# Patient Record
Sex: MALE | Race: BLACK OR AFRICAN AMERICAN | NOT HISPANIC OR LATINO
[De-identification: names, ages, dates, MRNs, and addresses within clinical notes are randomized per-mention and may not be internally consistent; named-entity substitution may affect disease eponyms.]

---

## 2017-01-02 ENCOUNTER — RESULT REVIEW (OUTPATIENT)
Age: 72
End: 2017-01-02

## 2017-01-03 ENCOUNTER — APPOINTMENT (OUTPATIENT)
Dept: INFUSION THERAPY | Facility: HOSPITAL | Age: 72
End: 2017-01-03

## 2017-01-04 ENCOUNTER — APPOINTMENT (OUTPATIENT)
Dept: NEPHROLOGY | Facility: CLINIC | Age: 72
End: 2017-01-04

## 2017-01-06 ENCOUNTER — LABORATORY RESULT (OUTPATIENT)
Age: 72
End: 2017-01-06

## 2017-01-06 ENCOUNTER — OUTPATIENT (OUTPATIENT)
Dept: OUTPATIENT SERVICES | Facility: HOSPITAL | Age: 72
LOS: 1 days | Discharge: HOME | End: 2017-01-06

## 2017-01-06 ENCOUNTER — APPOINTMENT (OUTPATIENT)
Dept: SPEECH THERAPY | Facility: CLINIC | Age: 72
End: 2017-01-06

## 2017-01-06 ENCOUNTER — APPOINTMENT (OUTPATIENT)
Dept: INFUSION THERAPY | Facility: HOSPITAL | Age: 72
End: 2017-01-06

## 2017-01-06 DIAGNOSIS — Z98.89 OTHER SPECIFIED POSTPROCEDURAL STATES: Chronic | ICD-10-CM

## 2017-01-06 DIAGNOSIS — Z90.49 ACQUIRED ABSENCE OF OTHER SPECIFIED PARTS OF DIGESTIVE TRACT: Chronic | ICD-10-CM

## 2017-01-09 ENCOUNTER — RESULT REVIEW (OUTPATIENT)
Age: 72
End: 2017-01-09

## 2017-01-10 ENCOUNTER — APPOINTMENT (OUTPATIENT)
Dept: INFUSION THERAPY | Facility: HOSPITAL | Age: 72
End: 2017-01-10

## 2017-01-10 ENCOUNTER — APPOINTMENT (OUTPATIENT)
Dept: HEMATOLOGY ONCOLOGY | Facility: CLINIC | Age: 72
End: 2017-01-10

## 2017-01-10 VITALS
OXYGEN SATURATION: 100 % | HEART RATE: 59 BPM | SYSTOLIC BLOOD PRESSURE: 115 MMHG | TEMPERATURE: 97.4 F | BODY MASS INDEX: 19.68 KG/M2 | RESPIRATION RATE: 16 BRPM | WEIGHT: 137.13 LBS | DIASTOLIC BLOOD PRESSURE: 76 MMHG

## 2017-01-13 ENCOUNTER — LABORATORY RESULT (OUTPATIENT)
Age: 72
End: 2017-01-13

## 2017-01-13 ENCOUNTER — APPOINTMENT (OUTPATIENT)
Dept: INFUSION THERAPY | Facility: HOSPITAL | Age: 72
End: 2017-01-13

## 2017-01-13 ENCOUNTER — APPOINTMENT (OUTPATIENT)
Dept: SPEECH THERAPY | Facility: CLINIC | Age: 72
End: 2017-01-13

## 2017-01-16 ENCOUNTER — RESULT REVIEW (OUTPATIENT)
Age: 72
End: 2017-01-16

## 2017-01-17 ENCOUNTER — APPOINTMENT (OUTPATIENT)
Dept: INFUSION THERAPY | Facility: HOSPITAL | Age: 72
End: 2017-01-17

## 2017-01-18 ENCOUNTER — APPOINTMENT (OUTPATIENT)
Dept: RADIATION ONCOLOGY | Facility: CLINIC | Age: 72
End: 2017-01-18

## 2017-01-18 ENCOUNTER — APPOINTMENT (OUTPATIENT)
Dept: SPEECH THERAPY | Facility: CLINIC | Age: 72
End: 2017-01-18

## 2017-01-18 VITALS
WEIGHT: 139 LBS | HEIGHT: 70 IN | OXYGEN SATURATION: 95 % | BODY MASS INDEX: 19.9 KG/M2 | DIASTOLIC BLOOD PRESSURE: 71 MMHG | SYSTOLIC BLOOD PRESSURE: 147 MMHG | HEART RATE: 61 BPM

## 2017-01-18 DIAGNOSIS — R13.12 DYSPHAGIA, OROPHARYNGEAL PHASE: ICD-10-CM

## 2017-01-20 ENCOUNTER — LABORATORY RESULT (OUTPATIENT)
Age: 72
End: 2017-01-20

## 2017-01-20 ENCOUNTER — APPOINTMENT (OUTPATIENT)
Dept: INFUSION THERAPY | Facility: HOSPITAL | Age: 72
End: 2017-01-20

## 2017-01-23 ENCOUNTER — RESULT REVIEW (OUTPATIENT)
Age: 72
End: 2017-01-23

## 2017-01-24 ENCOUNTER — APPOINTMENT (OUTPATIENT)
Dept: INFUSION THERAPY | Facility: HOSPITAL | Age: 72
End: 2017-01-24

## 2017-01-25 ENCOUNTER — RESULT REVIEW (OUTPATIENT)
Age: 72
End: 2017-01-25

## 2017-01-26 ENCOUNTER — LABORATORY RESULT (OUTPATIENT)
Age: 72
End: 2017-01-26

## 2017-01-26 ENCOUNTER — APPOINTMENT (OUTPATIENT)
Dept: INFUSION THERAPY | Facility: HOSPITAL | Age: 72
End: 2017-01-26

## 2017-01-30 ENCOUNTER — RESULT REVIEW (OUTPATIENT)
Age: 72
End: 2017-01-30

## 2017-01-30 ENCOUNTER — OUTPATIENT (OUTPATIENT)
Dept: OUTPATIENT SERVICES | Facility: HOSPITAL | Age: 72
LOS: 1 days | Discharge: HOME | End: 2017-01-30
Payer: MEDICARE

## 2017-01-30 DIAGNOSIS — Z98.89 OTHER SPECIFIED POSTPROCEDURAL STATES: Chronic | ICD-10-CM

## 2017-01-30 DIAGNOSIS — Z90.49 ACQUIRED ABSENCE OF OTHER SPECIFIED PARTS OF DIGESTIVE TRACT: Chronic | ICD-10-CM

## 2017-01-30 DIAGNOSIS — C11.9 MALIGNANT NEOPLASM OF NASOPHARYNX, UNSPECIFIED: ICD-10-CM

## 2017-01-31 ENCOUNTER — LABORATORY RESULT (OUTPATIENT)
Age: 72
End: 2017-01-31

## 2017-01-31 ENCOUNTER — APPOINTMENT (OUTPATIENT)
Dept: INFUSION THERAPY | Facility: HOSPITAL | Age: 72
End: 2017-01-31

## 2017-01-31 LAB
BASOPHILS # BLD AUTO: 0 K/UL — SIGNIFICANT CHANGE UP (ref 0–0.2)
BASOPHILS NFR BLD AUTO: 0 % — SIGNIFICANT CHANGE UP (ref 0–2)
EOSINOPHIL # BLD AUTO: 0.1 K/UL — SIGNIFICANT CHANGE UP (ref 0–0.5)
EOSINOPHIL NFR BLD AUTO: 1.1 % — SIGNIFICANT CHANGE UP (ref 0–6)
HCT VFR BLD CALC: 33.5 % — LOW (ref 39–50)
HGB BLD-MCNC: 11.5 G/DL — LOW (ref 13–17)
LYMPHOCYTES # BLD AUTO: 1.3 K/UL — SIGNIFICANT CHANGE UP (ref 1–3.3)
LYMPHOCYTES # BLD AUTO: 18.9 % — SIGNIFICANT CHANGE UP (ref 13–44)
MCHC RBC-ENTMCNC: 34.2 GM/DL — SIGNIFICANT CHANGE UP (ref 32–36)
MCHC RBC-ENTMCNC: 35.4 PG — HIGH (ref 27–34)
MCV RBC AUTO: 103 FL — HIGH (ref 80–100)
MONOCYTES # BLD AUTO: 0.4 K/UL — SIGNIFICANT CHANGE UP (ref 0–0.9)
MONOCYTES NFR BLD AUTO: 5.6 % — SIGNIFICANT CHANGE UP (ref 2–14)
NEUTROPHILS # BLD AUTO: 5.1 K/UL — SIGNIFICANT CHANGE UP (ref 1.8–7.4)
NEUTROPHILS NFR BLD AUTO: 74.4 % — SIGNIFICANT CHANGE UP (ref 43–77)
PLATELET # BLD AUTO: 262 K/UL — SIGNIFICANT CHANGE UP (ref 150–400)
RBC # BLD: 3.25 M/UL — LOW (ref 4.2–5.8)
RBC # FLD: 17 % — HIGH (ref 10.3–14.5)
WBC # BLD: 6.8 K/UL — SIGNIFICANT CHANGE UP (ref 3.8–10.5)
WBC # FLD AUTO: 6.8 K/UL — SIGNIFICANT CHANGE UP (ref 3.8–10.5)

## 2017-02-01 DIAGNOSIS — E86.0 DEHYDRATION: ICD-10-CM

## 2017-02-03 ENCOUNTER — APPOINTMENT (OUTPATIENT)
Dept: INFUSION THERAPY | Facility: HOSPITAL | Age: 72
End: 2017-02-03

## 2017-02-06 ENCOUNTER — RESULT REVIEW (OUTPATIENT)
Age: 72
End: 2017-02-06

## 2017-02-07 ENCOUNTER — APPOINTMENT (OUTPATIENT)
Dept: INFUSION THERAPY | Facility: HOSPITAL | Age: 72
End: 2017-02-07

## 2017-02-07 ENCOUNTER — LABORATORY RESULT (OUTPATIENT)
Age: 72
End: 2017-02-07

## 2017-02-07 ENCOUNTER — APPOINTMENT (OUTPATIENT)
Dept: HEMATOLOGY ONCOLOGY | Facility: CLINIC | Age: 72
End: 2017-02-07

## 2017-02-07 VITALS
TEMPERATURE: 98.1 F | HEART RATE: 68 BPM | DIASTOLIC BLOOD PRESSURE: 66 MMHG | WEIGHT: 134.48 LBS | RESPIRATION RATE: 16 BRPM | SYSTOLIC BLOOD PRESSURE: 102 MMHG | OXYGEN SATURATION: 98 % | BODY MASS INDEX: 19.3 KG/M2

## 2017-02-07 LAB
BASOPHILS # BLD AUTO: 0 K/UL — SIGNIFICANT CHANGE UP (ref 0–0.2)
BASOPHILS NFR BLD AUTO: 2 % — SIGNIFICANT CHANGE UP (ref 0–2)
EOSINOPHIL # BLD AUTO: 0.1 K/UL — SIGNIFICANT CHANGE UP (ref 0–0.5)
EOSINOPHIL NFR BLD AUTO: 2 % — SIGNIFICANT CHANGE UP (ref 0–6)
HCT VFR BLD CALC: 32.6 % — LOW (ref 39–50)
HGB BLD-MCNC: 10.8 G/DL — LOW (ref 13–17)
LYMPHOCYTES # BLD AUTO: 1.2 K/UL — SIGNIFICANT CHANGE UP (ref 1–3.3)
LYMPHOCYTES # BLD AUTO: 40 % — SIGNIFICANT CHANGE UP (ref 13–44)
MCHC RBC-ENTMCNC: 33.2 GM/DL — SIGNIFICANT CHANGE UP (ref 32–36)
MCHC RBC-ENTMCNC: 35.1 PG — HIGH (ref 27–34)
MCV RBC AUTO: 106 FL — HIGH (ref 80–100)
METAMYELOCYTES # FLD: 2 % — HIGH (ref 0–0)
MONOCYTES # BLD AUTO: 0.6 K/UL — SIGNIFICANT CHANGE UP (ref 0–0.9)
MONOCYTES NFR BLD AUTO: 17 % — HIGH (ref 2–14)
NEUTROPHILS # BLD AUTO: 1.6 K/UL — LOW (ref 1.8–7.4)
NEUTROPHILS NFR BLD AUTO: 37 % — LOW (ref 43–77)
PLAT MORPH BLD: NORMAL — SIGNIFICANT CHANGE UP
PLATELET # BLD AUTO: 285 K/UL — SIGNIFICANT CHANGE UP (ref 150–400)
RBC # BLD: 3.08 M/UL — LOW (ref 4.2–5.8)
RBC # FLD: 16.6 % — HIGH (ref 10.3–14.5)
RBC BLD AUTO: SIGNIFICANT CHANGE UP
WBC # BLD: 3.5 K/UL — LOW (ref 3.8–10.5)
WBC # FLD AUTO: 3.5 K/UL — LOW (ref 3.8–10.5)

## 2017-02-10 ENCOUNTER — APPOINTMENT (OUTPATIENT)
Dept: INFUSION THERAPY | Facility: HOSPITAL | Age: 72
End: 2017-02-10

## 2017-02-13 ENCOUNTER — RESULT REVIEW (OUTPATIENT)
Age: 72
End: 2017-02-13

## 2017-02-14 ENCOUNTER — APPOINTMENT (OUTPATIENT)
Dept: INFUSION THERAPY | Facility: HOSPITAL | Age: 72
End: 2017-02-14

## 2017-02-14 LAB
BASOPHILS # BLD AUTO: 0 K/UL — SIGNIFICANT CHANGE UP (ref 0–0.2)
BASOPHILS NFR BLD AUTO: 0.5 % — SIGNIFICANT CHANGE UP (ref 0–2)
EOSINOPHIL # BLD AUTO: 0 K/UL — SIGNIFICANT CHANGE UP (ref 0–0.5)
EOSINOPHIL NFR BLD AUTO: 0 % — SIGNIFICANT CHANGE UP (ref 0–6)
HCT VFR BLD CALC: 37.2 % — LOW (ref 39–50)
HGB BLD-MCNC: 12.3 G/DL — LOW (ref 13–17)
LYMPHOCYTES # BLD AUTO: 1.5 K/UL — SIGNIFICANT CHANGE UP (ref 1–3.3)
LYMPHOCYTES # BLD AUTO: 31.8 % — SIGNIFICANT CHANGE UP (ref 13–44)
MCHC RBC-ENTMCNC: 33 G/DL — SIGNIFICANT CHANGE UP (ref 32–36)
MCHC RBC-ENTMCNC: 34.3 PG — HIGH (ref 27–34)
MCV RBC AUTO: 104 FL — HIGH (ref 80–100)
MONOCYTES # BLD AUTO: 0.6 K/UL — SIGNIFICANT CHANGE UP (ref 0–0.9)
MONOCYTES NFR BLD AUTO: 12.4 % — SIGNIFICANT CHANGE UP (ref 2–14)
NEUTROPHILS # BLD AUTO: 2.7 K/UL — SIGNIFICANT CHANGE UP (ref 1.8–7.4)
NEUTROPHILS NFR BLD AUTO: 55.2 % — SIGNIFICANT CHANGE UP (ref 43–77)
PLATELET # BLD AUTO: 308 K/UL — SIGNIFICANT CHANGE UP (ref 150–400)
RBC # BLD: 3.58 M/UL — LOW (ref 4.2–5.8)
RBC # FLD: 16.1 % — HIGH (ref 10.3–14.5)
WBC # BLD: 4.8 K/UL — SIGNIFICANT CHANGE UP (ref 3.8–10.5)
WBC # FLD AUTO: 4.8 K/UL — SIGNIFICANT CHANGE UP (ref 3.8–10.5)

## 2017-02-17 ENCOUNTER — APPOINTMENT (OUTPATIENT)
Dept: INFUSION THERAPY | Facility: HOSPITAL | Age: 72
End: 2017-02-17

## 2017-02-17 PROCEDURE — 93010 ELECTROCARDIOGRAM REPORT: CPT

## 2017-02-20 ENCOUNTER — RESULT REVIEW (OUTPATIENT)
Age: 72
End: 2017-02-20

## 2017-02-21 ENCOUNTER — APPOINTMENT (OUTPATIENT)
Dept: INFUSION THERAPY | Facility: HOSPITAL | Age: 72
End: 2017-02-21

## 2017-02-21 ENCOUNTER — LABORATORY RESULT (OUTPATIENT)
Age: 72
End: 2017-02-21

## 2017-02-21 LAB
BASOPHILS # BLD AUTO: 0 K/UL — SIGNIFICANT CHANGE UP (ref 0–0.2)
BASOPHILS NFR BLD AUTO: 0.3 % — SIGNIFICANT CHANGE UP (ref 0–2)
EOSINOPHIL # BLD AUTO: 0.2 K/UL — SIGNIFICANT CHANGE UP (ref 0–0.5)
EOSINOPHIL NFR BLD AUTO: 4.2 % — SIGNIFICANT CHANGE UP (ref 0–6)
HCT VFR BLD CALC: 33.4 % — LOW (ref 39–50)
HGB BLD-MCNC: 11.4 G/DL — LOW (ref 13–17)
LYMPHOCYTES # BLD AUTO: 1.3 K/UL — SIGNIFICANT CHANGE UP (ref 1–3.3)
LYMPHOCYTES # BLD AUTO: 28.9 % — SIGNIFICANT CHANGE UP (ref 13–44)
MCHC RBC-ENTMCNC: 34.2 G/DL — SIGNIFICANT CHANGE UP (ref 32–36)
MCHC RBC-ENTMCNC: 35.6 PG — HIGH (ref 27–34)
MCV RBC AUTO: 104 FL — HIGH (ref 80–100)
MONOCYTES # BLD AUTO: 0.7 K/UL — SIGNIFICANT CHANGE UP (ref 0–0.9)
MONOCYTES NFR BLD AUTO: 14.7 % — HIGH (ref 2–14)
NEUTROPHILS # BLD AUTO: 2.4 K/UL — SIGNIFICANT CHANGE UP (ref 1.8–7.4)
NEUTROPHILS NFR BLD AUTO: 51.9 % — SIGNIFICANT CHANGE UP (ref 43–77)
PLATELET # BLD AUTO: 254 K/UL — SIGNIFICANT CHANGE UP (ref 150–400)
RBC # BLD: 3.2 M/UL — LOW (ref 4.2–5.8)
RBC # FLD: 15.9 % — HIGH (ref 10.3–14.5)
WBC # BLD: 4.6 K/UL — SIGNIFICANT CHANGE UP (ref 3.8–10.5)
WBC # FLD AUTO: 4.6 K/UL — SIGNIFICANT CHANGE UP (ref 3.8–10.5)

## 2017-02-22 ENCOUNTER — APPOINTMENT (OUTPATIENT)
Dept: NEPHROLOGY | Facility: CLINIC | Age: 72
End: 2017-02-22

## 2017-02-22 VITALS
DIASTOLIC BLOOD PRESSURE: 85 MMHG | BODY MASS INDEX: 19.61 KG/M2 | SYSTOLIC BLOOD PRESSURE: 151 MMHG | HEIGHT: 70 IN | OXYGEN SATURATION: 98 % | HEART RATE: 66 BPM | WEIGHT: 137 LBS

## 2017-02-22 VITALS — SYSTOLIC BLOOD PRESSURE: 130 MMHG | DIASTOLIC BLOOD PRESSURE: 80 MMHG

## 2017-02-22 RX ORDER — FLUCONAZOLE 40 MG/ML
40 POWDER, FOR SUSPENSION ORAL
Qty: 1 | Refills: 0 | Status: DISCONTINUED | COMMUNITY
Start: 2017-01-18 | End: 2017-02-22

## 2017-02-23 DIAGNOSIS — Z51.11 ENCOUNTER FOR ANTINEOPLASTIC CHEMOTHERAPY: ICD-10-CM

## 2017-02-23 DIAGNOSIS — R11.2 NAUSEA WITH VOMITING, UNSPECIFIED: ICD-10-CM

## 2017-02-24 ENCOUNTER — APPOINTMENT (OUTPATIENT)
Dept: INFUSION THERAPY | Facility: HOSPITAL | Age: 72
End: 2017-02-24

## 2017-02-27 ENCOUNTER — RESULT REVIEW (OUTPATIENT)
Age: 72
End: 2017-02-27

## 2017-02-27 ENCOUNTER — OUTPATIENT (OUTPATIENT)
Dept: OUTPATIENT SERVICES | Facility: HOSPITAL | Age: 72
LOS: 1 days | Discharge: HOME | End: 2017-02-27

## 2017-02-27 DIAGNOSIS — C11.9 MALIGNANT NEOPLASM OF NASOPHARYNX, UNSPECIFIED: ICD-10-CM

## 2017-02-27 DIAGNOSIS — Z98.89 OTHER SPECIFIED POSTPROCEDURAL STATES: Chronic | ICD-10-CM

## 2017-02-27 DIAGNOSIS — Z90.49 ACQUIRED ABSENCE OF OTHER SPECIFIED PARTS OF DIGESTIVE TRACT: Chronic | ICD-10-CM

## 2017-02-28 ENCOUNTER — LABORATORY RESULT (OUTPATIENT)
Age: 72
End: 2017-02-28

## 2017-02-28 ENCOUNTER — APPOINTMENT (OUTPATIENT)
Dept: INFUSION THERAPY | Facility: HOSPITAL | Age: 72
End: 2017-02-28

## 2017-02-28 LAB
BASO STIPL BLD QL SMEAR: PRESENT — SIGNIFICANT CHANGE UP
BASOPHILS # BLD AUTO: 0 K/UL — SIGNIFICANT CHANGE UP (ref 0–0.2)
BASOPHILS NFR BLD AUTO: 1 % — SIGNIFICANT CHANGE UP (ref 0–2)
EOSINOPHIL # BLD AUTO: 0 K/UL — SIGNIFICANT CHANGE UP (ref 0–0.5)
EOSINOPHIL NFR BLD AUTO: 1 % — SIGNIFICANT CHANGE UP (ref 0–6)
HCT VFR BLD CALC: 34.7 % — LOW (ref 39–50)
HGB BLD-MCNC: 12 G/DL — LOW (ref 13–17)
LYMPHOCYTES # BLD AUTO: 1.5 K/UL — SIGNIFICANT CHANGE UP (ref 1–3.3)
LYMPHOCYTES # BLD AUTO: 18 % — SIGNIFICANT CHANGE UP (ref 13–44)
MCHC RBC-ENTMCNC: 34.6 G/DL — SIGNIFICANT CHANGE UP (ref 32–36)
MCHC RBC-ENTMCNC: 35.5 PG — HIGH (ref 27–34)
MCV RBC AUTO: 103 FL — HIGH (ref 80–100)
MONOCYTES # BLD AUTO: 0.5 K/UL — SIGNIFICANT CHANGE UP (ref 0–0.9)
MONOCYTES NFR BLD AUTO: 15 % — HIGH (ref 2–14)
NEUTROPHILS # BLD AUTO: 3.4 K/UL — SIGNIFICANT CHANGE UP (ref 1.8–7.4)
NEUTROPHILS NFR BLD AUTO: 65 % — SIGNIFICANT CHANGE UP (ref 43–77)
PLAT MORPH BLD: NORMAL — SIGNIFICANT CHANGE UP
PLATELET # BLD AUTO: 214 K/UL — SIGNIFICANT CHANGE UP (ref 150–400)
RBC # BLD: 3.39 M/UL — LOW (ref 4.2–5.8)
RBC # FLD: 15.8 % — HIGH (ref 10.3–14.5)
RBC BLD AUTO: SIGNIFICANT CHANGE UP
WBC # BLD: 5.4 K/UL — SIGNIFICANT CHANGE UP (ref 3.8–10.5)
WBC # FLD AUTO: 5.4 K/UL — SIGNIFICANT CHANGE UP (ref 3.8–10.5)

## 2017-03-01 DIAGNOSIS — R11.2 NAUSEA WITH VOMITING, UNSPECIFIED: ICD-10-CM

## 2017-03-01 DIAGNOSIS — Z51.11 ENCOUNTER FOR ANTINEOPLASTIC CHEMOTHERAPY: ICD-10-CM

## 2017-03-01 DIAGNOSIS — E86.0 DEHYDRATION: ICD-10-CM

## 2017-03-04 ENCOUNTER — APPOINTMENT (OUTPATIENT)
Dept: INFUSION THERAPY | Facility: HOSPITAL | Age: 72
End: 2017-03-04

## 2017-03-05 ENCOUNTER — RESULT REVIEW (OUTPATIENT)
Age: 72
End: 2017-03-05

## 2017-03-06 ENCOUNTER — APPOINTMENT (OUTPATIENT)
Dept: INFUSION THERAPY | Facility: HOSPITAL | Age: 72
End: 2017-03-06

## 2017-03-06 LAB
EOSINOPHIL # BLD AUTO: 0 K/UL — SIGNIFICANT CHANGE UP (ref 0–0.5)
EOSINOPHIL NFR BLD AUTO: 4 % — SIGNIFICANT CHANGE UP (ref 0–6)
HCT VFR BLD CALC: 35.2 % — LOW (ref 39–50)
HGB BLD-MCNC: 11.9 G/DL — LOW (ref 13–17)
LYMPHOCYTES # BLD AUTO: 1 K/UL — SIGNIFICANT CHANGE UP (ref 1–3.3)
LYMPHOCYTES # BLD AUTO: 27 % — SIGNIFICANT CHANGE UP (ref 13–44)
MCHC RBC-ENTMCNC: 33.9 G/DL — SIGNIFICANT CHANGE UP (ref 32–36)
MCHC RBC-ENTMCNC: 35.2 PG — HIGH (ref 27–34)
MCV RBC AUTO: 104 FL — HIGH (ref 80–100)
MONOCYTES # BLD AUTO: 0.3 K/UL — SIGNIFICANT CHANGE UP (ref 0–0.9)
MONOCYTES NFR BLD AUTO: 10 % — SIGNIFICANT CHANGE UP (ref 2–14)
NEUTROPHILS # BLD AUTO: 2.1 K/UL — SIGNIFICANT CHANGE UP (ref 1.8–7.4)
NEUTROPHILS NFR BLD AUTO: 59 % — SIGNIFICANT CHANGE UP (ref 43–77)
NRBC # BLD: 1 /100 — HIGH (ref 0–0)
PLAT MORPH BLD: NORMAL — SIGNIFICANT CHANGE UP
PLATELET # BLD AUTO: 245 K/UL — SIGNIFICANT CHANGE UP (ref 150–400)
RBC # BLD: 3.39 M/UL — LOW (ref 4.2–5.8)
RBC # FLD: 16 % — HIGH (ref 10.3–14.5)
RBC BLD AUTO: SIGNIFICANT CHANGE UP
WBC # BLD: 3.5 K/UL — LOW (ref 3.8–10.5)
WBC # FLD AUTO: 3.5 K/UL — LOW (ref 3.8–10.5)

## 2017-03-07 ENCOUNTER — OTHER (OUTPATIENT)
Age: 72
End: 2017-03-07

## 2017-03-10 ENCOUNTER — APPOINTMENT (OUTPATIENT)
Dept: INFUSION THERAPY | Facility: HOSPITAL | Age: 72
End: 2017-03-10

## 2017-03-12 ENCOUNTER — RESULT REVIEW (OUTPATIENT)
Age: 72
End: 2017-03-12

## 2017-03-13 ENCOUNTER — LABORATORY RESULT (OUTPATIENT)
Age: 72
End: 2017-03-13

## 2017-03-13 ENCOUNTER — APPOINTMENT (OUTPATIENT)
Dept: INFUSION THERAPY | Facility: HOSPITAL | Age: 72
End: 2017-03-13

## 2017-03-13 LAB
BASOPHILS # BLD AUTO: 0 K/UL — SIGNIFICANT CHANGE UP (ref 0–0.2)
BASOPHILS NFR BLD AUTO: 0.7 % — SIGNIFICANT CHANGE UP (ref 0–2)
EOSINOPHIL # BLD AUTO: 0.2 K/UL — SIGNIFICANT CHANGE UP (ref 0–0.5)
EOSINOPHIL NFR BLD AUTO: 4.3 % — SIGNIFICANT CHANGE UP (ref 0–6)
HCT VFR BLD CALC: 33.8 % — LOW (ref 39–50)
HGB BLD-MCNC: 11.8 G/DL — LOW (ref 13–17)
LYMPHOCYTES # BLD AUTO: 1.4 K/UL — SIGNIFICANT CHANGE UP (ref 1–3.3)
LYMPHOCYTES # BLD AUTO: 28.1 % — SIGNIFICANT CHANGE UP (ref 13–44)
MCHC RBC-ENTMCNC: 34.9 G/DL — SIGNIFICANT CHANGE UP (ref 32–36)
MCHC RBC-ENTMCNC: 35.6 PG — HIGH (ref 27–34)
MCV RBC AUTO: 102 FL — HIGH (ref 80–100)
MONOCYTES # BLD AUTO: 0.4 K/UL — SIGNIFICANT CHANGE UP (ref 0–0.9)
MONOCYTES NFR BLD AUTO: 7.8 % — SIGNIFICANT CHANGE UP (ref 2–14)
NEUTROPHILS # BLD AUTO: 2.8 K/UL — SIGNIFICANT CHANGE UP (ref 1.8–7.4)
NEUTROPHILS NFR BLD AUTO: 59.1 % — SIGNIFICANT CHANGE UP (ref 43–77)
PLATELET # BLD AUTO: 210 K/UL — SIGNIFICANT CHANGE UP (ref 150–400)
RBC # BLD: 3.31 M/UL — LOW (ref 4.2–5.8)
RBC # FLD: 15.9 % — HIGH (ref 10.3–14.5)
WBC # BLD: 4.8 K/UL — SIGNIFICANT CHANGE UP (ref 3.8–10.5)
WBC # FLD AUTO: 4.8 K/UL — SIGNIFICANT CHANGE UP (ref 3.8–10.5)

## 2017-03-14 ENCOUNTER — APPOINTMENT (OUTPATIENT)
Dept: INFUSION THERAPY | Facility: HOSPITAL | Age: 72
End: 2017-03-14

## 2017-03-17 ENCOUNTER — APPOINTMENT (OUTPATIENT)
Dept: INFUSION THERAPY | Facility: HOSPITAL | Age: 72
End: 2017-03-17

## 2017-03-20 ENCOUNTER — RESULT REVIEW (OUTPATIENT)
Age: 72
End: 2017-03-20

## 2017-03-21 ENCOUNTER — LABORATORY RESULT (OUTPATIENT)
Age: 72
End: 2017-03-21

## 2017-03-21 ENCOUNTER — APPOINTMENT (OUTPATIENT)
Dept: INFUSION THERAPY | Facility: HOSPITAL | Age: 72
End: 2017-03-21

## 2017-03-21 LAB
BASOPHILS # BLD AUTO: 0 K/UL — SIGNIFICANT CHANGE UP (ref 0–0.2)
BASOPHILS NFR BLD AUTO: 0.2 % — SIGNIFICANT CHANGE UP (ref 0–2)
EOSINOPHIL # BLD AUTO: 0.2 K/UL — SIGNIFICANT CHANGE UP (ref 0–0.5)
EOSINOPHIL NFR BLD AUTO: 4.2 % — SIGNIFICANT CHANGE UP (ref 0–6)
HCT VFR BLD CALC: 34.8 % — LOW (ref 39–50)
HGB BLD-MCNC: 12.2 G/DL — LOW (ref 13–17)
LYMPHOCYTES # BLD AUTO: 1.2 K/UL — SIGNIFICANT CHANGE UP (ref 1–3.3)
LYMPHOCYTES # BLD AUTO: 28.2 % — SIGNIFICANT CHANGE UP (ref 13–44)
MCHC RBC-ENTMCNC: 35 G/DL — SIGNIFICANT CHANGE UP (ref 32–36)
MCHC RBC-ENTMCNC: 35.8 PG — HIGH (ref 27–34)
MCV RBC AUTO: 102 FL — HIGH (ref 80–100)
MONOCYTES # BLD AUTO: 0.2 K/UL — SIGNIFICANT CHANGE UP (ref 0–0.9)
MONOCYTES NFR BLD AUTO: 5.5 % — SIGNIFICANT CHANGE UP (ref 2–14)
NEUTROPHILS # BLD AUTO: 2.8 K/UL — SIGNIFICANT CHANGE UP (ref 1.8–7.4)
NEUTROPHILS NFR BLD AUTO: 62 % — SIGNIFICANT CHANGE UP (ref 43–77)
PLATELET # BLD AUTO: 215 K/UL — SIGNIFICANT CHANGE UP (ref 150–400)
RBC # BLD: 3.4 M/UL — LOW (ref 4.2–5.8)
RBC # FLD: 16 % — HIGH (ref 10.3–14.5)
WBC # BLD: 4.4 K/UL — SIGNIFICANT CHANGE UP (ref 3.8–10.5)
WBC # FLD AUTO: 4.4 K/UL — SIGNIFICANT CHANGE UP (ref 3.8–10.5)

## 2017-03-23 ENCOUNTER — RESULT REVIEW (OUTPATIENT)
Age: 72
End: 2017-03-23

## 2017-03-24 ENCOUNTER — LABORATORY RESULT (OUTPATIENT)
Age: 72
End: 2017-03-24

## 2017-03-24 ENCOUNTER — APPOINTMENT (OUTPATIENT)
Dept: INFUSION THERAPY | Facility: HOSPITAL | Age: 72
End: 2017-03-24

## 2017-03-24 LAB
BASOPHILS # BLD AUTO: 0 K/UL — SIGNIFICANT CHANGE UP (ref 0–0.2)
EOSINOPHIL # BLD AUTO: 0 K/UL — SIGNIFICANT CHANGE UP (ref 0–0.5)
EOSINOPHIL NFR BLD AUTO: 2 % — SIGNIFICANT CHANGE UP (ref 0–6)
HCT VFR BLD CALC: 33.1 % — LOW (ref 39–50)
HGB BLD-MCNC: 12 G/DL — LOW (ref 13–17)
LYMPHOCYTES # BLD AUTO: 1 K/UL — SIGNIFICANT CHANGE UP (ref 1–3.3)
LYMPHOCYTES # BLD AUTO: 35 % — SIGNIFICANT CHANGE UP (ref 13–44)
MCHC RBC-ENTMCNC: 36.3 G/DL — HIGH (ref 32–36)
MCHC RBC-ENTMCNC: 37 PG — HIGH (ref 27–34)
MCV RBC AUTO: 102 FL — HIGH (ref 80–100)
MONOCYTES # BLD AUTO: 0.6 K/UL — SIGNIFICANT CHANGE UP (ref 0–0.9)
MONOCYTES NFR BLD AUTO: 17 % — HIGH (ref 2–14)
NEUTROPHILS # BLD AUTO: 2.3 K/UL — SIGNIFICANT CHANGE UP (ref 1.8–7.4)
NEUTROPHILS NFR BLD AUTO: 46 % — SIGNIFICANT CHANGE UP (ref 43–77)
PLAT MORPH BLD: NORMAL — SIGNIFICANT CHANGE UP
PLATELET # BLD AUTO: 244 K/UL — SIGNIFICANT CHANGE UP (ref 150–400)
RBC # BLD: 3.25 M/UL — LOW (ref 4.2–5.8)
RBC # FLD: 16.1 % — HIGH (ref 10.3–14.5)
RBC BLD AUTO: SIGNIFICANT CHANGE UP
WBC # BLD: 4 K/UL — SIGNIFICANT CHANGE UP (ref 3.8–10.5)
WBC # FLD AUTO: 4 K/UL — SIGNIFICANT CHANGE UP (ref 3.8–10.5)

## 2017-03-27 ENCOUNTER — RESULT REVIEW (OUTPATIENT)
Age: 72
End: 2017-03-27

## 2017-03-27 ENCOUNTER — OUTPATIENT (OUTPATIENT)
Dept: OUTPATIENT SERVICES | Facility: HOSPITAL | Age: 72
LOS: 1 days | Discharge: HOME | End: 2017-03-27

## 2017-03-27 DIAGNOSIS — Z98.89 OTHER SPECIFIED POSTPROCEDURAL STATES: Chronic | ICD-10-CM

## 2017-03-27 DIAGNOSIS — C11.9 MALIGNANT NEOPLASM OF NASOPHARYNX, UNSPECIFIED: ICD-10-CM

## 2017-03-27 DIAGNOSIS — Z90.49 ACQUIRED ABSENCE OF OTHER SPECIFIED PARTS OF DIGESTIVE TRACT: Chronic | ICD-10-CM

## 2017-03-28 ENCOUNTER — LABORATORY RESULT (OUTPATIENT)
Age: 72
End: 2017-03-28

## 2017-03-28 ENCOUNTER — APPOINTMENT (OUTPATIENT)
Dept: HEMATOLOGY ONCOLOGY | Facility: CLINIC | Age: 72
End: 2017-03-28

## 2017-03-28 ENCOUNTER — APPOINTMENT (OUTPATIENT)
Dept: INFUSION THERAPY | Facility: HOSPITAL | Age: 72
End: 2017-03-28

## 2017-03-28 LAB
BASOPHILS # BLD AUTO: 0 K/UL — SIGNIFICANT CHANGE UP (ref 0–0.2)
BASOPHILS NFR BLD AUTO: 2 % — SIGNIFICANT CHANGE UP (ref 0–2)
EOSINOPHIL # BLD AUTO: 0.2 K/UL — SIGNIFICANT CHANGE UP (ref 0–0.5)
HCT VFR BLD CALC: 32.2 % — LOW (ref 39–50)
HGB BLD-MCNC: 11.3 G/DL — LOW (ref 13–17)
LYMPHOCYTES # BLD AUTO: 1.3 K/UL — SIGNIFICANT CHANGE UP (ref 1–3.3)
LYMPHOCYTES # BLD AUTO: 50 % — HIGH (ref 13–44)
MCHC RBC-ENTMCNC: 35.1 G/DL — SIGNIFICANT CHANGE UP (ref 32–36)
MCHC RBC-ENTMCNC: 35.8 PG — HIGH (ref 27–34)
MCV RBC AUTO: 102 FL — HIGH (ref 80–100)
MONOCYTES # BLD AUTO: 0.6 K/UL — SIGNIFICANT CHANGE UP (ref 0–0.9)
MONOCYTES NFR BLD AUTO: 15 % — HIGH (ref 2–14)
NEUTROPHILS # BLD AUTO: 1.1 K/UL — LOW (ref 1.8–7.4)
NEUTROPHILS NFR BLD AUTO: 33 % — LOW (ref 43–77)
PLAT MORPH BLD: NORMAL — SIGNIFICANT CHANGE UP
PLATELET # BLD AUTO: 268 K/UL — SIGNIFICANT CHANGE UP (ref 150–400)
RBC # BLD: 3.15 M/UL — LOW (ref 4.2–5.8)
RBC # FLD: 15.6 % — HIGH (ref 10.3–14.5)
RBC BLD AUTO: SIGNIFICANT CHANGE UP
WBC # BLD: 3.1 K/UL — LOW (ref 3.8–10.5)
WBC # FLD AUTO: 3.1 K/UL — LOW (ref 3.8–10.5)

## 2017-03-29 DIAGNOSIS — R11.2 NAUSEA WITH VOMITING, UNSPECIFIED: ICD-10-CM

## 2017-03-29 DIAGNOSIS — Z51.11 ENCOUNTER FOR ANTINEOPLASTIC CHEMOTHERAPY: ICD-10-CM

## 2017-03-31 ENCOUNTER — RESULT REVIEW (OUTPATIENT)
Age: 72
End: 2017-03-31

## 2017-04-01 ENCOUNTER — APPOINTMENT (OUTPATIENT)
Dept: INFUSION THERAPY | Facility: HOSPITAL | Age: 72
End: 2017-04-01

## 2017-04-01 LAB
BASOPHILS # BLD AUTO: 0 K/UL — SIGNIFICANT CHANGE UP (ref 0–0.2)
BASOPHILS NFR BLD AUTO: 0.5 % — SIGNIFICANT CHANGE UP (ref 0–2)
EOSINOPHIL # BLD AUTO: 0.1 K/UL — SIGNIFICANT CHANGE UP (ref 0–0.5)
EOSINOPHIL NFR BLD AUTO: 2 % — SIGNIFICANT CHANGE UP (ref 0–6)
HCT VFR BLD CALC: 36.7 % — LOW (ref 39–50)
HGB BLD-MCNC: 12.3 G/DL — LOW (ref 13–17)
LYMPHOCYTES # BLD AUTO: 1.3 K/UL — SIGNIFICANT CHANGE UP (ref 1–3.3)
LYMPHOCYTES # BLD AUTO: 39.1 % — SIGNIFICANT CHANGE UP (ref 13–44)
MCHC RBC-ENTMCNC: 33.5 G/DL — SIGNIFICANT CHANGE UP (ref 32–36)
MCHC RBC-ENTMCNC: 35.4 PG — HIGH (ref 27–34)
MCV RBC AUTO: 106 FL — HIGH (ref 80–100)
MONOCYTES # BLD AUTO: 0.5 K/UL — SIGNIFICANT CHANGE UP (ref 0–0.9)
MONOCYTES NFR BLD AUTO: 14 % — SIGNIFICANT CHANGE UP (ref 2–14)
NEUTROPHILS # BLD AUTO: 1.5 K/UL — LOW (ref 1.8–7.4)
NEUTROPHILS NFR BLD AUTO: 44.4 % — SIGNIFICANT CHANGE UP (ref 43–77)
PLATELET # BLD AUTO: 276 K/UL — SIGNIFICANT CHANGE UP (ref 150–400)
RBC # BLD: 3.47 M/UL — LOW (ref 4.2–5.8)
RBC # FLD: 15.7 % — HIGH (ref 10.3–14.5)
WBC # BLD: 3.3 K/UL — LOW (ref 3.8–10.5)
WBC # FLD AUTO: 3.3 K/UL — LOW (ref 3.8–10.5)

## 2017-04-04 DIAGNOSIS — E86.0 DEHYDRATION: ICD-10-CM

## 2017-04-05 ENCOUNTER — FORM ENCOUNTER (OUTPATIENT)
Age: 72
End: 2017-04-05

## 2017-04-06 ENCOUNTER — APPOINTMENT (OUTPATIENT)
Dept: NUCLEAR MEDICINE | Facility: IMAGING CENTER | Age: 72
End: 2017-04-06

## 2017-04-06 ENCOUNTER — OUTPATIENT (OUTPATIENT)
Dept: OUTPATIENT SERVICES | Facility: HOSPITAL | Age: 72
LOS: 1 days | End: 2017-04-06
Payer: MEDICARE

## 2017-04-06 ENCOUNTER — APPOINTMENT (OUTPATIENT)
Dept: MRI IMAGING | Facility: IMAGING CENTER | Age: 72
End: 2017-04-06

## 2017-04-06 DIAGNOSIS — C11.9 MALIGNANT NEOPLASM OF NASOPHARYNX, UNSPECIFIED: ICD-10-CM

## 2017-04-06 DIAGNOSIS — Z90.49 ACQUIRED ABSENCE OF OTHER SPECIFIED PARTS OF DIGESTIVE TRACT: Chronic | ICD-10-CM

## 2017-04-06 DIAGNOSIS — Z98.89 OTHER SPECIFIED POSTPROCEDURAL STATES: Chronic | ICD-10-CM

## 2017-04-06 PROCEDURE — A9552: CPT

## 2017-04-06 PROCEDURE — 70551 MRI BRAIN STEM W/O DYE: CPT

## 2017-04-06 PROCEDURE — 78815 PET IMAGE W/CT SKULL-THIGH: CPT

## 2017-04-11 ENCOUNTER — APPOINTMENT (OUTPATIENT)
Dept: HEMATOLOGY ONCOLOGY | Facility: CLINIC | Age: 72
End: 2017-04-11

## 2017-04-11 ENCOUNTER — APPOINTMENT (OUTPATIENT)
Dept: RADIATION ONCOLOGY | Facility: CLINIC | Age: 72
End: 2017-04-11

## 2017-04-11 ENCOUNTER — OTHER (OUTPATIENT)
Age: 72
End: 2017-04-11

## 2017-04-11 VITALS
HEART RATE: 90 BPM | RESPIRATION RATE: 17 BRPM | BODY MASS INDEX: 19.3 KG/M2 | SYSTOLIC BLOOD PRESSURE: 100 MMHG | DIASTOLIC BLOOD PRESSURE: 60 MMHG | WEIGHT: 134.48 LBS | TEMPERATURE: 98.8 F | OXYGEN SATURATION: 97 %

## 2017-04-11 VITALS
HEIGHT: 70 IN | RESPIRATION RATE: 16 BRPM | HEART RATE: 83 BPM | WEIGHT: 133.81 LBS | TEMPERATURE: 96.9 F | SYSTOLIC BLOOD PRESSURE: 125 MMHG | BODY MASS INDEX: 19.16 KG/M2 | DIASTOLIC BLOOD PRESSURE: 82 MMHG | OXYGEN SATURATION: 98 %

## 2017-05-01 ENCOUNTER — APPOINTMENT (OUTPATIENT)
Dept: OTOLARYNGOLOGY | Facility: CLINIC | Age: 72
End: 2017-05-01

## 2017-05-01 VITALS
SYSTOLIC BLOOD PRESSURE: 119 MMHG | BODY MASS INDEX: 19.08 KG/M2 | HEART RATE: 62 BPM | WEIGHT: 133 LBS | DIASTOLIC BLOOD PRESSURE: 76 MMHG

## 2017-05-10 ENCOUNTER — APPOINTMENT (OUTPATIENT)
Dept: PHYSICAL MEDICINE AND REHAB | Facility: CLINIC | Age: 72
End: 2017-05-10

## 2017-08-10 ENCOUNTER — APPOINTMENT (OUTPATIENT)
Dept: NEPHROLOGY | Facility: CLINIC | Age: 72
End: 2017-08-10
Payer: MEDICARE

## 2017-08-10 VITALS — DIASTOLIC BLOOD PRESSURE: 75 MMHG | SYSTOLIC BLOOD PRESSURE: 105 MMHG

## 2017-08-10 VITALS
OXYGEN SATURATION: 97 % | HEIGHT: 70 IN | SYSTOLIC BLOOD PRESSURE: 108 MMHG | WEIGHT: 131 LBS | BODY MASS INDEX: 18.75 KG/M2 | DIASTOLIC BLOOD PRESSURE: 79 MMHG | HEART RATE: 73 BPM

## 2017-08-10 DIAGNOSIS — J44.9 CHRONIC OBSTRUCTIVE PULMONARY DISEASE, UNSPECIFIED: ICD-10-CM

## 2017-08-10 DIAGNOSIS — Z83.3 FAMILY HISTORY OF DIABETES MELLITUS: ICD-10-CM

## 2017-08-10 DIAGNOSIS — Z87.898 PERSONAL HISTORY OF OTHER SPECIFIED CONDITIONS: ICD-10-CM

## 2017-08-10 DIAGNOSIS — Z80.9 FAMILY HISTORY OF MALIGNANT NEOPLASM, UNSPECIFIED: ICD-10-CM

## 2017-08-10 DIAGNOSIS — D3A.00 BENIGN CARCINOID TUMOR OF UNSPECIFIED SITE: ICD-10-CM

## 2017-08-10 PROCEDURE — 99215 OFFICE O/P EST HI 40 MIN: CPT | Mod: GC

## 2017-08-14 LAB
ALBUMIN SERPL ELPH-MCNC: 4.3 G/DL
ANION GAP SERPL CALC-SCNC: 16 MMOL/L
BASOPHILS # BLD AUTO: 0.02 K/UL
BASOPHILS NFR BLD AUTO: 0.5 %
BUN SERPL-MCNC: 15 MG/DL
CALCIUM SERPL-MCNC: 9.9 MG/DL
CHLORIDE SERPL-SCNC: 104 MMOL/L
CO2 SERPL-SCNC: 21 MMOL/L
CREAT SERPL-MCNC: 1.26 MG/DL
EOSINOPHIL # BLD AUTO: 0.05 K/UL
EOSINOPHIL NFR BLD AUTO: 1.3 %
FERRITIN SERPL-MCNC: 156 NG/ML
FOLATE SERPL-MCNC: >20 NG/ML
GLUCOSE SERPL-MCNC: 82 MG/DL
HCT VFR BLD CALC: 40.1 %
HGB BLD-MCNC: 13.1 G/DL
IMM GRANULOCYTES NFR BLD AUTO: 0.3 %
IRON SATN MFR SERPL: 27 %
IRON SERPL-MCNC: 61 UG/DL
LYMPHOCYTES # BLD AUTO: 1.32 K/UL
LYMPHOCYTES NFR BLD AUTO: 35.6 %
MAN DIFF?: NORMAL
MCHC RBC-ENTMCNC: 31 PG
MCHC RBC-ENTMCNC: 32.7 GM/DL
MCV RBC AUTO: 95 FL
MONOCYTES # BLD AUTO: 0.41 K/UL
MONOCYTES NFR BLD AUTO: 11.1 %
NEUTROPHILS # BLD AUTO: 1.9 K/UL
NEUTROPHILS NFR BLD AUTO: 51.2 %
PHOSPHATE SERPL-MCNC: 3.4 MG/DL
PLATELET # BLD AUTO: 281 K/UL
POTASSIUM SERPL-SCNC: 4.5 MMOL/L
RBC # BLD: 4.22 M/UL
RBC # FLD: 14 %
SODIUM SERPL-SCNC: 141 MMOL/L
TIBC SERPL-MCNC: 229 UG/DL
UIBC SERPL-MCNC: 168 UG/DL
VIT B12 SERPL-MCNC: 1032 PG/ML
WBC # FLD AUTO: 3.71 K/UL

## 2017-08-23 ENCOUNTER — APPOINTMENT (OUTPATIENT)
Dept: PHYSICAL MEDICINE AND REHAB | Facility: CLINIC | Age: 72
End: 2017-08-23

## 2017-08-25 ENCOUNTER — FORM ENCOUNTER (OUTPATIENT)
Age: 72
End: 2017-08-25

## 2017-08-25 ENCOUNTER — OUTPATIENT (OUTPATIENT)
Dept: OUTPATIENT SERVICES | Facility: HOSPITAL | Age: 72
LOS: 1 days | Discharge: HOME | End: 2017-08-25

## 2017-08-25 DIAGNOSIS — Z98.89 OTHER SPECIFIED POSTPROCEDURAL STATES: Chronic | ICD-10-CM

## 2017-08-25 DIAGNOSIS — Z90.49 ACQUIRED ABSENCE OF OTHER SPECIFIED PARTS OF DIGESTIVE TRACT: Chronic | ICD-10-CM

## 2017-08-25 DIAGNOSIS — C11.9 MALIGNANT NEOPLASM OF NASOPHARYNX, UNSPECIFIED: ICD-10-CM

## 2017-08-26 ENCOUNTER — APPOINTMENT (OUTPATIENT)
Dept: MRI IMAGING | Facility: IMAGING CENTER | Age: 72
End: 2017-08-26
Payer: MEDICARE

## 2017-08-26 ENCOUNTER — OUTPATIENT (OUTPATIENT)
Dept: OUTPATIENT SERVICES | Facility: HOSPITAL | Age: 72
LOS: 1 days | End: 2017-08-26
Payer: MEDICARE

## 2017-08-26 ENCOUNTER — APPOINTMENT (OUTPATIENT)
Dept: CT IMAGING | Facility: IMAGING CENTER | Age: 72
End: 2017-08-26
Payer: MEDICARE

## 2017-08-26 DIAGNOSIS — C11.9 MALIGNANT NEOPLASM OF NASOPHARYNX, UNSPECIFIED: ICD-10-CM

## 2017-08-26 DIAGNOSIS — Z90.49 ACQUIRED ABSENCE OF OTHER SPECIFIED PARTS OF DIGESTIVE TRACT: Chronic | ICD-10-CM

## 2017-08-26 DIAGNOSIS — Z98.89 OTHER SPECIFIED POSTPROCEDURAL STATES: Chronic | ICD-10-CM

## 2017-08-26 PROCEDURE — A9585: CPT

## 2017-08-26 PROCEDURE — 71250 CT THORAX DX C-: CPT | Mod: 26

## 2017-08-26 PROCEDURE — 70543 MRI ORBT/FAC/NCK W/O &W/DYE: CPT | Mod: 26

## 2017-08-26 PROCEDURE — 71250 CT THORAX DX C-: CPT

## 2017-08-26 PROCEDURE — 70543 MRI ORBT/FAC/NCK W/O &W/DYE: CPT

## 2017-08-30 ENCOUNTER — RESULT REVIEW (OUTPATIENT)
Age: 72
End: 2017-08-30

## 2017-08-30 ENCOUNTER — APPOINTMENT (OUTPATIENT)
Dept: HEMATOLOGY ONCOLOGY | Facility: CLINIC | Age: 72
End: 2017-08-30
Payer: MEDICARE

## 2017-08-30 VITALS
RESPIRATION RATE: 16 BRPM | OXYGEN SATURATION: 96 % | DIASTOLIC BLOOD PRESSURE: 91 MMHG | TEMPERATURE: 97.6 F | BODY MASS INDEX: 19.3 KG/M2 | WEIGHT: 134.48 LBS | HEART RATE: 80 BPM | SYSTOLIC BLOOD PRESSURE: 146 MMHG

## 2017-08-30 LAB
BASOPHILS # BLD AUTO: 0 K/UL — SIGNIFICANT CHANGE UP (ref 0–0.2)
BASOPHILS NFR BLD AUTO: 0.2 % — SIGNIFICANT CHANGE UP (ref 0–2)
EOSINOPHIL # BLD AUTO: 0.1 K/UL — SIGNIFICANT CHANGE UP (ref 0–0.5)
EOSINOPHIL NFR BLD AUTO: 2.1 % — SIGNIFICANT CHANGE UP (ref 0–6)
HCT VFR BLD CALC: 40.9 % — SIGNIFICANT CHANGE UP (ref 39–50)
HGB BLD-MCNC: 13.6 G/DL — SIGNIFICANT CHANGE UP (ref 13–17)
LYMPHOCYTES # BLD AUTO: 1.4 K/UL — SIGNIFICANT CHANGE UP (ref 1–3.3)
LYMPHOCYTES # BLD AUTO: 30.9 % — SIGNIFICANT CHANGE UP (ref 13–44)
MCHC RBC-ENTMCNC: 31.3 PG — SIGNIFICANT CHANGE UP (ref 27–34)
MCHC RBC-ENTMCNC: 33.4 G/DL — SIGNIFICANT CHANGE UP (ref 32–36)
MCV RBC AUTO: 93.9 FL — SIGNIFICANT CHANGE UP (ref 80–100)
MONOCYTES # BLD AUTO: 0.5 K/UL — SIGNIFICANT CHANGE UP (ref 0–0.9)
MONOCYTES NFR BLD AUTO: 11.3 % — SIGNIFICANT CHANGE UP (ref 2–14)
NEUTROPHILS # BLD AUTO: 2.5 K/UL — SIGNIFICANT CHANGE UP (ref 1.8–7.4)
NEUTROPHILS NFR BLD AUTO: 55.5 % — SIGNIFICANT CHANGE UP (ref 43–77)
PLATELET # BLD AUTO: 299 K/UL — SIGNIFICANT CHANGE UP (ref 150–400)
RBC # BLD: 4.35 M/UL — SIGNIFICANT CHANGE UP (ref 4.2–5.8)
RBC # FLD: 12.5 % — SIGNIFICANT CHANGE UP (ref 10.3–14.5)
WBC # BLD: 4.5 K/UL — SIGNIFICANT CHANGE UP (ref 3.8–10.5)
WBC # FLD AUTO: 4.5 K/UL — SIGNIFICANT CHANGE UP (ref 3.8–10.5)

## 2017-08-30 PROCEDURE — 99213 OFFICE O/P EST LOW 20 MIN: CPT

## 2017-09-25 ENCOUNTER — APPOINTMENT (OUTPATIENT)
Dept: OTOLARYNGOLOGY | Facility: CLINIC | Age: 72
End: 2017-09-25
Payer: MEDICARE

## 2017-09-25 VITALS
RESPIRATION RATE: 18 BRPM | DIASTOLIC BLOOD PRESSURE: 74 MMHG | HEIGHT: 70 IN | HEART RATE: 68 BPM | WEIGHT: 133 LBS | SYSTOLIC BLOOD PRESSURE: 113 MMHG | BODY MASS INDEX: 19.04 KG/M2

## 2017-09-25 DIAGNOSIS — Z09 ENCOUNTER FOR FOLLOW-UP EXAMINATION AFTER COMPLETED TREATMENT FOR CONDITIONS OTHER THAN MALIGNANT NEOPLASM: ICD-10-CM

## 2017-09-25 PROCEDURE — 31231 NASAL ENDOSCOPY DX: CPT

## 2017-09-25 PROCEDURE — 92567 TYMPANOMETRY: CPT

## 2017-09-25 PROCEDURE — 92557 COMPREHENSIVE HEARING TEST: CPT

## 2017-09-25 PROCEDURE — 99215 OFFICE O/P EST HI 40 MIN: CPT | Mod: 25

## 2017-10-16 LAB
ALBUMIN SERPL ELPH-MCNC: 4.5 G/DL
ALP BLD-CCNC: 49 U/L
ALT SERPL-CCNC: 13 U/L
AST SERPL-CCNC: 18 U/L
BILIRUB DIRECT SERPL-MCNC: 0.1 MG/DL
BILIRUB INDIRECT SERPL-MCNC: 0.2 MG/DL
BILIRUB SERPL-MCNC: 0.3 MG/DL
PROT SERPL-MCNC: 7.4 G/DL
TSH SERPL-ACNC: 1.15 UIU/ML

## 2017-10-24 ENCOUNTER — OTHER (OUTPATIENT)
Age: 72
End: 2017-10-24

## 2018-01-02 ENCOUNTER — OUTPATIENT (OUTPATIENT)
Dept: OUTPATIENT SERVICES | Facility: HOSPITAL | Age: 73
LOS: 1 days | Discharge: HOME | End: 2018-01-02

## 2018-01-02 DIAGNOSIS — C11.9 MALIGNANT NEOPLASM OF NASOPHARYNX, UNSPECIFIED: ICD-10-CM

## 2018-01-02 DIAGNOSIS — Z90.49 ACQUIRED ABSENCE OF OTHER SPECIFIED PARTS OF DIGESTIVE TRACT: Chronic | ICD-10-CM

## 2018-01-02 DIAGNOSIS — Z98.89 OTHER SPECIFIED POSTPROCEDURAL STATES: Chronic | ICD-10-CM

## 2018-01-15 ENCOUNTER — FORM ENCOUNTER (OUTPATIENT)
Age: 73
End: 2018-01-15

## 2018-01-16 ENCOUNTER — APPOINTMENT (OUTPATIENT)
Dept: CT IMAGING | Facility: IMAGING CENTER | Age: 73
End: 2018-01-16
Payer: MEDICARE

## 2018-01-16 ENCOUNTER — APPOINTMENT (OUTPATIENT)
Dept: MRI IMAGING | Facility: IMAGING CENTER | Age: 73
End: 2018-01-16
Payer: MEDICARE

## 2018-01-16 ENCOUNTER — OUTPATIENT (OUTPATIENT)
Dept: OUTPATIENT SERVICES | Facility: HOSPITAL | Age: 73
LOS: 1 days | End: 2018-01-16
Payer: MEDICARE

## 2018-01-16 DIAGNOSIS — Z90.49 ACQUIRED ABSENCE OF OTHER SPECIFIED PARTS OF DIGESTIVE TRACT: Chronic | ICD-10-CM

## 2018-01-16 DIAGNOSIS — Z98.89 OTHER SPECIFIED POSTPROCEDURAL STATES: Chronic | ICD-10-CM

## 2018-01-16 DIAGNOSIS — C11.9 MALIGNANT NEOPLASM OF NASOPHARYNX, UNSPECIFIED: ICD-10-CM

## 2018-01-16 PROCEDURE — 71250 CT THORAX DX C-: CPT | Mod: 26

## 2018-01-16 PROCEDURE — 82565 ASSAY OF CREATININE: CPT

## 2018-01-16 PROCEDURE — A9585: CPT

## 2018-01-16 PROCEDURE — 70543 MRI ORBT/FAC/NCK W/O &W/DYE: CPT | Mod: 26

## 2018-01-16 PROCEDURE — 71250 CT THORAX DX C-: CPT

## 2018-01-16 PROCEDURE — 70543 MRI ORBT/FAC/NCK W/O &W/DYE: CPT

## 2018-01-19 ENCOUNTER — RESULT REVIEW (OUTPATIENT)
Age: 73
End: 2018-01-19

## 2018-01-19 ENCOUNTER — APPOINTMENT (OUTPATIENT)
Dept: HEMATOLOGY ONCOLOGY | Facility: CLINIC | Age: 73
End: 2018-01-19
Payer: MEDICARE

## 2018-01-19 VITALS
BODY MASS INDEX: 19.9 KG/M2 | WEIGHT: 138.67 LBS | DIASTOLIC BLOOD PRESSURE: 72 MMHG | TEMPERATURE: 97.6 F | HEART RATE: 67 BPM | OXYGEN SATURATION: 94 % | SYSTOLIC BLOOD PRESSURE: 113 MMHG | RESPIRATION RATE: 16 BRPM

## 2018-01-19 LAB
BASOPHILS # BLD AUTO: 0.1 K/UL — SIGNIFICANT CHANGE UP (ref 0–0.2)
BASOPHILS NFR BLD AUTO: 1.4 % — SIGNIFICANT CHANGE UP (ref 0–2)
EOSINOPHIL # BLD AUTO: 0.1 K/UL — SIGNIFICANT CHANGE UP (ref 0–0.5)
EOSINOPHIL NFR BLD AUTO: 2.1 % — SIGNIFICANT CHANGE UP (ref 0–6)
HCT VFR BLD CALC: 41.3 % — SIGNIFICANT CHANGE UP (ref 39–50)
HGB BLD-MCNC: 14 G/DL — SIGNIFICANT CHANGE UP (ref 13–17)
LYMPHOCYTES # BLD AUTO: 1.4 K/UL — SIGNIFICANT CHANGE UP (ref 1–3.3)
LYMPHOCYTES # BLD AUTO: 30 % — SIGNIFICANT CHANGE UP (ref 13–44)
MCHC RBC-ENTMCNC: 31.8 PG — SIGNIFICANT CHANGE UP (ref 27–34)
MCHC RBC-ENTMCNC: 34 G/DL — SIGNIFICANT CHANGE UP (ref 32–36)
MCV RBC AUTO: 93.6 FL — SIGNIFICANT CHANGE UP (ref 80–100)
MONOCYTES # BLD AUTO: 0.5 K/UL — SIGNIFICANT CHANGE UP (ref 0–0.9)
MONOCYTES NFR BLD AUTO: 10.6 % — SIGNIFICANT CHANGE UP (ref 2–14)
NEUTROPHILS # BLD AUTO: 2.6 K/UL — SIGNIFICANT CHANGE UP (ref 1.8–7.4)
NEUTROPHILS NFR BLD AUTO: 55.9 % — SIGNIFICANT CHANGE UP (ref 43–77)
PLATELET # BLD AUTO: 273 K/UL — SIGNIFICANT CHANGE UP (ref 150–400)
RBC # BLD: 4.41 M/UL — SIGNIFICANT CHANGE UP (ref 4.2–5.8)
RBC # FLD: 13.3 % — SIGNIFICANT CHANGE UP (ref 10.3–14.5)
WBC # BLD: 4.6 K/UL — SIGNIFICANT CHANGE UP (ref 3.8–10.5)
WBC # FLD AUTO: 4.6 K/UL — SIGNIFICANT CHANGE UP (ref 3.8–10.5)

## 2018-01-19 PROCEDURE — 99214 OFFICE O/P EST MOD 30 MIN: CPT

## 2018-01-25 ENCOUNTER — APPOINTMENT (OUTPATIENT)
Dept: RADIATION ONCOLOGY | Facility: CLINIC | Age: 73
End: 2018-01-25
Payer: MEDICARE

## 2018-01-25 VITALS
HEIGHT: 70 IN | RESPIRATION RATE: 16 BRPM | OXYGEN SATURATION: 92 % | WEIGHT: 144.31 LBS | HEART RATE: 73 BPM | BODY MASS INDEX: 20.66 KG/M2 | DIASTOLIC BLOOD PRESSURE: 75 MMHG | TEMPERATURE: 98.1 F | SYSTOLIC BLOOD PRESSURE: 134 MMHG

## 2018-01-25 PROCEDURE — 99213 OFFICE O/P EST LOW 20 MIN: CPT | Mod: GC

## 2018-01-30 ENCOUNTER — APPOINTMENT (OUTPATIENT)
Dept: OTOLARYNGOLOGY | Facility: CLINIC | Age: 73
End: 2018-01-30
Payer: MEDICARE

## 2018-01-30 VITALS
DIASTOLIC BLOOD PRESSURE: 79 MMHG | HEART RATE: 62 BPM | BODY MASS INDEX: 20.62 KG/M2 | HEIGHT: 70 IN | SYSTOLIC BLOOD PRESSURE: 162 MMHG | WEIGHT: 144 LBS

## 2018-01-30 PROCEDURE — 92557 COMPREHENSIVE HEARING TEST: CPT

## 2018-01-30 PROCEDURE — 31231 NASAL ENDOSCOPY DX: CPT

## 2018-01-30 PROCEDURE — 99214 OFFICE O/P EST MOD 30 MIN: CPT | Mod: 25

## 2018-01-30 PROCEDURE — 92567 TYMPANOMETRY: CPT

## 2018-02-01 ENCOUNTER — APPOINTMENT (OUTPATIENT)
Dept: NEPHROLOGY | Facility: CLINIC | Age: 73
End: 2018-02-01
Payer: MEDICARE

## 2018-02-01 VITALS
OXYGEN SATURATION: 95 % | BODY MASS INDEX: 20.14 KG/M2 | DIASTOLIC BLOOD PRESSURE: 80 MMHG | SYSTOLIC BLOOD PRESSURE: 145 MMHG | HEART RATE: 68 BPM | HEIGHT: 70 IN | WEIGHT: 140.65 LBS

## 2018-02-01 VITALS — DIASTOLIC BLOOD PRESSURE: 80 MMHG | SYSTOLIC BLOOD PRESSURE: 134 MMHG

## 2018-02-01 DIAGNOSIS — N17.9 ACUTE KIDNEY FAILURE, UNSPECIFIED: ICD-10-CM

## 2018-02-01 PROCEDURE — 99214 OFFICE O/P EST MOD 30 MIN: CPT

## 2018-02-01 RX ORDER — AMLODIPINE BESYLATE 2.5 MG/1
2.5 TABLET ORAL
Refills: 0 | Status: DISCONTINUED | COMMUNITY
End: 2018-02-01

## 2018-02-02 LAB
APPEARANCE: CLEAR
BACTERIA: NEGATIVE
BILIRUBIN URINE: NEGATIVE
BLOOD URINE: NEGATIVE
COLOR: YELLOW
CREAT SPEC-SCNC: 285 MG/DL
CREAT/PROT UR: 0 RATIO
GLUCOSE QUALITATIVE U: NEGATIVE MG/DL
HYALINE CASTS: 2 /LPF
KETONES URINE: ABNORMAL
LEUKOCYTE ESTERASE URINE: ABNORMAL
MICROSCOPIC-UA: NORMAL
NITRITE URINE: NEGATIVE
PH URINE: 5
PROT UR-MCNC: 13 MG/DL
PROTEIN URINE: NEGATIVE MG/DL
RED BLOOD CELLS URINE: 2 /HPF
SPECIFIC GRAVITY URINE: 1.02
SQUAMOUS EPITHELIAL CELLS: 2 /HPF
UROBILINOGEN URINE: NEGATIVE MG/DL
WHITE BLOOD CELLS URINE: 19 /HPF

## 2018-02-04 ENCOUNTER — TRANSCRIPTION ENCOUNTER (OUTPATIENT)
Age: 73
End: 2018-02-04

## 2018-02-08 LAB
ALBUMIN SERPL ELPH-MCNC: 4.6 G/DL
ALP BLD-CCNC: 54 U/L
ALT SERPL-CCNC: 22 U/L
ANION GAP SERPL CALC-SCNC: 11 MMOL/L
AST SERPL-CCNC: 24 U/L
BILIRUB SERPL-MCNC: 0.5 MG/DL
BUN SERPL-MCNC: 17 MG/DL
CALCIUM SERPL-MCNC: 10 MG/DL
CHLORIDE SERPL-SCNC: 103 MMOL/L
CO2 SERPL-SCNC: 28 MMOL/L
CREAT SERPL-MCNC: 1.34 MG/DL
GLUCOSE SERPL-MCNC: 98 MG/DL
MAGNESIUM SERPL-MCNC: 2.2 MG/DL
POTASSIUM SERPL-SCNC: 4.6 MMOL/L
PROT SERPL-MCNC: 7.8 G/DL
SODIUM SERPL-SCNC: 142 MMOL/L
TSH SERPL-ACNC: 1.21 UIU/ML

## 2018-05-01 ENCOUNTER — APPOINTMENT (OUTPATIENT)
Dept: OTOLARYNGOLOGY | Facility: CLINIC | Age: 73
End: 2018-05-01
Payer: MEDICARE

## 2018-05-01 VITALS
SYSTOLIC BLOOD PRESSURE: 129 MMHG | BODY MASS INDEX: 20.04 KG/M2 | WEIGHT: 140 LBS | HEART RATE: 60 BPM | DIASTOLIC BLOOD PRESSURE: 76 MMHG | HEIGHT: 70 IN

## 2018-05-01 PROCEDURE — 31231 NASAL ENDOSCOPY DX: CPT

## 2018-05-01 PROCEDURE — 99214 OFFICE O/P EST MOD 30 MIN: CPT | Mod: 25

## 2018-05-01 RX ORDER — DIGOXIN 125 UG/1
125 TABLET ORAL
Qty: 90 | Refills: 0 | Status: DISCONTINUED | COMMUNITY
Start: 2017-08-03

## 2018-05-02 ENCOUNTER — FORM ENCOUNTER (OUTPATIENT)
Age: 73
End: 2018-05-02

## 2018-05-03 ENCOUNTER — APPOINTMENT (OUTPATIENT)
Dept: CT IMAGING | Facility: IMAGING CENTER | Age: 73
End: 2018-05-03
Payer: MEDICARE

## 2018-05-03 ENCOUNTER — OUTPATIENT (OUTPATIENT)
Dept: OUTPATIENT SERVICES | Facility: HOSPITAL | Age: 73
LOS: 1 days | End: 2018-05-03
Payer: MEDICARE

## 2018-05-03 DIAGNOSIS — C11.9 MALIGNANT NEOPLASM OF NASOPHARYNX, UNSPECIFIED: ICD-10-CM

## 2018-05-03 DIAGNOSIS — Z98.89 OTHER SPECIFIED POSTPROCEDURAL STATES: Chronic | ICD-10-CM

## 2018-05-03 DIAGNOSIS — Z90.49 ACQUIRED ABSENCE OF OTHER SPECIFIED PARTS OF DIGESTIVE TRACT: Chronic | ICD-10-CM

## 2018-05-03 PROCEDURE — 70487 CT MAXILLOFACIAL W/DYE: CPT | Mod: 26

## 2018-05-03 PROCEDURE — 82565 ASSAY OF CREATININE: CPT

## 2018-05-03 PROCEDURE — 70487 CT MAXILLOFACIAL W/DYE: CPT

## 2018-05-03 PROCEDURE — 70491 CT SOFT TISSUE NECK W/DYE: CPT

## 2018-05-03 PROCEDURE — 70491 CT SOFT TISSUE NECK W/DYE: CPT | Mod: 26

## 2018-05-03 PROCEDURE — 71260 CT THORAX DX C+: CPT

## 2018-05-03 PROCEDURE — 71260 CT THORAX DX C+: CPT | Mod: 26

## 2018-05-04 ENCOUNTER — OUTPATIENT (OUTPATIENT)
Dept: OUTPATIENT SERVICES | Facility: HOSPITAL | Age: 73
LOS: 1 days | Discharge: HOME | End: 2018-05-04

## 2018-05-04 DIAGNOSIS — Z90.49 ACQUIRED ABSENCE OF OTHER SPECIFIED PARTS OF DIGESTIVE TRACT: Chronic | ICD-10-CM

## 2018-05-04 DIAGNOSIS — C11.9 MALIGNANT NEOPLASM OF NASOPHARYNX, UNSPECIFIED: ICD-10-CM

## 2018-05-04 DIAGNOSIS — Z98.89 OTHER SPECIFIED POSTPROCEDURAL STATES: Chronic | ICD-10-CM

## 2018-05-07 ENCOUNTER — APPOINTMENT (OUTPATIENT)
Dept: HEMATOLOGY ONCOLOGY | Facility: CLINIC | Age: 73
End: 2018-05-07
Payer: MEDICARE

## 2018-05-07 ENCOUNTER — RESULT REVIEW (OUTPATIENT)
Age: 73
End: 2018-05-07

## 2018-05-07 VITALS
SYSTOLIC BLOOD PRESSURE: 152 MMHG | DIASTOLIC BLOOD PRESSURE: 86 MMHG | HEART RATE: 65 BPM | OXYGEN SATURATION: 98 % | WEIGHT: 134.02 LBS | RESPIRATION RATE: 16 BRPM | BODY MASS INDEX: 19.23 KG/M2 | TEMPERATURE: 97.4 F

## 2018-05-07 LAB
ALBUMIN SERPL ELPH-MCNC: 4.3 G/DL
ALP BLD-CCNC: 48 U/L
ALT SERPL-CCNC: 22 U/L
ANION GAP SERPL CALC-SCNC: 14 MMOL/L
AST SERPL-CCNC: 26 U/L
BASOPHILS # BLD AUTO: 0 K/UL — SIGNIFICANT CHANGE UP (ref 0–0.2)
BASOPHILS NFR BLD AUTO: 0.6 % — SIGNIFICANT CHANGE UP (ref 0–2)
BILIRUB SERPL-MCNC: 0.4 MG/DL
BUN SERPL-MCNC: 17 MG/DL
CALCIUM SERPL-MCNC: 9.7 MG/DL
CHLORIDE SERPL-SCNC: 103 MMOL/L
CO2 SERPL-SCNC: 26 MMOL/L
CREAT SERPL-MCNC: 1.28 MG/DL
EOSINOPHIL # BLD AUTO: 0.1 K/UL — SIGNIFICANT CHANGE UP (ref 0–0.5)
EOSINOPHIL NFR BLD AUTO: 1.2 % — SIGNIFICANT CHANGE UP (ref 0–6)
GLUCOSE SERPL-MCNC: 99 MG/DL
HBA1C MFR BLD HPLC: 6.1 %
HCT VFR BLD CALC: 35.9 % — LOW (ref 39–50)
HGB BLD-MCNC: 12.8 G/DL — LOW (ref 13–17)
LYMPHOCYTES # BLD AUTO: 1.5 K/UL — SIGNIFICANT CHANGE UP (ref 1–3.3)
LYMPHOCYTES # BLD AUTO: 29.4 % — SIGNIFICANT CHANGE UP (ref 13–44)
MCHC RBC-ENTMCNC: 32.8 PG — SIGNIFICANT CHANGE UP (ref 27–34)
MCHC RBC-ENTMCNC: 35.5 G/DL — SIGNIFICANT CHANGE UP (ref 32–36)
MCV RBC AUTO: 92.2 FL — SIGNIFICANT CHANGE UP (ref 80–100)
MONOCYTES # BLD AUTO: 0.5 K/UL — SIGNIFICANT CHANGE UP (ref 0–0.9)
MONOCYTES NFR BLD AUTO: 9.7 % — SIGNIFICANT CHANGE UP (ref 2–14)
NEUTROPHILS # BLD AUTO: 3 K/UL — SIGNIFICANT CHANGE UP (ref 1.8–7.4)
NEUTROPHILS NFR BLD AUTO: 59 % — SIGNIFICANT CHANGE UP (ref 43–77)
PLATELET # BLD AUTO: 293 K/UL — SIGNIFICANT CHANGE UP (ref 150–400)
POTASSIUM SERPL-SCNC: 4.7 MMOL/L
PROT SERPL-MCNC: 7.1 G/DL
RBC # BLD: 3.9 M/UL — LOW (ref 4.2–5.8)
RBC # FLD: 12.9 % — SIGNIFICANT CHANGE UP (ref 10.3–14.5)
SODIUM SERPL-SCNC: 143 MMOL/L
TSH SERPL-ACNC: 1.05 UIU/ML
WBC # BLD: 5.2 K/UL — SIGNIFICANT CHANGE UP (ref 3.8–10.5)
WBC # FLD AUTO: 5.2 K/UL — SIGNIFICANT CHANGE UP (ref 3.8–10.5)

## 2018-05-07 PROCEDURE — 99214 OFFICE O/P EST MOD 30 MIN: CPT

## 2018-05-09 ENCOUNTER — FORM ENCOUNTER (OUTPATIENT)
Age: 73
End: 2018-05-09

## 2018-05-10 ENCOUNTER — OUTPATIENT (OUTPATIENT)
Dept: OUTPATIENT SERVICES | Facility: HOSPITAL | Age: 73
LOS: 1 days | End: 2018-05-10
Payer: MEDICARE

## 2018-05-10 ENCOUNTER — APPOINTMENT (OUTPATIENT)
Dept: MRI IMAGING | Facility: IMAGING CENTER | Age: 73
End: 2018-05-10
Payer: MEDICARE

## 2018-05-10 DIAGNOSIS — Z98.89 OTHER SPECIFIED POSTPROCEDURAL STATES: Chronic | ICD-10-CM

## 2018-05-10 DIAGNOSIS — D3A.00 BENIGN CARCINOID TUMOR OF UNSPECIFIED SITE: ICD-10-CM

## 2018-05-10 DIAGNOSIS — Z90.49 ACQUIRED ABSENCE OF OTHER SPECIFIED PARTS OF DIGESTIVE TRACT: Chronic | ICD-10-CM

## 2018-05-10 PROCEDURE — A9585: CPT

## 2018-05-10 PROCEDURE — 74183 MRI ABD W/O CNTR FLWD CNTR: CPT | Mod: 26

## 2018-05-10 PROCEDURE — 74183 MRI ABD W/O CNTR FLWD CNTR: CPT

## 2018-05-15 ENCOUNTER — APPOINTMENT (OUTPATIENT)
Dept: HEMATOLOGY ONCOLOGY | Facility: CLINIC | Age: 73
End: 2018-05-15
Payer: MEDICARE

## 2018-05-15 ENCOUNTER — OTHER (OUTPATIENT)
Age: 73
End: 2018-05-15

## 2018-05-15 VITALS
SYSTOLIC BLOOD PRESSURE: 121 MMHG | DIASTOLIC BLOOD PRESSURE: 75 MMHG | RESPIRATION RATE: 16 BRPM | TEMPERATURE: 97.7 F | WEIGHT: 134 LBS | OXYGEN SATURATION: 96 % | HEART RATE: 73 BPM | BODY MASS INDEX: 19.23 KG/M2

## 2018-05-15 PROCEDURE — 99215 OFFICE O/P EST HI 40 MIN: CPT

## 2018-05-18 ENCOUNTER — OUTPATIENT (OUTPATIENT)
Dept: OUTPATIENT SERVICES | Facility: HOSPITAL | Age: 73
LOS: 1 days | End: 2018-05-18
Payer: MEDICARE

## 2018-05-18 VITALS
RESPIRATION RATE: 16 BRPM | SYSTOLIC BLOOD PRESSURE: 138 MMHG | OXYGEN SATURATION: 96 % | WEIGHT: 138.89 LBS | TEMPERATURE: 98 F | DIASTOLIC BLOOD PRESSURE: 70 MMHG | HEART RATE: 66 BPM | HEIGHT: 69.5 IN

## 2018-05-18 DIAGNOSIS — Z90.49 ACQUIRED ABSENCE OF OTHER SPECIFIED PARTS OF DIGESTIVE TRACT: Chronic | ICD-10-CM

## 2018-05-18 DIAGNOSIS — J44.9 CHRONIC OBSTRUCTIVE PULMONARY DISEASE, UNSPECIFIED: ICD-10-CM

## 2018-05-18 DIAGNOSIS — I10 ESSENTIAL (PRIMARY) HYPERTENSION: ICD-10-CM

## 2018-05-18 DIAGNOSIS — Z01.818 ENCOUNTER FOR OTHER PREPROCEDURAL EXAMINATION: ICD-10-CM

## 2018-05-18 DIAGNOSIS — Z98.89 OTHER SPECIFIED POSTPROCEDURAL STATES: Chronic | ICD-10-CM

## 2018-05-18 DIAGNOSIS — K86.9 DISEASE OF PANCREAS, UNSPECIFIED: ICD-10-CM

## 2018-05-18 DIAGNOSIS — I48.91 UNSPECIFIED ATRIAL FIBRILLATION: ICD-10-CM

## 2018-05-18 PROCEDURE — G0463: CPT

## 2018-05-18 NOTE — H&P PST ADULT - OTHER CARE PROVIDERS
Onc - Dr. Jb Spann ([facility]), RadOnc - Dr. Maxim Moncada, Cards - Dr. Magaly Hubbard- [phone]

## 2018-05-18 NOTE — H&P PST ADULT - PMH
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

## 2018-05-18 NOTE — H&P PST ADULT - HISTORY OF PRESENT ILLNESS
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 Pt scheduled for upper EUS FNA on 05/21/2018     ****Last dose of Apixaban on 05/18/2018 as per [facility]

## 2018-05-21 ENCOUNTER — APPOINTMENT (OUTPATIENT)
Dept: GASTROENTEROLOGY | Facility: HOSPITAL | Age: 73
End: 2018-05-21

## 2018-05-21 ENCOUNTER — OUTPATIENT (OUTPATIENT)
Dept: OUTPATIENT SERVICES | Facility: HOSPITAL | Age: 73
LOS: 1 days | End: 2018-05-21
Payer: MEDICARE

## 2018-05-21 ENCOUNTER — RESULT REVIEW (OUTPATIENT)
Age: 73
End: 2018-05-21

## 2018-05-21 DIAGNOSIS — Z90.49 ACQUIRED ABSENCE OF OTHER SPECIFIED PARTS OF DIGESTIVE TRACT: Chronic | ICD-10-CM

## 2018-05-21 DIAGNOSIS — K86.9 DISEASE OF PANCREAS, UNSPECIFIED: ICD-10-CM

## 2018-05-21 DIAGNOSIS — Z98.89 OTHER SPECIFIED POSTPROCEDURAL STATES: Chronic | ICD-10-CM

## 2018-05-21 PROCEDURE — 43242 EGD US FINE NEEDLE BX/ASPIR: CPT

## 2018-05-21 PROCEDURE — 43239 EGD BIOPSY SINGLE/MULTIPLE: CPT | Mod: XS

## 2018-05-21 PROCEDURE — 43242 EGD US FINE NEEDLE BX/ASPIR: CPT | Mod: GC

## 2018-05-21 PROCEDURE — 88305 TISSUE EXAM BY PATHOLOGIST: CPT | Mod: 26

## 2018-05-21 PROCEDURE — 88305 TISSUE EXAM BY PATHOLOGIST: CPT

## 2018-05-21 PROCEDURE — 88312 SPECIAL STAINS GROUP 1: CPT

## 2018-05-21 PROCEDURE — 88312 SPECIAL STAINS GROUP 1: CPT | Mod: 26

## 2018-05-22 ENCOUNTER — MESSAGE (OUTPATIENT)
Age: 73
End: 2018-05-22

## 2018-05-22 ENCOUNTER — APPOINTMENT (OUTPATIENT)
Dept: OTOLARYNGOLOGY | Facility: CLINIC | Age: 73
End: 2018-05-22
Payer: MEDICARE

## 2018-05-22 VITALS
WEIGHT: 133 LBS | BODY MASS INDEX: 19.04 KG/M2 | HEIGHT: 70 IN | SYSTOLIC BLOOD PRESSURE: 126 MMHG | HEART RATE: 69 BPM | DIASTOLIC BLOOD PRESSURE: 80 MMHG

## 2018-05-22 DIAGNOSIS — R04.0 EPISTAXIS: ICD-10-CM

## 2018-05-22 LAB — SURGICAL PATHOLOGY STUDY: SIGNIFICANT CHANGE UP

## 2018-05-22 PROCEDURE — 31231 NASAL ENDOSCOPY DX: CPT

## 2018-05-22 PROCEDURE — 99215 OFFICE O/P EST HI 40 MIN: CPT | Mod: 25

## 2018-05-25 ENCOUNTER — OUTPATIENT (OUTPATIENT)
Dept: OUTPATIENT SERVICES | Facility: HOSPITAL | Age: 73
LOS: 1 days | End: 2018-05-25

## 2018-05-25 VITALS
WEIGHT: 134.92 LBS | DIASTOLIC BLOOD PRESSURE: 90 MMHG | HEART RATE: 69 BPM | RESPIRATION RATE: 16 BRPM | TEMPERATURE: 97 F | SYSTOLIC BLOOD PRESSURE: 136 MMHG | HEIGHT: 69 IN

## 2018-05-25 DIAGNOSIS — C11.9 MALIGNANT NEOPLASM OF NASOPHARYNX, UNSPECIFIED: ICD-10-CM

## 2018-05-25 DIAGNOSIS — Z90.49 ACQUIRED ABSENCE OF OTHER SPECIFIED PARTS OF DIGESTIVE TRACT: Chronic | ICD-10-CM

## 2018-05-25 DIAGNOSIS — Z90.89 ACQUIRED ABSENCE OF OTHER ORGANS: Chronic | ICD-10-CM

## 2018-05-25 DIAGNOSIS — T78.40XS ALLERGY, UNSPECIFIED, SEQUELA: ICD-10-CM

## 2018-05-25 DIAGNOSIS — Z98.89 OTHER SPECIFIED POSTPROCEDURAL STATES: Chronic | ICD-10-CM

## 2018-05-25 DIAGNOSIS — I48.91 UNSPECIFIED ATRIAL FIBRILLATION: ICD-10-CM

## 2018-05-25 DIAGNOSIS — I10 ESSENTIAL (PRIMARY) HYPERTENSION: ICD-10-CM

## 2018-05-25 DIAGNOSIS — G47.33 OBSTRUCTIVE SLEEP APNEA (ADULT) (PEDIATRIC): ICD-10-CM

## 2018-05-25 LAB
APTT BLD: 40.4 SEC — HIGH (ref 27.5–37.4)
BUN SERPL-MCNC: 20 MG/DL — SIGNIFICANT CHANGE UP (ref 7–23)
CALCIUM SERPL-MCNC: 9.6 MG/DL — SIGNIFICANT CHANGE UP (ref 8.4–10.5)
CHLORIDE SERPL-SCNC: 100 MMOL/L — SIGNIFICANT CHANGE UP (ref 98–107)
CO2 SERPL-SCNC: 26 MMOL/L — SIGNIFICANT CHANGE UP (ref 22–31)
CREAT SERPL-MCNC: 1.31 MG/DL — HIGH (ref 0.5–1.3)
GLUCOSE SERPL-MCNC: 108 MG/DL — HIGH (ref 70–99)
HCT VFR BLD CALC: 38.9 % — LOW (ref 39–50)
HGB BLD-MCNC: 12.7 G/DL — LOW (ref 13–17)
INR BLD: 1.59 — HIGH (ref 0.88–1.17)
MCHC RBC-ENTMCNC: 30.2 PG — SIGNIFICANT CHANGE UP (ref 27–34)
MCHC RBC-ENTMCNC: 32.6 % — SIGNIFICANT CHANGE UP (ref 32–36)
MCV RBC AUTO: 92.6 FL — SIGNIFICANT CHANGE UP (ref 80–100)
NRBC # FLD: 0 — SIGNIFICANT CHANGE UP
PLATELET # BLD AUTO: 245 K/UL — SIGNIFICANT CHANGE UP (ref 150–400)
PMV BLD: 8.6 FL — SIGNIFICANT CHANGE UP (ref 7–13)
POTASSIUM SERPL-MCNC: 4 MMOL/L — SIGNIFICANT CHANGE UP (ref 3.5–5.3)
POTASSIUM SERPL-SCNC: 4 MMOL/L — SIGNIFICANT CHANGE UP (ref 3.5–5.3)
PROTHROM AB SERPL-ACNC: 18.5 SEC — HIGH (ref 9.8–13.1)
RBC # BLD: 4.2 M/UL — SIGNIFICANT CHANGE UP (ref 4.2–5.8)
RBC # FLD: 14.4 % — SIGNIFICANT CHANGE UP (ref 10.3–14.5)
SODIUM SERPL-SCNC: 139 MMOL/L — SIGNIFICANT CHANGE UP (ref 135–145)
WBC # BLD: 4.56 K/UL — SIGNIFICANT CHANGE UP (ref 3.8–10.5)
WBC # FLD AUTO: 4.56 K/UL — SIGNIFICANT CHANGE UP (ref 3.8–10.5)

## 2018-05-25 RX ORDER — SODIUM CHLORIDE 9 MG/ML
1000 INJECTION INTRAMUSCULAR; INTRAVENOUS; SUBCUTANEOUS
Qty: 0 | Refills: 0 | Status: DISCONTINUED | OUTPATIENT
Start: 2018-05-30 | End: 2018-06-14

## 2018-05-25 RX ORDER — APIXABAN 2.5 MG/1
1 TABLET, FILM COATED ORAL
Qty: 0 | Refills: 0 | COMMUNITY

## 2018-05-25 NOTE — H&P PST ADULT - HISTORY OF PRESENT ILLNESS
This is a 73 y.o. male with history of nasopharyngeal cancer 2016 , treated with chemo and radiation , discontinued 3/27/17 . Pt had Ct of head/neck ,and  chest. Pt complaining of frequent nose bleeds starting 2/14/18 - last 5/14/18. Pt has malignant neoplasm of nasopharynx ,unspecified . Pt now for surgery .

## 2018-05-25 NOTE — H&P PST ADULT - NSANTHOSAYNRD_GEN_A_CORE
No. VANDA screening performed.  STOP BANG Legend: 0-2 = LOW Risk; 3-4 = INTERMEDIATE Risk; 5-8 = HIGH Risk

## 2018-05-25 NOTE — H&P PST ADULT - RS GEN PE MLT RESP DETAILS PC
breath sounds equal/good air movement/no rhonchi/no wheezes/respirations non-labored/no rales/clear to auscultation bilaterally

## 2018-05-25 NOTE — H&P PST ADULT - PROBLEM SELECTOR PLAN 3
pt on eliquis , cardiac clearance as per surgeon . Pt to hold Eliquis 5/27/18 ( last dose). Cardiac clearance on chart .

## 2018-05-25 NOTE — H&P PST ADULT - ABILITY TO HEAR (WITH HEARING AID OR HEARING APPLIANCE IF NORMALLY USED):
right ear hearing loss/Mildly to Moderately Impaired: difficulty hearing in some environments or speaker may need to increase volume or speak distinctly

## 2018-05-29 ENCOUNTER — TRANSCRIPTION ENCOUNTER (OUTPATIENT)
Age: 73
End: 2018-05-29

## 2018-05-29 ENCOUNTER — APPOINTMENT (OUTPATIENT)
Dept: GASTROENTEROLOGY | Facility: CLINIC | Age: 73
End: 2018-05-29
Payer: MEDICARE

## 2018-05-29 VITALS
SYSTOLIC BLOOD PRESSURE: 146 MMHG | WEIGHT: 135 LBS | RESPIRATION RATE: 16 BRPM | HEIGHT: 70 IN | HEART RATE: 59 BPM | DIASTOLIC BLOOD PRESSURE: 77 MMHG | OXYGEN SATURATION: 97 % | BODY MASS INDEX: 19.33 KG/M2

## 2018-05-29 PROCEDURE — 99214 OFFICE O/P EST MOD 30 MIN: CPT

## 2018-05-29 RX ORDER — CIPROFLOXACIN HYDROCHLORIDE 500 MG/1
500 TABLET, FILM COATED ORAL
Qty: 10 | Refills: 0 | Status: DISCONTINUED | COMMUNITY
Start: 2018-05-21 | End: 2018-05-29

## 2018-05-30 ENCOUNTER — RESULT REVIEW (OUTPATIENT)
Age: 73
End: 2018-05-30

## 2018-05-30 ENCOUNTER — OUTPATIENT (OUTPATIENT)
Dept: OUTPATIENT SERVICES | Facility: HOSPITAL | Age: 73
LOS: 1 days | Discharge: HOME | End: 2018-05-30
Payer: MEDICARE

## 2018-05-30 ENCOUNTER — APPOINTMENT (OUTPATIENT)
Dept: OTOLARYNGOLOGY | Facility: HOSPITAL | Age: 73
End: 2018-05-30

## 2018-05-30 VITALS
SYSTOLIC BLOOD PRESSURE: 163 MMHG | DIASTOLIC BLOOD PRESSURE: 89 MMHG | OXYGEN SATURATION: 99 % | RESPIRATION RATE: 18 BRPM | HEART RATE: 60 BPM

## 2018-05-30 VITALS
WEIGHT: 134.92 LBS | HEART RATE: 44 BPM | OXYGEN SATURATION: 97 % | RESPIRATION RATE: 16 BRPM | TEMPERATURE: 98 F | SYSTOLIC BLOOD PRESSURE: 152 MMHG | HEIGHT: 69 IN | DIASTOLIC BLOOD PRESSURE: 75 MMHG

## 2018-05-30 DIAGNOSIS — Z98.89 OTHER SPECIFIED POSTPROCEDURAL STATES: Chronic | ICD-10-CM

## 2018-05-30 DIAGNOSIS — Z90.49 ACQUIRED ABSENCE OF OTHER SPECIFIED PARTS OF DIGESTIVE TRACT: Chronic | ICD-10-CM

## 2018-05-30 DIAGNOSIS — C11.9 MALIGNANT NEOPLASM OF NASOPHARYNX, UNSPECIFIED: ICD-10-CM

## 2018-05-30 DIAGNOSIS — Z90.89 ACQUIRED ABSENCE OF OTHER ORGANS: Chronic | ICD-10-CM

## 2018-05-30 LAB
AMYLASE/CREAT SERPL: 111 U/L
CANCER AG19-9 SERPL-ACNC: 1.2 U/ML
CREAT SERPL-MCNC: 1.24 MG/DL
LPL SERPL-CCNC: 48 U/L

## 2018-05-30 PROCEDURE — 31525 DX LARYNGOSCOPY EXCL NB: CPT | Mod: 59,GC

## 2018-05-30 PROCEDURE — 88305 TISSUE EXAM BY PATHOLOGIST: CPT | Mod: 26

## 2018-05-30 PROCEDURE — 31237 NSL/SINS NDSC SURG BX POLYPC: CPT | Mod: GC

## 2018-05-30 PROCEDURE — 43191 ESOPHAGOSCOPY RIGID TRNSO DX: CPT | Mod: GC

## 2018-05-30 PROCEDURE — 88360 TUMOR IMMUNOHISTOCHEM/MANUAL: CPT | Mod: 26

## 2018-05-30 PROCEDURE — 88341 IMHCHEM/IMCYTCHM EA ADD ANTB: CPT | Mod: 26,59

## 2018-05-30 PROCEDURE — 88342 IMHCHEM/IMCYTCHM 1ST ANTB: CPT | Mod: 26,59

## 2018-05-30 RX ORDER — ACETAMINOPHEN 500 MG
2 TABLET ORAL
Qty: 0 | Refills: 0 | COMMUNITY
Start: 2018-05-30

## 2018-05-30 RX ORDER — IBUPROFEN 200 MG
1 TABLET ORAL
Qty: 0 | Refills: 0 | COMMUNITY
Start: 2018-05-30

## 2018-05-30 RX ORDER — SODIUM CHLORIDE 9 MG/ML
1000 INJECTION, SOLUTION INTRAVENOUS
Qty: 0 | Refills: 0 | Status: DISCONTINUED | OUTPATIENT
Start: 2018-05-30 | End: 2018-06-14

## 2018-05-30 RX ORDER — IBUPROFEN 200 MG
400 TABLET ORAL EVERY 6 HOURS
Qty: 0 | Refills: 0 | Status: DISCONTINUED | OUTPATIENT
Start: 2018-05-30 | End: 2018-06-14

## 2018-05-30 RX ORDER — ACETAMINOPHEN 500 MG
650 TABLET ORAL EVERY 6 HOURS
Qty: 0 | Refills: 0 | Status: DISCONTINUED | OUTPATIENT
Start: 2018-05-30 | End: 2018-06-14

## 2018-05-30 RX ORDER — LABETALOL HCL 100 MG
10 TABLET ORAL ONCE
Qty: 0 | Refills: 0 | Status: COMPLETED | OUTPATIENT
Start: 2018-05-30 | End: 2018-05-30

## 2018-05-30 RX ORDER — FENTANYL CITRATE 50 UG/ML
25 INJECTION INTRAVENOUS
Qty: 0 | Refills: 0 | Status: DISCONTINUED | OUTPATIENT
Start: 2018-05-30 | End: 2018-05-30

## 2018-05-30 RX ORDER — OXYCODONE HYDROCHLORIDE 5 MG/1
5 TABLET ORAL EVERY 4 HOURS
Qty: 0 | Refills: 0 | Status: DISCONTINUED | OUTPATIENT
Start: 2018-05-30 | End: 2018-05-30

## 2018-05-30 RX ADMIN — Medication 10 MILLIGRAM(S): at 17:04

## 2018-05-30 NOTE — ASU DISCHARGE PLAN (ADULT/PEDIATRIC). - NURSING INSTRUCTIONS
No strenuous exercising, aerobics or lifting for two weeks. Liquid and soft diet for the first 24 hours. Resume regular diet as tolerated. Nothing hot in temperature to eat or drink, avoid hot baths.  No nose blowing - sneeze with mouth open. Apply ice to reduce pain and swelling. Cool and warm liquids that are not irritating to the throat should be given for the first day or two. Avoid hot liquids. Avoid citrus juices and milk. Advance at your own pace starting with soft foods and advancing to a regular diet. Avoid rough and scratchy foods and foods that are difficult to chew for approximately 5 days. Avoid strenous exercise and blowing of nose. Make appointment with MD.

## 2018-05-30 NOTE — ASU DISCHARGE PLAN (ADULT/PEDIATRIC). - MEDICATION SUMMARY - MEDICATIONS TO TAKE
I will START or STAY ON the medications listed below when I get home from the hospital:    digoxin 250 mcg (0.25 mg) oral tablet  -- 1 tab(s) by mouth once a day  -- Indication: For home med    Eliquis 5 mg oral tablet  -- 1 tab(s) by mouth 2 times a day. Last dose 5/27/18  -- Indication: For home med    Zetia 10 mg oral tablet  -- 1 tab(s) by mouth once a day  -- Indication: For home med    simvastatin 20 mg oral tablet  -- 1 tab(s) by mouth once a day (at bedtime)  -- Indication: For home med    Symbicort 160 mcg-4.5 mcg/inh inhalation aerosol  -- 2 puff(s) inhaled 2 times a day  -- Indication: For home med    Spiriva 18 mcg inhalation capsule  -- 1 cap(s) inhaled once a day  -- Indication: For home med    glutamine oral powder for reconstitution  -- 1 dose(s) by mouth once a day. Last dose 5/18/18  -- Indication: For home med    CoQ10 300 mg oral capsule  -- 1 cap(s) by mouth once a day. Last dose 5/18/18  -- Indication: For home med    Omega-3 oral capsule  -- 1 cap(s) by mouth once a day. Last dose 5/18/18  -- Indication: For home med    Multiple Vitamins oral capsule  -- 1 cap(s) by mouth once a day. last dose 5/18/18  -- Indication: For home med I will START or STAY ON the medications listed below when I get home from the hospital:    acetaminophen 325 mg oral tablet  -- 2 tab(s) by mouth every 6 hours, As needed, Mild Pain (1 - 3)  -- Indication: For over the counter pain medication    ibuprofen 400 mg oral tablet  -- 1 tab(s) by mouth every 6 hours, As needed, moderate pain  -- Indication: For over the counter pain medication    digoxin 250 mcg (0.25 mg) oral tablet  -- 1 tab(s) by mouth once a day  -- Indication: For home med    Eliquis 5 mg oral tablet  -- 1 tab(s) by mouth 2 times a day. Last dose 5/27/18  -- Indication: For home med    Zetia 10 mg oral tablet  -- 1 tab(s) by mouth once a day  -- Indication: For home med    simvastatin 20 mg oral tablet  -- 1 tab(s) by mouth once a day (at bedtime)  -- Indication: For home med    Symbicort 160 mcg-4.5 mcg/inh inhalation aerosol  -- 2 puff(s) inhaled 2 times a day  -- Indication: For home med    Spiriva 18 mcg inhalation capsule  -- 1 cap(s) inhaled once a day  -- Indication: For home med    glutamine oral powder for reconstitution  -- 1 dose(s) by mouth once a day. Last dose 5/18/18  -- Indication: For home med    CoQ10 300 mg oral capsule  -- 1 cap(s) by mouth once a day. Last dose 5/18/18  -- Indication: For home med    Omega-3 oral capsule  -- 1 cap(s) by mouth once a day. Last dose 5/18/18  -- Indication: For home med    Multiple Vitamins oral capsule  -- 1 cap(s) by mouth once a day. last dose 5/18/18  -- Indication: For home med

## 2018-06-01 ENCOUNTER — APPOINTMENT (OUTPATIENT)
Dept: HEMATOLOGY ONCOLOGY | Facility: CLINIC | Age: 73
End: 2018-06-01
Payer: MEDICARE

## 2018-06-01 VITALS
BODY MASS INDEX: 19.37 KG/M2 | TEMPERATURE: 97.5 F | RESPIRATION RATE: 16 BRPM | DIASTOLIC BLOOD PRESSURE: 74 MMHG | SYSTOLIC BLOOD PRESSURE: 137 MMHG | HEART RATE: 60 BPM | WEIGHT: 135 LBS | OXYGEN SATURATION: 97 %

## 2018-06-01 PROCEDURE — 99214 OFFICE O/P EST MOD 30 MIN: CPT

## 2018-06-04 LAB — CGA SERPL-MCNC: 125 NG/ML

## 2018-06-06 ENCOUNTER — OUTPATIENT (OUTPATIENT)
Dept: OUTPATIENT SERVICES | Facility: HOSPITAL | Age: 73
LOS: 1 days | End: 2018-06-06
Payer: MEDICARE

## 2018-06-06 VITALS
SYSTOLIC BLOOD PRESSURE: 130 MMHG | DIASTOLIC BLOOD PRESSURE: 84 MMHG | TEMPERATURE: 97 F | RESPIRATION RATE: 17 BRPM | OXYGEN SATURATION: 98 % | WEIGHT: 134.04 LBS | HEART RATE: 70 BPM | HEIGHT: 70 IN

## 2018-06-06 DIAGNOSIS — I10 ESSENTIAL (PRIMARY) HYPERTENSION: ICD-10-CM

## 2018-06-06 DIAGNOSIS — J44.9 CHRONIC OBSTRUCTIVE PULMONARY DISEASE, UNSPECIFIED: ICD-10-CM

## 2018-06-06 DIAGNOSIS — Z98.89 OTHER SPECIFIED POSTPROCEDURAL STATES: Chronic | ICD-10-CM

## 2018-06-06 DIAGNOSIS — Z01.818 ENCOUNTER FOR OTHER PREPROCEDURAL EXAMINATION: ICD-10-CM

## 2018-06-06 DIAGNOSIS — Z90.89 ACQUIRED ABSENCE OF OTHER ORGANS: Chronic | ICD-10-CM

## 2018-06-06 DIAGNOSIS — K86.9 DISEASE OF PANCREAS, UNSPECIFIED: ICD-10-CM

## 2018-06-06 DIAGNOSIS — Z90.49 ACQUIRED ABSENCE OF OTHER SPECIFIED PARTS OF DIGESTIVE TRACT: Chronic | ICD-10-CM

## 2018-06-06 DIAGNOSIS — I48.91 UNSPECIFIED ATRIAL FIBRILLATION: ICD-10-CM

## 2018-06-06 DIAGNOSIS — N18.3 CHRONIC KIDNEY DISEASE, STAGE 3 (MODERATE): ICD-10-CM

## 2018-06-06 PROCEDURE — G0463: CPT

## 2018-06-06 RX ORDER — GLUTAMINE 5 G/1
1 POWDER, FOR SOLUTION ORAL
Qty: 0 | Refills: 0 | COMMUNITY

## 2018-06-06 NOTE — H&P PST ADULT - PROBLEM SELECTOR PLAN 3
no previous PFT, states has a prescription, no time to schedule test due to what is going with pancreas   continue inhaler meghan-op

## 2018-06-06 NOTE — H&P PST ADULT - HISTORY OF PRESENT ILLNESS
73 year old retired , with h/o paroxysmal A-fib (on eliquis, last dose 6/15/18), HTN, COPD, carcinoid tumor s/p hemicolectomy, nasopharyngeal carcinoma (last chemo 3/2017), recent MRI revealed pancreatic mass, states had an upper endoscopy done last month, and now returning for a repeated upper EUS FNA for best treatment.

## 2018-06-06 NOTE — H&P PST ADULT - NSANTHOSAYNRD_GEN_A_CORE
No. VADNA screening performed.  STOP BANG Legend: 0-2 = LOW Risk; 3-4 = INTERMEDIATE Risk; 5-8 = HIGH Risk

## 2018-06-06 NOTE — H&P PST ADULT - PROBLEM SELECTOR PLAN 2
patient on eliquis and instructed by surgeon to hold x 2 days prior to procedure, last dose on 6/15/2018. most recent ekg report on [facility]

## 2018-06-07 ENCOUNTER — APPOINTMENT (OUTPATIENT)
Dept: CT IMAGING | Facility: IMAGING CENTER | Age: 73
End: 2018-06-07
Payer: MEDICARE

## 2018-06-07 ENCOUNTER — OUTPATIENT (OUTPATIENT)
Dept: OUTPATIENT SERVICES | Facility: HOSPITAL | Age: 73
LOS: 1 days | End: 2018-06-07
Payer: MEDICARE

## 2018-06-07 DIAGNOSIS — Z90.89 ACQUIRED ABSENCE OF OTHER ORGANS: Chronic | ICD-10-CM

## 2018-06-07 DIAGNOSIS — Z98.89 OTHER SPECIFIED POSTPROCEDURAL STATES: Chronic | ICD-10-CM

## 2018-06-07 DIAGNOSIS — Z90.49 ACQUIRED ABSENCE OF OTHER SPECIFIED PARTS OF DIGESTIVE TRACT: Chronic | ICD-10-CM

## 2018-06-07 DIAGNOSIS — K86.9 DISEASE OF PANCREAS, UNSPECIFIED: ICD-10-CM

## 2018-06-07 PROCEDURE — 74160 CT ABDOMEN W/CONTRAST: CPT | Mod: 26

## 2018-06-07 PROCEDURE — 74160 CT ABDOMEN W/CONTRAST: CPT

## 2018-06-11 ENCOUNTER — APPOINTMENT (OUTPATIENT)
Dept: OTOLARYNGOLOGY | Facility: CLINIC | Age: 73
End: 2018-06-11
Payer: MEDICARE

## 2018-06-11 PROCEDURE — 99214 OFFICE O/P EST MOD 30 MIN: CPT | Mod: 25

## 2018-06-11 PROCEDURE — 31231 NASAL ENDOSCOPY DX: CPT

## 2018-06-12 ENCOUNTER — APPOINTMENT (OUTPATIENT)
Dept: RADIATION ONCOLOGY | Facility: CLINIC | Age: 73
End: 2018-06-12
Payer: MEDICARE

## 2018-06-12 VITALS
SYSTOLIC BLOOD PRESSURE: 115 MMHG | BODY MASS INDEX: 19.11 KG/M2 | DIASTOLIC BLOOD PRESSURE: 72 MMHG | HEART RATE: 75 BPM | WEIGHT: 133.16 LBS | OXYGEN SATURATION: 95 % | RESPIRATION RATE: 16 BRPM

## 2018-06-12 PROCEDURE — 99213 OFFICE O/P EST LOW 20 MIN: CPT | Mod: GC

## 2018-06-18 ENCOUNTER — OUTPATIENT (OUTPATIENT)
Dept: OUTPATIENT SERVICES | Facility: HOSPITAL | Age: 73
LOS: 1 days | End: 2018-06-18
Payer: MEDICARE

## 2018-06-18 ENCOUNTER — APPOINTMENT (OUTPATIENT)
Dept: GASTROENTEROLOGY | Facility: HOSPITAL | Age: 73
End: 2018-06-18

## 2018-06-18 DIAGNOSIS — Z90.49 ACQUIRED ABSENCE OF OTHER SPECIFIED PARTS OF DIGESTIVE TRACT: Chronic | ICD-10-CM

## 2018-06-18 DIAGNOSIS — K86.9 DISEASE OF PANCREAS, UNSPECIFIED: ICD-10-CM

## 2018-06-18 DIAGNOSIS — Z90.89 ACQUIRED ABSENCE OF OTHER ORGANS: Chronic | ICD-10-CM

## 2018-06-18 DIAGNOSIS — Z01.818 ENCOUNTER FOR OTHER PREPROCEDURAL EXAMINATION: ICD-10-CM

## 2018-06-18 DIAGNOSIS — Z98.89 OTHER SPECIFIED POSTPROCEDURAL STATES: Chronic | ICD-10-CM

## 2018-06-18 PROCEDURE — 43237 ENDOSCOPIC US EXAM ESOPH: CPT

## 2018-06-18 PROCEDURE — 43239 EGD BIOPSY SINGLE/MULTIPLE: CPT | Mod: XS

## 2018-06-18 PROCEDURE — 43237 ENDOSCOPIC US EXAM ESOPH: CPT | Mod: GC

## 2018-07-10 NOTE — H&P PST ADULT - NSANTHBMIRD_ENT_A_CORE
Please contact patient and let them know that it is up to her discretion if she would like to bring her .   No

## 2018-07-17 ENCOUNTER — APPOINTMENT (OUTPATIENT)
Dept: GASTROENTEROLOGY | Facility: CLINIC | Age: 73
End: 2018-07-17
Payer: MEDICARE

## 2018-07-17 VITALS
DIASTOLIC BLOOD PRESSURE: 79 MMHG | HEART RATE: 67 BPM | BODY MASS INDEX: 18.75 KG/M2 | WEIGHT: 131 LBS | HEIGHT: 70 IN | SYSTOLIC BLOOD PRESSURE: 132 MMHG | OXYGEN SATURATION: 95 % | TEMPERATURE: 97.4 F | RESPIRATION RATE: 15 BRPM

## 2018-07-17 DIAGNOSIS — A04.8 OTHER SPECIFIED BACTERIAL INTESTINAL INFECTIONS: ICD-10-CM

## 2018-07-17 DIAGNOSIS — I48.91 UNSPECIFIED ATRIAL FIBRILLATION: ICD-10-CM

## 2018-07-17 PROBLEM — N17.9 ACUTE KIDNEY FAILURE, UNSPECIFIED: Chronic | Status: ACTIVE | Noted: 2018-05-18

## 2018-07-17 PROBLEM — C11.9 MALIGNANT NEOPLASM OF NASOPHARYNX, UNSPECIFIED: Chronic | Status: ACTIVE | Noted: 2018-05-18

## 2018-07-17 PROBLEM — N18.3 CHRONIC KIDNEY DISEASE, STAGE 3 (MODERATE): Chronic | Status: ACTIVE | Noted: 2018-05-18

## 2018-07-17 PROBLEM — K86.9 DISEASE OF PANCREAS, UNSPECIFIED: Chronic | Status: ACTIVE | Noted: 2018-05-18

## 2018-07-17 PROBLEM — J43.9 EMPHYSEMA, UNSPECIFIED: Chronic | Status: ACTIVE | Noted: 2018-05-18

## 2018-07-17 PROCEDURE — 99214 OFFICE O/P EST MOD 30 MIN: CPT

## 2018-07-18 ENCOUNTER — APPOINTMENT (OUTPATIENT)
Dept: PULMONOLOGY | Facility: CLINIC | Age: 73
End: 2018-07-18

## 2018-07-30 ENCOUNTER — OTHER (OUTPATIENT)
Age: 73
End: 2018-07-30

## 2018-07-31 PROBLEM — A04.8 H. PYLORI INFECTION: Status: ACTIVE | Noted: 2018-07-17

## 2018-08-02 ENCOUNTER — APPOINTMENT (OUTPATIENT)
Dept: NEPHROLOGY | Facility: CLINIC | Age: 73
End: 2018-08-02
Payer: MEDICARE

## 2018-08-02 VITALS
DIASTOLIC BLOOD PRESSURE: 64 MMHG | OXYGEN SATURATION: 95 % | HEIGHT: 70 IN | HEART RATE: 83 BPM | BODY MASS INDEX: 19.25 KG/M2 | SYSTOLIC BLOOD PRESSURE: 96 MMHG | WEIGHT: 134.48 LBS

## 2018-08-02 VITALS — DIASTOLIC BLOOD PRESSURE: 70 MMHG | SYSTOLIC BLOOD PRESSURE: 104 MMHG

## 2018-08-02 LAB
ALBUMIN SERPL ELPH-MCNC: 4.5 G/DL
ANION GAP SERPL CALC-SCNC: 16 MMOL/L
APPEARANCE: CLEAR
BACTERIA: NEGATIVE
BASOPHILS # BLD AUTO: 0.02 K/UL
BASOPHILS NFR BLD AUTO: 0.4 %
BILIRUBIN URINE: NEGATIVE
BLOOD URINE: NEGATIVE
BUN SERPL-MCNC: 18 MG/DL
CALCIUM SERPL-MCNC: 9.7 MG/DL
CHLORIDE SERPL-SCNC: 101 MMOL/L
CK SERPL-CCNC: 152 U/L
CO2 SERPL-SCNC: 22 MMOL/L
COLOR: YELLOW
CREAT SERPL-MCNC: 1.15 MG/DL
EOSINOPHIL # BLD AUTO: 0.05 K/UL
EOSINOPHIL NFR BLD AUTO: 1 %
GLUCOSE QUALITATIVE U: NEGATIVE MG/DL
GLUCOSE SERPL-MCNC: 70 MG/DL
HCT VFR BLD CALC: 41.2 %
HGB BLD-MCNC: 13.3 G/DL
HYALINE CASTS: 2 /LPF
IMM GRANULOCYTES NFR BLD AUTO: 0.2 %
KETONES URINE: NEGATIVE
LEUKOCYTE ESTERASE URINE: NEGATIVE
LYMPHOCYTES # BLD AUTO: 1.45 K/UL
LYMPHOCYTES NFR BLD AUTO: 28 %
MAGNESIUM SERPL-MCNC: 2.3 MG/DL
MAN DIFF?: NORMAL
MCHC RBC-ENTMCNC: 30.4 PG
MCHC RBC-ENTMCNC: 32.3 GM/DL
MCV RBC AUTO: 94.1 FL
MICROSCOPIC-UA: NORMAL
MONOCYTES # BLD AUTO: 0.58 K/UL
MONOCYTES NFR BLD AUTO: 11.2 %
NEUTROPHILS # BLD AUTO: 3.07 K/UL
NEUTROPHILS NFR BLD AUTO: 59.2 %
NITRITE URINE: NEGATIVE
PH URINE: 6
PHOSPHATE SERPL-MCNC: 2.8 MG/DL
PLATELET # BLD AUTO: 330 K/UL
POTASSIUM SERPL-SCNC: 4.3 MMOL/L
PROTEIN URINE: NEGATIVE MG/DL
RBC # BLD: 4.38 M/UL
RBC # FLD: 15.4 %
RED BLOOD CELLS URINE: 1 /HPF
SODIUM SERPL-SCNC: 140 MMOL/L
SPECIFIC GRAVITY URINE: 1.01
SQUAMOUS EPITHELIAL CELLS: 1 /HPF
UROBILINOGEN URINE: NEGATIVE MG/DL
WBC # FLD AUTO: 5.18 K/UL
WHITE BLOOD CELLS URINE: 1 /HPF

## 2018-08-02 PROCEDURE — 99214 OFFICE O/P EST MOD 30 MIN: CPT

## 2018-08-03 ENCOUNTER — APPOINTMENT (OUTPATIENT)
Dept: PHYSICAL MEDICINE AND REHAB | Facility: CLINIC | Age: 73
End: 2018-08-03
Payer: MEDICARE

## 2018-08-03 VITALS
HEART RATE: 95 BPM | OXYGEN SATURATION: 100 % | DIASTOLIC BLOOD PRESSURE: 66 MMHG | TEMPERATURE: 97.8 F | SYSTOLIC BLOOD PRESSURE: 101 MMHG

## 2018-08-03 LAB
25(OH)D3 SERPL-MCNC: 32.4 NG/ML
CALCIUM SERPL-MCNC: 9.7 MG/DL
CREAT SPEC-SCNC: 94 MG/DL
CREAT/PROT UR: 0.1 RATIO
PARATHYROID HORMONE INTACT: 51 PG/ML
PROT UR-MCNC: 8 MG/DL

## 2018-08-03 PROCEDURE — 99212 OFFICE O/P EST SF 10 MIN: CPT

## 2018-08-06 ENCOUNTER — TRANSCRIPTION ENCOUNTER (OUTPATIENT)
Age: 73
End: 2018-08-06

## 2018-08-07 ENCOUNTER — APPOINTMENT (OUTPATIENT)
Dept: PULMONOLOGY | Facility: CLINIC | Age: 73
End: 2018-08-07

## 2018-08-14 ENCOUNTER — APPOINTMENT (OUTPATIENT)
Dept: SURGERY | Facility: CLINIC | Age: 73
End: 2018-08-14
Payer: MEDICARE

## 2018-08-14 PROCEDURE — 99215 OFFICE O/P EST HI 40 MIN: CPT

## 2018-09-04 ENCOUNTER — APPOINTMENT (OUTPATIENT)
Dept: PULMONOLOGY | Facility: CLINIC | Age: 73
End: 2018-09-04
Payer: MEDICARE

## 2018-09-04 VITALS
RESPIRATION RATE: 15 BRPM | TEMPERATURE: 97 F | HEIGHT: 69 IN | SYSTOLIC BLOOD PRESSURE: 100 MMHG | DIASTOLIC BLOOD PRESSURE: 70 MMHG | HEART RATE: 54 BPM | WEIGHT: 131 LBS | BODY MASS INDEX: 19.4 KG/M2

## 2018-09-04 VITALS — WEIGHT: 131 LBS | HEIGHT: 69 IN | BODY MASS INDEX: 19.4 KG/M2

## 2018-09-04 PROCEDURE — ZZZZZ: CPT

## 2018-09-04 PROCEDURE — 94726 PLETHYSMOGRAPHY LUNG VOLUMES: CPT

## 2018-09-04 PROCEDURE — 94060 EVALUATION OF WHEEZING: CPT

## 2018-09-04 PROCEDURE — 94729 DIFFUSING CAPACITY: CPT

## 2018-09-04 PROCEDURE — 99205 OFFICE O/P NEW HI 60 MIN: CPT | Mod: 25

## 2018-09-11 ENCOUNTER — APPOINTMENT (OUTPATIENT)
Dept: SURGERY | Facility: CLINIC | Age: 73
End: 2018-09-11
Payer: MEDICARE

## 2018-09-11 VITALS
WEIGHT: 133 LBS | SYSTOLIC BLOOD PRESSURE: 148 MMHG | RESPIRATION RATE: 16 BRPM | HEART RATE: 56 BPM | DIASTOLIC BLOOD PRESSURE: 78 MMHG | HEIGHT: 69 IN | BODY MASS INDEX: 19.7 KG/M2

## 2018-09-11 DIAGNOSIS — K86.89 OTHER SPECIFIED DISEASES OF PANCREAS: ICD-10-CM

## 2018-09-11 PROCEDURE — 99215 OFFICE O/P EST HI 40 MIN: CPT

## 2018-09-12 ENCOUNTER — OUTPATIENT (OUTPATIENT)
Dept: OUTPATIENT SERVICES | Facility: HOSPITAL | Age: 73
LOS: 1 days | End: 2018-09-12
Payer: MEDICARE

## 2018-09-12 VITALS
RESPIRATION RATE: 16 BRPM | DIASTOLIC BLOOD PRESSURE: 82 MMHG | OXYGEN SATURATION: 97 % | TEMPERATURE: 98 F | HEIGHT: 69 IN | HEART RATE: 62 BPM | WEIGHT: 132.94 LBS | SYSTOLIC BLOOD PRESSURE: 140 MMHG

## 2018-09-12 DIAGNOSIS — Z90.89 ACQUIRED ABSENCE OF OTHER ORGANS: Chronic | ICD-10-CM

## 2018-09-12 DIAGNOSIS — Z29.9 ENCOUNTER FOR PROPHYLACTIC MEASURES, UNSPECIFIED: ICD-10-CM

## 2018-09-12 DIAGNOSIS — Z01.818 ENCOUNTER FOR OTHER PREPROCEDURAL EXAMINATION: ICD-10-CM

## 2018-09-12 DIAGNOSIS — K86.9 DISEASE OF PANCREAS, UNSPECIFIED: ICD-10-CM

## 2018-09-12 DIAGNOSIS — Z90.49 ACQUIRED ABSENCE OF OTHER SPECIFIED PARTS OF DIGESTIVE TRACT: Chronic | ICD-10-CM

## 2018-09-12 DIAGNOSIS — Z98.890 OTHER SPECIFIED POSTPROCEDURAL STATES: Chronic | ICD-10-CM

## 2018-09-12 DIAGNOSIS — Z98.89 OTHER SPECIFIED POSTPROCEDURAL STATES: Chronic | ICD-10-CM

## 2018-09-12 LAB
BLD GP AB SCN SERPL QL: NEGATIVE — SIGNIFICANT CHANGE UP
HBA1C BLD-MCNC: 6.2 % — HIGH (ref 4–5.6)
RH IG SCN BLD-IMP: NEGATIVE — SIGNIFICANT CHANGE UP

## 2018-09-12 PROCEDURE — 83036 HEMOGLOBIN GLYCOSYLATED A1C: CPT

## 2018-09-12 PROCEDURE — 86900 BLOOD TYPING SEROLOGIC ABO: CPT

## 2018-09-12 PROCEDURE — 86850 RBC ANTIBODY SCREEN: CPT

## 2018-09-12 PROCEDURE — G0463: CPT

## 2018-09-12 PROCEDURE — 86901 BLOOD TYPING SEROLOGIC RH(D): CPT

## 2018-09-12 RX ORDER — OMEGA-3 ACID ETHYL ESTERS 1 G
1 CAPSULE ORAL
Qty: 0 | Refills: 0 | COMMUNITY

## 2018-09-12 RX ORDER — LIDOCAINE HCL 20 MG/ML
0.2 VIAL (ML) INJECTION ONCE
Qty: 0 | Refills: 0 | Status: DISCONTINUED | OUTPATIENT
Start: 2018-09-19 | End: 2018-09-19

## 2018-09-12 RX ORDER — UBIDECARENONE 100 MG
1 CAPSULE ORAL
Qty: 0 | Refills: 0 | COMMUNITY

## 2018-09-12 RX ORDER — SODIUM CHLORIDE 9 MG/ML
3 INJECTION INTRAMUSCULAR; INTRAVENOUS; SUBCUTANEOUS EVERY 8 HOURS
Qty: 0 | Refills: 0 | Status: DISCONTINUED | OUTPATIENT
Start: 2018-09-19 | End: 2018-09-19

## 2018-09-12 RX ORDER — VANCOMYCIN HCL 1 G
1000 VIAL (EA) INTRAVENOUS ONCE
Qty: 0 | Refills: 0 | Status: DISCONTINUED | OUTPATIENT
Start: 2018-09-19 | End: 2018-09-21

## 2018-09-12 NOTE — H&P PST ADULT - PROBLEM SELECTOR PLAN 2
patient on Eliquis and instructed by surgeon to hold x 3 days prior to procedure, last dose on 9/15/2018. most recent ekg report on [facility]

## 2018-09-12 NOTE — H&P PST ADULT - MUSCULOSKELETAL
details… detailed exam no joint swelling/no joint warmth/no calf tenderness/no joint erythema/normal strength

## 2018-09-12 NOTE — H&P PST ADULT - PROBLEM SELECTOR PLAN 1
scheduled for open distal pancreatectomy, splenectomy   labs send by PMD on 09/14/18   A1c & T&S sent

## 2018-09-12 NOTE — H&P PST ADULT - NEGATIVE GASTROINTESTINAL SYMPTOMS
no change in bowel habits/no abdominal pain/no nausea/no constipation/no hiccoughs/no diarrhea/no melena/no jaundice/no vomiting

## 2018-09-12 NOTE — H&P PST ADULT - ACTIVITY
walks one mile daily, vacuuming, wash toilet bowl , PT 2x/neck, does the dishes walks least a mile daily, vacuuming, wash toilet bowl , PT 2x/neck, does the dishes

## 2018-09-12 NOTE — H&P PST ADULT - ASSESSMENT
CAPRINI SCORE [CLOT]    AGE RELATED RISK FACTORS                                                       MOBILITY RELATED FACTORS  [ ] Age 41-60 years                                            (1 Point)                  [ ] Bed rest                                                        (1 Point)  [ ] Age: 61-74 years                                           (2 Points)                 [ ] Plaster cast                                                   (2 Points)  [ ] Age= 75 years                                              (3 Points)                 [ ] Bed bound for more than 72 hours                 (2 Points)    DISEASE RELATED RISK FACTORS                                               GENDER SPECIFIC FACTORS  [ ] Edema in the lower extremities                       (1 Point)                  [ ] Pregnancy                                                     (1 Point)  [ ] Varicose veins                                               (1 Point)                  [ ] Post-partum < 6 weeks                                   (1 Point)             [ ] BMI > 25 Kg/m2                                            (1 Point)                  [ ] Hormonal therapy  or oral contraception          (1 Point)                 [ ] Sepsis (in the previous month)                        (1 Point)                  [ ] History of pregnancy complications                 (1 point)  [ ] Pneumonia or serious lung disease                                               [ ] Unexplained or recurrent                     (1 Point)           (in the previous month)                               (1 Point)  [ ] Abnormal pulmonary function test                     (1 Point)                 SURGERY RELATED RISK FACTORS  [ ] Acute myocardial infarction                              (1 Point)                 [ ]  Section                                             (1 Point)  [ ] Congestive heart failure (in the previous month)  (1 Point)               [ ] Minor surgery                                                  (1 Point)   [ ] Inflammatory bowel disease                             (1 Point)                 [ ] Arthroscopic surgery                                        (2 Points)  [ ] Central venous access                                      (2 Points)                [ ] General surgery lasting more than 45 minutes   (2 Points)       [ ] Stroke (in the previous month)                          (5 Points)               [ ] Elective arthroplasty                                         (5 Points)                                                                                                                                               HEMATOLOGY RELATED FACTORS                                                 TRAUMA RELATED RISK FACTORS  [ ] Prior episodes of VTE                                     (3 Points)                 [ ] Fracture of the hip, pelvis, or leg                       (5 Points)  [ ] Positive family history for VTE                         (3 Points)                 [ ] Acute spinal cord injury (in the previous month)  (5 Points)  [ ] Prothrombin 27511 A                                     (3 Points)                 [ ] Paralysis  (less than 1 month)                             (5 Points)  [ ] Factor V Leiden                                             (3 Points)                  [ ] Multiple Trauma within 1 month                        (5 Points)  [ ] Lupus anticoagulants                                     (3 Points)                                                           [ ] Anticardiolipin antibodies                               (3 Points)                                                       [ ] High homocysteine in the blood                      (3 Points)                                             [ ] Other congenital or acquired thrombophilia      (3 Points)                                                [ ] Heparin induced thrombocytopenia                  (3 Points)                                          Total Score [     4     ]

## 2018-09-12 NOTE — H&P PST ADULT - HISTORY OF PRESENT ILLNESS
73 year old retired , with h/o paroxysmal A-fib (on eliquis, last dose 09/15/18), HTN, COPD, carcinoid tumor s/p hemicolectomy, nasopharyngeal carcinoma (last chemo 3/2017), recent MRI revealed pancreatic mass, states had repeated upper EUS & needle biopsy in 06/18 for best treatment & scheduled for surgery. 73 year old retired , with h/o paroxysmal A-fib (on eliquis, last dose 09/15/18), HTN, COPD, carcinoid tumor s/p hemicolectomy, nasopharyngeal carcinoma (last chemo 3/2017),	 recent MRI revealed pancreatic mass, states had repeated upper EUS & needle biopsy in 06/18 for best treatment & scheduled for surgery. 73 year old retired , with h/o paroxysmal A-fib (on eliquis, last dose 09/15/18), HTN, COPD, carcinoid tumor s/p hemicolectomy, nasopharyngeal carcinoma (last chemo 3/20/17), recent MRI revealed pancreatic mass, states had repeated upper EUS & needle biopsy in 06/18 for best treatment & scheduled for surgery. 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

## 2018-09-12 NOTE — H&P PST ADULT - PMH
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

## 2018-09-12 NOTE — H&P PST ADULT - PSH
H/O endoscopy  last one 06/2018  H/O hemicolectomy    History of appendectomy    S/P tonsillectomy  1959

## 2018-09-12 NOTE — H&P PST ADULT - NEUROLOGICAL DETAILS
alert and oriented x 3/responds to pain/cranial nerves intact/normal strength/responds to verbal commands

## 2018-09-13 PROBLEM — J70.1 CHRONIC AND OTHER PULMONARY MANIFESTATIONS DUE TO RADIATION: Chronic | Status: ACTIVE | Noted: 2018-09-12

## 2018-09-17 ENCOUNTER — APPOINTMENT (OUTPATIENT)
Dept: OTOLARYNGOLOGY | Facility: CLINIC | Age: 73
End: 2018-09-17
Payer: MEDICARE

## 2018-09-17 VITALS
HEART RATE: 56 BPM | SYSTOLIC BLOOD PRESSURE: 136 MMHG | DIASTOLIC BLOOD PRESSURE: 81 MMHG | WEIGHT: 133 LBS | BODY MASS INDEX: 19.7 KG/M2 | HEIGHT: 69 IN

## 2018-09-17 PROCEDURE — 31231 NASAL ENDOSCOPY DX: CPT

## 2018-09-17 PROCEDURE — 99214 OFFICE O/P EST MOD 30 MIN: CPT | Mod: 25

## 2018-09-17 PROCEDURE — 69210 REMOVE IMPACTED EAR WAX UNI: CPT

## 2018-09-18 ENCOUNTER — TRANSCRIPTION ENCOUNTER (OUTPATIENT)
Age: 73
End: 2018-09-18

## 2018-09-19 ENCOUNTER — APPOINTMENT (OUTPATIENT)
Dept: SURGERY | Facility: HOSPITAL | Age: 73
End: 2018-09-19

## 2018-09-19 ENCOUNTER — INPATIENT (INPATIENT)
Facility: HOSPITAL | Age: 73
LOS: 9 days | Discharge: HOME HEALTH | DRG: 405 | End: 2018-09-29
Attending: SURGERY | Admitting: SURGERY
Payer: MEDICARE

## 2018-09-19 VITALS
SYSTOLIC BLOOD PRESSURE: 161 MMHG | TEMPERATURE: 98 F | HEIGHT: 69 IN | WEIGHT: 134.04 LBS | OXYGEN SATURATION: 99 % | DIASTOLIC BLOOD PRESSURE: 87 MMHG | RESPIRATION RATE: 19 BRPM | HEART RATE: 63 BPM

## 2018-09-19 DIAGNOSIS — Z98.890 OTHER SPECIFIED POSTPROCEDURAL STATES: Chronic | ICD-10-CM

## 2018-09-19 DIAGNOSIS — Z98.89 OTHER SPECIFIED POSTPROCEDURAL STATES: Chronic | ICD-10-CM

## 2018-09-19 DIAGNOSIS — K86.9 DISEASE OF PANCREAS, UNSPECIFIED: ICD-10-CM

## 2018-09-19 DIAGNOSIS — Z90.49 ACQUIRED ABSENCE OF OTHER SPECIFIED PARTS OF DIGESTIVE TRACT: Chronic | ICD-10-CM

## 2018-09-19 DIAGNOSIS — Z90.89 ACQUIRED ABSENCE OF OTHER ORGANS: Chronic | ICD-10-CM

## 2018-09-19 LAB
AMYLASE P1 CFR SERPL: 88 U/L — SIGNIFICANT CHANGE UP (ref 25–125)
ANION GAP SERPL CALC-SCNC: 13 MMOL/L — SIGNIFICANT CHANGE UP (ref 5–17)
ANION GAP SERPL CALC-SCNC: 14 MMOL/L — SIGNIFICANT CHANGE UP (ref 5–17)
ANION GAP SERPL CALC-SCNC: 15 MMOL/L — SIGNIFICANT CHANGE UP (ref 5–17)
BUN SERPL-MCNC: 11 MG/DL — SIGNIFICANT CHANGE UP (ref 7–23)
CALCIUM SERPL-MCNC: 9 MG/DL — SIGNIFICANT CHANGE UP (ref 8.4–10.5)
CALCIUM SERPL-MCNC: 9.1 MG/DL — SIGNIFICANT CHANGE UP (ref 8.4–10.5)
CALCIUM SERPL-MCNC: 9.2 MG/DL — SIGNIFICANT CHANGE UP (ref 8.4–10.5)
CHLORIDE SERPL-SCNC: 101 MMOL/L — SIGNIFICANT CHANGE UP (ref 96–108)
CHLORIDE SERPL-SCNC: 104 MMOL/L — SIGNIFICANT CHANGE UP (ref 96–108)
CHLORIDE SERPL-SCNC: 99 MMOL/L — SIGNIFICANT CHANGE UP (ref 96–108)
CO2 SERPL-SCNC: 22 MMOL/L — SIGNIFICANT CHANGE UP (ref 22–31)
CO2 SERPL-SCNC: 23 MMOL/L — SIGNIFICANT CHANGE UP (ref 22–31)
CO2 SERPL-SCNC: 24 MMOL/L — SIGNIFICANT CHANGE UP (ref 22–31)
CREAT SERPL-MCNC: 0.91 MG/DL — SIGNIFICANT CHANGE UP (ref 0.5–1.3)
CREAT SERPL-MCNC: 0.91 MG/DL — SIGNIFICANT CHANGE UP (ref 0.5–1.3)
CREAT SERPL-MCNC: 0.98 MG/DL — SIGNIFICANT CHANGE UP (ref 0.5–1.3)
GLUCOSE SERPL-MCNC: 122 MG/DL — HIGH (ref 70–99)
GLUCOSE SERPL-MCNC: 159 MG/DL — HIGH (ref 70–99)
GLUCOSE SERPL-MCNC: 168 MG/DL — HIGH (ref 70–99)
HCT VFR BLD CALC: 34.9 % — LOW (ref 39–50)
HCT VFR BLD CALC: 39.3 % — SIGNIFICANT CHANGE UP (ref 39–50)
HGB BLD-MCNC: 11.9 G/DL — LOW (ref 13–17)
HGB BLD-MCNC: 13.1 G/DL — SIGNIFICANT CHANGE UP (ref 13–17)
MCHC RBC-ENTMCNC: 30.5 PG — SIGNIFICANT CHANGE UP (ref 27–34)
MCHC RBC-ENTMCNC: 31.1 PG — SIGNIFICANT CHANGE UP (ref 27–34)
MCHC RBC-ENTMCNC: 33.4 GM/DL — SIGNIFICANT CHANGE UP (ref 32–36)
MCHC RBC-ENTMCNC: 34 GM/DL — SIGNIFICANT CHANGE UP (ref 32–36)
MCV RBC AUTO: 91.4 FL — SIGNIFICANT CHANGE UP (ref 80–100)
MCV RBC AUTO: 91.5 FL — SIGNIFICANT CHANGE UP (ref 80–100)
PLATELET # BLD AUTO: 217 K/UL — SIGNIFICANT CHANGE UP (ref 150–400)
PLATELET # BLD AUTO: 239 K/UL — SIGNIFICANT CHANGE UP (ref 150–400)
POTASSIUM SERPL-MCNC: 4.2 MMOL/L — SIGNIFICANT CHANGE UP (ref 3.5–5.3)
POTASSIUM SERPL-MCNC: 4.3 MMOL/L — SIGNIFICANT CHANGE UP (ref 3.5–5.3)
POTASSIUM SERPL-MCNC: 4.3 MMOL/L — SIGNIFICANT CHANGE UP (ref 3.5–5.3)
POTASSIUM SERPL-MCNC: 4.5 MMOL/L — SIGNIFICANT CHANGE UP (ref 3.5–5.3)
POTASSIUM SERPL-SCNC: 4.2 MMOL/L — SIGNIFICANT CHANGE UP (ref 3.5–5.3)
POTASSIUM SERPL-SCNC: 4.3 MMOL/L — SIGNIFICANT CHANGE UP (ref 3.5–5.3)
POTASSIUM SERPL-SCNC: 4.3 MMOL/L — SIGNIFICANT CHANGE UP (ref 3.5–5.3)
POTASSIUM SERPL-SCNC: 4.5 MMOL/L — SIGNIFICANT CHANGE UP (ref 3.5–5.3)
RBC # BLD: 3.81 M/UL — LOW (ref 4.2–5.8)
RBC # BLD: 4.3 M/UL — SIGNIFICANT CHANGE UP (ref 4.2–5.8)
RBC # FLD: 13.2 % — SIGNIFICANT CHANGE UP (ref 10.3–14.5)
RBC # FLD: 13.5 % — SIGNIFICANT CHANGE UP (ref 10.3–14.5)
RH IG SCN BLD-IMP: NEGATIVE — SIGNIFICANT CHANGE UP
SODIUM SERPL-SCNC: 135 MMOL/L — SIGNIFICANT CHANGE UP (ref 135–145)
SODIUM SERPL-SCNC: 138 MMOL/L — SIGNIFICANT CHANGE UP (ref 135–145)
SODIUM SERPL-SCNC: 142 MMOL/L — SIGNIFICANT CHANGE UP (ref 135–145)
WBC # BLD: 7.9 K/UL — SIGNIFICANT CHANGE UP (ref 3.8–10.5)
WBC # BLD: 9.9 K/UL — SIGNIFICANT CHANGE UP (ref 3.8–10.5)
WBC # FLD AUTO: 7.9 K/UL — SIGNIFICANT CHANGE UP (ref 3.8–10.5)
WBC # FLD AUTO: 9.9 K/UL — SIGNIFICANT CHANGE UP (ref 3.8–10.5)

## 2018-09-19 PROCEDURE — 88309 TISSUE EXAM BY PATHOLOGIST: CPT | Mod: 26

## 2018-09-19 PROCEDURE — 48140 PARTIAL REMOVAL OF PANCREAS: CPT | Mod: GC

## 2018-09-19 PROCEDURE — 88331 PATH CONSLTJ SURG 1 BLK 1SPC: CPT | Mod: 26

## 2018-09-19 PROCEDURE — 48999 UNLISTED PROCEDURE PANCREAS: CPT | Mod: 80,GC,22

## 2018-09-19 PROCEDURE — 48999 UNLISTED PROCEDURE PANCREAS: CPT | Mod: GC,22

## 2018-09-19 RX ORDER — HYDROMORPHONE HYDROCHLORIDE 2 MG/ML
1 INJECTION INTRAMUSCULAR; INTRAVENOUS; SUBCUTANEOUS
Qty: 0 | Refills: 0 | Status: DISCONTINUED | OUTPATIENT
Start: 2018-09-19 | End: 2018-09-19

## 2018-09-19 RX ORDER — HYDROMORPHONE HYDROCHLORIDE 2 MG/ML
0.5 INJECTION INTRAMUSCULAR; INTRAVENOUS; SUBCUTANEOUS
Qty: 0 | Refills: 0 | Status: DISCONTINUED | OUTPATIENT
Start: 2018-09-19 | End: 2018-09-22

## 2018-09-19 RX ORDER — LABETALOL HCL 100 MG
5 TABLET ORAL ONCE
Qty: 0 | Refills: 0 | Status: COMPLETED | OUTPATIENT
Start: 2018-09-19 | End: 2018-09-19

## 2018-09-19 RX ORDER — HEPARIN SODIUM 5000 [USP'U]/ML
5000 INJECTION INTRAVENOUS; SUBCUTANEOUS EVERY 8 HOURS
Qty: 0 | Refills: 0 | Status: DISCONTINUED | OUTPATIENT
Start: 2018-09-19 | End: 2018-09-21

## 2018-09-19 RX ORDER — HYDROMORPHONE HYDROCHLORIDE 2 MG/ML
0.25 INJECTION INTRAMUSCULAR; INTRAVENOUS; SUBCUTANEOUS
Qty: 0 | Refills: 0 | Status: DISCONTINUED | OUTPATIENT
Start: 2018-09-19 | End: 2018-09-22

## 2018-09-19 RX ORDER — ONDANSETRON 8 MG/1
4 TABLET, FILM COATED ORAL ONCE
Qty: 0 | Refills: 0 | Status: DISCONTINUED | OUTPATIENT
Start: 2018-09-19 | End: 2018-09-19

## 2018-09-19 RX ORDER — TIOTROPIUM BROMIDE 18 UG/1
1 CAPSULE ORAL; RESPIRATORY (INHALATION) DAILY
Qty: 0 | Refills: 0 | Status: DISCONTINUED | OUTPATIENT
Start: 2018-09-19 | End: 2018-09-29

## 2018-09-19 RX ORDER — HYDROMORPHONE HYDROCHLORIDE 2 MG/ML
0.5 INJECTION INTRAMUSCULAR; INTRAVENOUS; SUBCUTANEOUS
Qty: 0 | Refills: 0 | Status: DISCONTINUED | OUTPATIENT
Start: 2018-09-19 | End: 2018-09-19

## 2018-09-19 RX ORDER — DIGOXIN 250 MCG
0.25 TABLET ORAL DAILY
Qty: 0 | Refills: 0 | Status: DISCONTINUED | OUTPATIENT
Start: 2018-09-19 | End: 2018-09-26

## 2018-09-19 RX ORDER — INFLUENZA VIRUS VACCINE 15; 15; 15; 15 UG/.5ML; UG/.5ML; UG/.5ML; UG/.5ML
0.5 SUSPENSION INTRAMUSCULAR ONCE
Qty: 0 | Refills: 0 | Status: COMPLETED | OUTPATIENT
Start: 2018-09-19 | End: 2018-09-19

## 2018-09-19 RX ORDER — SODIUM CHLORIDE 9 MG/ML
1000 INJECTION, SOLUTION INTRAVENOUS
Qty: 0 | Refills: 0 | Status: DISCONTINUED | OUTPATIENT
Start: 2018-09-19 | End: 2018-09-21

## 2018-09-19 RX ORDER — ONDANSETRON 8 MG/1
4 TABLET, FILM COATED ORAL EVERY 6 HOURS
Qty: 0 | Refills: 0 | Status: DISCONTINUED | OUTPATIENT
Start: 2018-09-19 | End: 2018-09-29

## 2018-09-19 RX ORDER — NALOXONE HYDROCHLORIDE 4 MG/.1ML
0.1 SPRAY NASAL
Qty: 0 | Refills: 0 | Status: DISCONTINUED | OUTPATIENT
Start: 2018-09-19 | End: 2018-09-29

## 2018-09-19 RX ORDER — BUTORPHANOL TARTRATE 2 MG/ML
0.25 INJECTION, SOLUTION INTRAMUSCULAR; INTRAVENOUS EVERY 6 HOURS
Qty: 0 | Refills: 0 | Status: DISCONTINUED | OUTPATIENT
Start: 2018-09-19 | End: 2018-09-25

## 2018-09-19 RX ADMIN — Medication 5 MILLIGRAM(S): at 19:32

## 2018-09-19 RX ADMIN — SODIUM CHLORIDE 100 MILLILITER(S): 9 INJECTION, SOLUTION INTRAVENOUS at 17:58

## 2018-09-19 RX ADMIN — HEPARIN SODIUM 5000 UNIT(S): 5000 INJECTION INTRAVENOUS; SUBCUTANEOUS at 21:32

## 2018-09-19 NOTE — PATIENT PROFILE ADULT. - PMH
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

## 2018-09-19 NOTE — CHART NOTE - NSCHARTNOTEFT_GEN_A_CORE
General Surgery Post-Op Note    SUBJECTIVE:  This is a 73y Male POD 0 s/p lap distal pancreatectomy with splenic preservation. NGT present. Doing well overall. Denies flatus, BM, N/V. Received 3L fluids during procedure. In PACU, having high UOP, 1800ml since procedure.     OBJECTIVE:     ** VITAL SIGNS / I&O's **    Vital Signs Last 24 Hrs  T(C): 36.4 (19 Sep 2018 18:30), Max: 36.7 (19 Sep 2018 15:10)  T(F): 97.5 (19 Sep 2018 18:30), Max: 98.1 (19 Sep 2018 15:10)  HR: 61 (19 Sep 2018 19:30) (59 - 76)  BP: 176/95 (19 Sep 2018 19:00) (129/70 - 176/95)  BP(mean): 129 (19 Sep 2018 19:00) (93 - 129)  RR: 15 (19 Sep 2018 19:00) (12 - 19)  SpO2: 97% (19 Sep 2018 19:30) (97% - 100%)      19 Sep 2018 07:01  -  19 Sep 2018 20:43  --------------------------------------------------------  IN:    lactated ringers: 500 mL  Total IN: 500 mL    OUT:    Bulb: 50 mL    Indwelling Catheter - Urethral: 1800 mL  Total OUT: 1850 mL    Total NET: -1350 mL          ** PHYSICAL EXAM **    -- CONSTITUTIONAL: Alert, in NAD  -- PULMONARY: non-labored respirations  -- ABDOMEN: soft, non-distended, appropriately TTP, dressings c/d/i  -- NEURO: A&Ox3    ** LABS **                          11.9   7.9   )-----------( 217      ( 19 Sep 2018 15:50 )             34.9     19 Sep 2018 15:50    142    |  104    |  11     ----------------------------<  122    4.3     |  24     |  0.98     Ca    9.0        19 Sep 2018 15:50        CAPILLARY BLOOD GLUCOSE      POCT Blood Glucose.: 141 mg/dL (19 Sep 2018 20:20)  POCT Blood Glucose.: 73 mg/dL (19 Sep 2018 06:24)                MEDICATIONS  (STANDING):  digoxin  Injectable 0.25 milliGRAM(s) IV Push daily  heparin  Injectable 5000 Unit(s) SubCutaneous every 8 hours  HYDROmorphone (10 MICROgram(s)/mL) + BUpivacaine 0.0625% in 0.9% Sodium Chloride PCEA 250 milliLiter(s) Epidural PCA Continuous  influenza   Vaccine 0.5 milliLiter(s) IntraMuscular once  lactated ringers 1000 milliLiter(s) (100 mL/Hr) IV Continuous <Continuous>  tiotropium 18 MICROgram(s) Capsule 1 Capsule(s) Inhalation daily  vancomycin  IVPB 1000 milliGRAM(s) IV Intermittent once    MEDICATIONS  (PRN):  butorphanol Injectable 0.25 milliGRAM(s) IV Push every 6 hours PRN Pruritus  HYDROmorphone  Injectable 0.25 milliGRAM(s) IV Push every 10 minutes PRN Moderate Pain (4 - 6)  HYDROmorphone  Injectable 0.5 milliGRAM(s) IV Push every 10 minutes PRN Severe Pain (7 - 10)  HYDROmorphone (10 MICROgram(s)/mL) + BUpivacaine 0.0625% in 0.9% Sodium Chloride PCEA Rescue Clinician  Bolus 5 milliLiter(s) Epidural every 15 minutes PRN for Pain Score greater than 6  naloxone Injectable 0.1 milliGRAM(s) IV Push every 3 minutes PRN For ANY of the following changes in patient status:  A. RR LESS THAN 10 breaths per minute, B. Oxygen saturation LESS THAN 90%, C. Sedation score of 6  ondansetron Injectable 4 milliGRAM(s) IV Push every 6 hours PRN Nausea  ondansetron Injectable 4 milliGRAM(s) IV Push once PRN Nausea and/or Vomiting        Assessment:  This is a 73yMale POD 0 s/p     Plan:  - Diet:   - OOB, ambulate as tolerated  - Encourage use of IS  - Monitor dressings  - DVT ppx General Surgery Post-Op Note    SUBJECTIVE:  This is a 73y Male POD 0 s/p lap distal pancreatectomy with splenic preservation. NGT present. Doing well overall. Denies flatus, BM, N/V. Received 3L fluids during procedure. In PACU, having high UOP, 1800ml since procedure. Pain well controlled on PCA, pt states not requiring use of it.     OBJECTIVE:     ** VITAL SIGNS / I&O's **    Vital Signs Last 24 Hrs  T(C): 36.4 (19 Sep 2018 18:30), Max: 36.7 (19 Sep 2018 15:10)  T(F): 97.5 (19 Sep 2018 18:30), Max: 98.1 (19 Sep 2018 15:10)  HR: 61 (19 Sep 2018 19:30) (59 - 76)  BP: 176/95 (19 Sep 2018 19:00) (129/70 - 176/95)  BP(mean): 129 (19 Sep 2018 19:00) (93 - 129)  RR: 15 (19 Sep 2018 19:00) (12 - 19)  SpO2: 97% (19 Sep 2018 19:30) (97% - 100%)      19 Sep 2018 07:01  -  19 Sep 2018 20:43  --------------------------------------------------------  IN:    lactated ringers: 500 mL  Total IN: 500 mL    OUT:    Bulb: 50 mL    Indwelling Catheter - Urethral: 1800 mL  Total OUT: 1850 mL    Total NET: -1350 mL          ** PHYSICAL EXAM **    -- CONSTITUTIONAL: Alert, in NAD  -- PULMONARY: non-labored respirations  -- : castillo in place  -- ABDOMEN: soft, non-distended, appropriately TTP, dressings c/d/i, L FALLON sanguinous drainage.   -- NEURO: A&Ox3    ** LABS **                          11.9   7.9   )-----------( 217      ( 19 Sep 2018 15:50 )             34.9     19 Sep 2018 15:50    142    |  104    |  11     ----------------------------<  122    4.3     |  24     |  0.98     Ca    9.0        19 Sep 2018 15:50        CAPILLARY BLOOD GLUCOSE      POCT Blood Glucose.: 141 mg/dL (19 Sep 2018 20:20)  POCT Blood Glucose.: 73 mg/dL (19 Sep 2018 06:24)                MEDICATIONS  (STANDING):  digoxin  Injectable 0.25 milliGRAM(s) IV Push daily  heparin  Injectable 5000 Unit(s) SubCutaneous every 8 hours  HYDROmorphone (10 MICROgram(s)/mL) + BUpivacaine 0.0625% in 0.9% Sodium Chloride PCEA 250 milliLiter(s) Epidural PCA Continuous  influenza   Vaccine 0.5 milliLiter(s) IntraMuscular once  lactated ringers 1000 milliLiter(s) (100 mL/Hr) IV Continuous <Continuous>  tiotropium 18 MICROgram(s) Capsule 1 Capsule(s) Inhalation daily  vancomycin  IVPB 1000 milliGRAM(s) IV Intermittent once    MEDICATIONS  (PRN):  butorphanol Injectable 0.25 milliGRAM(s) IV Push every 6 hours PRN Pruritus  HYDROmorphone  Injectable 0.25 milliGRAM(s) IV Push every 10 minutes PRN Moderate Pain (4 - 6)  HYDROmorphone  Injectable 0.5 milliGRAM(s) IV Push every 10 minutes PRN Severe Pain (7 - 10)  HYDROmorphone (10 MICROgram(s)/mL) + BUpivacaine 0.0625% in 0.9% Sodium Chloride PCEA Rescue Clinician  Bolus 5 milliLiter(s) Epidural every 15 minutes PRN for Pain Score greater than 6  naloxone Injectable 0.1 milliGRAM(s) IV Push every 3 minutes PRN For ANY of the following changes in patient status:  A. RR LESS THAN 10 breaths per minute, B. Oxygen saturation LESS THAN 90%, C. Sedation score of 6  ondansetron Injectable 4 milliGRAM(s) IV Push every 6 hours PRN Nausea  ondansetron Injectable 4 milliGRAM(s) IV Push once PRN Nausea and/or Vomiting        Assessment:  This is a 73y Male POD 0 s/p lap distal pancreatectomy with splenic preservation. NGT and castillo present.     Plan:  - Diet: NPO  - Pain control with PCA  - Castillo in place, monitor UOP  - F/u BMP d/t increased UOP  - OOB, ambulate as tolerated  - Encourage use of IS  - Monitor dressings and drain output  - DVT ppx      [facility] General Surgery [phone]

## 2018-09-19 NOTE — BRIEF OPERATIVE NOTE - PROCEDURE
<<-----Click on this checkbox to enter Procedure Laparoscopic distal pancreatectomy  09/19/2018  Hand assisted  Active  AGAINES

## 2018-09-20 RX ORDER — BENZOCAINE AND MENTHOL 5; 1 G/100ML; G/100ML
1 LIQUID ORAL ONCE
Qty: 0 | Refills: 0 | Status: DISCONTINUED | OUTPATIENT
Start: 2018-09-20 | End: 2018-09-29

## 2018-09-20 RX ORDER — ACETAMINOPHEN 500 MG
1000 TABLET ORAL ONCE
Qty: 0 | Refills: 0 | Status: COMPLETED | OUTPATIENT
Start: 2018-09-20 | End: 2018-09-20

## 2018-09-20 RX ORDER — ACETAMINOPHEN 500 MG
1000 TABLET ORAL ONCE
Qty: 0 | Refills: 0 | Status: COMPLETED | OUTPATIENT
Start: 2018-09-21 | End: 2018-09-21

## 2018-09-20 RX ADMIN — SODIUM CHLORIDE 100 MILLILITER(S): 9 INJECTION, SOLUTION INTRAVENOUS at 21:54

## 2018-09-20 RX ADMIN — Medication 0.25 MILLIGRAM(S): at 12:15

## 2018-09-20 RX ADMIN — HEPARIN SODIUM 5000 UNIT(S): 5000 INJECTION INTRAVENOUS; SUBCUTANEOUS at 12:19

## 2018-09-20 RX ADMIN — HEPARIN SODIUM 5000 UNIT(S): 5000 INJECTION INTRAVENOUS; SUBCUTANEOUS at 05:01

## 2018-09-20 RX ADMIN — HEPARIN SODIUM 5000 UNIT(S): 5000 INJECTION INTRAVENOUS; SUBCUTANEOUS at 21:54

## 2018-09-20 NOTE — PROGRESS NOTE ADULT - SUBJECTIVE AND OBJECTIVE BOX
Day 1 of Anesthesia Pain Management Service    SUBJECTIVE: Endorsing hiccups    Pain Scale Score:   Refer to charted pain scores    THERAPY:  [X] Epidural Bupivacaine 0.0625% and Hydromorphone         [X] 10 micrograms/mL 	[ ] 5 micrograms/mL  [ ] Epidural Ropivacaine 0.1% plain – 1 mg/mL    Demand dose: 3 mL  Lockout: 15 minutes  Continuous Rate: 6 mL/hr    MEDICATIONS  (STANDING):  benzocaine 15 mG/menthol 3.6 mG Lozenge 1 Lozenge Oral once  digoxin  Injectable 0.25 milliGRAM(s) IV Push daily  heparin  Injectable 5000 Unit(s) SubCutaneous every 8 hours  HYDROmorphone (10 MICROgram(s)/mL) + BUpivacaine 0.0625% in 0.9% Sodium Chloride PCEA 250 milliLiter(s) Epidural PCA Continuous  influenza   Vaccine 0.5 milliLiter(s) IntraMuscular once  lactated ringers 1000 milliLiter(s) (100 mL/Hr) IV Continuous <Continuous>  tiotropium 18 MICROgram(s) Capsule 1 Capsule(s) Inhalation daily  vancomycin  IVPB 1000 milliGRAM(s) IV Intermittent once    MEDICATIONS  (PRN):  butorphanol Injectable 0.25 milliGRAM(s) IV Push every 6 hours PRN Pruritus  HYDROmorphone  Injectable 0.25 milliGRAM(s) IV Push every 10 minutes PRN Moderate Pain (4 - 6)  HYDROmorphone  Injectable 0.5 milliGRAM(s) IV Push every 10 minutes PRN Severe Pain (7 - 10)  HYDROmorphone (10 MICROgram(s)/mL) + BUpivacaine 0.0625% in 0.9% Sodium Chloride PCEA Rescue Clinician  Bolus 5 milliLiter(s) Epidural every 15 minutes PRN for Pain Score greater than 6  naloxone Injectable 0.1 milliGRAM(s) IV Push every 3 minutes PRN For ANY of the following changes in patient status:  A. RR LESS THAN 10 breaths per minute, B. Oxygen saturation LESS THAN 90%, C. Sedation score of 6  ondansetron Injectable 4 milliGRAM(s) IV Push every 6 hours PRN Nausea      OBJECTIVE:    Assessment of Catheter Site:    [X] Epidural 	  [X] Dressing intact	[X] Site non-tender	[X] Site without erythema, discharge, edema  [X] Epidural tubing and connection checked	[X] Gross neurological exam within normal limits  [ ] Catheter removed – tip intact		[X] Afebrile	            [ ] Febrile: ___                          13.1   9.9   )-----------( 239      ( 19 Sep 2018 22:12 )             39.3     Vital Signs Last 24 Hrs  T(C): 36.4 (09-20-18 @ 08:41), Max: 36.8 (09-19-18 @ 23:00)  T(F): 97.5 (09-20-18 @ 08:41), Max: 98.2 (09-19-18 @ 23:00)  HR: 60 (09-20-18 @ 08:41) (55 - 76)  BP: 159/78 (09-20-18 @ 08:41) (110/67 - 176/95)  BP(mean): 112 (09-19-18 @ 22:00) (93 - 129)  RR: 18 (09-20-18 @ 08:41) (12 - 20)  SpO2: 97% (09-20-18 @ 08:41) (95% - 100%)      Sedation Score:	[X] Alert	[ ] Drowsy	[ ] Arousable  [ ] Asleep     [ ] Unresponsive    Side Effects:	[X] None	[ ] Nausea	[ ] Vomiting   [ ] Pruritus  		[ ] Weakness     [ ] Numbness	[ ] Other:    ASSESSMENT/ PLAN:    Therapy:	[X] Continue   [ ] Discontinue   [ ] Change to PRN Analgesics   [ ] Change to PCA    Documentation and Verification of current medications:  [X] Done	[ ] Not done, not eligible, reason:    COMMENTS:

## 2018-09-20 NOTE — PROGRESS NOTE ADULT - ASSESSMENT
Assessment:  This is a 73y Male POD #1 s/p lap distal pancreatectomy with splenic preservation.     Plan:  - Diet: NPO/NGT  - Pain control with PCA  - Hernandez in place, monitor UOP  - f/u AM labs  - OOB, ambulate as tolerated  - Encourage use of IS  - Monitor dressings and drain output  - DVT ppx      Katherine French PA-C

## 2018-09-20 NOTE — PROGRESS NOTE ADULT - SUBJECTIVE AND OBJECTIVE BOX
Pain Management Attending Addendum    SUBJECTIVE:    Therapy:	  [ ] IV PCA	   [ x] Epidural           [ ] s/p Spinal Opoid              [ ] Postpartum infusion	  [ ] Patient controlled regional anesthesia (PCRA)    [ ] prn Analgesics    OBJECTIVE:   [x ] No new signs     [ ] Other:    Side Effects:  [x] None			[ ] Other:    Assessment of Catheter Site:		[x ] Intact		[ ] Other:    ASSESSMENT/PLAN  [x ] Continue current therapy    [ ] Therapy changed to:    [ ] IV PCA       [ ] Epidural     [ ] prn Analgesics     [ ] post partum infusion    Comments:

## 2018-09-20 NOTE — PROGRESS NOTE ADULT - SUBJECTIVE AND OBJECTIVE BOX
BLUE SURGERY DAILY PROGRESS NOTE:       SUBJECTIVE/ROS: Patient seen and examined at bedside.  Patient complains of having the hiccups.  His pain is well controlled with PCEA.  He denies any n/v.         MEDICATIONS  (STANDING):  benzocaine 15 mG/menthol 3.6 mG Lozenge 1 Lozenge Oral once  digoxin  Injectable 0.25 milliGRAM(s) IV Push daily  heparin  Injectable 5000 Unit(s) SubCutaneous every 8 hours  HYDROmorphone (10 MICROgram(s)/mL) + BUpivacaine 0.0625% in 0.9% Sodium Chloride PCEA 250 milliLiter(s) Epidural PCA Continuous  influenza   Vaccine 0.5 milliLiter(s) IntraMuscular once  lactated ringers 1000 milliLiter(s) (100 mL/Hr) IV Continuous <Continuous>  tiotropium 18 MICROgram(s) Capsule 1 Capsule(s) Inhalation daily  vancomycin  IVPB 1000 milliGRAM(s) IV Intermittent once    MEDICATIONS  (PRN):  butorphanol Injectable 0.25 milliGRAM(s) IV Push every 6 hours PRN Pruritus  HYDROmorphone  Injectable 0.25 milliGRAM(s) IV Push every 10 minutes PRN Moderate Pain (4 - 6)  HYDROmorphone  Injectable 0.5 milliGRAM(s) IV Push every 10 minutes PRN Severe Pain (7 - 10)  HYDROmorphone (10 MICROgram(s)/mL) + BUpivacaine 0.0625% in 0.9% Sodium Chloride PCEA Rescue Clinician  Bolus 5 milliLiter(s) Epidural every 15 minutes PRN for Pain Score greater than 6  naloxone Injectable 0.1 milliGRAM(s) IV Push every 3 minutes PRN For ANY of the following changes in patient status:  A. RR LESS THAN 10 breaths per minute, B. Oxygen saturation LESS THAN 90%, C. Sedation score of 6  ondansetron Injectable 4 milliGRAM(s) IV Push every 6 hours PRN Nausea      OBJECTIVE:    Vital Signs Last 24 Hrs  T(C): 36.3 (20 Sep 2018 06:29), Max: 36.8 (19 Sep 2018 23:00)  T(F): 97.4 (20 Sep 2018 06:29), Max: 98.2 (19 Sep 2018 23:00)  HR: 56 (20 Sep 2018 06:29) (55 - 76)  BP: 169/82 (20 Sep 2018 06:29) (110/67 - 176/95)  BP(mean): 112 (19 Sep 2018 22:00) (93 - 129)  RR: 18 (20 Sep 2018 06:29) (12 - 20)  SpO2: 98% (20 Sep 2018 06:29) (95% - 100%)        I&O's Detail    19 Sep 2018 07:01  -  20 Sep 2018 07:00  --------------------------------------------------------  IN:    lactated ringers: 600 mL  Total IN: 600 mL    OUT:    Bulb: 55 mL    Indwelling Catheter - Urethral: 2365 mL    Nasoenteral Tube: 50 mL  Total OUT: 2470 mL    Total NET: -1870 mL          Daily     Daily     LABS:                        13.1   9.9   )-----------( 239      ( 19 Sep 2018 22:12 )             39.3     09-19    135  |  99  |  11  ----------------------------<  168<H>  4.2   |  23  |  0.91    Ca    9.1      19 Sep 2018 22:12                    PHYSICAL EXAM:  -- CONSTITUTIONAL: Alert, in NAD  -- PULMONARY: non-labored respirations  -- : castillo in place  -- ABDOMEN: soft, non-distended, appropriately TTP, dressings c/d/i, L FALLON sanguinous drainage. NGT in place with minimal output.  -- NEURO: A&Ox3

## 2018-09-21 LAB
ANION GAP SERPL CALC-SCNC: 11 MMOL/L — SIGNIFICANT CHANGE UP (ref 5–17)
ANION GAP SERPL CALC-SCNC: 12 MMOL/L — SIGNIFICANT CHANGE UP (ref 5–17)
APTT BLD: 37.1 SEC — SIGNIFICANT CHANGE UP (ref 27.5–37.4)
BUN SERPL-MCNC: 12 MG/DL — SIGNIFICANT CHANGE UP (ref 7–23)
BUN SERPL-MCNC: 17 MG/DL — SIGNIFICANT CHANGE UP (ref 7–23)
CALCIUM SERPL-MCNC: 9.1 MG/DL — SIGNIFICANT CHANGE UP (ref 8.4–10.5)
CALCIUM SERPL-MCNC: 9.4 MG/DL — SIGNIFICANT CHANGE UP (ref 8.4–10.5)
CHLORIDE SERPL-SCNC: 97 MMOL/L — SIGNIFICANT CHANGE UP (ref 96–108)
CHLORIDE SERPL-SCNC: 98 MMOL/L — SIGNIFICANT CHANGE UP (ref 96–108)
CO2 SERPL-SCNC: 29 MMOL/L — SIGNIFICANT CHANGE UP (ref 22–31)
CO2 SERPL-SCNC: 30 MMOL/L — SIGNIFICANT CHANGE UP (ref 22–31)
CREAT SERPL-MCNC: 0.93 MG/DL — SIGNIFICANT CHANGE UP (ref 0.5–1.3)
CREAT SERPL-MCNC: 0.98 MG/DL — SIGNIFICANT CHANGE UP (ref 0.5–1.3)
GLUCOSE SERPL-MCNC: 106 MG/DL — HIGH (ref 70–99)
GLUCOSE SERPL-MCNC: 99 MG/DL — SIGNIFICANT CHANGE UP (ref 70–99)
HCT VFR BLD CALC: 38 % — LOW (ref 39–50)
HCT VFR BLD CALC: 39.5 % — SIGNIFICANT CHANGE UP (ref 39–50)
HGB BLD-MCNC: 12.6 G/DL — LOW (ref 13–17)
HGB BLD-MCNC: 13.4 G/DL — SIGNIFICANT CHANGE UP (ref 13–17)
INR BLD: 1.22 RATIO — HIGH (ref 0.88–1.16)
MAGNESIUM SERPL-MCNC: 1.9 MG/DL — SIGNIFICANT CHANGE UP (ref 1.6–2.6)
MAGNESIUM SERPL-MCNC: 2.1 MG/DL — SIGNIFICANT CHANGE UP (ref 1.6–2.6)
MCHC RBC-ENTMCNC: 30.9 PG — SIGNIFICANT CHANGE UP (ref 27–34)
MCHC RBC-ENTMCNC: 31.3 PG — SIGNIFICANT CHANGE UP (ref 27–34)
MCHC RBC-ENTMCNC: 33.2 GM/DL — SIGNIFICANT CHANGE UP (ref 32–36)
MCHC RBC-ENTMCNC: 34 GM/DL — SIGNIFICANT CHANGE UP (ref 32–36)
MCV RBC AUTO: 92.1 FL — SIGNIFICANT CHANGE UP (ref 80–100)
MCV RBC AUTO: 92.9 FL — SIGNIFICANT CHANGE UP (ref 80–100)
PHOSPHATE SERPL-MCNC: 1.9 MG/DL — LOW (ref 2.5–4.5)
PHOSPHATE SERPL-MCNC: 2.7 MG/DL — SIGNIFICANT CHANGE UP (ref 2.5–4.5)
PHOSPHATE SERPL-MCNC: 3.3 MG/DL — SIGNIFICANT CHANGE UP (ref 2.5–4.5)
PLATELET # BLD AUTO: 208 K/UL — SIGNIFICANT CHANGE UP (ref 150–400)
PLATELET # BLD AUTO: 213 K/UL — SIGNIFICANT CHANGE UP (ref 150–400)
POTASSIUM SERPL-MCNC: 4 MMOL/L — SIGNIFICANT CHANGE UP (ref 3.5–5.3)
POTASSIUM SERPL-MCNC: 5.2 MMOL/L — SIGNIFICANT CHANGE UP (ref 3.5–5.3)
POTASSIUM SERPL-SCNC: 4 MMOL/L — SIGNIFICANT CHANGE UP (ref 3.5–5.3)
POTASSIUM SERPL-SCNC: 5.2 MMOL/L — SIGNIFICANT CHANGE UP (ref 3.5–5.3)
PROTHROM AB SERPL-ACNC: 13.3 SEC — HIGH (ref 9.8–12.7)
RBC # BLD: 4.09 M/UL — LOW (ref 4.2–5.8)
RBC # BLD: 4.29 M/UL — SIGNIFICANT CHANGE UP (ref 4.2–5.8)
RBC # FLD: 13.8 % — SIGNIFICANT CHANGE UP (ref 10.3–14.5)
RBC # FLD: 13.8 % — SIGNIFICANT CHANGE UP (ref 10.3–14.5)
SODIUM SERPL-SCNC: 138 MMOL/L — SIGNIFICANT CHANGE UP (ref 135–145)
SODIUM SERPL-SCNC: 139 MMOL/L — SIGNIFICANT CHANGE UP (ref 135–145)
WBC # BLD: 10.1 K/UL — SIGNIFICANT CHANGE UP (ref 3.8–10.5)
WBC # BLD: 6.1 K/UL — SIGNIFICANT CHANGE UP (ref 3.8–10.5)
WBC # FLD AUTO: 10.1 K/UL — SIGNIFICANT CHANGE UP (ref 3.8–10.5)
WBC # FLD AUTO: 6.1 K/UL — SIGNIFICANT CHANGE UP (ref 3.8–10.5)

## 2018-09-21 RX ORDER — HEPARIN SODIUM 5000 [USP'U]/ML
5000 INJECTION INTRAVENOUS; SUBCUTANEOUS EVERY 8 HOURS
Qty: 0 | Refills: 0 | Status: DISCONTINUED | OUTPATIENT
Start: 2018-09-21 | End: 2018-09-29

## 2018-09-21 RX ORDER — MAGNESIUM SULFATE 500 MG/ML
1 VIAL (ML) INJECTION ONCE
Qty: 0 | Refills: 0 | Status: COMPLETED | OUTPATIENT
Start: 2018-09-21 | End: 2018-09-21

## 2018-09-21 RX ORDER — SODIUM CHLORIDE 9 MG/ML
1000 INJECTION, SOLUTION INTRAVENOUS
Qty: 0 | Refills: 0 | Status: DISCONTINUED | OUTPATIENT
Start: 2018-09-21 | End: 2018-09-21

## 2018-09-21 RX ORDER — MAGNESIUM SULFATE 500 MG/ML
1 VIAL (ML) INJECTION ONCE
Qty: 0 | Refills: 0 | Status: DISCONTINUED | OUTPATIENT
Start: 2018-09-21 | End: 2018-09-21

## 2018-09-21 RX ADMIN — TIOTROPIUM BROMIDE 1 CAPSULE(S): 18 CAPSULE ORAL; RESPIRATORY (INHALATION) at 12:59

## 2018-09-21 RX ADMIN — HEPARIN SODIUM 5000 UNIT(S): 5000 INJECTION INTRAVENOUS; SUBCUTANEOUS at 05:09

## 2018-09-21 RX ADMIN — Medication 0.25 MILLIGRAM(S): at 11:59

## 2018-09-21 RX ADMIN — HEPARIN SODIUM 5000 UNIT(S): 5000 INJECTION INTRAVENOUS; SUBCUTANEOUS at 21:32

## 2018-09-21 RX ADMIN — Medication 83.33 MILLIMOLE(S): at 05:09

## 2018-09-21 RX ADMIN — Medication 100 GRAM(S): at 05:09

## 2018-09-21 NOTE — PHYSICAL THERAPY INITIAL EVALUATION ADULT - PERTINENT HX OF CURRENT PROBLEM, REHAB EVAL
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

## 2018-09-21 NOTE — PROGRESS NOTE ADULT - SUBJECTIVE AND OBJECTIVE BOX
Patient is POD 2 after laparoscopic distal pancreatectomy. He is doing very well. With a basal epidural rate he has essentially no pain. I removed his NGT as the volumes were low and he was not nauseated. We will remove his castillo as well. Start clear liquid diet. Encourage ambulation. All labs stable. Path pending.  FALLON output serosanguinous. Will test for amylase after eating.

## 2018-09-21 NOTE — PHYSICAL THERAPY INITIAL EVALUATION ADULT - PLANNED THERAPY INTERVENTIONS, PT EVAL
gait training/GOALS: Pt will negotiate 12 steps with unilateral handrail & step to pattern with independence in 4wks/transfer training/bed mobility training

## 2018-09-21 NOTE — PROGRESS NOTE ADULT - SUBJECTIVE AND OBJECTIVE BOX
BLUE SURGERY DAILY PROGRESS NOTE:       SUBJECTIVE/ROS: Patient seen and examined at bedside.  No acute overnight events.  Patient states he is passing flatus.  His pain is well controlled with PCEA, which he presses rarely.         MEDICATIONS  (STANDING):  benzocaine 15 mG/menthol 3.6 mG Lozenge 1 Lozenge Oral once  dextrose 5% + sodium chloride 0.45%. 1000 milliLiter(s) (50 mL/Hr) IV Continuous <Continuous>  digoxin  Injectable 0.25 milliGRAM(s) IV Push daily  heparin  Injectable 5000 Unit(s) SubCutaneous every 8 hours  HYDROmorphone (10 MICROgram(s)/mL) + BUpivacaine 0.0625% in 0.9% Sodium Chloride PCEA 250 milliLiter(s) Epidural PCA Continuous  influenza   Vaccine 0.5 milliLiter(s) IntraMuscular once  tiotropium 18 MICROgram(s) Capsule 1 Capsule(s) Inhalation daily    MEDICATIONS  (PRN):  butorphanol Injectable 0.25 milliGRAM(s) IV Push every 6 hours PRN Pruritus  HYDROmorphone  Injectable 0.25 milliGRAM(s) IV Push every 10 minutes PRN Moderate Pain (4 - 6)  HYDROmorphone  Injectable 0.5 milliGRAM(s) IV Push every 10 minutes PRN Severe Pain (7 - 10)  HYDROmorphone (10 MICROgram(s)/mL) + BUpivacaine 0.0625% in 0.9% Sodium Chloride PCEA Rescue Clinician  Bolus 5 milliLiter(s) Epidural every 15 minutes PRN for Pain Score greater than 6  naloxone Injectable 0.1 milliGRAM(s) IV Push every 3 minutes PRN For ANY of the following changes in patient status:  A. RR LESS THAN 10 breaths per minute, B. Oxygen saturation LESS THAN 90%, C. Sedation score of 6  ondansetron Injectable 4 milliGRAM(s) IV Push every 6 hours PRN Nausea      OBJECTIVE:    Vital Signs Last 24 Hrs  T(C): 36.6 (21 Sep 2018 05:08), Max: 36.9 (21 Sep 2018 02:10)  T(F): 97.8 (21 Sep 2018 05:08), Max: 98.5 (21 Sep 2018 02:10)  HR: 61 (21 Sep 2018 05:08) (60 - 69)  BP: 154/81 (21 Sep 2018 05:08) (124/67 - 181/90)  BP(mean): --  RR: 18 (21 Sep 2018 05:08) (18 - 18)  SpO2: 92% (21 Sep 2018 05:08) (92% - 97%)        I&O's Detail    20 Sep 2018 07:01  -  21 Sep 2018 07:00  --------------------------------------------------------  IN:    lactated ringers: 1000 mL  Total IN: 1000 mL    OUT:    Bulb: 73 mL    Indwelling Catheter - Urethral: 2750 mL    Nasoenteral Tube: 500 mL  Total OUT: 3323 mL    Total NET: -2323 mL          Daily     Daily     LABS:                        12.6   10.1  )-----------( 213      ( 21 Sep 2018 03:09 )             38.0     09-21    139  |  98  |  17  ----------------------------<  99  5.2   |  29  |  0.93    Ca    9.1      21 Sep 2018 03:09  Phos  1.9     09-21  Mg     1.9     09-21      PT/INR - ( 21 Sep 2018 03:09 )   PT: 13.3 sec;   INR: 1.22 ratio         PTT - ( 21 Sep 2018 03:09 )  PTT:37.1 sec        PHYSICAL EXAM:  -- CONSTITUTIONAL: Alert, in NAD  -- PULMONARY: non-labored respirations  -- : castillo in place  -- ABDOMEN: soft, non-distended, appropriately TTP, dressings c/d/i, L FALLON sanguinous drainage. NGT in place with minimal output.  -- NEURO: A&Ox3

## 2018-09-21 NOTE — PROGRESS NOTE ADULT - ASSESSMENT
Assessment:  This is a 73y Male POD #2 s/p lap distal pancreatectomy with splenic preservation    Plan:    - f/u NG tube output and remove if minimal  - Pain control with PCEA  - Hernandez in place, monitor UOP  - f/u AM labs  - OOB, ambulate as tolerated  - Encourage use of IS  - Monitor dressings and drain output  - DVT ppx      Katherine French PA-C

## 2018-09-21 NOTE — PHYSICAL THERAPY INITIAL EVALUATION ADULT - GAIT DEVIATIONS NOTED, PT EVAL
decreased janet/increased time in double stance/decreased weight-shifting ability/decreased velocity of limb motion/decreased step length/decreased stride length

## 2018-09-21 NOTE — PHYSICAL THERAPY INITIAL EVALUATION ADULT - ADDITIONAL COMMENTS
Pt resides in apt with spouse, about 4 steps to enter, (+)elevator. PTA independent with mobility and ADL's, ambulatory with cane, pt states that he was attending outpatient PT for neck problems.

## 2018-09-21 NOTE — PROGRESS NOTE ADULT - SUBJECTIVE AND OBJECTIVE BOX
Day 2 of Anesthesia Pain Management Service    SUBJECTIVE: Patient doing well with PCEA    Pain Scale Score:   Refer to charted pain scores    THERAPY:  [X] Epidural Bupivacaine 0.0625% and Hydromorphone         [X] 10 micrograms/mL 	[ ] 5 micrograms/mL  [ ] Epidural Ropivacaine 0.1% plain – 1 mg/mL    Demand dose: 3 mL  Lockout: 15 minutes  Continuous Rate: 6 mL/hr    MEDICATIONS  (STANDING):  benzocaine 15 mG/menthol 3.6 mG Lozenge 1 Lozenge Oral once  dextrose 5% + sodium chloride 0.45%. 1000 milliLiter(s) (50 mL/Hr) IV Continuous <Continuous>  digoxin  Injectable 0.25 milliGRAM(s) IV Push daily  HYDROmorphone (10 MICROgram(s)/mL) + BUpivacaine 0.0625% in 0.9% Sodium Chloride PCEA 250 milliLiter(s) Epidural PCA Continuous  influenza   Vaccine 0.5 milliLiter(s) IntraMuscular once  tiotropium 18 MICROgram(s) Capsule 1 Capsule(s) Inhalation daily    MEDICATIONS  (PRN):  butorphanol Injectable 0.25 milliGRAM(s) IV Push every 6 hours PRN Pruritus  HYDROmorphone  Injectable 0.25 milliGRAM(s) IV Push every 10 minutes PRN Moderate Pain (4 - 6)  HYDROmorphone  Injectable 0.5 milliGRAM(s) IV Push every 10 minutes PRN Severe Pain (7 - 10)  HYDROmorphone (10 MICROgram(s)/mL) + BUpivacaine 0.0625% in 0.9% Sodium Chloride PCEA Rescue Clinician  Bolus 5 milliLiter(s) Epidural every 15 minutes PRN for Pain Score greater than 6  naloxone Injectable 0.1 milliGRAM(s) IV Push every 3 minutes PRN For ANY of the following changes in patient status:  A. RR LESS THAN 10 breaths per minute, B. Oxygen saturation LESS THAN 90%, C. Sedation score of 6  ondansetron Injectable 4 milliGRAM(s) IV Push every 6 hours PRN Nausea      OBJECTIVE:    Assessment of Catheter Site:    [X] Epidural 	  [X] Dressing intact	[X] Site non-tender	[X] Site without erythema, discharge, edema  [X] Epidural tubing and connection checked	[X] Gross neurological exam within normal limits  [ ] Catheter removed – tip intact		[X] Afebrile	            [ ] Febrile: ___    PT/INR - ( 21 Sep 2018 03:09 )   PT: 13.3 sec;   INR: 1.22 ratio         PTT - ( 21 Sep 2018 03:09 )  PTT:37.1 sec                      12.6   10.1  )-----------( 213      ( 21 Sep 2018 03:09 )             38.0     Vital Signs Last 24 Hrs  T(C): 37.1 (09-21-18 @ 08:39), Max: 37.1 (09-21-18 @ 08:39)  T(F): 98.8 (09-21-18 @ 08:39), Max: 98.8 (09-21-18 @ 08:39)  HR: 76 (09-21-18 @ 08:39) (60 - 76)  BP: 155/80 (09-21-18 @ 08:39) (124/67 - 181/90)  BP(mean): --  RR: 18 (09-21-18 @ 08:39) (18 - 18)  SpO2: 92% (09-21-18 @ 08:39) (92% - 97%)      Sedation Score:	[X] Alert	[ ] Drowsy	[ ] Arousable  [ ] Asleep     [ ] Unresponsive    Side Effects:	[X] None	[ ] Nausea	[ ] Vomiting   [ ] Pruritus  		[ ] Weakness     [ ] Numbness	[ ] Other:    ASSESSMENT/ PLAN:    Therapy:	[X] Continue   [ ] Discontinue   [ ] Change to PRN Analgesics   [ ] Change to PCA    Documentation and Verification of current medications:  [X] Done	[ ] Not done, not eligible, reason:    COMMENTS: Patient remains NPO. As per discussion with Dr. Brasher, continue PCEA.

## 2018-09-21 NOTE — PHYSICAL THERAPY INITIAL EVALUATION ADULT - DISCHARGE DISPOSITION, PT EVAL
home/home w/ assist/home with home PT for improved strength balance & endurance for activity; home safety assessment; recommend rolling walker for ambulation, pt owns cane, assist from spouse as needed./home w/ home PT

## 2018-09-21 NOTE — PHYSICAL THERAPY INITIAL EVALUATION ADULT - GENERAL OBSERVATIONS, REHAB EVAL
Pt received OOB sitting in chair, A&OX4, following all commands, willing to participate, spouse at bedside, +PCA, +castillo

## 2018-09-22 LAB
ANION GAP SERPL CALC-SCNC: 14 MMOL/L — SIGNIFICANT CHANGE UP (ref 5–17)
APTT BLD: 32.5 SEC — SIGNIFICANT CHANGE UP (ref 27.5–37.4)
BUN SERPL-MCNC: 14 MG/DL — SIGNIFICANT CHANGE UP (ref 7–23)
CALCIUM SERPL-MCNC: 9.7 MG/DL — SIGNIFICANT CHANGE UP (ref 8.4–10.5)
CHLORIDE SERPL-SCNC: 94 MMOL/L — LOW (ref 96–108)
CO2 SERPL-SCNC: 27 MMOL/L — SIGNIFICANT CHANGE UP (ref 22–31)
CREAT SERPL-MCNC: 1.03 MG/DL — SIGNIFICANT CHANGE UP (ref 0.5–1.3)
GLUCOSE SERPL-MCNC: 110 MG/DL — HIGH (ref 70–99)
HCT VFR BLD CALC: 40.5 % — SIGNIFICANT CHANGE UP (ref 39–50)
HGB BLD-MCNC: 14.3 G/DL — SIGNIFICANT CHANGE UP (ref 13–17)
INR BLD: 1.16 RATIO — SIGNIFICANT CHANGE UP (ref 0.88–1.16)
MCHC RBC-ENTMCNC: 32.4 PG — SIGNIFICANT CHANGE UP (ref 27–34)
MCHC RBC-ENTMCNC: 35.3 GM/DL — SIGNIFICANT CHANGE UP (ref 32–36)
MCV RBC AUTO: 91.6 FL — SIGNIFICANT CHANGE UP (ref 80–100)
PLATELET # BLD AUTO: 257 K/UL — SIGNIFICANT CHANGE UP (ref 150–400)
POTASSIUM SERPL-MCNC: 4.1 MMOL/L — SIGNIFICANT CHANGE UP (ref 3.5–5.3)
POTASSIUM SERPL-SCNC: 4.1 MMOL/L — SIGNIFICANT CHANGE UP (ref 3.5–5.3)
PROTHROM AB SERPL-ACNC: 13.2 SEC — HIGH (ref 10–13.1)
RBC # BLD: 4.43 M/UL — SIGNIFICANT CHANGE UP (ref 4.2–5.8)
RBC # FLD: 13.7 % — SIGNIFICANT CHANGE UP (ref 10.3–14.5)
SODIUM SERPL-SCNC: 135 MMOL/L — SIGNIFICANT CHANGE UP (ref 135–145)
WBC # BLD: 6 K/UL — SIGNIFICANT CHANGE UP (ref 3.8–10.5)
WBC # FLD AUTO: 6 K/UL — SIGNIFICANT CHANGE UP (ref 3.8–10.5)

## 2018-09-22 RX ORDER — IBUPROFEN 200 MG
600 TABLET ORAL EVERY 6 HOURS
Qty: 0 | Refills: 0 | Status: DISCONTINUED | OUTPATIENT
Start: 2018-09-22 | End: 2018-09-25

## 2018-09-22 RX ORDER — ACETAMINOPHEN 500 MG
650 TABLET ORAL EVERY 6 HOURS
Qty: 0 | Refills: 0 | Status: DISCONTINUED | OUTPATIENT
Start: 2018-09-22 | End: 2018-09-22

## 2018-09-22 RX ORDER — OXYCODONE AND ACETAMINOPHEN 5; 325 MG/1; MG/1
1 TABLET ORAL EVERY 4 HOURS
Qty: 0 | Refills: 0 | Status: DISCONTINUED | OUTPATIENT
Start: 2018-09-22 | End: 2018-09-23

## 2018-09-22 RX ORDER — ACETAMINOPHEN 500 MG
1000 TABLET ORAL ONCE
Qty: 0 | Refills: 0 | Status: COMPLETED | OUTPATIENT
Start: 2018-09-22 | End: 2018-09-22

## 2018-09-22 RX ADMIN — Medication 650 MILLIGRAM(S): at 17:59

## 2018-09-22 RX ADMIN — Medication 1000 MILLIGRAM(S): at 18:44

## 2018-09-22 RX ADMIN — Medication 600 MILLIGRAM(S): at 23:23

## 2018-09-22 RX ADMIN — Medication 600 MILLIGRAM(S): at 23:48

## 2018-09-22 RX ADMIN — TIOTROPIUM BROMIDE 1 CAPSULE(S): 18 CAPSULE ORAL; RESPIRATORY (INHALATION) at 11:54

## 2018-09-22 RX ADMIN — HEPARIN SODIUM 5000 UNIT(S): 5000 INJECTION INTRAVENOUS; SUBCUTANEOUS at 05:34

## 2018-09-22 RX ADMIN — HEPARIN SODIUM 5000 UNIT(S): 5000 INJECTION INTRAVENOUS; SUBCUTANEOUS at 13:48

## 2018-09-22 RX ADMIN — ONDANSETRON 4 MILLIGRAM(S): 8 TABLET, FILM COATED ORAL at 23:09

## 2018-09-22 RX ADMIN — Medication 400 MILLIGRAM(S): at 18:43

## 2018-09-22 RX ADMIN — HEPARIN SODIUM 5000 UNIT(S): 5000 INJECTION INTRAVENOUS; SUBCUTANEOUS at 21:33

## 2018-09-22 RX ADMIN — Medication 650 MILLIGRAM(S): at 17:57

## 2018-09-22 RX ADMIN — Medication 0.25 MILLIGRAM(S): at 11:54

## 2018-09-22 NOTE — PROGRESS NOTE ADULT - SUBJECTIVE AND OBJECTIVE BOX
BLUE SURGERY DAILY PROGRESS NOTE:       SUBJECTIVE: Patient seen and examined at bedside.  No acute overnight events.  Patient states he is passing flatus.  His pain is well controlled with PCEA, which he presses rarely.     Objective:    Vital Signs Last 24 Hrs  T(C): 36.3 (22 Sep 2018 10:13), Max: 37.4 (22 Sep 2018 05:25)  T(F): 97.4 (22 Sep 2018 10:13), Max: 99.3 (22 Sep 2018 05:25)  HR: 85 (22 Sep 2018 10:13) (67 - 85)  BP: 109/65 (22 Sep 2018 10:13) (109/65 - 180/92)  BP(mean): --  RR: 18 (22 Sep 2018 10:13) (18 - 18)  SpO2: 94% (22 Sep 2018 10:13) (92% - 94%)    I&O's Detail    21 Sep 2018 07:01  -  22 Sep 2018 07:00  --------------------------------------------------------  IN:    dextrose 5% + sodium chloride 0.45%.: 400 mL    Oral Fluid: 400 mL  Total IN: 800 mL    OUT:    Bulb: 25 mL    Indwelling Catheter - Urethral: 3200 mL  Total OUT: 3225 mL    Total NET: -2425 mL      22 Sep 2018 07:01  -  22 Sep 2018 11:27  --------------------------------------------------------  IN:    Oral Fluid: 540 mL  Total IN: 540 mL    OUT:    Bulb: 10 mL    Indwelling Catheter - Urethral: 125 mL  Total OUT: 135 mL    Total NET: 405 mL          MEDICATIONS  (STANDING):  benzocaine 15 mG/menthol 3.6 mG Lozenge 1 Lozenge Oral once  digoxin  Injectable 0.25 milliGRAM(s) IV Push daily  heparin  Injectable 5000 Unit(s) SubCutaneous every 8 hours  HYDROmorphone (10 MICROgram(s)/mL) + BUpivacaine 0.0625% in 0.9% Sodium Chloride PCEA 250 milliLiter(s) Epidural PCA Continuous  influenza   Vaccine 0.5 milliLiter(s) IntraMuscular once  tiotropium 18 MICROgram(s) Capsule 1 Capsule(s) Inhalation daily    MEDICATIONS  (PRN):  butorphanol Injectable 0.25 milliGRAM(s) IV Push every 6 hours PRN Pruritus  HYDROmorphone  Injectable 0.25 milliGRAM(s) IV Push every 10 minutes PRN Moderate Pain (4 - 6)  HYDROmorphone  Injectable 0.5 milliGRAM(s) IV Push every 10 minutes PRN Severe Pain (7 - 10)  HYDROmorphone (10 MICROgram(s)/mL) + BUpivacaine 0.0625% in 0.9% Sodium Chloride PCEA Rescue Clinician  Bolus 5 milliLiter(s) Epidural every 15 minutes PRN for Pain Score greater than 6  naloxone Injectable 0.1 milliGRAM(s) IV Push every 3 minutes PRN For ANY of the following changes in patient status:  A. RR LESS THAN 10 breaths per minute, B. Oxygen saturation LESS THAN 90%, C. Sedation score of 6  ondansetron Injectable 4 milliGRAM(s) IV Push every 6 hours PRN Nausea      LABS:                        13.4   6.1   )-----------( 208      ( 21 Sep 2018 23:30 )             39.5     09-21    138  |  97  |  12  ----------------------------<  106<H>  4.0   |  30  |  0.98    Ca    9.4      21 Sep 2018 23:30  Phos  2.7     09-21  Mg     2.1     09-21      PT/INR - ( 22 Sep 2018 08:12 )   PT: 13.2 sec;   INR: 1.16 ratio         PTT - ( 22 Sep 2018 08:12 )  PTT:32.5 sec                PHYSICAL EXAM:  -- CONSTITUTIONAL: Alert, in NAD  -- PULMONARY: non-labored respirations  -- : castillo in place  -- ABDOMEN: soft, non-distended, appropriately TTP, dressings c/d/i, L FALLON sanguinous drainage. NGT in place with minimal output.  -- NEURO: A&Ox3

## 2018-09-22 NOTE — PROGRESS NOTE ADULT - ASSESSMENT
Assessment:  This is a 73y Male POD #3 s/p lap distal pancreatectomy with splenic preservation    Plan:    - f/u NG tube output and remove if minimal  - Dc PCEA  - Dc castillo after PCEA is discontinued   - OOB, ambulate as tolerated  - Encourage use of IS  - Monitor dressings and drain output  - DVT ppx

## 2018-09-22 NOTE — PROGRESS NOTE ADULT - SUBJECTIVE AND OBJECTIVE BOX
Day __3_ of Anesthesia Pain Management Service    SUBJECTIVE:  Pain Scale Score	At rest: ___ 	With Activity: ___ 	[  x] Refer to charted pain scores    THERAPY:  [x ] Epidural Bupivacaine 0.0625% and Hydromorphone 10 micrograms/mL  [ ] Epidural Ropivacaine 0.2% plain     Demand dose _3__ lockout _15__ (minutes) Continuous Rate _6__       MEDICATIONS  (STANDING):  benzocaine 15 mG/menthol 3.6 mG Lozenge 1 Lozenge Oral once  digoxin  Injectable 0.25 milliGRAM(s) IV Push daily  heparin  Injectable 5000 Unit(s) SubCutaneous every 8 hours  HYDROmorphone (10 MICROgram(s)/mL) + BUpivacaine 0.0625% in 0.9% Sodium Chloride PCEA 250 milliLiter(s) Epidural PCA Continuous  influenza   Vaccine 0.5 milliLiter(s) IntraMuscular once  tiotropium 18 MICROgram(s) Capsule 1 Capsule(s) Inhalation daily    MEDICATIONS  (PRN):  butorphanol Injectable 0.25 milliGRAM(s) IV Push every 6 hours PRN Pruritus  HYDROmorphone  Injectable 0.25 milliGRAM(s) IV Push every 10 minutes PRN Moderate Pain (4 - 6)  HYDROmorphone  Injectable 0.5 milliGRAM(s) IV Push every 10 minutes PRN Severe Pain (7 - 10)  HYDROmorphone (10 MICROgram(s)/mL) + BUpivacaine 0.0625% in 0.9% Sodium Chloride PCEA Rescue Clinician  Bolus 5 milliLiter(s) Epidural every 15 minutes PRN for Pain Score greater than 6  naloxone Injectable 0.1 milliGRAM(s) IV Push every 3 minutes PRN For ANY of the following changes in patient status:  A. RR LESS THAN 10 breaths per minute, B. Oxygen saturation LESS THAN 90%, C. Sedation score of 6  ondansetron Injectable 4 milliGRAM(s) IV Push every 6 hours PRN Nausea      OBJECTIVE:    Assessment of Epidural Catheter Site: 	    [ x] Dressing intact	[x ] Site non-tender	[x ] Site without erythema, discharge, edema  [ x] Epidural tubing and connection checked	[x) Gross neurological exam within normal limits  [ ] Catheter removed – tip intact		    PT/INR - ( 22 Sep 2018 08:12 )   PT: 13.2 sec;   INR: 1.16 ratio         PTT - ( 22 Sep 2018 08:12 )  PTT:32.5 sec                      13.4   6.1   )-----------( 208      ( 21 Sep 2018 23:30 )             39.5     Vital Signs Last 24 Hrs  T(C): 37.4 (09-22-18 @ 05:25), Max: 37.4 (09-22-18 @ 05:25)  T(F): 99.3 (09-22-18 @ 05:25), Max: 99.3 (09-22-18 @ 05:25)  HR: 85 (09-22-18 @ 05:25) (67 - 85)  BP: 158/85 (09-22-18 @ 05:25) (116/70 - 180/92)  BP(mean): --  RR: 18 (09-22-18 @ 05:25) (18 - 18)  SpO2: 92% (09-22-18 @ 05:25) (92% - 94%)      Sedation Score:	[x ] Alert	[ ] Drowsy	[ ] Arousable  [ ] Asleep  [ ] Unresponsive    Side Effects:	[x ] None	[ ] Nausea	[ ] Vomiting  [ ] Pruritus  		[ ] Weakness  [ ] Numbness  [ ] Other:    ASSESSMENT/ PLAN:    Therapy to  be:	[x ] Continue   [ ] Discontinued   [ ] Change to prn Analgesics    Documentation and Verification of current medications:  [ X ] Done	[ ] Not done, not eligible, reason:    Comments:

## 2018-09-23 LAB
ANION GAP SERPL CALC-SCNC: 13 MMOL/L — SIGNIFICANT CHANGE UP (ref 5–17)
BUN SERPL-MCNC: 14 MG/DL — SIGNIFICANT CHANGE UP (ref 7–23)
CALCIUM SERPL-MCNC: 8.5 MG/DL — SIGNIFICANT CHANGE UP (ref 8.4–10.5)
CHLORIDE SERPL-SCNC: 95 MMOL/L — LOW (ref 96–108)
CO2 SERPL-SCNC: 28 MMOL/L — SIGNIFICANT CHANGE UP (ref 22–31)
CREAT SERPL-MCNC: 0.9 MG/DL — SIGNIFICANT CHANGE UP (ref 0.5–1.3)
GLUCOSE SERPL-MCNC: 123 MG/DL — HIGH (ref 70–99)
HCT VFR BLD CALC: 40.4 % — SIGNIFICANT CHANGE UP (ref 39–50)
HGB BLD-MCNC: 13.3 G/DL — SIGNIFICANT CHANGE UP (ref 13–17)
MAGNESIUM SERPL-MCNC: 2 MG/DL — SIGNIFICANT CHANGE UP (ref 1.6–2.6)
MCHC RBC-ENTMCNC: 30.3 PG — SIGNIFICANT CHANGE UP (ref 27–34)
MCHC RBC-ENTMCNC: 33.1 GM/DL — SIGNIFICANT CHANGE UP (ref 32–36)
MCV RBC AUTO: 91.5 FL — SIGNIFICANT CHANGE UP (ref 80–100)
PHOSPHATE SERPL-MCNC: 2.2 MG/DL — LOW (ref 2.5–4.5)
PLATELET # BLD AUTO: 266 K/UL — SIGNIFICANT CHANGE UP (ref 150–400)
POTASSIUM SERPL-MCNC: 3.9 MMOL/L — SIGNIFICANT CHANGE UP (ref 3.5–5.3)
POTASSIUM SERPL-SCNC: 3.9 MMOL/L — SIGNIFICANT CHANGE UP (ref 3.5–5.3)
RBC # BLD: 4.41 M/UL — SIGNIFICANT CHANGE UP (ref 4.2–5.8)
RBC # FLD: 13.3 % — SIGNIFICANT CHANGE UP (ref 10.3–14.5)
SODIUM SERPL-SCNC: 136 MMOL/L — SIGNIFICANT CHANGE UP (ref 135–145)
WBC # BLD: 2.9 K/UL — LOW (ref 3.8–10.5)
WBC # FLD AUTO: 2.9 K/UL — LOW (ref 3.8–10.5)

## 2018-09-23 PROCEDURE — 74018 RADEX ABDOMEN 1 VIEW: CPT | Mod: 26

## 2018-09-23 PROCEDURE — 71045 X-RAY EXAM CHEST 1 VIEW: CPT | Mod: 26

## 2018-09-23 RX ORDER — HYDRALAZINE HCL 50 MG
5 TABLET ORAL ONCE
Qty: 0 | Refills: 0 | Status: COMPLETED | OUTPATIENT
Start: 2018-09-23 | End: 2018-09-23

## 2018-09-23 RX ORDER — MORPHINE SULFATE 50 MG/1
2 CAPSULE, EXTENDED RELEASE ORAL EVERY 4 HOURS
Qty: 0 | Refills: 0 | Status: DISCONTINUED | OUTPATIENT
Start: 2018-09-23 | End: 2018-09-25

## 2018-09-23 RX ORDER — SODIUM CHLORIDE 9 MG/ML
1000 INJECTION, SOLUTION INTRAVENOUS ONCE
Qty: 0 | Refills: 0 | Status: COMPLETED | OUTPATIENT
Start: 2018-09-23 | End: 2018-09-23

## 2018-09-23 RX ORDER — ALBUMIN HUMAN 25 %
50 VIAL (ML) INTRAVENOUS ONCE
Qty: 0 | Refills: 0 | Status: COMPLETED | OUTPATIENT
Start: 2018-09-23 | End: 2018-09-23

## 2018-09-23 RX ORDER — SODIUM CHLORIDE 9 MG/ML
1000 INJECTION, SOLUTION INTRAVENOUS
Qty: 0 | Refills: 0 | Status: DISCONTINUED | OUTPATIENT
Start: 2018-09-23 | End: 2018-09-23

## 2018-09-23 RX ORDER — ACETAMINOPHEN 500 MG
1000 TABLET ORAL ONCE
Qty: 0 | Refills: 0 | Status: COMPLETED | OUTPATIENT
Start: 2018-09-23 | End: 2018-09-23

## 2018-09-23 RX ORDER — HYDRALAZINE HCL 50 MG
5 TABLET ORAL EVERY 8 HOURS
Qty: 0 | Refills: 0 | Status: DISCONTINUED | OUTPATIENT
Start: 2018-09-23 | End: 2018-09-29

## 2018-09-23 RX ORDER — SODIUM CHLORIDE 9 MG/ML
1000 INJECTION INTRAMUSCULAR; INTRAVENOUS; SUBCUTANEOUS
Qty: 0 | Refills: 0 | Status: DISCONTINUED | OUTPATIENT
Start: 2018-09-23 | End: 2018-09-25

## 2018-09-23 RX ADMIN — MORPHINE SULFATE 2 MILLIGRAM(S): 50 CAPSULE, EXTENDED RELEASE ORAL at 23:56

## 2018-09-23 RX ADMIN — MORPHINE SULFATE 2 MILLIGRAM(S): 50 CAPSULE, EXTENDED RELEASE ORAL at 18:47

## 2018-09-23 RX ADMIN — Medication 400 MILLIGRAM(S): at 05:34

## 2018-09-23 RX ADMIN — SODIUM CHLORIDE 1000 MILLILITER(S): 9 INJECTION, SOLUTION INTRAVENOUS at 06:45

## 2018-09-23 RX ADMIN — MORPHINE SULFATE 2 MILLIGRAM(S): 50 CAPSULE, EXTENDED RELEASE ORAL at 08:26

## 2018-09-23 RX ADMIN — HEPARIN SODIUM 5000 UNIT(S): 5000 INJECTION INTRAVENOUS; SUBCUTANEOUS at 05:35

## 2018-09-23 RX ADMIN — HEPARIN SODIUM 5000 UNIT(S): 5000 INJECTION INTRAVENOUS; SUBCUTANEOUS at 21:11

## 2018-09-23 RX ADMIN — MORPHINE SULFATE 2 MILLIGRAM(S): 50 CAPSULE, EXTENDED RELEASE ORAL at 15:20

## 2018-09-23 RX ADMIN — MORPHINE SULFATE 2 MILLIGRAM(S): 50 CAPSULE, EXTENDED RELEASE ORAL at 19:17

## 2018-09-23 RX ADMIN — MORPHINE SULFATE 2 MILLIGRAM(S): 50 CAPSULE, EXTENDED RELEASE ORAL at 14:40

## 2018-09-23 RX ADMIN — MORPHINE SULFATE 2 MILLIGRAM(S): 50 CAPSULE, EXTENDED RELEASE ORAL at 09:06

## 2018-09-23 RX ADMIN — Medication 50 MILLILITER(S): at 19:21

## 2018-09-23 RX ADMIN — HEPARIN SODIUM 5000 UNIT(S): 5000 INJECTION INTRAVENOUS; SUBCUTANEOUS at 14:42

## 2018-09-23 RX ADMIN — MORPHINE SULFATE 2 MILLIGRAM(S): 50 CAPSULE, EXTENDED RELEASE ORAL at 23:26

## 2018-09-23 RX ADMIN — TIOTROPIUM BROMIDE 1 CAPSULE(S): 18 CAPSULE ORAL; RESPIRATORY (INHALATION) at 19:16

## 2018-09-23 RX ADMIN — Medication 1000 MILLIGRAM(S): at 06:25

## 2018-09-23 RX ADMIN — Medication 5 MILLIGRAM(S): at 22:56

## 2018-09-23 RX ADMIN — Medication 0.25 MILLIGRAM(S): at 13:05

## 2018-09-23 RX ADMIN — SODIUM CHLORIDE 100 MILLILITER(S): 9 INJECTION INTRAMUSCULAR; INTRAVENOUS; SUBCUTANEOUS at 02:39

## 2018-09-23 NOTE — CHART NOTE - NSCHARTNOTEFT_GEN_A_CORE
pt complained of abdominal distention and vomited 300 cc of yellow fluid. His last feed was several hours earlier.  On abdominal exam, the patient was distended, with moderate tenderness on the right upper and lower quadrants.  Pt was made NPO, started on IVF, and an abdominal x ray was ordered urgently.

## 2018-09-23 NOTE — PROGRESS NOTE ADULT - SUBJECTIVE AND OBJECTIVE BOX
BLUE SURGERY DAILY PROGRESS NOTE:       SUBJECTIVE: Patient seen and examined at bedside.  No acute overnight events.  Patient states he is passing flatus.       Objective:    Vital Signs Last 24 Hrs  T(C): 36.3 (22 Sep 2018 10:13), Max: 37.4 (22 Sep 2018 05:25)  T(F): 97.4 (22 Sep 2018 10:13), Max: 99.3 (22 Sep 2018 05:25)  HR: 85 (22 Sep 2018 10:13) (67 - 85)  BP: 109/65 (22 Sep 2018 10:13) (109/65 - 180/92)  BP(mean): --  RR: 18 (22 Sep 2018 10:13) (18 - 18)  SpO2: 94% (22 Sep 2018 10:13) (92% - 94%)    I&O's Detail    21 Sep 2018 07:01  -  22 Sep 2018 07:00  --------------------------------------------------------  IN:    dextrose 5% + sodium chloride 0.45%.: 400 mL    Oral Fluid: 400 mL  Total IN: 800 mL    OUT:    Bulb: 25 mL    Indwelling Catheter - Urethral: 3200 mL  Total OUT: 3225 mL    Total NET: -2425 mL      22 Sep 2018 07:01  -  22 Sep 2018 11:27  --------------------------------------------------------  IN:    Oral Fluid: 540 mL  Total IN: 540 mL    OUT:    Bulb: 10 mL    Indwelling Catheter - Urethral: 125 mL  Total OUT: 135 mL    Total NET: 405 mL          MEDICATIONS  (STANDING):  benzocaine 15 mG/menthol 3.6 mG Lozenge 1 Lozenge Oral once  digoxin  Injectable 0.25 milliGRAM(s) IV Push daily  heparin  Injectable 5000 Unit(s) SubCutaneous every 8 hours  HYDROmorphone (10 MICROgram(s)/mL) + BUpivacaine 0.0625% in 0.9% Sodium Chloride PCEA 250 milliLiter(s) Epidural PCA Continuous  influenza   Vaccine 0.5 milliLiter(s) IntraMuscular once  tiotropium 18 MICROgram(s) Capsule 1 Capsule(s) Inhalation daily    MEDICATIONS  (PRN):  butorphanol Injectable 0.25 milliGRAM(s) IV Push every 6 hours PRN Pruritus  HYDROmorphone  Injectable 0.25 milliGRAM(s) IV Push every 10 minutes PRN Moderate Pain (4 - 6)  HYDROmorphone  Injectable 0.5 milliGRAM(s) IV Push every 10 minutes PRN Severe Pain (7 - 10)  HYDROmorphone (10 MICROgram(s)/mL) + BUpivacaine 0.0625% in 0.9% Sodium Chloride PCEA Rescue Clinician  Bolus 5 milliLiter(s) Epidural every 15 minutes PRN for Pain Score greater than 6  naloxone Injectable 0.1 milliGRAM(s) IV Push every 3 minutes PRN For ANY of the following changes in patient status:  A. RR LESS THAN 10 breaths per minute, B. Oxygen saturation LESS THAN 90%, C. Sedation score of 6  ondansetron Injectable 4 milliGRAM(s) IV Push every 6 hours PRN Nausea      LABS:                        13.4   6.1   )-----------( 208      ( 21 Sep 2018 23:30 )             39.5     09-21    138  |  97  |  12  ----------------------------<  106<H>  4.0   |  30  |  0.98    Ca    9.4      21 Sep 2018 23:30  Phos  2.7     09-21  Mg     2.1     09-21      PT/INR - ( 22 Sep 2018 08:12 )   PT: 13.2 sec;   INR: 1.16 ratio         PTT - ( 22 Sep 2018 08:12 )  PTT:32.5 sec                PHYSICAL EXAM:  -- CONSTITUTIONAL: Alert, in NAD  -- PULMONARY: non-labored respirations  -- : castillo in place  -- ABDOMEN: soft, non-distended, appropriately TTP, dressings c/d/i, L FALLON sanguinous drainage. NGT in place with minimal output.  -- NEURO: A&Ox3 BLUE SURGERY DAILY PROGRESS NOTE:       SUBJECTIVE: Patient seen and examined at bedside. Had episodes of N/Vx2 after advancing diet to pureed foods. PEX showed abd distension. ABD XR  -flatus/-BM.     Objective:    Vital Signs Last 24 Hrs  T(C): 36.3 (22 Sep 2018 10:13), Max: 37.4 (22 Sep 2018 05:25)  T(F): 97.4 (22 Sep 2018 10:13), Max: 99.3 (22 Sep 2018 05:25)  HR: 85 (22 Sep 2018 10:13) (67 - 85)  BP: 109/65 (22 Sep 2018 10:13) (109/65 - 180/92)  BP(mean): --  RR: 18 (22 Sep 2018 10:13) (18 - 18)  SpO2: 94% (22 Sep 2018 10:13) (92% - 94%)    I&O's Detail    21 Sep 2018 07:01  -  22 Sep 2018 07:00  --------------------------------------------------------  IN:    dextrose 5% + sodium chloride 0.45%.: 400 mL    Oral Fluid: 400 mL  Total IN: 800 mL    OUT:    Bulb: 25 mL    Indwelling Catheter - Urethral: 3200 mL  Total OUT: 3225 mL    Total NET: -2425 mL      22 Sep 2018 07:01  -  22 Sep 2018 11:27  --------------------------------------------------------  IN:    Oral Fluid: 540 mL  Total IN: 540 mL    OUT:    Bulb: 10 mL    Indwelling Catheter - Urethral: 125 mL  Total OUT: 135 mL    Total NET: 405 mL          MEDICATIONS  (STANDING):  benzocaine 15 mG/menthol 3.6 mG Lozenge 1 Lozenge Oral once  digoxin  Injectable 0.25 milliGRAM(s) IV Push daily  heparin  Injectable 5000 Unit(s) SubCutaneous every 8 hours  HYDROmorphone (10 MICROgram(s)/mL) + BUpivacaine 0.0625% in 0.9% Sodium Chloride PCEA 250 milliLiter(s) Epidural PCA Continuous  influenza   Vaccine 0.5 milliLiter(s) IntraMuscular once  tiotropium 18 MICROgram(s) Capsule 1 Capsule(s) Inhalation daily    MEDICATIONS  (PRN):  butorphanol Injectable 0.25 milliGRAM(s) IV Push every 6 hours PRN Pruritus  HYDROmorphone  Injectable 0.25 milliGRAM(s) IV Push every 10 minutes PRN Moderate Pain (4 - 6)  HYDROmorphone  Injectable 0.5 milliGRAM(s) IV Push every 10 minutes PRN Severe Pain (7 - 10)  HYDROmorphone (10 MICROgram(s)/mL) + BUpivacaine 0.0625% in 0.9% Sodium Chloride PCEA Rescue Clinician  Bolus 5 milliLiter(s) Epidural every 15 minutes PRN for Pain Score greater than 6  naloxone Injectable 0.1 milliGRAM(s) IV Push every 3 minutes PRN For ANY of the following changes in patient status:  A. RR LESS THAN 10 breaths per minute, B. Oxygen saturation LESS THAN 90%, C. Sedation score of 6  ondansetron Injectable 4 milliGRAM(s) IV Push every 6 hours PRN Nausea      LABS:                        13.4   6.1   )-----------( 208      ( 21 Sep 2018 23:30 )             39.5     09-21    138  |  97  |  12  ----------------------------<  106<H>  4.0   |  30  |  0.98    Ca    9.4      21 Sep 2018 23:30  Phos  2.7     09-21  Mg     2.1     09-21      PT/INR - ( 22 Sep 2018 08:12 )   PT: 13.2 sec;   INR: 1.16 ratio         PTT - ( 22 Sep 2018 08:12 )  PTT:32.5 sec                PHYSICAL EXAM:  -- CONSTITUTIONAL: Alert, in NAD  -- PULMONARY: non-labored respirations  -- : castillo in place  -- ABDOMEN: soft, non-distended, appropriately TTP, dressings c/d/i, L FALLON sanguinous drainage. NGT in place with minimal output.  -- NEURO: A&Ox3 General Surgery Progress Note    SUBJECTIVE:  The patient was seen and examined. Had episodes of N/Vx2 after advancing diet to pureed foods. PEX showed abd distension. ABD XR revealed ileus. NGT placed, made NPO. -flatus/-BM.    OBJECTIVE:     ** VITAL SIGNS / I&O's **    Vital Signs Last 24 Hrs  T(C): 37.1 (23 Sep 2018 10:04), Max: 37.1 (23 Sep 2018 10:04)  T(F): 98.8 (23 Sep 2018 10:04), Max: 98.8 (23 Sep 2018 10:04)  HR: 85 (23 Sep 2018 13:02) (65 - 96)  BP: 164/88 (23 Sep 2018 11:05) (130/83 - 180/102)  BP(mean): --  RR: 16 (23 Sep 2018 10:04) (16 - 18)  SpO2: 93% (23 Sep 2018 10:04) (92% - 96%)      22 Sep 2018 07:01  -  23 Sep 2018 07:00  --------------------------------------------------------  IN:    IV PiggyBack: 1100 mL    Oral Fluid: 780 mL    sodium chloride 0.9%.: 450 mL  Total IN: 2330 mL    OUT:    Bulb: 25 mL    Emesis: 400 mL    Indwelling Catheter - Urethral: 575 mL    Nasoenteral Tube: 500 mL    Voided: 100 mL  Total OUT: 1600 mL    Total NET: 730 mL      23 Sep 2018 07:01  -  23 Sep 2018 14:00  --------------------------------------------------------  IN:  Total IN: 0 mL    OUT:    Indwelling Catheter - Urethral: 275 mL    Nasoenteral Tube: 250 mL  Total OUT: 525 mL    Total NET: -525 mL          ** PHYSICAL EXAM **    -- CONSTITUTIONAL: Alert, in NAD  -- PULMONARY: non-labored respirations  -- : castillo in place  -- ABDOMEN: soft, non-distended, appropriately TTP, dressings c/d/i, L FALLON sanguinous drainage. NGT in place with minimal output.  -- NEURO: A&Ox3    ** LABS **                          14.3   6.0   )-----------( 257      ( 22 Sep 2018 21:57 )             40.5     22 Sep 2018 21:57    135    |  94     |  14     ----------------------------<  110    4.1     |  27     |  1.03     Ca    9.7        22 Sep 2018 21:57  Phos  2.7       21 Sep 2018 23:30  Mg     2.1       21 Sep 2018 23:30      PT/INR - ( 22 Sep 2018 08:12 )   PT: 13.2 sec;   INR: 1.16 ratio         PTT - ( 22 Sep 2018 08:12 )  PTT:32.5 sec  CAPILLARY BLOOD GLUCOSE                    MEDICATIONS  (STANDING):  benzocaine 15 mG/menthol 3.6 mG Lozenge 1 Lozenge Oral once  digoxin  Injectable 0.25 milliGRAM(s) IV Push daily  heparin  Injectable 5000 Unit(s) SubCutaneous every 8 hours  ibuprofen  Tablet. 600 milliGRAM(s) Oral every 6 hours  influenza   Vaccine 0.5 milliLiter(s) IntraMuscular once  sodium chloride 0.9%. 1000 milliLiter(s) (100 mL/Hr) IV Continuous <Continuous>  tiotropium 18 MICROgram(s) Capsule 1 Capsule(s) Inhalation daily    MEDICATIONS  (PRN):  butorphanol Injectable 0.25 milliGRAM(s) IV Push every 6 hours PRN Pruritus  morphine  - Injectable 2 milliGRAM(s) IV Push every 4 hours PRN Moderate Pain (4 - 6)  naloxone Injectable 0.1 milliGRAM(s) IV Push every 3 minutes PRN For ANY of the following changes in patient status:  A. RR LESS THAN 10 breaths per minute, B. Oxygen saturation LESS THAN 90%, C. Sedation score of 6  ondansetron Injectable 4 milliGRAM(s) IV Push every 6 hours PRN Nausea

## 2018-09-23 NOTE — PROGRESS NOTE ADULT - ASSESSMENT
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    Plan:  - PO pain meds  - monitor GI function  - OOB, ambulate as tolerated  - Encourage use of IS  - Monitor dressings and drain output  - DVT ppx      [facility] Surgery [phone]

## 2018-09-24 LAB
ANION GAP SERPL CALC-SCNC: 11 MMOL/L — SIGNIFICANT CHANGE UP (ref 5–17)
BUN SERPL-MCNC: 12 MG/DL — SIGNIFICANT CHANGE UP (ref 7–23)
CALCIUM SERPL-MCNC: 7.7 MG/DL — LOW (ref 8.4–10.5)
CHLORIDE SERPL-SCNC: 99 MMOL/L — SIGNIFICANT CHANGE UP (ref 96–108)
CO2 SERPL-SCNC: 27 MMOL/L — SIGNIFICANT CHANGE UP (ref 22–31)
CREAT SERPL-MCNC: 0.83 MG/DL — SIGNIFICANT CHANGE UP (ref 0.5–1.3)
GLUCOSE SERPL-MCNC: 119 MG/DL — HIGH (ref 70–99)
HCT VFR BLD CALC: 35.5 % — LOW (ref 39–50)
HGB BLD-MCNC: 12.1 G/DL — LOW (ref 13–17)
MAGNESIUM SERPL-MCNC: 2 MG/DL — SIGNIFICANT CHANGE UP (ref 1.6–2.6)
MCHC RBC-ENTMCNC: 31 PG — SIGNIFICANT CHANGE UP (ref 27–34)
MCHC RBC-ENTMCNC: 34.1 GM/DL — SIGNIFICANT CHANGE UP (ref 32–36)
MCV RBC AUTO: 91.1 FL — SIGNIFICANT CHANGE UP (ref 80–100)
PHOSPHATE SERPL-MCNC: 2.4 MG/DL — LOW (ref 2.5–4.5)
PLATELET # BLD AUTO: 235 K/UL — SIGNIFICANT CHANGE UP (ref 150–400)
POTASSIUM SERPL-MCNC: 3.2 MMOL/L — LOW (ref 3.5–5.3)
POTASSIUM SERPL-SCNC: 3.2 MMOL/L — LOW (ref 3.5–5.3)
RBC # BLD: 3.89 M/UL — LOW (ref 4.2–5.8)
RBC # FLD: 13.5 % — SIGNIFICANT CHANGE UP (ref 10.3–14.5)
SODIUM SERPL-SCNC: 137 MMOL/L — SIGNIFICANT CHANGE UP (ref 135–145)
WBC # BLD: 2.1 K/UL — LOW (ref 3.8–10.5)
WBC # FLD AUTO: 2.1 K/UL — LOW (ref 3.8–10.5)

## 2018-09-24 PROCEDURE — 74177 CT ABD & PELVIS W/CONTRAST: CPT | Mod: 26

## 2018-09-24 PROCEDURE — 71045 X-RAY EXAM CHEST 1 VIEW: CPT | Mod: 26

## 2018-09-24 RX ORDER — ACETAMINOPHEN 500 MG
1000 TABLET ORAL ONCE
Qty: 0 | Refills: 0 | Status: COMPLETED | OUTPATIENT
Start: 2018-09-25 | End: 2018-09-25

## 2018-09-24 RX ORDER — CHLORPROMAZINE HCL 10 MG
50 TABLET ORAL EVERY 12 HOURS
Qty: 0 | Refills: 0 | Status: DISCONTINUED | OUTPATIENT
Start: 2018-09-24 | End: 2018-09-29

## 2018-09-24 RX ORDER — POTASSIUM PHOSPHATE, MONOBASIC POTASSIUM PHOSPHATE, DIBASIC 236; 224 MG/ML; MG/ML
30 INJECTION, SOLUTION INTRAVENOUS ONCE
Qty: 0 | Refills: 0 | Status: COMPLETED | OUTPATIENT
Start: 2018-09-24 | End: 2018-09-25

## 2018-09-24 RX ORDER — ACETAMINOPHEN 500 MG
1000 TABLET ORAL ONCE
Qty: 0 | Refills: 0 | Status: COMPLETED | OUTPATIENT
Start: 2018-09-24 | End: 2018-09-24

## 2018-09-24 RX ORDER — ACETAMINOPHEN 500 MG
1000 TABLET ORAL EVERY 6 HOURS
Qty: 0 | Refills: 0 | Status: COMPLETED | OUTPATIENT
Start: 2018-09-24 | End: 2018-09-24

## 2018-09-24 RX ORDER — ACETAMINOPHEN 500 MG
1000 TABLET ORAL ONCE
Qty: 0 | Refills: 0 | Status: DISCONTINUED | OUTPATIENT
Start: 2018-09-24 | End: 2018-09-24

## 2018-09-24 RX ADMIN — Medication 400 MILLIGRAM(S): at 20:08

## 2018-09-24 RX ADMIN — Medication 1000 MILLIGRAM(S): at 08:40

## 2018-09-24 RX ADMIN — Medication 400 MILLIGRAM(S): at 08:18

## 2018-09-24 RX ADMIN — Medication 5 MILLIGRAM(S): at 05:15

## 2018-09-24 RX ADMIN — MORPHINE SULFATE 2 MILLIGRAM(S): 50 CAPSULE, EXTENDED RELEASE ORAL at 05:45

## 2018-09-24 RX ADMIN — Medication 1000 MILLIGRAM(S): at 20:38

## 2018-09-24 RX ADMIN — Medication 0.25 MILLIGRAM(S): at 14:58

## 2018-09-24 RX ADMIN — HEPARIN SODIUM 5000 UNIT(S): 5000 INJECTION INTRAVENOUS; SUBCUTANEOUS at 15:08

## 2018-09-24 RX ADMIN — Medication 62.5 MILLIMOLE(S): at 16:30

## 2018-09-24 RX ADMIN — Medication 5 MILLIGRAM(S): at 14:55

## 2018-09-24 RX ADMIN — Medication 5 MILLIGRAM(S): at 00:15

## 2018-09-24 RX ADMIN — Medication 1000 MILLIGRAM(S): at 15:28

## 2018-09-24 RX ADMIN — Medication 400 MILLIGRAM(S): at 14:57

## 2018-09-24 RX ADMIN — Medication 104 MILLIGRAM(S): at 21:08

## 2018-09-24 RX ADMIN — HEPARIN SODIUM 5000 UNIT(S): 5000 INJECTION INTRAVENOUS; SUBCUTANEOUS at 21:07

## 2018-09-24 RX ADMIN — HEPARIN SODIUM 5000 UNIT(S): 5000 INJECTION INTRAVENOUS; SUBCUTANEOUS at 05:15

## 2018-09-24 RX ADMIN — MORPHINE SULFATE 2 MILLIGRAM(S): 50 CAPSULE, EXTENDED RELEASE ORAL at 05:15

## 2018-09-24 RX ADMIN — Medication 62.5 MILLIMOLE(S): at 10:52

## 2018-09-24 RX ADMIN — TIOTROPIUM BROMIDE 1 CAPSULE(S): 18 CAPSULE ORAL; RESPIRATORY (INHALATION) at 15:12

## 2018-09-24 NOTE — PROGRESS NOTE ADULT - ASSESSMENT
This is a 73y Male POD #5 s/p lap distal pancreatectomy with splenic preservation for neuroendocrine tumor on 9/19.    Plan:  - NPO/NGT/IVF  - monitor GI function  - OOB, ambulate as tolerated  - Encourage use of IS  - Monitor dressings and drain output  - DVT ppx  - f/u AM labs      Katherine French PA-C

## 2018-09-24 NOTE — PROGRESS NOTE ADULT - SUBJECTIVE AND OBJECTIVE BOX
BLUE SURGERY DAILY PROGRESS NOTE:       SUBJECTIVE/ROS: Patient seen and examined at bedside.    Overnight he had an NG tube placed for increased abdominal distention and pain.  A Castillo was also placed for urinary retention.  His BP was also elevated and received a dose of hydralazine.      MEDICATIONS  (STANDING):  benzocaine 15 mG/menthol 3.6 mG Lozenge 1 Lozenge Oral once  digoxin  Injectable 0.25 milliGRAM(s) IV Push daily  heparin  Injectable 5000 Unit(s) SubCutaneous every 8 hours  hydrALAZINE Injectable 5 milliGRAM(s) IV Push every 8 hours  ibuprofen  Tablet. 600 milliGRAM(s) Oral every 6 hours  influenza   Vaccine 0.5 milliLiter(s) IntraMuscular once  sodium chloride 0.9%. 1000 milliLiter(s) (100 mL/Hr) IV Continuous <Continuous>  tiotropium 18 MICROgram(s) Capsule 1 Capsule(s) Inhalation daily    MEDICATIONS  (PRN):  butorphanol Injectable 0.25 milliGRAM(s) IV Push every 6 hours PRN Pruritus  morphine  - Injectable 2 milliGRAM(s) IV Push every 4 hours PRN Moderate Pain (4 - 6)  naloxone Injectable 0.1 milliGRAM(s) IV Push every 3 minutes PRN For ANY of the following changes in patient status:  A. RR LESS THAN 10 breaths per minute, B. Oxygen saturation LESS THAN 90%, C. Sedation score of 6  ondansetron Injectable 4 milliGRAM(s) IV Push every 6 hours PRN Nausea      OBJECTIVE:    Vital Signs Last 24 Hrs  T(C): 36.8 (24 Sep 2018 05:14), Max: 37.3 (23 Sep 2018 16:54)  T(F): 98.2 (24 Sep 2018 05:14), Max: 99.2 (24 Sep 2018 00:29)  HR: 86 (24 Sep 2018 05:14) (76 - 109)  BP: 171/92 (24 Sep 2018 05:14) (157/84 - 195/97)  BP(mean): --  RR: 18 (24 Sep 2018 05:14) (16 - 20)  SpO2: 93% (24 Sep 2018 05:14) (93% - 98%)        I&O's Detail    23 Sep 2018 07:01  -  24 Sep 2018 07:00  --------------------------------------------------------  IN:    sodium chloride 0.9%.: 2400 mL  Total IN: 2400 mL    OUT:    Bulb: 25 mL    Indwelling Catheter - Urethral: 1475 mL    Nasoenteral Tube: 700 mL  Total OUT: 2200 mL    Total NET: 200 mL          Daily     Daily     LABS:                        13.3   2.9   )-----------( 266      ( 23 Sep 2018 21:54 )             40.4     09-23    136  |  95<L>  |  14  ----------------------------<  123<H>  3.9   |  28  |  0.90    Ca    8.5      23 Sep 2018 21:54  Phos  2.2     09-23  Mg     2.0     09-23      PT/INR - ( 22 Sep 2018 08:12 )   PT: 13.2 sec;   INR: 1.16 ratio         PTT - ( 22 Sep 2018 08:12 )  PTT:32.5 sec              PHYSICAL EXAM:    -- CONSTITUTIONAL: Alert, in NAD  -- PULMONARY: non-labored respirations  -- : castillo in place  -- ABDOMEN: soft, moderately distended, appropriately TTP, dressings c/d/i, L FALLON sanguinous drainage. NGT in place with minimal output.  -- NEURO: A&Ox3

## 2018-09-25 RX ORDER — POTASSIUM CHLORIDE 20 MEQ
10 PACKET (EA) ORAL
Qty: 0 | Refills: 0 | Status: COMPLETED | OUTPATIENT
Start: 2018-09-25 | End: 2018-09-25

## 2018-09-25 RX ORDER — POTASSIUM PHOSPHATE, MONOBASIC POTASSIUM PHOSPHATE, DIBASIC 236; 224 MG/ML; MG/ML
15 INJECTION, SOLUTION INTRAVENOUS ONCE
Qty: 0 | Refills: 0 | Status: COMPLETED | OUTPATIENT
Start: 2018-09-25 | End: 2018-09-25

## 2018-09-25 RX ORDER — ACETAMINOPHEN 500 MG
1000 TABLET ORAL ONCE
Qty: 0 | Refills: 0 | Status: COMPLETED | OUTPATIENT
Start: 2018-09-25 | End: 2018-09-25

## 2018-09-25 RX ORDER — TAMSULOSIN HYDROCHLORIDE 0.4 MG/1
0.4 CAPSULE ORAL AT BEDTIME
Qty: 0 | Refills: 0 | Status: DISCONTINUED | OUTPATIENT
Start: 2018-09-25 | End: 2018-09-29

## 2018-09-25 RX ORDER — SODIUM CHLORIDE 9 MG/ML
1000 INJECTION, SOLUTION INTRAVENOUS
Qty: 0 | Refills: 0 | Status: DISCONTINUED | OUTPATIENT
Start: 2018-09-25 | End: 2018-09-26

## 2018-09-25 RX ADMIN — Medication 100 MILLIEQUIVALENT(S): at 11:48

## 2018-09-25 RX ADMIN — Medication 100 MILLIEQUIVALENT(S): at 14:48

## 2018-09-25 RX ADMIN — SODIUM CHLORIDE 100 MILLILITER(S): 9 INJECTION INTRAMUSCULAR; INTRAVENOUS; SUBCUTANEOUS at 02:18

## 2018-09-25 RX ADMIN — HEPARIN SODIUM 5000 UNIT(S): 5000 INJECTION INTRAVENOUS; SUBCUTANEOUS at 14:45

## 2018-09-25 RX ADMIN — Medication 400 MILLIGRAM(S): at 02:16

## 2018-09-25 RX ADMIN — HEPARIN SODIUM 5000 UNIT(S): 5000 INJECTION INTRAVENOUS; SUBCUTANEOUS at 21:43

## 2018-09-25 RX ADMIN — TIOTROPIUM BROMIDE 1 CAPSULE(S): 18 CAPSULE ORAL; RESPIRATORY (INHALATION) at 12:00

## 2018-09-25 RX ADMIN — Medication 5 MILLIGRAM(S): at 14:44

## 2018-09-25 RX ADMIN — Medication 400 MILLIGRAM(S): at 15:57

## 2018-09-25 RX ADMIN — TAMSULOSIN HYDROCHLORIDE 0.4 MILLIGRAM(S): 0.4 CAPSULE ORAL at 21:43

## 2018-09-25 RX ADMIN — POTASSIUM PHOSPHATE, MONOBASIC POTASSIUM PHOSPHATE, DIBASIC 62.5 MILLIMOLE(S): 236; 224 INJECTION, SOLUTION INTRAVENOUS at 17:55

## 2018-09-25 RX ADMIN — HEPARIN SODIUM 5000 UNIT(S): 5000 INJECTION INTRAVENOUS; SUBCUTANEOUS at 06:09

## 2018-09-25 RX ADMIN — POTASSIUM PHOSPHATE, MONOBASIC POTASSIUM PHOSPHATE, DIBASIC 83.33 MILLIMOLE(S): 236; 224 INJECTION, SOLUTION INTRAVENOUS at 00:29

## 2018-09-25 RX ADMIN — Medication 100 MILLIEQUIVALENT(S): at 18:59

## 2018-09-25 RX ADMIN — Medication 5 MILLIGRAM(S): at 21:43

## 2018-09-25 RX ADMIN — Medication 1000 MILLIGRAM(S): at 02:46

## 2018-09-25 RX ADMIN — Medication 1000 MILLIGRAM(S): at 16:27

## 2018-09-25 RX ADMIN — Medication 5 MILLIGRAM(S): at 06:09

## 2018-09-25 RX ADMIN — Medication 0.25 MILLIGRAM(S): at 12:01

## 2018-09-25 RX ADMIN — SODIUM CHLORIDE 100 MILLILITER(S): 9 INJECTION, SOLUTION INTRAVENOUS at 11:45

## 2018-09-25 NOTE — PROGRESS NOTE ADULT - SUBJECTIVE AND OBJECTIVE BOX
Patient is POD 6 after spleen-preserving laparoscopic distal pancreatectomy. Patient had a CT diagnosis of early postop small bowel obstruction. Patient has apparently opened up and is now experiencing loose stools. Pain much improved. Hiccups resolved on thorazine.   Will d/c NGT.   Has leukopenia. Will explore possible drug-induced . Will review the current meds. Will also consult hematology  Will advance diet as tolerated.

## 2018-09-25 NOTE — PROGRESS NOTE ADULT - SUBJECTIVE AND OBJECTIVE BOX
Surgery Progress Note    S: Patient seen and examined. No acute events overnight. patient had multiple loose BMs o/n with passage of flatus. patient's hiccups have resolved with thorazine. patient reports pain is well controlled and denies n/v.    O:  Vital Signs Last 24 Hrs  T(C): 37.3 (25 Sep 2018 08:23), Max: 37.3 (25 Sep 2018 08:23)  T(F): 99.1 (25 Sep 2018 08:23), Max: 99.1 (25 Sep 2018 08:23)  HR: 83 (25 Sep 2018 08:23) (74 - 94)  BP: 165/79 (25 Sep 2018 08:23) (124/77 - 195/98)  BP(mean): --  RR: 18 (25 Sep 2018 08:23) (18 - 18)  SpO2: 94% (25 Sep 2018 08:23) (94% - 96%)    I&O's Detail    24 Sep 2018 07:01  -  25 Sep 2018 07:00  --------------------------------------------------------  IN:    sodium chloride 0.9%.: 2400 mL    Solution: 400 mL    Solution: 500 mL    Solution: 500 mL  Total IN: 3800 mL    OUT:    Bulb: 25 mL    Indwelling Catheter - Urethral: 2950 mL    Nasoenteral Tube: 360 mL  Total OUT: 3335 mL    Total NET: 465 mL      25 Sep 2018 07:01  -  25 Sep 2018 10:20  --------------------------------------------------------  IN:    sodium chloride 0.9%.: 300 mL  Total IN: 300 mL    OUT:    Indwelling Catheter - Urethral: 500 mL  Total OUT: 500 mL    Total NET: -200 mL          MEDICATIONS  (STANDING):  benzocaine 15 mG/menthol 3.6 mG Lozenge 1 Lozenge Oral once  digoxin  Injectable 0.25 milliGRAM(s) IV Push daily  heparin  Injectable 5000 Unit(s) SubCutaneous every 8 hours  hydrALAZINE Injectable 5 milliGRAM(s) IV Push every 8 hours  ibuprofen  Tablet. 600 milliGRAM(s) Oral every 6 hours  influenza   Vaccine 0.5 milliLiter(s) IntraMuscular once  sodium chloride 0.9%. 1000 milliLiter(s) (100 mL/Hr) IV Continuous <Continuous>  tiotropium 18 MICROgram(s) Capsule 1 Capsule(s) Inhalation daily    MEDICATIONS  (PRN):  butorphanol Injectable 0.25 milliGRAM(s) IV Push every 6 hours PRN Pruritus  chlorproMAZINE    IVPB 50 milliGRAM(s) IV Intermittent every 12 hours PRN Hiccups  morphine  - Injectable 2 milliGRAM(s) IV Push every 4 hours PRN Moderate Pain (4 - 6)  naloxone Injectable 0.1 milliGRAM(s) IV Push every 3 minutes PRN For ANY of the following changes in patient status:  A. RR LESS THAN 10 breaths per minute, B. Oxygen saturation LESS THAN 90%, C. Sedation score of 6  ondansetron Injectable 4 milliGRAM(s) IV Push every 6 hours PRN Nausea                            12.1   2.1   )-----------( 235      ( 24 Sep 2018 22:25 )             35.5       09-24    137  |  99  |  12  ----------------------------<  119<H>  3.2<L>   |  27  |  0.83    Ca    7.7<L>      24 Sep 2018 22:25  Phos  2.4     09-24  Mg     2.0     09-24        Physical Exam:  Gen: Laying in bed, NAD, NGT in place to suction  Resp: Unlabored breathing  Abd: soft, NT, mildly distended but improved from yesterday, midline steri strips in place with minimal blood staining, LLQ FALLON with SS output, no rebound or guarding  Ext: WWP  Skin: No rashes

## 2018-09-25 NOTE — PROGRESS NOTE ADULT - ASSESSMENT
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    Plan:  - NPO/IVF  - NGT to gravity, possible d/c later today  - monitor GI function  - OOB, ambulate as tolerated  - Encourage use of IS  - Monitor drain output  - subq for DVT ppx  - f/u AM labs    Gadiel Fuller, PGY-1  [facility] Surgery [phone]

## 2018-09-26 ENCOUNTER — TRANSCRIPTION ENCOUNTER (OUTPATIENT)
Age: 73
End: 2018-09-26

## 2018-09-26 LAB
ANION GAP SERPL CALC-SCNC: 12 MMOL/L — SIGNIFICANT CHANGE UP (ref 5–17)
ANION GAP SERPL CALC-SCNC: 14 MMOL/L — SIGNIFICANT CHANGE UP (ref 5–17)
ANION GAP SERPL CALC-SCNC: 8 MMOL/L — SIGNIFICANT CHANGE UP (ref 5–17)
ANION GAP SERPL CALC-SCNC: 9 MMOL/L — SIGNIFICANT CHANGE UP (ref 5–17)
BASOPHILS # BLD AUTO: 0 K/UL — SIGNIFICANT CHANGE UP (ref 0–0.2)
BASOPHILS NFR BLD AUTO: 0.3 % — SIGNIFICANT CHANGE UP (ref 0–2)
BUN SERPL-MCNC: 5 MG/DL — LOW (ref 7–23)
BUN SERPL-MCNC: 7 MG/DL — SIGNIFICANT CHANGE UP (ref 7–23)
CALCIUM SERPL-MCNC: 7.6 MG/DL — LOW (ref 8.4–10.5)
CALCIUM SERPL-MCNC: 8.3 MG/DL — LOW (ref 8.4–10.5)
CALCIUM SERPL-MCNC: 8.6 MG/DL — SIGNIFICANT CHANGE UP (ref 8.4–10.5)
CALCIUM SERPL-MCNC: 8.7 MG/DL — SIGNIFICANT CHANGE UP (ref 8.4–10.5)
CHLORIDE SERPL-SCNC: 102 MMOL/L — SIGNIFICANT CHANGE UP (ref 96–108)
CHLORIDE SERPL-SCNC: 104 MMOL/L — SIGNIFICANT CHANGE UP (ref 96–108)
CHLORIDE SERPL-SCNC: 94 MMOL/L — LOW (ref 96–108)
CHLORIDE SERPL-SCNC: 98 MMOL/L — SIGNIFICANT CHANGE UP (ref 96–108)
CO2 SERPL-SCNC: 21 MMOL/L — LOW (ref 22–31)
CO2 SERPL-SCNC: 23 MMOL/L — SIGNIFICANT CHANGE UP (ref 22–31)
CO2 SERPL-SCNC: 23 MMOL/L — SIGNIFICANT CHANGE UP (ref 22–31)
CO2 SERPL-SCNC: 24 MMOL/L — SIGNIFICANT CHANGE UP (ref 22–31)
CREAT SERPL-MCNC: 0.71 MG/DL — SIGNIFICANT CHANGE UP (ref 0.5–1.3)
CREAT SERPL-MCNC: 0.76 MG/DL — SIGNIFICANT CHANGE UP (ref 0.5–1.3)
CREAT SERPL-MCNC: 0.79 MG/DL — SIGNIFICANT CHANGE UP (ref 0.5–1.3)
CREAT SERPL-MCNC: 0.81 MG/DL — SIGNIFICANT CHANGE UP (ref 0.5–1.3)
EOSINOPHIL # BLD AUTO: 0.1 K/UL — SIGNIFICANT CHANGE UP (ref 0–0.5)
EOSINOPHIL NFR BLD AUTO: 3.3 % — SIGNIFICANT CHANGE UP (ref 0–6)
GLUCOSE SERPL-MCNC: 112 MG/DL — HIGH (ref 70–99)
GLUCOSE SERPL-MCNC: 137 MG/DL — HIGH (ref 70–99)
GLUCOSE SERPL-MCNC: 153 MG/DL — HIGH (ref 70–99)
GLUCOSE SERPL-MCNC: 608 MG/DL — CRITICAL HIGH (ref 70–99)
HCT VFR BLD CALC: 37 % — LOW (ref 39–50)
HCT VFR BLD CALC: 37.1 % — LOW (ref 39–50)
HCT VFR BLD CALC: 39 % — SIGNIFICANT CHANGE UP (ref 39–50)
HGB BLD-MCNC: 12.3 G/DL — LOW (ref 13–17)
HGB BLD-MCNC: 12.4 G/DL — LOW (ref 13–17)
HGB BLD-MCNC: 12.9 G/DL — LOW (ref 13–17)
LYMPHOCYTES # BLD AUTO: 0.6 K/UL — LOW (ref 1–3.3)
LYMPHOCYTES # BLD AUTO: 16.4 % — SIGNIFICANT CHANGE UP (ref 13–44)
MAGNESIUM SERPL-MCNC: 1.8 MG/DL — SIGNIFICANT CHANGE UP (ref 1.6–2.6)
MAGNESIUM SERPL-MCNC: 2.1 MG/DL — SIGNIFICANT CHANGE UP (ref 1.6–2.6)
MAGNESIUM SERPL-MCNC: 2.3 MG/DL — SIGNIFICANT CHANGE UP (ref 1.6–2.6)
MCHC RBC-ENTMCNC: 30.2 PG — SIGNIFICANT CHANGE UP (ref 27–34)
MCHC RBC-ENTMCNC: 30.7 PG — SIGNIFICANT CHANGE UP (ref 27–34)
MCHC RBC-ENTMCNC: 30.9 PG — SIGNIFICANT CHANGE UP (ref 27–34)
MCHC RBC-ENTMCNC: 33.1 GM/DL — SIGNIFICANT CHANGE UP (ref 32–36)
MCHC RBC-ENTMCNC: 33.2 GM/DL — SIGNIFICANT CHANGE UP (ref 32–36)
MCHC RBC-ENTMCNC: 33.5 GM/DL — SIGNIFICANT CHANGE UP (ref 32–36)
MCV RBC AUTO: 91.3 FL — SIGNIFICANT CHANGE UP (ref 80–100)
MCV RBC AUTO: 92.3 FL — SIGNIFICANT CHANGE UP (ref 80–100)
MCV RBC AUTO: 92.5 FL — SIGNIFICANT CHANGE UP (ref 80–100)
MONOCYTES # BLD AUTO: 0.4 K/UL — SIGNIFICANT CHANGE UP (ref 0–0.9)
MONOCYTES NFR BLD AUTO: 10.3 % — SIGNIFICANT CHANGE UP (ref 2–14)
NEUTROPHILS # BLD AUTO: 2.7 K/UL — SIGNIFICANT CHANGE UP (ref 1.8–7.4)
NEUTROPHILS NFR BLD AUTO: 69.7 % — SIGNIFICANT CHANGE UP (ref 43–77)
PHOSPHATE SERPL-MCNC: 1.6 MG/DL — LOW (ref 2.5–4.5)
PHOSPHATE SERPL-MCNC: 14.6 MG/DL — HIGH (ref 2.5–4.5)
PHOSPHATE SERPL-MCNC: 2.7 MG/DL — SIGNIFICANT CHANGE UP (ref 2.5–4.5)
PHOSPHATE SERPL-MCNC: 2.7 MG/DL — SIGNIFICANT CHANGE UP (ref 2.5–4.5)
PLATELET # BLD AUTO: 245 K/UL — SIGNIFICANT CHANGE UP (ref 150–400)
PLATELET # BLD AUTO: 261 K/UL — SIGNIFICANT CHANGE UP (ref 150–400)
PLATELET # BLD AUTO: 265 K/UL — SIGNIFICANT CHANGE UP (ref 150–400)
POTASSIUM SERPL-MCNC: 3.7 MMOL/L — SIGNIFICANT CHANGE UP (ref 3.5–5.3)
POTASSIUM SERPL-MCNC: 3.8 MMOL/L — SIGNIFICANT CHANGE UP (ref 3.5–5.3)
POTASSIUM SERPL-MCNC: 4 MMOL/L — SIGNIFICANT CHANGE UP (ref 3.5–5.3)
POTASSIUM SERPL-MCNC: 4.9 MMOL/L — SIGNIFICANT CHANGE UP (ref 3.5–5.3)
POTASSIUM SERPL-SCNC: 3.7 MMOL/L — SIGNIFICANT CHANGE UP (ref 3.5–5.3)
POTASSIUM SERPL-SCNC: 3.8 MMOL/L — SIGNIFICANT CHANGE UP (ref 3.5–5.3)
POTASSIUM SERPL-SCNC: 4 MMOL/L — SIGNIFICANT CHANGE UP (ref 3.5–5.3)
POTASSIUM SERPL-SCNC: 4.9 MMOL/L — SIGNIFICANT CHANGE UP (ref 3.5–5.3)
RBC # BLD: 4 M/UL — LOW (ref 4.2–5.8)
RBC # BLD: 4.02 M/UL — LOW (ref 4.2–5.8)
RBC # BLD: 4.27 M/UL — SIGNIFICANT CHANGE UP (ref 4.2–5.8)
RBC # FLD: 13.9 % — SIGNIFICANT CHANGE UP (ref 10.3–14.5)
RBC # FLD: 14 % — SIGNIFICANT CHANGE UP (ref 10.3–14.5)
RBC # FLD: 14.1 % — SIGNIFICANT CHANGE UP (ref 10.3–14.5)
SODIUM SERPL-SCNC: 129 MMOL/L — LOW (ref 135–145)
SODIUM SERPL-SCNC: 133 MMOL/L — LOW (ref 135–145)
SODIUM SERPL-SCNC: 134 MMOL/L — LOW (ref 135–145)
SODIUM SERPL-SCNC: 136 MMOL/L — SIGNIFICANT CHANGE UP (ref 135–145)
WBC # BLD: 3.9 K/UL — SIGNIFICANT CHANGE UP (ref 3.8–10.5)
WBC # BLD: 4 K/UL — SIGNIFICANT CHANGE UP (ref 3.8–10.5)
WBC # BLD: 5.7 K/UL — SIGNIFICANT CHANGE UP (ref 3.8–10.5)
WBC # FLD AUTO: 3.9 K/UL — SIGNIFICANT CHANGE UP (ref 3.8–10.5)
WBC # FLD AUTO: 4 K/UL — SIGNIFICANT CHANGE UP (ref 3.8–10.5)
WBC # FLD AUTO: 5.7 K/UL — SIGNIFICANT CHANGE UP (ref 3.8–10.5)

## 2018-09-26 RX ORDER — ERYTHROMYCIN ETHYLSUCCINATE 400 MG
150 TABLET ORAL EVERY 8 HOURS
Qty: 0 | Refills: 0 | Status: DISCONTINUED | OUTPATIENT
Start: 2018-09-26 | End: 2018-09-26

## 2018-09-26 RX ORDER — DIGOXIN 250 MCG
0.25 TABLET ORAL DAILY
Qty: 0 | Refills: 0 | Status: DISCONTINUED | OUTPATIENT
Start: 2018-09-26 | End: 2018-09-29

## 2018-09-26 RX ORDER — SIMVASTATIN 20 MG/1
20 TABLET, FILM COATED ORAL AT BEDTIME
Qty: 0 | Refills: 0 | Status: DISCONTINUED | OUTPATIENT
Start: 2018-09-26 | End: 2018-09-29

## 2018-09-26 RX ORDER — ERYTHROMYCIN ETHYLSUCCINATE 400 MG
250 TABLET ORAL THREE TIMES A DAY
Qty: 0 | Refills: 0 | Status: DISCONTINUED | OUTPATIENT
Start: 2018-09-26 | End: 2018-09-28

## 2018-09-26 RX ORDER — POTASSIUM CHLORIDE 20 MEQ
20 PACKET (EA) ORAL ONCE
Qty: 0 | Refills: 0 | Status: COMPLETED | OUTPATIENT
Start: 2018-09-26 | End: 2018-09-26

## 2018-09-26 RX ORDER — MAGNESIUM SULFATE 500 MG/ML
2 VIAL (ML) INJECTION ONCE
Qty: 0 | Refills: 0 | Status: COMPLETED | OUTPATIENT
Start: 2018-09-26 | End: 2018-09-26

## 2018-09-26 RX ORDER — POTASSIUM PHOSPHATE, MONOBASIC POTASSIUM PHOSPHATE, DIBASIC 236; 224 MG/ML; MG/ML
15 INJECTION, SOLUTION INTRAVENOUS ONCE
Qty: 0 | Refills: 0 | Status: DISCONTINUED | OUTPATIENT
Start: 2018-09-26 | End: 2018-09-26

## 2018-09-26 RX ADMIN — Medication 62.5 MILLIMOLE(S): at 17:16

## 2018-09-26 RX ADMIN — Medication 83.33 MILLIMOLE(S): at 06:06

## 2018-09-26 RX ADMIN — SIMVASTATIN 20 MILLIGRAM(S): 20 TABLET, FILM COATED ORAL at 21:16

## 2018-09-26 RX ADMIN — HEPARIN SODIUM 5000 UNIT(S): 5000 INJECTION INTRAVENOUS; SUBCUTANEOUS at 06:07

## 2018-09-26 RX ADMIN — Medication 0.25 MILLIGRAM(S): at 21:16

## 2018-09-26 RX ADMIN — Medication 5 MILLIGRAM(S): at 13:56

## 2018-09-26 RX ADMIN — TIOTROPIUM BROMIDE 1 CAPSULE(S): 18 CAPSULE ORAL; RESPIRATORY (INHALATION) at 14:22

## 2018-09-26 RX ADMIN — Medication 20 MILLIEQUIVALENT(S): at 21:16

## 2018-09-26 RX ADMIN — HEPARIN SODIUM 5000 UNIT(S): 5000 INJECTION INTRAVENOUS; SUBCUTANEOUS at 13:59

## 2018-09-26 RX ADMIN — HEPARIN SODIUM 5000 UNIT(S): 5000 INJECTION INTRAVENOUS; SUBCUTANEOUS at 21:16

## 2018-09-26 RX ADMIN — Medication 50 GRAM(S): at 15:07

## 2018-09-26 RX ADMIN — Medication 5 MILLIGRAM(S): at 21:16

## 2018-09-26 RX ADMIN — Medication 250 MILLIGRAM(S): at 20:18

## 2018-09-26 RX ADMIN — TAMSULOSIN HYDROCHLORIDE 0.4 MILLIGRAM(S): 0.4 CAPSULE ORAL at 21:17

## 2018-09-26 NOTE — DISCHARGE NOTE ADULT - CARE PLAN
Principal Discharge DX:	Disease of pancreas, unspecified  Goal:	Postoperative recovery  Assessment and plan of treatment:	DIET: You may resume your regular diet.  BATHING: Please do not submerge wound underwater for one week. You may shower and/or sponge bathe.  ACTIVITY: No heavy lifting or straining for 2 weeks. Otherwise, you may return to your usual level of physical activity. If you are taking narcotic pain medication (such as oxycodone) DO NOT make important decisions.  NOTIFY YOUR SURGEON IF: You have any bleeding that does not stop, any pus draining from your wound(s), any fever (over 100.4 F) or chills, persistent nausea/vomiting, persistent diarrhea, or if your pain is not controlled on your discharge pain medications.  WOUND CARE:  Please keep incisions clean and dry. Please do not Scrub or rub incisions. Do not use lotion or powder on incisions.

## 2018-09-26 NOTE — DIETITIAN INITIAL EVALUATION ADULT. - ORAL INTAKE PTA
good/Pt reports good appetite and PO intake PTA, typical day consists of oatmeal, soup, vegetables, rice, chicken, fish. Pt takes multivitamin. NKFA.

## 2018-09-26 NOTE — DIETITIAN INITIAL EVALUATION ADULT. - ENERGY NEEDS
Ht: 69 Wt: 134 pounds BMI: 19.8kg/m2 IBW: 160  pounds(+/-10%)   No edema. No pressure ulcers documented.

## 2018-09-26 NOTE — DISCHARGE NOTE ADULT - CARE PROVIDER_API CALL
Ananda Brasher), Surgery  [address] [place] [address]  Phone: [phone]  Fax: [phone]

## 2018-09-26 NOTE — DISCHARGE NOTE ADULT - NS AS ACTIVITY OBS
Showering allowed/Walking-Outdoors allowed/No Heavy lifting/straining/Driving allowed/Walking-Indoors allowed

## 2018-09-26 NOTE — DISCHARGE NOTE ADULT - PATIENT PORTAL LINK FT
You can access the [facility] Patient Portal, offered by [facility], by registering with the following website: [email or web address]

## 2018-09-26 NOTE — PROVIDER CONTACT NOTE (CRITICAL VALUE NOTIFICATION) - ACTION/TREATMENT ORDERED:
MD Luevano aware, STAT repeat BMP and Phosphors ordered/ pending phlebotomy to bedside, will continue to monitor.

## 2018-09-26 NOTE — DISCHARGE NOTE ADULT - HOME CARE AGENCY
[facility] [phone] FOR VN AND PT. VISITING NURSE WILL CALL TO SCHEDULE VISIT DAY AFTER DISCHARGE.

## 2018-09-26 NOTE — PROGRESS NOTE ADULT - ASSESSMENT
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    Plan:  - CLD.  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    Plan:  - CLD.  - F/u hematology recs re: WBC.  - monitor GI function  - OOB, ambulate as tolerated  - Encourage use of IS.  - Hernandez out, TOV.   - Monitor drain output  - subq for DVT ppx  - f/u AM labs    [facility] Surgery [phone]

## 2018-09-26 NOTE — DIETITIAN INITIAL EVALUATION ADULT. - PHYSICAL APPEARANCE
other (specify)/Pt appears very thin, nutrition focused physical exam deferred at this time. Pt covered by blankets, noted to have moderate muscle wasting (temporal)

## 2018-09-26 NOTE — DISCHARGE NOTE ADULT - INSTRUCTIONS
WOUND CARE: Staples will be removed at follow up office visit.    You will be discharged with FALLON drains. You will need to empty them and record outputs accurately. This will be taught to you by the nursing staff. Please do not remove the FALLON drains. They will be removed in the office. Please bring to the office accurate records of output.   BATHING: Please do not submerge wound underwater. You may shower and/or sponge bathe.  ACTIVITY: No heavy lifting anything more than 10-15lbs or straining. Otherwise, you may return to your usual level of physical activity. If you are taking narcotic pain medication (such as Percocet), do NOT drive a car, operate machinery or make important decisions.  DIET: Low fiber diet  NOTIFY YOUR SURGEON IF: You have any bleeding that does not stop, any pus draining from your wound, any fever (over 100.4 F) or chills, persistent nausea/vomiting with inability to tolerate food or liquids, persistent diarrhea, or if your pain is not controlled on your discharge pain medications.  FOLLOW-UP:  1. Please call to make a follow-up appointment within one week of discharge   2. Please follow up with your primary care physician in one week regarding your hospitalization. WOUND CARE: Staples will be removed at follow up office visit.    BATHING: Please do not submerge wound underwater. You may shower and/or sponge bathe.  ACTIVITY: No heavy lifting anything more than 10-15lbs or straining. Otherwise, you may return to your usual level of physical activity. If you are taking narcotic pain medication (such as Percocet), do NOT drive a car, operate machinery or make important decisions.  DIET: Low fiber diet  NOTIFY YOUR SURGEON IF: You have any bleeding that does not stop, any pus draining from your wound, any fever (over 100.4 F) or chills, persistent nausea/vomiting with inability to tolerate food or liquids, persistent diarrhea, or if your pain is not controlled on your discharge pain medications.  FOLLOW-UP:  1. Please call to make a follow-up appointment within one week of discharge   2. Please follow up with your primary care physician in one week regarding your hospitalization.

## 2018-09-26 NOTE — PROGRESS NOTE ADULT - SUBJECTIVE AND OBJECTIVE BOX
Surgery Progress Note     Subjective/24hour Events:   Patient seen and examined.   No acute events overnight.   Pain well controlled.     Vital Signs:  Vital Signs Last 24 Hrs  T(C): 37.4 (26 Sep 2018 04:50), Max: 37.4 (26 Sep 2018 04:50)  T(F): 99.3 (26 Sep 2018 04:50), Max: 99.3 (26 Sep 2018 04:50)  HR: 86 (26 Sep 2018 04:50) (74 - 88)  BP: 150/82 (26 Sep 2018 04:50) (150/82 - 180/80)  BP(mean): --  RR: 18 (26 Sep 2018 04:50) (18 - 18)  SpO2: 97% (26 Sep 2018 04:50) (94% - 98%)    CAPILLARY BLOOD GLUCOSE          I&O's Detail    24 Sep 2018 07:01  -  25 Sep 2018 07:00  --------------------------------------------------------  IN:    sodium chloride 0.9%: 2400 mL    Solution: 500 mL    Solution: 500 mL    Solution: 400 mL  Total IN: 3800 mL    OUT:    Bulb: 25 mL    Indwelling Catheter - Urethral: 2950 mL    Nasoenteral Tube: 360 mL  Total OUT: 3335 mL    Total NET: 465 mL      25 Sep 2018 07:01  -  26 Sep 2018 05:56  --------------------------------------------------------  IN:    dextrose 5% + sodium chloride 0.45%: 900 mL    sodium chloride 0.9%: 300 mL    Solution: 250 mL    Solution: 400 mL  Total IN: 1850 mL    OUT:    Bulb: 25 mL    Indwelling Catheter - Urethral: 3200 mL  Total OUT: 3225 mL    Total NET: -1375 mL          MEDICATIONS  (STANDING):  benzocaine 15 mG/menthol 3.6 mG Lozenge 1 Lozenge Oral once  dextrose 5% + sodium chloride 0.45% 1000 milliLiter(s) (100 mL/Hr) IV Continuous <Continuous>  digoxin  Injectable 0.25 milliGRAM(s) IV Push daily  heparin  Injectable 5000 Unit(s) SubCutaneous every 8 hours  hydrALAZINE Injectable 5 milliGRAM(s) IV Push every 8 hours  influenza   Vaccine 0.5 milliLiter(s) IntraMuscular once  sodium phosphate IVPB 30 milliMole(s) IV Intermittent once  tamsulosin 0.4 milliGRAM(s) Oral at bedtime  tiotropium 18 MICROgram(s) Capsule 1 Capsule(s) Inhalation daily    MEDICATIONS  (PRN):  chlorproMAZINE    IVPB 50 milliGRAM(s) IV Intermittent every 12 hours PRN Hiccups  naloxone Injectable 0.1 milliGRAM(s) IV Push every 3 minutes PRN For ANY of the following changes in patient status:  A. RR LESS THAN 10 breaths per minute, B. Oxygen saturation LESS THAN 90%, C. Sedation score of 6  ondansetron Injectable 4 milliGRAM(s) IV Push every 6 hours PRN Nausea      Physical Exam:  Gen: Laying in bed, NAD, NGT in place to suction  Resp: Unlabored breathing  Abd: soft, NT, minimal distension. Steri strips in place with minimal blood staining, LLQ FALLON with 25cc/24 hr SS output, no rebound or guarding  Ext: WWP  Skin: No rashes    Labs:    09-26    136  |  104  |  7   ----------------------------<  137<H>  4.0   |  23  |  0.79    Ca    8.3<L>      26 Sep 2018 01:09  Phos  1.6     09-26  Mg     2.1     09-26                              12.3   4.0   )-----------( 245      ( 26 Sep 2018 01:09 )             37.0         Imaging:

## 2018-09-26 NOTE — DISCHARGE NOTE ADULT - MEDICATION SUMMARY - MEDICATIONS TO TAKE
I will START or STAY ON the medications listed below when I get home from the hospital:    tamsulosin 0.4 mg oral capsule  -- 1 cap(s) by mouth once a day (at bedtime)  -- Indication: For Home med    digoxin 250 mcg (0.25 mg) oral tablet  -- 1 tab(s) by mouth once a day  -- Indication: For Home med    Eliquis 5 mg oral tablet  -- 1 tab(s) by mouth 2 times a day. Last dose 9/15/18  -- Indication: For Home med    Zetia 10 mg oral tablet  -- 1 tab(s) by mouth once a day  -- Indication: For Home med    simvastatin 20 mg oral tablet  -- 1 tab(s) by mouth once a day (at bedtime)  -- Indication: For Home med    Megace 40 mg/mL oral suspension  -- 400 milliliter(s) by mouth once a day   -- Do not take this drug if you are pregnant.  Shake well before use.    -- Indication: For appetitie stimulant    Spiriva 18 mcg inhalation capsule  -- 1 cap(s) inhaled once a day  -- Indication: For Home med    erythromycin 500 mg oral tablet  -- 1 tab(s) by mouth 3 times a day  -- Indication: For GI motility

## 2018-09-26 NOTE — DIETITIAN INITIAL EVALUATION ADULT. - OTHER INFO
Pt seen for length of stay. Per chart, pt POD #6 s/p lap distal pancreatectomy with splenic preservation for neuroendocrine tumor on 9/19. Pt's diet advanced from clear liquid to regular today. Pt reports fair appetite and PO intake. Pt amenable to additional Promote supplement, 2 per day (provides additional 474 kcal & 30 g protein/day) to supplement intake. Pt reports history of swallowing difficulty 2/2 radiation in 2016 however denies difficulty swallowing for the past year and is able to tolerate regular consistency. Pt denies GI distress at this time.  Pt reports  pounds, previous RD note (11/30/16) 135.5 pounds, current weight 134 pounds - weight stable. Pt reports weight loss 2 years ago 2/2 cancer (highest weight 155 pounds) but has been able to maintain current weight. Pt declined to have any nutrition-related questions/concerns at this time. Pt made aware RD remains available.

## 2018-09-26 NOTE — DISCHARGE NOTE ADULT - HOSPITAL COURSE
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    On 9/19 he underwent a laparoscopic hand assisted distal pancreatectomy with splenic preservation, which the patient tolerated well without complications. The patient was then extubated to PACU, and then deemed stable for transfer to a floor unit. His postoperative course was complicated by ileus, for which an NGT was placed on 9/23. On 9/25 he began passing gas and the NG tube was removed.      On day of discharge, the patient's pain was well controlled, tolerating a regular diet, ambulating, and voiding spontaneously. The patient was then discharged to home with follow up with Dr. Brasher as appropriate. 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    On 9/19 he underwent a laparoscopic hand assisted distal pancreatectomy with splenic preservation, which the patient tolerated well without complications. The patient was then extubated to PACU, and then deemed stable for transfer to a floor unit. His postoperative course was complicated by ileus, for which an NGT was placed on 9/23. On 9/25 he began passing gas and the NG tube was removed.      On day of discharge, the patient's pain was well controlled, tolerating a regular diet, ambulating, and voiding spontaneously. The patient's FALLON had minimal output and was pulled before discharge. The patient was then discharged to home with follow up with Dr. Brasher as appropriate.

## 2018-09-26 NOTE — DIETITIAN INITIAL EVALUATION ADULT. - NS AS NUTRI INTERV ED CONTENT
Encouraged PO intake. Discussed small, frequent meals high in protein and energy. Discussed lean sources of protein and protein/energy dense snacks.

## 2018-09-26 NOTE — DISCHARGE NOTE ADULT - PLAN OF CARE
Postoperative recovery DIET: You may resume your regular diet.  BATHING: Please do not submerge wound underwater for one week. You may shower and/or sponge bathe.  ACTIVITY: No heavy lifting or straining for 2 weeks. Otherwise, you may return to your usual level of physical activity. If you are taking narcotic pain medication (such as oxycodone) DO NOT make important decisions.  NOTIFY YOUR SURGEON IF: You have any bleeding that does not stop, any pus draining from your wound(s), any fever (over 100.4 F) or chills, persistent nausea/vomiting, persistent diarrhea, or if your pain is not controlled on your discharge pain medications.  WOUND CARE:  Please keep incisions clean and dry. Please do not Scrub or rub incisions. Do not use lotion or powder on incisions.

## 2018-09-27 LAB
ANION GAP SERPL CALC-SCNC: 12 MMOL/L — SIGNIFICANT CHANGE UP (ref 5–17)
BUN SERPL-MCNC: 10 MG/DL — SIGNIFICANT CHANGE UP (ref 7–23)
CALCIUM SERPL-MCNC: 8.7 MG/DL — SIGNIFICANT CHANGE UP (ref 8.4–10.5)
CHLORIDE SERPL-SCNC: 101 MMOL/L — SIGNIFICANT CHANGE UP (ref 96–108)
CO2 SERPL-SCNC: 21 MMOL/L — LOW (ref 22–31)
CREAT SERPL-MCNC: 1.01 MG/DL — SIGNIFICANT CHANGE UP (ref 0.5–1.3)
GLUCOSE SERPL-MCNC: 137 MG/DL — HIGH (ref 70–99)
HCT VFR BLD CALC: 36.7 % — LOW (ref 39–50)
HGB BLD-MCNC: 12.2 G/DL — LOW (ref 13–17)
MAGNESIUM SERPL-MCNC: 2.2 MG/DL — SIGNIFICANT CHANGE UP (ref 1.6–2.6)
MCHC RBC-ENTMCNC: 30.6 PG — SIGNIFICANT CHANGE UP (ref 27–34)
MCHC RBC-ENTMCNC: 33.3 GM/DL — SIGNIFICANT CHANGE UP (ref 32–36)
MCV RBC AUTO: 91.9 FL — SIGNIFICANT CHANGE UP (ref 80–100)
PHOSPHATE SERPL-MCNC: 2.5 MG/DL — SIGNIFICANT CHANGE UP (ref 2.5–4.5)
PLATELET # BLD AUTO: 270 K/UL — SIGNIFICANT CHANGE UP (ref 150–400)
POTASSIUM SERPL-MCNC: 4.4 MMOL/L — SIGNIFICANT CHANGE UP (ref 3.5–5.3)
POTASSIUM SERPL-SCNC: 4.4 MMOL/L — SIGNIFICANT CHANGE UP (ref 3.5–5.3)
RBC # BLD: 3.99 M/UL — LOW (ref 4.2–5.8)
RBC # FLD: 14.6 % — HIGH (ref 10.3–14.5)
SODIUM SERPL-SCNC: 134 MMOL/L — LOW (ref 135–145)
WBC # BLD: 4.9 K/UL — SIGNIFICANT CHANGE UP (ref 3.8–10.5)
WBC # FLD AUTO: 4.9 K/UL — SIGNIFICANT CHANGE UP (ref 3.8–10.5)

## 2018-09-27 RX ORDER — HYDRALAZINE HCL 50 MG
10 TABLET ORAL ONCE
Qty: 0 | Refills: 0 | Status: COMPLETED | OUTPATIENT
Start: 2018-09-27 | End: 2018-09-27

## 2018-09-27 RX ORDER — MEGESTROL ACETATE 40 MG/ML
400 SUSPENSION ORAL DAILY
Qty: 0 | Refills: 0 | Status: DISCONTINUED | OUTPATIENT
Start: 2018-09-27 | End: 2018-09-29

## 2018-09-27 RX ADMIN — HEPARIN SODIUM 5000 UNIT(S): 5000 INJECTION INTRAVENOUS; SUBCUTANEOUS at 05:26

## 2018-09-27 RX ADMIN — HEPARIN SODIUM 5000 UNIT(S): 5000 INJECTION INTRAVENOUS; SUBCUTANEOUS at 13:16

## 2018-09-27 RX ADMIN — TIOTROPIUM BROMIDE 1 CAPSULE(S): 18 CAPSULE ORAL; RESPIRATORY (INHALATION) at 22:08

## 2018-09-27 RX ADMIN — TAMSULOSIN HYDROCHLORIDE 0.4 MILLIGRAM(S): 0.4 CAPSULE ORAL at 22:08

## 2018-09-27 RX ADMIN — HEPARIN SODIUM 5000 UNIT(S): 5000 INJECTION INTRAVENOUS; SUBCUTANEOUS at 22:09

## 2018-09-27 RX ADMIN — MEGESTROL ACETATE 400 MILLIGRAM(S): 40 SUSPENSION ORAL at 13:16

## 2018-09-27 RX ADMIN — Medication 250 MILLIGRAM(S): at 13:16

## 2018-09-27 RX ADMIN — Medication 250 MILLIGRAM(S): at 22:08

## 2018-09-27 RX ADMIN — Medication 250 MILLIGRAM(S): at 09:28

## 2018-09-27 RX ADMIN — Medication 0.25 MILLIGRAM(S): at 05:26

## 2018-09-27 RX ADMIN — Medication 10 MILLIGRAM(S): at 01:00

## 2018-09-27 RX ADMIN — SIMVASTATIN 20 MILLIGRAM(S): 20 TABLET, FILM COATED ORAL at 22:08

## 2018-09-27 NOTE — PROVIDER CONTACT NOTE (OTHER) - ACTION/TREATMENT ORDERED:
no further action, continue to monitor pt
stop regular and change to clear
tylenol suspension
MD Mukherjee aware and Asia RN aware, no intervention at this time, hold dose hydralazine until 2200, will continue to monitor.
MD aware. Pt instructed on not eating or drinking until further notice. Pt safety maintained, will continue to monitor.
10 mg IVP hydralazine given, will continue to monitor.
Awaiting orders.
Continue to monitor NG tube output. pt resting, will continue to monitor
Evening dose of hydralazine given. Will continue to monitor.
Folley to be placed as per MD orders. Pt resting comfortably, will continue to monitor
MD aware, Awaiting at bedside. pt safety maintained, will continue to monitor.
MD aware, more time given and pt encouraged to void. IV fluids started, will continue to monitor.
Recheck B.p safety maintained will cont to monitor
Recheck in half hour.
Team aware to round on pt this am.
awaiting further orders no pain medication due at this time. Safety maintained will cont to monitor
awaiting orders at present time will cont to monitor

## 2018-09-27 NOTE — PROVIDER CONTACT NOTE (OTHER) - BACKGROUND
Pt admitted for Lap distal pancreatectomy
Pt admitted for Distal Pancreadectomy
Pt admitted for distal pancreadectomy.
Pt s/p Distal pancreatectomy
Pt s/p distal pancreatectomy
Pt s/p distal pancreatectomy.
S/P Distal pancreatectomy
S/P distal Pancreatectomy Hx of HTN
S/P distal pancreatectomy. Hx of HTN
S/p NGT placement and castillo insertion last night.
S/p distal pancreatectomy
s/p distal pancreatectomy, NG tube d/c on 9/22 as well as folley catheter.

## 2018-09-27 NOTE — PROGRESS NOTE ADULT - ASSESSMENT
73M s/p lap distal pancreatectomy with splenic preservation for neuroendocrine tumor on 9/19.    -Continue CLD.   -Monitor GI.   -Megace and zofran.   -Encourage OOB and ambulating.   -Continue drain care.   -Monitor and replete electrolytes.     Blue Team General Surgery [phone]

## 2018-09-27 NOTE — PROGRESS NOTE ADULT - SUBJECTIVE AND OBJECTIVE BOX
BLUE SURGERY DAILY PROGRESS NOTE:       SUBJECTIVE/ROS: Overnight patient was febrile to 100.3 and was hypertensive to 200s systolic.  He was given hydralazine with appropriate response.  He has been tolerating a regular diet, but reports having little appetite.  He is having bowel movements.         MEDICATIONS  (STANDING):  benzocaine 15 mG/menthol 3.6 mG Lozenge 1 Lozenge Oral once  digoxin     Tablet 0.25 milliGRAM(s) Oral daily  erythromycin     base Tablet 250 milliGRAM(s) Oral three times a day  heparin  Injectable 5000 Unit(s) SubCutaneous every 8 hours  hydrALAZINE Injectable 5 milliGRAM(s) IV Push every 8 hours  influenza   Vaccine 0.5 milliLiter(s) IntraMuscular once  simvastatin 20 milliGRAM(s) Oral at bedtime  tamsulosin 0.4 milliGRAM(s) Oral at bedtime  tiotropium 18 MICROgram(s) Capsule 1 Capsule(s) Inhalation daily    MEDICATIONS  (PRN):  chlorproMAZINE    IVPB 50 milliGRAM(s) IV Intermittent every 12 hours PRN Hiccups  naloxone Injectable 0.1 milliGRAM(s) IV Push every 3 minutes PRN For ANY of the following changes in patient status:  A. RR LESS THAN 10 breaths per minute, B. Oxygen saturation LESS THAN 90%, C. Sedation score of 6  ondansetron Injectable 4 milliGRAM(s) IV Push every 6 hours PRN Nausea      OBJECTIVE:    Vital Signs Last 24 Hrs  T(C): 37.9 (27 Sep 2018 04:57), Max: 37.9 (27 Sep 2018 04:57)  T(F): 100.3 (27 Sep 2018 04:57), Max: 100.3 (27 Sep 2018 04:57)  HR: 96 (27 Sep 2018 04:57) (68 - 98)  BP: 117/70 (27 Sep 2018 04:57) (117/70 - 205/98)  BP(mean): --  RR: 18 (27 Sep 2018 04:57) (18 - 18)  SpO2: 96% (27 Sep 2018 04:57) (95% - 97%)        I&O's Detail    26 Sep 2018 07:01  -  27 Sep 2018 07:00  --------------------------------------------------------  IN:    Oral Fluid: 500 mL    Solution: 50 mL    Solution: 250 mL  Total IN: 800 mL    OUT:    Bulb: 10 mL    Voided: 1420 mL  Total OUT: 1430 mL    Total NET: -630 mL          Daily Height in cm: 170.18 (26 Sep 2018 12:01)    Daily Weight in k.7 (26 Sep 2018 11:15)    LABS:                        12.9   5.7   )-----------( 265      ( 26 Sep 2018 22:55 )             39.0         133<L>  |  98  |  5<L>  ----------------------------<  112<H>  3.8   |  23  |  0.81    Ca    8.7      26 Sep 2018 22:39  Phos  2.7       Mg     2.3                         PHYSICAL EXAM:  General: NAD, resting comfortably.   Cardiopulm: Non-labored breathing.   Ab: Soft, nontender, distended. FALLON with SS output.   Ext: Moves all 4 spontaneously.

## 2018-09-27 NOTE — PROGRESS NOTE ADULT - ASSESSMENT
73M s/p lap distal pancreatectomy with splenic preservation for neuroendocrine tumor on 9/19.    -Continue CLD.   -Monitor GI  -Megace, zofran, and erythromycin  -Encourage OOB and ambulating  -Continue drain care.   - f/u AM labs  - BP monitoring  -Monitor and replete electrolytes      SAMMY EpsteinC

## 2018-09-27 NOTE — PROVIDER CONTACT NOTE (OTHER) - RECOMMENDATIONS
Give Hydralazine IV Push dose due at 2200 early?
Zofran administered for nausea as per doctors orders.
5mg of hydralazine to be given
Abdominal x-ray ordered, NG tube placed.
BP medication
Bladder scan done multiple times see flow-sheets.
MD Luevano made aware
MD Luevano made fawad
MD at bedside
MD aware, awaiting MD at bedside.
MD aware. Performed bladder scan. Pt retaining 141ccs of urine.
Manual BP taken.
Md rodriguez
Pt educated on not to eat or drink anything until further notice.
no

## 2018-09-27 NOTE — PROGRESS NOTE ADULT - SUBJECTIVE AND OBJECTIVE BOX
Surgery Progress Note     Subjective/24hour Events:   Patient seen and examined.  Yesterday started on erythromycin for promotility.   Continues to be distended.   High BP overnight responded to hydralazine.   Passing flatus, having bowel movements.   Pain well controlled.     Vital Signs Last 24 Hrs  T(C): 37.9 (27 Sep 2018 04:57), Max: 37.9 (27 Sep 2018 04:57)  T(F): 100.3 (27 Sep 2018 04:57), Max: 100.3 (27 Sep 2018 04:57)  HR: 96 (27 Sep 2018 04:57) (68 - 98)  BP: 117/70 (27 Sep 2018 04:57) (117/70 - 205/98)  BP(mean): --  RR: 18 (27 Sep 2018 04:57) (18 - 18)  SpO2: 96% (27 Sep 2018 04:57) (95% - 97%)    CAPILLARY BLOOD GLUCOSE      POCT Blood Glucose.: 142 mg/dL (26 Sep 2018 13:49)      I&O's Detail    26 Sep 2018 07:01  -  27 Sep 2018 07:00  --------------------------------------------------------  IN:    Oral Fluid: 500 mL    Solution: 50 mL    Solution: 250 mL  Total IN: 800 mL    OUT:    Bulb: 10 mL    Voided: 1420 mL  Total OUT: 1430 mL    Total NET: -630 mL          MEDICATIONS  (STANDING):  benzocaine 15 mG/menthol 3.6 mG Lozenge 1 Lozenge Oral once  digoxin     Tablet 0.25 milliGRAM(s) Oral daily  erythromycin     base Tablet 250 milliGRAM(s) Oral three times a day  heparin  Injectable 5000 Unit(s) SubCutaneous every 8 hours  hydrALAZINE Injectable 5 milliGRAM(s) IV Push every 8 hours  influenza   Vaccine 0.5 milliLiter(s) IntraMuscular once  simvastatin 20 milliGRAM(s) Oral at bedtime  tamsulosin 0.4 milliGRAM(s) Oral at bedtime  tiotropium 18 MICROgram(s) Capsule 1 Capsule(s) Inhalation daily    MEDICATIONS  (PRN):  chlorproMAZINE    IVPB 50 milliGRAM(s) IV Intermittent every 12 hours PRN Hiccups  naloxone Injectable 0.1 milliGRAM(s) IV Push every 3 minutes PRN For ANY of the following changes in patient status:  A. RR LESS THAN 10 breaths per minute, B. Oxygen saturation LESS THAN 90%, C. Sedation score of 6  ondansetron Injectable 4 milliGRAM(s) IV Push every 6 hours PRN Nausea      Physical Exam:  General: NAD, resting comfortably.   Cardiopulm: Non-labored breathing.   Ab: Soft, nontender, distended. FALLON with SS output.   Ext: Moves all 4 spontaneously.     Labs:    09-26    133<L>  |  98  |  5<L>  ----------------------------<  112<H>  3.8   |  23  |  0.81    Ca    8.7      26 Sep 2018 22:39  Phos  2.7     09-26  Mg     2.3     09-26                              12.9   5.7   )-----------( 265      ( 26 Sep 2018 22:55 )             39.0         Imaging:

## 2018-09-27 NOTE — PROVIDER CONTACT NOTE (OTHER) - SITUATION
Pt vomited 50cc greenish color drainage . Pt also complained of abdominal pain.
 and regular to palpate
B.P 195/97. Pt c/o abd pain despite Morphine already given as ordered
PRETTY 187/83
Patient blood pressure 176/81, HR 84. Patient has history of HTN- 5mg Hydralazine IV Push given q8hrs, last dose given around 1500.
Pt BP was 170/100 taken manually.
Pt Bp was 180/100
Pt DTV @ 330am 9/23. Pt unsuccessful. Pt was vomiting all night. NG tube placed this am, iv fluids started as well.
Pt DTV @0235, pt voided 100ccs @1900 on 9/22 states he cannot pee.
Pt bp elevated
Pt bp elevated
Pt vomited 300ccs of greenish bile. Pt complained of abdominal fullness
Pt vomited 50cc greenish color drainage. Pt complained of abdominal fullness.
Pt vomiting green bile throughout night, abdominal increasing distention noticed.
Pt with elevated /102 manual BP; HR 96 palpated

## 2018-09-28 LAB
AMYLASE FLD-CCNC: 4355 U/L — SIGNIFICANT CHANGE UP
ANION GAP SERPL CALC-SCNC: 12 MMOL/L — SIGNIFICANT CHANGE UP (ref 5–17)
BUN SERPL-MCNC: 14 MG/DL — SIGNIFICANT CHANGE UP (ref 7–23)
CALCIUM SERPL-MCNC: 9.4 MG/DL — SIGNIFICANT CHANGE UP (ref 8.4–10.5)
CHLORIDE SERPL-SCNC: 99 MMOL/L — SIGNIFICANT CHANGE UP (ref 96–108)
CO2 SERPL-SCNC: 21 MMOL/L — LOW (ref 22–31)
CREAT SERPL-MCNC: 0.99 MG/DL — SIGNIFICANT CHANGE UP (ref 0.5–1.3)
GLUCOSE SERPL-MCNC: 101 MG/DL — HIGH (ref 70–99)
HCT VFR BLD CALC: 36.6 % — LOW (ref 39–50)
HGB BLD-MCNC: 12.3 G/DL — LOW (ref 13–17)
MAGNESIUM SERPL-MCNC: 2.2 MG/DL — SIGNIFICANT CHANGE UP (ref 1.6–2.6)
MCHC RBC-ENTMCNC: 30.9 PG — SIGNIFICANT CHANGE UP (ref 27–34)
MCHC RBC-ENTMCNC: 33.5 GM/DL — SIGNIFICANT CHANGE UP (ref 32–36)
MCV RBC AUTO: 92.2 FL — SIGNIFICANT CHANGE UP (ref 80–100)
PHOSPHATE SERPL-MCNC: 3.3 MG/DL — SIGNIFICANT CHANGE UP (ref 2.5–4.5)
PLATELET # BLD AUTO: 309 K/UL — SIGNIFICANT CHANGE UP (ref 150–400)
POTASSIUM SERPL-MCNC: 4.4 MMOL/L — SIGNIFICANT CHANGE UP (ref 3.5–5.3)
POTASSIUM SERPL-SCNC: 4.4 MMOL/L — SIGNIFICANT CHANGE UP (ref 3.5–5.3)
RBC # BLD: 3.97 M/UL — LOW (ref 4.2–5.8)
RBC # FLD: 14.5 % — SIGNIFICANT CHANGE UP (ref 10.3–14.5)
SODIUM SERPL-SCNC: 132 MMOL/L — LOW (ref 135–145)
WBC # BLD: 6 K/UL — SIGNIFICANT CHANGE UP (ref 3.8–10.5)
WBC # FLD AUTO: 6 K/UL — SIGNIFICANT CHANGE UP (ref 3.8–10.5)

## 2018-09-28 RX ORDER — ERYTHROMYCIN ETHYLSUCCINATE 400 MG
500 TABLET ORAL THREE TIMES A DAY
Qty: 0 | Refills: 0 | Status: DISCONTINUED | OUTPATIENT
Start: 2018-09-28 | End: 2018-09-29

## 2018-09-28 RX ADMIN — Medication 62.5 MILLIMOLE(S): at 14:38

## 2018-09-28 RX ADMIN — TAMSULOSIN HYDROCHLORIDE 0.4 MILLIGRAM(S): 0.4 CAPSULE ORAL at 22:15

## 2018-09-28 RX ADMIN — Medication 250 MILLIGRAM(S): at 06:14

## 2018-09-28 RX ADMIN — HEPARIN SODIUM 5000 UNIT(S): 5000 INJECTION INTRAVENOUS; SUBCUTANEOUS at 14:38

## 2018-09-28 RX ADMIN — SIMVASTATIN 20 MILLIGRAM(S): 20 TABLET, FILM COATED ORAL at 22:15

## 2018-09-28 RX ADMIN — HEPARIN SODIUM 5000 UNIT(S): 5000 INJECTION INTRAVENOUS; SUBCUTANEOUS at 06:16

## 2018-09-28 RX ADMIN — Medication 500 MILLIGRAM(S): at 22:14

## 2018-09-28 RX ADMIN — TIOTROPIUM BROMIDE 1 CAPSULE(S): 18 CAPSULE ORAL; RESPIRATORY (INHALATION) at 12:15

## 2018-09-28 RX ADMIN — HEPARIN SODIUM 5000 UNIT(S): 5000 INJECTION INTRAVENOUS; SUBCUTANEOUS at 22:14

## 2018-09-28 RX ADMIN — MEGESTROL ACETATE 400 MILLIGRAM(S): 40 SUSPENSION ORAL at 12:15

## 2018-09-28 RX ADMIN — Medication 250 MILLIGRAM(S): at 14:38

## 2018-09-28 RX ADMIN — Medication 0.25 MILLIGRAM(S): at 06:14

## 2018-09-28 RX ADMIN — Medication 5 MILLIGRAM(S): at 14:38

## 2018-09-28 RX ADMIN — Medication 5 MILLIGRAM(S): at 22:15

## 2018-09-28 NOTE — PROGRESS NOTE ADULT - ASSESSMENT
73M s/p lap distal pancreatectomy with splenic preservation for neuroendocrine tumor on 9/19.    -Regular diet as tolerated.   -Monitor GI.   -Megace and zofran.   -Encourage OOB and ambulating.   -Continue drain care.   -Monitor and replete electrolytes.   -Dispo: possible home today.    Blue Team General Surgery [phone]

## 2018-09-28 NOTE — PROGRESS NOTE ADULT - SUBJECTIVE AND OBJECTIVE BOX
Surgery Progress Note     Subjective/24hour Events:   Patient seen and examined.   No acute events overnight.   Pain well controlled.   Tolerating diet without N/V.   Out of bed and ambulating.   Passing flatus, having bowel movements.     Vital Signs:  Vital Signs Last 24 Hrs  T(C): 37.1 (28 Sep 2018 09:29), Max: 37.4 (28 Sep 2018 05:22)  T(F): 98.8 (28 Sep 2018 09:29), Max: 99.4 (28 Sep 2018 05:22)  HR: 90 (28 Sep 2018 09:29) (83 - 93)  BP: 144/86 (28 Sep 2018 09:29) (116/74 - 144/86)  BP(mean): --  RR: 18 (28 Sep 2018 09:29) (16 - 18)  SpO2: 96% (28 Sep 2018 09:29) (95% - 98%)    CAPILLARY BLOOD GLUCOSE          I&O's Detail    27 Sep 2018 07:01  -  28 Sep 2018 07:00  --------------------------------------------------------  IN:    Oral Fluid: 440 mL  Total IN: 440 mL    OUT:    Bulb: 15 mL    Voided: 840 mL  Total OUT: 855 mL    Total NET: -415 mL      28 Sep 2018 07:01  -  28 Sep 2018 11:59  --------------------------------------------------------  IN:    Oral Fluid: 240 mL  Total IN: 240 mL    OUT:    Bulb: 10 mL  Total OUT: 10 mL    Total NET: 230 mL          MEDICATIONS  (STANDING):  benzocaine 15 mG/menthol 3.6 mG Lozenge 1 Lozenge Oral once  digoxin     Tablet 0.25 milliGRAM(s) Oral daily  erythromycin     base Tablet 250 milliGRAM(s) Oral three times a day  heparin  Injectable 5000 Unit(s) SubCutaneous every 8 hours  hydrALAZINE Injectable 5 milliGRAM(s) IV Push every 8 hours  influenza   Vaccine 0.5 milliLiter(s) IntraMuscular once  megestrol Suspension 400 milliGRAM(s) Oral daily  simvastatin 20 milliGRAM(s) Oral at bedtime  sodium phosphate IVPB 15 milliMole(s) IV Intermittent once  tamsulosin 0.4 milliGRAM(s) Oral at bedtime  tiotropium 18 MICROgram(s) Capsule 1 Capsule(s) Inhalation daily    MEDICATIONS  (PRN):  chlorproMAZINE    IVPB 50 milliGRAM(s) IV Intermittent every 12 hours PRN Hiccups  naloxone Injectable 0.1 milliGRAM(s) IV Push every 3 minutes PRN For ANY of the following changes in patient status:  A. RR LESS THAN 10 breaths per minute, B. Oxygen saturation LESS THAN 90%, C. Sedation score of 6  ondansetron Injectable 4 milliGRAM(s) IV Push every 6 hours PRN Nausea      Physical Exam:  General: NAD, resting comfortably.   Cardiopulm: Non-labored breathing.   Ab: Soft, nontender, distended. FALLON with SS output.   Ext: Moves all 4 spontaneously.     Labs:    09-27    134<L>  |  101  |  10  ----------------------------<  137<H>  4.4   |  21<L>  |  1.01    Ca    8.7      27 Sep 2018 22:11  Phos  2.5     09-27  Mg     2.2     09-27                              12.2   4.9   )-----------( 270      ( 27 Sep 2018 22:11 )             36.7         Imaging:

## 2018-09-28 NOTE — PROGRESS NOTE ADULT - SUBJECTIVE AND OBJECTIVE BOX
9 after laparoscopic spleen-preserving distal pancreatectomy. 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    Very fortuitous that it was removed. \  Patient now eating a regular diet and feels more confident about eating. Will consider discharge home tomorrow.  All labs are normal.   Will discuss the pathologic findings with the patient.

## 2018-09-29 VITALS
HEART RATE: 98 BPM | OXYGEN SATURATION: 97 % | DIASTOLIC BLOOD PRESSURE: 72 MMHG | SYSTOLIC BLOOD PRESSURE: 106 MMHG | RESPIRATION RATE: 18 BRPM | TEMPERATURE: 98 F

## 2018-09-29 PROCEDURE — 82962 GLUCOSE BLOOD TEST: CPT

## 2018-09-29 PROCEDURE — P9047: CPT

## 2018-09-29 PROCEDURE — 86901 BLOOD TYPING SEROLOGIC RH(D): CPT

## 2018-09-29 PROCEDURE — 97110 THERAPEUTIC EXERCISES: CPT

## 2018-09-29 PROCEDURE — P9016: CPT

## 2018-09-29 PROCEDURE — 84100 ASSAY OF PHOSPHORUS: CPT

## 2018-09-29 PROCEDURE — 94640 AIRWAY INHALATION TREATMENT: CPT

## 2018-09-29 PROCEDURE — 82150 ASSAY OF AMYLASE: CPT

## 2018-09-29 PROCEDURE — 88331 PATH CONSLTJ SURG 1 BLK 1SPC: CPT

## 2018-09-29 PROCEDURE — 84132 ASSAY OF SERUM POTASSIUM: CPT

## 2018-09-29 PROCEDURE — 80048 BASIC METABOLIC PNL TOTAL CA: CPT

## 2018-09-29 PROCEDURE — 97162 PT EVAL MOD COMPLEX 30 MIN: CPT

## 2018-09-29 PROCEDURE — 83735 ASSAY OF MAGNESIUM: CPT

## 2018-09-29 PROCEDURE — 88309 TISSUE EXAM BY PATHOLOGIST: CPT

## 2018-09-29 PROCEDURE — 74177 CT ABD & PELVIS W/CONTRAST: CPT

## 2018-09-29 PROCEDURE — 71045 X-RAY EXAM CHEST 1 VIEW: CPT

## 2018-09-29 PROCEDURE — C1889: CPT

## 2018-09-29 PROCEDURE — 97116 GAIT TRAINING THERAPY: CPT

## 2018-09-29 PROCEDURE — 85730 THROMBOPLASTIN TIME PARTIAL: CPT

## 2018-09-29 PROCEDURE — 85027 COMPLETE CBC AUTOMATED: CPT

## 2018-09-29 PROCEDURE — 85610 PROTHROMBIN TIME: CPT

## 2018-09-29 PROCEDURE — 86900 BLOOD TYPING SEROLOGIC ABO: CPT

## 2018-09-29 PROCEDURE — 74018 RADEX ABDOMEN 1 VIEW: CPT

## 2018-09-29 PROCEDURE — 86923 COMPATIBILITY TEST ELECTRIC: CPT

## 2018-09-29 PROCEDURE — P9041: CPT

## 2018-09-29 RX ORDER — ERYTHROMYCIN ETHYLSUCCINATE 400 MG
1 TABLET ORAL
Qty: 90 | Refills: 0 | OUTPATIENT
Start: 2018-09-29 | End: 2018-10-28

## 2018-09-29 RX ORDER — TAMSULOSIN HYDROCHLORIDE 0.4 MG/1
1 CAPSULE ORAL
Qty: 0 | Refills: 0 | COMMUNITY
Start: 2018-09-29

## 2018-09-29 RX ADMIN — HEPARIN SODIUM 5000 UNIT(S): 5000 INJECTION INTRAVENOUS; SUBCUTANEOUS at 06:07

## 2018-09-29 RX ADMIN — Medication 500 MILLIGRAM(S): at 13:47

## 2018-09-29 RX ADMIN — Medication 0.25 MILLIGRAM(S): at 06:07

## 2018-09-29 RX ADMIN — Medication 500 MILLIGRAM(S): at 06:07

## 2018-09-29 RX ADMIN — TIOTROPIUM BROMIDE 1 CAPSULE(S): 18 CAPSULE ORAL; RESPIRATORY (INHALATION) at 12:37

## 2018-09-29 RX ADMIN — HEPARIN SODIUM 5000 UNIT(S): 5000 INJECTION INTRAVENOUS; SUBCUTANEOUS at 13:47

## 2018-09-29 RX ADMIN — MEGESTROL ACETATE 400 MILLIGRAM(S): 40 SUSPENSION ORAL at 12:37

## 2018-09-29 NOTE — PROGRESS NOTE ADULT - PROVIDER SPECIALTY LIST ADULT
Anesthesia
Surgery
Anesthesia

## 2018-09-29 NOTE — PROGRESS NOTE ADULT - SUBJECTIVE AND OBJECTIVE BOX
Surgery Progress Note    S: Patient seen and examined. No acute events overnight. patient continues to eat well without n/v. pain is well controlled. + flatus, + BM.    O:  Vital Signs Last 24 Hrs  T(C): 36.7 (29 Sep 2018 09:01), Max: 37.8 (29 Sep 2018 01:29)  T(F): 98 (29 Sep 2018 09:01), Max: 100 (29 Sep 2018 01:29)  HR: 104 (29 Sep 2018 09:01) (87 - 104)  BP: 104/69 (29 Sep 2018 09:01) (104/69 - 147/82)  BP(mean): --  RR: 18 (29 Sep 2018 09:01) (18 - 18)  SpO2: 95% (29 Sep 2018 09:01) (95% - 97%)    I&O's Detail    28 Sep 2018 07:01  -  29 Sep 2018 07:00  --------------------------------------------------------  IN:    Oral Fluid: 720 mL  Total IN: 720 mL    OUT:    Bulb: 28 mL    Voided: 500 mL  Total OUT: 528 mL    Total NET: 192 mL      29 Sep 2018 07:01  -  29 Sep 2018 10:01  --------------------------------------------------------  IN:    Oral Fluid: 300 mL  Total IN: 300 mL    OUT:    Voided: 300 mL  Total OUT: 300 mL    Total NET: 0 mL          MEDICATIONS  (STANDING):  benzocaine 15 mG/menthol 3.6 mG Lozenge 1 Lozenge Oral once  digoxin     Tablet 0.25 milliGRAM(s) Oral daily  erythromycin     base Tablet 500 milliGRAM(s) Oral three times a day  heparin  Injectable 5000 Unit(s) SubCutaneous every 8 hours  hydrALAZINE Injectable 5 milliGRAM(s) IV Push every 8 hours  influenza   Vaccine 0.5 milliLiter(s) IntraMuscular once  megestrol Suspension 400 milliGRAM(s) Oral daily  simvastatin 20 milliGRAM(s) Oral at bedtime  tamsulosin 0.4 milliGRAM(s) Oral at bedtime  tiotropium 18 MICROgram(s) Capsule 1 Capsule(s) Inhalation daily    MEDICATIONS  (PRN):  chlorproMAZINE    IVPB 50 milliGRAM(s) IV Intermittent every 12 hours PRN Hiccups  naloxone Injectable 0.1 milliGRAM(s) IV Push every 3 minutes PRN For ANY of the following changes in patient status:  A. RR LESS THAN 10 breaths per minute, B. Oxygen saturation LESS THAN 90%, C. Sedation score of 6  ondansetron Injectable 4 milliGRAM(s) IV Push every 6 hours PRN Nausea                            12.3   6.0   )-----------( 309      ( 28 Sep 2018 21:56 )             36.6       09-28    132<L>  |  99  |  14  ----------------------------<  101<H>  4.4   |  21<L>  |  0.99    Ca    9.4      28 Sep 2018 21:56  Phos  3.3     09-28  Mg     2.2     09-28        Physical Exam:  Gen: Laying in bed, NAD  Resp: Unlabored breathing  Abd: soft, NTND, FALLON with minimal output, no rebound or guarding  Ext: WWP  Skin: No rashes

## 2018-09-29 NOTE — PROGRESS NOTE ADULT - ASSESSMENT
73M s/p lap distal pancreatectomy with splenic preservation for neuroendocrine tumor on 9/19.    -c/w regular diet  -Monitor GI fxn  -Megace and zofran.   -Encourage OOB and ambulating.   - d/c FALLON  - d/c today     Gadiel Fuller, PGY-1  [facility], [place] Surgery [phone]

## 2018-10-02 PROBLEM — K86.9 DISEASE OF PANCREAS, UNSPECIFIED: Chronic | Status: ACTIVE | Noted: 2018-09-12

## 2018-10-02 PROBLEM — H91.90 UNSPECIFIED HEARING LOSS, UNSPECIFIED EAR: Chronic | Status: ACTIVE | Noted: 2018-09-12

## 2018-10-08 ENCOUNTER — TRANSCRIPTION ENCOUNTER (OUTPATIENT)
Age: 73
End: 2018-10-08

## 2018-10-16 ENCOUNTER — APPOINTMENT (OUTPATIENT)
Dept: SURGERY | Facility: CLINIC | Age: 73
End: 2018-10-16

## 2018-10-16 VITALS
WEIGHT: 124 LBS | HEART RATE: 70 BPM | DIASTOLIC BLOOD PRESSURE: 67 MMHG | RESPIRATION RATE: 17 BRPM | HEIGHT: 69 IN | BODY MASS INDEX: 18.37 KG/M2 | OXYGEN SATURATION: 98 % | SYSTOLIC BLOOD PRESSURE: 160 MMHG

## 2018-10-16 DIAGNOSIS — R73.02 IMPAIRED GLUCOSE TOLERANCE (ORAL): ICD-10-CM

## 2018-10-16 DIAGNOSIS — R63.0 ANOREXIA: ICD-10-CM

## 2018-10-25 ENCOUNTER — APPOINTMENT (OUTPATIENT)
Dept: OTOLARYNGOLOGY | Facility: CLINIC | Age: 73
End: 2018-10-25
Payer: MEDICARE

## 2018-10-25 VITALS
WEIGHT: 124 LBS | HEART RATE: 71 BPM | BODY MASS INDEX: 18.31 KG/M2 | SYSTOLIC BLOOD PRESSURE: 137 MMHG | DIASTOLIC BLOOD PRESSURE: 81 MMHG

## 2018-10-25 PROCEDURE — 99214 OFFICE O/P EST MOD 30 MIN: CPT | Mod: 25

## 2018-10-25 PROCEDURE — 69210 REMOVE IMPACTED EAR WAX UNI: CPT

## 2018-10-26 ENCOUNTER — APPOINTMENT (OUTPATIENT)
Dept: OTOLARYNGOLOGY | Facility: CLINIC | Age: 73
End: 2018-10-26

## 2018-11-06 ENCOUNTER — APPOINTMENT (OUTPATIENT)
Dept: OTOLARYNGOLOGY | Facility: CLINIC | Age: 73
End: 2018-11-06
Payer: MEDICARE

## 2018-11-06 VITALS
WEIGHT: 124 LBS | HEART RATE: 70 BPM | BODY MASS INDEX: 18.37 KG/M2 | DIASTOLIC BLOOD PRESSURE: 92 MMHG | HEIGHT: 69 IN | SYSTOLIC BLOOD PRESSURE: 146 MMHG

## 2018-11-06 PROCEDURE — 99214 OFFICE O/P EST MOD 30 MIN: CPT | Mod: 25

## 2018-11-06 PROCEDURE — 31231 NASAL ENDOSCOPY DX: CPT

## 2018-11-06 RX ORDER — ERYTHROMYCIN 500 MG/1
500 TABLET, FILM COATED ORAL 3 TIMES DAILY
Qty: 270 | Refills: 0 | Status: COMPLETED | COMMUNITY
Start: 2018-10-16 | End: 2018-11-06

## 2018-11-06 RX ORDER — OMEPRAZOLE 20 MG/1
20 CAPSULE, DELAYED RELEASE ORAL TWICE DAILY
Qty: 28 | Refills: 0 | Status: COMPLETED | COMMUNITY
Start: 2018-07-17 | End: 2018-11-06

## 2018-11-06 RX ORDER — CLARITHROMYCIN 500 MG/1
500 TABLET, FILM COATED ORAL TWICE DAILY
Qty: 28 | Refills: 0 | Status: COMPLETED | COMMUNITY
Start: 2018-07-17 | End: 2018-11-06

## 2018-11-06 RX ORDER — METRONIDAZOLE 500 MG/1
500 TABLET ORAL 3 TIMES DAILY
Qty: 42 | Refills: 0 | Status: COMPLETED | COMMUNITY
Start: 2018-07-17 | End: 2018-11-06

## 2018-11-06 RX ORDER — OFLOXACIN OTIC 3 MG/ML
0.3 SOLUTION AURICULAR (OTIC) TWICE DAILY
Qty: 1 | Refills: 0 | Status: COMPLETED | COMMUNITY
Start: 2018-10-25 | End: 2018-11-06

## 2018-11-13 ENCOUNTER — APPOINTMENT (OUTPATIENT)
Dept: SURGERY | Facility: CLINIC | Age: 73
End: 2018-11-13
Payer: MEDICARE

## 2018-11-13 VITALS
DIASTOLIC BLOOD PRESSURE: 70 MMHG | WEIGHT: 125.31 LBS | SYSTOLIC BLOOD PRESSURE: 120 MMHG | OXYGEN SATURATION: 98 % | HEART RATE: 70 BPM | RESPIRATION RATE: 16 BRPM | HEIGHT: 69 IN | BODY MASS INDEX: 18.56 KG/M2

## 2018-11-13 DIAGNOSIS — K86.9 DISEASE OF PANCREAS, UNSPECIFIED: ICD-10-CM

## 2018-11-13 PROCEDURE — 99214 OFFICE O/P EST MOD 30 MIN: CPT | Mod: 24

## 2018-11-14 PROBLEM — K86.9 LESION OF PANCREAS: Status: ACTIVE | Noted: 2018-07-31

## 2018-12-08 ENCOUNTER — FORM ENCOUNTER (OUTPATIENT)
Age: 73
End: 2018-12-08

## 2018-12-09 ENCOUNTER — APPOINTMENT (OUTPATIENT)
Dept: MRI IMAGING | Facility: IMAGING CENTER | Age: 73
End: 2018-12-09
Payer: MEDICARE

## 2018-12-09 ENCOUNTER — APPOINTMENT (OUTPATIENT)
Dept: CT IMAGING | Facility: IMAGING CENTER | Age: 73
End: 2018-12-09
Payer: MEDICARE

## 2018-12-09 ENCOUNTER — OUTPATIENT (OUTPATIENT)
Dept: OUTPATIENT SERVICES | Facility: HOSPITAL | Age: 73
LOS: 1 days | End: 2018-12-09
Payer: MEDICARE

## 2018-12-09 DIAGNOSIS — Z98.89 OTHER SPECIFIED POSTPROCEDURAL STATES: Chronic | ICD-10-CM

## 2018-12-09 DIAGNOSIS — C11.9 MALIGNANT NEOPLASM OF NASOPHARYNX, UNSPECIFIED: ICD-10-CM

## 2018-12-09 DIAGNOSIS — Z98.890 OTHER SPECIFIED POSTPROCEDURAL STATES: Chronic | ICD-10-CM

## 2018-12-09 DIAGNOSIS — Z90.49 ACQUIRED ABSENCE OF OTHER SPECIFIED PARTS OF DIGESTIVE TRACT: Chronic | ICD-10-CM

## 2018-12-09 DIAGNOSIS — Z90.89 ACQUIRED ABSENCE OF OTHER ORGANS: Chronic | ICD-10-CM

## 2018-12-09 PROCEDURE — 70491 CT SOFT TISSUE NECK W/DYE: CPT | Mod: 26

## 2018-12-09 PROCEDURE — 70543 MRI ORBT/FAC/NCK W/O &W/DYE: CPT | Mod: 26

## 2018-12-09 PROCEDURE — 71260 CT THORAX DX C+: CPT

## 2018-12-09 PROCEDURE — 71260 CT THORAX DX C+: CPT | Mod: 26

## 2018-12-09 PROCEDURE — 82565 ASSAY OF CREATININE: CPT

## 2018-12-09 PROCEDURE — A9585: CPT

## 2018-12-09 PROCEDURE — 70491 CT SOFT TISSUE NECK W/DYE: CPT

## 2018-12-09 PROCEDURE — 70543 MRI ORBT/FAC/NCK W/O &W/DYE: CPT

## 2018-12-18 ENCOUNTER — APPOINTMENT (OUTPATIENT)
Dept: SURGERY | Facility: CLINIC | Age: 73
End: 2018-12-18
Payer: MEDICARE

## 2018-12-18 VITALS
SYSTOLIC BLOOD PRESSURE: 160 MMHG | BODY MASS INDEX: 18.96 KG/M2 | WEIGHT: 128 LBS | OXYGEN SATURATION: 99 % | HEIGHT: 69 IN | DIASTOLIC BLOOD PRESSURE: 77 MMHG | HEART RATE: 42 BPM

## 2018-12-18 DIAGNOSIS — R53.81 OTHER MALAISE: ICD-10-CM

## 2018-12-18 DIAGNOSIS — K31.84 GASTROPARESIS: ICD-10-CM

## 2018-12-18 PROCEDURE — 99215 OFFICE O/P EST HI 40 MIN: CPT | Mod: 24

## 2018-12-20 PROBLEM — R53.81 DECLINING FUNCTIONAL STATUS: Status: ACTIVE | Noted: 2017-05-10

## 2018-12-20 PROBLEM — K31.84 GASTROPARESIS: Status: ACTIVE | Noted: 2018-10-16

## 2018-12-27 ENCOUNTER — INPATIENT (INPATIENT)
Facility: HOSPITAL | Age: 73
LOS: 4 days | Discharge: HOME | DRG: 312 | End: 2019-01-01
Attending: INTERNAL MEDICINE | Admitting: INTERNAL MEDICINE
Payer: MEDICARE

## 2018-12-27 VITALS
HEIGHT: 71 IN | RESPIRATION RATE: 18 BRPM | DIASTOLIC BLOOD PRESSURE: 94 MMHG | OXYGEN SATURATION: 98 % | SYSTOLIC BLOOD PRESSURE: 150 MMHG | HEART RATE: 68 BPM | WEIGHT: 134.92 LBS

## 2018-12-27 DIAGNOSIS — Z98.890 OTHER SPECIFIED POSTPROCEDURAL STATES: Chronic | ICD-10-CM

## 2018-12-27 DIAGNOSIS — R55 SYNCOPE AND COLLAPSE: ICD-10-CM

## 2018-12-27 DIAGNOSIS — Z90.89 ACQUIRED ABSENCE OF OTHER ORGANS: Chronic | ICD-10-CM

## 2018-12-27 DIAGNOSIS — Z98.89 OTHER SPECIFIED POSTPROCEDURAL STATES: Chronic | ICD-10-CM

## 2018-12-27 DIAGNOSIS — Z90.49 ACQUIRED ABSENCE OF OTHER SPECIFIED PARTS OF DIGESTIVE TRACT: Chronic | ICD-10-CM

## 2018-12-27 LAB
ALBUMIN SERPL ELPH-MCNC: 4.8 G/DL — SIGNIFICANT CHANGE UP (ref 3.3–5)
ALP SERPL-CCNC: 53 U/L — SIGNIFICANT CHANGE UP (ref 40–120)
ALT FLD-CCNC: 18 U/L — SIGNIFICANT CHANGE UP (ref 10–45)
AMMONIA BLD-MCNC: 21 UMOL/L — SIGNIFICANT CHANGE UP (ref 11–55)
ANION GAP SERPL CALC-SCNC: 15 MMOL/L — SIGNIFICANT CHANGE UP (ref 5–17)
AST SERPL-CCNC: 18 U/L — SIGNIFICANT CHANGE UP (ref 10–40)
BASOPHILS # BLD AUTO: 0 K/UL — SIGNIFICANT CHANGE UP (ref 0–0.2)
BASOPHILS NFR BLD AUTO: 0.2 % — SIGNIFICANT CHANGE UP (ref 0–2)
BILIRUB SERPL-MCNC: 0.5 MG/DL — SIGNIFICANT CHANGE UP (ref 0.2–1.2)
BUN SERPL-MCNC: 28 MG/DL — HIGH (ref 7–23)
CALCIUM SERPL-MCNC: 10.6 MG/DL — HIGH (ref 8.4–10.5)
CHLORIDE SERPL-SCNC: 94 MMOL/L — LOW (ref 96–108)
CO2 SERPL-SCNC: 27 MMOL/L — SIGNIFICANT CHANGE UP (ref 22–31)
CREAT SERPL-MCNC: 1.23 MG/DL — SIGNIFICANT CHANGE UP (ref 0.5–1.3)
DIGOXIN SERPL-MCNC: 2 NG/ML — SIGNIFICANT CHANGE UP (ref 0.8–2)
EOSINOPHIL # BLD AUTO: 0.1 K/UL — SIGNIFICANT CHANGE UP (ref 0–0.5)
EOSINOPHIL NFR BLD AUTO: 1.5 % — SIGNIFICANT CHANGE UP (ref 0–6)
GLUCOSE SERPL-MCNC: 225 MG/DL — HIGH (ref 70–99)
HCT VFR BLD CALC: 47.5 % — SIGNIFICANT CHANGE UP (ref 39–50)
HGB BLD-MCNC: 13.9 G/DL — SIGNIFICANT CHANGE UP (ref 13–17)
LYMPHOCYTES # BLD AUTO: 1.4 K/UL — SIGNIFICANT CHANGE UP (ref 1–3.3)
LYMPHOCYTES # BLD AUTO: 14.7 % — SIGNIFICANT CHANGE UP (ref 13–44)
MCHC RBC-ENTMCNC: 27.2 PG — SIGNIFICANT CHANGE UP (ref 27–34)
MCHC RBC-ENTMCNC: 29.3 GM/DL — LOW (ref 32–36)
MCV RBC AUTO: 92.9 FL — SIGNIFICANT CHANGE UP (ref 80–100)
MONOCYTES # BLD AUTO: 0.6 K/UL — SIGNIFICANT CHANGE UP (ref 0–0.9)
MONOCYTES NFR BLD AUTO: 6.6 % — SIGNIFICANT CHANGE UP (ref 2–14)
NEUTROPHILS # BLD AUTO: 7 K/UL — SIGNIFICANT CHANGE UP (ref 1.8–7.4)
NEUTROPHILS NFR BLD AUTO: 77 % — SIGNIFICANT CHANGE UP (ref 43–77)
PLATELET # BLD AUTO: 370 K/UL — SIGNIFICANT CHANGE UP (ref 150–400)
POTASSIUM SERPL-MCNC: 4.3 MMOL/L — SIGNIFICANT CHANGE UP (ref 3.5–5.3)
POTASSIUM SERPL-SCNC: 4.3 MMOL/L — SIGNIFICANT CHANGE UP (ref 3.5–5.3)
PROT SERPL-MCNC: 8.4 G/DL — HIGH (ref 6–8.3)
RBC # BLD: 5.12 M/UL — SIGNIFICANT CHANGE UP (ref 4.2–5.8)
RBC # FLD: 14.6 % — HIGH (ref 10.3–14.5)
SODIUM SERPL-SCNC: 136 MMOL/L — SIGNIFICANT CHANGE UP (ref 135–145)
TROPONIN T, HIGH SENSITIVITY RESULT: 24 NG/L — SIGNIFICANT CHANGE UP (ref 0–51)
TROPONIN T, HIGH SENSITIVITY RESULT: 26 NG/L — SIGNIFICANT CHANGE UP (ref 0–51)
WBC # BLD: 9.2 K/UL — SIGNIFICANT CHANGE UP (ref 3.8–10.5)
WBC # FLD AUTO: 9.2 K/UL — SIGNIFICANT CHANGE UP (ref 3.8–10.5)

## 2018-12-27 PROCEDURE — 93308 TTE F-UP OR LMTD: CPT | Mod: 26

## 2018-12-27 PROCEDURE — 70450 CT HEAD/BRAIN W/O DYE: CPT | Mod: 26

## 2018-12-27 PROCEDURE — 93010 ELECTROCARDIOGRAM REPORT: CPT

## 2018-12-27 PROCEDURE — 76937 US GUIDE VASCULAR ACCESS: CPT | Mod: 26

## 2018-12-27 PROCEDURE — 72125 CT NECK SPINE W/O DYE: CPT | Mod: 26

## 2018-12-27 PROCEDURE — 99285 EMERGENCY DEPT VISIT HI MDM: CPT | Mod: 25

## 2018-12-27 PROCEDURE — 73502 X-RAY EXAM HIP UNI 2-3 VIEWS: CPT | Mod: 26,LT

## 2018-12-27 PROCEDURE — 71045 X-RAY EXAM CHEST 1 VIEW: CPT | Mod: 26

## 2018-12-27 RX ORDER — SODIUM CHLORIDE 9 MG/ML
2000 INJECTION INTRAMUSCULAR; INTRAVENOUS; SUBCUTANEOUS ONCE
Qty: 0 | Refills: 0 | Status: COMPLETED | OUTPATIENT
Start: 2018-12-27 | End: 2018-12-27

## 2018-12-27 RX ORDER — ACETAMINOPHEN 500 MG
1000 TABLET ORAL ONCE
Qty: 0 | Refills: 0 | Status: COMPLETED | OUTPATIENT
Start: 2018-12-27 | End: 2018-12-27

## 2018-12-27 RX ORDER — APIXABAN 2.5 MG/1
5 TABLET, FILM COATED ORAL EVERY 12 HOURS
Qty: 0 | Refills: 0 | Status: DISCONTINUED | OUTPATIENT
Start: 2018-12-27 | End: 2019-01-01

## 2018-12-27 RX ORDER — MEGESTROL ACETATE 40 MG/ML
400 SUSPENSION ORAL
Qty: 12000 | Refills: 0 | OUTPATIENT

## 2018-12-27 RX ORDER — DIGOXIN 250 MCG
0.12 TABLET ORAL DAILY
Qty: 0 | Refills: 0 | Status: DISCONTINUED | OUTPATIENT
Start: 2018-12-27 | End: 2018-12-28

## 2018-12-27 RX ORDER — DIGOXIN 250 MCG
0.25 TABLET ORAL ONCE
Qty: 0 | Refills: 0 | Status: COMPLETED | OUTPATIENT
Start: 2018-12-27 | End: 2018-12-27

## 2018-12-27 RX ORDER — SIMVASTATIN 20 MG/1
20 TABLET, FILM COATED ORAL AT BEDTIME
Qty: 0 | Refills: 0 | Status: DISCONTINUED | OUTPATIENT
Start: 2018-12-27 | End: 2019-01-01

## 2018-12-27 RX ORDER — INFLUENZA VIRUS VACCINE 15; 15; 15; 15 UG/.5ML; UG/.5ML; UG/.5ML; UG/.5ML
0.5 SUSPENSION INTRAMUSCULAR ONCE
Qty: 0 | Refills: 0 | Status: DISCONTINUED | OUTPATIENT
Start: 2018-12-27 | End: 2019-01-01

## 2018-12-27 RX ADMIN — SODIUM CHLORIDE 2000 MILLILITER(S): 9 INJECTION INTRAMUSCULAR; INTRAVENOUS; SUBCUTANEOUS at 11:54

## 2018-12-27 RX ADMIN — APIXABAN 5 MILLIGRAM(S): 2.5 TABLET, FILM COATED ORAL at 21:40

## 2018-12-27 RX ADMIN — Medication 0.25 MILLIGRAM(S): at 11:52

## 2018-12-27 RX ADMIN — SIMVASTATIN 20 MILLIGRAM(S): 20 TABLET, FILM COATED ORAL at 21:40

## 2018-12-27 RX ADMIN — Medication 400 MILLIGRAM(S): at 11:52

## 2018-12-27 RX ADMIN — Medication 1000 MILLIGRAM(S): at 12:22

## 2018-12-27 NOTE — CONSULT NOTE ADULT - SUBJECTIVE AND OBJECTIVE BOX
CHIEF COMPLAINT:Patient is a 73y old  Male who presents with a chief complaint of syncope.    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 Patient does not recollect how he fell in the shower today, fall unwitnessed, found by wife in the bathtub on his back, ?LOC, denied head trauma. No chest pain, no shortness of breath. + left hip pain, mild, and has not taken anything for pain, did not take any home meds today. Wife states pt ambulated with assistance from EMS at the scene.  Pt wife at bedside also relates pt has not been himself for the past several weeks, typically ambulates unassisted now ambulates with cane and shuffling gait, +personality changes, worsening fine UE tremors. Pt wife was unsure if sxs 2/2 him taking erythromycin and Megace s/p pancreatectomy so she has not given him these medications in several days. Also episode of questionable syncope where pt was found awake but slumped on the floor outside his apartment on 12/20/18. Pt denies preceding CP/lightheadedness/palpitations, recent illness, HA, numbness, tingling weakness, change in vision.      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  ROM limited Mouth opening &amp; neck  Pancreatic mass  Hearing loss  Disease of pancreas, unspecified  Nasopharynx cancer: 07/2016- s/p Chemo &amp;RT  Pulmonary emphysema, unspecified emphysema type  Stage 3 chronic kidney disease  Acute kidney injury: 09/2016  Atrial fibrillation, unspecified type: Dxd in 01/2016-on Eliquis  COPD (chronic obstructive pulmonary disease)  Benign carcinoid tumor  Low back pain  High cholesterol  HTN (hypertension)  H/O endoscopy: last one 06/2018  S/P tonsillectomy: 1959  H/O hemicolectomy  History of appendectomy      MEDICATIONS  (STANDING):  influenza   Vaccine 0.5 milliLiter(s) IntraMuscular once    MEDICATIONS  (PRN):      FAMILY HISTORY:  Family history of breast cancer      SOCIAL HISTORY:    [ ] Non-smoker  [ ] Smoker  [ ] Alcohol    Allergies    ACE inhibitors (Other)  Bee Stings- anaphylaxis (Other)  penicillin (Other)    Intolerances    	    REVIEW OF SYSTEMS: very poor historian  CONSTITUTIONAL: No fever, weight loss, or fatigue  EYES: No eye pain, visual disturbances, or discharge  ENT:  No difficulty hearing, tinnitus, vertigo; No sinus or throat pain  NECK: No pain or stiffness  RESPIRATORY: No cough, wheezing, chills or hemoptysis; + Shortness of Breath  CARDIOVASCULAR: No chest pain, palpitations, passing out, dizziness, or leg swelling  GASTROINTESTINAL: No abdominal or epigastric pain. No nausea, vomiting, or hematemesis; No diarrhea or constipation. No melena or hematochezia.  GENITOURINARY: No dysuria, frequency, hematuria, or incontinence  NEUROLOGICAL: No headaches, memory loss, loss of strength, numbness, or tremors, + syncope  SKIN: No itching, burning, rashes, or lesions   LYMPH Nodes: No enlarged glands  ENDOCRINE: No heat or cold intolerance; No hair loss  MUSCULOSKELETAL: No joint pain or swelling; No muscle, back, or extremity pain  PSYCHIATRIC: No depression, anxiety, mood swings, or difficulty sleeping  HEME/LYMPH: No easy bruising, or bleeding gums  ALLERGY AND IMMUNOLOGIC: No hives or eczema	    [ ] All others negative	  [ x] Unable to obtain    PHYSICAL EXAM:  T(C): 36.2 (12-27-18 @ 18:08), Max: 37 (12-27-18 @ 16:12)  HR: 72 (12-27-18 @ 18:08) (62 - 72)  BP: 165/84 (12-27-18 @ 18:08) (143/89 - 187/99)  RR: 18 (12-27-18 @ 18:08) (16 - 18)  SpO2: 98% (12-27-18 @ 18:08) (97% - 98%)  Wt(kg): --  I&O's Summary      Appearance: Normal	  HEENT:   Normal oral mucosa, PERRL, EOMI	  Lymphatic: No lymphadenopathy  Cardiovascular: Normal S1 S2, No JVD, + murmurs, No edema  Respiratory: Lungs clear to auscultation	  Psychiatry: A & O x 3, Mood & affect appropriate  Gastrointestinal:  Soft, Non-tender, + BS	  Skin: No rashes, No ecchymoses, No cyanosis	  Neurologic: Non-focal  Extremities: Normal range of motion, No clubbing, cyanosis or edema  Vascular: Peripheral pulses palpable 2+ bilaterally    TELEMETRY: 	    ECG:  	  RADIOLOGY:  OTHER: 	  	  LABS:	 	    CARDIAC MARKERS:                              13.9   9.2   )-----------( 370      ( 27 Dec 2018 10:35 )             47.5     12-27    136  |  94<L>  |  28<H>  ----------------------------<  225<H>  4.3   |  27  |  1.23    Ca    10.6<H>      27 Dec 2018 10:35    TPro  8.4<H>  /  Alb  4.8  /  TBili  0.5  /  DBili  x   /  AST  18  /  ALT  18  /  AlkPhos  53  12-27    proBNP:   Lipid Profile:   HgA1c:   TSH:       PREVIOUS DIAGNOSTIC TESTING:    < from: Transthoracic Echocardiogram (01.16.16 @ 16:06) >  1. Normal Left Ventricular Systolic Function,  (EF = 55 to  60%)  2. Grade I diastolic dysfunction  3. RV systolic pressure is mildly increased.=31mmhg  4. There is mild tricuspid regurgitation.    < from: Nuclear Stress Test-Pharmacologic (01.15.16 @ 13:24) >  * Negative ECG evidence of ischemia after IV  administartion of Regadenoson.  * Myocardial Perfusion SPECT results are normal.  * No clear evidence of ischemia or infarct.  * Post-stress resting myocardial perfusion gated SPECT  imaging was performed (LVEF = 69 %) with no regional wall  motion abnormalities.    < from: 12 Lead ECG (05.25.18 @ 16:17) >  Diagnosis Line Sinus rhythm with 1st degree A-V block  Nonspecific T wave abnormality  Abnormal ECG    < from: CT Head No Cont (12.27.18 @ 12:07) >  Head CT:   No evidence for intracranial hemorrhage, mass effect, or displaced   calvarial fracture.       < end of copied text > Bladder cancer  H/O 2013  Dementia    Hypertension    Hypertrophy of prostate    Hypothyroidism    Pancreatic cancer    Pneumonia    Renal cyst    Risk factors for obstructive sleep apnea

## 2018-12-27 NOTE — ED PROVIDER NOTE - PHYSICAL EXAMINATION
GEN: emaciated in appearance, awake, eyes open spontaneously  HEENT: NCAT, dry MM, Trachea midline, normal conjunctiva, perrl, mild temporal wasting  CHEST/LUNGS: Non-tachypneic, CTAB, bilateral breath sounds  CARDIAC: Non-tachycardic, normal perfusion  ABDOMEN: Soft, NTND, No rebound/guarding  MSK: No edema, no gross deformity of extremities, + left anterior superior pelvic spine tenderness to palpation   SKIN: No rashes, no petechiae, no vesicles  NEURO: CN grossly intact, normal coordination, no focal motor or sensory deficits, + cogwheeling of bilateral wrists L>R, + cogwheeling at bilateral elbows, + tremor waves on Archimedes spiral   PSYCH: Alert, appropriate, cooperative, with capacity and insight GEN: emaciated in appearance, awake, eyes open spontaneously. Oriented x3, GCS 15.  HEENT: NCAT, dry MM, Trachea midline, normal conjunctiva, perrl, mild temporal wasting. EOMI. No perioccular ecchymosis, no Leach's sign, no hemotympanum pink DC from ear/nose.  CHEST/LUNGS: No bruising, no chest wall/rib TTP. Non-tachypneic, CTAB, bilateral breath sounds  CARDIAC:. Non-tachycardic, normal perfusion  ABDOMEN: No bruising. Soft, NTND, No rebound/guarding  MSK: No edema, no gross deformity of extremities, + left anterior superior pelvic spine tenderness to palpation. No midline spinal TTP no step-offs.  SKIN: No rashes, no petechiae, no vesicles  NEURO: CN grossly intact, normal coordination, no focal motor or sensory deficits, + cogwheeling of bilateral wrists L>R, + cogwheeling at bilateral elbows, + tremor waves on Archimedes spiral   PSYCH: Alert, appropriate, cooperative, with capacity and insight

## 2018-12-27 NOTE — H&P ADULT - NSHPPHYSICALEXAM_GEN_ALL_CORE
PHYSICAL EXAMINATION:  Vital Signs Last 24 Hrs  T(C): 36.2 (27 Dec 2018 18:08), Max: 37 (27 Dec 2018 16:12)  T(F): 97.2 (27 Dec 2018 18:08), Max: 98.6 (27 Dec 2018 16:12)  HR: 72 (27 Dec 2018 18:08) (62 - 72)  BP: 165/84 (27 Dec 2018 18:08) (143/89 - 187/99)  BP(mean): --  RR: 18 (27 Dec 2018 18:08) (16 - 18)  SpO2: 98% (27 Dec 2018 18:08) (97% - 98%)  CAPILLARY BLOOD GLUCOSE            GENERAL: NAD, well-groomed,  HEAD:  atraumatic, normocephalic  EYES: sclera anicteric  ENMT: mucous membranes moist  NECK: supple, No JVD  CHEST/LUNG: clear to auscultation bilaterally;    no      rales   ,   no rhonchi,   HEART: normal S1, S2  ABDOMEN: BS+, soft, ND, NT   EXTREMITIES:    no    edema    b/l LEs  NEURO: awake, ,     moves all extremities  SKIN: no     rash

## 2018-12-27 NOTE — ED ADULT NURSE NOTE - OBJECTIVE STATEMENT
72 y/o male pmhx nasopharyngeal cancer, pancreatic mass presenting to ED s/p slip and fall in the shower today. Per wife at bedside pt. has not been eating right, and "seems out of it". Per wife pt. was admitted to this facility in september, and upon discharge was doing well. Since it has gotten cold outside, pt. has been moving around less and seems to be "regressing". Pt denies LOC, hitting his head, trauma, headache, dizziness, chest pain, palpitations, cough, SOB, abdominal pain, n/v/d, urinary symptoms, fevers, chills, weakness at this time. A&Ox4 gross neuro intact, pt. is hard of hearing, lungs cta bilaterally, no difficulty speaking in complete sentences, s1s2 heart sounds heard, pulses x 4, collazo x4, abdomen soft nontender nondistended, skin intact. Safety and comfort measures maintained. Patient undressed and placed into gown, call bell in hand and side rails up for safety. warm blanket provided, vital signs stable, pt in no acute distress.

## 2018-12-27 NOTE — ED ADULT NURSE NOTE - NS ED NURSE REPORT GIVEN TO FT
Report given to on coming nurse Miri BARBOSA. Understands pmh, medications given and plan of care for patient. Patient in stable condition, vital signs updated, has no complaints at this time and has been updated on care plan. Explained to patient that it is change of shift and new nurse is taking over, pt verbalized understanding.

## 2018-12-27 NOTE — ED ADULT NURSE REASSESSMENT NOTE - NS ED NURSE REASSESS COMMENT FT1
RN unable to place IV line. MD at bedside placing US guided IV. Safety and comfort measures maintained.

## 2018-12-27 NOTE — H&P ADULT - HISTORY OF PRESENT ILLNESS
· Chief Complaint: The patient is a 73y Male complaining of fall.	   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 Patient does not recollect how he fell in the shower today, fall unwitnessed, found by wife in the bathtub on his back,   denied head trauma.  No chest pain, no shortness of breath. + left hip pain, mild, and has not taken anything for pain, did not take any home meds today. Wife states pt ambulated with assistance from EMS at the scene.    pt has not been himself for the past several weeks, typically ambulates unassisted now ambulates with cane and shuffling gait, +personality changes, worsening fine UE tremors Pt wife was unsure if sxs 2/2 him taking erythromycin and Megace s/p pancreatectomy so she has not given him these medications in several days.    syncope where pt was found awake but slumped on the floor outside his apartment on 12/20/18.  wife at [facility]

## 2018-12-27 NOTE — H&P ADULT - ASSESSMENT
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  afib/  eliquis,  copd,  hld,  htn,  ckd 2,   nasopharyngeal  ca, . 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  afib/  eliquis,  copd,  hld,  htn,  ckd 2,   nasopharyngeal  ca, . s/p pancreatectomy   ca pancreas    admitted  with  h/o recurrent syncope   tele   echo   orthostasis       PT eval   wife at bedside

## 2018-12-27 NOTE — ED PROVIDER NOTE - MEDICAL DECISION MAKING DETAILS
Patient with syncope and appearance of dehydration with failure to thrive of late and new shuffling gait and cogwheeling: will get iv, cbc, cmp, ct head/neck, xray chest pelvis, fluids and reassess  Will follow up on labs, analgesia, reassess and disposition to the inpatient team as clinically indicated.

## 2018-12-27 NOTE — H&P ADULT - NSHPLABSRESULTS_GEN_ALL_CORE
LABS:                        13.9   9.2   )-----------( 370      ( 27 Dec 2018 10:35 )             47.5     12-27    136  |  94<L>  |  28<H>  ----------------------------<  225<H>  4.3   |  27  |  1.23    Ca    10.6<H>      27 Dec 2018 10:35    TPro  8.4<H>  /  Alb  4.8  /  TBili  0.5  /  DBili  x   /  AST  18  /  ALT  18  /  AlkPhos  53  12-27

## 2018-12-27 NOTE — CONSULT NOTE ADULT - ASSESSMENT
pt with sig pmhx with multiple syncopal episodes  ?orthostatic hypotension  tele  echo  check orthostatic  tsh  b12  will adjust bp meds

## 2018-12-27 NOTE — H&P ADULT - PMH
Acute kidney injury  09/2016  Atrial fibrillation, unspecified type  Dxd in 01/2016-on Eliquis  Benign carcinoid tumor    COPD (chronic obstructive pulmonary disease)    Disease of pancreas, unspecified    ETOH abuse  quit in 2016  Hearing loss    High cholesterol    HTN (hypertension)    Low back pain    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

## 2018-12-27 NOTE — ED PROVIDER NOTE - PROGRESS NOTE DETAILS
concern for syncope, ultrasound at bedside without pericardial effusion and no WMA noted, IVC fully collapsable

## 2018-12-27 NOTE — ED PROVIDER NOTE - CARE PLAN
Principal Discharge DX:	Syncope and collapse  Secondary Diagnosis:	Contusion of hip and thigh, left, initial encounter

## 2018-12-27 NOTE — ED PROVIDER NOTE - OBJECTIVE STATEMENT
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 Patient does not recollect how he fell in the shower today. No chest pain, no shortness of breath. + left hip pain, mild, and has not taken anything for pain. 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 Patient does not recollect how he fell in the shower today, fall unwitnessed, found by wife in the bathtub on his back, ?LOC, denied head trauma. No chest pain, no shortness of breath. + left hip pain, mild, and has not taken anything for pain, did not take any home meds today. Wife states pt ambulated with assistance from EMS at the scene.  Pt wife at bedside also relates pt has not been himself for the past several weeks, typically ambulates unassisted now ambulates with cane and shuffling gait, +personality changes, worsening fine UE tremors. Pt wife was unsure if sxs 2/2 him taking erythromycin and Megace s/p pancreatectomy so she has not given him these medications in several days. Also episode of questionable syncope where pt was found awake but slumped on the floor outside his apartment on 12/20/18. Pt denies preceding CP/lightheadedness/palpitations, recent illness, HA, numbnes, tingling weakness, change in vision. 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 Patient does not recollect how he fell in the shower today, fall unwitnessed, found by wife in the bathtub on his back, ?LOC, denied head trauma. No chest pain, no shortness of breath. + left hip pain, mild, and has not taken anything for pain, did not take any home meds today. Wife states pt ambulated with assistance from EMS at the scene.  Pt wife at bedside also relates pt has not been himself for the past several weeks, typically ambulates unassisted now ambulates with cane and shuffling gait, +personality changes, worsening fine UE tremors. Pt wife was unsure if sxs 2/2 him taking erythromycin and Megace s/p pancreatectomy so she has not given him these medications in several days. Also episode of questionable syncope where pt was found awake but slumped on the floor outside his apartment on 12/20/18. Pt denies preceding CP/lightheadedness/palpitations, recent illness, HA, numbness, tingling weakness, change in vision.

## 2018-12-27 NOTE — ED PROVIDER NOTE - PMH
Acute kidney injury  09/2016  Atrial fibrillation, unspecified type  Dxd in 01/2016-on Eliquis  Benign carcinoid tumor    COPD (chronic obstructive pulmonary disease)    Disease of pancreas, unspecified    Hearing loss    High cholesterol    HTN (hypertension)    Low back pain    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 Acute kidney injury  09/2016  Atrial fibrillation, unspecified type  Dxd in 01/2016-on Eliquis  Benign carcinoid tumor    COPD (chronic obstructive pulmonary disease)    Disease of pancreas, unspecified    ETOH abuse  quit in 2016  Hearing loss    High cholesterol    HTN (hypertension)    Low back pain    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

## 2018-12-28 LAB
ANION GAP SERPL CALC-SCNC: 16 MMOL/L — SIGNIFICANT CHANGE UP (ref 5–17)
BUN SERPL-MCNC: 24 MG/DL — HIGH (ref 7–23)
CALCIUM SERPL-MCNC: 9.9 MG/DL — SIGNIFICANT CHANGE UP (ref 8.4–10.5)
CHLORIDE SERPL-SCNC: 99 MMOL/L — SIGNIFICANT CHANGE UP (ref 96–108)
CO2 SERPL-SCNC: 22 MMOL/L — SIGNIFICANT CHANGE UP (ref 22–31)
CREAT SERPL-MCNC: 1.02 MG/DL — SIGNIFICANT CHANGE UP (ref 0.5–1.3)
GLUCOSE SERPL-MCNC: 192 MG/DL — HIGH (ref 70–99)
HCT VFR BLD CALC: 40 % — SIGNIFICANT CHANGE UP (ref 39–50)
HGB BLD-MCNC: 13.1 G/DL — SIGNIFICANT CHANGE UP (ref 13–17)
MCHC RBC-ENTMCNC: 31 PG — SIGNIFICANT CHANGE UP (ref 27–34)
MCHC RBC-ENTMCNC: 32.8 GM/DL — SIGNIFICANT CHANGE UP (ref 32–36)
MCV RBC AUTO: 94.6 FL — SIGNIFICANT CHANGE UP (ref 80–100)
PLATELET # BLD AUTO: 288 K/UL — SIGNIFICANT CHANGE UP (ref 150–400)
POTASSIUM SERPL-MCNC: 4.3 MMOL/L — SIGNIFICANT CHANGE UP (ref 3.5–5.3)
POTASSIUM SERPL-SCNC: 4.3 MMOL/L — SIGNIFICANT CHANGE UP (ref 3.5–5.3)
RBC # BLD: 4.23 M/UL — SIGNIFICANT CHANGE UP (ref 4.2–5.8)
RBC # FLD: 16.2 % — HIGH (ref 10.3–14.5)
SODIUM SERPL-SCNC: 137 MMOL/L — SIGNIFICANT CHANGE UP (ref 135–145)
WBC # BLD: 7.34 K/UL — SIGNIFICANT CHANGE UP (ref 3.8–10.5)
WBC # FLD AUTO: 7.34 K/UL — SIGNIFICANT CHANGE UP (ref 3.8–10.5)

## 2018-12-28 RX ORDER — SODIUM CHLORIDE 9 MG/ML
1000 INJECTION INTRAMUSCULAR; INTRAVENOUS; SUBCUTANEOUS
Qty: 0 | Refills: 0 | Status: DISCONTINUED | OUTPATIENT
Start: 2018-12-28 | End: 2018-12-28

## 2018-12-28 RX ORDER — SODIUM CHLORIDE 9 MG/ML
1000 INJECTION INTRAMUSCULAR; INTRAVENOUS; SUBCUTANEOUS
Qty: 0 | Refills: 0 | Status: DISCONTINUED | OUTPATIENT
Start: 2018-12-28 | End: 2019-01-01

## 2018-12-28 RX ADMIN — SODIUM CHLORIDE 80 MILLILITER(S): 9 INJECTION INTRAMUSCULAR; INTRAVENOUS; SUBCUTANEOUS at 10:00

## 2018-12-28 RX ADMIN — Medication 0.12 MILLIGRAM(S): at 05:59

## 2018-12-28 RX ADMIN — APIXABAN 5 MILLIGRAM(S): 2.5 TABLET, FILM COATED ORAL at 05:59

## 2018-12-28 RX ADMIN — SODIUM CHLORIDE 50 MILLILITER(S): 9 INJECTION INTRAMUSCULAR; INTRAVENOUS; SUBCUTANEOUS at 08:27

## 2018-12-28 RX ADMIN — APIXABAN 5 MILLIGRAM(S): 2.5 TABLET, FILM COATED ORAL at 17:22

## 2018-12-28 RX ADMIN — SIMVASTATIN 20 MILLIGRAM(S): 20 TABLET, FILM COATED ORAL at 21:26

## 2018-12-28 NOTE — PROGRESS NOTE ADULT - ASSESSMENT
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  afib/  eliquis,  copd,  hld,  htn,  ckd 2,   nasopharyngeal  ca, . s/p pancreatectomy   ca pancreas    admitted  with  h/o recurrent syncope   tele,  nsr/   echo pending  afib/ Eliquis   orthostatics positive/ iv fluids   PT eval

## 2018-12-28 NOTE — CHART NOTE - NSCHARTNOTEFT_GEN_A_CORE
patient with orthostatics positive, patient asymptomatic.    Vital Signs Last 24 Hrs  T(C): 36.6 (28 Dec 2018 04:41), Max: 37 (27 Dec 2018 16:12)  T(F): 97.9 (28 Dec 2018 04:41), Max: 98.6 (27 Dec 2018 16:12)  HR: 70 (28 Dec 2018 04:41) (62 - 72)  BP: 124/80 (28 Dec 2018 04:41) (124/80 - 187/99)  BP(mean): --  RR: 18 (28 Dec 2018 04:41) (16 - 18)  SpO2: 97% (28 Dec 2018 04:41) (97% - 98%)    Orthostatic SBP trending from 120's to low 100's                           13.1   7.34  )-----------( 288      ( 28 Dec 2018 07:02 )             40.0     Basic Metabolic Panel in AM (12.28.18 @ 06:11)    Sodium, Serum: 137 mmol/L    Potassium, Serum: 4.3 mmol/L    Chloride, Serum: 99 mmol/L    Carbon Dioxide, Serum: 22 mmol/L    Anion Gap, Serum: 16 mmol/L    Blood Urea Nitrogen, Serum: 24 mg/dL    Creatinine, Serum: 1.02 mg/dL    Glucose, Serum: 192 mg/dL    Calcium, Total Serum: 9.9 mg/dL    HPI    73 year old retired ,    with h/o paroxysmal A-fib on eliquis and digoxin, HTN, COPD (emphysema), CKD3, previous etoh abuse quit in 2016  , carcinoid tumor s/p hemicolectomy, 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 Patient does not recollect how he fell in the shower today, fall unwitnessed, found by wife in the bathtub on his back,   denied head trauma.   No chest pain, no shortness of breath. + left hip pain, mild, and has not taken anything for pain, did not take any home meds today. Wife states pt ambulated with assistance from EMS at the scene.     pt has not been himself for the past several weeks, typically ambulates unassisted now ambulates with cane and shuffling gait, +personality changes, worsening fine UE tremors  Pt wife was unsure if sxs 2/2 him taking erythromycin and Megace s/p pancreatectomy so she has not given him these medications in several days.     syncope where pt was found awake but slumped on the floor outside his apartment on 12/20/18    Patient admitted with syncope, Orthostatics BP trending down  Last TTe with preserved EF    started on Ns @ 50ml/hr  Antiembolism stockings ordered  fall precautions  TTE pending  F/U an TSH  F/U with Attending and cardiology in am  Will follow closely  Will update with primary team    Keny Collado FNP BC  [id number]                            Orthostatics q12 hourly

## 2018-12-29 DIAGNOSIS — R53.1 WEAKNESS: ICD-10-CM

## 2018-12-29 DIAGNOSIS — R55 SYNCOPE AND COLLAPSE: ICD-10-CM

## 2018-12-29 DIAGNOSIS — N18.3 CHRONIC KIDNEY DISEASE, STAGE 3 (MODERATE): ICD-10-CM

## 2018-12-29 LAB
ANION GAP SERPL CALC-SCNC: 15 MMOL/L — SIGNIFICANT CHANGE UP (ref 5–17)
BUN SERPL-MCNC: 19 MG/DL — SIGNIFICANT CHANGE UP (ref 7–23)
CALCIUM SERPL-MCNC: 9 MG/DL — SIGNIFICANT CHANGE UP (ref 8.4–10.5)
CHLORIDE SERPL-SCNC: 101 MMOL/L — SIGNIFICANT CHANGE UP (ref 96–108)
CO2 SERPL-SCNC: 23 MMOL/L — SIGNIFICANT CHANGE UP (ref 22–31)
CORTIS AM PEAK SERPL-MCNC: 2.6 UG/DL — LOW (ref 6–18.4)
CREAT SERPL-MCNC: 1.07 MG/DL — SIGNIFICANT CHANGE UP (ref 0.5–1.3)
GLUCOSE SERPL-MCNC: 202 MG/DL — HIGH (ref 70–99)
HCT VFR BLD CALC: 36.9 % — LOW (ref 39–50)
HGB BLD-MCNC: 11.9 G/DL — LOW (ref 13–17)
MCHC RBC-ENTMCNC: 31 PG — SIGNIFICANT CHANGE UP (ref 27–34)
MCHC RBC-ENTMCNC: 32.2 GM/DL — SIGNIFICANT CHANGE UP (ref 32–36)
MCV RBC AUTO: 96.1 FL — SIGNIFICANT CHANGE UP (ref 80–100)
PLATELET # BLD AUTO: 300 K/UL — SIGNIFICANT CHANGE UP (ref 150–400)
POTASSIUM SERPL-MCNC: 3.9 MMOL/L — SIGNIFICANT CHANGE UP (ref 3.5–5.3)
POTASSIUM SERPL-SCNC: 3.9 MMOL/L — SIGNIFICANT CHANGE UP (ref 3.5–5.3)
RBC # BLD: 3.84 M/UL — LOW (ref 4.2–5.8)
RBC # FLD: 16.4 % — HIGH (ref 10.3–14.5)
SODIUM SERPL-SCNC: 139 MMOL/L — SIGNIFICANT CHANGE UP (ref 135–145)
TSH SERPL-MCNC: 1.06 UIU/ML — SIGNIFICANT CHANGE UP (ref 0.27–4.2)
TSH SERPL-MCNC: 1.07 UIU/ML — SIGNIFICANT CHANGE UP (ref 0.27–4.2)
WBC # BLD: 8.81 K/UL — SIGNIFICANT CHANGE UP (ref 3.8–10.5)
WBC # FLD AUTO: 8.81 K/UL — SIGNIFICANT CHANGE UP (ref 3.8–10.5)

## 2018-12-29 RX ORDER — POLYETHYLENE GLYCOL 3350 17 G/17G
17 POWDER, FOR SOLUTION ORAL DAILY
Qty: 0 | Refills: 0 | Status: DISCONTINUED | OUTPATIENT
Start: 2018-12-30 | End: 2019-01-01

## 2018-12-29 RX ORDER — COSYNTROPIN 0.25 MG/ML
0.25 INJECTION, SOLUTION INTRAVENOUS ONCE
Qty: 0 | Refills: 0 | Status: COMPLETED | OUTPATIENT
Start: 2018-12-29 | End: 2018-12-29

## 2018-12-29 RX ORDER — SENNA PLUS 8.6 MG/1
2 TABLET ORAL AT BEDTIME
Qty: 0 | Refills: 0 | Status: DISCONTINUED | OUTPATIENT
Start: 2018-12-30 | End: 2019-01-01

## 2018-12-29 RX ADMIN — APIXABAN 5 MILLIGRAM(S): 2.5 TABLET, FILM COATED ORAL at 05:00

## 2018-12-29 RX ADMIN — APIXABAN 5 MILLIGRAM(S): 2.5 TABLET, FILM COATED ORAL at 18:35

## 2018-12-29 RX ADMIN — SIMVASTATIN 20 MILLIGRAM(S): 20 TABLET, FILM COATED ORAL at 21:07

## 2018-12-29 RX ADMIN — COSYNTROPIN 0.25 MILLIGRAM(S): 0.25 INJECTION, SOLUTION INTRAVENOUS at 17:11

## 2018-12-29 NOTE — PHYSICAL THERAPY INITIAL EVALUATION ADULT - GAIT DEVIATIONS NOTED, PT EVAL
decreased velocity of limb motion/decreased step length/decreased weight-shifting ability/decreased stride length/decreased janet/increased time in double stance

## 2018-12-29 NOTE — PROGRESS NOTE ADULT - ASSESSMENT
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  afib/  eliquis,  copd,  hld,  htn,  ckd 2,   nasopharyngeal  ca, . 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  afib/  eliquis,  copd,  hld,  htn,  ckd 2,   nasopharyngeal  ca, . 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  afib/  eliquis,  copd,  hld,  htn,  ckd 2,   nasopharyngeal  ca, . s/p pancreatectomy   ca pancreas    admitted  with  h/o recurrent syncope   echo pending  afib/ Eliquis   orthostatics   still  positive/ despite  iv fluids  low  am cortisol noted,  needs  endo  eval/  dr núñez/  discussed  with  pa/ timothy, /  defer  acth  stim  test  to  endo   PT eval

## 2018-12-29 NOTE — PHYSICAL THERAPY INITIAL EVALUATION ADULT - ADDITIONAL COMMENTS
Pt resides in co-op with spouse, about 4 steps to enter with handrail & about 4 steps within. PTA independent with mobility and ADL's, owns cane & rolling walker; pt states that he usually uses cane for ambulation.

## 2018-12-29 NOTE — PHYSICAL THERAPY INITIAL EVALUATION ADULT - PERTINENT HX OF CURRENT PROBLEM, REHAB EVAL
Pt is 73M admitted 12/27/18 BIBEMS s/p fall in the shower. 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

## 2018-12-29 NOTE — PHYSICAL THERAPY INITIAL EVALUATION ADULT - PLANNED THERAPY INTERVENTIONS, PT EVAL
bed mobility training/transfer training/gait training/GOALS: Pt will negotiate 8 steps with unilateral handrail & step to pattern with supervision in 4wks

## 2018-12-29 NOTE — CONSULT NOTE ADULT - SUBJECTIVE AND OBJECTIVE BOX
HPI:  · Chief Complaint: The patient is a 73y Male complaining of fall.	   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 Patient does not recollect how he fell in the shower today, fall unwitnessed, found by wife in the bathtub on his back,   denied head trauma.  No chest pain, no shortness of breath. + left hip pain, mild, and has not taken anything for pain, did not take any home meds today. Wife states pt ambulated with assistance from EMS at the scene.    pt has not been himself for the past several weeks, typically ambulates unassisted now ambulates with cane and shuffling gait, +personality changes, worsening fine UE tremors Pt wife was unsure if sxs 2/2 him taking erythromycin and Megace s/p pancreatectomy so she has not given him these medications in several days.    syncope where pt was found awake but slumped on the floor outside his apartment on 12/20/18.  wife at [facility] (27 Dec 2018 19:44)  Patient has no history of chronic steroid use, no history of adrenal insufficiency, feeling tired, has GI issues, poor PO in take.     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  ROM limited Mouth opening &amp; neck  Pancreatic mass  Hearing loss  Disease of pancreas, unspecified  Nasopharynx cancer: 07/2016- s/p Chemo &amp;RT  Pulmonary emphysema, unspecified emphysema type  Stage 3 chronic kidney disease  Acute kidney injury: 09/2016  Atrial fibrillation, unspecified type: Dxd in 01/2016-on Eliquis  COPD (chronic obstructive pulmonary disease)  Benign carcinoid tumor  Low back pain  High cholesterol  HTN (hypertension)  H/O endoscopy: last one 06/2018  S/P tonsillectomy: 1959  H/O hemicolectomy  History of appendectomy      FAMILY HISTORY:  Family history of breast cancer      Social History:    Outpatient Medications:    MEDICATIONS  (STANDING):  apixaban 5 milliGRAM(s) Oral every 12 hours  influenza   Vaccine 0.5 milliLiter(s) IntraMuscular once  simvastatin 20 milliGRAM(s) Oral at bedtime  sodium chloride 0.9%. 1000 milliLiter(s) (80 mL/Hr) IV Continuous <Continuous>    MEDICATIONS  (PRN):      Allergies    ACE inhibitors (Other)  Bee Stings- anaphylaxis (Other)  penicillin (Other)    Intolerances      Review of Systems:  Constitutional: No fever, no chills  Eyes: No blurry vision  Neuro: No tremors  HEENT: No pain, no neck swelling  Cardiovascular: No chest pain, no palpitations  Respiratory: Has SOB, no cough  GI: No nausea, vomiting, abdominal pain  : No dysuria  Skin: no rash  MSK: Has leg swelling.  Psych: no depression  Endocrine: no polyuria, polydipsia    ALL OTHER SYSTEMS REVIEWED AND NEGATIVE    UNABLE TO OBTAIN    PHYSICAL EXAM:  VITALS: T(C): 36.5 (12-29-18 @ 12:08)  T(F): 97.7 (12-29-18 @ 12:08), Max: 97.9 (12-28-18 @ 20:32)  HR: 69 (12-29-18 @ 12:08) (60 - 69)  BP: 164/81 (12-29-18 @ 12:08) (148/73 - 164/81)  RR:  (18 - 18)  SpO2:  (97% - 98%)  Wt(kg): --  GENERAL: NAD, well-groomed, well-developed  EYES: No proptosis, no lid lag  HEENT:  Atraumatic, Normocephalic  THYROID: Normal size, no palpable nodules  RESPIRATORY: Clear to auscultation bilaterally; No rales, rhonchi, wheezing  CARDIOVASCULAR: Si S2, No murmurs;  GI: Soft, non distended, normal bowel sounds  SKIN: Dry, intact, No rashes or lesions  MUSCULOSKELETAL: Has BL lower extremity edema.  NEURO:  no tremor, sensation decreased in feet BL,                              11.9   8.81  )-----------( 300      ( 29 Dec 2018 08:55 )             36.9       12-29    139  |  101  |  19  ----------------------------<  202<H>  3.9   |  23  |  1.07    EGFR if : 79  EGFR if non : 68    Ca    9.0      12-29    TPro  8.4<H>  /  Alb  4.8  /  TBili  0.5  /  DBili  x   /  AST  18  /  ALT  18  /  AlkPhos  53  12-27      Thyroid Function Tests:  12-29 @ 08:52 TSH 1.06 FreeT4 -- T3 -- Anti TPO -- Anti Thyroglobulin Ab -- TSI --              Radiology:

## 2018-12-29 NOTE — PHYSICAL THERAPY INITIAL EVALUATION ADULT - GENERAL OBSERVATIONS, REHAB EVAL
Pt received semisupine in bed, A&OX4, following all commands, willing to participate, +IVL, (+)orthostatics

## 2018-12-29 NOTE — PHYSICAL THERAPY INITIAL EVALUATION ADULT - DISCHARGE DISPOSITION, PT EVAL
home with home PT for improved strength balance & endurance for activity; return to baseline with home safety assessment; recommend rolling walker for ambulation (pt owns) & supervision/assist for mobility/ADL's (pt states that spouse can assist as needed)/home/home w/ assist/home w/ home PT

## 2018-12-29 NOTE — PHYSICAL THERAPY INITIAL EVALUATION ADULT - PRECAUTIONS/LIMITATIONS, REHAB EVAL
Head CT: No evidence for intracranial hemorrhage, mass effect, or displaced calvarial fracture. Cervical spine CT: No evidence for acute displaced fracture or traumatic malalignment. Cervical degenerative spondylosis, as described above. XRAY CHEST: The heart is normal in size. The lungs are clear. No acute fractures could be identified. Degenerative changes of the thoracic spine. If clinically warranted dedicated rib study should be obtained. No pneumothorax. XRAY HIP: No acute fracture or dislocation is demonstrated. Mild bilateral hip arthrosis. Head CT: No evidence for intracranial hemorrhage, mass effect, or displaced calvarial fracture. Cervical spine CT: No evidence for acute displaced fracture or traumatic malalignment. Cervical degenerative spondylosis, as described above. XRAY CHEST: The heart is normal in size. The lungs are clear. No acute fractures could be identified. Degenerative changes of the thoracic spine. If clinically warranted dedicated rib study should be obtained. No pneumothorax. XRAY HIP: No acute fracture or dislocation is demonstrated. Mild bilateral hip arthrosis./fall precautions

## 2018-12-29 NOTE — CONSULT NOTE ADULT - ASSESSMENT
Assessment  Weakness/Low cortisol: 73y Male with no history of adrenal insufficiency no history of steroid use, feeling weak, has low am cortisol, unlikely has adrenal insufficiency.  Syncope: No new episode, stable, monitored.  HTN: Controlled, no headaches, on medications.  CKD: Monitor labs/BMP,             Deja Ernandez MD  Cell: [phone]  Office: [phone]

## 2018-12-30 LAB
ANION GAP SERPL CALC-SCNC: 13 MMOL/L — SIGNIFICANT CHANGE UP (ref 5–17)
BUN SERPL-MCNC: 18 MG/DL — SIGNIFICANT CHANGE UP (ref 7–23)
CALCIUM SERPL-MCNC: 8.9 MG/DL — SIGNIFICANT CHANGE UP (ref 8.4–10.5)
CHLORIDE SERPL-SCNC: 102 MMOL/L — SIGNIFICANT CHANGE UP (ref 96–108)
CO2 SERPL-SCNC: 23 MMOL/L — SIGNIFICANT CHANGE UP (ref 22–31)
CORTIS F PM SERPL-MCNC: 15.5 UG/DL — HIGH (ref 2.7–10.5)
CREAT SERPL-MCNC: 1.01 MG/DL — SIGNIFICANT CHANGE UP (ref 0.5–1.3)
GLUCOSE BLDC GLUCOMTR-MCNC: 247 MG/DL — HIGH (ref 70–99)
GLUCOSE BLDC GLUCOMTR-MCNC: 254 MG/DL — HIGH (ref 70–99)
GLUCOSE BLDC GLUCOMTR-MCNC: 272 MG/DL — HIGH (ref 70–99)
GLUCOSE SERPL-MCNC: 320 MG/DL — HIGH (ref 70–99)
HBA1C BLD-MCNC: 9 % — HIGH (ref 4–5.6)
HCT VFR BLD CALC: 33.8 % — LOW (ref 39–50)
HGB BLD-MCNC: 11.2 G/DL — LOW (ref 13–17)
MCHC RBC-ENTMCNC: 31.3 PG — SIGNIFICANT CHANGE UP (ref 27–34)
MCHC RBC-ENTMCNC: 33.1 GM/DL — SIGNIFICANT CHANGE UP (ref 32–36)
MCV RBC AUTO: 94.4 FL — SIGNIFICANT CHANGE UP (ref 80–100)
PLATELET # BLD AUTO: 287 K/UL — SIGNIFICANT CHANGE UP (ref 150–400)
POTASSIUM SERPL-MCNC: 3.8 MMOL/L — SIGNIFICANT CHANGE UP (ref 3.5–5.3)
POTASSIUM SERPL-SCNC: 3.8 MMOL/L — SIGNIFICANT CHANGE UP (ref 3.5–5.3)
RBC # BLD: 3.58 M/UL — LOW (ref 4.2–5.8)
RBC # FLD: 16.1 % — HIGH (ref 10.3–14.5)
SODIUM SERPL-SCNC: 138 MMOL/L — SIGNIFICANT CHANGE UP (ref 135–145)
WBC # BLD: 8.35 K/UL — SIGNIFICANT CHANGE UP (ref 3.8–10.5)
WBC # FLD AUTO: 8.35 K/UL — SIGNIFICANT CHANGE UP (ref 3.8–10.5)

## 2018-12-30 RX ORDER — DEXTROSE 50 % IN WATER 50 %
12.5 SYRINGE (ML) INTRAVENOUS ONCE
Qty: 0 | Refills: 0 | Status: DISCONTINUED | OUTPATIENT
Start: 2018-12-30 | End: 2019-01-01

## 2018-12-30 RX ORDER — INSULIN GLARGINE 100 [IU]/ML
6 INJECTION, SOLUTION SUBCUTANEOUS AT BEDTIME
Qty: 0 | Refills: 0 | Status: DISCONTINUED | OUTPATIENT
Start: 2018-12-30 | End: 2019-01-01

## 2018-12-30 RX ORDER — HYDROCORTISONE 20 MG
50 TABLET ORAL EVERY 8 HOURS
Qty: 0 | Refills: 0 | Status: DISCONTINUED | OUTPATIENT
Start: 2018-12-30 | End: 2018-12-31

## 2018-12-30 RX ORDER — GLUCAGON INJECTION, SOLUTION 0.5 MG/.1ML
1 INJECTION, SOLUTION SUBCUTANEOUS ONCE
Qty: 0 | Refills: 0 | Status: DISCONTINUED | OUTPATIENT
Start: 2018-12-30 | End: 2019-01-01

## 2018-12-30 RX ORDER — INSULIN LISPRO 100/ML
VIAL (ML) SUBCUTANEOUS AT BEDTIME
Qty: 0 | Refills: 0 | Status: DISCONTINUED | OUTPATIENT
Start: 2018-12-30 | End: 2019-01-01

## 2018-12-30 RX ORDER — DEXTROSE 50 % IN WATER 50 %
25 SYRINGE (ML) INTRAVENOUS ONCE
Qty: 0 | Refills: 0 | Status: DISCONTINUED | OUTPATIENT
Start: 2018-12-30 | End: 2019-01-01

## 2018-12-30 RX ORDER — SODIUM CHLORIDE 9 MG/ML
1000 INJECTION, SOLUTION INTRAVENOUS
Qty: 0 | Refills: 0 | Status: DISCONTINUED | OUTPATIENT
Start: 2018-12-30 | End: 2019-01-01

## 2018-12-30 RX ORDER — DEXTROSE 50 % IN WATER 50 %
15 SYRINGE (ML) INTRAVENOUS ONCE
Qty: 0 | Refills: 0 | Status: DISCONTINUED | OUTPATIENT
Start: 2018-12-30 | End: 2019-01-01

## 2018-12-30 RX ORDER — INSULIN LISPRO 100/ML
VIAL (ML) SUBCUTANEOUS
Qty: 0 | Refills: 0 | Status: DISCONTINUED | OUTPATIENT
Start: 2018-12-30 | End: 2019-01-01

## 2018-12-30 RX ADMIN — APIXABAN 5 MILLIGRAM(S): 2.5 TABLET, FILM COATED ORAL at 17:18

## 2018-12-30 RX ADMIN — SENNA PLUS 2 TABLET(S): 8.6 TABLET ORAL at 22:13

## 2018-12-30 RX ADMIN — SODIUM CHLORIDE 80 MILLILITER(S): 9 INJECTION INTRAMUSCULAR; INTRAVENOUS; SUBCUTANEOUS at 12:39

## 2018-12-30 RX ADMIN — POLYETHYLENE GLYCOL 3350 17 GRAM(S): 17 POWDER, FOR SOLUTION ORAL at 12:38

## 2018-12-30 RX ADMIN — SIMVASTATIN 20 MILLIGRAM(S): 20 TABLET, FILM COATED ORAL at 22:13

## 2018-12-30 RX ADMIN — INSULIN GLARGINE 6 UNIT(S): 100 INJECTION, SOLUTION SUBCUTANEOUS at 22:12

## 2018-12-30 RX ADMIN — Medication 3: at 17:18

## 2018-12-30 RX ADMIN — Medication 50 MILLIGRAM(S): at 22:11

## 2018-12-30 RX ADMIN — APIXABAN 5 MILLIGRAM(S): 2.5 TABLET, FILM COATED ORAL at 05:02

## 2018-12-30 NOTE — PROGRESS NOTE ADULT - ASSESSMENT
Assessment  Weakness/Low cortisol: 73y Male with no history of adrenal insufficiency no history of steroid use, feeling weak, has low am cortisol, orthostatic hypotension, cosyntropin stim test done, shows partial adrenal insufficiency  Syncope: No new episode, stable, monitored.  HTN: Controlled, no headaches, on medications.  CKD: Monitor labs/BMP,             Deja Ernandez MD  Cell: [phone]  Office: [phone]

## 2018-12-30 NOTE — PROGRESS NOTE ADULT - PROBLEM SELECTOR PLAN 1
Will give IV hydrocortisone 50mg IV q8hr for 24 hours and see response, will taper to oral steroid, will DC home on maintenance dose steroids. Discussed with patient and wife.

## 2018-12-30 NOTE — PROGRESS NOTE ADULT - ASSESSMENT
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  afib/  eliquis,  copd,  hld,  htn,  ckd 2,   nasopharyngeal  ca, . 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  afib/  eliquis,  copd,  hld,  htn,  ckd 2,   nasopharyngeal  ca, . s/p pancreatectomy   ca pancreas    admitted  with  h/o recurrent syncope   echo pending  afib/ Eliquis   orthostatics   still  positive/ despite  iv fluids  low  am cortisol noted,  per  endo,  acth  stim  test   done  if pt does  not have  adrenal insufficiency,  will then start  midodrine  tid  dm, new,  follow  fs/  endo to adjust insulin/  glucose  is  320 today   PT eval

## 2018-12-31 ENCOUNTER — TRANSCRIPTION ENCOUNTER (OUTPATIENT)
Age: 73
End: 2018-12-31

## 2018-12-31 LAB
ANION GAP SERPL CALC-SCNC: 12 MMOL/L — SIGNIFICANT CHANGE UP (ref 5–17)
BUN SERPL-MCNC: 17 MG/DL — SIGNIFICANT CHANGE UP (ref 7–23)
CALCIUM SERPL-MCNC: 9.4 MG/DL — SIGNIFICANT CHANGE UP (ref 8.4–10.5)
CHLORIDE SERPL-SCNC: 102 MMOL/L — SIGNIFICANT CHANGE UP (ref 96–108)
CO2 SERPL-SCNC: 26 MMOL/L — SIGNIFICANT CHANGE UP (ref 22–31)
CREAT SERPL-MCNC: 0.95 MG/DL — SIGNIFICANT CHANGE UP (ref 0.5–1.3)
GLUCOSE BLDC GLUCOMTR-MCNC: 143 MG/DL — HIGH (ref 70–99)
GLUCOSE BLDC GLUCOMTR-MCNC: 175 MG/DL — HIGH (ref 70–99)
GLUCOSE BLDC GLUCOMTR-MCNC: 179 MG/DL — HIGH (ref 70–99)
GLUCOSE BLDC GLUCOMTR-MCNC: 274 MG/DL — HIGH (ref 70–99)
GLUCOSE SERPL-MCNC: 199 MG/DL — HIGH (ref 70–99)
HCT VFR BLD CALC: 34.2 % — LOW (ref 39–50)
HGB BLD-MCNC: 11.3 G/DL — LOW (ref 13–17)
MCHC RBC-ENTMCNC: 30.5 PG — SIGNIFICANT CHANGE UP (ref 27–34)
MCHC RBC-ENTMCNC: 33 GM/DL — SIGNIFICANT CHANGE UP (ref 32–36)
MCV RBC AUTO: 92.4 FL — SIGNIFICANT CHANGE UP (ref 80–100)
PLATELET # BLD AUTO: 265 K/UL — SIGNIFICANT CHANGE UP (ref 150–400)
POTASSIUM SERPL-MCNC: 4.2 MMOL/L — SIGNIFICANT CHANGE UP (ref 3.5–5.3)
POTASSIUM SERPL-SCNC: 4.2 MMOL/L — SIGNIFICANT CHANGE UP (ref 3.5–5.3)
RBC # BLD: 3.7 M/UL — LOW (ref 4.2–5.8)
RBC # FLD: 16.1 % — HIGH (ref 10.3–14.5)
SODIUM SERPL-SCNC: 140 MMOL/L — SIGNIFICANT CHANGE UP (ref 135–145)
WBC # BLD: 6.88 K/UL — SIGNIFICANT CHANGE UP (ref 3.8–10.5)
WBC # FLD AUTO: 6.88 K/UL — SIGNIFICANT CHANGE UP (ref 3.8–10.5)

## 2018-12-31 PROCEDURE — 99222 1ST HOSP IP/OBS MODERATE 55: CPT | Mod: GC

## 2018-12-31 RX ORDER — HYDROCORTISONE 20 MG
10 TABLET ORAL
Qty: 0 | Refills: 0 | Status: DISCONTINUED | OUTPATIENT
Start: 2018-12-31 | End: 2019-01-01

## 2018-12-31 RX ORDER — SENNA PLUS 8.6 MG/1
2 TABLET ORAL
Qty: 0 | Refills: 0 | DISCHARGE
Start: 2018-12-31

## 2018-12-31 RX ORDER — OMEGA-3 ACID ETHYL ESTERS 1 G
0 CAPSULE ORAL
Qty: 0 | Refills: 0 | COMMUNITY

## 2018-12-31 RX ORDER — POLYETHYLENE GLYCOL 3350 17 G/17G
17 POWDER, FOR SOLUTION ORAL
Qty: 0 | Refills: 0 | DISCHARGE
Start: 2018-12-31

## 2018-12-31 RX ORDER — LINAGLIPTIN 5 MG/1
1 TABLET, FILM COATED ORAL
Qty: 30 | Refills: 0
Start: 2018-12-31 | End: 2019-01-29

## 2018-12-31 RX ORDER — ISOPROPYL ALCOHOL, BENZOCAINE .7; .06 ML/ML; ML/ML
0 SWAB TOPICAL
Qty: 1 | Refills: 0 | OUTPATIENT
Start: 2018-12-31

## 2018-12-31 RX ORDER — HYDROCORTISONE 20 MG
2 TABLET ORAL
Qty: 90 | Refills: 0
Start: 2018-12-31 | End: 2019-01-29

## 2018-12-31 RX ORDER — ISOPROPYL ALCOHOL, BENZOCAINE .7; .06 ML/ML; ML/ML
0 SWAB TOPICAL
Qty: 1 | Refills: 0
Start: 2018-12-31

## 2018-12-31 RX ORDER — HYDROCORTISONE 20 MG
20 TABLET ORAL
Qty: 0 | Refills: 0 | Status: DISCONTINUED | OUTPATIENT
Start: 2018-12-31 | End: 2019-01-01

## 2018-12-31 RX ADMIN — Medication 1: at 12:12

## 2018-12-31 RX ADMIN — Medication 10 MILLIGRAM(S): at 15:19

## 2018-12-31 RX ADMIN — SENNA PLUS 2 TABLET(S): 8.6 TABLET ORAL at 22:02

## 2018-12-31 RX ADMIN — Medication 1: at 08:03

## 2018-12-31 RX ADMIN — SODIUM CHLORIDE 80 MILLILITER(S): 9 INJECTION INTRAMUSCULAR; INTRAVENOUS; SUBCUTANEOUS at 15:19

## 2018-12-31 RX ADMIN — SIMVASTATIN 20 MILLIGRAM(S): 20 TABLET, FILM COATED ORAL at 22:02

## 2018-12-31 RX ADMIN — Medication 1: at 22:03

## 2018-12-31 RX ADMIN — INSULIN GLARGINE 6 UNIT(S): 100 INJECTION, SOLUTION SUBCUTANEOUS at 22:02

## 2018-12-31 RX ADMIN — Medication 50 MILLIGRAM(S): at 13:03

## 2018-12-31 RX ADMIN — APIXABAN 5 MILLIGRAM(S): 2.5 TABLET, FILM COATED ORAL at 05:17

## 2018-12-31 RX ADMIN — APIXABAN 5 MILLIGRAM(S): 2.5 TABLET, FILM COATED ORAL at 17:27

## 2018-12-31 RX ADMIN — Medication 50 MILLIGRAM(S): at 05:17

## 2018-12-31 NOTE — DISCHARGE NOTE ADULT - MEDICATION SUMMARY - MEDICATIONS TO TAKE
I will START or STAY ON the medications listed below when I get home from the hospital:    glucometer  -- Check glucose as directed 3 times a day  -- Indication: For blood glucose monitoring     lancets  -- As directed 3 times a day. 1 month supply   -- Indication: For check FS     test strips  -- As directed, 1 month supply  -- Indication: For test strips     alcohol swabs  -- As directed, 1 month supply   -- Indication: For Antiseptic     CoQ10 100 mg oral capsule  -- 1 milligram(s) by mouth once a day  -- Indication: For vitamin supplement     hydrocortisone 10 mg oral tablet  -- 2 tab(s) by mouth at 7am and 1 tablet at 4pm  -- Indication: For Adrenal insufficiency    Eliquis 5 mg oral tablet  -- 1 tab(s) by mouth 2 times a day  -- Indication: For Atrial fibrillation, unspecified type    Tradjenta 5 mg oral tablet  -- 1 tab(s) by mouth once a day   -- Check with your doctor before becoming pregnant.  Obtain medical advice before taking any non-prescription drugs as some may affect the action of this medication.    -- Indication: For Diabetes mellitus type 2, uncontrolled    simvastatin 20 mg oral tablet  -- 1 tab(s) by mouth once a day (at bedtime) **see internal lauren**  -- Indication: For Elevated cholesterol     senna oral tablet  -- 2 tab(s) by mouth once a day (at bedtime)  -- Indication: For contipation     polyethylene glycol 3350 oral powder for reconstitution  -- 17 gram(s) by mouth once a day  -- Indication: For contipation     multivitamin  -- 1 milligram(s) by mouth once a day  -- Indication: For vitamin supplement

## 2018-12-31 NOTE — PROGRESS NOTE ADULT - PROBLEM SELECTOR PLAN 1
Will  taper to oral steroid, will DC home on maintenance dose steroids. Discussed with patient/wife and primary team.

## 2018-12-31 NOTE — PROGRESS NOTE ADULT - ASSESSMENT
Assessment  Weakness/Low cortisol: 73y Male with partial adrenal insufficiency on IV steroids, feeling better today, ortho hypotension improved some what.   Syncope: No new episode, stable, monitored.  HTN: Controlled, no headaches, on medications.  CKD: Monitor labs/BMP,             Deja Ernandez MD  Cell: [phone]  Office: [phone]

## 2018-12-31 NOTE — DISCHARGE NOTE ADULT - PATIENT PORTAL LINK FT
You can access the [facility] Patient Portal, offered by [facility], by registering with the following website: [email or web address]

## 2018-12-31 NOTE — CHART NOTE - NSCHARTNOTEFT_GEN_A_CORE
Case reviewed with Dr. Chino endocrine. Discharge planning for tomorrow if patient is feeling well. Continue current dose of cortef 20mg in the morning and 10mg in the evening. No need for insulin for discharge as per Dr. Chino who recommends Tradjenta 5mg daily. Pt will follow up in 2 weeks with endocrine.

## 2018-12-31 NOTE — DISCHARGE NOTE ADULT - CARE PROVIDER_API CALL
Deja Ernandez), EndocrinologyMetabDiabetes; Internal Medicine  [address] [place] [id number]  Phone: [phone]  Fax: [phone]    Annalisa Stephenson (DO), Cardiology Medicine  [address] [place] [address]  Phone: [phone]  Fax: [phone]

## 2018-12-31 NOTE — PROGRESS NOTE ADULT - ASSESSMENT
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  afib/  eliquis,  copd,  hld,  htn,  ckd 2,   nasopharyngeal  ca, . 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  afib/  eliquis,  copd,  hld,  htn,  ckd 2,   nasopharyngeal  ca, . s/p pancreatectomy   ca pancreas    admitted  with  h/o recurrent syncope   echo  afib/ Eliquis   orthostatics  was  positive  low  am cortisol noted,  per  endo,  acth  stim  test   done  dm, new,  follow  fs/  endo to adjust insulin/   per  endo, pt  with  adrenal insufficiency. on iv  hydrocortisome   PT eval

## 2018-12-31 NOTE — CONSULT NOTE ADULT - ASSESSMENT
Impression:  # Normocytic Anemia without overt blood loss: Patient s/p colonoscopy last year (reportedly normal) and EUS/EGD 5/2018. Differential Diagnosis includes ulcer disease, angioectasias, erosive gastritis and colon cancer. Hemoglobin stable after decrease from admission. Patient remains on Eliquis with stable Hb  # Afib: Currently on Eliquis  # Adrenal Insufficiency  # Hypertension  # COPD  # CKD  # Carcinoid tumor s/p hemicolectomy    Recommendation:  - Trend CBC and transfuse PRN  - EGD/Colonoscopy inpatient vs outpatient pending clinical course  - Supportive care per primary team    Erinn Bowers MD  Gastroenterology Fellow  [phone]  Please page on call fellow on weekends and after 5pm on weekdays Impression:  # Normocytic Anemia without overt blood loss: Patient s/p colonoscopy last year (reportedly normal) and EUS/EGD 5/2018. Differential Diagnosis includes ulcer disease, angioectasias, erosive gastritis and colon cancer. Hemoglobin stable after decrease from admission. Patient remains on Eliquis with stable Hb  # Afib: Currently on Eliquis  # Adrenal Insufficiency  # Hypertension  # COPD  # CKD  # Carcinoid tumor s/p hemicolectomy    Recommendation:  - Trend CBC and transfuse PRN  - Continue work up of syncope  - EGD/Colonoscopy inpatient vs outpatient pending clinical course  - Supportive care per primary team    Erinn Bowers MD  Gastroenterology Fellow  [phone]  Please page on call fellow on weekends and after 5pm on weekdays Impression:  # Normocytic Anemia without overt blood loss: Patient s/p colonoscopy last year (reportedly normal) and EUS/EGD 5/2018. Differential Diagnosis includes ulcer disease, angioectasias, erosive gastritis and colon cancer. Hemoglobin stable after decrease from admission. Patient remains on Eliquis with stable Hb  # Afib: Currently on Eliquis  # Adrenal Insufficiency  # Hypertension  # COPD  # CKD  # Carcinoid tumor s/p hemicolectomy    Recommendation:  - Trend CBC and transfuse PRN  - Continue work up of syncope  - EGD/Colonoscopy inpatient vs outpatient pending clinical course  - Supportive care per primary team  - Obtain iron studies    Erinn Bowers MD  Gastroenterology Fellow  [phone]  Please page on call fellow on weekends and after 5pm on weekdays

## 2018-12-31 NOTE — DISCHARGE NOTE ADULT - PLAN OF CARE
to remain without reoccurring episodes of syncope HOME CARE INSTRUCTIONS  Have someone stay with you until you feel stable.  Do not drive, operate machinery, or play sports until your caregiver says it is okay.  Keep all follow-up appointments as directed by your caregiver.   Lie down right away if you start feeling like you might faint. Breathe deeply and steadily. Wait until all the symptoms have passed.Drink enough fluids to keep your urine clear or pale yellow.  If you are taking blood pressure or heart medicine, get up slowly, taking several minutes to sit and then stand. This can reduce dizziness.  SEEK IMMEDIATE MEDICAL CARE IF:  You have a severe headache.  You have unusual pain in the chest, abdomen, or back.  You are bleeding from the mouth or rectum, or you have black or tarry stool.  You have an irregular or very fast heartbeat.  You have pain with breathing.  You have repeated fainting or seizure-like jerking during an episode.  You faint when sitting or lying down.  You have confusion.  You have difficulty walking.  You have severe weakness.  You have vision problems. Continue current medications, follow up with PMD and Dr. Ernandez Atrial fibrillation is the most common heart rhythm problem.  The condition puts you at risk for has stroke and heart attack  It helps if you control your blood pressure, not drink more than 1-2 alcohol drinks per day, cut down on caffeine, getting treatment for over active thyroid gland, and get regular exercise  Call your doctor if you feel your heart racing or beating unusually, chest tightness or pain, lightheaded, faint, shortness of breath especially with exercise  It is important to take your heart medication as prescribed  You may be on anticoagulation which is very important to take as directed - you may need blood work to monitor drug levels Avoid taking (NSAIDs) - (ex: Ibuprofen, Advil, Celebrex, Naprosyn)  Avoid taking any nephrotoxic agents (can harm kidneys) - Intravenous contrast for diagnostic testing, combination cold medications.  Have all medications adjusted for your renal function by your Health Care Provider.  Blood pressure control is important.  Take all medication as prescribed. HgA1C this admission was 9  Make sure you get your HgA1c checked every three months.  If you take oral diabetes medications, check your blood glucose two times a day.  If you take insulin, check your blood glucose before meals and at bedtime.  It's important not to skip any meals.  Keep a log of your blood glucose results and always take it with you to your doctor appointments.  Keep a list of your current medications including injectables and over the counter medications and bring this medication list with you to all your doctor appointments.  If you have not seen your ophthalmologist this year call for appointment.  Check your feet daily for redness, sores, or openings. Do not self treat. If no improvement in two days call your primary care physician for an appointment.  Low blood sugar (hypoglycemia) is a blood sugar below 70mg/dl. Check your blood sugar if you feel signs/symptoms of hypoglycemia. If your blood sugar is below 70 take 15 grams of carbohydrates (ex 4 oz of apple juice, 3-4 glucose tablets, or 4-6 oz of regular soda) wait 15 minutes and repeat blood sugar to make sure it comes up above 70.  If your blood sugar is above 70 and you are due for a meal, have a meal.  If you are not due for a meal have a snack.  This snack helps keeps your blood sugar at a safe range. Continue Steroids, follow up with Dr. Ernandez for management HgA1C this admission was 9  Make sure you get your HgA1c checked every three months.  If you take oral diabetes medications, check your blood glucose two times a day.  If you take insulin, check your blood glucose before meals and at bedtime.  It's important not to skip any meals.  Keep a log of your blood glucose results and always take it with you to your doctor appointments.  Keep a list of your current medications including injectables and over the counter medications and bring this medication list with you to all your doctor appointments.  If you have not seen your ophthalmologist this year call for appointment.  Check your feet daily for redness, sores, or openings. Do not self treat. If no improvement in two days call your primary care physician for an appointment.  Low blood sugar (hypoglycemia) is a blood sugar below 70mg/dl. Check your blood sugar if you feel signs/symptoms of hypoglycemia. If your blood sugar is below 70 take 15 grams of carbohydrates (ex 4 oz of apple juice, 3-4 glucose tablets, or 4-6 oz of regular soda) wait 15 minutes and repeat blood sugar to make sure it comes up above 70.  If your blood sugar is above 70 and you are due for a meal, have a meal.  If you are not due for a meal have a snack.  This snack helps keeps your blood sugar at a safe range.  Tradjenta Daily and follow up with Dr. Ernandez in 2 weeks Continue Steroids, 20 mg in am aan 10 mg in the pm  follow up with Dr. Ernandez for management

## 2018-12-31 NOTE — DISCHARGE NOTE ADULT - SECONDARY DIAGNOSIS.
Orthostatic hypotension Atrial fibrillation, unspecified type Acute kidney injury Diabetes mellitus type 2, uncontrolled Adrenal insufficiency

## 2018-12-31 NOTE — DISCHARGE NOTE ADULT - CARE PLAN
Principal Discharge DX:	Syncope and collapse  Goal:	to remain without reoccurring episodes of syncope  Assessment and plan of treatment:	HOME CARE INSTRUCTIONS  Have someone stay with you until you feel stable.  Do not drive, operate machinery, or play sports until your caregiver says it is okay.  Keep all follow-up appointments as directed by your caregiver.   Lie down right away if you start feeling like you might faint. Breathe deeply and steadily. Wait until all the symptoms have passed.Drink enough fluids to keep your urine clear or pale yellow.  If you are taking blood pressure or heart medicine, get up slowly, taking several minutes to sit and then stand. This can reduce dizziness.  SEEK IMMEDIATE MEDICAL CARE IF:  You have a severe headache.  You have unusual pain in the chest, abdomen, or back.  You are bleeding from the mouth or rectum, or you have black or tarry stool.  You have an irregular or very fast heartbeat.  You have pain with breathing.  You have repeated fainting or seizure-like jerking during an episode.  You faint when sitting or lying down.  You have confusion.  You have difficulty walking.  You have severe weakness.  You have vision problems.  Secondary Diagnosis:	Orthostatic hypotension  Assessment and plan of treatment:	Continue current medications, follow up with PMD and Dr. Ernandez  Secondary Diagnosis:	Atrial fibrillation, unspecified type  Assessment and plan of treatment:	Atrial fibrillation is the most common heart rhythm problem.  The condition puts you at risk for has stroke and heart attack  It helps if you control your blood pressure, not drink more than 1-2 alcohol drinks per day, cut down on caffeine, getting treatment for over active thyroid gland, and get regular exercise  Call your doctor if you feel your heart racing or beating unusually, chest tightness or pain, lightheaded, faint, shortness of breath especially with exercise  It is important to take your heart medication as prescribed  You may be on anticoagulation which is very important to take as directed - you may need blood work to monitor drug levels  Secondary Diagnosis:	Acute kidney injury  Assessment and plan of treatment:	Avoid taking (NSAIDs) - (ex: Ibuprofen, Advil, Celebrex, Naprosyn)  Avoid taking any nephrotoxic agents (can harm kidneys) - Intravenous contrast for diagnostic testing, combination cold medications.  Have all medications adjusted for your renal function by your Health Care Provider.  Blood pressure control is important.  Take all medication as prescribed.  Secondary Diagnosis:	Diabetes mellitus type 2, uncontrolled  Assessment and plan of treatment:	HgA1C this admission was 9  Make sure you get your HgA1c checked every three months.  If you take oral diabetes medications, check your blood glucose two times a day.  If you take insulin, check your blood glucose before meals and at bedtime.  It's important not to skip any meals.  Keep a log of your blood glucose results and always take it with you to your doctor appointments.  Keep a list of your current medications including injectables and over the counter medications and bring this medication list with you to all your doctor appointments.  If you have not seen your ophthalmologist this year call for appointment.  Check your feet daily for redness, sores, or openings. Do not self treat. If no improvement in two days call your primary care physician for an appointment.  Low blood sugar (hypoglycemia) is a blood sugar below 70mg/dl. Check your blood sugar if you feel signs/symptoms of hypoglycemia. If your blood sugar is below 70 take 15 grams of carbohydrates (ex 4 oz of apple juice, 3-4 glucose tablets, or 4-6 oz of regular soda) wait 15 minutes and repeat blood sugar to make sure it comes up above 70.  If your blood sugar is above 70 and you are due for a meal, have a meal.  If you are not due for a meal have a snack.  This snack helps keeps your blood sugar at a safe range.  Secondary Diagnosis:	Adrenal insufficiency  Assessment and plan of treatment:	Continue Steroids, follow up with Dr. Ernandez for management Principal Discharge DX:	Syncope and collapse  Goal:	to remain without reoccurring episodes of syncope  Assessment and plan of treatment:	HOME CARE INSTRUCTIONS  Have someone stay with you until you feel stable.  Do not drive, operate machinery, or play sports until your caregiver says it is okay.  Keep all follow-up appointments as directed by your caregiver.   Lie down right away if you start feeling like you might faint. Breathe deeply and steadily. Wait until all the symptoms have passed.Drink enough fluids to keep your urine clear or pale yellow.  If you are taking blood pressure or heart medicine, get up slowly, taking several minutes to sit and then stand. This can reduce dizziness.  SEEK IMMEDIATE MEDICAL CARE IF:  You have a severe headache.  You have unusual pain in the chest, abdomen, or back.  You are bleeding from the mouth or rectum, or you have black or tarry stool.  You have an irregular or very fast heartbeat.  You have pain with breathing.  You have repeated fainting or seizure-like jerking during an episode.  You faint when sitting or lying down.  You have confusion.  You have difficulty walking.  You have severe weakness.  You have vision problems.  Secondary Diagnosis:	Orthostatic hypotension  Assessment and plan of treatment:	Continue current medications, follow up with PMD and Dr. Ernandez  Secondary Diagnosis:	Atrial fibrillation, unspecified type  Assessment and plan of treatment:	Atrial fibrillation is the most common heart rhythm problem.  The condition puts you at risk for has stroke and heart attack  It helps if you control your blood pressure, not drink more than 1-2 alcohol drinks per day, cut down on caffeine, getting treatment for over active thyroid gland, and get regular exercise  Call your doctor if you feel your heart racing or beating unusually, chest tightness or pain, lightheaded, faint, shortness of breath especially with exercise  It is important to take your heart medication as prescribed  You may be on anticoagulation which is very important to take as directed - you may need blood work to monitor drug levels  Secondary Diagnosis:	Acute kidney injury  Assessment and plan of treatment:	Avoid taking (NSAIDs) - (ex: Ibuprofen, Advil, Celebrex, Naprosyn)  Avoid taking any nephrotoxic agents (can harm kidneys) - Intravenous contrast for diagnostic testing, combination cold medications.  Have all medications adjusted for your renal function by your Health Care Provider.  Blood pressure control is important.  Take all medication as prescribed.  Secondary Diagnosis:	Diabetes mellitus type 2, uncontrolled  Assessment and plan of treatment:	HgA1C this admission was 9  Make sure you get your HgA1c checked every three months.  If you take oral diabetes medications, check your blood glucose two times a day.  If you take insulin, check your blood glucose before meals and at bedtime.  It's important not to skip any meals.  Keep a log of your blood glucose results and always take it with you to your doctor appointments.  Keep a list of your current medications including injectables and over the counter medications and bring this medication list with you to all your doctor appointments.  If you have not seen your ophthalmologist this year call for appointment.  Check your feet daily for redness, sores, or openings. Do not self treat. If no improvement in two days call your primary care physician for an appointment.  Low blood sugar (hypoglycemia) is a blood sugar below 70mg/dl. Check your blood sugar if you feel signs/symptoms of hypoglycemia. If your blood sugar is below 70 take 15 grams of carbohydrates (ex 4 oz of apple juice, 3-4 glucose tablets, or 4-6 oz of regular soda) wait 15 minutes and repeat blood sugar to make sure it comes up above 70.  If your blood sugar is above 70 and you are due for a meal, have a meal.  If you are not due for a meal have a snack.  This snack helps keeps your blood sugar at a safe range.  Tradjenta Daily and follow up with Dr. Ernandez in 2 weeks  Secondary Diagnosis:	Adrenal insufficiency  Assessment and plan of treatment:	Continue Steroids, follow up with Dr. Ernandez for management Principal Discharge DX:	Syncope and collapse  Goal:	to remain without reoccurring episodes of syncope  Assessment and plan of treatment:	HOME CARE INSTRUCTIONS  Have someone stay with you until you feel stable.  Do not drive, operate machinery, or play sports until your caregiver says it is okay.  Keep all follow-up appointments as directed by your caregiver.   Lie down right away if you start feeling like you might faint. Breathe deeply and steadily. Wait until all the symptoms have passed.Drink enough fluids to keep your urine clear or pale yellow.  If you are taking blood pressure or heart medicine, get up slowly, taking several minutes to sit and then stand. This can reduce dizziness.  SEEK IMMEDIATE MEDICAL CARE IF:  You have a severe headache.  You have unusual pain in the chest, abdomen, or back.  You are bleeding from the mouth or rectum, or you have black or tarry stool.  You have an irregular or very fast heartbeat.  You have pain with breathing.  You have repeated fainting or seizure-like jerking during an episode.  You faint when sitting or lying down.  You have confusion.  You have difficulty walking.  You have severe weakness.  You have vision problems.  Secondary Diagnosis:	Orthostatic hypotension  Assessment and plan of treatment:	Continue current medications, follow up with PMD and Dr. Ernandez  Secondary Diagnosis:	Atrial fibrillation, unspecified type  Assessment and plan of treatment:	Atrial fibrillation is the most common heart rhythm problem.  The condition puts you at risk for has stroke and heart attack  It helps if you control your blood pressure, not drink more than 1-2 alcohol drinks per day, cut down on caffeine, getting treatment for over active thyroid gland, and get regular exercise  Call your doctor if you feel your heart racing or beating unusually, chest tightness or pain, lightheaded, faint, shortness of breath especially with exercise  It is important to take your heart medication as prescribed  You may be on anticoagulation which is very important to take as directed - you may need blood work to monitor drug levels  Secondary Diagnosis:	Acute kidney injury  Assessment and plan of treatment:	Avoid taking (NSAIDs) - (ex: Ibuprofen, Advil, Celebrex, Naprosyn)  Avoid taking any nephrotoxic agents (can harm kidneys) - Intravenous contrast for diagnostic testing, combination cold medications.  Have all medications adjusted for your renal function by your Health Care Provider.  Blood pressure control is important.  Take all medication as prescribed.  Secondary Diagnosis:	Diabetes mellitus type 2, uncontrolled  Assessment and plan of treatment:	HgA1C this admission was 9  Make sure you get your HgA1c checked every three months.  If you take oral diabetes medications, check your blood glucose two times a day.  If you take insulin, check your blood glucose before meals and at bedtime.  It's important not to skip any meals.  Keep a log of your blood glucose results and always take it with you to your doctor appointments.  Keep a list of your current medications including injectables and over the counter medications and bring this medication list with you to all your doctor appointments.  If you have not seen your ophthalmologist this year call for appointment.  Check your feet daily for redness, sores, or openings. Do not self treat. If no improvement in two days call your primary care physician for an appointment.  Low blood sugar (hypoglycemia) is a blood sugar below 70mg/dl. Check your blood sugar if you feel signs/symptoms of hypoglycemia. If your blood sugar is below 70 take 15 grams of carbohydrates (ex 4 oz of apple juice, 3-4 glucose tablets, or 4-6 oz of regular soda) wait 15 minutes and repeat blood sugar to make sure it comes up above 70.  If your blood sugar is above 70 and you are due for a meal, have a meal.  If you are not due for a meal have a snack.  This snack helps keeps your blood sugar at a safe range.  Tradjenta Daily and follow up with Dr. Ernandez in 2 weeks  Secondary Diagnosis:	Adrenal insufficiency  Assessment and plan of treatment:	Continue Steroids, 20 mg in am aan 10 mg in the pm  follow up with Dr. Ernandez for management

## 2018-12-31 NOTE — DISCHARGE NOTE ADULT - MEDICATION SUMMARY - MEDICATIONS TO STOP TAKING
I will STOP taking the medications listed below when I get home from the hospital:    Zetia 10 mg oral tablet  -- 1 tab(s) by mouth once a day    digoxin 250 mcg (0.25 mg) oral tablet  -- 1 tab(s) by mouth once a day    erythromycin 250 mg oral tablet  -- 1 tab(s) by mouth every 8 hours    Anoro Ellipta 62.5 mcg-25 mcg/inh inhalation powder  -- 1 puff(s) inhaled once a day    Megace 40 mg/mL oral suspension  -- 20 milliliter(s) by mouth once a day  -- Do not take this drug if you are pregnant.  Shake well before use.

## 2018-12-31 NOTE — CONSULT NOTE ADULT - SUBJECTIVE AND OBJECTIVE BOX
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 Patient does not recall the details regarding how he fell, but he was found lying on his back by his wife. No reported chest pain, palpitations, dizziness, numbness, headache, shortness of breath,  blurry vision, abdominal pain, nausea, vomiting, diarrhea or head trauma.     Last Colonoscopy: Reports 1 year ago - Normal  Last Endoscopy:   < from: Upper EUS (05.21.18 @ 09:52) >      Mild portal hypertensive gastropathy was found in the entire examined stomach. Biopsies were        taken with a cold forceps for Helicobacter pylori testing. Estimated blood loss was minimal.       The examined duodenum was normal. Normal major papilla.  < end of copied text >      Allergies:  ACE inhibitors (Other)  Bee Stings- anaphylaxis (Other)  penicillin (Other)      Home Medications:    Hospital Medications:  apixaban 5 milliGRAM(s) Oral every 12 hours  dextrose 40% Gel 15 Gram(s) Oral once PRN  dextrose 5%. 1000 milliLiter(s) IV Continuous <Continuous>  dextrose 50% Injectable 12.5 Gram(s) IV Push once  dextrose 50% Injectable 25 Gram(s) IV Push once  dextrose 50% Injectable 25 Gram(s) IV Push once  glucagon  Injectable 1 milliGRAM(s) IntraMuscular once PRN  hydrocortisone 20 milliGRAM(s) Oral <User Schedule>  hydrocortisone 10 milliGRAM(s) Oral <User Schedule>  influenza   Vaccine 0.5 milliLiter(s) IntraMuscular once  insulin glargine Injectable (LANTUS) 6 Unit(s) SubCutaneous at bedtime  insulin lispro (HumaLOG) corrective regimen sliding scale   SubCutaneous three times a day before meals  insulin lispro (HumaLOG) corrective regimen sliding scale   SubCutaneous at bedtime  polyethylene glycol 3350 17 Gram(s) Oral daily  senna 2 Tablet(s) Oral at bedtime  simvastatin 20 milliGRAM(s) Oral at bedtime  sodium chloride 0.9%. 1000 milliLiter(s) IV Continuous <Continuous>      PMHX/PSHX:  ETOH abuse  Radiation fibrosis  Pancreatic mass  Hearing loss  Disease of pancreas, unspecified  Nasopharynx cancer  Pulmonary emphysema, unspecified emphysema type  Stage 3 chronic kidney disease  Acute kidney injury  Atrial fibrillation, unspecified type  COPD (chronic obstructive pulmonary disease)  Benign carcinoid tumor  Low back pain  High cholesterol  HTN (hypertension)  H/O endoscopy  S/P tonsillectomy  H/O hemicolectomy  History of appendectomy      Family history:  Family history of breast cancer      Social History:     ROS:   General:  No fevers, chills or night sweats.  ENT:  No sore throat or dysphagia  CV:  No pain or palpitations  Resp:  No dyspnea, cough, wheezing  GI:  No pain, No nausea, No vomiting, No diarrhea, No constipation, No weight loss, No pruritis, No rectal bleeding, No tarry stools  Skin:  No rash or edema      PHYSICAL EXAM:   GENERAL:  NAD, Appears stated age  HEENT:  NC/AT,  conjunctivae clear and pink, sclera -anicteric  CHEST:  CTA B/L, Normal effort  HEART:  RRR S1/S2, No murmurs  ABDOMEN:  Soft, non-tender, non-distended, normoactive bowel sounds,  no masses ,no hepato-splenomegaly, no signs of chronic liver disease  EXTEREMITIES:  No cyanosis or Edema  SKIN:  Warm & Dry. No rash or erythema  NEURO:  Alert, oriented    Vital Signs:  Vital Signs Last 24 Hrs  T(C): 36.8 (31 Dec 2018 11:42), Max: 36.8 (30 Dec 2018 20:41)  T(F): 98.3 (31 Dec 2018 11:42), Max: 98.3 (30 Dec 2018 20:41)  HR: 60 (31 Dec 2018 11:42) (60 - 76)  BP: 175/87 (31 Dec 2018 11:42) (148/80 - 175/87)  BP(mean): --  RR: 18 (31 Dec 2018 11:42) (18 - 18)  SpO2: 98% (31 Dec 2018 11:42) (98% - 99%)  Daily     Daily     LABS:                        11.3   6.88  )-----------( 265      ( 31 Dec 2018 08:10 )             34.2     Mean Cell Volume: 92.4 fl (12-31-18 @ 08:10)    12-31    140  |  102  |  17  ----------------------------<  199<H>  4.2   |  26  |  0.95    Ca    9.4      31 Dec 2018 06:52    Imaging: Chief Complaint:  Patient is a 73y old  Male who presents with a chief complaint of fall (31 Dec 2018 16:10)    HPI:  73 year old man with hx of Afib (on Eliquid), Hypertension, COPD, CKD3, Carcinoid tumor s/p hemicolectomy, 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 Patient does not recall the details regarding how he fell, but he was found lying on his back by his wife. No reported chest pain, palpitations, dizziness, numbness, headache, shortness of breath, blurry vision, abdominal pain, nausea, vomiting, diarrhea or head trauma. Patient found to be orthostatic positive and per endocrine notes with partial adrenal insufficiency given low AM cortisol.     GI consulted for anemia. Patient denies any melena, hematochezia, hematemesis, hematuria or any other complaints at this time. Reports having recent EGD/Colonoscopy (see below)    Last Colonoscopy: Reports 1 year ago - Normal  Last Endoscopy:   < from: Upper EUS (05.21.18 @ 09:52) >      Mild portal hypertensive gastropathy was found in the entire examined stomach. Biopsies were        taken with a cold forceps for Helicobacter pylori testing. Estimated blood loss was minimal.       The examined duodenum was normal. Normal major papilla.  < end of copied text >    Allergies:  ACE inhibitors (Other)  Bee Stings- anaphylaxis (Other)  penicillin (Other)    Home Medications:  Reviewed    Hospital Medications:  apixaban 5 milliGRAM(s) Oral every 12 hours  dextrose 40% Gel 15 Gram(s) Oral once PRN  dextrose 5%. 1000 milliLiter(s) IV Continuous <Continuous>  dextrose 50% Injectable 12.5 Gram(s) IV Push once  dextrose 50% Injectable 25 Gram(s) IV Push once  dextrose 50% Injectable 25 Gram(s) IV Push once  glucagon  Injectable 1 milliGRAM(s) IntraMuscular once PRN  hydrocortisone 20 milliGRAM(s) Oral <User Schedule>  hydrocortisone 10 milliGRAM(s) Oral <User Schedule>  influenza   Vaccine 0.5 milliLiter(s) IntraMuscular once  insulin glargine Injectable (LANTUS) 6 Unit(s) SubCutaneous at bedtime  insulin lispro (HumaLOG) corrective regimen sliding scale   SubCutaneous three times a day before meals  insulin lispro (HumaLOG) corrective regimen sliding scale   SubCutaneous at bedtime  polyethylene glycol 3350 17 Gram(s) Oral daily  senna 2 Tablet(s) Oral at bedtime  simvastatin 20 milliGRAM(s) Oral at bedtime  sodium chloride 0.9%. 1000 milliLiter(s) IV Continuous <Continuous>    PMHX/PSHX:  ETOH abuse  Radiation fibrosis  Pancreatic mass  Hearing loss  Disease of pancreas, unspecified  Nasopharynx cancer  Pulmonary emphysema, unspecified emphysema type  Stage 3 chronic kidney disease  Acute kidney injury  Atrial fibrillation, unspecified type  COPD (chronic obstructive pulmonary disease)  Benign carcinoid tumor  Low back pain  High cholesterol  HTN (hypertension)  H/O endoscopy  S/P tonsillectomy  H/O hemicolectomy  History of appendectomy    Family history:  Family history of breast cancer  Denies family history of PUD, IBD, colon cancer/polyps, stomach cancer/polyps, pancreatic cancer/masses, liver cancer/disease    Social History:   Former EtOH use and tobacco use  No drug use    ROS:   General:  No fevers, chills  ENT:  No sore throat or dysphagia  CV:  No pain or palpitations  Resp:  No dyspnea, cough, wheezing  GI:  See HPI  Skin:  No rash or edema    PHYSICAL EXAM:   GENERAL:  NAD, Appears stated age  HEENT:  NC/AT,  conjunctivae clear and pink, sclera -anicteric  CHEST:  CTA B/L, Normal effort  HEART:  RRR S1/S2, No murmurs  ABDOMEN:  Soft, non-tender, non-distended, normoactive bowel sounds  EXTEREMITIES:  No cyanosis or Edema  SKIN:  Warm & Dry.   NEURO:  Alert, oriented  RECTAL: Refused    Vital Signs:  Vital Signs Last 24 Hrs  T(C): 36.8 (31 Dec 2018 11:42), Max: 36.8 (30 Dec 2018 20:41)  T(F): 98.3 (31 Dec 2018 11:42), Max: 98.3 (30 Dec 2018 20:41)  HR: 60 (31 Dec 2018 11:42) (60 - 76)  BP: 175/87 (31 Dec 2018 11:42) (148/80 - 175/87)  BP(mean): --  RR: 18 (31 Dec 2018 11:42) (18 - 18)  SpO2: 98% (31 Dec 2018 11:42) (98% - 99%)  Daily     Daily     LABS:                        11.3   6.88  )-----------( 265      ( 31 Dec 2018 08:10 )             34.2     Mean Cell Volume: 92.4 fl (12-31-18 @ 08:10)    12-31    140  |  102  |  17  ----------------------------<  199<H>  4.2   |  26  |  0.95    Ca    9.4      31 Dec 2018 06:52 Chief Complaint:  Patient is a 73y old  Male who presents with a chief complaint of fall (31 Dec 2018 16:10)    HPI:  73 year old man with hx of Afib (on Eliquid), Hypertension, COPD, CKD3, Carcinoid tumor s/p hemicolectomy, 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 Patient does not recall the details regarding how he fell, but he was found lying on his back by his wife. No reported chest pain, palpitations, dizziness, numbness, headache, shortness of breath, blurry vision, abdominal pain, nausea, vomiting, diarrhea or head trauma. Patient found to be orthostatic positive and per endocrine notes with partial adrenal insufficiency given low AM cortisol.     GI consulted for anemia. Patient denies any melena, hematochezia, hematemesis, hematuria or any other complaints at this time. Reports having recent EGD/Colonoscopy (see below)    Last Colonoscopy: Reports 1 year ago - Normal  Last Endoscopy:   < from: Upper EUS (05.21.18 @ 09:52) >      Mild portal hypertensive gastropathy was found in the entire examined stomach. Biopsies were        taken with a cold forceps for Helicobacter pylori testing. Estimated blood loss was minimal.       The examined duodenum was normal. Normal major papilla.  < end of copied text >    Allergies:  ACE inhibitors (Other)  Bee Stings- anaphylaxis (Other)  penicillin (Other)    Home Medications:  Reviewed    Hospital Medications:  apixaban 5 milliGRAM(s) Oral every 12 hours  dextrose 40% Gel 15 Gram(s) Oral once PRN  dextrose 5%. 1000 milliLiter(s) IV Continuous <Continuous>  dextrose 50% Injectable 12.5 Gram(s) IV Push once  dextrose 50% Injectable 25 Gram(s) IV Push once  dextrose 50% Injectable 25 Gram(s) IV Push once  glucagon  Injectable 1 milliGRAM(s) IntraMuscular once PRN  hydrocortisone 20 milliGRAM(s) Oral <User Schedule>  hydrocortisone 10 milliGRAM(s) Oral <User Schedule>  influenza   Vaccine 0.5 milliLiter(s) IntraMuscular once  insulin glargine Injectable (LANTUS) 6 Unit(s) SubCutaneous at bedtime  insulin lispro (HumaLOG) corrective regimen sliding scale   SubCutaneous three times a day before meals  insulin lispro (HumaLOG) corrective regimen sliding scale   SubCutaneous at bedtime  polyethylene glycol 3350 17 Gram(s) Oral daily  senna 2 Tablet(s) Oral at bedtime  simvastatin 20 milliGRAM(s) Oral at bedtime  sodium chloride 0.9%. 1000 milliLiter(s) IV Continuous <Continuous>    PMHX/PSHX:  ETOH abuse  Radiation fibrosis  Pancreatic mass  Hearing loss  Disease of pancreas, unspecified  Nasopharynx cancer  Pulmonary emphysema, unspecified emphysema type  Stage 3 chronic kidney disease  Acute kidney injury  Atrial fibrillation, unspecified type  COPD (chronic obstructive pulmonary disease)  Benign carcinoid tumor  Low back pain  High cholesterol  HTN (hypertension)  H/O endoscopy  S/P tonsillectomy  H/O hemicolectomy  History of appendectomy    Family history:  Family history of breast cancer  Denies family history of PUD, IBD, colon cancer/polyps, stomach cancer/polyps, pancreatic cancer/masses, liver cancer/disease    Social History:   Former EtOH use and tobacco use  No drug use    ROS:   General:  No fevers, chills  ENT:  No sore throat or dysphagia  CV:  No pain or palpitations  Resp:  No dyspnea, cough, wheezing  GI:  See HPI  Skin:  No rash or edema    PHYSICAL EXAM:   GENERAL:  NAD, Appears stated age  HEENT:  NC/AT,  conjunctivae clear and pink, sclera -anicteric  CHEST:  CTA B/L, Normal effort  HEART:  RRR S1/S2, No murmurs  ABDOMEN:  Soft, non-tender, non-distended, normoactive bowel sounds  EXTEREMITIES:  No cyanosis or Edema  SKIN:  Warm & Dry.   NEURO:  Alert, oriented  RECTAL: Patient refused    Vital Signs:  Vital Signs Last 24 Hrs  T(C): 36.8 (31 Dec 2018 11:42), Max: 36.8 (30 Dec 2018 20:41)  T(F): 98.3 (31 Dec 2018 11:42), Max: 98.3 (30 Dec 2018 20:41)  HR: 60 (31 Dec 2018 11:42) (60 - 76)  BP: 175/87 (31 Dec 2018 11:42) (148/80 - 175/87)  BP(mean): --  RR: 18 (31 Dec 2018 11:42) (18 - 18)  SpO2: 98% (31 Dec 2018 11:42) (98% - 99%)  Daily     Daily     LABS:                        11.3   6.88  )-----------( 265      ( 31 Dec 2018 08:10 )             34.2     Mean Cell Volume: 92.4 fl (12-31-18 @ 08:10)    12-31    140  |  102  |  17  ----------------------------<  199<H>  4.2   |  26  |  0.95    Ca    9.4      31 Dec 2018 06:52

## 2018-12-31 NOTE — CONSULT NOTE ADULT - ATTENDING COMMENTS
Patient seen and examined. Agree with above. Patient with no clinical bleeding by history, refused rectal exam now. If his H/H is stable, can consider endoscopic evaluation as outpatient. Otherwise, would complete cardiac workup for syncope first. If his H/H continues to trend down or if he has evidence of makenna GI bleeding, would consider inpatient EGD/colonoscopy as appropriate.

## 2018-12-31 NOTE — DISCHARGE NOTE ADULT - HOSPITAL COURSE
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 Patient does not recollect how he fell in the shower today, fall unwitnessed, found by wife in the bathtub on his back, ?LOC,              Dx Syncope/ fall                 has low am cortisol, orthostatic hypotension, cosyntropin stim test done, shows partial adrenal insufficiency                  Hx of afib on xarelto                      admitted  with  h/o recurrent syncope   echo  afib/ Eliquis   orthostatics  was  positive  low  am cortisol noted,  per  endo,  acth  stim  test   done  dm, new,  follow  fs/  endo to adjust insulin/   per  endo, pt  with  adrenal insufficiency. on iv  hydrocortisome   PT eval: home; home w/ assist; home w/ home PT; home with home PT for improved strength balance & endurance for activity; return to baseline with home safety assessment; recommend rolling walker for ambulation (pt owns) & supervision/assist for mobility/ADL's (pt states that spouse can assist as needed

## 2019-01-01 VITALS
RESPIRATION RATE: 18 BRPM | TEMPERATURE: 98 F | DIASTOLIC BLOOD PRESSURE: 83 MMHG | SYSTOLIC BLOOD PRESSURE: 149 MMHG | HEART RATE: 61 BPM | OXYGEN SATURATION: 97 %

## 2019-01-01 LAB
ANION GAP SERPL CALC-SCNC: 13 MMOL/L — SIGNIFICANT CHANGE UP (ref 5–17)
BUN SERPL-MCNC: 21 MG/DL — SIGNIFICANT CHANGE UP (ref 7–23)
CALCIUM SERPL-MCNC: 8.9 MG/DL — SIGNIFICANT CHANGE UP (ref 8.4–10.5)
CHLORIDE SERPL-SCNC: 102 MMOL/L — SIGNIFICANT CHANGE UP (ref 96–108)
CO2 SERPL-SCNC: 23 MMOL/L — SIGNIFICANT CHANGE UP (ref 22–31)
CREAT SERPL-MCNC: 0.98 MG/DL — SIGNIFICANT CHANGE UP (ref 0.5–1.3)
GLUCOSE BLDC GLUCOMTR-MCNC: 163 MG/DL — HIGH (ref 70–99)
GLUCOSE BLDC GLUCOMTR-MCNC: 191 MG/DL — HIGH (ref 70–99)
GLUCOSE BLDC GLUCOMTR-MCNC: 261 MG/DL — HIGH (ref 70–99)
GLUCOSE BLDC GLUCOMTR-MCNC: 472 MG/DL — CRITICAL HIGH (ref 70–99)
GLUCOSE SERPL-MCNC: 159 MG/DL — HIGH (ref 70–99)
HCT VFR BLD CALC: 35.5 % — LOW (ref 39–50)
HGB BLD-MCNC: 11.7 G/DL — LOW (ref 13–17)
MCHC RBC-ENTMCNC: 30.5 PG — SIGNIFICANT CHANGE UP (ref 27–34)
MCHC RBC-ENTMCNC: 33 GM/DL — SIGNIFICANT CHANGE UP (ref 32–36)
MCV RBC AUTO: 92.7 FL — SIGNIFICANT CHANGE UP (ref 80–100)
PLATELET # BLD AUTO: 281 K/UL — SIGNIFICANT CHANGE UP (ref 150–400)
POTASSIUM SERPL-MCNC: 3.6 MMOL/L — SIGNIFICANT CHANGE UP (ref 3.5–5.3)
POTASSIUM SERPL-SCNC: 3.6 MMOL/L — SIGNIFICANT CHANGE UP (ref 3.5–5.3)
RBC # BLD: 3.83 M/UL — LOW (ref 4.2–5.8)
RBC # FLD: 16.5 % — HIGH (ref 10.3–14.5)
SODIUM SERPL-SCNC: 138 MMOL/L — SIGNIFICANT CHANGE UP (ref 135–145)
WBC # BLD: 8.04 K/UL — SIGNIFICANT CHANGE UP (ref 3.8–10.5)
WBC # FLD AUTO: 8.04 K/UL — SIGNIFICANT CHANGE UP (ref 3.8–10.5)

## 2019-01-01 PROCEDURE — 97162 PT EVAL MOD COMPLEX 30 MIN: CPT

## 2019-01-01 PROCEDURE — 72125 CT NECK SPINE W/O DYE: CPT

## 2019-01-01 PROCEDURE — 80162 ASSAY OF DIGOXIN TOTAL: CPT

## 2019-01-01 PROCEDURE — 82533 TOTAL CORTISOL: CPT

## 2019-01-01 PROCEDURE — 71045 X-RAY EXAM CHEST 1 VIEW: CPT

## 2019-01-01 PROCEDURE — 76937 US GUIDE VASCULAR ACCESS: CPT

## 2019-01-01 PROCEDURE — 93308 TTE F-UP OR LMTD: CPT

## 2019-01-01 PROCEDURE — 73502 X-RAY EXAM HIP UNI 2-3 VIEWS: CPT

## 2019-01-01 PROCEDURE — 96374 THER/PROPH/DIAG INJ IV PUSH: CPT

## 2019-01-01 PROCEDURE — 72170 X-RAY EXAM OF PELVIS: CPT

## 2019-01-01 PROCEDURE — 99285 EMERGENCY DEPT VISIT HI MDM: CPT | Mod: 25

## 2019-01-01 PROCEDURE — 82962 GLUCOSE BLOOD TEST: CPT

## 2019-01-01 PROCEDURE — 36000 PLACE NEEDLE IN VEIN: CPT | Mod: RT,XU

## 2019-01-01 PROCEDURE — 85027 COMPLETE CBC AUTOMATED: CPT

## 2019-01-01 PROCEDURE — 80048 BASIC METABOLIC PNL TOTAL CA: CPT

## 2019-01-01 PROCEDURE — 93005 ELECTROCARDIOGRAM TRACING: CPT

## 2019-01-01 PROCEDURE — 83036 HEMOGLOBIN GLYCOSYLATED A1C: CPT

## 2019-01-01 PROCEDURE — 82140 ASSAY OF AMMONIA: CPT

## 2019-01-01 PROCEDURE — 84443 ASSAY THYROID STIM HORMONE: CPT

## 2019-01-01 PROCEDURE — 84484 ASSAY OF TROPONIN QUANT: CPT

## 2019-01-01 PROCEDURE — 70450 CT HEAD/BRAIN W/O DYE: CPT

## 2019-01-01 PROCEDURE — 80053 COMPREHEN METABOLIC PANEL: CPT

## 2019-01-01 RX ORDER — MEGESTROL ACETATE 40 MG/ML
20 SUSPENSION ORAL
Qty: 0 | Refills: 0 | COMMUNITY

## 2019-01-01 RX ORDER — OMEGA-3 ACID ETHYL ESTERS 1 G
1 CAPSULE ORAL
Qty: 0 | Refills: 0 | COMMUNITY

## 2019-01-01 RX ORDER — DIGOXIN 250 MCG
1 TABLET ORAL
Qty: 0 | Refills: 0 | COMMUNITY

## 2019-01-01 RX ORDER — UMECLIDINIUM BROMIDE AND VILANTEROL TRIFENATATE 62.5; 25 UG/1; UG/1
1 POWDER RESPIRATORY (INHALATION)
Qty: 0 | Refills: 0 | COMMUNITY

## 2019-01-01 RX ORDER — ERYTHROMYCIN ETHYLSUCCINATE 400 MG
1 TABLET ORAL
Qty: 0 | Refills: 0 | COMMUNITY

## 2019-01-01 RX ADMIN — Medication 1: at 08:33

## 2019-01-01 RX ADMIN — Medication 20 MILLIGRAM(S): at 06:45

## 2019-01-01 RX ADMIN — APIXABAN 5 MILLIGRAM(S): 2.5 TABLET, FILM COATED ORAL at 05:41

## 2019-01-01 RX ADMIN — Medication 1: at 11:38

## 2019-01-01 NOTE — PROGRESS NOTE ADULT - PROVIDER SPECIALTY LIST ADULT
Cardiology
Endocrinology
Endocrinology
Internal Medicine

## 2019-01-01 NOTE — PROGRESS NOTE ADULT - ASSESSMENT
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  afib/  eliquis,  copd,  hld,  htn,  ckd 2,   nasopharyngeal  ca, . s/p pancreatectomy   ca pancreas    admitted  with  h/o recurrent syncope   echo  afib/ Eliquis   orthostatics  was  positive  low  am cortisol noted,  per  endo,  acth  stim  test   done  dm, new,  follow  fs/  endo to adjust insulin/   per  endo, pt  with  adrenal insufficiency. on   po  steroid   PT eval seen  on po tradjenta  as  an op  pmd,  in  1  to  2 weeks 73M,       s/p lap distal pancreatectomy with splenic preservation for neuroendocrine tumor on 9/19.  afib/  eliquis,  copd,  hld,  htn,  ckd 2,   nasopharyngeal  ca, . s/p pancreatectomy   ca pancreas    admitted  with  h/o recurrent syncope   echo  afib/ Eliquis   orthostatics  was  positive  low  am cortisol noted,  per  endo,  acth  stim  test   done  dm, new,  follow  fs/  endo to adjust insulin/   per  endo, pt  with  adrenal insufficiency. on   po  steroid   PT mannieal seen  on po tradjenta  as  an op  pmd,  in  1  to  2 weeks  spoke with daughter/  dc today 73M,       s/p lap distal pancreatectomy with splenic preservation for neuroendocrine tumor on 9/19.  afib/  eliquis,  copd,  hld,  htn,  ckd 2,   nasopharyngeal  ca, . s/p pancreatectomy   ca pancreas    admitted  with  h/o recurrent syncope   echo  afib/ Eliquis   orthostatics  was  positive  low  am cortisol noted,  per  endo,  acth  stim  test   done  dm, new,  follow  fs/  endo to adjust insulin/   per  endo, pt  with  adrenal insufficiency. on   po  steroid   PT eval seen  on po tradjenta  as  an op, per  endo  spoke with  wife  dc today/  wife  will be educated on diabetic teaching a s well  echo as  an  op, still not done/ pt will f/p with dr huber, in  2 days

## 2019-01-01 NOTE — PROGRESS NOTE ADULT - SUBJECTIVE AND OBJECTIVE BOX
no  compliants    REVIEW OF SYSTEMS:  GEN: no fever,    no chills  RESP: no SOB,   no cough  CVS: no chest pain,   no palpitations  GI: no abdominal pain,   no nausea,   no vomiting,   no constipation,   no diarrhea  : no dysuria,   no frequency  NEURO: no headache,   no dizziness  PSYCH: no depression,   not anxious  Derm : no rash    MEDICATIONS  (STANDING):  apixaban 5 milliGRAM(s) Oral every 12 hours  influenza   Vaccine 0.5 milliLiter(s) IntraMuscular once  simvastatin 20 milliGRAM(s) Oral at bedtime  sodium chloride 0.9%. 1000 milliLiter(s) (80 mL/Hr) IV Continuous <Continuous>    MEDICATIONS  (PRN):      Vital Signs Last 24 Hrs  T(C): 36.6 (29 Dec 2018 05:06), Max: 36.6 (28 Dec 2018 14:07)  T(F): 97.8 (29 Dec 2018 05:06), Max: 97.9 (28 Dec 2018 14:07)  HR: 60 (29 Dec 2018 05:06) (60 - 82)  BP: 160/88 (29 Dec 2018 05:06) (148/73 - 161/80)  BP(mean): --  RR: 18 (29 Dec 2018 05:06) (18 - 18)  SpO2: 97% (29 Dec 2018 09:09) (97% - 98%)  CAPILLARY BLOOD GLUCOSE        I&O's Summary    28 Dec 2018 07:01  -  29 Dec 2018 07:00  --------------------------------------------------------  IN: 360 mL / OUT: 900 mL / NET: -540 mL        PHYSICAL EXAM:  HEAD:  Atraumatic, Normocephalic  NECK: Supple, No   JVD  CHEST/LUNG:   no     rales,     no,    rhonchi  HEART: Regular rate and rhythm;         murmur  ABDOMEN: Soft, Nontender, ;   EXTREMITIES:    no    edema  NEUROLOGY:  alert    LABS:                        11.9   8.81  )-----------( 300      ( 29 Dec 2018 08:55 )             36.9     12-29    139  |  101  |  19  ----------------------------<  202<H>  3.9   |  23  |  1.07    Ca    9.0      29 Dec 2018 06:51    TPro  8.4<H>  /  Alb  4.8  /  TBili  0.5  /  DBili  x   /  AST  18  /  ALT  18  /  AlkPhos  53  12-27                            Consultant(s) Notes Reviewed:      Care Discussed with Consultants/Other Providers:
tele, nsr   no  cp/sob  orthostatic  nsr    REVIEW OF SYSTEMS:  GEN: no fever,    no chills  RESP: no SOB,   no cough  CVS: no chest pain,   no palpitations  GI: no abdominal pain,   no nausea,   no vomiting,   no constipation,   no diarrhea  : no dysuria,   no frequency  NEURO: no headache,   no dizziness  PSYCH: no depression,   not anxious  Derm : no rash    MEDICATIONS  (STANDING):  apixaban 5 milliGRAM(s) Oral every 12 hours  influenza   Vaccine 0.5 milliLiter(s) IntraMuscular once  polyethylene glycol 3350 17 Gram(s) Oral daily  senna 2 Tablet(s) Oral at bedtime  simvastatin 20 milliGRAM(s) Oral at bedtime  sodium chloride 0.9%. 1000 milliLiter(s) (80 mL/Hr) IV Continuous <Continuous>    MEDICATIONS  (PRN):      Vital Signs Last 24 Hrs  T(C): 36.8 (30 Dec 2018 04:01), Max: 36.8 (30 Dec 2018 04:01)  T(F): 98.3 (30 Dec 2018 04:01), Max: 98.3 (30 Dec 2018 04:01)  HR: 62 (30 Dec 2018 04:01) (62 - 70)  BP: 162/82 (30 Dec 2018 04:01) (158/83 - 164/81)  BP(mean): --  RR: 18 (30 Dec 2018 04:01) (18 - 18)  SpO2: 100% (30 Dec 2018 04:01) (96% - 100%)  CAPILLARY BLOOD GLUCOSE        I&O's Summary    29 Dec 2018 07:01  -  30 Dec 2018 07:00  --------------------------------------------------------  IN: 2970 mL / OUT: 1675 mL / NET: 1295 mL        PHYSICAL EXAM:  HEAD:  Atraumatic, Normocephalic  NECK: Supple, No   JVD  CHEST/LUNG:   no     rales,     no,    rhonchi  HEART: Regular rate and rhythm;         murmur  ABDOMEN: Soft, Nontender, ;   EXTREMITIES:      no  edema  NEUROLOGY:  alert    LABS:                        11.2   8.35  )-----------( 287      ( 30 Dec 2018 08:27 )             33.8     12-30    138  |  102  |  18  ----------------------------<  320<H>  3.8   |  23  |  1.01    Ca    8.9      30 Dec 2018 06:45                      Thyroid Stimulating Hormone, Serum: 1.06 uIU/mL (12-29 @ 08:52)          Consultant(s) Notes Reviewed:      Care Discussed with Consultants/Other Providers:
CARDIOLOGY     PROGRESS  NOTE   ________________________________________________    CHIEF COMPLAINT:Patient is a 73y old  Male who presents with a chief complaint of fall (01 Jan 2019 09:54)    	  REVIEW OF SYSTEMS:  CONSTITUTIONAL: No fever, weight loss, or fatigue  EYES: No eye pain, visual disturbances, or discharge  ENT:  No difficulty hearing, tinnitus, vertigo; No sinus or throat pain  NECK: No pain or stiffness  RESPIRATORY: No cough, wheezing, chills or hemoptysis; No Shortness of Breath  CARDIOVASCULAR: No chest pain, palpitations, passing out, dizziness, or leg swelling  GASTROINTESTINAL: No abdominal or epigastric pain. No nausea, vomiting, or hematemesis; No diarrhea or constipation. No melena or hematochezia.  GENITOURINARY: No dysuria, frequency, hematuria, or incontinence  NEUROLOGICAL: No headaches, memory loss, loss of strength, numbness, or tremors  SKIN: No itching, burning, rashes, or lesions   LYMPH Nodes: No enlarged glands  ENDOCRINE: No heat or cold intolerance; No hair loss  MUSCULOSKELETAL: No joint pain or swelling; No muscle, back, or extremity pain  PSYCHIATRIC: No depression, anxiety, mood swings, or difficulty sleeping  HEME/LYMPH: No easy bruising, or bleeding gums  ALLERGY AND IMMUNOLOGIC: No hives or eczema	    [ ] All others negative	  [ ] Unable to obtain    PHYSICAL EXAM:  T(C): 36.7 (01-01-19 @ 04:01), Max: 36.9 (12-31-18 @ 20:21)  HR: 66 (01-01-19 @ 04:01) (60 - 66)  BP: 165/97 (01-01-19 @ 04:01) (161/85 - 175/87)  RR: 18 (01-01-19 @ 04:01) (17 - 18)  SpO2: 98% (01-01-19 @ 04:01) (97% - 98%)  Wt(kg): --  I&O's Summary    31 Dec 2018 07:01  -  01 Jan 2019 07:00  --------------------------------------------------------  IN: 1880 mL / OUT: 1200 mL / NET: 680 mL        Appearance: Normal	  HEENT:   Normal oral mucosa, PERRL, EOMI	  Lymphatic: No lymphadenopathy  Cardiovascular: Normal S1 S2, No JVD, No murmurs, No edema  Respiratory: Lungs clear to auscultation	  Psychiatry: A & O x 3, Mood & affect appropriate  Gastrointestinal:  Soft, Non-tender, + BS	  Skin: No rashes, No ecchymoses, No cyanosis	  Neurologic: Non-focal  Extremities: Normal range of motion, No clubbing, cyanosis or edema  Vascular: Peripheral pulses palpable 2+ bilaterally    MEDICATIONS  (STANDING):  apixaban 5 milliGRAM(s) Oral every 12 hours  dextrose 5%. 1000 milliLiter(s) (50 mL/Hr) IV Continuous <Continuous>  dextrose 50% Injectable 12.5 Gram(s) IV Push once  dextrose 50% Injectable 25 Gram(s) IV Push once  dextrose 50% Injectable 25 Gram(s) IV Push once  hydrocortisone 20 milliGRAM(s) Oral <User Schedule>  hydrocortisone 10 milliGRAM(s) Oral <User Schedule>  influenza   Vaccine 0.5 milliLiter(s) IntraMuscular once  insulin glargine Injectable (LANTUS) 6 Unit(s) SubCutaneous at bedtime  insulin lispro (HumaLOG) corrective regimen sliding scale   SubCutaneous three times a day before meals  insulin lispro (HumaLOG) corrective regimen sliding scale   SubCutaneous at bedtime  polyethylene glycol 3350 17 Gram(s) Oral daily  senna 2 Tablet(s) Oral at bedtime  simvastatin 20 milliGRAM(s) Oral at bedtime      TELEMETRY: 	    ECG:  	  RADIOLOGY:  OTHER: 	  	  LABS:	 	    CARDIAC MARKERS:                                11.7   8.04  )-----------( 281      ( 01 Jan 2019 09:24 )             35.5     01-01    138  |  102  |  21  ----------------------------<  159<H>  3.6   |  23  |  0.98    Ca    8.9      01 Jan 2019 06:28      proBNP:   Lipid Profile:   HgA1c: Hemoglobin A1C, Whole Blood: 9.0 % (12-30 @ 19:21)    TSH: Thyroid Stimulating Hormone, Serum: 1.06 uIU/mL (12-29 @ 08:52)  Thyroid Stimulating Hormone, Serum: 1.07 uIU/mL (12-29 @ 08:52)  Cortisol AM, Serum: 2.6: Note reference range change for CORTAM and CORTPM. ug/dL (12.29.18 @ 08:52)            Assessment and plan  ---------------------------  doing better  continue meds  orthostatic improved , asymptomatic  endocrine  awaiting echo
CARDIOLOGY     PROGRESS  NOTE   ________________________________________________    CHIEF COMPLAINT:Patient is a 73y old  Male who presents with a chief complaint of fall (28 Dec 2018 09:52)  no complain.  	  REVIEW OF SYSTEMS:  CONSTITUTIONAL: No fever, weight loss, or fatigue  EYES: No eye pain, visual disturbances, or discharge  ENT:  No difficulty hearing, tinnitus, vertigo; No sinus or throat pain  NECK: No pain or stiffness  RESPIRATORY: No cough, wheezing, chills or hemoptysis; + Shortness of Breath  CARDIOVASCULAR: No chest pain, palpitations, passing out, dizziness, or leg swelling  GASTROINTESTINAL: No abdominal or epigastric pain. No nausea, vomiting, or hematemesis; No diarrhea or constipation. No melena or hematochezia.  GENITOURINARY: No dysuria, frequency, hematuria, or incontinence  NEUROLOGICAL: No headaches, memory loss, loss of strength, numbness, or tremors  SKIN: No itching, burning, rashes, or lesions   LYMPH Nodes: No enlarged glands  ENDOCRINE: No heat or cold intolerance; No hair loss  MUSCULOSKELETAL: No joint pain or swelling; No muscle, back, or extremity pain  PSYCHIATRIC: No depression, anxiety, mood swings, or difficulty sleeping  HEME/LYMPH: No easy bruising, or bleeding gums  ALLERGY AND IMMUNOLOGIC: No hives or eczema	    [ ] All others negative	  [ ] Unable to obtain    PHYSICAL EXAM:  T(C): 36.6 (12-28-18 @ 04:41), Max: 37 (12-27-18 @ 16:12)  HR: 70 (12-28-18 @ 04:41) (62 - 72)  BP: 124/80 (12-28-18 @ 04:41) (124/80 - 187/99)  RR: 18 (12-28-18 @ 04:41) (16 - 18)  SpO2: 97% (12-28-18 @ 04:41) (97% - 98%)  Wt(kg): --  I&O's Summary    27 Dec 2018 07:01  -  28 Dec 2018 07:00  --------------------------------------------------------  IN: 240 mL / OUT: 700 mL / NET: -460 mL        Appearance: Normal	  HEENT:   Normal oral mucosa, PERRL, EOMI	  Lymphatic: No lymphadenopathy  Cardiovascular: Normal S1 S2, No JVD, + murmurs,  + trace edema  Respiratory: Lungs clear to auscultation	  Psychiatry: A & O x 3, Mood & affect appropriate  Gastrointestinal:  Soft, Non-tender, + BS	  Skin: No rashes, No ecchymoses, No cyanosis	  Neurologic: Non-focal  Extremities: Normal range of motion, No clubbing, cyanosis or edema  Vascular: Peripheral pulses palpable 2+ bilaterally    MEDICATIONS  (STANDING):  apixaban 5 milliGRAM(s) Oral every 12 hours  influenza   Vaccine 0.5 milliLiter(s) IntraMuscular once  simvastatin 20 milliGRAM(s) Oral at bedtime  sodium chloride 0.9%. 1000 milliLiter(s) (80 mL/Hr) IV Continuous <Continuous>      TELEMETRY: 	    ECG:  	  RADIOLOGY:  OTHER: 	  	  LABS:	 	    CARDIAC MARKERS:    < from: Transthoracic Echocardiogram (01.16.16 @ 16:06) >  1. Normal Left Ventricular Systolic Function,  (EF = 55 to  60%)  2. Grade I diastolic dysfunction  3. RV systolic pressure is mildly increased.=31mmhg  4. There is mild tricuspid regurgitation.    < end of copied text >                              13.1   7.34  )-----------( 288      ( 28 Dec 2018 07:02 )             40.0     12-28    137  |  99  |  24<H>  ----------------------------<  192<H>  4.3   |  22  |  1.02    Ca    9.9      28 Dec 2018 06:11    TPro  8.4<H>  /  Alb  4.8  /  TBili  0.5  /  DBili  x   /  AST  18  /  ALT  18  /  AlkPhos  53  12-27    proBNP:   Lipid Profile:   HgA1c:   TSH:         Assessment and plan  ---------------------------  pt with sig pmhx with multiple syncopal episodes  ?orthostatic hypotension  tele  echo  + orthostatic increase salt and fluid intake  tsh  b12  will adjust bp meds
CARDIOLOGY     PROGRESS  NOTE   ________________________________________________    CHIEF COMPLAINT:Patient is a 73y old  Male who presents with a chief complaint of fall (29 Dec 2018 09:34)    	  REVIEW OF SYSTEMS:  CONSTITUTIONAL: No fever, weight loss, or fatigue  EYES: No eye pain, visual disturbances, or discharge  ENT:  No difficulty hearing, tinnitus, vertigo; No sinus or throat pain  NECK: No pain or stiffness  RESPIRATORY: No cough, wheezing, chills or hemoptysis; No Shortness of Breath  CARDIOVASCULAR: No chest pain, palpitations, passing out, dizziness, or leg swelling  GASTROINTESTINAL: No abdominal or epigastric pain. No nausea, vomiting, or hematemesis; No diarrhea or constipation. No melena or hematochezia.  GENITOURINARY: No dysuria, frequency, hematuria, or incontinence  NEUROLOGICAL: No headaches, memory loss, loss of strength, numbness, or tremors  SKIN: No itching, burning, rashes, or lesions   LYMPH Nodes: No enlarged glands  ENDOCRINE: No heat or cold intolerance; No hair loss  MUSCULOSKELETAL: No joint pain or swelling; No muscle, back, or extremity pain  PSYCHIATRIC: No depression, anxiety, mood swings, or difficulty sleeping  HEME/LYMPH: No easy bruising, or bleeding gums  ALLERGY AND IMMUNOLOGIC: No hives or eczema	    [ ] All others negative	  [ ] Unable to obtain    PHYSICAL EXAM:  T(C): 36.6 (12-29-18 @ 05:06), Max: 36.6 (12-28-18 @ 14:07)  HR: 60 (12-29-18 @ 05:06) (60 - 82)  BP: 160/88 (12-29-18 @ 05:06) (148/73 - 161/80)  RR: 18 (12-29-18 @ 05:06) (18 - 18)  SpO2: 97% (12-29-18 @ 09:09) (97% - 98%)  Wt(kg): --  I&O's Summary    28 Dec 2018 07:01  -  29 Dec 2018 07:00  --------------------------------------------------------  IN: 360 mL / OUT: 900 mL / NET: -540 mL        Appearance: Normal	  HEENT:   Normal oral mucosa, PERRL, EOMI	  Lymphatic: No lymphadenopathy  Cardiovascular: Normal S1 S2, No JVD, N+murmurs, No edema  Respiratory: Lungs clear to auscultation	  Psychiatry: A & O x 3, Mood & affect appropriate  Gastrointestinal:  Soft, Non-tender, + BS	  Skin: No rashes, No ecchymoses, No cyanosis	  Neurologic: Non-focal  Extremities: Normal range of motion, No clubbing, cyanosis or edema  Vascular: Peripheral pulses palpable 2+ bilaterally    MEDICATIONS  (STANDING):  apixaban 5 milliGRAM(s) Oral every 12 hours  influenza   Vaccine 0.5 milliLiter(s) IntraMuscular once  simvastatin 20 milliGRAM(s) Oral at bedtime  sodium chloride 0.9%. 1000 milliLiter(s) (80 mL/Hr) IV Continuous <Continuous>      TELEMETRY: 	    ECG:  	  RADIOLOGY:  OTHER: 	  	  LABS:	 	    CARDIAC MARKERS:                                11.9   8.81  )-----------( 300      ( 29 Dec 2018 08:55 )             36.9     12-29    139  |  101  |  19  ----------------------------<  202<H>  3.9   |  23  |  1.07    Ca    9.0      29 Dec 2018 06:51    TPro  8.4<H>  /  Alb  4.8  /  TBili  0.5  /  DBili  x   /  AST  18  /  ALT  18  /  AlkPhos  53  12-27    proBNP:   Lipid Profile:   HgA1c:   TSH:   < from: Transthoracic Echocardiogram (01.16.16 @ 16:06) >  1. Normal Left Ventricular Systolic Function,  (EF = 55 to  60%)  2. Grade I diastolic dysfunction  3. RV systolic pressure is mildly increased.=31mmhg  4. There is mild tricuspid regurgitation.    < end of copied text >        Assessment and plan  ---------------------------  severe orthostatic hypotension  ivf  awaiting repeat echo check am cortisol level
CARDIOLOGY     PROGRESS  NOTE   ________________________________________________    CHIEF COMPLAINT:Patient is a 73y old  Male who presents with a chief complaint of fall (30 Dec 2018 09:13)  doing better.  	  REVIEW OF SYSTEMS:  CONSTITUTIONAL: No fever, weight loss, or fatigue  EYES: No eye pain, visual disturbances, or discharge  ENT:  No difficulty hearing, tinnitus, vertigo; No sinus or throat pain  NECK: No pain or stiffness  RESPIRATORY: No cough, wheezing, chills or hemoptysis; No Shortness of Breath  CARDIOVASCULAR: No chest pain, palpitations, passing out, dizziness, or leg swelling  GASTROINTESTINAL: No abdominal or epigastric pain. No nausea, vomiting, or hematemesis; No diarrhea or constipation. No melena or hematochezia.  GENITOURINARY: No dysuria, frequency, hematuria, or incontinence  NEUROLOGICAL: No headaches, memory loss, loss of strength, numbness, or tremors  SKIN: No itching, burning, rashes, or lesions   LYMPH Nodes: No enlarged glands  ENDOCRINE: No heat or cold intolerance; No hair loss  MUSCULOSKELETAL: No joint pain or swelling; No muscle, back, or extremity pain  PSYCHIATRIC: No depression, anxiety, mood swings, or difficulty sleeping  HEME/LYMPH: No easy bruising, or bleeding gums  ALLERGY AND IMMUNOLOGIC: No hives or eczema	    [ ] All others negative	  [ ] Unable to obtain    PHYSICAL EXAM:  T(C): 36.8 (12-30-18 @ 04:01), Max: 36.8 (12-30-18 @ 04:01)  HR: 62 (12-30-18 @ 04:01) (62 - 70)  BP: 162/82 (12-30-18 @ 04:01) (158/83 - 164/81)  RR: 18 (12-30-18 @ 04:01) (18 - 18)  SpO2: 100% (12-30-18 @ 04:01) (96% - 100%)  Wt(kg): --  I&O's Summary    29 Dec 2018 07:01  -  30 Dec 2018 07:00  --------------------------------------------------------  IN: 2970 mL / OUT: 1675 mL / NET: 1295 mL        Appearance: Normal	  HEENT:   Normal oral mucosa, PERRL, EOMI	  Lymphatic: No lymphadenopathy  Cardiovascular: Normal S1 S2, No JVD, + murmurs, No edema  Respiratory: Lungs clear to auscultation	  Psychiatry: A & O x 3, Mood & affect appropriate  Gastrointestinal:  Soft, Non-tender, + BS	  Skin: No rashes, No ecchymoses, No cyanosis	  Neurologic: Non-focal  Extremities: Normal range of motion, No clubbing, cyanosis or edema  Vascular: Peripheral pulses palpable 2+ bilaterally    MEDICATIONS  (STANDING):  apixaban 5 milliGRAM(s) Oral every 12 hours  influenza   Vaccine 0.5 milliLiter(s) IntraMuscular once  insulin glargine Injectable (LANTUS) 6 Unit(s) SubCutaneous at bedtime  polyethylene glycol 3350 17 Gram(s) Oral daily  senna 2 Tablet(s) Oral at bedtime  simvastatin 20 milliGRAM(s) Oral at bedtime  sodium chloride 0.9%. 1000 milliLiter(s) (80 mL/Hr) IV Continuous <Continuous>      TELEMETRY: 	    ECG:  	  RADIOLOGY:  OTHER: 	  	  LABS:	 	    CARDIAC MARKERS:    tele nsr                            11.2   8.35  )-----------( 287      ( 30 Dec 2018 08:27 )             33.8     12-30    138  |  102  |  18  ----------------------------<  320<H>  3.8   |  23  |  1.01    Ca    8.9      30 Dec 2018 06:45      proBNP:   Lipid Profile:   HgA1c:   TSH: Thyroid Stimulating Hormone, Serum: 1.06 uIU/mL (12-29 @ 08:52)  Thyroid Stimulating Hormone, Serum: 1.07 uIU/mL (12-29 @ 08:52)  < from: Transthoracic Echocardiogram (01.16.16 @ 16:06) >  1. Normal Left Ventricular Systolic Function,  (EF = 55 to  60%)  2. Grade I diastolic dysfunction  3. RV systolic pressure is mildly increased.=31mmhg  4. There is mild tricuspid regurgitation.    < end of copied text >    Cortisol AM, Serum (12.29.18 @ 08:52)    Cortisol AM, Serum: 2.6: Note reference range change for CORTAM and CORTPM. ug/dL    Cortisol PM, Serum (12.29.18 @ 21:08)    Cortisol PM, Serum: 15.5: Note reference range change for CORTAM and CORTPM. ug/dL          Assessment and plan  ---------------------------  severe orthostatic hypotension  repeat am cortisol level  ivf  repeat orthostatic  echo
CARDIOLOGY     PROGRESS  NOTE   ________________________________________________    CHIEF COMPLAINT:Patient is a 73y old  Male who presents with a chief complaint of fall (30 Dec 2018 14:37)    	  REVIEW OF SYSTEMS:  CONSTITUTIONAL: No fever, weight loss, or fatigue  EYES: No eye pain, visual disturbances, or discharge  ENT:  No difficulty hearing, tinnitus, vertigo; No sinus or throat pain  NECK: No pain or stiffness  RESPIRATORY: No cough, wheezing, chills or hemoptysis; No Shortness of Breath  CARDIOVASCULAR: No chest pain, palpitations, passing out, dizziness, or leg swelling  GASTROINTESTINAL: No abdominal or epigastric pain. No nausea, vomiting, or hematemesis; No diarrhea or constipation. No melena or hematochezia.  GENITOURINARY: No dysuria, frequency, hematuria, or incontinence  NEUROLOGICAL: No headaches, memory loss, loss of strength, numbness, or tremors  SKIN: No itching, burning, rashes, or lesions   LYMPH Nodes: No enlarged glands  ENDOCRINE: No heat or cold intolerance; No hair loss  MUSCULOSKELETAL: No joint pain or swelling; No muscle, back, or extremity pain  PSYCHIATRIC: No depression, anxiety, mood swings, or difficulty sleeping  HEME/LYMPH: No easy bruising, or bleeding gums  ALLERGY AND IMMUNOLOGIC: No hives or eczema	    [ ] All others negative	  [ ] Unable to obtain    PHYSICAL EXAM:  T(C): 36.2 (12-31-18 @ 04:55), Max: 36.9 (12-30-18 @ 11:54)  HR: 72 (12-31-18 @ 05:42) (68 - 76)  BP: 158/69 (12-31-18 @ 05:42) (146/83 - 159/76)  RR: 18 (12-31-18 @ 04:55) (18 - 18)  SpO2: 99% (12-31-18 @ 04:55) (98% - 99%)  Wt(kg): --  I&O's Summary    30 Dec 2018 07:01  -  31 Dec 2018 07:00  --------------------------------------------------------  IN: 1895 mL / OUT: 1950 mL / NET: -55 mL        Appearance: Normal	  HEENT:   Normal oral mucosa, PERRL, EOMI	  Lymphatic: No lymphadenopathy  Cardiovascular: Normal S1 S2, No JVD, No murmurs, No edema  Respiratory: Lungs clear to auscultation	  Psychiatry: A & O x 3, Mood & affect appropriate  Gastrointestinal:  Soft, Non-tender, + BS	  Skin: No rashes, No ecchymoses, No cyanosis	  Neurologic: Non-focal  Extremities: Normal range of motion, No clubbing, cyanosis or edema  Vascular: Peripheral pulses palpable 2+ bilaterally    MEDICATIONS  (STANDING):  apixaban 5 milliGRAM(s) Oral every 12 hours  dextrose 5%. 1000 milliLiter(s) (50 mL/Hr) IV Continuous <Continuous>  dextrose 50% Injectable 12.5 Gram(s) IV Push once  dextrose 50% Injectable 25 Gram(s) IV Push once  dextrose 50% Injectable 25 Gram(s) IV Push once  hydrocortisone sodium succinate Injectable 50 milliGRAM(s) IV Push every 8 hours  influenza   Vaccine 0.5 milliLiter(s) IntraMuscular once  insulin glargine Injectable (LANTUS) 6 Unit(s) SubCutaneous at bedtime  insulin lispro (HumaLOG) corrective regimen sliding scale   SubCutaneous three times a day before meals  insulin lispro (HumaLOG) corrective regimen sliding scale   SubCutaneous at bedtime  polyethylene glycol 3350 17 Gram(s) Oral daily  senna 2 Tablet(s) Oral at bedtime  simvastatin 20 milliGRAM(s) Oral at bedtime  sodium chloride 0.9%. 1000 milliLiter(s) (80 mL/Hr) IV Continuous <Continuous>      TELEMETRY: nsr	    ECG:  	  RADIOLOGY:  OTHER: 	  	  LABS:	 	    CARDIAC MARKERS:                                11.2   8.35  )-----------( 287      ( 30 Dec 2018 08:27 )             33.8     12-31    140  |  102  |  17  ----------------------------<  199<H>  4.2   |  26  |  0.95    Ca    9.4      31 Dec 2018 06:52      proBNP:   Lipid Profile:   HgA1c: Hemoglobin A1C, Whole Blood: 9.0 % (12-30 @ 19:21)    TSH: Thyroid Stimulating Hormone, Serum: 1.06 uIU/mL (12-29 @ 08:52)  Thyroid Stimulating Hormone, Serum: 1.07 uIU/mL (12-29 @ 08:52)          Assessment and plan  ---------------------------  doing better  endocrine eval for cortisol level  check orthostatic  repeat echo
Chief complaint  Patient is a 73y old  Male who presents with a chief complaint of fall (30 Dec 2018 09:33)   Review of systems  Patient in bed, looks comfortable, no fever, no hypoglycemia.    Labs and Fingersticks  CAPILLARY BLOOD GLUCOSE      POCT Blood Glucose.: 272 mg/dL (30 Dec 2018 11:43)      Anion Gap, Serum: 13 (12-30 @ 06:45)  Anion Gap, Serum: 15 (12-29 @ 06:51)      Calcium, Total Serum: 8.9 (12-30 @ 06:45)  Calcium, Total Serum: 9.0 (12-29 @ 06:51)          12-30    138  |  102  |  18  ----------------------------<  320<H>  3.8   |  23  |  1.01    Ca    8.9      30 Dec 2018 06:45                          11.2   8.35  )-----------( 287      ( 30 Dec 2018 08:27 )             33.8     Medications  MEDICATIONS  (STANDING):  apixaban 5 milliGRAM(s) Oral every 12 hours  dextrose 5%. 1000 milliLiter(s) (50 mL/Hr) IV Continuous <Continuous>  dextrose 50% Injectable 12.5 Gram(s) IV Push once  dextrose 50% Injectable 25 Gram(s) IV Push once  dextrose 50% Injectable 25 Gram(s) IV Push once  hydrocortisone sodium succinate Injectable 50 milliGRAM(s) IV Push every 8 hours  influenza   Vaccine 0.5 milliLiter(s) IntraMuscular once  insulin glargine Injectable (LANTUS) 6 Unit(s) SubCutaneous at bedtime  insulin lispro (HumaLOG) corrective regimen sliding scale   SubCutaneous three times a day before meals  insulin lispro (HumaLOG) corrective regimen sliding scale   SubCutaneous at bedtime  polyethylene glycol 3350 17 Gram(s) Oral daily  senna 2 Tablet(s) Oral at bedtime  simvastatin 20 milliGRAM(s) Oral at bedtime  sodium chloride 0.9%. 1000 milliLiter(s) (80 mL/Hr) IV Continuous <Continuous>      Physical Exam  General: Patient comfortable in bed  Vital Signs Last 12 Hrs  T(F): 98.5 (12-30-18 @ 11:54), Max: 98.5 (12-30-18 @ 11:54)  HR: 71 (12-30-18 @ 11:54) (62 - 71)  BP: 146/83 (12-30-18 @ 11:54) (146/83 - 162/82)  BP(mean): --  RR: 18 (12-30-18 @ 11:54) (18 - 18)  SpO2: 98% (12-30-18 @ 11:54) (98% - 100%)  Neck: No palpable thyroid nodules.  CVS: S1S2, No murmurs  Respiratory: No wheezing, no crepitations  GI: Abdomen soft, bowel sounds positive  Musculoskeletal:  edema lower extremities.   Skin: No skin rashes, no ecchymosis    Diagnostics    Cortisol PM, Serum: STAT (12-29 @ 15:56)
Chief complaint  Patient is a 73y old  Male who presents with a chief complaint of fall (31 Dec 2018 10:32)   Review of systems  Patient in bed, looks comfortable, no fever, no hypoglycemia.    Labs and Fingersticks  CAPILLARY BLOOD GLUCOSE      POCT Blood Glucose.: 175 mg/dL (31 Dec 2018 11:36)  POCT Blood Glucose.: 179 mg/dL (31 Dec 2018 07:35)  POCT Blood Glucose.: 247 mg/dL (30 Dec 2018 21:19)  POCT Blood Glucose.: 254 mg/dL (30 Dec 2018 16:44)      Anion Gap, Serum: 12 (12-31 @ 06:52)  Anion Gap, Serum: 13 (12-30 @ 06:45)    Hemoglobin A1C, Whole Blood: 9.0 <H> (12-30 @ 19:21)    Calcium, Total Serum: 9.4 (12-31 @ 06:52)  Calcium, Total Serum: 8.9 (12-30 @ 06:45)          12-31    140  |  102  |  17  ----------------------------<  199<H>  4.2   |  26  |  0.95    Ca    9.4      31 Dec 2018 06:52                          11.3   6.88  )-----------( 265      ( 31 Dec 2018 08:10 )             34.2     Medications  MEDICATIONS  (STANDING):  apixaban 5 milliGRAM(s) Oral every 12 hours  dextrose 5%. 1000 milliLiter(s) (50 mL/Hr) IV Continuous <Continuous>  dextrose 50% Injectable 12.5 Gram(s) IV Push once  dextrose 50% Injectable 25 Gram(s) IV Push once  dextrose 50% Injectable 25 Gram(s) IV Push once  hydrocortisone 20 milliGRAM(s) Oral <User Schedule>  hydrocortisone 10 milliGRAM(s) Oral <User Schedule>  influenza   Vaccine 0.5 milliLiter(s) IntraMuscular once  insulin glargine Injectable (LANTUS) 6 Unit(s) SubCutaneous at bedtime  insulin lispro (HumaLOG) corrective regimen sliding scale   SubCutaneous three times a day before meals  insulin lispro (HumaLOG) corrective regimen sliding scale   SubCutaneous at bedtime  polyethylene glycol 3350 17 Gram(s) Oral daily  senna 2 Tablet(s) Oral at bedtime  simvastatin 20 milliGRAM(s) Oral at bedtime  sodium chloride 0.9%. 1000 milliLiter(s) (80 mL/Hr) IV Continuous <Continuous>      Physical Exam  General: Patient comfortable in bed  Vital Signs Last 12 Hrs  T(F): 98.3 (12-31-18 @ 11:42), Max: 98.3 (12-31-18 @ 11:42)  HR: 60 (12-31-18 @ 11:42) (60 - 76)  BP: 175/87 (12-31-18 @ 11:42) (158/69 - 175/87)  BP(mean): --  RR: 18 (12-31-18 @ 11:42) (18 - 18)  SpO2: 98% (12-31-18 @ 11:42) (98% - 99%)  Neck: No palpable thyroid nodules.  CVS: S1S2, No murmurs  Respiratory: No wheezing, no crepitations  GI: Abdomen soft, bowel sounds positive  Musculoskeletal:  edema lower extremities.   Skin: No skin rashes, no ecchymosis    Diagnostics    Cortisol PM, Serum: STAT (12-29 @ 15:56)
no  compalints  REVIEW OF SYSTEMS:  GEN: no fever,    no chills  RESP: no SOB,   no cough  CVS: no chest pain,   no palpitations  GI: no abdominal pain,   no nausea,   no vomiting,   no constipation,   no diarrhea  : no dysuria,   no frequency  NEURO: no headache,   no dizziness  PSYCH: no depression,   not anxious  Derm : no rash    MEDICATIONS  (STANDING):  apixaban 5 milliGRAM(s) Oral every 12 hours  dextrose 5%. 1000 milliLiter(s) (50 mL/Hr) IV Continuous <Continuous>  dextrose 50% Injectable 12.5 Gram(s) IV Push once  dextrose 50% Injectable 25 Gram(s) IV Push once  dextrose 50% Injectable 25 Gram(s) IV Push once  hydrocortisone sodium succinate Injectable 50 milliGRAM(s) IV Push every 8 hours  influenza   Vaccine 0.5 milliLiter(s) IntraMuscular once  insulin glargine Injectable (LANTUS) 6 Unit(s) SubCutaneous at bedtime  insulin lispro (HumaLOG) corrective regimen sliding scale   SubCutaneous three times a day before meals  insulin lispro (HumaLOG) corrective regimen sliding scale   SubCutaneous at bedtime  polyethylene glycol 3350 17 Gram(s) Oral daily  senna 2 Tablet(s) Oral at bedtime  simvastatin 20 milliGRAM(s) Oral at bedtime  sodium chloride 0.9%. 1000 milliLiter(s) (80 mL/Hr) IV Continuous <Continuous>    MEDICATIONS  (PRN):  dextrose 40% Gel 15 Gram(s) Oral once PRN Blood Glucose LESS THAN 70 milliGRAM(s)/deciliter  glucagon  Injectable 1 milliGRAM(s) IntraMuscular once PRN Glucose LESS THAN 70 milligrams/deciliter      Vital Signs Last 24 Hrs  T(C): 36.2 (31 Dec 2018 04:55), Max: 36.9 (30 Dec 2018 11:54)  T(F): 97.2 (31 Dec 2018 04:55), Max: 98.5 (30 Dec 2018 11:54)  HR: 72 (31 Dec 2018 05:42) (68 - 76)  BP: 158/69 (31 Dec 2018 05:42) (146/83 - 159/76)  BP(mean): --  RR: 18 (31 Dec 2018 04:55) (18 - 18)  SpO2: 99% (31 Dec 2018 04:55) (98% - 99%)  CAPILLARY BLOOD GLUCOSE      POCT Blood Glucose.: 179 mg/dL (31 Dec 2018 07:35)  POCT Blood Glucose.: 247 mg/dL (30 Dec 2018 21:19)  POCT Blood Glucose.: 254 mg/dL (30 Dec 2018 16:44)  POCT Blood Glucose.: 272 mg/dL (30 Dec 2018 11:43)    I&O's Summary    30 Dec 2018 07:01  -  31 Dec 2018 07:00  --------------------------------------------------------  IN: 1895 mL / OUT: 1950 mL / NET: -55 mL        PHYSICAL EXAM:  HEAD:  Atraumatic, Normocephalic  NECK: Supple, No   JVD  CHEST/LUNG:   no     rales,     no,    rhonchi  HEART: Regular rate and rhythm;         murmur  ABDOMEN: Soft, Nontender, ;   EXTREMITIES:     no   edema  NEUROLOGY:  alert    LABS:                        11.2   8.35  )-----------( 287      ( 30 Dec 2018 08:27 )             33.8     12-31    140  |  102  |  17  ----------------------------<  199<H>  4.2   |  26  |  0.95    Ca    9.4      31 Dec 2018 06:52                    Hemoglobin A1C, Whole Blood: 9.0 % (12-30 @ 19:21)    Thyroid Stimulating Hormone, Serum: 1.06 uIU/mL (12-29 @ 08:52)          Consultant(s) Notes Reviewed:      Care Discussed with Consultants/Other Providers:
no  complaints    REVIEW OF SYSTEMS:  GEN: no fever,    no chills  RESP: no SOB,   no cough  CVS: no chest pain,   no palpitations  GI: no abdominal pain,   no nausea,   no vomiting,   no constipation,   no diarrhea  : no dysuria,   no frequency  NEURO: no headache,   no dizziness  PSYCH: no depression,   not anxious  Derm : no rash    MEDICATIONS  (STANDING):  apixaban 5 milliGRAM(s) Oral every 12 hours  dextrose 5%. 1000 milliLiter(s) (50 mL/Hr) IV Continuous <Continuous>  dextrose 50% Injectable 12.5 Gram(s) IV Push once  dextrose 50% Injectable 25 Gram(s) IV Push once  dextrose 50% Injectable 25 Gram(s) IV Push once  hydrocortisone 20 milliGRAM(s) Oral <User Schedule>  hydrocortisone 10 milliGRAM(s) Oral <User Schedule>  influenza   Vaccine 0.5 milliLiter(s) IntraMuscular once  insulin glargine Injectable (LANTUS) 6 Unit(s) SubCutaneous at bedtime  insulin lispro (HumaLOG) corrective regimen sliding scale   SubCutaneous three times a day before meals  insulin lispro (HumaLOG) corrective regimen sliding scale   SubCutaneous at bedtime  polyethylene glycol 3350 17 Gram(s) Oral daily  senna 2 Tablet(s) Oral at bedtime  simvastatin 20 milliGRAM(s) Oral at bedtime  sodium chloride 0.9%. 1000 milliLiter(s) (80 mL/Hr) IV Continuous <Continuous>    MEDICATIONS  (PRN):  dextrose 40% Gel 15 Gram(s) Oral once PRN Blood Glucose LESS THAN 70 milliGRAM(s)/deciliter  glucagon  Injectable 1 milliGRAM(s) IntraMuscular once PRN Glucose LESS THAN 70 milligrams/deciliter      Vital Signs Last 24 Hrs  T(C): 36.7 (01 Jan 2019 04:01), Max: 36.9 (31 Dec 2018 20:21)  T(F): 98 (01 Jan 2019 04:01), Max: 98.4 (31 Dec 2018 20:21)  HR: 66 (01 Jan 2019 04:01) (60 - 66)  BP: 165/97 (01 Jan 2019 04:01) (161/85 - 175/87)  BP(mean): --  RR: 18 (01 Jan 2019 04:01) (17 - 18)  SpO2: 98% (01 Jan 2019 04:01) (97% - 98%)  CAPILLARY BLOOD GLUCOSE      POCT Blood Glucose.: 163 mg/dL (01 Jan 2019 07:33)  POCT Blood Glucose.: 274 mg/dL (31 Dec 2018 21:48)  POCT Blood Glucose.: 143 mg/dL (31 Dec 2018 16:25)  POCT Blood Glucose.: 175 mg/dL (31 Dec 2018 11:36)    I&O's Summary    31 Dec 2018 07:01  -  01 Jan 2019 07:00  --------------------------------------------------------  IN: 1880 mL / OUT: 1200 mL / NET: 680 mL        PHYSICAL EXAM:  HEAD:  Atraumatic, Normocephalic  NECK: Supple, No   JVD  CHEST/LUNG:   no     rales,     no,    rhonchi  HEART: Regular rate and rhythm;         murmur  ABDOMEN: Soft, Nontender, ;   EXTREMITIES:    no    edema  NEUROLOGY:  alert    LABS:                        11.3   6.88  )-----------( 265      ( 31 Dec 2018 08:10 )             34.2     01-01    138  |  102  |  21  ----------------------------<  159<H>  3.6   |  23  |  0.98    Ca    8.9      01 Jan 2019 06:28                    Hemoglobin A1C, Whole Blood: 9.0 % (12-30 @ 19:21)    Thyroid Stimulating Hormone, Serum: 1.06 uIU/mL (12-29 @ 08:52)          Consultant(s) Notes Reviewed:      Care Discussed with Consultants/Other Providers:
no  cp/sob  REVIEW OF SYSTEMS:  GEN: no fever,    no chills  RESP: no SOB,   no cough  CVS: no chest pain,   no palpitations  GI: no abdominal pain,   no nausea,   no vomiting,   no constipation,   no diarrhea  : no dysuria,   no frequency  NEURO: no headache,   no dizziness  PSYCH: no depression,   not anxious  Derm : no rash    MEDICATIONS  (STANDING):  apixaban 5 milliGRAM(s) Oral every 12 hours  digoxin     Tablet 0.125 milliGRAM(s) Oral daily  influenza   Vaccine 0.5 milliLiter(s) IntraMuscular once  simvastatin 20 milliGRAM(s) Oral at bedtime  sodium chloride 0.9%. 1000 milliLiter(s) (50 mL/Hr) IV Continuous <Continuous>    MEDICATIONS  (PRN):      Vital Signs Last 24 Hrs  T(C): 36.6 (28 Dec 2018 04:41), Max: 37 (27 Dec 2018 16:12)  T(F): 97.9 (28 Dec 2018 04:41), Max: 98.6 (27 Dec 2018 16:12)  HR: 70 (28 Dec 2018 04:41) (62 - 72)  BP: 124/80 (28 Dec 2018 04:41) (124/80 - 187/99)  BP(mean): --  RR: 18 (28 Dec 2018 04:41) (16 - 18)  SpO2: 97% (28 Dec 2018 04:41) (97% - 98%)  CAPILLARY BLOOD GLUCOSE        I&O's Summary    27 Dec 2018 07:01  -  28 Dec 2018 07:00  --------------------------------------------------------  IN: 240 mL / OUT: 700 mL / NET: -460 mL        PHYSICAL EXAM:  HEAD:  Atraumatic, Normocephalic  NECK: Supple, No   JVD  CHEST/LUNG:   no     rales,     no,    rhonchi  HEART: Regular rate and rhythm;         murmur  ABDOMEN: Soft, Nontender, ;   EXTREMITIES:      no  edema  NEUROLOGY:  alert    LABS:                        13.1   7.34  )-----------( 288      ( 28 Dec 2018 07:02 )             40.0     12-28    137  |  99  |  24<H>  ----------------------------<  192<H>  4.3   |  22  |  1.02    Ca    9.9      28 Dec 2018 06:11    TPro  8.4<H>  /  Alb  4.8  /  TBili  0.5  /  DBili  x   /  AST  18  /  ALT  18  /  AlkPhos  53  12-27                            Consultant(s) Notes Reviewed:      Care Discussed with Consultants/Other Providers:

## 2019-01-07 ENCOUNTER — INBOUND DOCUMENT (OUTPATIENT)
Age: 74
End: 2019-01-07

## 2019-01-29 ENCOUNTER — APPOINTMENT (OUTPATIENT)
Dept: OTOLARYNGOLOGY | Facility: CLINIC | Age: 74
End: 2019-01-29
Payer: MEDICARE

## 2019-01-29 VITALS
SYSTOLIC BLOOD PRESSURE: 161 MMHG | WEIGHT: 129 LBS | HEIGHT: 69 IN | HEART RATE: 73 BPM | BODY MASS INDEX: 19.11 KG/M2 | DIASTOLIC BLOOD PRESSURE: 80 MMHG

## 2019-01-29 PROCEDURE — 99213 OFFICE O/P EST LOW 20 MIN: CPT | Mod: 25

## 2019-01-29 PROCEDURE — 31231 NASAL ENDOSCOPY DX: CPT

## 2019-01-29 RX ORDER — DIGOXIN 250 UG/1
250 TABLET ORAL DAILY
Refills: 0 | Status: COMPLETED | COMMUNITY
End: 2019-01-29

## 2019-01-29 RX ORDER — MEGESTROL ACETATE 40 MG/ML
40 SUSPENSION ORAL DAILY
Qty: 600 | Refills: 3 | Status: COMPLETED | COMMUNITY
Start: 2018-10-16 | End: 2019-01-29

## 2019-01-29 RX ORDER — LACTOBACILLUS RHAMNOSUS GG 10B CELL
CAPSULE ORAL
Refills: 0 | Status: COMPLETED | COMMUNITY
Start: 2017-08-10 | End: 2019-01-29

## 2019-01-29 RX ORDER — ERYTHROMYCIN 250 MG/1
250 TABLET, FILM COATED ORAL EVERY 8 HOURS
Qty: 90 | Refills: 3 | Status: COMPLETED | COMMUNITY
Start: 2018-11-13 | End: 2019-01-29

## 2019-01-29 RX ORDER — MEGESTROL ACETATE 40 MG/ML
40 SUSPENSION ORAL
Qty: 600 | Refills: 3 | Status: COMPLETED | COMMUNITY
Start: 2018-09-11 | End: 2019-01-29

## 2019-01-29 RX ORDER — UMECLIDINIUM BROMIDE AND VILANTEROL TRIFENATATE 62.5; 25 UG/1; UG/1
62.5-25 POWDER RESPIRATORY (INHALATION)
Qty: 1 | Refills: 3 | Status: COMPLETED | COMMUNITY
Start: 2018-09-04 | End: 2019-01-29

## 2019-01-29 RX ORDER — ALBUTEROL SULFATE 108 UG/1
108 (90 BASE) AEROSOL, METERED RESPIRATORY (INHALATION)
Qty: 1 | Refills: 5 | Status: COMPLETED | COMMUNITY
Start: 2018-09-04 | End: 2019-01-29

## 2019-01-29 NOTE — HISTORY OF PRESENT ILLNESS
[de-identified] : Pt with hx of SCC R NPC is here today with R ear drainage for the past two days.  He denies any otalgia, dizziness.  His hearing is stable.  He denies any recent URI.  He also denies any epistaxis, nasal fullness, dyspnea, dysphagia or weight loss.  He was recently admitted to hospital for workup of syncopal episodes.

## 2019-01-29 NOTE — PROCEDURE
[None] : none [Flexible Endoscope] : examined with the flexible endoscope [Serial Number: ___] : Serial Number: [unfilled] [FreeTextEntry6] : Posttreatment changes in the NPx, no new lesions, no crusting, no lesions in the NC.  [de-identified] : NPC

## 2019-01-29 NOTE — PHYSICAL EXAM
[de-identified] : Posttreatment changes, no obvious LAD. [de-identified] : AD - purulent drainage in the EAC. [Nasal Endoscopy Performed] : nasal endoscopy was performed, see procedure section for findings [Midline] : trachea located in midline position [Edentulous] : edentulous [Normal] : no rashes [de-identified] : No other LAD.

## 2019-02-04 ENCOUNTER — APPOINTMENT (OUTPATIENT)
Dept: OTOLARYNGOLOGY | Facility: CLINIC | Age: 74
End: 2019-02-04
Payer: MEDICARE

## 2019-02-04 ENCOUNTER — APPOINTMENT (OUTPATIENT)
Dept: RADIATION ONCOLOGY | Facility: CLINIC | Age: 74
End: 2019-02-04
Payer: MEDICARE

## 2019-02-04 VITALS
HEART RATE: 70 BPM | SYSTOLIC BLOOD PRESSURE: 168 MMHG | BODY MASS INDEX: 19.99 KG/M2 | WEIGHT: 135 LBS | DIASTOLIC BLOOD PRESSURE: 88 MMHG | HEIGHT: 69 IN

## 2019-02-04 VITALS
SYSTOLIC BLOOD PRESSURE: 156 MMHG | OXYGEN SATURATION: 97 % | WEIGHT: 135.14 LBS | DIASTOLIC BLOOD PRESSURE: 84 MMHG | RESPIRATION RATE: 16 BRPM | HEART RATE: 64 BPM | BODY MASS INDEX: 19.96 KG/M2

## 2019-02-04 PROBLEM — F10.10 ALCOHOL ABUSE, UNCOMPLICATED: Chronic | Status: ACTIVE | Noted: 2018-12-27

## 2019-02-04 PROCEDURE — 99213 OFFICE O/P EST LOW 20 MIN: CPT

## 2019-02-04 NOTE — HISTORY OF PRESENT ILLNESS
[de-identified] : Pt is here to f/up for R ear drainage.  Pt states he is still having trouble hearing and still has some drainage.  He is using the Ciprodex drops twice a day.  He has been using dry ear precautions.  He denies any pain.

## 2019-02-04 NOTE — REVIEW OF SYSTEMS
[Dysphagia: Grade 0] : Dysphagia: Grade 0 [Fatigue: Grade 0] : Fatigue: Grade 0 [Xerostomia: Grade 0] : Xerostomia: Grade 0 [Oral Pain: Grade 0] : Oral Pain: Grade 0 [Dysgeusia: Grade 0] : Dysgeusia: Grade 0 [Skin Hyperpigmentation: Grade 1 - Hyperpigmentation covering <10% BSA; no psychosocial impact] : Skin Hyperpigmentation: Grade 1 - Hyperpigmentation covering <10% BSA; no psychosocial impact [Dermatitis Radiation: Grade 0] : Dermatitis Radiation: Grade 0

## 2019-02-04 NOTE — HISTORY OF PRESENT ILLNESS
[FreeTextEntry1] : Mr. 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 He underwent carboplatin and taxol under the care of Dr. Spann. Dr. Hidalgo is his ENT. He completed radiotherapy to 70 Gy on 11/23/16. He did not complete a full course of carboplatin secondary to elevated BUN/Cr but otherwise completed chemotherapy on 3/28/17.\par Followed by Dr. Hidalgo last visit 1/9/19. \par Denies epistaxis, nasal fullness, pain or dysphagia. Maintaining weight \par

## 2019-02-04 NOTE — PHYSICAL EXAM
[de-identified] : Posttreatment changes, no obvious LAD. [de-identified] : AD - purulence is resolved, persistent erythema.   [Midline] : trachea located in midline position [Edentulous] : edentulous [Normal] : no rashes [de-identified] : No other LAD.

## 2019-02-05 ENCOUNTER — APPOINTMENT (OUTPATIENT)
Dept: NEPHROLOGY | Facility: CLINIC | Age: 74
End: 2019-02-05
Payer: MEDICARE

## 2019-02-05 VITALS
DIASTOLIC BLOOD PRESSURE: 79 MMHG | HEART RATE: 41 BPM | OXYGEN SATURATION: 97 % | HEIGHT: 69 IN | BODY MASS INDEX: 19.99 KG/M2 | WEIGHT: 135 LBS | SYSTOLIC BLOOD PRESSURE: 148 MMHG

## 2019-02-05 DIAGNOSIS — R00.1 BRADYCARDIA, UNSPECIFIED: ICD-10-CM

## 2019-02-05 LAB
BASOPHILS # BLD AUTO: 0.02 K/UL
BASOPHILS NFR BLD AUTO: 0.3 %
EOSINOPHIL # BLD AUTO: 0.01 K/UL
EOSINOPHIL NFR BLD AUTO: 0.1 %
ESTIMATED AVERAGE GLUCOSE: 189 MG/DL
HBA1C MFR BLD HPLC: 8.2 %
HCT VFR BLD CALC: 42.5 %
HGB BLD-MCNC: 13.2 G/DL
IMM GRANULOCYTES NFR BLD AUTO: 1.2 %
LYMPHOCYTES # BLD AUTO: 1.86 K/UL
LYMPHOCYTES NFR BLD AUTO: 24.7 %
MAN DIFF?: NORMAL
MCHC RBC-ENTMCNC: 31.1 GM/DL
MCHC RBC-ENTMCNC: 31.1 PG
MCV RBC AUTO: 100.2 FL
MONOCYTES # BLD AUTO: 0.55 K/UL
MONOCYTES NFR BLD AUTO: 7.3 %
NEUTROPHILS # BLD AUTO: 5.01 K/UL
NEUTROPHILS NFR BLD AUTO: 66.4 %
PLATELET # BLD AUTO: 362 K/UL
RBC # BLD: 4.24 M/UL
RBC # FLD: 15.9 %
WBC # FLD AUTO: 7.54 K/UL

## 2019-02-05 PROCEDURE — 99214 OFFICE O/P EST MOD 30 MIN: CPT | Mod: 25

## 2019-02-05 PROCEDURE — 93000 ELECTROCARDIOGRAM COMPLETE: CPT

## 2019-02-06 LAB
25(OH)D3 SERPL-MCNC: 26.8 NG/ML
ALBUMIN SERPL ELPH-MCNC: 4.6 G/DL
ANION GAP SERPL CALC-SCNC: 16 MMOL/L
APPEARANCE: CLEAR
BACTERIA: NEGATIVE
BILIRUBIN URINE: NEGATIVE
BLOOD URINE: NEGATIVE
BUN SERPL-MCNC: 16 MG/DL
CALCIUM SERPL-MCNC: 10.5 MG/DL
CALCIUM SERPL-MCNC: 10.5 MG/DL
CHLORIDE SERPL-SCNC: 100 MMOL/L
CO2 SERPL-SCNC: 26 MMOL/L
COLOR: ABNORMAL
CREAT SERPL-MCNC: 1.08 MG/DL
CREAT SPEC-SCNC: 156 MG/DL
CREAT/PROT UR: 0.1 RATIO
FERRITIN SERPL-MCNC: 222 NG/ML
GLUCOSE QUALITATIVE U: NEGATIVE MG/DL
GLUCOSE SERPL-MCNC: 96 MG/DL
HYALINE CASTS: 1 /LPF
IRON SATN MFR SERPL: 27 %
IRON SERPL-MCNC: 75 UG/DL
KETONES URINE: NEGATIVE
LEUKOCYTE ESTERASE URINE: NEGATIVE
MAGNESIUM SERPL-MCNC: 2.1 MG/DL
MICROSCOPIC-UA: NORMAL
NITRITE URINE: NEGATIVE
PARATHYROID HORMONE INTACT: 27 PG/ML
PH URINE: 5.5
PHOSPHATE SERPL-MCNC: 4.2 MG/DL
POTASSIUM SERPL-SCNC: 4.1 MMOL/L
PROT UR-MCNC: 11 MG/DL
PROTEIN URINE: NEGATIVE MG/DL
RED BLOOD CELLS URINE: 2 /HPF
SODIUM SERPL-SCNC: 142 MMOL/L
SPECIFIC GRAVITY URINE: 1.02
SQUAMOUS EPITHELIAL CELLS: 0 /HPF
TIBC SERPL-MCNC: 278 UG/DL
UIBC SERPL-MCNC: 203 UG/DL
UROBILINOGEN URINE: NEGATIVE MG/DL
WHITE BLOOD CELLS URINE: 1 /HPF

## 2019-02-06 NOTE — ASSESSMENT
[FreeTextEntry1] : Mr Hernandez is a 73 y/o man with H/O   nasopharyngeal cancer treated with chemotherapy /Radiation therapy complicated with ARTURO. 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 in the past  in the setting of cisplatin/dehydration /ARB  \par serum creatinine during recent hospitalization was in the normal range( 0.9-1.2 mg/dl) \par Renal function today good\par No proteinuria or hematuria\par Electrolytes in range\par Encourage continued PO hydration  \par  \par HTN:  BP is a bit on the high side today\par At home it fluctuates but mostly in the 130/80 range\par Given the recent hospitalization for syncope/orthostasis will hold off on any treatment for now\par \par Arrythmia: \par Irregular HR on palpation\par 12 lead ekg obtained\par Sinus rhythm with a bigemini  pattern\par Told him to f/u with his cardiologist tomorrow\par Monitor without medications \par  \par Vitamin D insufficiency: start low dose vit D \par Called and left VM\par \par F/U in 2  months\par \par \par \par \par \par \par \par Dr cool

## 2019-02-06 NOTE — PHYSICAL EXAM
[General Appearance - Alert] : alert [General Appearance - In No Acute Distress] : in no acute distress [Sclera] : the sclera and conjunctiva were normal [PERRL With Normal Accommodation] : pupils were equal in size, round, and reactive to light [] : no respiratory distress [Respiration, Rhythm And Depth] : normal respiratory rhythm and effort [Exaggerated Use Of Accessory Muscles For Inspiration] : no accessory muscle use [Auscultation Breath Sounds / Voice Sounds] : lungs were clear to auscultation bilaterally [Heart Sounds Gallop] : no gallops [Murmurs] : no murmurs [Edema] : there was no peripheral edema [Heart Sounds Pericardial Friction Rub] : no pericardial rub [FreeTextEntry1] : irregular
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
## 2019-02-06 NOTE — REVIEW OF SYSTEMS
[Loss Of Hearing] : hearing loss [Chills] : chills [Negative] : Gastrointestinal [Fever] : no fever [Feeling Poorly] : not feeling poorly [Recent Weight Gain (___ Lbs)] : no recent weight gain [Recent Weight Loss (___ Lbs)] : no recent weight loss [Eyesight Problems] : no eyesight problems [Earache] : no earache [Nosebleeds] : no nosebleeds [Nasal Discharge] : no nasal discharge [Sore Throat] : no sore throat [Hoarseness] : no hoarseness [Heart Rate Is Slow] : the heart rate was not slow [Heart Rate Is Fast] : the heart rate was not fast [Chest Pain] : no chest pain [Palpitations] : no palpitations [Shortness Of Breath] : no shortness of breath [Wheezing] : no wheezing [Cough] : no cough [SOB on Exertion] : no shortness of breath during exertion [Abdominal Pain] : no abdominal pain [Vomiting] : no vomiting [Constipation] : no constipation [Diarrhea] : no diarrhea [Dysuria] : no dysuria [Incontinence] : no incontinence [Hesitancy] : no urinary hesitancy [Nocturia] : no nocturia [Skin Lesions] : no skin lesions [Dry Skin] : no dry skin

## 2019-02-19 ENCOUNTER — APPOINTMENT (OUTPATIENT)
Dept: OTOLARYNGOLOGY | Facility: CLINIC | Age: 74
End: 2019-02-19
Payer: MEDICARE

## 2019-02-19 VITALS
DIASTOLIC BLOOD PRESSURE: 81 MMHG | HEIGHT: 69 IN | HEART RATE: 69 BPM | WEIGHT: 135 LBS | BODY MASS INDEX: 19.99 KG/M2 | SYSTOLIC BLOOD PRESSURE: 170 MMHG

## 2019-02-19 PROCEDURE — 99214 OFFICE O/P EST MOD 30 MIN: CPT | Mod: 25

## 2019-02-19 PROCEDURE — 31231 NASAL ENDOSCOPY DX: CPT

## 2019-02-19 RX ORDER — CIPROFLOXACIN AND DEXAMETHASONE 3; 1 MG/ML; MG/ML
0.3-0.1 SUSPENSION/ DROPS AURICULAR (OTIC) TWICE DAILY
Qty: 1 | Refills: 0 | Status: COMPLETED | COMMUNITY
Start: 2019-01-29 | End: 2019-02-19

## 2019-02-19 NOTE — PROCEDURE
[None] : none [Flexible Endoscope] : examined with the flexible endoscope [Serial Number: ___] : Serial Number: [unfilled] [Normal] : the paranasal sinuses had no abnormalities [FreeTextEntry6] : Posttreatment changes in the NPx, no new lesions, no crusting, no lesions in the NC. \par  [de-identified] : NPC

## 2019-02-19 NOTE — PHYSICAL EXAM
[de-identified] : Posttreatment changes, no obvious LAD. [de-identified] : The PET appears to be in the middle ear space with no active drainage. [Midline] : trachea located in midline position [Edentulous] : edentulous [Normal] : no rashes [de-identified] : No other LAD.

## 2019-02-19 NOTE — HISTORY OF PRESENT ILLNESS
[de-identified] : Patient with T4 N1 M0 SCCa R. NPC. He is now s/p concurrent CXRT and consolidative chemotherapy completed in March 2017.  Pt is here to f/up for a R ear infection.  Pt stopped the Ciprodex on Feb 12th.  Pt c/o decreased hearing in the R ear.  Denies pain or drainage.  He denies any epistaxis, his weight is stable, no otalgia, no otorrhea.

## 2019-03-21 ENCOUNTER — APPOINTMENT (OUTPATIENT)
Dept: OTOLARYNGOLOGY | Facility: CLINIC | Age: 74
End: 2019-03-21
Payer: MEDICARE

## 2019-03-21 VITALS
WEIGHT: 135 LBS | HEART RATE: 79 BPM | BODY MASS INDEX: 19.99 KG/M2 | HEIGHT: 69 IN | SYSTOLIC BLOOD PRESSURE: 132 MMHG | DIASTOLIC BLOOD PRESSURE: 78 MMHG

## 2019-03-21 PROCEDURE — 92504 EAR MICROSCOPY EXAMINATION: CPT

## 2019-03-21 PROCEDURE — 92567 TYMPANOMETRY: CPT

## 2019-03-21 PROCEDURE — 92557 COMPREHENSIVE HEARING TEST: CPT

## 2019-03-21 PROCEDURE — 69200 CLEAR OUTER EAR CANAL: CPT | Mod: RT

## 2019-03-21 PROCEDURE — 99214 OFFICE O/P EST MOD 30 MIN: CPT | Mod: 25

## 2019-03-23 NOTE — PHYSICAL EXAM
[Midline] : trachea located in midline position [Normal] : no rashes [Hearing Loss Right Only] : normal [Hearing Loss Left Only] : normal [de-identified] : + dry perforation [de-identified] : + PE tube in ME

## 2019-03-23 NOTE — PROCEDURE
[Same] : same as the Pre Op Dx. [] : Removal of FB: Ear Canal [FreeTextEntry1] : R TM perf, foreign body middle ear [FreeTextEntry4] : none [FreeTextEntry6] : retained myringotomy tube was removed from the middle ear through the tympanic membrane perforation under binocular microscopy with a combination of a suction, mac needle, alligator and/or a loop curette. The patient tolerated the procedure well and there were no complications. The included findings were noted.\par

## 2019-03-23 NOTE — REASON FOR VISIT
[Initial Consultation] : an initial consultation for [FreeTextEntry2] : right ear tube check, a patient of Dr Wilberto Hidalgo

## 2019-03-23 NOTE — HISTORY OF PRESENT ILLNESS
[de-identified] : 75 y/o male here for right ear tube check, a patient of Dr Wilberto Hidalgo. Pt has history of chemo and radiation for nasopharyngeal cancer. Pt has had the tube in since July, 2016. Pt wants to see if the tube should stay in or come out. Pt has had 1 ear infection with otorrhea (clear to yellow in consistency). Pt c/o of hearing loss when watching TV. Pt denies otalgia, tinnitus, dizziness, vertigo or headaches related to hearing.\par

## 2019-04-04 ENCOUNTER — APPOINTMENT (OUTPATIENT)
Dept: NEPHROLOGY | Facility: CLINIC | Age: 74
End: 2019-04-04
Payer: MEDICARE

## 2019-04-04 VITALS
SYSTOLIC BLOOD PRESSURE: 130 MMHG | BODY MASS INDEX: 20.57 KG/M2 | WEIGHT: 138.89 LBS | OXYGEN SATURATION: 95 % | HEART RATE: 76 BPM | HEIGHT: 69 IN | DIASTOLIC BLOOD PRESSURE: 77 MMHG

## 2019-04-04 VITALS — SYSTOLIC BLOOD PRESSURE: 140 MMHG | DIASTOLIC BLOOD PRESSURE: 80 MMHG

## 2019-04-04 PROCEDURE — 99214 OFFICE O/P EST MOD 30 MIN: CPT
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
## 2019-04-04 NOTE — ASSESSMENT
[FreeTextEntry1] : Mr Hernandez is a 73 y/o man with H/O   nasopharyngeal cancer treated with chemotherapy /Radiation therapy complicated with ARTURO. 

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
## 2019-04-05 ENCOUNTER — OTHER (OUTPATIENT)
Age: 74
End: 2019-04-05

## 2019-04-09 ENCOUNTER — APPOINTMENT (OUTPATIENT)
Dept: CT IMAGING | Facility: IMAGING CENTER | Age: 74
End: 2019-04-09
Payer: MEDICARE

## 2019-04-09 ENCOUNTER — OUTPATIENT (OUTPATIENT)
Dept: OUTPATIENT SERVICES | Facility: HOSPITAL | Age: 74
LOS: 1 days | End: 2019-04-09
Payer: MEDICARE

## 2019-04-09 DIAGNOSIS — Z98.890 OTHER SPECIFIED POSTPROCEDURAL STATES: Chronic | ICD-10-CM

## 2019-04-09 DIAGNOSIS — Z90.49 ACQUIRED ABSENCE OF OTHER SPECIFIED PARTS OF DIGESTIVE TRACT: Chronic | ICD-10-CM

## 2019-04-09 DIAGNOSIS — K86.9 DISEASE OF PANCREAS, UNSPECIFIED: ICD-10-CM

## 2019-04-09 DIAGNOSIS — Z90.89 ACQUIRED ABSENCE OF OTHER ORGANS: Chronic | ICD-10-CM

## 2019-04-09 DIAGNOSIS — Z98.89 OTHER SPECIFIED POSTPROCEDURAL STATES: Chronic | ICD-10-CM

## 2019-04-09 PROCEDURE — 74177 CT ABD & PELVIS W/CONTRAST: CPT | Mod: 26

## 2019-04-09 PROCEDURE — 74177 CT ABD & PELVIS W/CONTRAST: CPT

## 2019-04-10 ENCOUNTER — APPOINTMENT (OUTPATIENT)
Dept: PULMONOLOGY | Facility: CLINIC | Age: 74
End: 2019-04-10
Payer: MEDICARE

## 2019-04-10 VITALS
TEMPERATURE: 97.9 F | HEIGHT: 69 IN | RESPIRATION RATE: 16 BRPM | SYSTOLIC BLOOD PRESSURE: 154 MMHG | DIASTOLIC BLOOD PRESSURE: 88 MMHG | WEIGHT: 140 LBS | HEART RATE: 81 BPM | BODY MASS INDEX: 20.73 KG/M2

## 2019-04-10 PROCEDURE — 99214 OFFICE O/P EST MOD 30 MIN: CPT

## 2019-04-10 NOTE — ASSESSMENT
[FreeTextEntry1] : 73 y/o M with emphysema/COPD, nasopharyngeal ca history who presents with cough for 1 month. His cough maybe related to his COPD and him not taking medications vs aspiration vs allergies. \par \par - Referral to speech therapy for evaluation of swallowing. \par - Restart anoro daily.\par - Restart albuterol as needed.\par - Use claritin as needed. \par \par RTC in 6 months unless symptoms worse.

## 2019-04-10 NOTE — REVIEW OF SYSTEMS
[Cough] : cough [Sputum] : sputum  [Hay Fever] : hay fever [Nasal Discharge] : nasal discharge [As Noted in HPI] : as noted in HPI [Negative] : Pulmonary Hypertension [Dyspnea] : no dyspnea [Hemoptysis] : no hemoptysis [Frequent URIs] : no frequent upper respiratory infections [Chest Tightness] : no chest tightness [Pleuritic Pain] : no pleuritic pain [Wheezing] : no wheezing

## 2019-04-10 NOTE — PHYSICAL EXAM
[III] : III [General Appearance - Well Developed] : well developed [Normal Appearance] : normal appearance [No Deformities] : no deformities [Well Groomed] : well groomed [General Appearance - Well Nourished] : well nourished [Eyelids - No Xanthelasma] : the eyelids demonstrated no xanthelasmas [General Appearance - In No Acute Distress] : no acute distress [Normal Conjunctiva] : the conjunctiva exhibited no abnormalities [Normal Oropharynx] : normal oropharynx [Neck Appearance] : the appearance of the neck was normal [Neck Cervical Mass (___cm)] : no neck mass was observed [Thyroid Diffuse Enlargement] : the thyroid was not enlarged [Jugular Venous Distention Increased] : there was no jugular-venous distention [Heart Rate And Rhythm] : heart rate and rhythm were normal [Heart Sounds] : normal S1 and S2 [Thyroid Nodule] : there were no palpable thyroid nodules [Murmurs] : no murmurs present [Respiration, Rhythm And Depth] : normal respiratory rhythm and effort [Auscultation Breath Sounds / Voice Sounds] : lungs were clear to auscultation bilaterally [Exaggerated Use Of Accessory Muscles For Inspiration] : no accessory muscle use [Abdomen Soft] : soft [Abdomen Tenderness] : non-tender [Abdomen Mass (___ Cm)] : no abdominal mass palpated [Abnormal Walk] : normal gait [Gait - Sufficient For Exercise Testing] : the gait was sufficient for exercise testing [Nail Clubbing] : no clubbing of the fingernails [Cyanosis, Localized] : no localized cyanosis [Petechial Hemorrhages (___cm)] : no petechial hemorrhages [Skin Color & Pigmentation] : normal skin color and pigmentation [Skin Lesions] : no skin lesions [No Venous Stasis] : no venous stasis [] : no rash [No Skin Ulcers] : no skin ulcer [No Xanthoma] : no  xanthoma was observed [Deep Tendon Reflexes (DTR)] : deep tendon reflexes were 2+ and symmetric [Sensation] : the sensory exam was normal to light touch and pinprick [No Focal Deficits] : no focal deficits [Oriented To Time, Place, And Person] : oriented to person, place, and time [Impaired Insight] : insight and judgment were intact [Affect] : the affect was normal [FreeTextEntry1] : Prolonged expiratory phase

## 2019-04-10 NOTE — DISCUSSION/SUMMARY
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 A. fib on Eliquis. And symptoms of dysphasia.\par \par Restart ANORO for treatment of COPD. P.r.n. Proventil\par \par Patient advised to follow up with speech and swallow and ENT for evaluation of dysphagia\par \par Followup in 6 months unless needed sooner.

## 2019-04-10 NOTE — HISTORY OF PRESENT ILLNESS
[FreeTextEntry1] : 73-year-old male here for initial evaluation for history of emphysema. 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 He also had a history of neuroendocrine tumor of the bowel, status post right hemicolectomy in 2016, prior to the discovery of the nasopharyngeal cancer. Patient is a former smoker, approximately half a pack per day from age [age]. He quit at the time of his colon surgery. He had also been a drinker of one to one and a half pints per day, also quit at the time of surgery.\par \par Other past medical history includes A. fib, for which he is on Eliquis and digoxin, as well as hypercholesterolemia.\par \par Most recent CAT scan from May 2018 demonstrated emphysema.\par \par Patient on his previous visit was started on Anoro. Since then, he had a distal pancreatectomy for a lesion. Then started Anoro. Ended up in the hospital end of december for syncope and was diagnosed with adrenal insufficiency and T2DM and was started on hydrocortisone. On discharge was never restarted on Anoro or his as neede proventil. He had a couple of good months. Then he has been coughing for the past month. He has it all the time, worse when he gets up. He feels sometimes its related to the food intake. Other times, he might have a post nasal drip.

## 2019-04-10 NOTE — ASSESSMENT
[FreeTextEntry1] : 75 y/o M with emphysema/COPD, nasopharyngeal ca history who presents with cough for 1 month. His cough maybe related to his COPD and him not taking medications vs aspiration vs allergies. \par \par - Referral to speech therapy for evaluation of swallowing. \par - Restart anoro daily.\par - Restart albuterol as needed.\par - Use claritin as needed. \par \par RTC in 6 months unless symptoms worse.

## 2019-04-10 NOTE — PHYSICAL EXAM
[III] : III [General Appearance - Well Developed] : well developed [Normal Appearance] : normal appearance [No Deformities] : no deformities [Well Groomed] : well groomed [General Appearance - Well Nourished] : well nourished [General Appearance - In No Acute Distress] : no acute distress [Eyelids - No Xanthelasma] : the eyelids demonstrated no xanthelasmas [Normal Conjunctiva] : the conjunctiva exhibited no abnormalities [Normal Oropharynx] : normal oropharynx [Neck Appearance] : the appearance of the neck was normal [Thyroid Diffuse Enlargement] : the thyroid was not enlarged [Neck Cervical Mass (___cm)] : no neck mass was observed [Jugular Venous Distention Increased] : there was no jugular-venous distention [Heart Rate And Rhythm] : heart rate and rhythm were normal [Thyroid Nodule] : there were no palpable thyroid nodules [Heart Sounds] : normal S1 and S2 [Murmurs] : no murmurs present [Auscultation Breath Sounds / Voice Sounds] : lungs were clear to auscultation bilaterally [Exaggerated Use Of Accessory Muscles For Inspiration] : no accessory muscle use [Respiration, Rhythm And Depth] : normal respiratory rhythm and effort [Abdomen Soft] : soft [Abdomen Tenderness] : non-tender [Abnormal Walk] : normal gait [Abdomen Mass (___ Cm)] : no abdominal mass palpated [Nail Clubbing] : no clubbing of the fingernails [Gait - Sufficient For Exercise Testing] : the gait was sufficient for exercise testing [Cyanosis, Localized] : no localized cyanosis [Petechial Hemorrhages (___cm)] : no petechial hemorrhages [Skin Color & Pigmentation] : normal skin color and pigmentation [] : no rash [No Venous Stasis] : no venous stasis [Skin Lesions] : no skin lesions [No Skin Ulcers] : no skin ulcer [No Xanthoma] : no  xanthoma was observed [Deep Tendon Reflexes (DTR)] : deep tendon reflexes were 2+ and symmetric [Sensation] : the sensory exam was normal to light touch and pinprick [No Focal Deficits] : no focal deficits [Oriented To Time, Place, And Person] : oriented to person, place, and time [Impaired Insight] : insight and judgment were intact [Affect] : the affect was normal [FreeTextEntry1] : Prolonged expiratory phase

## 2019-04-10 NOTE — REVIEW OF SYSTEMS
[Cough] : cough [Sputum] : sputum  [Hay Fever] : hay fever [Nasal Discharge] : nasal discharge [As Noted in HPI] : as noted in HPI [Negative] : Sleep Disorder [Hemoptysis] : no hemoptysis [Dyspnea] : no dyspnea [Frequent URIs] : no frequent upper respiratory infections [Pleuritic Pain] : no pleuritic pain [Chest Tightness] : no chest tightness [Wheezing] : no wheezing

## 2019-04-11 ENCOUNTER — TRANSCRIPTION ENCOUNTER (OUTPATIENT)
Age: 74
End: 2019-04-11

## 2019-04-16 ENCOUNTER — APPOINTMENT (OUTPATIENT)
Dept: SURGERY | Facility: CLINIC | Age: 74
End: 2019-04-16
Payer: MEDICARE

## 2019-04-16 VITALS
DIASTOLIC BLOOD PRESSURE: 86 MMHG | SYSTOLIC BLOOD PRESSURE: 150 MMHG | BODY MASS INDEX: 20.44 KG/M2 | HEART RATE: 60 BPM | WEIGHT: 138 LBS | RESPIRATION RATE: 16 BRPM | HEIGHT: 69 IN

## 2019-04-16 PROCEDURE — 99214 OFFICE O/P EST MOD 30 MIN: CPT

## 2019-04-23 ENCOUNTER — APPOINTMENT (OUTPATIENT)
Dept: SURGERY | Facility: CLINIC | Age: 74
End: 2019-04-23

## 2019-05-17 ENCOUNTER — MEDICATION RENEWAL (OUTPATIENT)
Age: 74
End: 2019-05-17

## 2019-06-12 NOTE — REVIEW OF SYSTEMS
[Negative] : Gastrointestinal [FreeTextEntry2] : Reports some weight gain [FreeTextEntry5] : No chest pain  [FreeTextEntry6] : hx COPD - on treatment w/ inhaler  [FreeTextEntry8] : Denies abdominal pain, vomiting, constipation or diarrhea  [de-identified] : uses cane for assist with ambulation   [de-identified] : hx diabetes and adrenal insuffiency

## 2019-06-12 NOTE — PHYSICAL EXAM
[Normal] : oriented to person, place and time, with appropriate affect [de-identified] : Anicteric  [de-identified] : Thin male [de-identified] : Normal lips  [de-identified] : Supple  [de-identified] : Normal respiratory effort  [de-identified] : Not distended, non tender

## 2019-06-12 NOTE — HISTORY OF PRESENT ILLNESS
[de-identified] : 73 y.o. male with history as below, including nasopharyngeal carcinoma s/p chemo and RT, COPD, CKD, DM, who is s/p open distal pancreatectomy and splenectomy on 9/9/18 for pancreas body/tail lesion. 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 His most recent CT abdomen 4/9/19 is without evidence of recurrence of pancreas lesion.Patient states he has gained a few lbs since prior visit. He denies abdominal pain, diarrhea, or oily stools. Patient's wife also reports he was diagnosed with adrenal insufficiency, for which he is taking hydrocortisone tablet twice daily. His endocrinologist is Dr. Julian Eckert. Patient is taking Tradjenta daily for his diabetes since January. Wife reports his blood glucose management has improved over the past 2 months (she brought in a log book).

## 2019-06-12 NOTE — ASSESSMENT
[FreeTextEntry1] : 73 y.o. male s/p open distal pancreatectomy and splenectomy on 9/9/18 for pancreas lesion, path reports IPMN with high grade dysplasia. Most recent CT 4/9/19 without evidence of recurrence of pancreas lesion.\par -CT abdomen in 6 months for surveillance\par -RTC after CT \par -Continued follow-ups with his endocrinologist

## 2019-07-08 ENCOUNTER — APPOINTMENT (OUTPATIENT)
Dept: RADIATION ONCOLOGY | Facility: CLINIC | Age: 74
End: 2019-07-08
Payer: MEDICARE

## 2019-07-13 ENCOUNTER — FORM ENCOUNTER (OUTPATIENT)
Age: 74
End: 2019-07-13

## 2019-07-14 ENCOUNTER — APPOINTMENT (OUTPATIENT)
Dept: CT IMAGING | Facility: IMAGING CENTER | Age: 74
End: 2019-07-14
Payer: MEDICARE

## 2019-07-14 ENCOUNTER — OUTPATIENT (OUTPATIENT)
Dept: OUTPATIENT SERVICES | Facility: HOSPITAL | Age: 74
LOS: 1 days | End: 2019-07-14
Payer: MEDICARE

## 2019-07-14 DIAGNOSIS — Z98.890 OTHER SPECIFIED POSTPROCEDURAL STATES: Chronic | ICD-10-CM

## 2019-07-14 DIAGNOSIS — Z90.49 ACQUIRED ABSENCE OF OTHER SPECIFIED PARTS OF DIGESTIVE TRACT: Chronic | ICD-10-CM

## 2019-07-14 DIAGNOSIS — Z90.89 ACQUIRED ABSENCE OF OTHER ORGANS: Chronic | ICD-10-CM

## 2019-07-14 DIAGNOSIS — Z98.89 OTHER SPECIFIED POSTPROCEDURAL STATES: Chronic | ICD-10-CM

## 2019-07-14 DIAGNOSIS — C11.9 MALIGNANT NEOPLASM OF NASOPHARYNX, UNSPECIFIED: ICD-10-CM

## 2019-07-14 PROCEDURE — 71260 CT THORAX DX C+: CPT | Mod: 26

## 2019-07-14 PROCEDURE — 71260 CT THORAX DX C+: CPT

## 2019-07-14 PROCEDURE — 70491 CT SOFT TISSUE NECK W/DYE: CPT

## 2019-07-14 PROCEDURE — 70491 CT SOFT TISSUE NECK W/DYE: CPT | Mod: 26

## 2019-07-15 ENCOUNTER — APPOINTMENT (OUTPATIENT)
Dept: RADIATION ONCOLOGY | Facility: CLINIC | Age: 74
End: 2019-07-15
Payer: MEDICARE

## 2019-07-18 ENCOUNTER — APPOINTMENT (OUTPATIENT)
Dept: RADIATION ONCOLOGY | Facility: CLINIC | Age: 74
End: 2019-07-18
Payer: MEDICARE

## 2019-07-18 VITALS
RESPIRATION RATE: 14 BRPM | OXYGEN SATURATION: 96 % | HEART RATE: 50 BPM | TEMPERATURE: 94.28 F | SYSTOLIC BLOOD PRESSURE: 168 MMHG | DIASTOLIC BLOOD PRESSURE: 98 MMHG

## 2019-07-18 PROCEDURE — 99213 OFFICE O/P EST LOW 20 MIN: CPT | Mod: GC

## 2019-07-23 NOTE — END OF VISIT
[Resident] : Resident [>50% of Time Spent on Counseling and Coordination of Care for  ___] : Greater than 50% of the encounter time was spent on counseling and coordination of care for [unfilled] [Time Spent: ___ minutes] : I have spent [unfilled] minutes of face to face time with the patient

## 2019-07-23 NOTE — HISTORY OF PRESENT ILLNESS
[FreeTextEntry1] : Mr. 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 He underwent carboplatin and taxol under the care of Dr. Spann. Dr. Hidalgo is his ENT. He completed radiotherapy to 70 Gy on 11/23/16. 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 \par \par His most recent CT abdomen 4/9/19 is without evidence of recurrence of pancreas lesion. Has not had imaging of head/neck since December 2018. \par \par Followed by Dr. Hidalgo last visit 2/4/19. \par

## 2019-09-02 PROBLEM — Z09 FOLLOW UP: Status: ACTIVE | Noted: 2017-09-25

## 2019-10-08 ENCOUNTER — APPOINTMENT (OUTPATIENT)
Dept: PULMONOLOGY | Facility: CLINIC | Age: 74
End: 2019-10-08
Payer: MEDICARE

## 2019-10-08 ENCOUNTER — APPOINTMENT (OUTPATIENT)
Dept: OTOLARYNGOLOGY | Facility: CLINIC | Age: 74
End: 2019-10-08
Payer: MEDICARE

## 2019-10-08 VITALS
OXYGEN SATURATION: 97 % | RESPIRATION RATE: 16 BRPM | WEIGHT: 140.38 LBS | DIASTOLIC BLOOD PRESSURE: 82 MMHG | HEART RATE: 55 BPM | TEMPERATURE: 207.86 F | SYSTOLIC BLOOD PRESSURE: 156 MMHG | BODY MASS INDEX: 20.73 KG/M2

## 2019-10-08 VITALS — BODY MASS INDEX: 22.22 KG/M2 | WEIGHT: 150 LBS | HEIGHT: 69 IN

## 2019-10-08 PROCEDURE — 99214 OFFICE O/P EST MOD 30 MIN: CPT

## 2019-10-08 PROCEDURE — 99214 OFFICE O/P EST MOD 30 MIN: CPT | Mod: 25

## 2019-10-08 PROCEDURE — 31231 NASAL ENDOSCOPY DX: CPT

## 2019-10-08 NOTE — DISCUSSION/SUMMARY
[FreeTextEntry1] : 74 year old male, former smoker, moderate COPD, h/o head/neck SCC treated with RT and chemo , and mucinous adeno pancreas/splenectomy\par \par COPD- \par continue Anoro. 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 i have asked for results to be sent to me\par \par Received prevnar last year\par Needs flu and pneumovax  - they prefer to get by PMD\par \par f/u 6 mo

## 2019-10-08 NOTE — PROCEDURE
[None] : none [Flexible Endoscope] : examined with the flexible endoscope [Serial Number: ___] : Serial Number: [unfilled] [Normal] : the septum showed no abnormalities [FreeTextEntry6] : Posttreatment changes in the NPx, no new lesions, no crusting, no lesions in the NC.  [de-identified] : NPC

## 2019-10-08 NOTE — HISTORY OF PRESENT ILLNESS
[de-identified] : Patient with T4 N1 M0 SCCa R. NPC. He is now s/p concurrent CXRT and consolidative chemotherapy completed in March 2017.  Last scans were in July 2019.  Pt is being followed by Dr. De La Rosa for hearing loss.  Pt denies dysphonia, dysphagia, pain, SOB.  His weight is stable.  \par

## 2019-10-08 NOTE — HISTORY OF PRESENT ILLNESS
[FreeTextEntry1] : pt here for follow up . accompanied by his wife\par \par former smoker. moderate COPD\par h/o treated nasopharyngeal cancer\par and mucinous adenocarcinoma, s/p partial pancreat/and splenectomy. adrenal insufficiency, DM\par AF on eliquis\par \par Feeling well overall.\par Taking daily ANoro\par not requiring proventil\par walks 1/2 mile a day without difficulty\par does have a chronic cough , clear phlegm

## 2019-10-08 NOTE — PHYSICAL EXAM
[General Appearance - Well Developed] : well developed [Well Groomed] : well groomed [Normal Appearance] : normal appearance [General Appearance - Well Nourished] : well nourished [No Deformities] : no deformities [General Appearance - In No Acute Distress] : no acute distress [Normal Conjunctiva] : the conjunctiva exhibited no abnormalities [Eyelids - No Xanthelasma] : the eyelids demonstrated no xanthelasmas [Normal Oropharynx] : normal oropharynx [Neck Appearance] : the appearance of the neck was normal [Jugular Venous Distention Increased] : there was no jugular-venous distention [Neck Cervical Mass (___cm)] : no neck mass was observed [Heart Rate And Rhythm] : heart rate and rhythm were normal [Thyroid Nodule] : there were no palpable thyroid nodules [Thyroid Diffuse Enlargement] : the thyroid was not enlarged [Murmurs] : no murmurs present [Heart Sounds] : normal S1 and S2 [Respiration, Rhythm And Depth] : normal respiratory rhythm and effort [Exaggerated Use Of Accessory Muscles For Inspiration] : no accessory muscle use [Abdomen Soft] : soft [Auscultation Breath Sounds / Voice Sounds] : lungs were clear to auscultation bilaterally [Abdomen Tenderness] : non-tender [Abdomen Mass (___ Cm)] : no abdominal mass palpated [Gait - Sufficient For Exercise Testing] : the gait was sufficient for exercise testing [Abnormal Walk] : normal gait [Cyanosis, Localized] : no localized cyanosis [Nail Clubbing] : no clubbing of the fingernails [Petechial Hemorrhages (___cm)] : no petechial hemorrhages [] : no rash [Skin Color & Pigmentation] : normal skin color and pigmentation [No Venous Stasis] : no venous stasis [Skin Lesions] : no skin lesions [No Skin Ulcers] : no skin ulcer [No Xanthoma] : no  xanthoma was observed [Deep Tendon Reflexes (DTR)] : deep tendon reflexes were 2+ and symmetric [Sensation] : the sensory exam was normal to light touch and pinprick [No Focal Deficits] : no focal deficits [Affect] : the affect was normal [Impaired Insight] : insight and judgment were intact [Oriented To Time, Place, And Person] : oriented to person, place, and time

## 2019-10-08 NOTE — PHYSICAL EXAM
[Nasal Endoscopy Performed] : nasal endoscopy was performed, see procedure section for findings [Midline] : trachea located in midline position [Edentulous] : edentulous [Normal] : no rashes [de-identified] : Posttreatment changes, no obvious LAD. [de-identified] : The PET appears to be in the middle ear space with no active drainage. [de-identified] : No other LAD.

## 2019-10-15 ENCOUNTER — APPOINTMENT (OUTPATIENT)
Dept: SURGERY | Facility: CLINIC | Age: 74
End: 2019-10-15

## 2019-10-27 ENCOUNTER — FORM ENCOUNTER (OUTPATIENT)
Age: 74
End: 2019-10-27

## 2019-10-28 ENCOUNTER — APPOINTMENT (OUTPATIENT)
Dept: CT IMAGING | Facility: IMAGING CENTER | Age: 74
End: 2019-10-28
Payer: MEDICARE

## 2019-10-28 ENCOUNTER — OUTPATIENT (OUTPATIENT)
Dept: OUTPATIENT SERVICES | Facility: HOSPITAL | Age: 74
LOS: 1 days | End: 2019-10-28
Payer: MEDICARE

## 2019-10-28 DIAGNOSIS — Z98.89 OTHER SPECIFIED POSTPROCEDURAL STATES: Chronic | ICD-10-CM

## 2019-10-28 DIAGNOSIS — C11.9 MALIGNANT NEOPLASM OF NASOPHARYNX, UNSPECIFIED: ICD-10-CM

## 2019-10-28 DIAGNOSIS — Z98.890 OTHER SPECIFIED POSTPROCEDURAL STATES: Chronic | ICD-10-CM

## 2019-10-28 DIAGNOSIS — Z90.89 ACQUIRED ABSENCE OF OTHER ORGANS: Chronic | ICD-10-CM

## 2019-10-28 DIAGNOSIS — Z90.49 ACQUIRED ABSENCE OF OTHER SPECIFIED PARTS OF DIGESTIVE TRACT: Chronic | ICD-10-CM

## 2019-10-28 PROCEDURE — 74160 CT ABDOMEN W/CONTRAST: CPT | Mod: 26

## 2019-10-28 PROCEDURE — 74160 CT ABDOMEN W/CONTRAST: CPT

## 2019-10-28 PROCEDURE — 71260 CT THORAX DX C+: CPT | Mod: 26

## 2019-10-28 PROCEDURE — 82565 ASSAY OF CREATININE: CPT

## 2019-10-28 PROCEDURE — 70491 CT SOFT TISSUE NECK W/DYE: CPT | Mod: 26

## 2019-10-28 PROCEDURE — 70491 CT SOFT TISSUE NECK W/DYE: CPT

## 2019-10-28 PROCEDURE — 71260 CT THORAX DX C+: CPT

## 2019-11-05 ENCOUNTER — APPOINTMENT (OUTPATIENT)
Dept: SURGERY | Facility: CLINIC | Age: 74
End: 2019-11-05
Payer: MEDICARE

## 2019-11-05 VITALS
TEMPERATURE: 209.12 F | HEART RATE: 58 BPM | HEIGHT: 69 IN | SYSTOLIC BLOOD PRESSURE: 144 MMHG | BODY MASS INDEX: 20.73 KG/M2 | RESPIRATION RATE: 16 BRPM | DIASTOLIC BLOOD PRESSURE: 90 MMHG | WEIGHT: 140 LBS

## 2019-11-05 DIAGNOSIS — E11.9 TYPE 2 DIABETES MELLITUS W/OUT COMPLICATIONS: ICD-10-CM

## 2019-11-05 PROCEDURE — 99214 OFFICE O/P EST MOD 30 MIN: CPT

## 2019-11-21 ENCOUNTER — APPOINTMENT (OUTPATIENT)
Dept: RADIATION ONCOLOGY | Facility: CLINIC | Age: 74
End: 2019-11-21
Payer: MEDICARE

## 2019-11-21 VITALS
TEMPERATURE: 207.32 F | HEART RATE: 56 BPM | RESPIRATION RATE: 16 BRPM | HEIGHT: 69 IN | DIASTOLIC BLOOD PRESSURE: 81 MMHG | WEIGHT: 141.87 LBS | BODY MASS INDEX: 21.01 KG/M2 | OXYGEN SATURATION: 98 % | SYSTOLIC BLOOD PRESSURE: 124 MMHG

## 2019-11-21 PROCEDURE — 99213 OFFICE O/P EST LOW 20 MIN: CPT | Mod: GC

## 2019-11-21 NOTE — REVIEW OF SYSTEMS
[Dysphagia: Grade 0] : Dysphagia: Grade 0 [Fatigue: Grade 0] : Fatigue: Grade 0 [Tinnitus - Grade 0] : Tinnitus - Grade 0 [Xerostomia: Grade 1 - Symptomatic (e.g., dry or thick saliva) without significant dietary alteration; unstimulated saliva flow >0.2 ml/min] : Xerostomia: Grade 1 - Symptomatic (e.g., dry or thick saliva) without significant dietary alteration; unstimulated saliva flow >0.2 ml/min [Dysgeusia: Grade 0] : Dysgeusia: Grade 0 [Skin Hyperpigmentation: Grade 1 - Hyperpigmentation covering <10% BSA; no psychosocial impact] : Skin Hyperpigmentation: Grade 1 - Hyperpigmentation covering <10% BSA; no psychosocial impact [Dermatitis Radiation: Grade 0] : Dermatitis Radiation: Grade 0 [Visual Disturbances] : visual disturbances [Negative] : Psychiatric

## 2019-11-21 NOTE — HISTORY OF PRESENT ILLNESS
[FreeTextEntry1] : Mr. 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 He underwent carboplatin and taxol under the care of Dr. Spann. Dr. Hidalgo is his ENT. He completed radiotherapy to 70 Gy on 11/23/16. 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 \par \par CT chest/abdomen 10/28/19: 0.9 cm cystic lesion in the pancreatic head, not significantly changed compared with the prior scan. Follow-up CT imaging in 6 months is recommended. No dilatation of the main pancreatic duct. Status post distal pancreatectomy. No evidence of recurrence at the distal pancreatectomy site.\par \par CT neck 10/28/19: No gross evidence for residual or recurrent tumor. No evidence for enlarged cervical lymph nodes.\par \par Largely feeling well today, however, does complain of episodes of double vision with right-sided gaze. Ophtho evaluation was negative.\par

## 2019-11-21 NOTE — PHYSICAL EXAM
[Normal] : oriented to person, place and time, the affect was normal, the mood was normal and not anxious [de-identified] : visual field defect of right temporal and left nasal fields (homonymous hemianopsia)

## 2019-12-20 NOTE — HISTORY OF PRESENT ILLNESS
[de-identified] : 73 y.o. male presents for follow-up visit s/p distal pancreatectomy and splenectomy on 9/9/18. Surgical pathology revealed IPMN with high grade dysplasia, and negative for invasive carcinoma. 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  CT abdomen performed on 10/28/19 findings notable for 0.9 cm cystic lesion in the pancreatic head, not significantly changed from prior. No evidence of recurrence at the distal pancreatectomy site.    Ca 19-9 in 2018 was 1.2. \par Patient offers no complaints. Denies abdominal discomfort, nausea, vomiting, or steatorrhea. States his blood glucose has been better controlled lately, \par \par

## 2019-12-20 NOTE — REVIEW OF SYSTEMS
[Negative] : Constitutional [FreeTextEntry2] : weight stable  [FreeTextEntry4] : h [FreeTextEntry5] : No chest pain  [FreeTextEntry6] : hx COPD - on treatment w/ inhaler  [de-identified] : hx diabetes and adrenal insufficiency

## 2019-12-20 NOTE — PHYSICAL EXAM
[Normal] : well developed, well nourished, in no acute distress [de-identified] : Anicteric  [de-identified] : MAMADOU  [de-identified] : Supple  [de-identified] : Normal lips  [de-identified] : Normal respiratory effort  [de-identified] : Normal appearance [de-identified] : Not distended, non tender

## 2019-12-20 NOTE — REASON FOR VISIT
[Follow-Up Visit] : a follow-up visit for [Spouse] : spouse [FreeTextEntry2] : IPMN s/p distal pancreatectomy

## 2019-12-27 ENCOUNTER — FORM ENCOUNTER (OUTPATIENT)
Age: 74
End: 2019-12-27

## 2019-12-28 ENCOUNTER — APPOINTMENT (OUTPATIENT)
Dept: MRI IMAGING | Facility: IMAGING CENTER | Age: 74
End: 2019-12-28
Payer: MEDICARE

## 2019-12-28 ENCOUNTER — OUTPATIENT (OUTPATIENT)
Dept: OUTPATIENT SERVICES | Facility: HOSPITAL | Age: 74
LOS: 1 days | End: 2019-12-28
Payer: MEDICARE

## 2019-12-28 DIAGNOSIS — Z90.49 ACQUIRED ABSENCE OF OTHER SPECIFIED PARTS OF DIGESTIVE TRACT: Chronic | ICD-10-CM

## 2019-12-28 DIAGNOSIS — Z98.890 OTHER SPECIFIED POSTPROCEDURAL STATES: Chronic | ICD-10-CM

## 2019-12-28 DIAGNOSIS — Z90.89 ACQUIRED ABSENCE OF OTHER ORGANS: Chronic | ICD-10-CM

## 2019-12-28 DIAGNOSIS — C11.9 MALIGNANT NEOPLASM OF NASOPHARYNX, UNSPECIFIED: ICD-10-CM

## 2019-12-28 DIAGNOSIS — Z98.89 OTHER SPECIFIED POSTPROCEDURAL STATES: Chronic | ICD-10-CM

## 2019-12-28 PROCEDURE — A9585: CPT

## 2019-12-28 PROCEDURE — 70553 MRI BRAIN STEM W/O & W/DYE: CPT

## 2019-12-28 PROCEDURE — 70553 MRI BRAIN STEM W/O & W/DYE: CPT | Mod: 26

## 2020-01-30 ENCOUNTER — APPOINTMENT (OUTPATIENT)
Dept: NEPHROLOGY | Facility: CLINIC | Age: 75
End: 2020-01-30
Payer: MEDICARE

## 2020-01-30 VITALS
HEART RATE: 67 BPM | WEIGHT: 141.09 LBS | DIASTOLIC BLOOD PRESSURE: 74 MMHG | BODY MASS INDEX: 20.84 KG/M2 | OXYGEN SATURATION: 97 % | SYSTOLIC BLOOD PRESSURE: 129 MMHG

## 2020-01-30 PROCEDURE — 99213 OFFICE O/P EST LOW 20 MIN: CPT

## 2020-01-30 RX ORDER — REPAGLINIDE 1 MG/1
1 TABLET ORAL
Refills: 0 | Status: DISCONTINUED | COMMUNITY
End: 2020-01-30

## 2020-01-30 RX ORDER — HYDROCORTISONE 10 MG/1
10 TABLET ORAL
Refills: 0 | Status: DISCONTINUED | COMMUNITY
End: 2020-01-30

## 2020-01-31 LAB
APPEARANCE: CLEAR
BACTERIA: NEGATIVE
BILIRUBIN URINE: NEGATIVE
BLOOD URINE: NEGATIVE
COLOR: YELLOW
CREAT SPEC-SCNC: 136 MG/DL
CREAT/PROT UR: 0.1 RATIO
GLUCOSE QUALITATIVE U: NEGATIVE
HYALINE CASTS: 0 /LPF
KETONES URINE: NEGATIVE
LEUKOCYTE ESTERASE URINE: NEGATIVE
MICROSCOPIC-UA: NORMAL
NITRITE URINE: NEGATIVE
PH URINE: 5.5
PROT UR-MCNC: 11 MG/DL
PROTEIN URINE: NEGATIVE
RED BLOOD CELLS URINE: 1 /HPF
SPECIFIC GRAVITY URINE: 1.02
SQUAMOUS EPITHELIAL CELLS: 1 /HPF
UROBILINOGEN URINE: NORMAL
WHITE BLOOD CELLS URINE: 1 /HPF
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
## 2020-01-31 NOTE — REVIEW OF SYSTEMS
[As Noted in HPI] : as noted in HPI [Loss Of Hearing] : hearing loss [Eyesight Problems] : eyesight problems [Fever] : no fever [Chills] : no chills [Feeling Poorly] : not feeling poorly [Recent Weight Gain (___ Lbs)] : no recent weight gain [Recent Weight Loss (___ Lbs)] : no recent weight loss [Earache] : no earache [Nosebleeds] : no nosebleeds [Nasal Discharge] : no nasal discharge [Sore Throat] : no sore throat [Hoarseness] : no hoarseness [FreeTextEntry3] : +diplopia

## 2020-01-31 NOTE — ASSESSMENT
[FreeTextEntry1] : Mr Hernandez is a 74 y/o man with H/O   nasopharyngeal cancer treated with chemotherapy /Radiation therapy complicated with ARTURO. 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 Historically with No proteinuria or hematuria\par Encourage continued PO hydration  \par  \par HTN:  BP OK\par Maintain on low dose metoprolol  \par \par  Vitamin D insufficiency:\par Maintain on mvi\par \par \par F/U in 1 year\par \par \par \par \par \par \par \par Dr cool

## 2020-02-04 ENCOUNTER — APPOINTMENT (OUTPATIENT)
Dept: OTOLARYNGOLOGY | Facility: CLINIC | Age: 75
End: 2020-02-04
Payer: MEDICARE

## 2020-02-04 VITALS
SYSTOLIC BLOOD PRESSURE: 162 MMHG | HEIGHT: 69 IN | WEIGHT: 141 LBS | HEART RATE: 59 BPM | BODY MASS INDEX: 20.88 KG/M2 | DIASTOLIC BLOOD PRESSURE: 77 MMHG

## 2020-02-04 PROCEDURE — 31231 NASAL ENDOSCOPY DX: CPT

## 2020-02-04 PROCEDURE — 99214 OFFICE O/P EST MOD 30 MIN: CPT | Mod: 25

## 2020-02-04 PROCEDURE — 69210 REMOVE IMPACTED EAR WAX UNI: CPT

## 2020-02-04 NOTE — PHYSICAL EXAM
[Nasal Endoscopy Performed] : nasal endoscopy was performed, see procedure section for findings [Midline] : trachea located in midline position [Edentulous] : edentulous [Normal] : orientation to person, place, and time: normal [de-identified] : Posttreatment changes, no obvious LAD. [de-identified] : AS with cerumen impaction, normal AD. [de-identified] : Stable perforation AD, no otorrhea.   [de-identified] : No other LAD.

## 2020-02-04 NOTE — PROCEDURE
[None] : none [Flexible Endoscope] : examined with the flexible endoscope [Normal] : the middle meatus had no abnormalities [Serial Number: ___] : Serial Number: [unfilled] [de-identified] : NPC [FreeTextEntry6] : Posttreatment changes in the NPx, no new lesions, no crusting, no lesions in the NC. [Same] : same as the Pre Op Dx. [Risk and Benefits Discussed] : The purpose, risks, discomforts, benefits and alternatives of the procedure have been explained to the patient including no treatment. [] : Removal of Cerumen

## 2020-02-04 NOTE — HISTORY OF PRESENT ILLNESS
[de-identified] : Patient with T4 N1 M0 SCCa R. NPC. He is now s/p concurrent CXRT and consolidative chemotherapy completed in March 2017. Last scans were in July 2019. Pt is being followed by Dr. De La Rosa for hearing loss. Last CT was in Oct, 2019 and MRI was in Dec, 2019.  Pt has been experiencing intermittent double vision since Nov and Dr. Moncada ordered a MRI of the brain which was done on 12/28/2019.  Pt's ophthalmologist didn't see any abnormality.  Pt is due for another field vision test with  Dr. Almeida, the ophthalmologist.

## 2020-02-11 ENCOUNTER — APPOINTMENT (OUTPATIENT)
Dept: OTOLARYNGOLOGY | Facility: CLINIC | Age: 75
End: 2020-02-11
Payer: MEDICARE

## 2020-02-11 VITALS
HEART RATE: 66 BPM | DIASTOLIC BLOOD PRESSURE: 92 MMHG | SYSTOLIC BLOOD PRESSURE: 180 MMHG | HEIGHT: 69 IN | WEIGHT: 141 LBS | BODY MASS INDEX: 20.88 KG/M2

## 2020-02-11 PROCEDURE — 99214 OFFICE O/P EST MOD 30 MIN: CPT

## 2020-02-11 NOTE — PHYSICAL EXAM
[de-identified] : Posttreatment changes, no obvious LAD. [de-identified] : AS with cerumen impaction - cleared, normal AD. [de-identified] : Stable perforation AD, no otorrhea.   [Midline] : trachea located in midline position [Edentulous] : edentulous [Normal] : orientation to person, place, and time: normal [de-identified] : No other LAD.

## 2020-02-11 NOTE — HISTORY OF PRESENT ILLNESS
[de-identified] : Patient with T4 N1 M0 SCCa R. NPC. He is now s/p concurrent CXRT and consolidative chemotherapy completed in March 2017.  Pt was here last week and was noted to have cerumen impaction of his ears.  He was started on Ciprodex and is here today to f/up.

## 2020-03-05 ENCOUNTER — APPOINTMENT (OUTPATIENT)
Dept: RADIATION ONCOLOGY | Facility: CLINIC | Age: 75
End: 2020-03-05
Payer: MEDICARE

## 2020-03-05 VITALS
HEART RATE: 60 BPM | TEMPERATURE: 94.6 F | OXYGEN SATURATION: 95 % | RESPIRATION RATE: 17 BRPM | SYSTOLIC BLOOD PRESSURE: 171 MMHG | WEIGHT: 138.78 LBS | DIASTOLIC BLOOD PRESSURE: 77 MMHG | BODY MASS INDEX: 20.49 KG/M2

## 2020-03-05 PROCEDURE — 99213 OFFICE O/P EST LOW 20 MIN: CPT

## 2020-03-05 NOTE — HISTORY OF PRESENT ILLNESS
[FreeTextEntry1] : Mr. 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 He underwent carboplatin and taxol under the care of Dr. Spann. Dr. Hidalgo is his ENT. He completed radiotherapy to 70 Gy on 11/23/16. 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 \par \par CT chest/abdomen 10/28/19: 0.9 cm cystic lesion in the pancreatic head, not significantly changed compared with the prior scan. Follow-up CT imaging in 6 months is recommended. No dilatation of the main pancreatic duct. Status post distal pancreatectomy. No evidence of recurrence at the distal pancreatectomy site.\par \par CT neck 10/28/19: No gross evidence for residual or recurrent tumor. 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 \par Nonspecific T2 prolongation involving the posterior right nasopharyngeal region.\par \par Largely feeling well today, however, does complain of episodes of double vision with right-sided gaze. Ophtho evaluation was negative.\par

## 2020-03-05 NOTE — REVIEW OF SYSTEMS
[Dysphagia: Grade 0] : Dysphagia: Grade 0 [Fatigue: Grade 0] : Fatigue: Grade 0 [Mucositis Oral: Grade 0] : Mucositis Oral: Grade 0  [Xerostomia: Grade 1 - Symptomatic (e.g., dry or thick saliva) without significant dietary alteration; unstimulated saliva flow >0.2 ml/min] : Xerostomia: Grade 1 - Symptomatic (e.g., dry or thick saliva) without significant dietary alteration; unstimulated saliva flow >0.2 ml/min [Oral Pain: Grade 0] : Oral Pain: Grade 0 [Dysgeusia: Grade 1- Altered taste but no change in diet] : Dysgeusia: Grade 1 - Altered taste but no change in diet [Skin Hyperpigmentation: Grade 0] : Skin Hyperpigmentation: Grade 0 [Dermatitis Radiation: Grade 0] : Dermatitis Radiation: Grade 0 [FreeTextEntry4] : At night

## 2020-03-05 NOTE — PHYSICAL EXAM
[Normal] : oriented to person, place and time, the affect was normal, the mood was normal and not anxious [de-identified] : visual field defect of right temporal and left nasal fields (homonymous hemianopsia) [FreeTextEntry1] : Intact cranial nerves, no motor or sensory deficits. Oral cavity, no mass. no mucositis. no cervical lymphadenopathy

## 2020-05-14 ENCOUNTER — RX RENEWAL (OUTPATIENT)
Age: 75
End: 2020-05-14

## 2020-06-09 ENCOUNTER — RESULT REVIEW (OUTPATIENT)
Age: 75
End: 2020-06-09

## 2020-06-09 ENCOUNTER — APPOINTMENT (OUTPATIENT)
Dept: RADIOLOGY | Facility: IMAGING CENTER | Age: 75
End: 2020-06-09
Payer: MEDICARE

## 2020-06-09 ENCOUNTER — OUTPATIENT (OUTPATIENT)
Dept: OUTPATIENT SERVICES | Facility: HOSPITAL | Age: 75
LOS: 1 days | End: 2020-06-09
Payer: MEDICARE

## 2020-06-09 ENCOUNTER — APPOINTMENT (OUTPATIENT)
Dept: CT IMAGING | Facility: IMAGING CENTER | Age: 75
End: 2020-06-09
Payer: MEDICARE

## 2020-06-09 DIAGNOSIS — Z98.89 OTHER SPECIFIED POSTPROCEDURAL STATES: Chronic | ICD-10-CM

## 2020-06-09 DIAGNOSIS — Z00.8 ENCOUNTER FOR OTHER GENERAL EXAMINATION: ICD-10-CM

## 2020-06-09 DIAGNOSIS — Z90.89 ACQUIRED ABSENCE OF OTHER ORGANS: Chronic | ICD-10-CM

## 2020-06-09 DIAGNOSIS — Z98.890 OTHER SPECIFIED POSTPROCEDURAL STATES: Chronic | ICD-10-CM

## 2020-06-09 DIAGNOSIS — Z90.49 ACQUIRED ABSENCE OF OTHER SPECIFIED PARTS OF DIGESTIVE TRACT: Chronic | ICD-10-CM

## 2020-06-09 PROCEDURE — 70491 CT SOFT TISSUE NECK W/DYE: CPT

## 2020-06-09 PROCEDURE — 70491 CT SOFT TISSUE NECK W/DYE: CPT | Mod: 26

## 2020-06-09 PROCEDURE — 71046 X-RAY EXAM CHEST 2 VIEWS: CPT

## 2020-06-09 PROCEDURE — 82565 ASSAY OF CREATININE: CPT

## 2020-06-09 PROCEDURE — 71046 X-RAY EXAM CHEST 2 VIEWS: CPT | Mod: 26

## 2020-06-15 ENCOUNTER — APPOINTMENT (OUTPATIENT)
Dept: OTOLARYNGOLOGY | Facility: CLINIC | Age: 75
End: 2020-06-15

## 2020-06-16 ENCOUNTER — APPOINTMENT (OUTPATIENT)
Dept: OTOLARYNGOLOGY | Facility: CLINIC | Age: 75
End: 2020-06-16
Payer: MEDICARE

## 2020-06-16 VITALS
WEIGHT: 138 LBS | SYSTOLIC BLOOD PRESSURE: 173 MMHG | HEIGHT: 69 IN | DIASTOLIC BLOOD PRESSURE: 89 MMHG | BODY MASS INDEX: 20.44 KG/M2 | HEART RATE: 51 BPM

## 2020-06-16 DIAGNOSIS — Z00.00 ENCOUNTER FOR GENERAL ADULT MEDICAL EXAMINATION W/OUT ABNORMAL FINDINGS: ICD-10-CM

## 2020-06-16 PROCEDURE — 99214 OFFICE O/P EST MOD 30 MIN: CPT | Mod: 25

## 2020-06-16 PROCEDURE — 31231 NASAL ENDOSCOPY DX: CPT

## 2020-06-16 RX ORDER — LANCETS 28 GAUGE
EACH MISCELLANEOUS
Qty: 100 | Refills: 0 | Status: COMPLETED | COMMUNITY
Start: 2020-03-12

## 2020-06-16 RX ORDER — BLOOD SUGAR DIAGNOSTIC
STRIP MISCELLANEOUS
Qty: 50 | Refills: 0 | Status: COMPLETED | COMMUNITY
Start: 2020-03-12

## 2020-06-16 RX ORDER — CIPROFLOXACIN AND DEXAMETHASONE 3; 1 MG/ML; MG/ML
0.3-0.1 SUSPENSION/ DROPS AURICULAR (OTIC) TWICE DAILY
Qty: 1 | Refills: 0 | Status: COMPLETED | COMMUNITY
Start: 2020-02-04 | End: 2020-06-16

## 2020-06-16 RX ORDER — GLUTAMINE 100 %
POWDER (GRAM) ORAL
Refills: 0 | Status: COMPLETED | COMMUNITY
Start: 2017-08-10 | End: 2020-06-16

## 2020-06-16 RX ORDER — REPAGLINIDE 0.5 MG/1
0.5 TABLET ORAL
Qty: 90 | Refills: 0 | Status: COMPLETED | COMMUNITY
Start: 2020-01-14

## 2020-06-16 NOTE — PROCEDURE
[None] : none [Flexible Endoscope] : examined with the flexible endoscope [Normal] : the paranasal sinuses had no abnormalities [Serial Number: ___] : Serial Number: [unfilled] [FreeTextEntry6] : Posttreatment changes in the NPx, no new lesions, no crusting, no lesions in the NC.  [de-identified] : NPC

## 2020-06-16 NOTE — PHYSICAL EXAM
[Edentulous] : edentulous [Midline] : trachea located in midline position [de-identified] : Posttreatment changes, no obvious LAD. [Normal] : external ears are normal bilaterally [de-identified] : Stable perforation AD, no otorrhea.   [de-identified] : No other LAD.

## 2020-06-22 ENCOUNTER — RX RENEWAL (OUTPATIENT)
Age: 75
End: 2020-06-22

## 2020-09-08 ENCOUNTER — APPOINTMENT (OUTPATIENT)
Dept: PULMONOLOGY | Facility: CLINIC | Age: 75
End: 2020-09-08
Payer: MEDICARE

## 2020-09-08 VITALS
TEMPERATURE: 98.1 F | DIASTOLIC BLOOD PRESSURE: 80 MMHG | RESPIRATION RATE: 17 BRPM | WEIGHT: 133 LBS | BODY MASS INDEX: 19.64 KG/M2 | SYSTOLIC BLOOD PRESSURE: 155 MMHG | OXYGEN SATURATION: 92 % | HEART RATE: 73 BPM

## 2020-09-08 PROCEDURE — 99213 OFFICE O/P EST LOW 20 MIN: CPT

## 2020-09-08 NOTE — DISCUSSION/SUMMARY
[FreeTextEntry1] : 75-year-old male, former smoker of greater than 30-pack-year history, quit 4 years ago, moderate COPD and history of head and neck squamous cell cancer treated with radiation and chemotherapy currently without evidence of disease\par \par Continue daily Anoro.  Albuterol as needed\par \par Low-dose CT chest ordered for October 2020, his last CT of the chest was October 2018\par \par I advised a high-dose flu shot.  His wife reports she thinks her insurance company covers it done at pharmacies rather than a doctor's offices.  He is also due for Pneumovax which she has not received, I gave her written instructions to take to the pharmacy.

## 2020-09-08 NOTE — PHYSICAL EXAM
[No Acute Distress] : no acute distress [Normal Oropharynx] : normal oropharynx [Normal Appearance] : normal appearance [No Neck Mass] : no neck mass [Normal S1, S2] : normal s1, s2 [Normal Rate/Rhythm] : normal rate/rhythm [No Resp Distress] : no resp distress [No Murmurs] : no murmurs [Benign] : benign [Clear to Auscultation Bilaterally] : clear to auscultation bilaterally [No Abnormalities] : no abnormalities [No Clubbing] : no clubbing [Normal Gait] : normal gait [No Cyanosis] : no cyanosis [No Edema] : no edema [FROM] : FROM [Normal Color/ Pigmentation] : normal color/ pigmentation [No Focal Deficits] : no focal deficits [Oriented x3] : oriented x3 [Normal Affect] : normal affect

## 2020-09-08 NOTE — HISTORY OF PRESENT ILLNESS
[TextBox_4] : Follow-up visit, patient accompanied by his wife.  Last visit October 2019.\par \par Former smoker, quit in 2016, at the time the diagnosis of his squamous cell head and neck cancer, which was treated chemoradiation, no evidence of disease per last CAT scan June 2020\par Moderate COPD, on daily Anoro.  Feels good.  Walking in his complex without any difficulty with shortness of breath.  No need for his short acting bronchodilator.\par \par No symptoms or exposures to COVID

## 2020-09-17 ENCOUNTER — APPOINTMENT (OUTPATIENT)
Dept: OTOLARYNGOLOGY | Facility: CLINIC | Age: 75
End: 2020-09-17
Payer: MEDICARE

## 2020-09-17 VITALS
HEART RATE: 61 BPM | WEIGHT: 133 LBS | SYSTOLIC BLOOD PRESSURE: 149 MMHG | HEIGHT: 69.5 IN | BODY MASS INDEX: 19.26 KG/M2 | DIASTOLIC BLOOD PRESSURE: 87 MMHG

## 2020-09-17 PROCEDURE — 92511 NASOPHARYNGOSCOPY: CPT

## 2020-09-17 PROCEDURE — 99213 OFFICE O/P EST LOW 20 MIN: CPT | Mod: 25

## 2020-09-17 RX ORDER — LINAGLIPTIN 5 MG/1
5 TABLET, FILM COATED ORAL DAILY
Refills: 0 | Status: COMPLETED | COMMUNITY
End: 2020-09-17

## 2020-09-17 NOTE — PHYSICAL EXAM
[de-identified] : Presence of purulent discharge and debris in the EAC AD. This was suctioned and debrided in the clinic today using the microscope and alligator forceps. [Midline] : trachea located in midline position [Normal] : no rashes

## 2020-09-17 NOTE — HISTORY OF PRESENT ILLNESS
[de-identified] : 75 yro male here for 10/10 right ear pain, has been going on for about 2 weeks. Pt reports ringing, states that ear pain comes like a backache, lasts for hours at a time. Clear watery fluid came out of ear 3 days ago, no blood, has been going on throughout the day, now crusting and drainage in the ear. Pt started on Ciprodex drops 3 days ago, which helped a little. Took tylenol last night for pain which helped. Denies fevers, chills, dizziness, vertigo. \par Complete review of systems which was performed during a previous encounter was reviewed with the patient and there are no changes except as stated in the HPI section.\par

## 2020-09-21 ENCOUNTER — APPOINTMENT (OUTPATIENT)
Dept: OTOLARYNGOLOGY | Facility: CLINIC | Age: 75
End: 2020-09-21
Payer: MEDICARE

## 2020-09-21 VITALS
HEART RATE: 57 BPM | DIASTOLIC BLOOD PRESSURE: 81 MMHG | BODY MASS INDEX: 19.26 KG/M2 | SYSTOLIC BLOOD PRESSURE: 148 MMHG | HEIGHT: 69.5 IN | WEIGHT: 133 LBS

## 2020-09-21 PROCEDURE — 69210 REMOVE IMPACTED EAR WAX UNI: CPT

## 2020-09-21 PROCEDURE — 99214 OFFICE O/P EST MOD 30 MIN: CPT | Mod: 25

## 2020-09-21 NOTE — HISTORY OF PRESENT ILLNESS
[de-identified] : 75 yro male here for 10/10 right ear pain, has been going on for about 2 weeks. Pt reports ringing, states that ear pain comes like a backache, lasts for hours at a time. Clear watery fluid came out of ear 3 days ago, no blood, has been going on throughout the day, now crusting and drainage in the ear. Pt started on Ciprodex drops and feeling much better. pt saw Dr. Jack and unable to see the inner ear due to the infection per pt, but did clean up some debridement. pt has no nasal congestion or nosed bleeding. pt has hx of right side hearing loss for long time, stable. last imaging in 6/2020.\par Complete review of systems which was performed during a previous encounter was reviewed with the patient and there are no changes except as stated in the HPI section.\par

## 2020-09-21 NOTE — REASON FOR VISIT
[Subsequent Evaluation] : a subsequent evaluation for [Spouse] : spouse [FreeTextEntry2] : follow up on his right ear. Patient has been having pain and drainage out of his right ear. He was prescribed antibiotic for his ear.

## 2020-09-21 NOTE — PHYSICAL EXAM
[Midline] : trachea located in midline position [Edentulous] : edentulous [Normal] : no rashes [de-identified] : Posttreatment changes, no obvious LAD. [de-identified] : Findings of OE with debris - cleared. [de-identified] : Stable perforation AD. [de-identified] : No other LAD.

## 2020-09-21 NOTE — PROCEDURE
[Same] : same as the Pre Op Dx. [] : Binocular Microscopy [FreeTextEntry5] : Debridement of R. EAC. [FreeTextEntry6] : R. EAC debris, stable R. TM perforation.  No otorrhea from the middle ear appreciated.

## 2020-09-29 ENCOUNTER — APPOINTMENT (OUTPATIENT)
Dept: OTOLARYNGOLOGY | Facility: CLINIC | Age: 75
End: 2020-09-29
Payer: MEDICARE

## 2020-09-29 VITALS
DIASTOLIC BLOOD PRESSURE: 82 MMHG | HEART RATE: 62 BPM | TEMPERATURE: 97.1 F | WEIGHT: 133 LBS | SYSTOLIC BLOOD PRESSURE: 149 MMHG | BODY MASS INDEX: 19.7 KG/M2 | HEIGHT: 69 IN

## 2020-09-29 PROCEDURE — 31231 NASAL ENDOSCOPY DX: CPT

## 2020-09-29 PROCEDURE — 99214 OFFICE O/P EST MOD 30 MIN: CPT | Mod: 25

## 2020-09-29 NOTE — PROCEDURE
[None] : none [Flexible Endoscope] : examined with the flexible endoscope [Serial Number: ___] : Serial Number: [unfilled] [FreeTextEntry6] : Posttreatment changes in the NPx, no new lesions, no crusting, no lesions in the NC.  [de-identified] : NPC

## 2020-09-29 NOTE — PHYSICAL EXAM
[Midline] : trachea located in midline position [Edentulous] : edentulous [Normal] : no rashes [de-identified] : Posttreatment changes, no obvious LAD. [de-identified] : Mild debris noted AD - cleared, no drainage, no erythema, no TTP. [de-identified] : Stable perforation AD. [de-identified] : No other LAD.

## 2020-09-29 NOTE — REASON FOR VISIT
[Subsequent Evaluation] : a subsequent evaluation for [Spouse] : spouse [FreeTextEntry2] : follow up on his right ear- OE

## 2020-09-29 NOTE — HISTORY OF PRESENT ILLNESS
[de-identified] : 76 y/o male with T4 N1 M0 SCCa R. NPC. He is now s/p concurrent CXRT and consolidative chemotherapy completed in March 2017 and hx of OE and here to f/u on right OE and is getting better about 50%, clear discharge and mild pain in AM.  pt has an appt with Dr. De La Rosa on 10/13/2020.  pt has hx of right side hearing loss for long time, stable. last imaging in 6/2020. \par

## 2020-10-13 ENCOUNTER — APPOINTMENT (OUTPATIENT)
Dept: OTOLARYNGOLOGY | Facility: CLINIC | Age: 75
End: 2020-10-13
Payer: MEDICARE

## 2020-10-13 VITALS
SYSTOLIC BLOOD PRESSURE: 154 MMHG | HEIGHT: 69 IN | HEART RATE: 66 BPM | WEIGHT: 131 LBS | BODY MASS INDEX: 19.4 KG/M2 | DIASTOLIC BLOOD PRESSURE: 105 MMHG

## 2020-10-13 PROCEDURE — 92504 EAR MICROSCOPY EXAMINATION: CPT

## 2020-10-13 PROCEDURE — 99214 OFFICE O/P EST MOD 30 MIN: CPT | Mod: 25

## 2020-10-14 NOTE — PROCEDURE
[Same] : same as the Pre Op Dx. [] : Removal of Cerumen [FreeTextEntry1] : MIGUEL ÁNGEL eubanks KO, MEE [FreeTextEntry4] : none [FreeTextEntry6] : Cerumen was removed under binocular microscopy with a combination of a suction, mac needle, alligator and/or a loop curette. The patient tolerated the procedure well and there were no complications. The included findings were noted.\par

## 2020-10-14 NOTE — REASON FOR VISIT
[Subsequent Evaluation] : a subsequent evaluation for [FreeTextEntry2] : referred by Dr. Jack for right otalgia.

## 2020-10-14 NOTE — PHYSICAL EXAM
[Binocular Microscopic Exam] : Binocular microscopic exam was performed [Hearing Loss Right Only] : normal [Hearing Loss Left Only] : normal [FreeTextEntry8] : edema erythema, exudate; cultured and treated with GV/boric powder [FreeTextEntry9] : keratosis obturans, partial removal [de-identified] : not seen [de-identified] : not seen [Midline] : trachea located in midline position [Normal] : no rashes

## 2020-10-14 NOTE — HISTORY OF PRESENT ILLNESS
[de-identified] : 75 year man referred by Dr. Jack for right otalgia. Reports light yellow right otorrhea and crusting, used Ciprodex with temporary relief. Reports right sided hearing loss Denies dizziness, headaches related to hearing. \par \par History of T4 N1 M0 SCCa right nasopharyngeal carcinoma. S/p concurrent CXRT and consolidative chemotherapy completed in March 2017, follow by Dr. Jack and Dr. Hidalgo. \par \par Patient's blood pressure was 154/105 on 10/13/2020 . Patient was advised to follow up with PCP for high blood pressure.

## 2020-10-21 LAB — BACTERIA FLD CULT: NORMAL

## 2020-10-24 ENCOUNTER — APPOINTMENT (OUTPATIENT)
Dept: OTOLARYNGOLOGY | Facility: CLINIC | Age: 75
End: 2020-10-24
Payer: MEDICARE

## 2020-10-24 VITALS
WEIGHT: 131 LBS | TEMPERATURE: 98 F | HEART RATE: 61 BPM | SYSTOLIC BLOOD PRESSURE: 163 MMHG | HEIGHT: 69 IN | DIASTOLIC BLOOD PRESSURE: 84 MMHG | BODY MASS INDEX: 19.4 KG/M2

## 2020-10-24 PROCEDURE — 92504 EAR MICROSCOPY EXAMINATION: CPT

## 2020-10-24 PROCEDURE — 99213 OFFICE O/P EST LOW 20 MIN: CPT | Mod: 25

## 2020-10-24 PROCEDURE — 99072 ADDL SUPL MATRL&STAF TM PHE: CPT

## 2020-10-26 NOTE — PHYSICAL EXAM
[Binocular Microscopic Exam] : Binocular microscopic exam was performed [Hearing Loss Right Only] : normal [Hearing Loss Left Only] : normal [FreeTextEntry8] : erythema, exudate; cleared, GV and powder applied, clotrimazole applied [FreeTextEntry9] : keratosis obturans, full removal [de-identified] : not seen [de-identified] : not seen [Midline] : trachea located in midline position [Normal] : no rashes

## 2020-10-26 NOTE — HISTORY OF PRESENT ILLNESS
[de-identified] : R ear improved\par L ear have been using peroxide and oil\par no makenna otorrhea

## 2020-10-29 ENCOUNTER — NON-APPOINTMENT (OUTPATIENT)
Age: 75
End: 2020-10-29

## 2020-10-29 VITALS — BODY MASS INDEX: 19.4 KG/M2 | HEIGHT: 69 IN | WEIGHT: 131 LBS

## 2020-10-29 DIAGNOSIS — Z12.2 ENCOUNTER FOR SCREENING FOR MALIGNANT NEOPLASM OF RESPIRATORY ORGANS: ICD-10-CM

## 2020-10-29 DIAGNOSIS — R11.2 NAUSEA WITH VOMITING, UNSPECIFIED: ICD-10-CM

## 2020-10-29 DIAGNOSIS — T45.1X5A NAUSEA WITH VOMITING, UNSPECIFIED: ICD-10-CM

## 2020-10-29 NOTE — HISTORY OF PRESENT ILLNESS
[Former] : former smoker [_____ pack-years] : [unfilled] pack-years [TextBox_13] : He denies hemoptysis, denies new cough, denies unexplained weight loss.\par He is a former smoker with a 30 pack year smoking history (approx 0.6ppd x approx 50 years). He quit 4 years ago.   He has a h/o head and neck cancer.  He is referred by Dr. Gita Lisker.

## 2020-10-29 NOTE — PLAN
[Smoking Cessation] : smoking cessation [Age appropriate screenings] : Age appropriate screenings [Regular follow-up with healthcare provider] : regular follow-up with healthcare provider [FreeTextEntry1] : His LDCT is scheduld for 10/31/20 at the [facility] location.  He will follow up with Dr. Lisker for the results.

## 2020-10-31 ENCOUNTER — OUTPATIENT (OUTPATIENT)
Dept: OUTPATIENT SERVICES | Facility: HOSPITAL | Age: 75
LOS: 1 days | End: 2020-10-31
Payer: MEDICARE

## 2020-10-31 ENCOUNTER — APPOINTMENT (OUTPATIENT)
Dept: CT IMAGING | Facility: IMAGING CENTER | Age: 75
End: 2020-10-31
Payer: MEDICARE

## 2020-10-31 ENCOUNTER — RESULT REVIEW (OUTPATIENT)
Age: 75
End: 2020-10-31

## 2020-10-31 DIAGNOSIS — J43.9 EMPHYSEMA, UNSPECIFIED: ICD-10-CM

## 2020-10-31 DIAGNOSIS — Z90.49 ACQUIRED ABSENCE OF OTHER SPECIFIED PARTS OF DIGESTIVE TRACT: Chronic | ICD-10-CM

## 2020-10-31 DIAGNOSIS — Z98.89 OTHER SPECIFIED POSTPROCEDURAL STATES: Chronic | ICD-10-CM

## 2020-10-31 DIAGNOSIS — Z90.89 ACQUIRED ABSENCE OF OTHER ORGANS: Chronic | ICD-10-CM

## 2020-10-31 DIAGNOSIS — D49.0 NEOPLASM OF UNSPECIFIED BEHAVIOR OF DIGESTIVE SYSTEM: ICD-10-CM

## 2020-10-31 DIAGNOSIS — Z00.8 ENCOUNTER FOR OTHER GENERAL EXAMINATION: ICD-10-CM

## 2020-10-31 DIAGNOSIS — Z98.890 OTHER SPECIFIED POSTPROCEDURAL STATES: Chronic | ICD-10-CM

## 2020-10-31 PROCEDURE — G0297: CPT

## 2020-10-31 PROCEDURE — G0297: CPT | Mod: 26

## 2020-10-31 PROCEDURE — 74160 CT ABDOMEN W/CONTRAST: CPT | Mod: 26

## 2020-10-31 PROCEDURE — 74160 CT ABDOMEN W/CONTRAST: CPT

## 2020-11-05 ENCOUNTER — APPOINTMENT (OUTPATIENT)
Dept: OTOLARYNGOLOGY | Facility: CLINIC | Age: 75
End: 2020-11-05
Payer: MEDICARE

## 2020-11-05 VITALS — WEIGHT: 132 LBS | HEIGHT: 69.5 IN | BODY MASS INDEX: 19.11 KG/M2

## 2020-11-05 DIAGNOSIS — T16.1XXS FOREIGN BODY IN RIGHT EAR, SEQUELA: ICD-10-CM

## 2020-11-05 DIAGNOSIS — Z86.69 PERSONAL HISTORY OF OTHER DISEASES OF THE NERVOUS SYSTEM AND SENSE ORGANS: ICD-10-CM

## 2020-11-05 DIAGNOSIS — H90.A11 CONDUCTIVE HEARING LOSS, UNILATERAL, RIGHT EAR WITH RESTRICTED HEARING ON THE CONTRALATERAL SIDE: ICD-10-CM

## 2020-11-05 DIAGNOSIS — H69.81 OTHER SPECIFIED DISORDERS OF EUSTACHIAN TUBE, RIGHT EAR: ICD-10-CM

## 2020-11-05 DIAGNOSIS — H60.90 UNSPECIFIED OTITIS EXTERNA, UNSPECIFIED EAR: ICD-10-CM

## 2020-11-05 PROCEDURE — 92504 EAR MICROSCOPY EXAMINATION: CPT

## 2020-11-05 PROCEDURE — 99214 OFFICE O/P EST MOD 30 MIN: CPT | Mod: 25

## 2020-11-05 PROCEDURE — 99072 ADDL SUPL MATRL&STAF TM PHE: CPT

## 2020-11-05 PROCEDURE — 92567 TYMPANOMETRY: CPT

## 2020-11-05 PROCEDURE — 92557 COMPREHENSIVE HEARING TEST: CPT

## 2020-11-05 RX ORDER — CIPROFLOXACIN AND DEXAMETHASONE 3; 1 MG/ML; MG/ML
0.3-0.1 SUSPENSION/ DROPS AURICULAR (OTIC)
Qty: 8 | Refills: 1 | Status: DISCONTINUED | COMMUNITY
Start: 2020-09-14 | End: 2020-11-05

## 2020-11-05 NOTE — PROCEDURE
[Same] : same as the Pre Op Dx. [] : Binocular Microscopy [FreeTextEntry1] : radha OE, R TM perf, hearing loss [FreeTextEntry4] : none [FreeTextEntry6] : Operative microscope was used to examine/treat the ear canal, ear drum and visible middle ear landmarks. Adequate exam/treatment would not have been possible without the use of a microscope. Findings are described.\par \par

## 2020-11-05 NOTE — PHYSICAL EXAM
[Binocular Microscopic Exam] : Binocular microscopic exam was performed [Hearing Loss Right Only] : normal [Hearing Loss Left Only] : normal [de-identified] : small superior perf, dry [Midline] : trachea located in midline position [Normal] : no rashes

## 2020-11-05 NOTE — HISTORY OF PRESENT ILLNESS
[de-identified] : 75 year old male follow up for Right otitis external and Left ear keratosis obturans, history of head and neck Ca, s/p XRT and chemo, subsequent ETD, Right OE and Left KO.  Right ear topical therapy for aspergillus, Left ear KO removed.  Patient states hearing better since last visit.  Denies otalgia, otorrhea, tinnitus, dizziness, vertigo, headaches related to ears, recent fevers and ear infections.

## 2020-11-05 NOTE — REASON FOR VISIT
[Subsequent Evaluation] : a subsequent evaluation for [Family Member] : family member [FreeTextEntry2] : follow up for Right otitis external and Left ear keratosis obturans

## 2020-11-10 ENCOUNTER — APPOINTMENT (OUTPATIENT)
Dept: OTOLARYNGOLOGY | Facility: CLINIC | Age: 75
End: 2020-11-10
Payer: MEDICARE

## 2020-11-10 ENCOUNTER — APPOINTMENT (OUTPATIENT)
Dept: SURGERY | Facility: CLINIC | Age: 75
End: 2020-11-10
Payer: MEDICARE

## 2020-11-10 VITALS
DIASTOLIC BLOOD PRESSURE: 76 MMHG | TEMPERATURE: 97.3 F | OXYGEN SATURATION: 94 % | SYSTOLIC BLOOD PRESSURE: 125 MMHG | WEIGHT: 132 LBS | HEART RATE: 66 BPM | HEIGHT: 69.5 IN | RESPIRATION RATE: 17 BRPM | BODY MASS INDEX: 19.11 KG/M2

## 2020-11-10 PROCEDURE — 99072 ADDL SUPL MATRL&STAF TM PHE: CPT

## 2020-11-10 PROCEDURE — 99213 OFFICE O/P EST LOW 20 MIN: CPT

## 2020-11-10 PROCEDURE — 99213 OFFICE O/P EST LOW 20 MIN: CPT | Mod: 25

## 2020-11-10 PROCEDURE — 92504 EAR MICROSCOPY EXAMINATION: CPT

## 2020-11-12 NOTE — PHYSICAL EXAM
[Midline] : trachea located in midline position [Binocular Microscopic Exam] : Binocular microscopic exam was performed [Normal] : the left mastoid was normal [Hearing Loss Right Only] : normal [Hearing Loss Left Only] : normal [de-identified] : small superior perf, wet, granulaiton; cauterized, cleared, placed GV an powder

## 2020-11-18 LAB — BACTERIA FLD CULT: ABNORMAL

## 2020-11-19 NOTE — HISTORY OF PRESENT ILLNESS
[de-identified] : Mr. RADHA HOPE is a 75 year old male who presents for follow-up visit s/p distal pancreatectomy and splenectomy on 9/9/18. Surgical pathology revealed IPMN with high grade dysplasia, and negative for invasive carcinoma. 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  CT abdomen performed on 10/28/19 findings notable for 0.9 cm cystic lesion in the pancreatic head, not significantly changed from prior. No evidence of recurrence at the distal pancreatectomy site.    \par Ca 19-9 in 2018 was 1.2.   \par \par CT abdomen 10/31/20: Interval decrease in size of pancreatic head cystic lesion, now measuring 0.6 cm, previous 0.9 cm. \par \par

## 2020-11-19 NOTE — PHYSICAL EXAM
[Normal] : oriented to person, place and time, with appropriate affect [de-identified] : Thin male, in no acute distress [de-identified] : Anicteric  [de-identified] : supple  [de-identified] : normal respiratory effort  [de-identified] : no deformities appreciated  [de-identified] : normal appearance

## 2020-11-19 NOTE — REVIEW OF SYSTEMS
[Negative] : Neurological [FreeTextEntry2] : weight stable  [FreeTextEntry5] : No chest pain  [FreeTextEntry6] : no shortness of breath  [de-identified] : hx diabetes and adrenal insufficiency

## 2020-11-19 NOTE — ASSESSMENT
[FreeTextEntry1] : s/p distal pancreatectomy 9/9/18 for IPMN- path with high grade dysplasia.  Recent CT shows interval decrease in size of pancreatic head cyst, now 6 mm in the head. \par \par Plan:\par -MRI/MRCP in 1 year \par -RTC after MRI

## 2020-11-24 ENCOUNTER — APPOINTMENT (OUTPATIENT)
Dept: OTOLARYNGOLOGY | Facility: CLINIC | Age: 75
End: 2020-11-24
Payer: MEDICARE

## 2020-11-24 DIAGNOSIS — H92.11 OTORRHEA, RIGHT EAR: ICD-10-CM

## 2020-11-24 PROCEDURE — 92504 EAR MICROSCOPY EXAMINATION: CPT

## 2020-11-24 PROCEDURE — 99213 OFFICE O/P EST LOW 20 MIN: CPT | Mod: 25

## 2020-11-24 NOTE — PROCEDURE
[Same] : same as the Pre Op Dx. [] : Binocular Microscopy [FreeTextEntry1] : R otorrhea [FreeTextEntry4] : none [FreeTextEntry6] : Operative microscope was used to examine/treat the ear canal, ear drum and visible middle ear landmarks. Adequate exam/treatment would not have been possible without the use of a microscope. Findings are described.\par \par

## 2020-11-24 NOTE — PHYSICAL EXAM
[Binocular Microscopic Exam] : Binocular microscopic exam was performed [Hearing Loss Right Only] : normal [Hearing Loss Left Only] : normal [de-identified] : small inferior perf with granulation (pinpoint). placed gelfoam and powder [Midline] : trachea located in midline position [Normal] : no rashes

## 2020-12-04 ENCOUNTER — OUTPATIENT (OUTPATIENT)
Dept: OUTPATIENT SERVICES | Facility: HOSPITAL | Age: 75
LOS: 1 days | End: 2020-12-04
Payer: MEDICARE

## 2020-12-04 ENCOUNTER — APPOINTMENT (OUTPATIENT)
Dept: CT IMAGING | Facility: IMAGING CENTER | Age: 75
End: 2020-12-04
Payer: MEDICARE

## 2020-12-04 DIAGNOSIS — C11.9 MALIGNANT NEOPLASM OF NASOPHARYNX, UNSPECIFIED: ICD-10-CM

## 2020-12-04 DIAGNOSIS — Z98.89 OTHER SPECIFIED POSTPROCEDURAL STATES: Chronic | ICD-10-CM

## 2020-12-04 DIAGNOSIS — Z90.89 ACQUIRED ABSENCE OF OTHER ORGANS: Chronic | ICD-10-CM

## 2020-12-04 DIAGNOSIS — Z90.49 ACQUIRED ABSENCE OF OTHER SPECIFIED PARTS OF DIGESTIVE TRACT: Chronic | ICD-10-CM

## 2020-12-04 DIAGNOSIS — Z00.8 ENCOUNTER FOR OTHER GENERAL EXAMINATION: ICD-10-CM

## 2020-12-04 DIAGNOSIS — Z98.890 OTHER SPECIFIED POSTPROCEDURAL STATES: Chronic | ICD-10-CM

## 2020-12-04 PROCEDURE — 82565 ASSAY OF CREATININE: CPT

## 2020-12-04 PROCEDURE — 70491 CT SOFT TISSUE NECK W/DYE: CPT

## 2020-12-04 PROCEDURE — 70491 CT SOFT TISSUE NECK W/DYE: CPT | Mod: 26

## 2020-12-15 ENCOUNTER — APPOINTMENT (OUTPATIENT)
Dept: OTOLARYNGOLOGY | Facility: CLINIC | Age: 75
End: 2020-12-15
Payer: MEDICARE

## 2020-12-15 VITALS
DIASTOLIC BLOOD PRESSURE: 94 MMHG | BODY MASS INDEX: 19.11 KG/M2 | HEIGHT: 69.5 IN | HEART RATE: 58 BPM | WEIGHT: 132 LBS | SYSTOLIC BLOOD PRESSURE: 167 MMHG

## 2020-12-15 PROCEDURE — 99072 ADDL SUPL MATRL&STAF TM PHE: CPT

## 2020-12-15 PROCEDURE — 31231 NASAL ENDOSCOPY DX: CPT

## 2020-12-15 PROCEDURE — 99214 OFFICE O/P EST MOD 30 MIN: CPT | Mod: 25

## 2020-12-15 NOTE — HISTORY OF PRESENT ILLNESS
[de-identified] : 76 y/o male with T4 N1 M0 SCCa R. NPC. He is now s/p concurrent CXRT and consolidative chemotherapy completed in March 2017 and hx of OE and being f/u with Dr. De La Rosa and much better. Last imaging 12/7/2020. pt has no nasal c.o. no  nose bleeding. no wt loss.

## 2020-12-15 NOTE — PROCEDURE
[None] : none [Flexible Endoscope] : examined with the flexible endoscope [Serial Number: ___] : Serial Number: [unfilled] [FreeTextEntry6] : Posttreatment changes in the NPx, no new lesions, no crusting, no lesions in the NC.  [de-identified] : NPC

## 2020-12-15 NOTE — PHYSICAL EXAM
[Nasal Endoscopy Performed] : nasal endoscopy was performed, see procedure section for findings [Midline] : trachea located in midline position [Edentulous] : edentulous [Normal] : no rashes [de-identified] : Posttreatment changes, no obvious LAD. [de-identified] : No other LAD.

## 2020-12-15 NOTE — REASON FOR VISIT
[Subsequent Evaluation] : a subsequent evaluation for [Spouse] : spouse [FreeTextEntry2] : f/u NPC and OE

## 2020-12-15 NOTE — REVIEW OF SYSTEMS
[Ear Pain] : ear pain [Ear Drainage] : ear drainage [As Noted in HPI] : as noted in HPI [Negative] : Heme/Lymph

## 2020-12-31 ENCOUNTER — APPOINTMENT (OUTPATIENT)
Dept: RADIATION ONCOLOGY | Facility: CLINIC | Age: 75
End: 2020-12-31
Payer: MEDICARE

## 2020-12-31 VITALS
RESPIRATION RATE: 15 BRPM | SYSTOLIC BLOOD PRESSURE: 134 MMHG | TEMPERATURE: 95.7 F | DIASTOLIC BLOOD PRESSURE: 78 MMHG | WEIGHT: 136.35 LBS | BODY MASS INDEX: 19.85 KG/M2 | OXYGEN SATURATION: 95 % | HEART RATE: 62 BPM

## 2020-12-31 PROCEDURE — 99072 ADDL SUPL MATRL&STAF TM PHE: CPT

## 2020-12-31 PROCEDURE — 99214 OFFICE O/P EST MOD 30 MIN: CPT

## 2020-12-31 NOTE — REVIEW OF SYSTEMS
[Dysphagia: Grade 0] : Dysphagia: Grade 0 [Fatigue: Grade 0] : Fatigue: Grade 0 [Xerostomia: Grade 0] : Xerostomia: Grade 0 [Oral Pain: Grade 0] : Oral Pain: Grade 0 [Dysgeusia: Grade 0] : Dysgeusia: Grade 0 [Hoarseness: Grade 1 - Mild or intermittent voice change; fully understandable; self-resolves] : Hoarseness: Grade 1 - Mild or intermittent voice change; fully understandable; self-resolves [Skin Hyperpigmentation: Grade 0] : Skin Hyperpigmentation: Grade 0 [Dermatitis Radiation: Grade 0] : Dermatitis Radiation: Grade 0

## 2020-12-31 NOTE — HISTORY OF PRESENT ILLNESS
[FreeTextEntry1] : Mr. 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 He underwent carboplatin and taxol under the care of Dr. Spann. Dr. Hidalgo is his ENT. He completed radiotherapy to 70 Gy on 11/23/16. 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 \par \par CT chest/abdomen 10/28/19: 0.9 cm cystic lesion in the pancreatic head, not significantly changed compared with the prior scan. Follow-up CT imaging in 6 months is recommended. No dilatation of the main pancreatic duct. Status post distal pancreatectomy. No evidence of recurrence at the distal pancreatectomy site.\par \par CT neck 10/28/19: No gross evidence for residual or recurrent tumor. 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 \par Nonspecific T2 prolongation involving the posterior right nasopharyngeal region.\par \par CT A/P 10/31/20: CHERRI with decrease in size of panc cyst\par \par Saw Dr. Hidalgo 12/15/20 with laryngoscope showing CHERRI.\par \par CT neck 12/4/20: CHERRI\par \par He presents today for routine follow up. Feeling well. Denies any difficulty swallowing. Eating well.

## 2021-01-05 ENCOUNTER — APPOINTMENT (OUTPATIENT)
Dept: OTOLARYNGOLOGY | Facility: CLINIC | Age: 76
End: 2021-01-05

## 2021-01-29 ENCOUNTER — NON-APPOINTMENT (OUTPATIENT)
Age: 76
End: 2021-01-29

## 2021-02-09 ENCOUNTER — APPOINTMENT (OUTPATIENT)
Dept: CT IMAGING | Facility: IMAGING CENTER | Age: 76
End: 2021-02-09

## 2021-02-18 ENCOUNTER — NON-APPOINTMENT (OUTPATIENT)
Age: 76
End: 2021-02-18

## 2021-03-10 ENCOUNTER — APPOINTMENT (OUTPATIENT)
Dept: NEPHROLOGY | Facility: CLINIC | Age: 76
End: 2021-03-10
Payer: MEDICARE

## 2021-03-10 VITALS
BODY MASS INDEX: 20.14 KG/M2 | HEIGHT: 69 IN | TEMPERATURE: 93.7 F | WEIGHT: 136 LBS | OXYGEN SATURATION: 96 % | DIASTOLIC BLOOD PRESSURE: 83 MMHG | HEART RATE: 70 BPM | SYSTOLIC BLOOD PRESSURE: 132 MMHG

## 2021-03-10 DIAGNOSIS — Z87.891 PERSONAL HISTORY OF NICOTINE DEPENDENCE: ICD-10-CM

## 2021-03-10 PROCEDURE — 99213 OFFICE O/P EST LOW 20 MIN: CPT

## 2021-03-10 PROCEDURE — 99072 ADDL SUPL MATRL&STAF TM PHE: CPT

## 2021-03-10 NOTE — REVIEW OF SYSTEMS
[Eyesight Problems] : eyesight problems [As Noted in HPI] : as noted in HPI [Loss Of Hearing] : hearing loss [Negative] : Genitourinary [Fever] : no fever [Chills] : no chills [Feeling Poorly] : not feeling poorly [Recent Weight Gain (___ Lbs)] : no recent weight gain [Recent Weight Loss (___ Lbs)] : no recent weight loss [Earache] : no earache [Nosebleeds] : no nosebleeds [Nasal Discharge] : no nasal discharge [Sore Throat] : no sore throat [Hoarseness] : no hoarseness [FreeTextEntry3] : improved [FreeTextEntry4] : hearing is better

## 2021-03-10 NOTE — ASSESSMENT
[FreeTextEntry1] : Mr Hernandez is a 77 y/o man with H/O   nasopharyngeal cancer treated with chemotherapy /Radiation therapy complicated with ARTURO. 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 Historically with No proteinuria or hematuria\par Encourage continued PO hydration  \par  No NSAIDS\par \par HTN:  BP OK\par Euvolemic on exam\par Maintain on low dose metoprolol  \par \par  \par \par \par F/U in 6 months\par \par \par \par \par \par \par \par Dr cool
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
## 2021-03-10 NOTE — PHYSICAL EXAM
[General Appearance - Alert] : alert [General Appearance - In No Acute Distress] : in no acute distress [] : no respiratory distress [Respiration, Rhythm And Depth] : normal respiratory rhythm and effort [Exaggerated Use Of Accessory Muscles For Inspiration] : no accessory muscle use [Heart Sounds] : normal S1 and S2 [Heart Sounds Gallop] : no gallops [Murmurs] : no murmurs [Heart Sounds Pericardial Friction Rub] : no pericardial rub [Edema] : there was no peripheral edema [Oriented To Time, Place, And Person] : oriented to person, place, and time [Impaired Insight] : insight and judgment were intact [Affect] : the affect was normal [FreeTextEntry1] : decreased BS present left lung base [Bowel Sounds] : normal bowel sounds [Abdomen Soft] : soft [Abdomen Tenderness] : non-tender

## 2021-03-18 ENCOUNTER — RESULT REVIEW (OUTPATIENT)
Age: 76
End: 2021-03-18

## 2021-04-08 ENCOUNTER — APPOINTMENT (OUTPATIENT)
Dept: CT IMAGING | Facility: IMAGING CENTER | Age: 76
End: 2021-04-08
Payer: MEDICARE

## 2021-04-08 ENCOUNTER — OUTPATIENT (OUTPATIENT)
Dept: OUTPATIENT SERVICES | Facility: HOSPITAL | Age: 76
LOS: 1 days | End: 2021-04-08
Payer: MEDICARE

## 2021-04-08 DIAGNOSIS — Z90.49 ACQUIRED ABSENCE OF OTHER SPECIFIED PARTS OF DIGESTIVE TRACT: Chronic | ICD-10-CM

## 2021-04-08 DIAGNOSIS — R91.8 OTHER NONSPECIFIC ABNORMAL FINDING OF LUNG FIELD: ICD-10-CM

## 2021-04-08 DIAGNOSIS — Z98.89 OTHER SPECIFIED POSTPROCEDURAL STATES: Chronic | ICD-10-CM

## 2021-04-08 DIAGNOSIS — Z98.890 OTHER SPECIFIED POSTPROCEDURAL STATES: Chronic | ICD-10-CM

## 2021-04-08 DIAGNOSIS — Z00.8 ENCOUNTER FOR OTHER GENERAL EXAMINATION: ICD-10-CM

## 2021-04-08 DIAGNOSIS — Z90.89 ACQUIRED ABSENCE OF OTHER ORGANS: Chronic | ICD-10-CM

## 2021-04-08 PROCEDURE — 71250 CT THORAX DX C-: CPT

## 2021-04-08 PROCEDURE — 71250 CT THORAX DX C-: CPT | Mod: 26

## 2021-04-09 ENCOUNTER — NON-APPOINTMENT (OUTPATIENT)
Age: 76
End: 2021-04-09

## 2021-04-14 ENCOUNTER — NON-APPOINTMENT (OUTPATIENT)
Age: 76
End: 2021-04-14

## 2021-04-15 NOTE — PROVIDER CONTACT NOTE (OTHER) - ASSESSMENT
/81, HR 84, RR 18, O2 97% on room, denies chest pain or SOB, no distress noted.
4
Pts abdomen is distended, hypoactive and round.  Emesis was greenish color drainage.
9/19 Distal pancreatectomy  C/o back pain. Morphine 2m IVP ordered and given. No s/s or distress noted. Denies SOB or headache.  Hernandez with good output. 100cc from NGT.
Abdomen distended, hypoactive. pt complaining of fullness throughout the night
Pt Vomited 50cc greenish color drainage. Pt complained of abdominal pain
Pt abdomen distended, hypoactive. Pt states that he doesn't need to void at present time.
Pt bp 190/93, other VSS. Pt denies blurry vision, headache, chest pain, or difficulty breathing. Pt making adequate urine output. Pt is however c/o about a new b/l hand tremor that he has just started experiencing today. Pt denies numbness' or tingling in the extremities but there is a noticeable tremor.
Pt bp 205/98, other VSS. Pt making adequate urine output. Pt denies chest pain, SOB, headache, or any vision changes. Pt resting in bed comfortably. Pt and pt spouse states that this happens in the hospital often to him, these bouts of hypertension.
Pt guarding abd also c/o hiccups
Pt guarding abd c/o hiccups and abd pain
Pt just finished vomiting.
Pts abdomen distended, round, hypoactive. C/o abdominal pain/fullness. Pt tried to void on multiple occasions unsuccessful
Pts abdomen is distended, hypoactive and round.  Emesis was green color drainage.
S/p Ng tube placement and folley catheter placement
no complaints at present time. NG tube in place Hernandez in place with drainage.

## 2021-06-15 ENCOUNTER — APPOINTMENT (OUTPATIENT)
Dept: OTOLARYNGOLOGY | Facility: CLINIC | Age: 76
End: 2021-06-15
Payer: MEDICARE

## 2021-06-15 VITALS
SYSTOLIC BLOOD PRESSURE: 157 MMHG | BODY MASS INDEX: 20.14 KG/M2 | WEIGHT: 136 LBS | HEART RATE: 59 BPM | HEIGHT: 69 IN | DIASTOLIC BLOOD PRESSURE: 76 MMHG

## 2021-06-15 PROCEDURE — 99214 OFFICE O/P EST MOD 30 MIN: CPT | Mod: 25

## 2021-06-15 PROCEDURE — 31231 NASAL ENDOSCOPY DX: CPT

## 2021-06-15 PROCEDURE — 99072 ADDL SUPL MATRL&STAF TM PHE: CPT

## 2021-06-15 NOTE — HISTORY OF PRESENT ILLNESS
[de-identified] : 75 y/o male with T4 N1 M0 SCCa R. NPC and  s/p concurrent CXRT and consolidative chemotherapy completed in March 2017.  pt c.o right side neck pain since the radiation on and off. not worsen.  hx of OE and being f/u with Dr. De La Rosa, yearly  and much better. Last imaging 12/7/2020. pt has no nasal c.o. no  nose bleeding. no wt loss.  no neck mass.

## 2021-06-15 NOTE — PHYSICAL EXAM
[Nasal Endoscopy Performed] : nasal endoscopy was performed, see procedure section for findings [Midline] : trachea located in midline position [Edentulous] : edentulous [de-identified] : Posttreatment changes, no obvious LAD. [Normal] : tympanic membranes are normal in both ears [de-identified] : No other LAD.

## 2021-06-15 NOTE — PROCEDURE
[None] : none [Flexible Endoscope] : examined with the flexible endoscope [Normal] : the middle meatus had no abnormalities [Serial Number: ___] : Serial Number: [unfilled] [de-identified] : NPC [FreeTextEntry6] : Posttreatment changes in the NPx, no new lesions, no crusting, no lesions in the NC.

## 2021-07-13 ENCOUNTER — OUTPATIENT (OUTPATIENT)
Dept: OUTPATIENT SERVICES | Facility: HOSPITAL | Age: 76
LOS: 1 days | End: 2021-07-13
Payer: MEDICARE

## 2021-07-13 ENCOUNTER — APPOINTMENT (OUTPATIENT)
Dept: CT IMAGING | Facility: IMAGING CENTER | Age: 76
End: 2021-07-13
Payer: MEDICARE

## 2021-07-13 DIAGNOSIS — Z98.89 OTHER SPECIFIED POSTPROCEDURAL STATES: Chronic | ICD-10-CM

## 2021-07-13 DIAGNOSIS — Z98.890 OTHER SPECIFIED POSTPROCEDURAL STATES: Chronic | ICD-10-CM

## 2021-07-13 DIAGNOSIS — C11.9 MALIGNANT NEOPLASM OF NASOPHARYNX, UNSPECIFIED: ICD-10-CM

## 2021-07-13 DIAGNOSIS — Z00.8 ENCOUNTER FOR OTHER GENERAL EXAMINATION: ICD-10-CM

## 2021-07-13 DIAGNOSIS — Z90.49 ACQUIRED ABSENCE OF OTHER SPECIFIED PARTS OF DIGESTIVE TRACT: Chronic | ICD-10-CM

## 2021-07-13 DIAGNOSIS — Z90.89 ACQUIRED ABSENCE OF OTHER ORGANS: Chronic | ICD-10-CM

## 2021-07-13 PROCEDURE — 71260 CT THORAX DX C+: CPT | Mod: 26

## 2021-07-13 PROCEDURE — 82565 ASSAY OF CREATININE: CPT

## 2021-07-13 PROCEDURE — 70491 CT SOFT TISSUE NECK W/DYE: CPT | Mod: 26

## 2021-07-13 PROCEDURE — 70491 CT SOFT TISSUE NECK W/DYE: CPT

## 2021-07-13 PROCEDURE — 71260 CT THORAX DX C+: CPT

## 2021-07-30 ENCOUNTER — RX RENEWAL (OUTPATIENT)
Age: 76
End: 2021-07-30

## 2021-08-27 ENCOUNTER — TRANSCRIPTION ENCOUNTER (OUTPATIENT)
Age: 76
End: 2021-08-27

## 2021-09-09 ENCOUNTER — APPOINTMENT (OUTPATIENT)
Dept: NEPHROLOGY | Facility: CLINIC | Age: 76
End: 2021-09-09
Payer: MEDICARE

## 2021-09-09 VITALS
WEIGHT: 136.46 LBS | OXYGEN SATURATION: 100 % | BODY MASS INDEX: 20.21 KG/M2 | HEART RATE: 60 BPM | SYSTOLIC BLOOD PRESSURE: 126 MMHG | HEIGHT: 69 IN | DIASTOLIC BLOOD PRESSURE: 71 MMHG | TEMPERATURE: 97.2 F

## 2021-09-09 DIAGNOSIS — N28.1 CYST OF KIDNEY, ACQUIRED: ICD-10-CM

## 2021-09-09 DIAGNOSIS — N18.30 CHRONIC KIDNEY DISEASE, STAGE 3 UNSPECIFIED: ICD-10-CM

## 2021-09-09 DIAGNOSIS — N18.2 CHRONIC KIDNEY DISEASE, STAGE 2 (MILD): ICD-10-CM

## 2021-09-09 PROCEDURE — 99213 OFFICE O/P EST LOW 20 MIN: CPT

## 2021-09-09 NOTE — PHYSICAL EXAM
[General Appearance - Alert] : alert [General Appearance - In No Acute Distress] : in no acute distress [] : no respiratory distress [Respiration, Rhythm And Depth] : normal respiratory rhythm and effort [Exaggerated Use Of Accessory Muscles For Inspiration] : no accessory muscle use [Heart Sounds] : normal S1 and S2 [Heart Sounds Gallop] : no gallops [Murmurs] : no murmurs [Heart Sounds Pericardial Friction Rub] : no pericardial rub [Edema] : there was no peripheral edema [Bowel Sounds] : normal bowel sounds [Abdomen Soft] : soft [Abdomen Tenderness] : non-tender [Impaired Insight] : insight and judgment were intact [Oriented To Time, Place, And Person] : oriented to person, place, and time [Affect] : the affect was normal [Auscultation Breath Sounds / Voice Sounds] : lungs were clear to auscultation bilaterally
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
## 2021-09-09 NOTE — REVIEW OF SYSTEMS
[Eyesight Problems] : eyesight problems [As Noted in HPI] : as noted in HPI [Loss Of Hearing] : hearing loss [Negative] : Genitourinary [Fever] : no fever [Chills] : no chills [Feeling Poorly] : not feeling poorly [Recent Weight Gain (___ Lbs)] : no recent weight gain [Recent Weight Loss (___ Lbs)] : no recent weight loss [Earache] : no earache [Nosebleeds] : no nosebleeds [Nasal Discharge] : no nasal discharge [Sore Throat] : no sore throat [Hoarseness] : no hoarseness [FreeTextEntry3] : rt ear with pain and fullness. Putting ear drops  [FreeTextEntry4] : hearing is better

## 2021-09-09 NOTE — ASSESSMENT
[FreeTextEntry1] : Mr Hernandez is a 77 y/o man with H/O   nasopharyngeal cancer treated with chemotherapy /Radiation therapy complicated with ARTURO. Also, found to have a pancreatic tumor ( intraductal papillary mucinous neoplasm /high grade dysplasia, negative for invasive carcinoma) s/p distal resection, orthostatic hypotension, diet controlled diabetes here for a follow up visit\par \par \par HTN:  BP OK\par Euvolemic on exam\par Maintain on low dose metoprolol  \par \par CKD ( stage 2 ) : as a result of ARTURO in the past  in the setting of cisplatin/dehydration /ARB  \par serum creatinine has been in this  range( 0.9-1.2 mg/dl) \par \par Recent serum  creatinine at 1 mg/dl\par Electrolytes in range\par  U/A bland. Historically with No proteinuria or hematuria\par Encourage continued PO hydration  \par No NSAIDS\par \par Renal cyst: simple per recent imaging \par No further testing\par \par \par No further renal follow up is needed. Told him to call if any ARTURO/BP problems\par \par  \par \par \par  \par \par \par \par \par \par \par Dr cool

## 2021-09-20 NOTE — ASU DISCHARGE PLAN (ADULT/PEDIATRIC). - NOTIFY
PSYCHIATRY INPATIENT PROGRESS NOTE           Patient:  Jessica Spivey    [id number] Date:  2021   :  1981       40 year old female Attending assigned:  Danielle Schofield MD   DOA:  9/15/2021       Covering attending:  MATTHEW Christiansen     CHIEF COMPLAINT:  depression, anxiety and PTSD.  Days inpatient:  5    INTERVAL HISTORY:  I reviewed Jessica Spivey electronic medical record, met with interdisciplinary team and met with nursing staff, and interviewed pt today to provide ongoing treatment.  Met with patient at bedside who reports some improvement in symptoms of depression and anxiety. Reports increased anxiety at night time related to past and recent trauma but declines additional medication at this time. Endorses improvements in sleep and appetite overall. Denies current suicidal or homicidal ideation and audiovisual hallucinations. Attending and participating in some of the groups while here.     Review of Systems:  Constitutional, cardio, pulmonary, neurologic and gastrointestinal reviews are conducted and are negative      CURRENT MEDICATIONS:  • melatonin  9 mg Oral Nightly   • fluticasone propionate  2 puff Inhalation BID   • hydrochlorothiazide  25 mg Oral Daily   • sulfamethoxazole-trimethoprim  1 tablet Oral BID   • citalopram  20 mg Oral Daily   • mupirocin   Topical BID   • pantoprazole  40 mg Oral 2 times per day   • rivaroxaban  20 mg Oral Daily with dinner   • nicotine  1 patch Transdermal Daily       EXAMINATION/OBJECTIVES:   LABS: Reviewed    CONSTITUTIONAL:  Vital signs reviewed  Vitals:    21 0701 21 1606 21 0653 21 0655   BP: 128/67 130/81 99/56 135/77   BP Location: RUE - Right upper extremity RUE - Right upper extremity RUE - Right upper extremity RUE - Right upper extremity   Patient Position: Sitting Sitting Left side lying Sitting   Pulse: 76 82 67 82   Resp:  18 18    Temp:  97.8 °F (36.6 °C) 97.8 °F (36.6 °C)    TempSrc:  Oral Oral    SpO2:  97%  97%    Weight:       Height:       LMP: 08/26/2021       CIWA Scores:   Patient Vitals for the past 1000 hrs:   CIWA-Ar Total Score   09/18/21 1359 5   09/18/21 0754 2   09/17/21 1533 4   09/17/21 1400 3   09/17/21 0900 3   09/16/21 2130 4   09/16/21 1800 4   09/16/21 1600 3   09/16/21 1255 2   09/16/21 1017 2   09/16/21 0842 2   09/16/21 0615 6   09/15/21 1844 4   09/15/21 1745 4   09/15/21 1629 4   09/15/21 1055 9       MENTAL STATUS EXAMINATION:   General Appearance:  Jessica Spivey is a 40 year old White  female who appears as stated age.  good eye contact.  well-nourished.  disheveled.  Behavior/Attitude:  cooperative, pleasant and guarded   Gait/Psychomotor:  Normal psychomotor and Pt has steady gait  Speech:  softspoken, concise  Mood:  \"anxious\" and \"ok\".  Affect:  blunted and congruent with stated mood.  Associations:  intact  Thought Process:  goal directed and linear  Thought Content, Perceptions, Hallucinations:  denies SI/HI/AVH.   Level of Consciousness and Orientation:  alert and oriented to situation, time, date, place, person  Attention/Concentration:  Able to sustain attention and focus for interview with minimal redirection  Memory:  no apparent impairments in short term memory and no apparent impairments in long term memory   Language:  fluent   Fund of Knowledge:  average  Insight:  fair, as evidenced by patient's insight into her own illness  Judgment:  fair, as evidenced by engagement in treatment      MEDICAL DECISION MAKING:    Diagnosis:  Depression NOS with suicidal ideation  Post-traumatic stress disorder  Alcohol dependence with withdrawal, uncomplicated  Polysubstance abuse (alcohol, cocaine, THC)    Impression: Patient reports improvement in symptoms. Having some trouble with anxiety at bedtime related to past and recent trauma. Offered prazosin but patient declines at this time. Plan for discharge tomorrow.     Plan:  1. Continue current medications as prescribed  2. Milieu and group  therapy as appropriate  3. Psychoeducation and supportive therapy with focus on relapse prevention, compliance, and recovery  4. Current LOS:  5  5. ELOS: 1 more day; discharge 9/21/2021  6. Next level of care recommended:  Outpatient therapist, Outpatient psychiatrist and residential   7. Reason to continue hospitalization:  safekeeping, disabling depression, medication adjustments, psychiatric stabilization, discharge planning.  I certify the need to continue current level of mental health services based on medical necessity as noted above.       MATTHEW Christiansen    This information is confidential and disclosure without patient consent or statutory authorization is prohibited by law.     Pain not relieved by Medications/Swelling that continues/Inability to Tolerate Liquids or Foods/Numbness, color, or temperature change to extremity/Persistent Nausea and Vomiting/Bleeding that does not stop Persistent Nausea and Vomiting/Fever greater than 101/Inability to Tolerate Liquids or Foods/shortness of breath, difficult swallowing, chest pain/Pain not relieved by Medications/Numbness, color, or temperature change to extremity/Increased Irritability or Sluggishness/Unable to Urinate/Swelling that continues/Bleeding that does not stop

## 2021-11-03 ENCOUNTER — APPOINTMENT (OUTPATIENT)
Dept: MRI IMAGING | Facility: IMAGING CENTER | Age: 76
End: 2021-11-03
Payer: MEDICARE

## 2021-11-03 ENCOUNTER — OUTPATIENT (OUTPATIENT)
Dept: OUTPATIENT SERVICES | Facility: HOSPITAL | Age: 76
LOS: 1 days | End: 2021-11-03
Payer: MEDICARE

## 2021-11-03 DIAGNOSIS — D37.8 NEOPLASM OF UNCERTAIN BEHAVIOR OF OTHER SPECIFIED DIGESTIVE ORGANS: ICD-10-CM

## 2021-11-03 DIAGNOSIS — Z98.890 OTHER SPECIFIED POSTPROCEDURAL STATES: Chronic | ICD-10-CM

## 2021-11-03 DIAGNOSIS — Z90.49 ACQUIRED ABSENCE OF OTHER SPECIFIED PARTS OF DIGESTIVE TRACT: Chronic | ICD-10-CM

## 2021-11-03 DIAGNOSIS — Z00.8 ENCOUNTER FOR OTHER GENERAL EXAMINATION: ICD-10-CM

## 2021-11-03 DIAGNOSIS — Z98.89 OTHER SPECIFIED POSTPROCEDURAL STATES: Chronic | ICD-10-CM

## 2021-11-03 DIAGNOSIS — Z90.89 ACQUIRED ABSENCE OF OTHER ORGANS: Chronic | ICD-10-CM

## 2021-11-03 PROCEDURE — A9585: CPT

## 2021-11-03 PROCEDURE — 74183 MRI ABD W/O CNTR FLWD CNTR: CPT

## 2021-11-03 PROCEDURE — 74183 MRI ABD W/O CNTR FLWD CNTR: CPT | Mod: 26

## 2021-11-09 ENCOUNTER — APPOINTMENT (OUTPATIENT)
Dept: SURGERY | Facility: CLINIC | Age: 76
End: 2021-11-09
Payer: MEDICARE

## 2021-11-09 VITALS
SYSTOLIC BLOOD PRESSURE: 161 MMHG | HEART RATE: 55 BPM | RESPIRATION RATE: 16 BRPM | WEIGHT: 136 LBS | BODY MASS INDEX: 20.08 KG/M2 | DIASTOLIC BLOOD PRESSURE: 78 MMHG

## 2021-11-09 PROCEDURE — 99214 OFFICE O/P EST MOD 30 MIN: CPT

## 2021-11-11 NOTE — ASSESSMENT
[FreeTextEntry1] : 77 y/o M s/p distal pancreatectomy 9/9/18 for IPMN, surgical path with high grade dysplasia. Recent surveillance MRI/MRCP stable/ no changes. \par \par Plan:\par MRCP in 1 year\par RTC after, visit can be via Telemedicine

## 2021-11-11 NOTE — PHYSICAL EXAM
[Normal] : oriented to person, place and time, with appropriate affect [de-identified] : Thin male, in no acute distress [de-identified] : Anicteric  [de-identified] : supple  [de-identified] : normal respiratory effort  [de-identified] : no deformities appreciated  [de-identified] : normal appearance

## 2021-11-11 NOTE — REVIEW OF SYSTEMS
[Negative] : Neurological [FreeTextEntry2] : weight stable  [FreeTextEntry5] : No chest pain  [FreeTextEntry6] : no shortness of breath  [de-identified] : hx diabetes and adrenal insufficiency

## 2021-11-11 NOTE — HISTORY OF PRESENT ILLNESS
[de-identified] : Mr. RADHA HOPE is a 75 year old male who presents for follow-up visit for surveillance of IPMN.  \par He is s/p distal pancreatectomy and splenectomy on 9/9/18. Surgical pathology revealed IPMN with high grade dysplasia, and negative for invasive carcinoma. 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  \par Ca 19-9 in 2018 was 1.2.   \par \par CT abdomen 10/28/19 - 0.9 cm cystic lesion in the pancreatic head, not significantly changed from prior. No evidence of recurrence at the distal pancreatectomy site.    \par CT abdomen 10/31/20: Interval decrease in size of pancreatic head cystic lesion, now measuring 0.6 cm, previous 0.9 cm. \par MRI/MRCP 11/3/21: Cystic pancreatic head lesion most consistent with branch IPMN minimally enlarged since 05/10/2018 in retrospect. Apparent second tiny cystic branch I PMN in the proximal pancreatic tail. Pancreas divisum\par \par 11/9/21: Patient presents for follow-up visit for IPMN surveillance. He offers no complaints.

## 2021-11-16 ENCOUNTER — APPOINTMENT (OUTPATIENT)
Dept: SURGERY | Facility: CLINIC | Age: 76
End: 2021-11-16

## 2021-11-30 ENCOUNTER — APPOINTMENT (OUTPATIENT)
Dept: OTOLARYNGOLOGY | Facility: CLINIC | Age: 76
End: 2021-11-30
Payer: MEDICARE

## 2021-11-30 VITALS
SYSTOLIC BLOOD PRESSURE: 153 MMHG | WEIGHT: 136 LBS | BODY MASS INDEX: 20.14 KG/M2 | HEIGHT: 69 IN | DIASTOLIC BLOOD PRESSURE: 92 MMHG | HEART RATE: 54 BPM

## 2021-11-30 DIAGNOSIS — H60.91 UNSPECIFIED OTITIS EXTERNA, RIGHT EAR: ICD-10-CM

## 2021-11-30 PROCEDURE — 92567 TYMPANOMETRY: CPT

## 2021-11-30 PROCEDURE — 99214 OFFICE O/P EST MOD 30 MIN: CPT

## 2021-11-30 PROCEDURE — 92557 COMPREHENSIVE HEARING TEST: CPT

## 2021-11-30 PROCEDURE — 92504 EAR MICROSCOPY EXAMINATION: CPT

## 2021-11-30 NOTE — PHYSICAL EXAM
[Midline] : trachea located in midline position [Binocular Microscopic Exam] : Binocular microscopic exam was performed [Rinne Test Air Conduction Persists > Bone Conduction Right] : air conduction greater than bone conduction on the right [Rinne Test Air Conduction Persists > Bone Conduction Left] : air conduction greater than bone conduction on the left [Hearing Everett Test (Tuning Fork On Forehead)] : no lateralization of tone [Normal] : the right tympanic membrane was normal [Hearing Loss Right Only] : normal [Hearing Loss Left Only] : normal [Nystagmus] : ~T no ~M nystagmus was seen [Fukuda Step Test] : Fukuda Step Test was Negative [Romberg's Sign] : Romberg's sign was absent [Fistula Sign] : Fistula Sign: Negative [Past-Pointing] : Past-Pointing: Negative [Ale-Halljeanetteke] : Bertha-Hallpike: Negative [FreeTextEntry8] : cerumen, removed; ?white fungal debris, removed; placed GV and powder [FreeTextEntry9] : large amt cerumen, removed

## 2021-11-30 NOTE — HISTORY OF PRESENT ILLNESS
[de-identified] : 76 year old man. History of Right otitis external and Right TM perforation. Patient reports 1 episode of otorrhea in September that was treated with Ciprodex for 10 days, symptoms resolved. Patient reports right ear hearing has improved but reports still having difficulty hearing on the right side. Denies use of hearing aids. Patient denies otalgia, otorrhea, recent ear infections, dizziness, vertigo, headaches related to hearing.\par

## 2021-11-30 NOTE — CONSULT LETTER
[Sincerely,] : Sincerely, [FreeTextEntry3] : Zack De La Rosa MD, Otology Neurotology & Skull Base Surgery

## 2021-11-30 NOTE — DATA REVIEWED
[de-identified] : An audiogram was ordered and performed including pure tones, tympanometry and speech testing for the patients complaint of hearing loss\par I have independently reviewed the patient's audiogram from today and my findings include R mixed loss, L SNHL

## 2021-12-06 ENCOUNTER — APPOINTMENT (OUTPATIENT)
Dept: OTOLARYNGOLOGY | Facility: CLINIC | Age: 76
End: 2021-12-06
Payer: MEDICARE

## 2021-12-06 PROCEDURE — 99214 OFFICE O/P EST MOD 30 MIN: CPT | Mod: 25

## 2021-12-06 PROCEDURE — 31231 NASAL ENDOSCOPY DX: CPT

## 2021-12-06 NOTE — HISTORY OF PRESENT ILLNESS
[de-identified] : 75 y/o male with T4 N1 M0 SCCa R. NPC and  s/p concurrent CXRT and consolidative chemotherapy completed in March 2017.  last scan 7/2021.  hx of OE and being f/u with Dr. De La Rosa, yearly  and much better. pt still c.o right side neck spasm since the radiation on and off, working physical therapy. pt has no nasal c.o., no  nose bleeding. no wt loss.  no neck mass.

## 2021-12-06 NOTE — PROCEDURE
[None] : none [Flexible Endoscope] : examined with the flexible endoscope [Normal] : the middle meatus had no abnormalities [FreeTextEntry6] : Posttreatment changes in the NPx, no new lesions, no crusting, no lesions in the NC.  [de-identified] : NPC

## 2021-12-06 NOTE — PHYSICAL EXAM
[Nasal Endoscopy Performed] : nasal endoscopy was performed, see procedure section for findings [Midline] : trachea located in midline position [Edentulous] : edentulous [Normal] : no rashes [de-identified] : Posttreatment changes, no obvious LAD. [de-identified] : No other LAD.

## 2021-12-08 ENCOUNTER — NON-APPOINTMENT (OUTPATIENT)
Age: 76
End: 2021-12-08

## 2021-12-16 ENCOUNTER — APPOINTMENT (OUTPATIENT)
Dept: RADIATION ONCOLOGY | Facility: CLINIC | Age: 76
End: 2021-12-16
Payer: MEDICARE

## 2021-12-16 VITALS
HEART RATE: 69 BPM | RESPIRATION RATE: 16 BRPM | HEIGHT: 69 IN | SYSTOLIC BLOOD PRESSURE: 122 MMHG | DIASTOLIC BLOOD PRESSURE: 71 MMHG | BODY MASS INDEX: 20.26 KG/M2 | OXYGEN SATURATION: 95 % | TEMPERATURE: 96.44 F | WEIGHT: 136.79 LBS

## 2021-12-16 PROCEDURE — 99213 OFFICE O/P EST LOW 20 MIN: CPT | Mod: GC

## 2021-12-16 NOTE — REVIEW OF SYSTEMS
[Dysphagia: Grade 0] : Dysphagia: Grade 0 [Fatigue: Grade 0] : Fatigue: Grade 0 [Xerostomia: Grade 0] : Xerostomia: Grade 0 [Oral Pain: Grade 0] : Oral Pain: Grade 0 [Dysgeusia: Grade 0] : Dysgeusia: Grade 0 [Hoarseness: Grade 1 - Mild or intermittent voice change; fully understandable; self-resolves] : Hoarseness: Grade 1 - Mild or intermittent voice change; fully understandable; self-resolves [Skin Hyperpigmentation: Grade 1 - Hyperpigmentation covering <10% BSA; no psychosocial impact] : Skin Hyperpigmentation: Grade 1 - Hyperpigmentation covering <10% BSA; no psychosocial impact [Dermatitis Radiation: Grade 0] : Dermatitis Radiation: Grade 0

## 2021-12-23 NOTE — HISTORY OF PRESENT ILLNESS
[FreeTextEntry1] : Mr. 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 He underwent carboplatin and taxol under the care of Dr. Spann. Dr. Hidalgo is his ENT. He completed radiotherapy to 70 Gy on 11/23/16. 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 \par \par CT chest/abdomen 10/28/19: 0.9 cm cystic lesion in the pancreatic head, not significantly changed compared with the prior scan. Follow-up CT imaging in 6 months is recommended. No dilatation of the main pancreatic duct. Status post distal pancreatectomy. No evidence of recurrence at the distal pancreatectomy site.\par \par CT neck 10/28/19: No gross evidence for residual or recurrent tumor. 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 \par Nonspecific T2 prolongation involving the posterior right nasopharyngeal region.\par \par CT A/P 10/31/20: CHERRI with decrease in size of panc cyst\par \par Saw Dr. Hidalgo 12/15/20 with laryngoscope showing CHERRI.\par \par CT Chest 7/13/21 CHERRI\par CT NEck 7/13/21 \par Primary: NIRADS 1 - Expected post-treatment changes without evidence of recurrent disease in the primary site.\par Nodes: NIRADS 1 - No evidence of abnormal lymph nodes.\par \par 11/3/21 MRCP\par IMPRESSION:\par Cystic pancreatic head lesion most consistent with branch  PMN minimally enlarged since 05/10/2018 in retrospect.\par Apparent second tiny cystic branch I PMN in the proximal pancreatic tail.\par Pancreas divisum\par \par Saw Dr. Hidalgo 11/6/21 with laryngoscope showing CHERRI.

## 2022-02-16 ENCOUNTER — INPATIENT (INPATIENT)
Facility: HOSPITAL | Age: 77
LOS: 0 days | Discharge: HOME | DRG: 184 | End: 2022-02-17
Attending: SURGERY | Admitting: SURGERY
Payer: MEDICARE

## 2022-02-16 ENCOUNTER — EMERGENCY (EMERGENCY)
Facility: HOSPITAL | Age: 77
LOS: 0 days | Discharge: ANOTHER HOSPITAL | End: 2022-02-16
Attending: STUDENT IN AN ORGANIZED HEALTH CARE EDUCATION/TRAINING PROGRAM
Payer: MEDICARE

## 2022-02-16 VITALS — WEIGHT: 149.91 LBS | HEIGHT: 71 IN

## 2022-02-16 VITALS
DIASTOLIC BLOOD PRESSURE: 97 MMHG | WEIGHT: 130.07 LBS | RESPIRATION RATE: 17 BRPM | HEART RATE: 73 BPM | HEIGHT: 71 IN | OXYGEN SATURATION: 97 % | SYSTOLIC BLOOD PRESSURE: 173 MMHG | TEMPERATURE: 98 F

## 2022-02-16 VITALS
HEART RATE: 68 BPM | OXYGEN SATURATION: 99 % | SYSTOLIC BLOOD PRESSURE: 177 MMHG | DIASTOLIC BLOOD PRESSURE: 90 MMHG | TEMPERATURE: 98 F | RESPIRATION RATE: 18 BRPM

## 2022-02-16 DIAGNOSIS — Z20.822 CONTACT WITH AND (SUSPECTED) EXPOSURE TO COVID-19: ICD-10-CM

## 2022-02-16 DIAGNOSIS — S22.42XA MULTIPLE FRACTURES OF RIBS, LEFT SIDE, INITIAL ENCOUNTER FOR CLOSED FRACTURE: ICD-10-CM

## 2022-02-16 DIAGNOSIS — C18.9 MALIGNANT NEOPLASM OF COLON, UNSPECIFIED: ICD-10-CM

## 2022-02-16 DIAGNOSIS — I12.9 HYPERTENSIVE CHRONIC KIDNEY DISEASE WITH STAGE 1 THROUGH STAGE 4 CHRONIC KIDNEY DISEASE, OR UNSPECIFIED CHRONIC KIDNEY DISEASE: ICD-10-CM

## 2022-02-16 DIAGNOSIS — Z88.8 ALLERGY STATUS TO OTHER DRUGS, MEDICAMENTS AND BIOLOGICAL SUBSTANCES STATUS: ICD-10-CM

## 2022-02-16 DIAGNOSIS — Z29.9 ENCOUNTER FOR PROPHYLACTIC MEASURES, UNSPECIFIED: ICD-10-CM

## 2022-02-16 DIAGNOSIS — Z90.89 ACQUIRED ABSENCE OF OTHER ORGANS: Chronic | ICD-10-CM

## 2022-02-16 DIAGNOSIS — J44.9 CHRONIC OBSTRUCTIVE PULMONARY DISEASE, UNSPECIFIED: ICD-10-CM

## 2022-02-16 DIAGNOSIS — Z90.49 ACQUIRED ABSENCE OF OTHER SPECIFIED PARTS OF DIGESTIVE TRACT: Chronic | ICD-10-CM

## 2022-02-16 DIAGNOSIS — Z98.890 OTHER SPECIFIED POSTPROCEDURAL STATES: Chronic | ICD-10-CM

## 2022-02-16 DIAGNOSIS — Z88.0 ALLERGY STATUS TO PENICILLIN: ICD-10-CM
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
## 2022-02-16 DIAGNOSIS — Z79.01 LONG TERM (CURRENT) USE OF ANTICOAGULANTS: ICD-10-CM

## 2022-02-16 DIAGNOSIS — Z79.4 LONG TERM (CURRENT) USE OF INSULIN: ICD-10-CM

## 2022-02-16 DIAGNOSIS — Z91.030 BEE ALLERGY STATUS: ICD-10-CM

## 2022-02-16 DIAGNOSIS — S22.49XA MULTIPLE FRACTURES OF RIBS, UNSPECIFIED SIDE, INITIAL ENCOUNTER FOR CLOSED FRACTURE: ICD-10-CM

## 2022-02-16 DIAGNOSIS — Z98.89 OTHER SPECIFIED POSTPROCEDURAL STATES: Chronic | ICD-10-CM

## 2022-02-16 DIAGNOSIS — Z80.3 FAMILY HISTORY OF MALIGNANT NEOPLASM OF BREAST: ICD-10-CM

## 2022-02-16 DIAGNOSIS — I10 ESSENTIAL (PRIMARY) HYPERTENSION: ICD-10-CM

## 2022-02-16 DIAGNOSIS — N18.30 CHRONIC KIDNEY DISEASE, STAGE 3 UNSPECIFIED: ICD-10-CM

## 2022-02-16 DIAGNOSIS — R07.81 PLEURODYNIA: ICD-10-CM
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
## 2022-02-16 DIAGNOSIS — I48.0 PAROXYSMAL ATRIAL FIBRILLATION: ICD-10-CM

## 2022-02-16 LAB
ALBUMIN SERPL ELPH-MCNC: 3.8 G/DL — SIGNIFICANT CHANGE UP (ref 3.3–5)
ALBUMIN SERPL ELPH-MCNC: 4.7 G/DL — SIGNIFICANT CHANGE UP (ref 3.3–5)
ALP SERPL-CCNC: 55 U/L — SIGNIFICANT CHANGE UP (ref 40–120)
ALP SERPL-CCNC: 58 U/L — SIGNIFICANT CHANGE UP (ref 40–120)
ALT FLD-CCNC: 25 U/L — SIGNIFICANT CHANGE UP (ref 10–45)
ALT FLD-CCNC: 34 U/L — SIGNIFICANT CHANGE UP (ref 12–78)
ANION GAP SERPL CALC-SCNC: 15 MMOL/L — SIGNIFICANT CHANGE UP (ref 5–17)
ANION GAP SERPL CALC-SCNC: 5 MMOL/L — SIGNIFICANT CHANGE UP (ref 5–17)
APTT BLD: 26 SEC — LOW (ref 27.5–35.5)
APTT BLD: 43.1 SEC — HIGH (ref 27.5–35.5)
AST SERPL-CCNC: 33 U/L — SIGNIFICANT CHANGE UP (ref 15–37)
AST SERPL-CCNC: 35 U/L — SIGNIFICANT CHANGE UP (ref 10–40)
BASOPHILS # BLD AUTO: 0.03 K/UL — SIGNIFICANT CHANGE UP (ref 0–0.2)
BASOPHILS # BLD AUTO: 0.05 K/UL — SIGNIFICANT CHANGE UP (ref 0–0.2)
BASOPHILS NFR BLD AUTO: 0.3 % — SIGNIFICANT CHANGE UP (ref 0–2)
BASOPHILS NFR BLD AUTO: 0.4 % — SIGNIFICANT CHANGE UP (ref 0–2)
BILIRUB SERPL-MCNC: 1.2 MG/DL — SIGNIFICANT CHANGE UP (ref 0.2–1.2)
BILIRUB SERPL-MCNC: 1.2 MG/DL — SIGNIFICANT CHANGE UP (ref 0.2–1.2)
BLD GP AB SCN SERPL QL: NEGATIVE — SIGNIFICANT CHANGE UP
BLD GP AB SCN SERPL QL: SIGNIFICANT CHANGE UP
BUN SERPL-MCNC: 20 MG/DL — SIGNIFICANT CHANGE UP (ref 7–23)
BUN SERPL-MCNC: 22 MG/DL — SIGNIFICANT CHANGE UP (ref 7–23)
CALCIUM SERPL-MCNC: 10.1 MG/DL — SIGNIFICANT CHANGE UP (ref 8.4–10.5)
CALCIUM SERPL-MCNC: 9.5 MG/DL — SIGNIFICANT CHANGE UP (ref 8.5–10.1)
CHLORIDE SERPL-SCNC: 100 MMOL/L — SIGNIFICANT CHANGE UP (ref 96–108)
CHLORIDE SERPL-SCNC: 105 MMOL/L — SIGNIFICANT CHANGE UP (ref 96–108)
CO2 SERPL-SCNC: 23 MMOL/L — SIGNIFICANT CHANGE UP (ref 22–31)
CO2 SERPL-SCNC: 28 MMOL/L — SIGNIFICANT CHANGE UP (ref 22–31)
CREAT SERPL-MCNC: 1.07 MG/DL — SIGNIFICANT CHANGE UP (ref 0.5–1.3)
CREAT SERPL-MCNC: 1.23 MG/DL — SIGNIFICANT CHANGE UP (ref 0.5–1.3)
EOSINOPHIL # BLD AUTO: 0.01 K/UL — SIGNIFICANT CHANGE UP (ref 0–0.5)
EOSINOPHIL # BLD AUTO: 0.01 K/UL — SIGNIFICANT CHANGE UP (ref 0–0.5)
EOSINOPHIL NFR BLD AUTO: 0.1 % — SIGNIFICANT CHANGE UP (ref 0–6)
EOSINOPHIL NFR BLD AUTO: 0.1 % — SIGNIFICANT CHANGE UP (ref 0–6)
GLUCOSE SERPL-MCNC: 108 MG/DL — HIGH (ref 70–99)
GLUCOSE SERPL-MCNC: 110 MG/DL — HIGH (ref 70–99)
HCT VFR BLD CALC: 42.2 % — SIGNIFICANT CHANGE UP (ref 39–50)
HCT VFR BLD CALC: 42.8 % — SIGNIFICANT CHANGE UP (ref 39–50)
HGB BLD-MCNC: 14 G/DL — SIGNIFICANT CHANGE UP (ref 13–17)
HGB BLD-MCNC: 14 G/DL — SIGNIFICANT CHANGE UP (ref 13–17)
IMM GRANULOCYTES NFR BLD AUTO: 0.5 % — SIGNIFICANT CHANGE UP (ref 0–1.5)
IMM GRANULOCYTES NFR BLD AUTO: 0.6 % — SIGNIFICANT CHANGE UP (ref 0–1.5)
INR BLD: 1.57 RATIO — HIGH (ref 0.88–1.16)
INR BLD: 1.64 RATIO — HIGH (ref 0.88–1.16)
LYMPHOCYTES # BLD AUTO: 0.89 K/UL — LOW (ref 1–3.3)
LYMPHOCYTES # BLD AUTO: 1.54 K/UL — SIGNIFICANT CHANGE UP (ref 1–3.3)
LYMPHOCYTES # BLD AUTO: 11.8 % — LOW (ref 13–44)
LYMPHOCYTES # BLD AUTO: 7.8 % — LOW (ref 13–44)
MCHC RBC-ENTMCNC: 30.4 PG — SIGNIFICANT CHANGE UP (ref 27–34)
MCHC RBC-ENTMCNC: 30.5 PG — SIGNIFICANT CHANGE UP (ref 27–34)
MCHC RBC-ENTMCNC: 32.7 GM/DL — SIGNIFICANT CHANGE UP (ref 32–36)
MCHC RBC-ENTMCNC: 33.2 G/DL — SIGNIFICANT CHANGE UP (ref 32–36)
MCV RBC AUTO: 91.7 FL — SIGNIFICANT CHANGE UP (ref 80–100)
MCV RBC AUTO: 93.2 FL — SIGNIFICANT CHANGE UP (ref 80–100)
MONOCYTES # BLD AUTO: 0.44 K/UL — SIGNIFICANT CHANGE UP (ref 0–0.9)
MONOCYTES # BLD AUTO: 0.59 K/UL — SIGNIFICANT CHANGE UP (ref 0–0.9)
MONOCYTES NFR BLD AUTO: 3.8 % — SIGNIFICANT CHANGE UP (ref 2–14)
MONOCYTES NFR BLD AUTO: 4.5 % — SIGNIFICANT CHANGE UP (ref 2–14)
NEUTROPHILS # BLD AUTO: 10 K/UL — HIGH (ref 1.8–7.4)
NEUTROPHILS # BLD AUTO: 10.76 K/UL — HIGH (ref 1.8–7.4)
NEUTROPHILS NFR BLD AUTO: 82.7 % — HIGH (ref 43–77)
NEUTROPHILS NFR BLD AUTO: 87.4 % — HIGH (ref 43–77)
NRBC # BLD: 0 /100 WBCS — SIGNIFICANT CHANGE UP (ref 0–0)
NRBC # BLD: 0 /100 WBCS — SIGNIFICANT CHANGE UP (ref 0–0)
PLATELET # BLD AUTO: 188 K/UL — SIGNIFICANT CHANGE UP (ref 150–400)
PLATELET # BLD AUTO: 260 K/UL — SIGNIFICANT CHANGE UP (ref 150–400)
POTASSIUM SERPL-MCNC: 3.9 MMOL/L — SIGNIFICANT CHANGE UP (ref 3.5–5.3)
POTASSIUM SERPL-MCNC: 3.9 MMOL/L — SIGNIFICANT CHANGE UP (ref 3.5–5.3)
POTASSIUM SERPL-SCNC: 3.9 MMOL/L — SIGNIFICANT CHANGE UP (ref 3.5–5.3)
POTASSIUM SERPL-SCNC: 3.9 MMOL/L — SIGNIFICANT CHANGE UP (ref 3.5–5.3)
PROT SERPL-MCNC: 7.9 G/DL — SIGNIFICANT CHANGE UP (ref 6–8.3)
PROT SERPL-MCNC: 8.4 GM/DL — HIGH (ref 6–8.3)
PROTHROM AB SERPL-ACNC: 18.4 SEC — HIGH (ref 10.6–13.6)
PROTHROM AB SERPL-ACNC: 18.6 SEC — HIGH (ref 10.6–13.6)
RAPID RVP RESULT: SIGNIFICANT CHANGE UP
RBC # BLD: 4.59 M/UL — SIGNIFICANT CHANGE UP (ref 4.2–5.8)
RBC # BLD: 4.6 M/UL — SIGNIFICANT CHANGE UP (ref 4.2–5.8)
RBC # FLD: 14.8 % — HIGH (ref 10.3–14.5)
RBC # FLD: 15 % — HIGH (ref 10.3–14.5)
RH IG SCN BLD-IMP: NEGATIVE — SIGNIFICANT CHANGE UP
SARS-COV-2 RNA SPEC QL NAA+PROBE: SIGNIFICANT CHANGE UP
SODIUM SERPL-SCNC: 138 MMOL/L — SIGNIFICANT CHANGE UP (ref 135–145)
SODIUM SERPL-SCNC: 138 MMOL/L — SIGNIFICANT CHANGE UP (ref 135–145)
WBC # BLD: 11.44 K/UL — HIGH (ref 3.8–10.5)
WBC # BLD: 13.02 K/UL — HIGH (ref 3.8–10.5)
WBC # FLD AUTO: 11.44 K/UL — HIGH (ref 3.8–10.5)
WBC # FLD AUTO: 13.02 K/UL — HIGH (ref 3.8–10.5)

## 2022-02-16 PROCEDURE — 71250 CT THORAX DX C-: CPT | Mod: 26,MA

## 2022-02-16 PROCEDURE — 93010 ELECTROCARDIOGRAM REPORT: CPT

## 2022-02-16 PROCEDURE — 99223 1ST HOSP IP/OBS HIGH 75: CPT

## 2022-02-16 PROCEDURE — 70450 CT HEAD/BRAIN W/O DYE: CPT | Mod: 26,MA

## 2022-02-16 PROCEDURE — 99285 EMERGENCY DEPT VISIT HI MDM: CPT

## 2022-02-16 PROCEDURE — 72125 CT NECK SPINE W/O DYE: CPT | Mod: 26,MA

## 2022-02-16 PROCEDURE — 71045 X-RAY EXAM CHEST 1 VIEW: CPT | Mod: 26

## 2022-02-16 PROCEDURE — 99222 1ST HOSP IP/OBS MODERATE 55: CPT

## 2022-02-16 PROCEDURE — 99291 CRITICAL CARE FIRST HOUR: CPT

## 2022-02-16 RX ORDER — SODIUM CHLORIDE 9 MG/ML
500 INJECTION, SOLUTION INTRAVENOUS ONCE
Refills: 0 | Status: COMPLETED | OUTPATIENT
Start: 2022-02-16 | End: 2022-02-16

## 2022-02-16 RX ORDER — HYDRALAZINE HCL 50 MG
5 TABLET ORAL ONCE
Refills: 0 | Status: COMPLETED | OUTPATIENT
Start: 2022-02-16 | End: 2022-02-16

## 2022-02-16 RX ORDER — FENTANYL CITRATE 50 UG/ML
50 INJECTION INTRAVENOUS ONCE
Refills: 0 | Status: DISCONTINUED | OUTPATIENT
Start: 2022-02-16 | End: 2022-02-16

## 2022-02-16 RX ORDER — SENNA PLUS 8.6 MG/1
2 TABLET ORAL AT BEDTIME
Refills: 0 | Status: DISCONTINUED | OUTPATIENT
Start: 2022-02-16 | End: 2022-02-17

## 2022-02-16 RX ORDER — ACETAMINOPHEN 500 MG
650 TABLET ORAL ONCE
Refills: 0 | Status: COMPLETED | OUTPATIENT
Start: 2022-02-16 | End: 2022-02-16

## 2022-02-16 RX ORDER — ENOXAPARIN SODIUM 100 MG/ML
40 INJECTION SUBCUTANEOUS EVERY 24 HOURS
Refills: 0 | Status: DISCONTINUED | OUTPATIENT
Start: 2022-02-16 | End: 2022-02-17

## 2022-02-16 RX ORDER — HYDRALAZINE HCL 50 MG
5 TABLET ORAL EVERY 6 HOURS
Refills: 0 | Status: DISCONTINUED | OUTPATIENT
Start: 2022-02-16 | End: 2022-02-17

## 2022-02-16 RX ORDER — IBUPROFEN 200 MG
200 TABLET ORAL EVERY 6 HOURS
Refills: 0 | Status: DISCONTINUED | OUTPATIENT
Start: 2022-02-16 | End: 2022-02-17

## 2022-02-16 RX ORDER — LIDOCAINE 4 G/100G
1 CREAM TOPICAL DAILY
Refills: 0 | Status: DISCONTINUED | OUTPATIENT
Start: 2022-02-16 | End: 2022-02-17

## 2022-02-16 RX ORDER — POLYETHYLENE GLYCOL 3350 17 G/17G
17 POWDER, FOR SOLUTION ORAL DAILY
Refills: 0 | Status: DISCONTINUED | OUTPATIENT
Start: 2022-02-16 | End: 2022-02-17

## 2022-02-16 RX ORDER — TRAMADOL HYDROCHLORIDE 50 MG/1
50 TABLET ORAL EVERY 4 HOURS
Refills: 0 | Status: DISCONTINUED | OUTPATIENT
Start: 2022-02-16 | End: 2022-02-17

## 2022-02-16 RX ORDER — TRAMADOL HYDROCHLORIDE 50 MG/1
25 TABLET ORAL EVERY 4 HOURS
Refills: 0 | Status: DISCONTINUED | OUTPATIENT
Start: 2022-02-16 | End: 2022-02-17

## 2022-02-16 RX ORDER — SIMVASTATIN 20 MG/1
20 TABLET, FILM COATED ORAL AT BEDTIME
Refills: 0 | Status: DISCONTINUED | OUTPATIENT
Start: 2022-02-16 | End: 2022-02-17

## 2022-02-16 RX ORDER — ACETAMINOPHEN 500 MG
975 TABLET ORAL EVERY 6 HOURS
Refills: 0 | Status: DISCONTINUED | OUTPATIENT
Start: 2022-02-16 | End: 2022-02-17

## 2022-02-16 RX ORDER — METOPROLOL TARTRATE 50 MG
25 TABLET ORAL AT BEDTIME
Refills: 0 | Status: DISCONTINUED | OUTPATIENT
Start: 2022-02-16 | End: 2022-02-16

## 2022-02-16 RX ORDER — MORPHINE SULFATE 50 MG/1
4 CAPSULE, EXTENDED RELEASE ORAL ONCE
Refills: 0 | Status: DISCONTINUED | OUTPATIENT
Start: 2022-02-16 | End: 2022-02-16

## 2022-02-16 RX ORDER — METOPROLOL TARTRATE 50 MG
25 TABLET ORAL DAILY
Refills: 0 | Status: DISCONTINUED | OUTPATIENT
Start: 2022-02-16 | End: 2022-02-17

## 2022-02-16 RX ORDER — AMLODIPINE BESYLATE 2.5 MG/1
5 TABLET ORAL DAILY
Refills: 0 | Status: DISCONTINUED | OUTPATIENT
Start: 2022-02-16 | End: 2022-02-17

## 2022-02-16 RX ADMIN — SODIUM CHLORIDE 500 MILLILITER(S): 9 INJECTION, SOLUTION INTRAVENOUS at 05:29

## 2022-02-16 RX ADMIN — Medication 975 MILLIGRAM(S): at 11:35

## 2022-02-16 RX ADMIN — Medication 5 MILLIGRAM(S): at 13:12

## 2022-02-16 RX ADMIN — Medication 5 MILLIGRAM(S): at 17:35

## 2022-02-16 RX ADMIN — Medication 650 MILLIGRAM(S): at 04:53

## 2022-02-16 RX ADMIN — TRAMADOL HYDROCHLORIDE 25 MILLIGRAM(S): 50 TABLET ORAL at 20:06

## 2022-02-16 RX ADMIN — SIMVASTATIN 20 MILLIGRAM(S): 20 TABLET, FILM COATED ORAL at 20:06

## 2022-02-16 RX ADMIN — POLYETHYLENE GLYCOL 3350 17 GRAM(S): 17 POWDER, FOR SOLUTION ORAL at 11:36

## 2022-02-16 RX ADMIN — Medication 975 MILLIGRAM(S): at 17:09

## 2022-02-16 RX ADMIN — FENTANYL CITRATE 50 MICROGRAM(S): 50 INJECTION INTRAVENOUS at 07:51

## 2022-02-16 RX ADMIN — ENOXAPARIN SODIUM 40 MILLIGRAM(S): 100 INJECTION SUBCUTANEOUS at 11:35

## 2022-02-16 RX ADMIN — AMLODIPINE BESYLATE 5 MILLIGRAM(S): 2.5 TABLET ORAL at 15:56

## 2022-02-16 RX ADMIN — Medication 1 TABLET(S): at 11:36

## 2022-02-16 RX ADMIN — TRAMADOL HYDROCHLORIDE 25 MILLIGRAM(S): 50 TABLET ORAL at 20:36

## 2022-02-16 RX ADMIN — Medication 25 MILLIGRAM(S): at 18:25

## 2022-02-16 RX ADMIN — LIDOCAINE 1 PATCH: 4 CREAM TOPICAL at 20:06

## 2022-02-16 RX ADMIN — SENNA PLUS 2 TABLET(S): 8.6 TABLET ORAL at 20:06

## 2022-02-16 NOTE — H&P ADULT - HISTORY OF PRESENT ILLNESS
GENERAL SURGERY TRAUMA SERVICE  --------------------------------------------------------------------------------------------    TRAUMA ACTIVATION LEVEL: 3    MECHANISM OF INJURY:      [] Blunt:     [] Motor vehicle collision       [X] Fall       [] Pedestrian struck	      [] Motorcycle accident      [] Penetrating:     [] Gun shot wound       [] Stab wound    CHIEF COMPLAINT: Patient is a 77y old  Male who presents with a chief complaint of mechanical fall    HPI: 77M w/ PMHx of AFib on Eliquis, HTN, COPD, CKD3, Carcinoic tumor s/p [id number], nasopharyngewal carcinoma s/p chemo/XRT and IPMN s/p distal pancreatectomy by Dr. Brasher on 2018 who now presents to the ED as a transfer from OSH after suffering a mechanical fall this morning. Patient states he went to the bathroom and as he was walking back to his room he tripped and fell on his left side. No headstrike of LOC.    In the ED, patient was afebrile, hemodynamically stable, labs largely remarkable for leukocytosis of 13k, otherwise WNL. CTHN negative for traumatic injuries, CT chest revealed left 6 and 7 rib fracture and small pneumothorax 5-10%. Trauma surgery consulted for evaluation. IS is 1,750ml.    No fevers/chills, nausea/vomiting, chest pain/shortness of breath, or dizziness/lightheadedness.    PRIMARY SURVEY:   A - airway intact  B - bilateral breath sounds and good chest rise  C - initial BP  BP: 148/82 (02-16-22 @ 09:11) *** , HR HR: 67 (02-16-22 @ 09:11) *** , palpable pulses in all extremities  D - GCS 15 on arrival. E 4, V 5, M 6.   Exposure obtained    SECONDARY SURVEY:  General: NAD  HEENT: Normocephalic, atraumatic, EOMI, PEERLA  Neck: Soft, midline trachea  Chest: Left chest wall tenderness  Cardiac: S1, S2, RRR  Respiratory: Bilateral breath sounds clear and equal   Abdomen: Soft, non-distended, non-tender; no rebound or guarding; no palpable masses   Groin: Normal appearing  Extremities: Palpable radial & DP pulses bilaterally. Motor and sensory grossly intact in all 4 extremities.  Back: No TTP; no palpable runoff/stepoff/deformity GENERAL SURGERY TRAUMA SERVICE  --------------------------------------------------------------------------------------------    TRAUMA ACTIVATION LEVEL: 3    MECHANISM OF INJURY:      [] Blunt:     [] Motor vehicle collision       [X] Fall       [] Pedestrian struck	      [] Motorcycle accident      [] Penetrating:     [] Gun shot wound       [] Stab wound    CHIEF COMPLAINT: Patient is a 77y old  Male who presents with a chief complaint of mechanical fall    HPI: 77M w/ PMHx of AFib on Eliquis, HTN, COPD, CKD3, Carcinoid tumor s/p hemicolectomy, nasopharyngeal carcinoma s/p chemo/XRT and IPMN s/p distal pancreatectomy by Dr. Brasher on 2018 who now presents to the ED as a transfer from OSH after suffering a mechanical fall this morning. Patient states he went to the bathroom and as he was walking back to his room he tripped and fell on his left side. No headstrike of LOC.    In the ED, patient was afebrile, hemodynamically stable, labs largely remarkable for leukocytosis of 13k, otherwise WNL. CTHN negative for traumatic injuries, CT chest revealed left 6 and 7 rib fracture and small pneumothorax 5-10%. Trauma surgery consulted for evaluation. IS is 1,750ml.    No fevers/chills, nausea/vomiting, chest pain/shortness of breath, or dizziness/lightheadedness.    PRIMARY SURVEY:   A - airway intact  B - bilateral breath sounds and good chest rise  C - initial BP  BP: 148/82 (02-16-22 @ 09:11) *** , HR HR: 67 (02-16-22 @ 09:11) *** , palpable pulses in all extremities  D - GCS 15 on arrival. E 4, V 5, M 6.   Exposure obtained    SECONDARY SURVEY:  General: NAD  HEENT: Normocephalic, atraumatic, EOMI, PEERLA  Neck: Soft, midline trachea  Chest: Left chest wall tenderness  Cardiac: S1, S2, RRR  Respiratory: Bilateral breath sounds clear and equal   Abdomen: Soft, non-distended, non-tender; no rebound or guarding; no palpable masses   Groin: Normal appearing  Extremities: Palpable radial & DP pulses bilaterally. Motor and sensory grossly intact in all 4 extremities.  Back: No TTP; no palpable runoff/stepoff/deformity

## 2022-02-16 NOTE — ED PROVIDER NOTE - PHYSICAL EXAMINATION
GEN: Patient awake alert NAD.   HEENT: normocephalic, atraumatic, EOMI, no scleral icterus, neck supple no ttp with active motion against resistance.   CARDIAC: RRR, S1, S2, no murmur.   PULM: +L anterior chest wall ttp, + mildly diminished breath sound L compared to R. no no wheeze, rhonchi, rales   ABD: soft NT, ND, no rebound no guarding  SPine: no midline CTL spine ttp.   MSK: Moving all extremities, no edema. 5/5 strength and full ROM in all extremities.     NEURO: A&Ox3, gait normal, no focal neurological deficits, CN 2-12 grossly intact  SKIN: warm, dry, no rash.

## 2022-02-16 NOTE — H&P ADULT - NSHPPHYSICALEXAM_GEN_ALL_CORE
VITAL SIGNS:  Vital Signs Last 24 Hrs  T(C): 36.9 (16 Feb 2022 08:02), Max: 36.9 (16 Feb 2022 08:02)  T(F): 98.4 (16 Feb 2022 08:02), Max: 98.4 (16 Feb 2022 08:02)  HR: 67 (16 Feb 2022 09:11) (67 - 73)  BP: 148/82 (16 Feb 2022 09:11) (148/82 - 177/90)  BP(mean): --  RR: 18 (16 Feb 2022 09:11) (17 - 19)  SpO2: 97% (16 Feb 2022 09:11) (97% - 99%)      PHYSICAL EXAM:  General: NAD  HEENT: Normocephalic, atraumatic, EOMI, PEERLA  Neck: Soft, midline trachea  Chest: Left chest wall tenderness  Cardiac: S1, S2, RRR  Respiratory: Bilateral breath sounds clear and equal   Abdomen: Soft, non-distended, non-tender; no rebound or guarding; no palpable masses   Groin: Normal appearing  Extremities: Palpable radial & DP pulses bilaterally. Motor and sensory grossly intact in all 4 extremities.  Back: No TTP; no palpable runoff/stepoff/deformity

## 2022-02-16 NOTE — H&P ADULT - NSHPLABSRESULTS_GEN_ALL_CORE
LABS:                        14.0   13.02 )-----------( 188      ( 16 Feb 2022 08:03 )             42.8     16 Feb 2022 08:03    138    |  100    |  20     ----------------------------<  108    3.9     |  23     |  1.07     Ca    10.1       16 Feb 2022 08:03    TPro  7.9    /  Alb  4.7    /  TBili  1.2    /  DBili  x      /  AST  35     /  ALT  25     /  AlkPhos  58     16 Feb 2022 08:03    PT/INR - ( 16 Feb 2022 08:03 )   PT: 18.4 sec;   INR: 1.57 ratio         PTT - ( 16 Feb 2022 08:03 )  PTT:26.0 sec    LIVER FUNCTIONS - ( 16 Feb 2022 08:03 )  Alb: 4.7 g/dL / Pro: 7.9 g/dL / ALK PHOS: 58 U/L / ALT: 25 U/L / AST: 35 U/L / GGT: x           IMAGING:    CT Head No Cont (02.16.22 @ 04:21)    FINDINGS:    There is no acute intra-axial or extra-axial hemorrhage. There is no mass   effect or shift of the midline. The basal cisterns are not effaced. There   is cerebral and cerebellar volume loss with prominence of the ventricles,   sulci, and cerebellar folia. 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 There are atherosclerotic   calcifications of the intracranial carotid arteries and intradural   vertebral arteries.    There is no significant scalp soft tissue swelling or scalp hematoma. The   skull base and bony calvarium are intact. The visualized paranasal   sinuses and tympanic/mastoid cavities are clear apart from minimal   ethmoid mucosal thickening and a small right mastoid effusion.    IMPRESSION:  No acute intracranial hemorrhage, mass effect, or acute osseous fracture.  Chronic ischemic changes including multiple old infarcts, as described.    CT Chest No Cont (02.16.22 @ 04:21)    FINDINGS:    CHEST:  LUNGS AND LARGE AIRWAYS: Patent central airways. Secretions within the   trachea.. Emphysematous lung changes. Atelectasis/scarring right lung   base. Patchy groundglass opacity left lung base may represent an   infectious/inflammatory process and/or contusion.  PLEURA: Small left apical pneumothorax and small anterior left lower lung   pneumothorax extending medially. Approximately 5-10%.  VESSELS: Limited without intravenous contrast, however no gross thoracic   aortic aneurysm. Coronary artery calcifications.  HEART: Heart size is normal. Small amount of pericardial fluid again seen.  MEDIASTINUM AND JAVIER: No lymphadenopathy.  CHEST WALL AND LOWER NECK: Within normal limits.  VISUALIZED UPPER ABDOMEN: 2.3 cm right renal cyst.  BONES: Mildly displaced fractures of the left lateral sixthand seventh   ribs. Degenerative changes of the spine.    IMPRESSION:  Mildly displaced fractures of the left lateral sixth and seventh ribs.  Left-sided pneumothorax of approximately 5-10%.

## 2022-02-16 NOTE — CONSULT NOTE ADULT - SUBJECTIVE AND OBJECTIVE BOX
CHIEF COMPLAINT: Patient is a 77y old  Male who presents with a chief complaint of Mechanical fall (16 Feb 2022 10:17)      HPI: 77M w/ PMHx of AFib on Eliquis, HTN, COPD, CKD3, Carcinoid tumor s/p hemicolectomy, nasopharyngeal carcinoma s/p chemo/XRT and IPMN s/p distal pancreatectomy by Dr. Brasher on 2018 who now presented to the ED as a transfer from OSH after suffering a mechanical fall. Patient states he went to the bathroom and as he was walking back to his room he tripped and fell on his left side. No headstrike of LOC.    In the ED, patient was afebrile, hemodynamically stable, labs largely remarkable for leukocytosis of 13k, otherwise WNL. CTHN negative for traumatic injuries, CT chest revealed left 6 and 7 rib fracture and small pneumothorax 5-10%.     No fevers/chills, nausea/vomiting, chest pain/shortness of breath, or dizziness/lightheadedness.    Pt noted to have SBP elevated to hte 200s today. Currently in bed, awake, c/o sharp R sided CP when he coughs.     Allergies    ACE inhibitors (Other)  Bee Stings- anaphylaxis (Other)  penicillin (Other)      Home Medications:  Eliquis 5 mg oral tablet: 1 tab(s) orally 2 times a day (16 Feb 2022 10:23)  multivitamin: 1 milligram(s) orally once a day (16 Feb 2022 10:23)  polyethylene glycol 3350 oral powder for reconstitution: 17 gram(s) orally once a day (16 Feb 2022 10:23)  senna oral tablet: 2 tab(s) orally once a day (at bedtime) (16 Feb 2022 10:23)  Toprol XL 25mg      MEDICATIONS  (STANDING):  acetaminophen     Tablet .. 975 milliGRAM(s) Oral every 6 hours  amLODIPine   Tablet 5 milliGRAM(s) Oral daily  enoxaparin Injectable 40 milliGRAM(s) SubCutaneous every 24 hours  metoprolol succinate ER 25 milliGRAM(s) Oral at bedtime  multivitamin 1 Tablet(s) Oral daily  polyethylene glycol 3350 17 Gram(s) Oral daily  senna 2 Tablet(s) Oral at bedtime  simvastatin 20 milliGRAM(s) Oral at bedtime    MEDICATIONS  (PRN):  hydrALAZINE Injectable 5 milliGRAM(s) IV Push every 6 hours PRN SBP >180  ibuprofen  Tablet. 200 milliGRAM(s) Oral every 6 hours PRN Mild Pain (1 - 3)  traMADol 25 milliGRAM(s) Oral every 4 hours PRN Moderate Pain (4 - 6)  traMADol 50 milliGRAM(s) Oral every 4 hours PRN Severe Pain (7 - 10)      PAST MEDICAL & SURGICAL HISTORY:  HTN (hypertension)    High cholesterol    Low back pain    Benign carcinoid tumor    COPD (chronic obstructive pulmonary disease)    Atrial fibrillation, unspecified type  Dxd in 01/2016-on Eliquis    Acute kidney injury  09/2016    Stage 3 chronic kidney disease    Pulmonary emphysema, unspecified emphysema type    Nasopharynx cancer  07/2016- s/p Chemo &amp;RT    Disease of pancreas, unspecified    Hearing loss    Pancreatic mass    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  ROM limited Mouth opening &amp; neck    ETOH abuse  quit in 2016    History of appendectomy    H/O hemicolectomy    S/P tonsillectomy  1959    H/O endoscopy  last one 06/2018    [ ] Reviewed     Functional Assessment: [ ] Independent  [ ] Assistance  [ ] Total care  [ ] Non-ambulatory    SOCIAL HISTORY:  Residence: [ ] [facility]  [ ] SNF  [ x] Community  [x ] Substance abuse: Past smoker, quit in 2018    FAMILY HISTORY:  Family history of breast cancer in mother      REVIEW OF SYSTEMS:    CONSTITUTIONAL: No fever, weight loss, or fatigue  EYES: No eye pain, visual disturbances, or discharge  ENMT:  No difficulty hearing, tinnitus, vertigo; No sinus or throat pain  NECK: No pain or stiffness  BREASTS: No pain, masses, or nipple discharge  RESPIRATORY: No cough, wheezing, chills or hemoptysis; No shortness of breath  CARDIOVASCULAR: No chest pain, palpitations, dizziness, or leg swelling  GASTROINTESTINAL: No abdominal or epigastric pain. No nausea, vomiting, or hematemesis; No diarrhea or constipation. No melena or hematochezia.  GENITOURINARY: No dysuria, frequency, hematuria, or incontinence  NEUROLOGICAL: No headaches, memory loss, loss of strength, numbness, or tremors  SKIN: No itching, burning, rashes, or lesions   LYMPH NODES: No enlarged glands  ENDOCRINE: No heat or cold intolerance; No hair loss  MUSCULOSKELETAL: Pain as above  PSYCHIATRIC: No depression, anxiety, mood swings, or difficulty sleeping  HEME/LYMPH: No easy bruising, or bleeding gums  ALLERGY AND IMMUNOLOGIC: No hives or eczema    [ x ] All other ROS negative  [  ] Unable to obtain due to poor mental status    PHYSICAL EXAM:    Vital Signs Last 24 Hrs  T(C): 36.3 (16 Feb 2022 13:40), Max: 36.9 (16 Feb 2022 08:02)  T(F): 97.4 (16 Feb 2022 13:40), Max: 98.4 (16 Feb 2022 08:02)  HR: 72 (16 Feb 2022 14:55) (66 - 77)  BP: 205/89 (16 Feb 2022 14:55) (148/82 - 205/89)  BP(mean): --  RR: 18 (16 Feb 2022 14:55) (17 - 19)  SpO2: 96% (16 Feb 2022 14:55) (96% - 99%)    CONSTITUTIONAL: Well-groomed, in no apparent distress  EYES: No conjunctival or scleral injection, non-icteric; PERRLA and symmetric  ENMT: No external nasal lesions; nasal mucosa not inflamed; normal dentition; no pharyngeal injection or exudates, oral mucosa with moist membranes  NECK: Trachea midline without palpable neck mass; thyroid not enlarged and non-tender  RESPIRATORY: Breathing comfortably; no dullness to percussion; lungs CTA without wheeze/rhonchi/rales  CARDIOVASCULAR: +S1S2, RRR, no M/G/R; no carotid bruits; pedal pulses full and symmetric; no lower extremity edema  CHEST/BREAST: Decreased breath sounds at bases  GASTROINTESTINAL: No palpable masses or tenderness, +BS throughout, no rebound/guarding; no hepatosplenomegaly; no hernia palpated  LYMPHATIC: No cervical LAD or tenderness; no axillary LAD or tenderness; no inguinal LAD or tenderness  MUSCULOSKELETAL: Normal gait and station; no digital clubbing or cyanosis; no paraspinal tenderness; examination of the  (head/neck, spine/ribs/pelvis, RUE, LUE, RLE, LLE) without misalignment, normal strength and tone of extremities  SKIN: No rashes or ulcers noted; no subcutaneous nodules or induration palpable  NEUROLOGIC: CN II-XII intact; normal reflexes in upper and lower extremities; sensation intact in LEs b/l to light touch  PSYCHIATRIC: A+O x 3; mood and affect appropriate; appropriate insight and judgment    LABS:                        14.0   13.02 )-----------( 188      ( 16 Feb 2022 08:03 )             42.8     Hemoglobin: 14.0 g/dL (02-16 @ 08:03)  Hemoglobin: 14.0 g/dL (02-16 @ 05:21)    02-16    138  |  100  |  20  ----------------------------<  108<H>  3.9   |  23  |  1.07    Ca    10.1      16 Feb 2022 08:03    TPro  7.9  /  Alb  4.7  /  TBili  1.2  /  DBili  x   /  AST  35  /  ALT  25  /  AlkPhos  58  02-16    PT/INR - ( 16 Feb 2022 08:03 )   PT: 18.4 sec;   INR: 1.57 ratio         PTT - ( 16 Feb 2022 08:03 )  PTT:26.0 sec    CAPILLARY BLOOD GLUCOSE            RADIOLOGY & ADDITIONAL STUDIES:    EKG:   Personally Reviewed:  [ ] YES     Imaging:   Personally Reviewed:  [x ] YES- EKG, sinus rhythm              [ ] Consultant(s) Notes Reviewed  [x] Care Discussed with Consultants/Other Providers: Trauma surgery Team    [x ] Fall risks identified:    [x ] Increased delirium risk    [ x] Delirium and other risks can be reduced by:          -early ambulation          -minimizing "tethers" - IV, oxygen, catheters, etc          -avoiding hypnotics and sedatives          -maintaining hydration/nutrition          -avoid anticholinergics - diphenhydramine, etc          -pain control          -supportive environment    Advanced Directives: [ ] DNR  [ ] No feeding tube  [ ] MOLST in chart  [ ] MOLST completed today  [x ] Unknown

## 2022-02-16 NOTE — ED PROVIDER NOTE - PROGRESS NOTE DETAILS
hemodynamically stable, mentating well, no respiratory distress, saturating 95% on RA, speaking full sentences, discussed with [facility] trauma dr turcios about small ptx, and left rib fx x 2. recommend transfer. discussed with patient hemodynamically stable, mentating well, no respiratory distress, saturating 95% on RA, speaking full sentences. CT showing small ptx < 10%, since < 25%, will observe patient's resp status closely and hold on pigtail/chest tube placement. Placed on 02 supplemental. (+) Rib fx x 2, with underlying possible pulmonary contusions. CT head and neck negative. Will transfer to trauma [facility].     discussed with [facility] trauma dr turcios about small ptx, and left rib fx x 2. recommend transfer. discussed with patient and wife.

## 2022-02-16 NOTE — ED PROVIDER NOTE - PROGRESS NOTE DETAILS
Celine Parker M.D. [employer]  trauma surgery will see pt. Pt well appearing, states pain well controlled, no resp distress.

## 2022-02-16 NOTE — ED PROVIDER NOTE - PHYSICAL EXAMINATION
VITAL SIGNS: I have reviewed nursing notes and confirm.   GEN: Well-developed; well-nourished; in no acute distress. Speaking full sentences. GCS 15  SKIN: Warm, pink, no rash, no diaphoresis, no cyanosis, well perfused.   HEAD: Normocephalic; atraumatic. No scalp lacerations, no abrasions.  NECK: Supple; non tender.  NO midline ttp,  NO step offs,  Full ROM w/o pain.  EYES: Pupils 3mm equal, round, reactive to light and accomodation, conjunctiva and sclera clear. Extra-ocular movements intact bilaterally.  ENT: No nasal discharge; airway clear. Trachea is midline. ORAL: No oropharyngeal exudates or erythema. Normal dentition.  CV: Regular rate and rhythm. S1, S2 normal; no murmurs, gallops, or rubs. No lower extremity pitting edema bilaterally. Capillary refill < 2 seconds throughout. Distal pulses intact 2+ throughout. (+) LEFT sided chest wall pain, moderate, no overlying ecchymosis.  RESP: CTA bilaterally. No wheezes, rales, or rhonchi.   ABD: Normal bowel sounds, soft, non-distended, non-tender, no rebound, no guarding, no rigidity, no hepatosplenomegaly. No CVA tenderness bilaterally.   MSK: Normal range of motion and movement of all 4 extremities. No joint or muscular pain throughout. No clubbing. EXCEPT:    BACK:  NO thoracolumbar midline TTP,  NO parathoracic TTP,  NO paralumbar TTP. No step-offs or obvious deformities.   NEURO: Alert & oriented x 3, Grossly unremarkable. Sensory and motor intact throughout. No focal deficits. Gait: Fluid. Normal speech and coordination.   PSYCH: Cooperative, appropriate.

## 2022-02-16 NOTE — PHYSICAL THERAPY INITIAL EVALUATION ADULT - PERTINENT HX OF CURRENT PROBLEM, REHAB EVAL
77M presenting after suffering a mechanical fall found to have left sided 6th and 7th rib fracture and small pneumothorax. on imaging

## 2022-02-16 NOTE — ED PROVIDER NOTE - ATTENDING CONTRIBUTION TO CARE
MD Navarro:  patient seen and evaluated with the resident.  I was present for key portions of the History & Physical, and I agree with the Impression & Plan.  MD Navarro:  76 yo M, xfer from [facility] for evaluation L rib Fx and small PTX sustained in fall early this AM.  Fall was unwitnessed, but patient had just stood up to get off the toilet when he fell.   Context: originally presented to [facility], where he underwent CT Head/C-spine, Chest which showed the above findings.   Associated Sx:  +L-sided CP and pleurisy, no f/c, no N/V/D, no abd pain, no back pain, no rash.  No reported Hx of head trauma.    Better/worse: no clear modifiers.  VS: wnl.  Physical Exam: elderly M, mild pain on exam , NCAT, PERRL, EOMI, neck supple, RRR, CTA B, Abd: s/nd/nt.  Ext: no deformities  Neuro: AAOx3, stands under own power, strength 5/5 & symmetric throughout.  ECG:  NSR, normal intervals.  Impression:  Elderly small L sided PTX and associated Rib Fxs s/p fall of unclear etiology (unclear at this time if syncope vs mechanical).  Physical exam remarkable for L sided chest wall pain and pleurisy.  No hypoxia or hemodynamic instability.   Plan:  labs, Type, INR, CXR to reassess PTX, ECG, discuss with trauma.

## 2022-02-16 NOTE — ED ADULT NURSE NOTE - OBJECTIVE STATEMENT
77y male with hx of afib on eliquis, htn, nasopharynx ca, stage 3 kidney disease, ETOH abuse in past (last drink 2016) bibems for 2 rib fxs. Pt is alert and oriented x 4 and speaking coherently. Pt transferred from [place] for 6th and 7th left rib fx and small pneumothorax. Pt on supplemental O2 as per MD Navarro @ 5L NC. pt states he does not remember the fall. no obvious deformities noted, no injuries to head noted. pt voiding in urinal, urine clear light yellow. pt hypertensive in ER. 20G Left FA placed at VS. Covid neg from VS. EKG in progress, pt on cm. MD scott completed. lung CTA b/l. will reassess.

## 2022-02-16 NOTE — ED PROVIDER NOTE - NS ED ROS FT
GENERAL: No fever, chills  EYES: no vision changes, no discharge.   ENT: no difficulty swallowing or speaking   CARDIAC: no chest pain/pressure, SOB, lower extremity swelling  PULMONARY: no cough, SOB + L chest wall pain  GI: no abdominal pain, n/v/d  : no dysuria, no hematuria  SKIN: no rashes, no ecchymosis  NEURO: no headache, lightheadedness  MSK: No joint pain, myalgia, weakness.

## 2022-02-16 NOTE — ED PROVIDER NOTE - NSICDXPASTMEDICALHX_GEN_ALL_CORE_FT
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

## 2022-02-16 NOTE — ED PROVIDER NOTE - CRITICAL CARE ATTENDING CONTRIBUTION TO CARE
Upon my evaluation, this patient had a high probability of imminent or life-threatening deterioration due to pneumothorax and rib fractures x 2 s/p traumatic fall, which required my direct attention, intervention, and personal management.     I have personally provided 35 minutes of critical care time exclusive of time spent on separately billable procedures. Time includes review of laboratory data, radiology results, discussion with consultants, and monitoring for potential decompensation. Interventions were performed as documented above.    - Cal Mcclain MD, Emergency Medicine and Medical Toxicology Attending.

## 2022-02-16 NOTE — ED ADULT NURSE NOTE - NSSUHOSCREENINGYN_ED_ALL_ED
Yes - the patient is able to be screened
s/p fluconazole in ED  patient requesting gyn consult, can call in AM

## 2022-02-16 NOTE — H&P ADULT - TIME BILLING
elderly patient with rib fractures  breathing comfortably  admitted to trauma team  arranging for repeat chest x ray

## 2022-02-16 NOTE — PATIENT PROFILE ADULT - FALL HARM RISK - HARM RISK INTERVENTIONS

## 2022-02-16 NOTE — ED PROVIDER NOTE - OBJECTIVE STATEMENT
77M PMHx of PAF on eliquis, digoxin, htn, copd, ckd3, previous ETOH abuse, carcinoid tumor s/p hemicolectomy, nasopharyngeal carcinoma s/p radiation/chemotherapy, right fibrosis neck, presenting with left sided rib pain today. Tripped and fell on his left side. Unable to walk due to pain. A/w left sided chest wall pain, difficulty breathing. Denies any head trauma, neck pain, fevers, chills, nausea/vomiting, LOC, rashes, abrasions, lacerations, visual complaints, abdominal pain, hip pain, other extremity injuries.

## 2022-02-16 NOTE — ED ADULT NURSE NOTE - OBJECTIVE STATEMENT
Patient A&0 x3 with moments of confusion came in with complaints of slipping in his hallway after going to the bathroom. Patient says he could have fainted, he is unsure. Patient denies hitting his head, patient hit his left side by his ribs. no chest pain, no sob. Patient A&0 x3 with moments of confusion came in with complaints of slipping in his hallway after going to the bathroom. Patient says he could have fainted, he is unsure. Patient denies hitting his head, patient hit his left side by his ribs. no chest pain, no sob. Patient says it hurts more when he coughs.

## 2022-02-16 NOTE — PHYSICAL THERAPY INITIAL EVALUATION ADULT - ADDITIONAL COMMENTS
as per pt: PTA pt was living in an apartment elevator  and was independent in all functional mobility and ADL's. cane for gait. lives with spouse as per pt: PTA pt was living in an apartment elevator  and was independent in all functional mobility and ADL's. cane for gait. lives with spouse. DME: pt owns rollator, SAC and shower chair.

## 2022-02-16 NOTE — ED PROVIDER NOTE - OBJECTIVE STATEMENT
76 yo M hx of PAF on eliquis, digoxin, htn, copd, ckd3, carcinoid tumor sp hemicolectomy, nasopharyngeal CA sp radiation and chemo, prior hx or ETOH (quit in 2016), transferred from  for rib fx, 76 yo M hx of PAF on eliquis, digoxin, htn, copd, ckd3, carcinoid tumor sp hemicolectomy, nasopharyngeal CA sp radiation and chemo, prior hx or ETOH (quit in 2016), transferred from  for L 6 and 7 rib fracture and ~10% PNX on CT sp mechanical fall at home at 2AM while trying to get back to bed from bathroom. Pt well appearing, hypertensive, no resp distress.

## 2022-02-16 NOTE — ED PROVIDER NOTE - CLINICAL SUMMARY MEDICAL DECISION MAKING FREE TEXT BOX
Impression:  Elderly small L sided PTX and associated Rib Fxs s/p fall of unclear etiology (unclear at this time if syncope vs mechanical).  Physical exam remarkable for L sided chest wall pain and pleurisy.  No hypoxia or hemodynamic instability.   Plan:  labs, Type, INR, CXR to reassess PTX, ECG, discuss with trauma.  Supplemental O2 to expedite resolution of PTX

## 2022-02-16 NOTE — ED ADULT NURSE NOTE - NSICDXPASTSURGICALHX_GEN_ALL_CORE_FT
PAST SURGICAL HISTORY:  H/O endoscopy last one 06/2018    H/O hemicolectomy     History of appendectomy     S/P tonsillectomy 1959

## 2022-02-16 NOTE — ED PROVIDER NOTE - CLINICAL SUMMARY MEDICAL DECISION MAKING FREE TEXT BOX
ddx: rule out traumatic injuries  - CT CHEST, and head/neck.  - labs, pain control, re-eval  - found rib fx on left side w/ pulmonary contusions/ptx, will CT IVC chest, on eliquis for AFIB.   - CTC Trauma consult.

## 2022-02-16 NOTE — CONSULT NOTE ADULT - PROBLEM SELECTOR RECOMMENDATION 9
Given 5mg of iv Hydralazine w improvement in SBP to 160, but increased again to 200. Norvasc ordered, w prn Hydralazine for breakthrough. Continue home Toprol XL.    Pt currently asymptomatic, without evidence of end organ damage, so BP can be brought down gradually.

## 2022-02-17 ENCOUNTER — TRANSCRIPTION ENCOUNTER (OUTPATIENT)
Age: 77
End: 2022-02-17

## 2022-02-17 VITALS
OXYGEN SATURATION: 94 % | HEART RATE: 68 BPM | DIASTOLIC BLOOD PRESSURE: 81 MMHG | TEMPERATURE: 98 F | RESPIRATION RATE: 18 BRPM | SYSTOLIC BLOOD PRESSURE: 141 MMHG

## 2022-02-17 DIAGNOSIS — S22.49XA MULTIPLE FRACTURES OF RIBS, UNSPECIFIED SIDE, INITIAL ENCOUNTER FOR CLOSED FRACTURE: ICD-10-CM

## 2022-02-17 PROCEDURE — 86900 BLOOD TYPING SEROLOGIC ABO: CPT

## 2022-02-17 PROCEDURE — 97110 THERAPEUTIC EXERCISES: CPT

## 2022-02-17 PROCEDURE — 96374 THER/PROPH/DIAG INJ IV PUSH: CPT

## 2022-02-17 PROCEDURE — 85730 THROMBOPLASTIN TIME PARTIAL: CPT

## 2022-02-17 PROCEDURE — 80053 COMPREHEN METABOLIC PANEL: CPT

## 2022-02-17 PROCEDURE — 86901 BLOOD TYPING SEROLOGIC RH(D): CPT

## 2022-02-17 PROCEDURE — 93005 ELECTROCARDIOGRAM TRACING: CPT

## 2022-02-17 PROCEDURE — 85025 COMPLETE CBC W/AUTO DIFF WBC: CPT

## 2022-02-17 PROCEDURE — 97161 PT EVAL LOW COMPLEX 20 MIN: CPT

## 2022-02-17 PROCEDURE — 85610 PROTHROMBIN TIME: CPT

## 2022-02-17 PROCEDURE — 86850 RBC ANTIBODY SCREEN: CPT

## 2022-02-17 PROCEDURE — 97116 GAIT TRAINING THERAPY: CPT

## 2022-02-17 PROCEDURE — 71045 X-RAY EXAM CHEST 1 VIEW: CPT

## 2022-02-17 PROCEDURE — 71045 X-RAY EXAM CHEST 1 VIEW: CPT | Mod: 26

## 2022-02-17 PROCEDURE — 36415 COLL VENOUS BLD VENIPUNCTURE: CPT

## 2022-02-17 PROCEDURE — 99233 SBSQ HOSP IP/OBS HIGH 50: CPT

## 2022-02-17 PROCEDURE — 99285 EMERGENCY DEPT VISIT HI MDM: CPT

## 2022-02-17 RX ORDER — APIXABAN 2.5 MG/1
5 TABLET, FILM COATED ORAL EVERY 12 HOURS
Refills: 0 | Status: DISCONTINUED | OUTPATIENT
Start: 2022-02-17 | End: 2022-02-17

## 2022-02-17 RX ORDER — IBUPROFEN 200 MG
1 TABLET ORAL
Qty: 0 | Refills: 0 | DISCHARGE
Start: 2022-02-17

## 2022-02-17 RX ORDER — TRAMADOL HYDROCHLORIDE 50 MG/1
0.5 TABLET ORAL
Qty: 10 | Refills: 0
Start: 2022-02-17

## 2022-02-17 RX ORDER — AMLODIPINE BESYLATE 2.5 MG/1
1 TABLET ORAL
Qty: 30 | Refills: 0
Start: 2022-02-17 | End: 2022-03-18

## 2022-02-17 RX ORDER — ACETAMINOPHEN 500 MG
3 TABLET ORAL
Qty: 0 | Refills: 0 | DISCHARGE
Start: 2022-02-17

## 2022-02-17 RX ADMIN — LIDOCAINE 1 PATCH: 4 CREAM TOPICAL at 07:29

## 2022-02-17 RX ADMIN — Medication 1 TABLET(S): at 11:10

## 2022-02-17 RX ADMIN — Medication 975 MILLIGRAM(S): at 11:10

## 2022-02-17 RX ADMIN — LIDOCAINE 1 PATCH: 4 CREAM TOPICAL at 11:10

## 2022-02-17 RX ADMIN — Medication 975 MILLIGRAM(S): at 05:22

## 2022-02-17 RX ADMIN — LIDOCAINE 1 PATCH: 4 CREAM TOPICAL at 08:33

## 2022-02-17 RX ADMIN — AMLODIPINE BESYLATE 5 MILLIGRAM(S): 2.5 TABLET ORAL at 05:22

## 2022-02-17 RX ADMIN — POLYETHYLENE GLYCOL 3350 17 GRAM(S): 17 POWDER, FOR SOLUTION ORAL at 11:09

## 2022-02-17 RX ADMIN — Medication 25 MILLIGRAM(S): at 05:22

## 2022-02-17 NOTE — DISCHARGE NOTE NURSING/CASE MANAGEMENT/SOCIAL WORK - NSDCPEFALRISK_GEN_ALL_CORE
For information on Fall & Injury Prevention, visit: [email or web address] OR  [email or web address] OR  https://www.cdc.gov/steadi/patient.html

## 2022-02-17 NOTE — PROGRESS NOTE ADULT - ASSESSMENT
Assessment: 77M presenting after suffering a mechanical fall found to have left sided 6th and 7th rib fracture and small pneumothorax.    PLAN:  - Repeat CXR in AM to monitor pneumothorax  - Pain control:   Lidoderm patch over area of chest pain/tenderness  Standing Tylenol q8hrs for 48hrs (max of 3g/day), after 48hrs then can switch to 500 q6hr (max 2g/day)  PRN: Ibuprofen 200mg PO q4hr mild pain, Tramadol 25mg PO q4hr moderate pain, Tramadol 50 q4hr severe pain  - Bowel regimen: Senna and Miralax  - OOB to chair within 12hrs of admission  - Ambulation within 24hrs of admission  - PT consult  - Incentive spirometry 10x / hour  - HOB >30 degrees    Acute Care Surgery   [id number] Assessment: 77M presenting after suffering a mechanical fall found to have left sided 6th and 7th rib fracture and small pneumothorax.    PLAN:  - Repeat CXR in AM to monitor pneumothorax  - Pain control:   Lidoderm patch over area of chest pain/tenderness  Standing Tylenol q8hrs for 48hrs (max of 3g/day), after 48hrs then can switch to 500 q6hr (max 2g/day)  PRN: Ibuprofen 200mg PO q4hr mild pain, Tramadol 25mg PO q4hr moderate pain, Tramadol 50 q4hr severe pain  - Bowel regimen: Senna and Miralax  - OOB to chair within 12hrs of admission  - Ambulation within 24hrs of admission  - PT/OOB  - Incentive spirometry 10x / hour  - HOB >30 degrees    Acute Care Surgery   [id number]

## 2022-02-17 NOTE — DISCHARGE NOTE PROVIDER - NSDCCPCAREPLAN_GEN_ALL_CORE_FT
PRINCIPAL DISCHARGE DIAGNOSIS  Diagnosis: Fracture of rib  Assessment and Plan of Treatment: Rib fractures take time to heal on their own and don't require any necessary follow up unless you have any questions or concerns  You may use Salanpas 1% topical lidocaine patches over the counter for topical pain relief  Use your incentive spirometer 10 times every hour while awake for deep breathing exercises to prevent pneumonia   Taking narcotic pain medication ie. tramadol may cause constipation. You can take over the counter stool softeners, ie. Senna or Miralax to prevent this. Please do not take stool softeners if you are having loose bowel movements. Some pain medications can also make you nauseous, sleepy, dizzy and itchy-these are the most common side effects.   Do not drive or operate machinery if taking narcotic pain medications.   If you experience fever > 101, shortness of breath, difficulty breathing, or pain not controlled with discharge medications, please call your surgeon or return to emergency room immediately  Please call Acute Care surgery office at [phone] ([address] [place] [address] [address] [place] [id number]) only as needed with any questions related to your hospitalization and injuries. You do not need to follow up with trauma surgery unless you would like to do so or as needed.      SECONDARY DISCHARGE DIAGNOSES  Diagnosis: Pneumothorax  Assessment and Plan of Treatment: resolved on repeat imaging    Diagnosis: Uncontrolled hypertension  Assessment and Plan of Treatment: continue blood pressure medications as per discharge medication list  a new medication called amlodipine was added  please call to schedule a follow up appointment with your primary care doctor within one week of discharge for further management    Diagnosis: Paroxysmal atrial fibrillation  Assessment and Plan of Treatment: continue home eliquis  please follow up with your primary care doctor/cardiologist after discharge     PRINCIPAL DISCHARGE DIAGNOSIS  Diagnosis: Fracture of rib  Assessment and Plan of Treatment: Rib fractures take time to heal on their own and don't require any necessary follow up unless you have any questions or concerns  You may use Salanpas 1% topical lidocaine patches over the counter for topical pain relief  Use your incentive spirometer 10 times every hour while awake for deep breathing exercises to prevent pneumonia   Taking narcotic pain medication ie. tramadol may cause constipation. You can take over the counter stool softeners, ie. Senna or Miralax to prevent this. Please do not take stool softeners if you are having loose bowel movements. Some pain medications can also make you nauseous, sleepy, dizzy and itchy-these are the most common side effects.   Do not drive or operate machinery if taking narcotic pain medications.   If you experience fever > 101, shortness of breath, difficulty breathing, or pain not controlled with discharge medications, please call your surgeon or return to emergency room immediately  Please call Acute Care surgery office at [phone] ([address] [place] [address] [address] [place] [id number]) for a follow up appointment within 1-2 weeks of discharge      SECONDARY DISCHARGE DIAGNOSES  Diagnosis: Pneumothorax  Assessment and Plan of Treatment: resolved on repeat imaging    Diagnosis: Uncontrolled hypertension  Assessment and Plan of Treatment: continue blood pressure medications as per discharge medication list  a new medication called amlodipine was added  please call to schedule a follow up appointment with your primary care doctor within one week of discharge for further management    Diagnosis: Paroxysmal atrial fibrillation  Assessment and Plan of Treatment: continue home eliquis  please follow up with your primary care doctor/cardiologist after discharge     PRINCIPAL DISCHARGE DIAGNOSIS  Diagnosis: Fracture of rib  Assessment and Plan of Treatment: Rib fractures take time to heal on their own   You may use Salanpas 1% topical lidocaine patches over the counter for topical pain relief  Use your incentive spirometer 10 times every hour while awake for deep breathing exercises to prevent pneumonia   Taking narcotic pain medication ie. tramadol may cause constipation. You can take over the counter stool softeners, ie. Senna or Miralax to prevent this. Please do not take stool softeners if you are having loose bowel movements. Some pain medications can also make you nauseous, sleepy, dizzy and itchy-these are the most common side effects.   Do not drive or operate machinery if taking narcotic pain medications.   If you experience fever > 101, shortness of breath, difficulty breathing, or pain not controlled with discharge medications, please call your surgeon or return to emergency room immediately  Please call Acute Care surgery office at [phone] ([address] [place] [address] [address] [place] [address]) for a follow up appointment within 1-2 weeks of discharge      SECONDARY DISCHARGE DIAGNOSES  Diagnosis: Pneumothorax  Assessment and Plan of Treatment: resolved on repeat imaging    Diagnosis: Uncontrolled hypertension  Assessment and Plan of Treatment: continue blood pressure medications as per discharge medication list  a new medication called amlodipine was added  please call to schedule a follow up appointment with your primary care doctor within one week of discharge for further management    Diagnosis: Paroxysmal atrial fibrillation  Assessment and Plan of Treatment: continue home eliquis  please follow up with your primary care doctor/cardiologist after discharge     PRINCIPAL DISCHARGE DIAGNOSIS  Diagnosis: Fracture of rib  Assessment and Plan of Treatment: Rib fractures take time to heal on their own   You may use Salanpas 1% topical lidocaine patches over the counter for topical pain relief  Use your incentive spirometer 10 times every hour while awake for deep breathing exercises to prevent pneumonia   Taking narcotic pain medication ie. tramadol may cause constipation. You can take over the counter stool softeners, ie. Senna or Miralax to prevent this. Please do not take stool softeners if you are having loose bowel movements. Some pain medications can also make you nauseous, sleepy, dizzy and itchy-these are the most common side effects.   Do not drive or operate machinery if taking narcotic pain medications.   If you experience fever > 101, shortness of breath, difficulty breathing, or pain not controlled with discharge medications, please call your surgeon or return to emergency room immediately  Please call Acute Care surgery office at [phone] ([address] [place] [address]) for a follow up appointment within 1-2 weeks of discharge      SECONDARY DISCHARGE DIAGNOSES  Diagnosis: Pneumothorax  Assessment and Plan of Treatment: stable on repeat imaging, please call to schedule a follow up appointment with acute care surgery as above after discharge    Diagnosis: Uncontrolled hypertension  Assessment and Plan of Treatment: continue blood pressure medications as per discharge medication list  a new medication called amlodipine was added  please call to schedule a follow up appointment with your primary care doctor within one week of discharge for further management    Diagnosis: Paroxysmal atrial fibrillation  Assessment and Plan of Treatment: continue home eliquis  please follow up with your primary care doctor/cardiologist after discharge

## 2022-02-17 NOTE — DISCHARGE NOTE PROVIDER - NSDCFUADDINST_GEN_ALL_CORE_FT
Please utilize Tylenol or Motrin for pain prior to using narcotics ie. tramadol .  Do not take more than 4grams of Tylenol in a 24 hour period.

## 2022-02-17 NOTE — DISCHARGE NOTE PROVIDER - CARE PROVIDER_API CALL
Guzman Smith (MD)  Surgery; Surgical Critical Care  [address], [address]  [place] [address]  Phone: [phone]  Fax: [phone]  Follow Up Time:

## 2022-02-17 NOTE — DISCHARGE NOTE PROVIDER - NSDCMRMEDTOKEN_GEN_ALL_CORE_FT
acetaminophen 325 mg oral tablet: 3 tab(s) orally every 6 hours  Anoro Ellipta 62.5 mcg-25 mcg/inh inhalation powder: 1 puff(s) inhaled once a day  Eliquis 5 mg oral tablet: 1 tab(s) orally 2 times a day  ibuprofen 200 mg oral tablet: 1 tab(s) orally every 6 hours, As needed, Mild Pain (1 - 3)  metoprolol succinate 25 mg oral tablet, extended release: 1 tab(s) orally once a day  multivitamin: 1 milligram(s) orally once a day  outpatient physical therapy :   Proventil HFA 90 mcg/inh inhalation aerosol: 2 puff(s) inhaled every 6 hours, As Needed  simvastatin 40 mg oral tablet: 1 tab(s) orally once a day (at bedtime)   acetaminophen 325 mg oral tablet: 3 tab(s) orally every 6 hours  amLODIPine 5 mg oral tablet: 1 tab(s) orally once a day  Anoro Ellipta 62.5 mcg-25 mcg/inh inhalation powder: 1 puff(s) inhaled once a day  Eliquis 5 mg oral tablet: 1 tab(s) orally 2 times a day  ibuprofen 200 mg oral tablet: 1 tab(s) orally every 6 hours, As needed, Mild Pain (1 - 3)  metoprolol succinate 25 mg oral tablet, extended release: 1 tab(s) orally once a day  multivitamin: 1 milligram(s) orally once a day  outpatient physical therapy :   Proventil HFA 90 mcg/inh inhalation aerosol: 2 puff(s) inhaled every 6 hours, As Needed  simvastatin 40 mg oral tablet: 1 tab(s) orally once a day (at bedtime)  traMADol 50 mg oral tablet: 0.5 tab(s) orally every 6 hours, As Needed -for pain MDD:4

## 2022-02-17 NOTE — DISCHARGE NOTE PROVIDER - NSDCFUSCHEDAPPT_GEN_ALL_CORE_FT
RADHA HOPE ; 03/04/2022 ; [facility] [address] [facility] L[facility] ; 03/04/2022 ; [facility] [address]

## 2022-02-17 NOTE — PROGRESS NOTE ADULT - SUBJECTIVE AND OBJECTIVE BOX
TEAM Surgery Progress Note  Patient is a 77y old  Male who presents with a chief complaint of Mechanical fall (16 Feb 2022 15:36)      INTERVAL EVENTS: No acute events overnight. Pt was severely hypertensive throughout the day and given hydralazine, amlodipine, and metoprolol (in the PM). Pt's BP responded appropriately and has been stable.     SUBJECTIVE: Patient seen and examined at bedside with surgical team, patient without complaints. Denies fever, chills, CP, SOB nausea, vomiting, abdominal pain.      OBJECTIVE:    Vital Signs Last 24 Hrs  T(C): 36.7 (17 Feb 2022 01:05), Max: 37.2 (16 Feb 2022 21:36)  T(F): 98 (17 Feb 2022 01:05), Max: 99 (16 Feb 2022 21:36)  HR: 74 (17 Feb 2022 01:05) (66 - 84)  BP: 118/77 (17 Feb 2022 01:05) (115/74 - 206/95)  BP(mean): --  RR: 18 (17 Feb 2022 01:05) (17 - 19)  SpO2: 96% (17 Feb 2022 01:05) (93% - 99%)I&O's Detail    16 Feb 2022 07:01  -  17 Feb 2022 01:42  --------------------------------------------------------  IN:    Oral Fluid: 940 mL  Total IN: 940 mL    OUT:    Voided (mL): 300 mL  Total OUT: 300 mL    Total NET: 640 mL      MEDICATIONS  (STANDING):  acetaminophen     Tablet .. 975 milliGRAM(s) Oral every 6 hours  amLODIPine   Tablet 5 milliGRAM(s) Oral daily  enoxaparin Injectable 40 milliGRAM(s) SubCutaneous every 24 hours  lidocaine   4% Patch 1 Patch Transdermal daily  metoprolol succinate ER 25 milliGRAM(s) Oral daily  multivitamin 1 Tablet(s) Oral daily  polyethylene glycol 3350 17 Gram(s) Oral daily  senna 2 Tablet(s) Oral at bedtime  simvastatin 20 milliGRAM(s) Oral at bedtime    MEDICATIONS  (PRN):  hydrALAZINE Injectable 5 milliGRAM(s) IV Push every 6 hours PRN SBP >180  ibuprofen  Tablet. 200 milliGRAM(s) Oral every 6 hours PRN Mild Pain (1 - 3)  traMADol 25 milliGRAM(s) Oral every 4 hours PRN Moderate Pain (4 - 6)  traMADol 50 milliGRAM(s) Oral every 4 hours PRN Severe Pain (7 - 10)      PHYSICAL EXAM:  General: NAD  Chest: Left chest wall tenderness  Cardiac: RRR  Respiratory: Bilateral breath sounds clear and equal   Abdomen: Soft, non-distended, non-tender; no rebound or guarding; no palpable masses   Groin: Normal appearing  Extremities: Palpable radial & DP pulses bilaterally. Motor and sensory grossly intact in all 4 extremities.      LABS:                        14.0   13.02 )-----------( 188      ( 16 Feb 2022 08:03 )             42.8     02-16    138  |  100  |  20  ----------------------------<  108<H>  3.9   |  23  |  1.07    Ca    10.1      16 Feb 2022 08:03    TPro  7.9  /  Alb  4.7  /  TBili  1.2  /  DBili  x   /  AST  35  /  ALT  25  /  AlkPhos  58  02-16    PT/INR - ( 16 Feb 2022 08:03 )   PT: 18.4 sec;   INR: 1.57 ratio         PTT - ( 16 Feb 2022 08:03 )  PTT:26.0 sec  LIVER FUNCTIONS - ( 16 Feb 2022 08:03 )  Alb: 4.7 g/dL / Pro: 7.9 g/dL / ALK PHOS: 58 U/L / ALT: 25 U/L / AST: 35 U/L / GGT: x             ABO Interpretation: B (02-16-22 @ 08:12)      IMAGING:     ACS Daily Progress Note:    Patient is a 77y old  Male who presents with a chief complaint of Mechanical fall (16 Feb 2022 15:36)      INTERVAL EVENTS: No acute events overnight. Pt was severely hypertensive throughout the day and given hydralazine, amlodipine, and metoprolol (in the PM). Pt's BP responded appropriately and has been stable.     SUBJECTIVE: Patient seen and examined at bedside with surgical team, patient without complaints. Pulling 1.5L on IS- Denies fever, chills, CP, SOB nausea, vomiting, abdominal pain.      OBJECTIVE:    Vital Signs Last 24 Hrs  T(C): 36.7 (17 Feb 2022 01:05), Max: 37.2 (16 Feb 2022 21:36)  T(F): 98 (17 Feb 2022 01:05), Max: 99 (16 Feb 2022 21:36)  HR: 74 (17 Feb 2022 01:05) (66 - 84)  BP: 118/77 (17 Feb 2022 01:05) (115/74 - 206/95)  BP(mean): --  RR: 18 (17 Feb 2022 01:05) (17 - 19)  SpO2: 96% (17 Feb 2022 01:05) (93% - 99%)I&O's Detail    16 Feb 2022 07:01  -  17 Feb 2022 01:42  --------------------------------------------------------  IN:    Oral Fluid: 940 mL  Total IN: 940 mL    OUT:    Voided (mL): 300 mL  Total OUT: 300 mL    Total NET: 640 mL      MEDICATIONS  (STANDING):  acetaminophen     Tablet .. 975 milliGRAM(s) Oral every 6 hours  amLODIPine   Tablet 5 milliGRAM(s) Oral daily  enoxaparin Injectable 40 milliGRAM(s) SubCutaneous every 24 hours  lidocaine   4% Patch 1 Patch Transdermal daily  metoprolol succinate ER 25 milliGRAM(s) Oral daily  multivitamin 1 Tablet(s) Oral daily  polyethylene glycol 3350 17 Gram(s) Oral daily  senna 2 Tablet(s) Oral at bedtime  simvastatin 20 milliGRAM(s) Oral at bedtime    MEDICATIONS  (PRN):  hydrALAZINE Injectable 5 milliGRAM(s) IV Push every 6 hours PRN SBP >180  ibuprofen  Tablet. 200 milliGRAM(s) Oral every 6 hours PRN Mild Pain (1 - 3)  traMADol 25 milliGRAM(s) Oral every 4 hours PRN Moderate Pain (4 - 6)  traMADol 50 milliGRAM(s) Oral every 4 hours PRN Severe Pain (7 - 10)      PHYSICAL EXAM:  General: NAD  Chest: Left chest wall tenderness  Cardiac: RRR  Respiratory: Bilateral breath sounds clear and equal   Abdomen: Soft, non-distended, non-tender; no rebound or guarding; no palpable masses   Groin: Normal appearing  Extremities: Palpable radial & DP pulses bilaterally. Motor and sensory grossly intact in all 4 extremities.      LABS:                        14.0   13.02 )-----------( 188      ( 16 Feb 2022 08:03 )             42.8     02-16    138  |  100  |  20  ----------------------------<  108<H>  3.9   |  23  |  1.07    Ca    10.1      16 Feb 2022 08:03    TPro  7.9  /  Alb  4.7  /  TBili  1.2  /  DBili  x   /  AST  35  /  ALT  25  /  AlkPhos  58  02-16    PT/INR - ( 16 Feb 2022 08:03 )   PT: 18.4 sec;   INR: 1.57 ratio         PTT - ( 16 Feb 2022 08:03 )  PTT:26.0 sec  LIVER FUNCTIONS - ( 16 Feb 2022 08:03 )  Alb: 4.7 g/dL / Pro: 7.9 g/dL / ALK PHOS: 58 U/L / ALT: 25 U/L / AST: 35 U/L / GGT: x             ABO Interpretation: B (02-16-22 @ 08:12)      IMAGING:

## 2022-02-17 NOTE — DISCHARGE NOTE PROVIDER - HOSPITAL COURSE
Patient is a 77y old  Male who presents with a chief complaint of mechanical fall    HPI: 77M w/ PMHx of AFib on Eliquis, HTN, COPD, CKD3, Carcinoid tumor s/p hemicolectomy, nasopharyngeal carcinoma s/p chemo/XRT and IPMN s/p distal pancreatectomy by Dr. Brasher on 2018 who now presents to the ED as a transfer from OSH after suffering a mechanical fall this morning. Patient states he went to the bathroom and as he was walking back to his room he tripped and fell on his left side. No headstrike of LOC.    In the ED, patient was afebrile, hemodynamically stable, labs largely remarkable for leukocytosis of 13k, otherwise WNL. CTHN negative for traumatic injuries, CT chest revealed left 6 and 7 rib fracture and small pneumothorax 5-10%. Trauma surgery consulted for evaluation. IS is 1,750ml.    No fevers/chills, nausea/vomiting, chest pain/shortness of breath, or dizziness/lightheadedness.    PRIMARY SURVEY:   A - airway intact  B - bilateral breath sounds and good chest rise  C - initial BP  BP: 148/82 (02-16-22 @ 09:11) *** , HR HR: 67 (02-16-22 @ 09:11) *** , palpable pulses in all extremities  D - GCS 15 on arrival. E 4, V 5, M 6.   Exposure obtained    Pt admitted to surgery. Placed on multimodal pain control, IS use encouraged. Medicine assisted with co management and recommendations for htn followed throughout admission, amlodipine added  AC restarted on 2/17. Repeat CXR stable    PT evaluated pt and recommended outpatient PT.    Patient continued to recover appropriately and was deemed stable for dc. At the time of discharge, the patient was hemodynamically stable, tolerating PO diet, voiding urine, passing stool, ambulating with assistance and was comfortable with adequate pain control. The patient was instructed to follow up with PMD within 1-2 weeks after discharge and trauma surgeon only as needed.    Patient is a 77y old  Male who presents with a chief complaint of mechanical fall    HPI: 77M w/ PMHx of AFib on Eliquis, HTN, COPD, CKD3, Carcinoid tumor s/p hemicolectomy, nasopharyngeal carcinoma s/p chemo/XRT and IPMN s/p distal pancreatectomy by Dr. Brasher on 2018 who now presents to the ED as a transfer from OSH after suffering a mechanical fall this morning. Patient states he went to the bathroom and as he was walking back to his room he tripped and fell on his left side. No headstrike of LOC.    In the ED, patient was afebrile, hemodynamically stable, labs largely remarkable for leukocytosis of 13k, otherwise WNL. CTHN negative for traumatic injuries, CT chest revealed left 6 and 7 rib fracture and small pneumothorax 5-10%. Trauma surgery consulted for evaluation. IS is 1,750ml.    No fevers/chills, nausea/vomiting, chest pain/shortness of breath, or dizziness/lightheadedness.    PRIMARY SURVEY:   A - airway intact  B - bilateral breath sounds and good chest rise  C - initial BP  BP: 148/82 (02-16-22 @ 09:11) *** , HR HR: 67 (02-16-22 @ 09:11) *** , palpable pulses in all extremities  D - GCS 15 on arrival. E 4, V 5, M 6.   Exposure obtained    Pt admitted to surgery. Placed on multimodal pain control, IS use encouraged. Medicine assisted with co management and recommendations for htn followed throughout admission, amlodipine added  AC restarted on 2/17. Repeat CXR stable no ptx    PT evaluated pt and recommended outpatient PT.    Patient continued to recover appropriately and was deemed stable for dc. At the time of discharge, the patient was hemodynamically stable, tolerating PO diet, voiding urine, passing stool, ambulating with assistance and was comfortable with adequate pain control. The patient was instructed to follow up with PMD within 1-2 weeks after discharge and trauma surgeon only as needed.    Patient is a 77y old  Male who presents with a chief complaint of mechanical fall    HPI: 77M w/ PMHx of AFib on Eliquis, HTN, COPD, CKD3, Carcinoid tumor s/p hemicolectomy, nasopharyngeal carcinoma s/p chemo/XRT and IPMN s/p distal pancreatectomy by Dr. Brasher on 2018 who now presents to the ED as a transfer from OSH after suffering a mechanical fall this morning. Patient states he went to the bathroom and as he was walking back to his room he tripped and fell on his left side. No headstrike of LOC.    In the ED, patient was afebrile, hemodynamically stable, labs largely remarkable for leukocytosis of 13k, otherwise WNL. CTHN negative for traumatic injuries, CT chest revealed left 6 and 7 rib fracture and small pneumothorax 5-10%. Trauma surgery consulted for evaluation. IS is 1,750ml.    No fevers/chills, nausea/vomiting, chest pain/shortness of breath, or dizziness/lightheadedness.    PRIMARY SURVEY:   A - airway intact  B - bilateral breath sounds and good chest rise  C - initial BP  BP: 148/82 (02-16-22 @ 09:11) *** , HR HR: 67 (02-16-22 @ 09:11) *** , palpable pulses in all extremities  D - GCS 15 on arrival. E 4, V 5, M 6.   Exposure obtained    Pt admitted to surgery. Placed on multimodal pain control, IS use encouraged. Medicine assisted with co management and recommendations for htn followed throughout admission, amlodipine added  AC restarted on 2/17. Repeat CXR stable - Unchanged small left apical pneumothorax and acute lateral left rib fractures..    PT evaluated pt and recommended outpatient PT.    Patient continued to recover appropriately and was deemed stable for dc. At the time of discharge, the patient was hemodynamically stable, tolerating PO diet, voiding urine, passing stool, ambulating with assistance and was comfortable with adequate pain control. The patient was instructed to follow up with PMD and trauma surgeon within 1-2 weeks after discharge

## 2022-02-17 NOTE — PROGRESS NOTE ADULT - SUBJECTIVE AND OBJECTIVE BOX
Patient is a 77y old  Male who presents with a chief complaint of Mechanical fall (17 Feb 2022 12:20)      SUBJECTIVE / OVERNIGHT EVENTS: Pain improved. Feels well.     MEDICATIONS  (STANDING):  acetaminophen     Tablet .. 975 milliGRAM(s) Oral every 6 hours  amLODIPine   Tablet 5 milliGRAM(s) Oral daily  apixaban 5 milliGRAM(s) Oral every 12 hours  lidocaine   4% Patch 1 Patch Transdermal daily  metoprolol succinate ER 25 milliGRAM(s) Oral daily  multivitamin 1 Tablet(s) Oral daily  polyethylene glycol 3350 17 Gram(s) Oral daily  senna 2 Tablet(s) Oral at bedtime  simvastatin 20 milliGRAM(s) Oral at bedtime    MEDICATIONS  (PRN):  hydrALAZINE Injectable 5 milliGRAM(s) IV Push every 6 hours PRN SBP >180  ibuprofen  Tablet. 200 milliGRAM(s) Oral every 6 hours PRN Mild Pain (1 - 3)  traMADol 25 milliGRAM(s) Oral every 4 hours PRN Moderate Pain (4 - 6)  traMADol 50 milliGRAM(s) Oral every 4 hours PRN Severe Pain (7 - 10)      CAPILLARY BLOOD GLUCOSE        I&O's Summary    16 Feb 2022 07:01  -  17 Feb 2022 07:00  --------------------------------------------------------  IN: 1060 mL / OUT: 300 mL / NET: 760 mL    17 Feb 2022 07:01  -  17 Feb 2022 13:58  --------------------------------------------------------  IN: 560 mL / OUT: 0 mL / NET: 560 mL        PHYSICAL EXAM:  T(C): 36.5 (02-17-22 @ 13:26), Max: 37.2 (02-16-22 @ 21:36)  HR: 73 (02-17-22 @ 13:26) (66 - 84)  BP: 139/57 (02-17-22 @ 13:26) (110/70 - 206/95)  RR: 18 (02-17-22 @ 13:26) (17 - 18)  SpO2: 93% (02-17-22 @ 13:26) (92% - 96%)  CONSTITUTIONAL: NAD, well-developed, well-groomed  EYES: PERRLA; conjunctiva and sclera clear  ENMT: Moist oral mucosa, no pharyngeal injection or exudates; normal dentition  NECK: Supple, no palpable masses; no thyromegaly  RESPIRATORY: Normal respiratory effort; lungs are clear to auscultation bilaterally  CARDIOVASCULAR: Regular rate and rhythm, normal S1 and S2, no murmur/rub/gallop; No lower extremity edema; Peripheral pulses are 2+ bilaterally  ABDOMEN: Nontender to palpation, normoactive bowel sounds, no rebound/guarding; No hepatosplenomegaly  MUSCULOSKELETAL:  Normal gait; no clubbing or cyanosis of digits; no joint swelling or tenderness to palpation  PSYCH: A+O to person, place, and time; affect appropriate  NEUROLOGY: CN 2-12 are intact and symmetric; no gross sensory deficits   SKIN: No rashes; no palpable lesions    LABS:                        14.0   13.02 )-----------( 188      ( 16 Feb 2022 08:03 )             42.8     02-16    138  |  100  |  20  ----------------------------<  108<H>  3.9   |  23  |  1.07    Ca    10.1      16 Feb 2022 08:03    TPro  7.9  /  Alb  4.7  /  TBili  1.2  /  DBili  x   /  AST  35  /  ALT  25  /  AlkPhos  58  02-16    PT/INR - ( 16 Feb 2022 08:03 )   PT: 18.4 sec;   INR: 1.57 ratio         PTT - ( 16 Feb 2022 08:03 )  PTT:26.0 sec          RADIOLOGY & ADDITIONAL TESTS:    Imaging Personally Reviewed:    Consultant(s) Notes Reviewed:      Care Discussed with Consultants/Other Providers: Trauma team

## 2022-02-17 NOTE — PROGRESS NOTE ADULT - ASSESSMENT
77M w/ PMHx of AFib on Eliquis, HTN, COPD, CKD3, Carcinoid tumor s/p hemicolectomy, nasopharyngeal carcinoma s/p chemo/XRT and IPMN s/p distal pancreatectomy by Dr. Brasher on 2018 who now presented to the ED as a transfer from OSH after suffering a mechanical fall. Patient states he went to the bathroom and as he was walking back to his room he tripped and fell on his left side. No headstrike of LOC.    In the ED, patient was afebrile, hemodynamically stable, labs largely remarkable for leukocytosis of 13k, otherwise WNL. CTHN negative for traumatic injuries, CT chest revealed left 6 and 7 rib fracture and small pneumothorax 5-10%.     Uncontrolled htn, now improved.

## 2022-02-17 NOTE — DISCHARGE NOTE NURSING/CASE MANAGEMENT/SOCIAL WORK - PATIENT PORTAL LINK FT
You can access the FollowMyHealth Patient Portal offered by [facility] by registering at the following website: [email or web address]. By joining [email or web address]’s FollowMyHealth portal, you will also be able to view your health information using other applications (apps) compatible with our system.

## 2022-03-04 ENCOUNTER — APPOINTMENT (OUTPATIENT)
Dept: VASCULAR SURGERY | Facility: CLINIC | Age: 77
End: 2022-03-04
Payer: MEDICARE

## 2022-03-04 VITALS — HEART RATE: 52 BPM | SYSTOLIC BLOOD PRESSURE: 153 MMHG | DIASTOLIC BLOOD PRESSURE: 79 MMHG

## 2022-03-04 VITALS
BODY MASS INDEX: 19.26 KG/M2 | HEIGHT: 69 IN | TEMPERATURE: 202.1 F | SYSTOLIC BLOOD PRESSURE: 145 MMHG | WEIGHT: 130 LBS | HEART RATE: 51 BPM | DIASTOLIC BLOOD PRESSURE: 75 MMHG

## 2022-03-04 PROCEDURE — 93880 EXTRACRANIAL BILAT STUDY: CPT

## 2022-03-04 PROCEDURE — 93922 UPR/L XTREMITY ART 2 LEVELS: CPT | Mod: 59

## 2022-03-04 PROCEDURE — 99203 OFFICE O/P NEW LOW 30 MIN: CPT

## 2022-03-27 ENCOUNTER — NON-APPOINTMENT (OUTPATIENT)
Age: 77
End: 2022-03-27

## 2022-04-06 NOTE — DISCHARGE NOTE ADULT - CONTRAINDICATIONS & PRECAUTIONS (SELECT ALL THAT APPLY)
Body Location Override (Optional - Billing Will Still Be Based On Selected Body Map Location If Applicable): left lower nasal alar groove Detail Level: Detailed Depth Of Biopsy: dermis Was A Bandage Applied: Yes Size Of Lesion In Cm: 0.3 X Size Of Lesion In Cm: 0 Biopsy Type: H and E Biopsy Method: Personna blade Anesthesia Type: 2% lidocaine with epinephrine Anesthesia Volume In Cc (Will Not Render If 0): 0.5 Hemostasis: Electrocautery Wound Care: Vaseline Dressing: pressure dressing Destruction After The Procedure: No Type Of Destruction Used: Electrodesiccation Curettage Text: The wound bed was treated with curettage after the biopsy was performed. Cryotherapy Text: The wound bed was treated with cryotherapy after the biopsy was performed. Electrodesiccation Text: The wound bed was treated with electrodesiccation after the biopsy was performed. Electrodesiccation And Curettage Text: The wound bed was treated with electrodesiccation and curettage after the biopsy was performed. Silver Nitrate Text: The wound bed was treated with silver nitrate after the biopsy was performed. Lab: [facility] Consent: The provider's intent is to obtain a tissue sample solely for diagnostic purposes. Written consent to obtain tissue sample was obtained and risks were reviewed including but not limited to scarring, infection, bleeding, scabbing, incomplete removal, nerve damage and allergy to anesthesia. Post-Care Instructions: i reviewed with patient in detail post care instructions and written instructions were provided? apply aquaphor or vaseline daily until healed with bandage. Patient verbalizes understanding. Notification Instructions: Patient will be notified of biopsy results. However, patient instructed to call the office if not contacted within 2 weeks. Billing Type: [employer] Information: Selecting Yes will display possible errors in your note based on the variables you have selected. This validation is only offered as a suggestion for you. PLEASE NOTE THAT THE VALIDATION TEXT WILL BE REMOVED WHEN YOU FINALIZE YOUR NOTE. IF YOU WANT TO FAX A PRELIMINARY NOTE YOU WILL NEED TO TOGGLE THIS TO 'NO' IF YOU DO NOT WANT IT IN YOUR FAXED NOTE. Patient/surrogate refused vaccine...

## 2022-04-25 ENCOUNTER — NON-APPOINTMENT (OUTPATIENT)
Age: 77
End: 2022-04-25

## 2022-05-17 ENCOUNTER — APPOINTMENT (OUTPATIENT)
Dept: PULMONOLOGY | Facility: CLINIC | Age: 77
End: 2022-05-17
Payer: MEDICARE

## 2022-05-17 VITALS
HEIGHT: 69 IN | SYSTOLIC BLOOD PRESSURE: 104 MMHG | OXYGEN SATURATION: 96 % | TEMPERATURE: 97.1 F | WEIGHT: 132 LBS | DIASTOLIC BLOOD PRESSURE: 67 MMHG | HEART RATE: 66 BPM | BODY MASS INDEX: 19.55 KG/M2

## 2022-05-17 PROCEDURE — 99213 OFFICE O/P EST LOW 20 MIN: CPT

## 2022-05-17 NOTE — HISTORY OF PRESENT ILLNESS
[TextBox_4] : Patient is here for follow-up visit.  Accompanied by his wife.  Last seen in September 2020.\par \bethany Is a former smoker.  He has mild COPD.  He had spirometry in 2018 which showed a normal FEV1, and decreased FEV1/FVC ratio, hyperinflation and moderately decreased diffusion capacity.  He is currently maintained on Anoro daily.  Not requiring his Proventil at all.\par \par Prior history of head and neck cancer, postradiation\par \par He also has a history of hypertension, atrial fibrillation on Eliquis, diabetes currently managed off medications.\par \par Patient had an episode of syncope in February, was admitted to the hospital and found to have a rib fracture and a small apical pneumothorax, managed conservatively without chest tube placement.  He was discharged home.  Since that time he has had 2 further syncopal episodes, but did not go to the hospital on his request.\par \par He had a follow-up with his primary care physician, as well as with his cardiologist who he saw yesterday, Dr. Magaly Gleason.\par His blood pressure, which his wife keeps a log of, had been running low.  Yesterday his amlodipine dose was decreased from 5-2.5.  She is also ordering a Holter for 24-hour monitoring.  And referred him to EPS for evaluation.  She also referred him to see a neurologist, and follow-up with his endocrinologist.\par \par He is scheduled for a CT head neck and chest with contrast at the end of the month ordered for follow-up of his prior malignancy.

## 2022-05-17 NOTE — DISCUSSION/SUMMARY
[FreeTextEntry1] : 77-year-old male, former smoker, mild COPD, history of head and neck cancer treated with radiation, with multiple recent episodes of syncope, admitted to the hospital in February after syncopal episode with a left rib fracture and small pneumothorax treated conservatively.\par \par From a pulmonary standpoint he is stable.  He is on daily Anoro.  He has not required his Proventil at all.\par \par He has a CT neck and chest scheduled for later this month.  This will serve as a follow-up for the pneumothorax\par \par Active issue right now is the syncopal episodes.  Being followed by his cardiologist, was referred to EP as well, and neurology.\par \par Is fully vaccinated and boosted x2 for COVID\par \par Follow-up in 6 months

## 2022-05-19 ENCOUNTER — APPOINTMENT (OUTPATIENT)
Dept: CT IMAGING | Facility: IMAGING CENTER | Age: 77
End: 2022-05-19
Payer: MEDICARE

## 2022-05-19 ENCOUNTER — OUTPATIENT (OUTPATIENT)
Dept: OUTPATIENT SERVICES | Facility: HOSPITAL | Age: 77
LOS: 1 days | End: 2022-05-19
Payer: MEDICARE

## 2022-05-19 DIAGNOSIS — Z90.49 ACQUIRED ABSENCE OF OTHER SPECIFIED PARTS OF DIGESTIVE TRACT: Chronic | ICD-10-CM

## 2022-05-19 DIAGNOSIS — Z00.8 ENCOUNTER FOR OTHER GENERAL EXAMINATION: ICD-10-CM

## 2022-05-19 DIAGNOSIS — Z90.89 ACQUIRED ABSENCE OF OTHER ORGANS: Chronic | ICD-10-CM

## 2022-05-19 DIAGNOSIS — Z98.890 OTHER SPECIFIED POSTPROCEDURAL STATES: Chronic | ICD-10-CM

## 2022-05-19 DIAGNOSIS — C11.9 MALIGNANT NEOPLASM OF NASOPHARYNX, UNSPECIFIED: ICD-10-CM

## 2022-05-19 DIAGNOSIS — Z98.89 OTHER SPECIFIED POSTPROCEDURAL STATES: Chronic | ICD-10-CM

## 2022-05-19 PROCEDURE — 71260 CT THORAX DX C+: CPT

## 2022-05-19 PROCEDURE — 70491 CT SOFT TISSUE NECK W/DYE: CPT | Mod: 26

## 2022-05-19 PROCEDURE — 71260 CT THORAX DX C+: CPT | Mod: 26

## 2022-05-19 PROCEDURE — 70491 CT SOFT TISSUE NECK W/DYE: CPT

## 2022-06-10 ENCOUNTER — NON-APPOINTMENT (OUTPATIENT)
Age: 77
End: 2022-06-10

## 2022-06-10 ENCOUNTER — APPOINTMENT (OUTPATIENT)
Dept: ELECTROPHYSIOLOGY | Facility: CLINIC | Age: 77
End: 2022-06-10
Payer: MEDICARE

## 2022-06-10 VITALS
OXYGEN SATURATION: 95 % | HEIGHT: 69 IN | DIASTOLIC BLOOD PRESSURE: 73 MMHG | BODY MASS INDEX: 19.49 KG/M2 | SYSTOLIC BLOOD PRESSURE: 121 MMHG | TEMPERATURE: 97.7 F | HEART RATE: 93 BPM

## 2022-06-10 VITALS — WEIGHT: 132 LBS | BODY MASS INDEX: 19.49 KG/M2

## 2022-06-10 DIAGNOSIS — I10 ESSENTIAL (PRIMARY) HYPERTENSION: ICD-10-CM

## 2022-06-10 PROCEDURE — 93000 ELECTROCARDIOGRAM COMPLETE: CPT

## 2022-06-10 PROCEDURE — 99203 OFFICE O/P NEW LOW 30 MIN: CPT

## 2022-06-10 NOTE — DISCUSSION/SUMMARY
[FreeTextEntry1] : Impression:\par \par 1. Syncope: recurrent syncope without prodromals. EKG performed today to assess for presence of conduction disease and reveals NSR. Given recurrent syncope, concern for possible arrhythmogenic syncope/transient heart block. Discussed possible EP study vs CardioNet vs ILR for long term monitoring. ECHO with normal LVEF. Given frequency of syncope, prefers CardioNet placement, placed on patient in office.\par \par 2. HTN: resume oral antihypertensives as prescribed. Encouraged heart healthy diet, sodium restriction, and weight loss. Continue regular f/u with Cardiologist for further HTN management.\par \par 3. HLD: resume statin therapy as prescribed and regular f/u with Cardiologist for routine lipid monitoring and management.\par \par 30 day CardioNet placed. \par \par Sincerely,\par \par Tim Cunningham MD

## 2022-06-10 NOTE — HISTORY OF PRESENT ILLNESS
[FreeTextEntry1] : Hossein eHrnandez is a 78y/o man with Hx of HTN, HLD, COPD, CKD (III), carcinoid tumor s/p hemicolectomy, nasopharyngeal carcinoma s/p chemo/RT, IPMN s/p distal pancreatectomy (2018), and paroxysmal afib, on Eliquis, who presents today for initial evaluation post episodes of fall and syncope. Back in 2/2022 he presented to [facility] after passing out in the hallway. He had gone up to go to the bathroom. Suffered a left rib fracture and small pneumothorax. He had another episode of syncope in 3/2022 while he was doing dishes in the kitchen and then again in 5/2022 while in the laundry room. Denies any prodromals. Saw Cardiologist and underwent multiple Holters without evidence of heart block or bradyarrhythmias but did not have any syncope or near syncope while wearing.

## 2022-06-10 NOTE — CARDIOLOGY SUMMARY
[de-identified] : 1/15/2016: normal study  [de-identified] : 9/30/2021: normal LV systolic function, grade I diastolic dsyfunction \par 1/2016: LVEF 55-60%, grade I diastolic dysfunction \par 5/2016: LV function 55%, grade I diastolic dysfunction

## 2022-06-24 ENCOUNTER — NON-APPOINTMENT (OUTPATIENT)
Age: 77
End: 2022-06-24

## 2022-06-27 ENCOUNTER — NON-APPOINTMENT (OUTPATIENT)
Age: 77
End: 2022-06-27

## 2022-06-28 ENCOUNTER — APPOINTMENT (OUTPATIENT)
Dept: OTOLARYNGOLOGY | Facility: CLINIC | Age: 77
End: 2022-06-28

## 2022-06-28 VITALS
HEIGHT: 69 IN | HEART RATE: 60 BPM | DIASTOLIC BLOOD PRESSURE: 73 MMHG | BODY MASS INDEX: 19.55 KG/M2 | SYSTOLIC BLOOD PRESSURE: 111 MMHG | WEIGHT: 132 LBS

## 2022-06-28 PROCEDURE — 99214 OFFICE O/P EST MOD 30 MIN: CPT | Mod: 25

## 2022-06-28 PROCEDURE — 31231 NASAL ENDOSCOPY DX: CPT

## 2022-06-28 RX ORDER — SIMVASTATIN 20 MG/1
20 TABLET, FILM COATED ORAL
Qty: 90 | Refills: 0 | Status: DISCONTINUED | COMMUNITY
Start: 2022-05-09

## 2022-06-28 RX ORDER — TRAMADOL HYDROCHLORIDE 50 MG/1
50 TABLET, COATED ORAL
Qty: 10 | Refills: 0 | Status: DISCONTINUED | COMMUNITY
Start: 2022-02-17

## 2022-06-28 RX ORDER — AMLODIPINE BESYLATE 2.5 MG/1
2.5 TABLET ORAL
Qty: 90 | Refills: 3 | Status: COMPLETED | COMMUNITY
Start: 2022-06-10 | End: 2022-06-28

## 2022-06-28 NOTE — HISTORY OF PRESENT ILLNESS
[de-identified] : 77 year old male presents for follow up for NPC and OE. History of  T4 N1 M0 SCCa R. NPC and s/p concurrent CXRT and consolidative chemotherapy completed in March 2017. States still has the right side neck spasms. Wife states has a couple of episodes of syncope and collapse, first episode was February fainted at night going to the bathroom, went to ER had a fractured rib a pneumothorax was admitted for one day, second episode of syncope March did not sustain any  injury and last episode was May 2, occurred at 5 am, had no injuries and did not seek medical care, all falls were reported to primary. Patient denies dysphagia, odynophagia, dyspnea, dysphonia, night sweats, chills, fevers, or otalgia.\par

## 2022-06-28 NOTE — REASON FOR VISIT
[Subsequent Evaluation] : a subsequent evaluation for [Spouse] : spouse [FreeTextEntry2] :  NPC and OE.

## 2022-06-28 NOTE — PROCEDURE
[None] : none [Flexible Endoscope] : examined with the flexible endoscope [Normal] : the middle meatus had no abnormalities [FreeTextEntry6] : Posttreatment changes in the NPx, no new lesions, no crusting, no lesions in the NC.  [de-identified] : NPC [Serial Number: ___] : Serial Number: [unfilled]

## 2022-06-28 NOTE — PHYSICAL EXAM
[Nasal Endoscopy Performed] : nasal endoscopy was performed, see procedure section for findings [Midline] : trachea located in midline position [Edentulous] : edentulous [Normal] : no rashes [de-identified] : Posttreatment changes, no obvious LAD. [de-identified] : No other LAD.

## 2022-07-03 ENCOUNTER — OUTPATIENT (OUTPATIENT)
Dept: OUTPATIENT SERVICES | Facility: HOSPITAL | Age: 77
LOS: 1 days | End: 2022-07-03
Payer: MEDICARE

## 2022-07-03 ENCOUNTER — APPOINTMENT (OUTPATIENT)
Dept: MRI IMAGING | Facility: IMAGING CENTER | Age: 77
End: 2022-07-03

## 2022-07-03 DIAGNOSIS — Z90.89 ACQUIRED ABSENCE OF OTHER ORGANS: Chronic | ICD-10-CM

## 2022-07-03 DIAGNOSIS — Z00.8 ENCOUNTER FOR OTHER GENERAL EXAMINATION: ICD-10-CM

## 2022-07-03 DIAGNOSIS — Z90.49 ACQUIRED ABSENCE OF OTHER SPECIFIED PARTS OF DIGESTIVE TRACT: Chronic | ICD-10-CM

## 2022-07-03 DIAGNOSIS — Z98.89 OTHER SPECIFIED POSTPROCEDURAL STATES: Chronic | ICD-10-CM

## 2022-07-03 DIAGNOSIS — Z98.890 OTHER SPECIFIED POSTPROCEDURAL STATES: Chronic | ICD-10-CM

## 2022-07-03 PROCEDURE — 70551 MRI BRAIN STEM W/O DYE: CPT

## 2022-07-03 PROCEDURE — 70551 MRI BRAIN STEM W/O DYE: CPT | Mod: 26

## 2022-07-04 ENCOUNTER — NON-APPOINTMENT (OUTPATIENT)
Age: 77
End: 2022-07-04

## 2022-09-09 ENCOUNTER — APPOINTMENT (OUTPATIENT)
Dept: VASCULAR SURGERY | Facility: CLINIC | Age: 77
End: 2022-09-09

## 2022-09-09 VITALS
WEIGHT: 132 LBS | SYSTOLIC BLOOD PRESSURE: 182 MMHG | HEIGHT: 69 IN | BODY MASS INDEX: 19.55 KG/M2 | TEMPERATURE: 93.6 F | HEART RATE: 54 BPM | DIASTOLIC BLOOD PRESSURE: 94 MMHG

## 2022-09-09 DIAGNOSIS — Z01.818 ENCOUNTER FOR OTHER PREPROCEDURAL EXAMINATION: ICD-10-CM

## 2022-09-09 PROCEDURE — 99214 OFFICE O/P EST MOD 30 MIN: CPT

## 2022-09-09 PROCEDURE — 93923 UPR/LXTR ART STDY 3+ LVLS: CPT

## 2022-09-09 NOTE — ED ADULT NURSE NOTE - NS ED NURSE DISCH DISPOSITION
Outreach Attempt was made to schedule Overdue Care Gap.    The Outcome of the Outreach was Contact was not made, appointment already scheduled. Care Gaps discussed included Immunizations and Wellness Visits. Appointment Scheduled 10/27/2022.     Admitted

## 2022-10-17 PROBLEM — Z01.818 PRE-OPERATIVE EXAM: Status: ACTIVE | Noted: 2022-10-17

## 2022-11-16 ENCOUNTER — APPOINTMENT (OUTPATIENT)
Dept: MRI IMAGING | Facility: IMAGING CENTER | Age: 77
End: 2022-11-16

## 2022-11-16 ENCOUNTER — APPOINTMENT (OUTPATIENT)
Dept: CT IMAGING | Facility: IMAGING CENTER | Age: 77
End: 2022-11-16

## 2022-11-16 ENCOUNTER — OUTPATIENT (OUTPATIENT)
Dept: OUTPATIENT SERVICES | Facility: HOSPITAL | Age: 77
LOS: 1 days | End: 2022-11-16
Payer: MEDICARE

## 2022-11-16 DIAGNOSIS — Z98.89 OTHER SPECIFIED POSTPROCEDURAL STATES: Chronic | ICD-10-CM

## 2022-11-16 DIAGNOSIS — Z00.8 ENCOUNTER FOR OTHER GENERAL EXAMINATION: ICD-10-CM

## 2022-11-16 DIAGNOSIS — Z98.890 OTHER SPECIFIED POSTPROCEDURAL STATES: Chronic | ICD-10-CM

## 2022-11-16 DIAGNOSIS — Z90.89 ACQUIRED ABSENCE OF OTHER ORGANS: Chronic | ICD-10-CM

## 2022-11-16 DIAGNOSIS — Z90.49 ACQUIRED ABSENCE OF OTHER SPECIFIED PARTS OF DIGESTIVE TRACT: Chronic | ICD-10-CM

## 2022-11-16 PROCEDURE — 71250 CT THORAX DX C-: CPT | Mod: 26

## 2022-11-16 PROCEDURE — 74183 MRI ABD W/O CNTR FLWD CNTR: CPT | Mod: 26

## 2022-11-16 PROCEDURE — A9585: CPT

## 2022-11-16 PROCEDURE — 74183 MRI ABD W/O CNTR FLWD CNTR: CPT

## 2022-11-16 PROCEDURE — 71250 CT THORAX DX C-: CPT

## 2022-11-29 ENCOUNTER — APPOINTMENT (OUTPATIENT)
Dept: SURGERY | Facility: CLINIC | Age: 77
End: 2022-11-29
Payer: MEDICARE

## 2022-12-01 ENCOUNTER — APPOINTMENT (OUTPATIENT)
Dept: PULMONOLOGY | Facility: CLINIC | Age: 77
End: 2022-12-01

## 2022-12-01 PROCEDURE — 99215 OFFICE O/P EST HI 40 MIN: CPT | Mod: 95

## 2022-12-01 NOTE — PHYSICAL EXAM
[No Acute Distress] : no acute distress [TextBox_2] : Patient sitting in chair, appears tired but not in distress

## 2022-12-01 NOTE — DISCUSSION/SUMMARY
[FreeTextEntry1] : 77-year-old male, former smoker, history of head and neck cancer, feeling unwell for about 2 weeks, low-grade fevers for the first week, cough fatigue and shortness of breath.  Tested twice for COVID at home and were negative.\par \par Of note, the patient had undergone a CT of the chest and neck ordered by ENT on November 16.  The CT of the chest, which I have reviewed, actually showed a new 2 cm opacity in the superior segment of the left lower lobe.  Possibly, infectious, and related to above symptoms.\par \par I am giving him 1 week of Levaquin.  Short-term follow-up CT ordered for third week of December.  And follow-up with me immediately after.

## 2022-12-01 NOTE — REASON FOR VISIT
[Home] : at home, [unfilled] , at the time of the visit. [Medical Office: ([facility])___] : at the medical office located in  [Spouse] : spouse

## 2022-12-01 NOTE — HISTORY OF PRESENT ILLNESS
[Former] : former [TextBox_4] : Follow-up telehealth visit.  Patient's wife present.  It took many attempts but she was finally able to connect via video\par \par 77-year-old male, former smoker, COPD and history of head and neck cancer.\par \par Patient has been feeling unwell for the last 2 weeks.  His wife reports that on November 19 he was out in the cold for about 2 hours and came back and sneezing with a runny nose.  In retrospect, he also thinks he was feeling more fatigued even before then.  Over the next several days he has low-grade fevers, to about 99 5, the highest was 100.1 on November 22.  He was feeling fatigued and a bit more winded at times with walking.  2 COVID tests at home were negative.  Took Tylenol for the low-grade fevers and some Claritin for the runny nose and sneezing.  Since November 22 he has not had any fever.  He still has unusual fatigue.  Coughing.  No little more short of breath than usual.  He is taking his Anoro daily for COPD.  He is not using

## 2022-12-13 ENCOUNTER — APPOINTMENT (OUTPATIENT)
Dept: SURGERY | Facility: CLINIC | Age: 77
End: 2022-12-13
Payer: MEDICARE

## 2022-12-13 ENCOUNTER — APPOINTMENT (OUTPATIENT)
Dept: OTOLARYNGOLOGY | Facility: CLINIC | Age: 77
End: 2022-12-13

## 2022-12-13 VITALS
HEIGHT: 69 IN | OXYGEN SATURATION: 98 % | BODY MASS INDEX: 19.11 KG/M2 | HEART RATE: 59 BPM | RESPIRATION RATE: 17 BRPM | SYSTOLIC BLOOD PRESSURE: 179 MMHG | WEIGHT: 129 LBS | DIASTOLIC BLOOD PRESSURE: 96 MMHG

## 2022-12-13 VITALS
SYSTOLIC BLOOD PRESSURE: 154 MMHG | HEART RATE: 60 BPM | HEIGHT: 69 IN | WEIGHT: 132 LBS | BODY MASS INDEX: 19.55 KG/M2 | DIASTOLIC BLOOD PRESSURE: 80 MMHG

## 2022-12-13 DIAGNOSIS — Z90.410 ACQUIRED TOTAL ABSENCE OF PANCREAS: ICD-10-CM

## 2022-12-13 DIAGNOSIS — D49.0 NEOPLASM OF UNSPECIFIED BEHAVIOR OF DIGESTIVE SYSTEM: ICD-10-CM

## 2022-12-13 DIAGNOSIS — R63.4 ABNORMAL WEIGHT LOSS: ICD-10-CM

## 2022-12-13 PROCEDURE — 92567 TYMPANOMETRY: CPT

## 2022-12-13 PROCEDURE — 92557 COMPREHENSIVE HEARING TEST: CPT

## 2022-12-13 PROCEDURE — 99213 OFFICE O/P EST LOW 20 MIN: CPT

## 2022-12-13 PROCEDURE — 92504 EAR MICROSCOPY EXAMINATION: CPT

## 2022-12-13 RX ORDER — ATORVASTATIN CALCIUM 40 MG/1
40 TABLET, FILM COATED ORAL
Qty: 30 | Refills: 1 | Status: COMPLETED | COMMUNITY
Start: 2022-06-10 | End: 2022-12-13

## 2022-12-13 RX ORDER — LEVOFLOXACIN 500 MG/1
500 TABLET, FILM COATED ORAL DAILY
Qty: 7 | Refills: 0 | Status: COMPLETED | COMMUNITY
Start: 2022-12-01 | End: 2022-12-13

## 2022-12-13 NOTE — CONSULT LETTER
[Dear  ___] : Dear  [unfilled], [Consult Letter:] : I had the pleasure of evaluating your patient, [unfilled]. [Please see my note below.] : Please see my note below. [Consult Closing:] : Thank you very much for allowing me to participate in the care of this patient.  If you have any questions, please do not hesitate to contact me. [Sincerely,] : Sincerely, [FreeTextEntry2] : Adrian Barriga MD  [FreeTextEntry3] : Zack De La Rosa MD, Otology Neurology & Skull Base Surgery

## 2022-12-13 NOTE — HISTORY OF PRESENT ILLNESS
[de-identified] : 77 year old male presents with decreased hearing for 4-5 months \par History of Right otitis external and Right TM perforation and nasal cancer\par Last ear infection 11/2021\par States right ear is worse than left \par Reports being exposed to chemo and radiation. \par Denies wearing bilateral hearing aids for years. \par Denies family history of hearing loss\par Denies history of otalgia, otorrhea, ear infections, tinnitus, dizziness, vertigo, ear surgeries. No history of head/otologic trauma. Denies loud noise exposure. \par Last audiogram conducted 11/30/2021

## 2022-12-13 NOTE — PHYSICAL EXAM
[Midline] : trachea located in midline position [Binocular Microscopic Exam] : Binocular microscopic exam was performed [Hearing Everett Test (Tuning Fork On Forehead)] : no lateralization of tone [Normal] : gait was normal [Hearing Loss Right Only] : normal [Hearing Loss Left Only] : normal [Rinne Test Air Conduction Persists > Bone Conduction Right] : bone conduction greater than air conduction on the right [Rinne Test Air Conduction Persists > Bone Conduction Left] : bone conduction greater than air conduction on the left [Nystagmus] : ~T no ~M nystagmus was seen [Fukuda Step Test] : Fukuda Step Test was Negative [Romberg's Sign] : Romberg's sign was absent [Fistula Sign] : Fistula Sign: Negative [Past-Pointing] : Past-Pointing: Negative [Ale-Halljeanetteke] : Duncan-Hallpike: Negative [FreeTextEntry8] : cerumen, removed [FreeTextEntry9] : cerumen, removed [de-identified] : small central pinpoint perf, dry

## 2022-12-13 NOTE — REASON FOR VISIT
[Subsequent Evaluation] : a subsequent evaluation for [Spouse] : spouse [FreeTextEntry2] : decreased hearing

## 2022-12-13 NOTE — DATA REVIEWED
[de-identified] : An audiogram was ordered and performed including pure tones, tympanometry and speech testing for the patient complaint of hearing loss\par i have independently reviewed the patient's audiogram from today and my findings include radha SNHL, R ear mixed and worse than prior

## 2022-12-14 PROBLEM — R63.4 WEIGHT LOSS, UNINTENTIONAL: Status: ACTIVE | Noted: 2022-12-14

## 2022-12-19 ENCOUNTER — APPOINTMENT (OUTPATIENT)
Dept: CT IMAGING | Facility: IMAGING CENTER | Age: 77
End: 2022-12-19

## 2022-12-21 ENCOUNTER — APPOINTMENT (OUTPATIENT)
Dept: PULMONOLOGY | Facility: CLINIC | Age: 77
End: 2022-12-21

## 2022-12-21 PROCEDURE — 99214 OFFICE O/P EST MOD 30 MIN: CPT | Mod: 95

## 2022-12-21 NOTE — HISTORY OF PRESENT ILLNESS
[TextBox_4] : Follow-up telehealth visit.\par \par Last visit on December 1, patient with possible pneumonia, respiratory infection.  And a new nodule on the previous CT.  Treated with a week of Levaquin.\par \par Overall, he reports that he is feeling better.  He has no fevers.  His cough is still there but much less.  And does not feel it in his chest anymore.  He is less weak.  And he is moving around better.  He does note in the morning when he blows his nose he has seen little streaks of blood in the tissue, but no hemoptysis.\par \par He is scheduled for a CT of the chest for December 27 as a short-term follow-up to above.\par \bethany Is recently been evaluated by otolaryngology for the hearing loss.  Referred also for swallow therapy.  History of head and neck cancer.  Recently evaluated by surgical oncology all stable.\par \par On the video, patient appears at his baseline.  He is comfortable with no distress.  He has a hoarse voice which is his baseline speaking clearly, no respiratory distress.  Fully awake alert and oriented.\par \par Follow-up CT chest for December 27, need to assess what happened to the incidentally new nodule that was noted in November.  Hopefully cleared up after the course of antibiotics, he was also sick at the time.\par \par Nasal saline recommended for now.  Follow-up with ENT if persistent epistaxis, at this time it seems very minimal\par \par Continue his Anoro for his COPD, as needed albuterol

## 2022-12-21 NOTE — REASON FOR VISIT
[Home] : at home, [unfilled] , at the time of the visit. [Medical Office: ([facility])___] : at the medical office located in  [Spouse] : spouse [Patient] : the patient

## 2022-12-27 ENCOUNTER — APPOINTMENT (OUTPATIENT)
Dept: CT IMAGING | Facility: IMAGING CENTER | Age: 77
End: 2022-12-27
Payer: MEDICARE

## 2022-12-27 ENCOUNTER — OUTPATIENT (OUTPATIENT)
Dept: OUTPATIENT SERVICES | Facility: HOSPITAL | Age: 77
LOS: 1 days | End: 2022-12-27
Payer: MEDICARE

## 2022-12-27 DIAGNOSIS — Z98.890 OTHER SPECIFIED POSTPROCEDURAL STATES: Chronic | ICD-10-CM

## 2022-12-27 DIAGNOSIS — Z90.49 ACQUIRED ABSENCE OF OTHER SPECIFIED PARTS OF DIGESTIVE TRACT: Chronic | ICD-10-CM

## 2022-12-27 DIAGNOSIS — Z90.89 ACQUIRED ABSENCE OF OTHER ORGANS: Chronic | ICD-10-CM

## 2022-12-27 DIAGNOSIS — Z98.89 OTHER SPECIFIED POSTPROCEDURAL STATES: Chronic | ICD-10-CM

## 2022-12-27 DIAGNOSIS — R93.89 ABNORMAL FINDINGS ON DIAGNOSTIC IMAGING OF OTHER SPECIFIED BODY STRUCTURES: ICD-10-CM

## 2022-12-27 DIAGNOSIS — Z00.8 ENCOUNTER FOR OTHER GENERAL EXAMINATION: ICD-10-CM

## 2022-12-27 PROCEDURE — 71250 CT THORAX DX C-: CPT | Mod: 26

## 2022-12-27 PROCEDURE — 71250 CT THORAX DX C-: CPT

## 2023-01-08 PROBLEM — Z90.410 HISTORY OF PANCREATECTOMY: Status: ACTIVE | Noted: 2019-12-20

## 2023-01-08 PROBLEM — D49.0 IPMN (INTRADUCTAL PAPILLARY MUCINOUS NEOPLASM): Status: ACTIVE | Noted: 2023-01-08

## 2023-01-08 NOTE — REVIEW OF SYSTEMS
[FreeTextEntry2] : weight stable  [FreeTextEntry5] : No chest pain  [FreeTextEntry6] : no shortness of breath  [de-identified] : hx diabetes and adrenal insufficiency

## 2023-01-08 NOTE — PHYSICAL EXAM
[de-identified] : Thin male, in no acute distress [de-identified] : Anicteric  [de-identified] : supple  [de-identified] : normal respiratory effort  [de-identified] : no deformities appreciated  [de-identified] : normal appearance

## 2023-01-08 NOTE — ASSESSMENT
[FreeTextEntry1] : Mr. 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 Recent surveillance MRI/MRCP stable/ no changes.  He has lost weight after having pneumonia, fair appetite. \par I personally reviewed patient's imaging and radiology reports including most recent MRI. Findings discussed with patient and all questions answered at the time of visit.  \par \par Plan:\par Will order trial of Megace to help with patient's appetite.  He will f/u with his PCP.   \par MRCP in 1 year\par RTC after, visit can be via Telemedicine

## 2023-01-08 NOTE — HISTORY OF PRESENT ILLNESS
[de-identified] : Mr. RADHA HOPE is a 77 year old male who presents for follow-up visit for surveillance of IPMN.  \par He is s/p distal pancreatectomy and splenectomy on 9/9/18. Surgical pathology revealed IPMN with high grade dysplasia, and negative for invasive carcinoma. 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  \par \par CT abdomen 10/28/19 - 0.9 cm cystic lesion in the pancreatic head, not significantly changed from prior. No evidence of recurrence at the distal pancreatectomy site.    \par CT abdomen 10/31/20: Interval decrease in size of pancreatic head cystic lesion, now measuring 0.6 cm, previous 0.9 cm. \par MRCP 11/3/21: Cystic pancreatic head lesion most consistent with branch IPMN minimally enlarged since 05/10/2018 in retrospect. Apparent second tiny cystic branch I PMN in the proximal pancreatic tail. Pancreas divisum\par MRCP 11/16/22: Pancreas divisum, normal variant. No main duct dilation. Pancreatic head 8 mm and pancreatic neck 7 mm likely a branch duct IPMNs.  Stable distal pancreatectomy postoperative changes.\par \par Patient presents after recent surveillance MRI.  He is doing well. Does report weight loss after having pneumonia last month. States he is eating normal amounts. Denies any bowel habit changes, abdominal pain or diarrhea.

## 2023-01-09 ENCOUNTER — NON-APPOINTMENT (OUTPATIENT)
Age: 78
End: 2023-01-09

## 2023-01-18 ENCOUNTER — APPOINTMENT (OUTPATIENT)
Dept: OTOLARYNGOLOGY | Facility: CLINIC | Age: 78
End: 2023-01-18

## 2023-01-18 ENCOUNTER — APPOINTMENT (OUTPATIENT)
Dept: OTOLARYNGOLOGY | Facility: CLINIC | Age: 78
End: 2023-01-18
Payer: MEDICARE

## 2023-01-18 PROCEDURE — 92610 EVALUATE SWALLOWING FUNCTION: CPT | Mod: GN

## 2023-01-24 ENCOUNTER — APPOINTMENT (OUTPATIENT)
Dept: OTOLARYNGOLOGY | Facility: CLINIC | Age: 78
End: 2023-01-24
Payer: MEDICARE

## 2023-01-24 PROCEDURE — 92612 ENDOSCOPY SWALLOW (FEES) VID: CPT | Mod: GN

## 2023-01-27 ENCOUNTER — APPOINTMENT (OUTPATIENT)
Dept: CT IMAGING | Facility: IMAGING CENTER | Age: 78
End: 2023-01-27
Payer: MEDICARE

## 2023-01-27 ENCOUNTER — OUTPATIENT (OUTPATIENT)
Dept: OUTPATIENT SERVICES | Facility: HOSPITAL | Age: 78
LOS: 1 days | End: 2023-01-27
Payer: MEDICARE

## 2023-01-27 DIAGNOSIS — R91.8 OTHER NONSPECIFIC ABNORMAL FINDING OF LUNG FIELD: ICD-10-CM

## 2023-01-27 DIAGNOSIS — Z90.89 ACQUIRED ABSENCE OF OTHER ORGANS: Chronic | ICD-10-CM

## 2023-01-27 DIAGNOSIS — Z98.890 OTHER SPECIFIED POSTPROCEDURAL STATES: Chronic | ICD-10-CM

## 2023-01-27 DIAGNOSIS — Z98.89 OTHER SPECIFIED POSTPROCEDURAL STATES: Chronic | ICD-10-CM

## 2023-01-27 DIAGNOSIS — Z90.49 ACQUIRED ABSENCE OF OTHER SPECIFIED PARTS OF DIGESTIVE TRACT: Chronic | ICD-10-CM

## 2023-01-27 PROCEDURE — 71250 CT THORAX DX C-: CPT

## 2023-01-27 PROCEDURE — 71250 CT THORAX DX C-: CPT | Mod: 26

## 2023-02-07 ENCOUNTER — APPOINTMENT (OUTPATIENT)
Dept: OTOLARYNGOLOGY | Facility: CLINIC | Age: 78
End: 2023-02-07
Payer: MEDICARE

## 2023-02-07 VITALS — SYSTOLIC BLOOD PRESSURE: 187 MMHG | DIASTOLIC BLOOD PRESSURE: 100 MMHG | HEART RATE: 79 BPM

## 2023-02-07 VITALS — HEIGHT: 69 IN | HEART RATE: 79 BPM | WEIGHT: 130 LBS | BODY MASS INDEX: 19.26 KG/M2

## 2023-02-07 VITALS — SYSTOLIC BLOOD PRESSURE: 169 MMHG | DIASTOLIC BLOOD PRESSURE: 72 MMHG

## 2023-02-07 DIAGNOSIS — R91.8 OTHER NONSPECIFIC ABNORMAL FINDING OF LUNG FIELD: ICD-10-CM

## 2023-02-07 PROCEDURE — 31231 NASAL ENDOSCOPY DX: CPT

## 2023-02-07 PROCEDURE — 99214 OFFICE O/P EST MOD 30 MIN: CPT | Mod: 25

## 2023-02-07 RX ORDER — SENNOSIDES 8.6 MG
TABLET ORAL
Refills: 0 | Status: ACTIVE | COMMUNITY

## 2023-02-07 RX ORDER — SIMVASTATIN 20 MG/1
20 TABLET, FILM COATED ORAL
Refills: 0 | Status: ACTIVE | COMMUNITY

## 2023-02-07 RX ORDER — MULTIVIT-MIN/FA/LYCOPEN/LUTEIN .4-300-25
TABLET ORAL
Refills: 0 | Status: ACTIVE | COMMUNITY

## 2023-02-07 NOTE — HISTORY OF PRESENT ILLNESS
[de-identified] : 78 year old male presents for follow up for NPC and OE. History of T4 N1 M0 SCCa R. NPC and s/p concurrent CXRT and consolidative chemotherapy completed in March 2017. States still has the right side neck spasms. Wife states has a couple of episodes of syncope and collapse, first episode was February fainted at night going to the bathroom, went to ER had a fractured rib a pneumothorax was admitted for one day, second episode of syncope March did not sustain any injury and last episode was May 2, occurred at 5 am, had no injuries and did not seek medical care, all falls were reported to primary. Reports constant neck cramps- using warm compresses. \par Patient denies odynophagia, dyspnea, dysphonia, coughing up blood ,night sweats, chills, fevers, or otalgia\par Now seeing Hilaria Reid for mild dysphagia and cough-\par Occasionally has blood nasal mucus. \par Patient's blood pressure was 187/100. Patient was advised to follow up with PCP for high blood pressure. \par 11/16/2022 CT lung showed new lung left mass \par repeat CT scan done 12/27/22 and 01/27/2023 IMPRESSION:\par Persistent collapse of the superior segment left lower lobe despite prone positioning, with abrupt cut off of the segmental bronchus. The nodule previously noted in this region on the November 2022 study cannot be evaluated, may be obscured.\par Consider direct visualization, given abrupt cut off of the segmental bronchus, or PET/CT for further evaluation of the atelectatic lung parenchyma to evaluate for underlying lesion.\par New ill-defined nodular opacities in the right middle lobe are likely infectious/inflammatory.

## 2023-02-07 NOTE — PHYSICAL EXAM
[Nasal Endoscopy Performed] : nasal endoscopy was performed, see procedure section for findings [Midline] : trachea located in midline position [Edentulous] : edentulous [Normal] : no rashes [de-identified] : Posttreatment changes, no obvious LAD. [de-identified] : No other LAD.

## 2023-02-07 NOTE — PROCEDURE
[None] : none [Flexible Endoscope] : examined with the flexible endoscope [Normal] : the middle meatus had no abnormalities [Serial Number: ___] : Serial Number: [unfilled] [FreeTextEntry6] : Posttreatment changes in the NPx, no new lesions, no crusting, no lesions in the NC.  [de-identified] : NPC

## 2023-02-08 ENCOUNTER — NON-APPOINTMENT (OUTPATIENT)
Age: 78
End: 2023-02-08

## 2023-02-10 ENCOUNTER — NON-APPOINTMENT (OUTPATIENT)
Age: 78
End: 2023-02-10

## 2023-02-10 ENCOUNTER — TRANSCRIPTION ENCOUNTER (OUTPATIENT)
Age: 78
End: 2023-02-10

## 2023-02-10 ENCOUNTER — APPOINTMENT (OUTPATIENT)
Dept: PHYSICAL MEDICINE AND REHAB | Facility: CLINIC | Age: 78
End: 2023-02-10
Payer: MEDICARE

## 2023-02-10 VITALS
TEMPERATURE: 97.3 F | HEART RATE: 67 BPM | OXYGEN SATURATION: 97 % | SYSTOLIC BLOOD PRESSURE: 120 MMHG | DIASTOLIC BLOOD PRESSURE: 70 MMHG

## 2023-02-10 PROCEDURE — 99205 OFFICE O/P NEW HI 60 MIN: CPT

## 2023-02-10 NOTE — HISTORY OF PRESENT ILLNESS
[FreeTextEntry1] : \par 78 year old male pmh SCC R nasopharynx s/p RT and chemo in 2017, afib (on eliquis, metoprolol), dm2 (diet controlled), emphysema, hld, htn, hearing deficit (s/p chemo), presenting for initial visit.   He was previously seen in 2018 by Dr. Grubbs for neck tightness and neck pain.  \par Patient has had neck cramping and spasms since that time, used heat packs, also sees SLP Pamela Reid for dysphagia and cough.   \par \par CT may 2022: showed multilevel degenerative disc disease and spondylosis C2-3 through C6-7 with loss of disc height, degenerative changes, multilevel neural foraminal stenosis. \par \par He started using [employer] movement supplements.  Uses heat prn which helps. \par He reports 6/10 neck pain, initially R sided, now b/l neck pain, would like to restart PT.\par \par States he does not have difficulty eating but sometimes coughs with dry food, or if eating quickly.  \par \par \par weight 132 lb\par \par former smoker.\par \par denies numbness, weakness, or radicular symptoms upper extremities.

## 2023-02-10 NOTE — PHYSICAL EXAM
[FreeTextEntry1] : GEN: AAOx3, NAD.\par PSYCH: Normal mood and affect. Responds appropriately to commands.\par EYES: Sclerae Anicteric. No discharge. EOMI.\par RESP: Breathing unlabored.\par CV: Radial pulses 2+ and equal. No varicosities noted.\par EXT: No C/C/E. \par SKIN: No lesions noted. Incisions well healed,  no erythema  \par LYMPH: No supraclavicular, anterior or posterior cervical lymphadenopathy appreciated.\par GAIT: Non antalgic, Normal reciprocating heel to toe.\par STRENGTH: 5/5 bilateral upper extremities\par REFLEXES: 2+ symmetric brachioradialis\par SENSATION: Grossly intact to light touch bilateral upper extremities.\par INSP: no visible swelling appreciated,  + kyphosis\par CERVICAL ROM: with tightness b/l\par SHOULDER ROM: Full and pain free bilaterally.\par PALP: there is no tenderness to palpation b/l masseter, mandible \par MOUTH OPENINcm\par \par \par \par

## 2023-02-10 NOTE — ASSESSMENT
[FreeTextEntry1] : \par noted to have trismsus. recommend oral stretching, to see SLP next week\par start PT for cervical muscle pain and tightness, include posture, strengthening, ROM, myofascial treatment\par follow up in 6 weeks.\par I spent a total of 60 minutes on this encounter including documentation, face to face time, care coordination and reviewing prior records.\par \par

## 2023-02-15 ENCOUNTER — APPOINTMENT (OUTPATIENT)
Dept: OTOLARYNGOLOGY | Facility: CLINIC | Age: 78
End: 2023-02-15
Payer: MEDICARE

## 2023-02-15 PROCEDURE — 92526 ORAL FUNCTION THERAPY: CPT | Mod: GN

## 2023-02-22 ENCOUNTER — APPOINTMENT (OUTPATIENT)
Dept: OTOLARYNGOLOGY | Facility: CLINIC | Age: 78
End: 2023-02-22
Payer: MEDICARE

## 2023-02-22 PROCEDURE — 92526 ORAL FUNCTION THERAPY: CPT | Mod: GN

## 2023-02-23 NOTE — ED ADULT NURSE NOTE - PMH
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
normal balance

## 2023-02-24 ENCOUNTER — APPOINTMENT (OUTPATIENT)
Dept: PULMONOLOGY | Facility: CLINIC | Age: 78
End: 2023-02-24
Payer: MEDICARE

## 2023-02-24 VITALS
SYSTOLIC BLOOD PRESSURE: 127 MMHG | HEIGHT: 69 IN | HEART RATE: 64 BPM | TEMPERATURE: 97.1 F | BODY MASS INDEX: 19.7 KG/M2 | OXYGEN SATURATION: 95 % | RESPIRATION RATE: 16 BRPM | WEIGHT: 133 LBS | DIASTOLIC BLOOD PRESSURE: 78 MMHG

## 2023-02-24 DIAGNOSIS — R91.8 OTHER NONSPECIFIC ABNORMAL FINDING OF LUNG FIELD: ICD-10-CM

## 2023-02-24 PROCEDURE — 99215 OFFICE O/P EST HI 40 MIN: CPT

## 2023-02-25 PROBLEM — R91.8 ENDOBRONCHIAL MASS: Status: ACTIVE | Noted: 2023-02-24

## 2023-02-25 LAB
ALBUMIN SERPL ELPH-MCNC: 4.4 G/DL
ALP BLD-CCNC: 49 U/L
ALT SERPL-CCNC: 14 U/L
ANION GAP SERPL CALC-SCNC: 18 MMOL/L
APTT BLD: 40.2 SEC
AST SERPL-CCNC: 18 U/L
BASOPHILS # BLD AUTO: 0.05 K/UL
BASOPHILS NFR BLD AUTO: 1.1 %
BILIRUB SERPL-MCNC: 0.4 MG/DL
BUN SERPL-MCNC: 22 MG/DL
CALCIUM SERPL-MCNC: 10.3 MG/DL
CHLORIDE SERPL-SCNC: 103 MMOL/L
CO2 SERPL-SCNC: 21 MMOL/L
CREAT SERPL-MCNC: 1.25 MG/DL
EGFR: 59 ML/MIN/1.73M2
EOSINOPHIL # BLD AUTO: 0.03 K/UL
EOSINOPHIL NFR BLD AUTO: 0.6 %
GLUCOSE SERPL-MCNC: 94 MG/DL
HCT VFR BLD CALC: 40 %
HGB BLD-MCNC: 12.3 G/DL
IMM GRANULOCYTES NFR BLD AUTO: 0.4 %
INR PPP: 1.63 RATIO
LYMPHOCYTES # BLD AUTO: 1.32 K/UL
LYMPHOCYTES NFR BLD AUTO: 27.7 %
MAN DIFF?: NORMAL
MCHC RBC-ENTMCNC: 29.9 PG
MCHC RBC-ENTMCNC: 30.8 GM/DL
MCV RBC AUTO: 97.3 FL
MONOCYTES # BLD AUTO: 0.49 K/UL
MONOCYTES NFR BLD AUTO: 10.3 %
NEUTROPHILS # BLD AUTO: 2.85 K/UL
NEUTROPHILS NFR BLD AUTO: 59.9 %
PLATELET # BLD AUTO: 267 K/UL
POTASSIUM SERPL-SCNC: 4.4 MMOL/L
PROT SERPL-MCNC: 7.3 G/DL
PT BLD: 19 SEC
RBC # BLD: 4.11 M/UL
RBC # FLD: 16.9 %
SODIUM SERPL-SCNC: 142 MMOL/L
WBC # FLD AUTO: 4.76 K/UL

## 2023-02-25 NOTE — ASSESSMENT
[FreeTextEntry1] : 78 M PMHx COPD, nasopharyngeal SCC, recent pna who presents for abnormal CT chest\par \par #Lt superior segment atelectasis\par Persistent following treatment of pna despite prone positioning follow up CT 1 month from initial post treatment CT. DDx includes endobronchial lesion, benign (hamartoma, lipoma, leiomyoma, papilloma, adenoma etc.) vs malignancy vs secretions (less likely without significant improvement with cough and prone positioning CT.\par - plan for bronchoscopy\par - Preoperative risk assessment with cardiologist \par - will need to hold AC x48 hours prior to procedure\par - pre-op labs\par - Risks and benefits of bronchoscopy were discussed with patient and his wife\par \par The risk and benefits of bronchoscopy with  biopsy including risk of bleeding and  risk pneumothorax was discussed with the patient and they demonstrated understanding. In case of pneumothorax, we discussed the need for in hospital monitoring and chest tube placement. In case of bleeding, we explained that they may require inpatient or ICU admission if persistent. Educational reading material regarding the procedure, risks and benefits provided to the patient in paper format. Risks and benefits of therapeutic bronchoscopy using thermal ablation and cryobalation and associated tumor debulking, including but not limited to airway injury, bleeding, pneumothorax was discussed and patient demonstrated understanding.\par \par \par

## 2023-02-25 NOTE — REVIEW OF SYSTEMS
[Chronic Pain] : chronic pain [Fever] : no fever [Chills] : no chills [Hemoptysis] : no hemoptysis [Dyspnea] : no dyspnea [Chest Discomfort] : no chest discomfort [Orthopnea] : no orthopnea [Nausea] : no nausea [Vomiting] : no vomiting [Rash] : no rash [Headache] : no headache [Dizziness] : no dizziness

## 2023-02-25 NOTE — DISCUSSION/SUMMARY
[FreeTextEntry1] : The patients most recent CT scan was reviewed by my independently and my interpretation is stated below:\par \par Left lower lobe superior segment endobronchial opacity with progressive atelectasis of the superior segment.

## 2023-02-25 NOTE — PHYSICAL EXAM
[No Acute Distress] : no acute distress [Normal Oropharynx] : normal oropharynx [Supple] : supple [No JVD] : no jvd [Normal Rate/Rhythm] : normal rate/rhythm [Normal S1, S2] : normal s1, s2 [No Resp Distress] : no resp distress [Clear to Auscultation Bilaterally] : clear to auscultation bilaterally [No Abnormalities] : no abnormalities [Benign] : benign [No Clubbing] : no clubbing [No Cyanosis] : no cyanosis [No Edema] : no edema [FROM] : FROM [Normal Color/ Pigmentation] : normal color/ pigmentation [No Focal Deficits] : no focal deficits [Oriented x3] : oriented x3 [Normal Affect] : normal affect [TextBox_11] : ~2cm mouth opening

## 2023-02-25 NOTE — HISTORY OF PRESENT ILLNESS
[TextBox_4] : Interventional Pulmonology Consultation/Visit Note\par \par 78 M PMHx COPD, nasopharyngeal SCC, recent pna who presents for abnormal CT chest. f/u CT chest was ordered following treatment for pna with levofloxacin in december. There was a Lt lung nodule and atelectasis of the Lt superior segment. Repeat CT chest following cough and prone positioning with similar finding and was referred for bronchoscopy for further evaluation. Former smoker. No fever, chills, cp, sob at this time. Has difficulty opening mouth wide. Significant b/l neck pain that he is starting physical therapy for.

## 2023-02-27 ENCOUNTER — NON-APPOINTMENT (OUTPATIENT)
Age: 78
End: 2023-02-27

## 2023-03-01 ENCOUNTER — APPOINTMENT (OUTPATIENT)
Dept: OTOLARYNGOLOGY | Facility: CLINIC | Age: 78
End: 2023-03-01
Payer: MEDICARE

## 2023-03-01 ENCOUNTER — OUTPATIENT (OUTPATIENT)
Dept: OUTPATIENT SERVICES | Facility: HOSPITAL | Age: 78
LOS: 1 days | End: 2023-03-01
Payer: MEDICARE

## 2023-03-01 VITALS
HEIGHT: 69 IN | TEMPERATURE: 97 F | RESPIRATION RATE: 16 BRPM | WEIGHT: 132.94 LBS | SYSTOLIC BLOOD PRESSURE: 170 MMHG | DIASTOLIC BLOOD PRESSURE: 100 MMHG | OXYGEN SATURATION: 96 % | HEART RATE: 58 BPM

## 2023-03-01 DIAGNOSIS — I48.91 UNSPECIFIED ATRIAL FIBRILLATION: ICD-10-CM

## 2023-03-01 DIAGNOSIS — Z90.49 ACQUIRED ABSENCE OF OTHER SPECIFIED PARTS OF DIGESTIVE TRACT: Chronic | ICD-10-CM

## 2023-03-01 DIAGNOSIS — Z90.411 ACQUIRED PARTIAL ABSENCE OF PANCREAS: Chronic | ICD-10-CM

## 2023-03-01 DIAGNOSIS — R91.8 OTHER NONSPECIFIC ABNORMAL FINDING OF LUNG FIELD: ICD-10-CM

## 2023-03-01 DIAGNOSIS — I10 ESSENTIAL (PRIMARY) HYPERTENSION: ICD-10-CM

## 2023-03-01 DIAGNOSIS — Z98.89 OTHER SPECIFIED POSTPROCEDURAL STATES: Chronic | ICD-10-CM

## 2023-03-01 DIAGNOSIS — Z98.890 OTHER SPECIFIED POSTPROCEDURAL STATES: Chronic | ICD-10-CM

## 2023-03-01 DIAGNOSIS — Z90.89 ACQUIRED ABSENCE OF OTHER ORGANS: Chronic | ICD-10-CM

## 2023-03-01 DIAGNOSIS — J44.9 CHRONIC OBSTRUCTIVE PULMONARY DISEASE, UNSPECIFIED: ICD-10-CM

## 2023-03-01 DIAGNOSIS — Z92.3 PERSONAL HISTORY OF IRRADIATION: ICD-10-CM

## 2023-03-01 DIAGNOSIS — E11.9 TYPE 2 DIABETES MELLITUS WITHOUT COMPLICATIONS: ICD-10-CM

## 2023-03-01 LAB
A1C WITH ESTIMATED AVERAGE GLUCOSE RESULT: 6 % — HIGH (ref 4–5.6)
ALBUMIN SERPL ELPH-MCNC: 4.2 G/DL — SIGNIFICANT CHANGE UP (ref 3.3–5)
ALP SERPL-CCNC: 44 U/L — SIGNIFICANT CHANGE UP (ref 40–120)
ALT FLD-CCNC: 15 U/L — SIGNIFICANT CHANGE UP (ref 4–41)
ANION GAP SERPL CALC-SCNC: 10 MMOL/L — SIGNIFICANT CHANGE UP (ref 7–14)
AST SERPL-CCNC: 20 U/L — SIGNIFICANT CHANGE UP (ref 4–40)
BILIRUB SERPL-MCNC: 0.2 MG/DL — SIGNIFICANT CHANGE UP (ref 0.2–1.2)
BUN SERPL-MCNC: 17 MG/DL — SIGNIFICANT CHANGE UP (ref 7–23)
CALCIUM SERPL-MCNC: 10 MG/DL — SIGNIFICANT CHANGE UP (ref 8.4–10.5)
CHLORIDE SERPL-SCNC: 105 MMOL/L — SIGNIFICANT CHANGE UP (ref 98–107)
CO2 SERPL-SCNC: 26 MMOL/L — SIGNIFICANT CHANGE UP (ref 22–31)
CREAT SERPL-MCNC: 0.98 MG/DL — SIGNIFICANT CHANGE UP (ref 0.5–1.3)
EGFR: 78 ML/MIN/1.73M2 — SIGNIFICANT CHANGE UP
ESTIMATED AVERAGE GLUCOSE: 126 — SIGNIFICANT CHANGE UP
GLUCOSE SERPL-MCNC: 100 MG/DL — HIGH (ref 70–99)
HCT VFR BLD CALC: 38.6 % — LOW (ref 39–50)
HGB BLD-MCNC: 11.8 G/DL — LOW (ref 13–17)
MCHC RBC-ENTMCNC: 29 PG — SIGNIFICANT CHANGE UP (ref 27–34)
MCHC RBC-ENTMCNC: 30.6 GM/DL — LOW (ref 32–36)
MCV RBC AUTO: 94.8 FL — SIGNIFICANT CHANGE UP (ref 80–100)
NRBC # BLD: 0 /100 WBCS — SIGNIFICANT CHANGE UP (ref 0–0)
NRBC # FLD: 0 K/UL — SIGNIFICANT CHANGE UP (ref 0–0)
PLATELET # BLD AUTO: 253 K/UL — SIGNIFICANT CHANGE UP (ref 150–400)
POTASSIUM SERPL-MCNC: 4.3 MMOL/L — SIGNIFICANT CHANGE UP (ref 3.5–5.3)
POTASSIUM SERPL-SCNC: 4.3 MMOL/L — SIGNIFICANT CHANGE UP (ref 3.5–5.3)
PROT SERPL-MCNC: 7.7 G/DL — SIGNIFICANT CHANGE UP (ref 6–8.3)
RBC # BLD: 4.07 M/UL — LOW (ref 4.2–5.8)
RBC # FLD: 15.9 % — HIGH (ref 10.3–14.5)
SODIUM SERPL-SCNC: 141 MMOL/L — SIGNIFICANT CHANGE UP (ref 135–145)
WBC # BLD: 4.38 K/UL — SIGNIFICANT CHANGE UP (ref 3.8–10.5)
WBC # FLD AUTO: 4.38 K/UL — SIGNIFICANT CHANGE UP (ref 3.8–10.5)

## 2023-03-01 PROCEDURE — 92526 ORAL FUNCTION THERAPY: CPT | Mod: GN

## 2023-03-01 PROCEDURE — 93010 ELECTROCARDIOGRAM REPORT: CPT

## 2023-03-01 RX ORDER — METOPROLOL TARTRATE 50 MG
1 TABLET ORAL
Qty: 0 | Refills: 0 | DISCHARGE

## 2023-03-01 RX ORDER — SODIUM CHLORIDE 9 MG/ML
1000 INJECTION, SOLUTION INTRAVENOUS
Refills: 0 | Status: DISCONTINUED | OUTPATIENT
Start: 2023-03-08 | End: 2023-03-22

## 2023-03-01 RX ORDER — SIMVASTATIN 20 MG/1
1 TABLET, FILM COATED ORAL
Qty: 0 | Refills: 0 | DISCHARGE

## 2023-03-01 RX ORDER — SODIUM CHLORIDE 9 MG/ML
3 INJECTION INTRAMUSCULAR; INTRAVENOUS; SUBCUTANEOUS EVERY 8 HOURS
Refills: 0 | Status: DISCONTINUED | OUTPATIENT
Start: 2023-03-08 | End: 2023-03-22

## 2023-03-01 NOTE — H&P PST ADULT - OTHER CARE PROVIDERS
Cardiologist Dr. Magaly Hubbard [phone] cell [phone]  Pulmonary Dr. Gita Lisker [phone]  Endocrinologist Dr. Julian Crow

## 2023-03-01 NOTE — H&P PST ADULT - NEGATIVE ENMT SYMPTOMS
no ear pain/no tinnitus/no vertigo/no sinus symptoms/no nasal congestion/no nasal discharge/no nasal obstruction/no nose bleeds/no gum bleeding/no dry mouth

## 2023-03-01 NOTE — H&P PST ADULT - ATTENDING COMMENTS
Patient here for flexible bronchoscopy, evaluation, possible endobronchial biopsy, argon plasma coagulation. Risks including but not limited to pneumothorax and bleeding discussed, and possible interventions. Agreeable to proceed.

## 2023-03-01 NOTE — H&P PST ADULT - PROBLEM SELECTOR PLAN 2
pt was instructed by the surgeon to hold Eliquis 2 days prior to surgery   recommendation to be confirmed by cardiologist pt was instructed by the surgeon to hold Eliquis 2 days prior to surgery   recommendation to be confirmed by cardiologist  cardiac eval pending

## 2023-03-01 NOTE — H&P PST ADULT - MUSCULOSKELETAL
normal gait/strength 5/5 bilateral upper extremities/strength 5/5 bilateral lower extremities/extremities exam details…

## 2023-03-01 NOTE — H&P PST ADULT - NSICDXPASTMEDICALHX_GEN_ALL_CORE_FT
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

## 2023-03-01 NOTE — H&P PST ADULT - PROBLEM SELECTOR PLAN 1
preop for flexible endobronchial ultrasound bronchoscopy, endobronchial biopsy, tumor debulking, argon plasma coagulation, cautery on 3/8/23  preop instructions given, pt verbalized understanding  GI prophylaxis and chlorhexidine wash provided  pt informed COVID testing must be done 3-5 days prior to surgery  cbc, cmp, hga1c pending preop for flexible endobronchial ultrasound bronchoscopy, endobronchial biopsy, tumor debulking, argon plasma coagulation, cautery on 3/8/23  preop instructions given, pt verbalized understanding  pt informed COVID testing must be done 3-5 days prior to surgery  cbc, cmp, hga1c pending  cardiac evaluation requested- 79 y/o male with h/o Afib on Eliquis & uncontrolled /100  medical evaluation requested- pt has Type 2 DM and stage 3 CKD

## 2023-03-01 NOTE — H&P PST ADULT - PROBLEM SELECTOR PLAN 5
pt will take pt will take metoprolol night before surgery as prescribed  /100- called pt's cardiologist and left message regarding elevated BP reading and need to be seen for BP evaluation either today or tomorrow. Awaiting call back.

## 2023-03-01 NOTE — H&P PST ADULT - ASSESSMENT
77 y/o male with COPD and h/o nasopharyngeal cancer s/p chemo and RT presents to PST preop for flexible endobronchial ultrasound bronchoscopy, endobronchial biopsy, tumor debulking, argon plasma coagulation, cautery. Pt's wife reports a recent surveillance CT scan revealed a lung nodule and atelectasis.   Pt was also recently treated with levofloxacin for PNA.

## 2023-03-01 NOTE — H&P PST ADULT - HISTORY OF PRESENT ILLNESS
77 y/o male with COPD and h/o nasopharyngeal cancer s/p chemo and RT presents to PST preop for flexible endobronchial ultrasound bronchoscopy, endobronchial biopsy, tumor debulking, argon plasma coagulation, cautery. Pt's wife reports a recent surveillance CT scan revealed a lung nodule and atelectasis.   Pt was also recently treated with levofloxacin for PNA. 77 y/o male with COPD and h/o nasopharyngeal cancer s/p chemo and RT presents to PST preop for flexible endobronchial ultrasound bronchoscopy, endobronchial biopsy, tumor debulking, argon plasma coagulation, cautery. Pt's wife reports a recent surveillance chest CT revealed a lung nodule and atelectasis.   Pt was also recently treated with levofloxacin for PNA.

## 2023-03-01 NOTE — H&P PST ADULT - PROBLEM SELECTOR PLAN 6
pt with h/o nasopharyngeal CA s/p radiation therapy in 2016. Pt with limited ROM of neck with hyperextension. Pt evaluated by Dr. Collins at [facility]- ANESTHESIA ALERT

## 2023-03-01 NOTE — H&P PST ADULT - RESPIRATORY AND THORAX COMMENTS
h/o COPD. denies recent exacerbation, On Anoro inhaler daily and Proventil as needed. preop dx of other specific abnormal findings of lung field. see HPI

## 2023-03-01 NOTE — H&P PST ADULT - NSICDXPASTSURGICALHX_GEN_ALL_CORE_FT
PAST SURGICAL HISTORY:  H/O endoscopy last one 06/2018    H/O hemicolectomy     History of appendectomy     History of partial pancreatectomy     S/P tonsillectomy 1959

## 2023-03-01 NOTE — H&P PST ADULT - PROBLEM SELECTOR PLAN 4
pt will use Anoro inhaler AM of surgery as prescribed pt will use Anoro inhaler AM of surgery as prescribed  Pt may use Proventil inhaler as needed AM of surgery

## 2023-03-07 ENCOUNTER — TRANSCRIPTION ENCOUNTER (OUTPATIENT)
Age: 78
End: 2023-03-07

## 2023-03-07 NOTE — ASU PATIENT PROFILE, ADULT - NS PRO TALK SOMEONE YN
Controlled Substance Refill Request for Oxycodone 5 mg(11/18/19 for 60#)  LOV was on 1/15/2020  Problem List Complete:  Yes   checked in past 3 months?  Yes Last 3 refills were on 11/18, 9/4 & 6/18 for 60# each    Patient is followed by JAMEL MARLEY for ongoing prescription of benzodiazepines.  All refills should be approved by this provider, or covering partner.     Medication(s): clonazepam 1 mg #30 per month and oxycodone 5 mg #60 per mo.   Maximum quantity per month: #30 and 60  Clinic visit frequency required: Q 3 months     Catherine RN  Triage Nurse            
Patient needs this filled today. Please call her to let her know when it is ready.  
no

## 2023-03-08 ENCOUNTER — TRANSCRIPTION ENCOUNTER (OUTPATIENT)
Age: 78
End: 2023-03-08

## 2023-03-08 ENCOUNTER — APPOINTMENT (OUTPATIENT)
Dept: PULMONOLOGY | Facility: HOSPITAL | Age: 78
End: 2023-03-08

## 2023-03-08 ENCOUNTER — RESULT REVIEW (OUTPATIENT)
Age: 78
End: 2023-03-08

## 2023-03-08 ENCOUNTER — OUTPATIENT (OUTPATIENT)
Dept: OUTPATIENT SERVICES | Facility: HOSPITAL | Age: 78
LOS: 1 days | Discharge: HOME | End: 2023-03-08
Payer: MEDICARE

## 2023-03-08 VITALS
SYSTOLIC BLOOD PRESSURE: 147 MMHG | OXYGEN SATURATION: 94 % | RESPIRATION RATE: 18 BRPM | HEART RATE: 60 BPM | DIASTOLIC BLOOD PRESSURE: 90 MMHG

## 2023-03-08 VITALS
RESPIRATION RATE: 18 BRPM | SYSTOLIC BLOOD PRESSURE: 158 MMHG | HEART RATE: 58 BPM | WEIGHT: 132.94 LBS | HEIGHT: 69 IN | DIASTOLIC BLOOD PRESSURE: 76 MMHG | TEMPERATURE: 97 F | OXYGEN SATURATION: 97 %

## 2023-03-08 DIAGNOSIS — Z98.890 OTHER SPECIFIED POSTPROCEDURAL STATES: Chronic | ICD-10-CM

## 2023-03-08 DIAGNOSIS — Z98.89 OTHER SPECIFIED POSTPROCEDURAL STATES: Chronic | ICD-10-CM

## 2023-03-08 DIAGNOSIS — Z90.49 ACQUIRED ABSENCE OF OTHER SPECIFIED PARTS OF DIGESTIVE TRACT: Chronic | ICD-10-CM

## 2023-03-08 DIAGNOSIS — Z90.411 ACQUIRED PARTIAL ABSENCE OF PANCREAS: Chronic | ICD-10-CM

## 2023-03-08 DIAGNOSIS — Z90.89 ACQUIRED ABSENCE OF OTHER ORGANS: Chronic | ICD-10-CM

## 2023-03-08 DIAGNOSIS — R91.8 OTHER NONSPECIFIC ABNORMAL FINDING OF LUNG FIELD: ICD-10-CM

## 2023-03-08 LAB
GRAM STN FLD: SIGNIFICANT CHANGE UP
NIGHT BLUE STAIN TISS: SIGNIFICANT CHANGE UP
SPECIMEN SOURCE: SIGNIFICANT CHANGE UP
SPECIMEN SOURCE: SIGNIFICANT CHANGE UP

## 2023-03-08 PROCEDURE — 31645 BRNCHSC W/THER ASPIR 1ST: CPT | Mod: GC

## 2023-03-08 PROCEDURE — 88173 CYTOPATH EVAL FNA REPORT: CPT | Mod: 26

## 2023-03-08 PROCEDURE — 88305 TISSUE EXAM BY PATHOLOGIST: CPT | Mod: 26

## 2023-03-08 PROCEDURE — 31652 BRONCH EBUS SAMPLNG 1/2 NODE: CPT | Mod: GC

## 2023-03-08 PROCEDURE — 88112 CYTOPATH CELL ENHANCE TECH: CPT | Mod: 26,59

## 2023-03-08 PROCEDURE — 31624 DX BRONCHOSCOPE/LAVAGE: CPT | Mod: GC

## 2023-03-08 RX ORDER — FENTANYL CITRATE 50 UG/ML
25 INJECTION INTRAVENOUS
Refills: 0 | Status: DISCONTINUED | OUTPATIENT
Start: 2023-03-08 | End: 2023-03-08

## 2023-03-08 RX ORDER — OXYCODONE HYDROCHLORIDE 5 MG/1
5 TABLET ORAL ONCE
Refills: 0 | Status: DISCONTINUED | OUTPATIENT
Start: 2023-03-08 | End: 2023-03-08

## 2023-03-08 RX ORDER — OXYCODONE HYDROCHLORIDE 5 MG/1
10 TABLET ORAL ONCE
Refills: 0 | Status: DISCONTINUED | OUTPATIENT
Start: 2023-03-08 | End: 2023-03-08

## 2023-03-08 RX ORDER — SODIUM CHLORIDE 9 MG/ML
1000 INJECTION, SOLUTION INTRAVENOUS
Refills: 0 | Status: DISCONTINUED | OUTPATIENT
Start: 2023-03-08 | End: 2023-03-22

## 2023-03-08 RX ORDER — ONDANSETRON 8 MG/1
4 TABLET, FILM COATED ORAL ONCE
Refills: 0 | Status: DISCONTINUED | OUTPATIENT
Start: 2023-03-08 | End: 2023-03-22

## 2023-03-08 NOTE — BRIEF OPERATIVE NOTE - OPERATION/FINDINGS
Flexible bronchoscopy with bronchoalveolar lavage, endobronchial ultrasound with biopsies of lymph nodes at stations 11L and 4L Flexible bronchoscopy with bronchoalveolar lavage, endobronchial ultrasound with biopsies of lymph nodes at stations 11L and 4L  #[id number]

## 2023-03-08 NOTE — ASU DISCHARGE PLAN (ADULT/PEDIATRIC) - CARE PROVIDER_API CALL
Lisker, Gita N (MD)  Critical Care Medicine; Pulmonary Disease  [address] [place] [address]  Phone: [phone]  Fax: [phone]  Follow Up Time:

## 2023-03-08 NOTE — ASU DISCHARGE PLAN (ADULT/PEDIATRIC) - FOLLOW UP APPOINTMENTS
499 may also call Recovery Room (PACU) 24/7 @ [phone]/[facility]

## 2023-03-08 NOTE — ASU DISCHARGE PLAN (ADULT/PEDIATRIC) - NS MD DC FALL RISK RISK
For information on Fall & Injury Prevention, visit: [email or web address] OR  [email or web address] OR  https://www.cdc.gov/steadi/patient.html

## 2023-03-08 NOTE — BRIEF OPERATIVE NOTE - COMMENTS
Successful flexible bronchoscopy with LLL BAL, endobronchial ultrasound with biopsies of stations 11L and 4L

## 2023-03-08 NOTE — BRIEF OPERATIVE NOTE - NSICDXBRIEFPROCEDURE_GEN_ALL_CORE_FT
PROCEDURES:  Bronchoscopy with bronchoalveolar lavage 08-Mar-2023 10:22:38  Mark Moya  Bronchoscopy with endobronchial ultrasound and 1 or 2 biopsies 08-Mar-2023 10:22:51  Mark Moya

## 2023-03-08 NOTE — ASU DISCHARGE PLAN (ADULT/PEDIATRIC) - ASU DC SPECIAL INSTRUCTIONSFT
Patients may experience fever on the night of the procedure. Patient's may cough up specks of blood for 1-2 days after the procedure. If the bleeding is large or persistent please contact your pulmonologist. If your fever is persistent, please contact your pulmonologist. In case of sudden chest pain or trouble breathing, please go to your nearest emergency room and contact your pulmonologist.     You may resume your Eliquis tomorrow, 3/9/23. Please continue to hold it on the day of your procedure.

## 2023-03-09 LAB — GLUCOSE BLDC GLUCOMTR-MCNC: 73 MG/DL — SIGNIFICANT CHANGE UP (ref 70–99)

## 2023-03-10 ENCOUNTER — APPOINTMENT (OUTPATIENT)
Dept: VASCULAR SURGERY | Facility: CLINIC | Age: 78
End: 2023-03-10
Payer: MEDICARE

## 2023-03-10 VITALS
TEMPERATURE: 97.3 F | SYSTOLIC BLOOD PRESSURE: 178 MMHG | BODY MASS INDEX: 19.55 KG/M2 | WEIGHT: 132 LBS | HEIGHT: 69 IN | HEART RATE: 61 BPM | DIASTOLIC BLOOD PRESSURE: 84 MMHG

## 2023-03-10 VITALS — HEART RATE: 58 BPM | DIASTOLIC BLOOD PRESSURE: 93 MMHG | SYSTOLIC BLOOD PRESSURE: 174 MMHG

## 2023-03-10 PROBLEM — J18.9 PNEUMONIA, UNSPECIFIED ORGANISM: Chronic | Status: ACTIVE | Noted: 2023-03-01

## 2023-03-10 PROBLEM — Z92.21 PERSONAL HISTORY OF ANTINEOPLASTIC CHEMOTHERAPY: Chronic | Status: ACTIVE | Noted: 2023-03-01

## 2023-03-10 PROBLEM — R13.10 DYSPHAGIA, UNSPECIFIED: Chronic | Status: ACTIVE | Noted: 2023-03-01

## 2023-03-10 PROBLEM — Z92.3 PERSONAL HISTORY OF IRRADIATION: Chronic | Status: ACTIVE | Noted: 2023-03-01

## 2023-03-10 PROBLEM — R91.8 OTHER NONSPECIFIC ABNORMAL FINDING OF LUNG FIELD: Chronic | Status: ACTIVE | Noted: 2023-03-01

## 2023-03-10 PROBLEM — R93.89 ABNORMAL FINDINGS ON DIAGNOSTIC IMAGING OF OTHER SPECIFIED BODY STRUCTURES: Chronic | Status: ACTIVE | Noted: 2023-03-01

## 2023-03-10 PROBLEM — E11.9 TYPE 2 DIABETES MELLITUS WITHOUT COMPLICATIONS: Chronic | Status: ACTIVE | Noted: 2023-03-01

## 2023-03-10 LAB
CULTURE RESULTS: SIGNIFICANT CHANGE UP
SPECIMEN SOURCE: SIGNIFICANT CHANGE UP

## 2023-03-10 PROCEDURE — 93923 UPR/LXTR ART STDY 3+ LVLS: CPT | Mod: 59

## 2023-03-10 PROCEDURE — 93880 EXTRACRANIAL BILAT STUDY: CPT

## 2023-03-10 PROCEDURE — 99212 OFFICE O/P EST SF 10 MIN: CPT

## 2023-03-11 LAB — NON-GYNECOLOGICAL CYTOLOGY STUDY: SIGNIFICANT CHANGE UP

## 2023-03-17 ENCOUNTER — NON-APPOINTMENT (OUTPATIENT)
Age: 78
End: 2023-03-17

## 2023-03-31 ENCOUNTER — APPOINTMENT (OUTPATIENT)
Dept: PHYSICAL MEDICINE AND REHAB | Facility: CLINIC | Age: 78
End: 2023-03-31
Payer: MEDICARE

## 2023-03-31 VITALS
WEIGHT: 135 LBS | OXYGEN SATURATION: 96 % | BODY MASS INDEX: 19.94 KG/M2 | SYSTOLIC BLOOD PRESSURE: 173 MMHG | DIASTOLIC BLOOD PRESSURE: 91 MMHG | HEART RATE: 58 BPM | TEMPERATURE: 96.7 F

## 2023-03-31 PROCEDURE — 99214 OFFICE O/P EST MOD 30 MIN: CPT

## 2023-03-31 NOTE — ASSESSMENT
[FreeTextEntry1] : attending PT program for neck pain and weakness, cramping.  pain improving. continue current PT program\par patient may also benefit from specialized cancer rehab therapy for radiation fibrosis if symptoms persist however making improvement in current program\par would also benefit from generalized strengthening program, balance, endurance for generalized/LE weakness in the future\par follow up in 6 weeks.\par encouraged to increase exercise at home, walking program, take breaks from watching TV to stretch.\par I spent a total of 40 minutes on this encounter including documentation, face to face time, care coordination and reviewing prior records.\par

## 2023-03-31 NOTE — HISTORY OF PRESENT ILLNESS
[FreeTextEntry1] : \par Hossein Hernandez is a 78 year old male pmh SCC R nasopharynx s/p RT and chemo in 2017, afib (on eliquis, metoprolol), dm2 (diet controlled), emphysema, hld, htn, hearing deficit (s/p chemo), presenting for follow up today, last seen 2/10/23. At that time PT was ordered for neck pain likely due to degenerative changes as well as radiation fibrosis.  He denies radicular symptoms or upper extremity weakness, but has lost weight over the past few years and reports LE generalized weakness. He sees SLP Pamela Reid for dysphagia and cough. He was also noted to have trismus.  He had swallow test and was recommended for thickened diet, and given instructions on oral stretching. \par \par Since last visit he is s/p bronchoscopy with Dr. Lisker, no lesions found.  \par CT may 2022: showed multilevel degenerative disc disease and spondylosis C2-3 through C6-7 with loss of disc height, degenerative changes, multilevel neural foraminal stenosis. \par Since last visit he has attended 7 PT sessions so far.  He reports approx 60% improvement in neck pain since starting PT, he started home exercises and uses heat prn with improvement.  Neck pain involved b/l neck but now mostly on the right. \par reports cramping in the neck when watching TV, feels it is happening less often since starting PT.  \par weight 137 lb today\par \par former smoker.\par

## 2023-03-31 NOTE — PHYSICAL EXAM
[FreeTextEntry1] : GEN: AAOx3, NAD.\par PSYCH: Normal mood and affect. Responds appropriately to commands.\par EYES: Sclerae Anicteric. No discharge. EOMI.\par RESP: Breathing unlabored.\par CV: Radial pulses 2+ and equal. No varicosities noted.\par EXT: No C/C/E. \par SKIN: No lesions noted. Incisions well healed, no erythema \par LYMPH: No supraclavicular, anterior or posterior cervical lymphadenopathy appreciated.\par GAIT: Non antalgic, Normal reciprocating heel to toe.\par STRENGTH: 5/5 bilateral upper extremities, 4+/5 b/l LE \par REFLEXES: 2+ symmetric brachioradialis\par SENSATION: Grossly intact to light touch bilateral upper extremities.\par INSP: no visible swelling appreciated \par CERVICAL ROM: with tightness b/l\par SHOULDER ROM: Full and pain free bilaterally.\par PALP: there is no tenderness to palpation b/l masseter, mandible \par MOUTH OPENIN.75 cm\par

## 2023-04-08 LAB
CULTURE RESULTS: SIGNIFICANT CHANGE UP
SPECIMEN SOURCE: SIGNIFICANT CHANGE UP

## 2023-04-26 LAB
CULTURE RESULTS: SIGNIFICANT CHANGE UP
SPECIMEN SOURCE: SIGNIFICANT CHANGE UP

## 2023-05-02 NOTE — ED PROVIDER NOTE - NSREASONFORTRANSFER_ED_A_ED
[facility]                          9 MONTH WELL CHILD EXAM     Florecita is a 9 m.o. female infant     History given by Mother and Grandmother    CONCERNS/QUESTIONS: No    IMMUNIZATION: up to date and documented    NUTRITION, ELIMINATION, SLEEP, SOCIAL      NUTRITION HISTORY:   Formula: Enfacare 22Kcal, 6-7 oz every 3-4 hours, good suck. Powder mixed 1 scoop/2oz water  Cereal: 2 times a day.  Vegetables? Yes  Fruits? Yes  Meats? Yes    ELIMINATION:   Has ample wet diapers per day and BM is soft.    SLEEP PATTERN:   Sleeps through the night? Yes  Sleeps in crib? Yes  Sleeps with parent? No    SOCIAL HISTORY:   The patient lives at home with parents, and does not attend day care. Has 2 siblings.  Smokers at home? No    HISTORY     Patient's medications, allergies, past medical, surgical, social and family histories were reviewed and updated as appropriate.    Past Medical History:   Diagnosis Date    Premature baby     Twin birth      Patient Active Problem List    Diagnosis Date Noted    Twin birth 2022    Premature infant of 34 weeks gestation 2022     No past surgical history on file.  Family History   Problem Relation Age of Onset    No Known Problems Mother     No Known Problems Sister     Arthritis Maternal Grandmother         Copied from mother's family history at birth    Hyperlipidemia Maternal Grandmother      Current Outpatient Medications   Medication Sig Dispense Refill    ibuprofen (MOTRIN) 100 MG/5ML Suspension Take 10 mg/kg by mouth every 6 hours as needed.      acetaminophen (TYLENOL) 160 MG/5ML Suspension Take 15 mg/kg by mouth every four hours as needed.       No current facility-administered medications for this visit.     No Known Allergies    REVIEW OF SYSTEMS       Constitutional: Afebrile, good appetite, alert.  HENT: No abnormal head shape, no congestion, no nasal drainage.  Eyes: Negative for any discharge in eyes, appears to focus, not cross  "eyed.  Respiratory: Negative for any difficulty breathing or noisy breathing.   Cardiovascular: Negative for changes in color/activity.   Gastrointestinal: Negative for any vomiting or excessive spitting up, constipation or blood in stool.   Genitourinary: Ample amount of wet diapers.   Musculoskeletal: Negative for any sign of arm pain or leg pain with movement.   Skin: Negative for rash or skin infection.  Neurological: Negative for any weakness or decrease in strength.     Psychiatric/Behavioral: Appropriate for age.     SCREENINGS      STRUCTURED DEVELOPMENTAL SCREENING :      ASQ- Above cutoff in all domains : Yes, appropriate for adjusted age     SENSORY SCREENING:   Hearing: Risk Assessment Pass  Vision: Risk Assessment Pass    LEAD RISK ASSESSMENT:    Does your child live in or visit a home or  facility with an identified  lead hazard or a home built before 1960 that is in poor repair or was  renovated in the past 6 months? No    ORAL HEALTH:   Primary water source is deficient in fluoride? yes  Oral Fluoride supplementation recommended? yes   Cleaning teeth twice a day, daily oral fluoride? yes    OBJECTIVE     PHYSICAL EXAM:   Reviewed vital signs and growth parameters in EMR.     Pulse 112   Temp 36.2 °C (97.1 °F) (Temporal)   Resp 34   Ht 0.7 m (2' 3.56\")   Wt 7.59 kg (16 lb 11.7 oz)   HC 42 cm (16.54\")   BMI 15.49 kg/m²     Length - 39 %ile (Z= -0.28) based on WHO (Girls, 0-2 years) Length-for-age data based on Length recorded on 5/2/2023.  Weight - 22 %ile (Z= -0.76) based on WHO (Girls, 0-2 years) weight-for-age data using vitals from 5/2/2023.  HC - 7 %ile (Z= -1.48) based on WHO (Girls, 0-2 years) head circumference-for-age based on Head Circumference recorded on 5/2/2023.    GENERAL: This is an alert, active infant in no distress.   HEAD: Normocephalic, atraumatic. Anterior fontanelle is open, soft and flat.   EYES: PERRL, positive red reflex bilaterally. No conjunctival infection " or discharge.   EARS: TM’s are transparent with good landmarks. Canals are patent.  NOSE: Nares are patent and free of congestion.  THROAT: Oropharynx has no lesions, moist mucus membranes. Pharynx without erythema, tonsils normal.  NECK: Supple, no lymphadenopathy or masses.   HEART: Regular rate and rhythm without murmur. Brachial and femoral pulses are 2+ and equal.  LUNGS: Clear bilaterally to auscultation, no wheezes or rhonchi. No retractions, nasal flaring, or distress noted.  ABDOMEN: Normal bowel sounds, soft and non-tender without hepatomegaly or splenomegaly or masses.   GENITALIA: Normal female genitalia.  normal external genitalia, no erythema, no discharge.  MUSCULOSKELETAL: Hips have normal range of motion with negative Ashley and Ortolani. Spine is straight. Extremities are without abnormalities. Moves all extremities well and symmetrically with normal tone.    NEURO: Alert, active, normal infant reflexes.  SKIN: Intact without significant rash or birthmarks. Skin is warm, dry, and pink.     ASSESSMENT AND PLAN     Well Child Exam: Healthy 9 m.o. old with good growth and development.    1. Anticipatory guidance was reviewed and age appropriate.  Bright Futures handout provided and discussed:  2. Immunizations given today: None.    3. Multivitamin with 400iu of Vitamin D po daily if indicated.  4. Gradual increase of table foods, ensure variety and textures. Introduction of sippy cup with meals.  5. Safety Priority: Car safety seats, heat stroke prevention, poisoning, burns, drowning, sun protection, firearm safety, safe home environment.   Return to clinic for 12 month well child exam or as needed.   Higher Level of Care or Service Not Available

## 2023-05-12 ENCOUNTER — APPOINTMENT (OUTPATIENT)
Dept: PHYSICAL MEDICINE AND REHAB | Facility: CLINIC | Age: 78
End: 2023-05-12

## 2023-06-20 ENCOUNTER — APPOINTMENT (OUTPATIENT)
Dept: PULMONOLOGY | Facility: CLINIC | Age: 78
End: 2023-06-20
Payer: MEDICARE

## 2023-06-20 PROCEDURE — 99213 OFFICE O/P EST LOW 20 MIN: CPT | Mod: 95

## 2023-06-20 NOTE — PHYSICAL EXAM
[No Acute Distress] : no acute distress [Normal Oropharynx] : normal oropharynx [Normal Appearance] : normal appearance [No Resp Distress] : no resp distress [No Clubbing] : no clubbing [No Cyanosis] : no cyanosis [No Edema] : no edema [Normal Color/ Pigmentation] : normal color/ pigmentation [No Focal Deficits] : no focal deficits [Oriented x3] : oriented x3 [Normal Affect] : normal affect

## 2023-06-20 NOTE — DISCUSSION/SUMMARY
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  Following for a left lower lobe suspicious nodule, however had a negative bronchoscopy.  Continues to just feel not well, now with unintentional weight loss as well as nasal fatigue.  Also continues to have issues with what sounds like orthostatic hypotension and near syncope, following with cardiology and EP.  Increasing fluid and salt intake\par \par I am ordering a PET/CT for further evaluation

## 2023-06-20 NOTE — REASON FOR VISIT
[Home] : at home, [unfilled] , at the time of the visit. [Medical Office: ([facility])___] : at the medical office located in  [Patient] : the patient [This encounter was initiated by telehealth (audio with video) and converted to telephone (audio only) due to technical difficulties.] : This encounter was initiated by telehealth (audio with video) and converted to telephone (audio only) due to technical difficulties. [Follow-Up] : a follow-up visit [Abnormal CXR/ Chest CT] : an abnormal CXR/ chest CT [Shortness of Breath] : shortness of breath [Spouse] : spouse

## 2023-06-20 NOTE — HISTORY OF PRESENT ILLNESS
[Former] : former [TextBox_4] : Follow-up visit, patient's wife present on the video as well.\par \par He had underwent his bronchoscopy in March which was negative.\par \par However, still not feeling great since then.  Seems to be unintentionally losing weight.  Feels tired all the time.  Continues to have episodes of dizziness and near syncope, following up with his cardiologist.  Antihypertensives were held, he is increasing his fluid intake.  And likely will have a follow-up visit with EP

## 2023-06-20 NOTE — REVIEW OF SYSTEMS
[Fatigue] : fatigue [Poor Appetite] : poor appetite [Recent Wt Loss (___ Lbs)] : ~T recent [unfilled] lb weight loss [Cough] : cough [Sputum] : sputum [SOB on Exertion] : sob on exertion [Dizziness] : dizziness [Diabetes] : diabetes [Negative] : Psychiatric [Fever] : no fever [Recent Wt Gain (___ Lbs)] : ~T no recent weight gain [Chills] : no chills [Hemoptysis] : no hemoptysis [Chest Tightness] : no chest tightness [Frequent URIs] : no frequent URIs [Dyspnea] : no dyspnea [Wheezing] : no wheezing [A.M. Dry Mouth] : no a.m. dry mouth [Headache] : no headache [Focal Weakness] : no focal weakness [Seizures] : no seizures [Head Injury] : no head injury [Numbness] : no numbness [Memory Loss] : no memory loss [Tremor] : no tremor [Paralysis] : no paralysis [Confusion] : no confusion [Involuntary Movements] : no involuntary movements [Thyroid Problem] : no thyroid problem [Obesity] : no obesity

## 2023-06-21 NOTE — ED PROVIDER NOTE - TOBACCO USE
Former smoker Ivermectin Counseling:  Patient instructed to take medication on an empty stomach with a full glass of water.  Patient informed of potential adverse effects including but not limited to nausea, diarrhea, dizziness, itching, and swelling of the extremities or lymph nodes.  The patient verbalized understanding of the proper use and possible adverse effects of ivermectin.  All of the patient's questions and concerns were addressed.

## 2023-06-26 ENCOUNTER — INPATIENT (INPATIENT)
Facility: HOSPITAL | Age: 78
LOS: 5 days | Discharge: HOME HEALTH | End: 2023-07-02
Attending: STUDENT IN AN ORGANIZED HEALTH CARE EDUCATION/TRAINING PROGRAM | Admitting: STUDENT IN AN ORGANIZED HEALTH CARE EDUCATION/TRAINING PROGRAM
Payer: MEDICARE

## 2023-06-26 VITALS
OXYGEN SATURATION: 96 % | SYSTOLIC BLOOD PRESSURE: 139 MMHG | RESPIRATION RATE: 18 BRPM | HEART RATE: 66 BPM | DIASTOLIC BLOOD PRESSURE: 73 MMHG | TEMPERATURE: 98 F

## 2023-06-26 DIAGNOSIS — J44.1 CHRONIC OBSTRUCTIVE PULMONARY DISEASE WITH (ACUTE) EXACERBATION: ICD-10-CM

## 2023-06-26 DIAGNOSIS — J44.9 CHRONIC OBSTRUCTIVE PULMONARY DISEASE, UNSPECIFIED: ICD-10-CM

## 2023-06-26 DIAGNOSIS — J18.9 PNEUMONIA, UNSPECIFIED ORGANISM: ICD-10-CM

## 2023-06-26 DIAGNOSIS — Z90.49 ACQUIRED ABSENCE OF OTHER SPECIFIED PARTS OF DIGESTIVE TRACT: Chronic | ICD-10-CM

## 2023-06-26 DIAGNOSIS — E11.9 TYPE 2 DIABETES MELLITUS WITHOUT COMPLICATIONS: ICD-10-CM

## 2023-06-26 DIAGNOSIS — Z90.411 ACQUIRED PARTIAL ABSENCE OF PANCREAS: Chronic | ICD-10-CM

## 2023-06-26 DIAGNOSIS — Z29.9 ENCOUNTER FOR PROPHYLACTIC MEASURES, UNSPECIFIED: ICD-10-CM

## 2023-06-26 DIAGNOSIS — I48.91 UNSPECIFIED ATRIAL FIBRILLATION: ICD-10-CM

## 2023-06-26 DIAGNOSIS — E78.5 HYPERLIPIDEMIA, UNSPECIFIED: ICD-10-CM

## 2023-06-26 DIAGNOSIS — I10 ESSENTIAL (PRIMARY) HYPERTENSION: ICD-10-CM

## 2023-06-26 DIAGNOSIS — Z98.89 OTHER SPECIFIED POSTPROCEDURAL STATES: Chronic | ICD-10-CM

## 2023-06-26 DIAGNOSIS — D49.0 NEOPLASM OF UNSPECIFIED BEHAVIOR OF DIGESTIVE SYSTEM: ICD-10-CM

## 2023-06-26 DIAGNOSIS — Z98.890 OTHER SPECIFIED POSTPROCEDURAL STATES: Chronic | ICD-10-CM

## 2023-06-26 DIAGNOSIS — R29.6 REPEATED FALLS: ICD-10-CM

## 2023-06-26 DIAGNOSIS — Z90.89 ACQUIRED ABSENCE OF OTHER ORGANS: Chronic | ICD-10-CM

## 2023-06-26 DIAGNOSIS — R53.83 OTHER FATIGUE: ICD-10-CM

## 2023-06-26 LAB
ALBUMIN SERPL ELPH-MCNC: 3.1 G/DL — LOW (ref 3.3–5)
ALP SERPL-CCNC: 75 U/L — SIGNIFICANT CHANGE UP (ref 40–120)
ALT FLD-CCNC: 39 U/L — SIGNIFICANT CHANGE UP (ref 4–41)
ANION GAP SERPL CALC-SCNC: 16 MMOL/L — HIGH (ref 7–14)
APPEARANCE UR: ABNORMAL
AST SERPL-CCNC: 35 U/L — SIGNIFICANT CHANGE UP (ref 4–40)
B PERT DNA SPEC QL NAA+PROBE: SIGNIFICANT CHANGE UP
B PERT+PARAPERT DNA PNL SPEC NAA+PROBE: SIGNIFICANT CHANGE UP
BASE EXCESS BLDV CALC-SCNC: 2.1 MMOL/L — SIGNIFICANT CHANGE UP (ref -2–3)
BASOPHILS # BLD AUTO: 0.06 K/UL — SIGNIFICANT CHANGE UP (ref 0–0.2)
BASOPHILS NFR BLD AUTO: 0.6 % — SIGNIFICANT CHANGE UP (ref 0–2)
BILIRUB SERPL-MCNC: 0.2 MG/DL — SIGNIFICANT CHANGE UP (ref 0.2–1.2)
BILIRUB UR-MCNC: NEGATIVE — SIGNIFICANT CHANGE UP
BLOOD GAS VENOUS COMPREHENSIVE RESULT: SIGNIFICANT CHANGE UP
BORDETELLA PARAPERTUSSIS (RAPRVP): SIGNIFICANT CHANGE UP
BUN SERPL-MCNC: 18 MG/DL — SIGNIFICANT CHANGE UP (ref 7–23)
C PNEUM DNA SPEC QL NAA+PROBE: SIGNIFICANT CHANGE UP
CALCIUM SERPL-MCNC: 8.9 MG/DL — SIGNIFICANT CHANGE UP (ref 8.4–10.5)
CHLORIDE BLDV-SCNC: 101 MMOL/L — SIGNIFICANT CHANGE UP (ref 96–108)
CHLORIDE SERPL-SCNC: 98 MMOL/L — SIGNIFICANT CHANGE UP (ref 98–107)
CO2 BLDV-SCNC: 28.7 MMOL/L — HIGH (ref 22–26)
CO2 SERPL-SCNC: 21 MMOL/L — LOW (ref 22–31)
COLOR SPEC: YELLOW — SIGNIFICANT CHANGE UP
CREAT SERPL-MCNC: 1.14 MG/DL — SIGNIFICANT CHANGE UP (ref 0.5–1.3)
DIFF PNL FLD: NEGATIVE — SIGNIFICANT CHANGE UP
EGFR: 65 ML/MIN/1.73M2 — SIGNIFICANT CHANGE UP
EOSINOPHIL # BLD AUTO: 0.03 K/UL — SIGNIFICANT CHANGE UP (ref 0–0.5)
EOSINOPHIL NFR BLD AUTO: 0.3 % — SIGNIFICANT CHANGE UP (ref 0–6)
FLUAV SUBTYP SPEC NAA+PROBE: SIGNIFICANT CHANGE UP
FLUBV RNA SPEC QL NAA+PROBE: SIGNIFICANT CHANGE UP
GAS PNL BLDV: 132 MMOL/L — LOW (ref 136–145)
GAS PNL BLDV: SIGNIFICANT CHANGE UP
GLUCOSE BLDC GLUCOMTR-MCNC: 120 MG/DL — HIGH (ref 70–99)
GLUCOSE BLDV-MCNC: 116 MG/DL — HIGH (ref 70–99)
GLUCOSE SERPL-MCNC: 114 MG/DL — HIGH (ref 70–99)
GLUCOSE UR QL: NEGATIVE — SIGNIFICANT CHANGE UP
HADV DNA SPEC QL NAA+PROBE: SIGNIFICANT CHANGE UP
HCO3 BLDV-SCNC: 27 MMOL/L — SIGNIFICANT CHANGE UP (ref 22–29)
HCOV 229E RNA SPEC QL NAA+PROBE: SIGNIFICANT CHANGE UP
HCOV HKU1 RNA SPEC QL NAA+PROBE: SIGNIFICANT CHANGE UP
HCOV NL63 RNA SPEC QL NAA+PROBE: SIGNIFICANT CHANGE UP
HCOV OC43 RNA SPEC QL NAA+PROBE: SIGNIFICANT CHANGE UP
HCT VFR BLD CALC: 31.7 % — LOW (ref 39–50)
HCT VFR BLDA CALC: 33 % — LOW (ref 39–51)
HGB BLD CALC-MCNC: 11 G/DL — LOW (ref 12.6–17.4)
HGB BLD-MCNC: 10.1 G/DL — LOW (ref 13–17)
HMPV RNA SPEC QL NAA+PROBE: SIGNIFICANT CHANGE UP
HPIV1 RNA SPEC QL NAA+PROBE: SIGNIFICANT CHANGE UP
HPIV2 RNA SPEC QL NAA+PROBE: SIGNIFICANT CHANGE UP
HPIV3 RNA SPEC QL NAA+PROBE: SIGNIFICANT CHANGE UP
HPIV4 RNA SPEC QL NAA+PROBE: SIGNIFICANT CHANGE UP
IANC: 8.14 K/UL — HIGH (ref 1.8–7.4)
IMM GRANULOCYTES NFR BLD AUTO: 0.5 % — SIGNIFICANT CHANGE UP (ref 0–0.9)
KETONES UR-MCNC: NEGATIVE — SIGNIFICANT CHANGE UP
LACTATE BLDV-MCNC: 1.5 MMOL/L — SIGNIFICANT CHANGE UP (ref 0.5–2)
LEUKOCYTE ESTERASE UR-ACNC: NEGATIVE — SIGNIFICANT CHANGE UP
LYMPHOCYTES # BLD AUTO: 0.81 K/UL — LOW (ref 1–3.3)
LYMPHOCYTES # BLD AUTO: 8.1 % — LOW (ref 13–44)
M PNEUMO DNA SPEC QL NAA+PROBE: SIGNIFICANT CHANGE UP
MCHC RBC-ENTMCNC: 29.3 PG — SIGNIFICANT CHANGE UP (ref 27–34)
MCHC RBC-ENTMCNC: 31.9 GM/DL — LOW (ref 32–36)
MCV RBC AUTO: 91.9 FL — SIGNIFICANT CHANGE UP (ref 80–100)
MONOCYTES # BLD AUTO: 0.93 K/UL — HIGH (ref 0–0.9)
MONOCYTES NFR BLD AUTO: 9.3 % — SIGNIFICANT CHANGE UP (ref 2–14)
NEUTROPHILS # BLD AUTO: 8.14 K/UL — HIGH (ref 1.8–7.4)
NEUTROPHILS NFR BLD AUTO: 81.2 % — HIGH (ref 43–77)
NITRITE UR-MCNC: NEGATIVE — SIGNIFICANT CHANGE UP
NRBC # BLD: 0 /100 WBCS — SIGNIFICANT CHANGE UP (ref 0–0)
NRBC # FLD: 0 K/UL — SIGNIFICANT CHANGE UP (ref 0–0)
NT-PROBNP SERPL-SCNC: 248 PG/ML — SIGNIFICANT CHANGE UP
PCO2 BLDV: 44 MMHG — SIGNIFICANT CHANGE UP (ref 42–55)
PH BLDV: 7.4 — SIGNIFICANT CHANGE UP (ref 7.32–7.43)
PH UR: 6 — SIGNIFICANT CHANGE UP (ref 5–8)
PHOSPHATE SERPL-MCNC: 3 MG/DL — SIGNIFICANT CHANGE UP (ref 2.5–4.5)
PLATELET # BLD AUTO: 489 K/UL — HIGH (ref 150–400)
PO2 BLDV: 21 MMHG — LOW (ref 25–45)
POTASSIUM BLDV-SCNC: 4.7 MMOL/L — SIGNIFICANT CHANGE UP (ref 3.5–5.1)
POTASSIUM SERPL-MCNC: 4.7 MMOL/L — SIGNIFICANT CHANGE UP (ref 3.5–5.3)
POTASSIUM SERPL-SCNC: 4.7 MMOL/L — SIGNIFICANT CHANGE UP (ref 3.5–5.3)
PROT SERPL-MCNC: 7.7 G/DL — SIGNIFICANT CHANGE UP (ref 6–8.3)
PROT UR-MCNC: ABNORMAL
RAPID RVP RESULT: DETECTED
RBC # BLD: 3.45 M/UL — LOW (ref 4.2–5.8)
RBC # FLD: 14.4 % — SIGNIFICANT CHANGE UP (ref 10.3–14.5)
RBC CASTS # UR COMP ASSIST: 1 /HPF — SIGNIFICANT CHANGE UP (ref 0–4)
RSV RNA SPEC QL NAA+PROBE: DETECTED
RV+EV RNA SPEC QL NAA+PROBE: SIGNIFICANT CHANGE UP
SAO2 % BLDV: 22.8 % — LOW (ref 67–88)
SARS-COV-2 RNA SPEC QL NAA+PROBE: SIGNIFICANT CHANGE UP
SODIUM SERPL-SCNC: 135 MMOL/L — SIGNIFICANT CHANGE UP (ref 135–145)
SP GR SPEC: 1.02 — SIGNIFICANT CHANGE UP (ref 1.01–1.05)
UROBILINOGEN FLD QL: SIGNIFICANT CHANGE UP
WBC # BLD: 10.02 K/UL — SIGNIFICANT CHANGE UP (ref 3.8–10.5)
WBC # FLD AUTO: 10.02 K/UL — SIGNIFICANT CHANGE UP (ref 3.8–10.5)
WBC UR QL: 0 /HPF — SIGNIFICANT CHANGE UP (ref 0–5)

## 2023-06-26 PROCEDURE — 99223 1ST HOSP IP/OBS HIGH 75: CPT

## 2023-06-26 PROCEDURE — 71046 X-RAY EXAM CHEST 2 VIEWS: CPT | Mod: 26

## 2023-06-26 PROCEDURE — 70450 CT HEAD/BRAIN W/O DYE: CPT | Mod: 26,MA

## 2023-06-26 PROCEDURE — 99285 EMERGENCY DEPT VISIT HI MDM: CPT

## 2023-06-26 RX ORDER — METOPROLOL TARTRATE 50 MG
25 TABLET ORAL
Refills: 0 | Status: DISCONTINUED | OUTPATIENT
Start: 2023-06-26 | End: 2023-07-02

## 2023-06-26 RX ORDER — DEXTROSE 50 % IN WATER 50 %
15 SYRINGE (ML) INTRAVENOUS ONCE
Refills: 0 | Status: DISCONTINUED | OUTPATIENT
Start: 2023-06-26 | End: 2023-06-27

## 2023-06-26 RX ORDER — INSULIN LISPRO 100/ML
VIAL (ML) SUBCUTANEOUS
Refills: 0 | Status: DISCONTINUED | OUTPATIENT
Start: 2023-06-26 | End: 2023-06-27

## 2023-06-26 RX ORDER — ALBUTEROL 90 UG/1
2 AEROSOL, METERED ORAL EVERY 6 HOURS
Refills: 0 | Status: DISCONTINUED | OUTPATIENT
Start: 2023-06-26 | End: 2023-07-02

## 2023-06-26 RX ORDER — AZITHROMYCIN 500 MG/1
500 TABLET, FILM COATED ORAL EVERY 24 HOURS
Refills: 0 | Status: DISCONTINUED | OUTPATIENT
Start: 2023-06-27 | End: 2023-06-28

## 2023-06-26 RX ORDER — DEXTROSE 50 % IN WATER 50 %
12.5 SYRINGE (ML) INTRAVENOUS ONCE
Refills: 0 | Status: DISCONTINUED | OUTPATIENT
Start: 2023-06-26 | End: 2023-06-27

## 2023-06-26 RX ORDER — SIMVASTATIN 20 MG/1
20 TABLET, FILM COATED ORAL AT BEDTIME
Refills: 0 | Status: DISCONTINUED | OUTPATIENT
Start: 2023-06-26 | End: 2023-07-02

## 2023-06-26 RX ORDER — ONDANSETRON 8 MG/1
4 TABLET, FILM COATED ORAL EVERY 8 HOURS
Refills: 0 | Status: DISCONTINUED | OUTPATIENT
Start: 2023-06-26 | End: 2023-07-02

## 2023-06-26 RX ORDER — INSULIN LISPRO 100/ML
VIAL (ML) SUBCUTANEOUS AT BEDTIME
Refills: 0 | Status: DISCONTINUED | OUTPATIENT
Start: 2023-06-26 | End: 2023-06-27

## 2023-06-26 RX ORDER — CEFTRIAXONE 500 MG/1
1000 INJECTION, POWDER, FOR SOLUTION INTRAMUSCULAR; INTRAVENOUS ONCE
Refills: 0 | Status: COMPLETED | OUTPATIENT
Start: 2023-06-26 | End: 2023-06-26

## 2023-06-26 RX ORDER — ACETAMINOPHEN 500 MG
650 TABLET ORAL EVERY 6 HOURS
Refills: 0 | Status: DISCONTINUED | OUTPATIENT
Start: 2023-06-26 | End: 2023-07-02

## 2023-06-26 RX ORDER — AZITHROMYCIN 500 MG/1
500 TABLET, FILM COATED ORAL ONCE
Refills: 0 | Status: COMPLETED | OUTPATIENT
Start: 2023-06-26 | End: 2023-06-26

## 2023-06-26 RX ORDER — GLUCAGON INJECTION, SOLUTION 0.5 MG/.1ML
1 INJECTION, SOLUTION SUBCUTANEOUS ONCE
Refills: 0 | Status: DISCONTINUED | OUTPATIENT
Start: 2023-06-26 | End: 2023-06-27

## 2023-06-26 RX ORDER — TIOTROPIUM BROMIDE AND OLODATEROL 3.124; 2.736 UG/1; UG/1
2 SPRAY, METERED RESPIRATORY (INHALATION) DAILY
Refills: 0 | Status: DISCONTINUED | OUTPATIENT
Start: 2023-06-27 | End: 2023-07-02

## 2023-06-26 RX ORDER — DEXTROSE 50 % IN WATER 50 %
25 SYRINGE (ML) INTRAVENOUS ONCE
Refills: 0 | Status: DISCONTINUED | OUTPATIENT
Start: 2023-06-26 | End: 2023-06-27

## 2023-06-26 RX ORDER — CEFTRIAXONE 500 MG/1
1000 INJECTION, POWDER, FOR SOLUTION INTRAMUSCULAR; INTRAVENOUS EVERY 24 HOURS
Refills: 0 | Status: COMPLETED | OUTPATIENT
Start: 2023-06-27 | End: 2023-06-30

## 2023-06-26 RX ORDER — LANOLIN ALCOHOL/MO/W.PET/CERES
3 CREAM (GRAM) TOPICAL AT BEDTIME
Refills: 0 | Status: DISCONTINUED | OUTPATIENT
Start: 2023-06-26 | End: 2023-07-02

## 2023-06-26 RX ORDER — SODIUM CHLORIDE 9 MG/ML
1000 INJECTION, SOLUTION INTRAVENOUS
Refills: 0 | Status: DISCONTINUED | OUTPATIENT
Start: 2023-06-26 | End: 2023-06-27

## 2023-06-26 RX ORDER — HEPARIN SODIUM 5000 [USP'U]/ML
5000 INJECTION INTRAVENOUS; SUBCUTANEOUS EVERY 8 HOURS
Refills: 0 | Status: DISCONTINUED | OUTPATIENT
Start: 2023-06-26 | End: 2023-06-27

## 2023-06-26 RX ADMIN — Medication 25 MILLIGRAM(S): at 22:45

## 2023-06-26 RX ADMIN — CEFTRIAXONE 100 MILLIGRAM(S): 500 INJECTION, POWDER, FOR SOLUTION INTRAMUSCULAR; INTRAVENOUS at 13:27

## 2023-06-26 RX ADMIN — AZITHROMYCIN 255 MILLIGRAM(S): 500 TABLET, FILM COATED ORAL at 14:06

## 2023-06-26 RX ADMIN — CEFTRIAXONE 1000 MILLIGRAM(S): 500 INJECTION, POWDER, FOR SOLUTION INTRAMUSCULAR; INTRAVENOUS at 13:57

## 2023-06-26 NOTE — H&P ADULT - PROBLEM SELECTOR PLAN 4
Pt w/ HLD on home statin  - c/w statin Pt w/ HTN. Amlodopine recently discontinued  - c/w metoprolol succinate 25mg qd

## 2023-06-26 NOTE — H&P ADULT - PROBLEM SELECTOR PLAN 1
Patient w/ dyspnea and cough w/ imaging c/w LLL opacity c/f PNA  - No risk factors for HAP  - Will c/w CTX for 5 days (6/26-6/30)  - Will c/w Azithro for atypical coverage, but low suspicion   - Check urine legionella  - Monitor fever/WBC curve  - Currently on RA, monitor respiratory status Patient w/ dyspnea and cough w/ imaging c/w LLL opacity c/f PNA. +RSV  - No risk factors for HAP  - Will c/w CTX for 5 days (6/26-6/30)  - Will c/w Azithro for atypical coverage, but low suspicion   - Check urine legionella  - Monitor fever/WBC curve  - Currently on RA, monitor respiratory status and c/w symptomatic management

## 2023-06-26 NOTE — H&P ADULT - NSHPREVIEWOFSYSTEMS_GEN_ALL_CORE

## 2023-06-26 NOTE — ED ADULT NURSE NOTE - CHIEF COMPLAINT QUOTE
Pt c/o worsening SOB. Hx of COPD, HTN, colon CA in remission. Denies CP, HA, dizziness, palpitations.

## 2023-06-26 NOTE — H&P ADULT - NSHPPHYSICALEXAM_GEN_ALL_CORE
Vital Signs Last 24 Hrs  T(C): 36.8 (26 Jun 2023 12:55), Max: 36.8 (26 Jun 2023 08:59)  T(F): 98.3 (26 Jun 2023 12:55), Max: 98.3 (26 Jun 2023 08:59)  HR: 60 (26 Jun 2023 12:55) (60 - 66)  BP: 118/76 (26 Jun 2023 12:55) (118/76 - 139/73)  BP(mean): --  RR: 19 (26 Jun 2023 12:55) (18 - 19)  SpO2: 99% (26 Jun 2023 12:55) (96% - 99%)    Parameters below as of 26 Jun 2023 12:55  Patient On (Oxygen Delivery Method): room air      PHYSICAL EXAM:  GENERAL: NAD, well-groomed, well-developed  HEAD:  Atraumatic, Normocephalic  EYES: EOMI, PERRLA, conjunctiva and sclera clear  ENMT: No tonsillar erythema, exudates, or enlargement; Moist mucous membranes  NECK: Supple, No JVD, Normal thyroid  HEART: Regular rate and rhythm; No murmurs, rubs, or gallops  RESPIRATORY: CTA B/L, No W/R/R  ABDOMEN: Soft, Nontender, Nondistended; Bowel sounds present  NEUROLOGY: A&Ox3, nonfocal, moving all extremities  EXTREMITIES:  2+ Peripheral Pulses, No clubbing, cyanosis, or edema  SKIN: warm, dry, normal color, no rash or abnormal lesions Vital Signs Last 24 Hrs  T(C): 36.8 (26 Jun 2023 12:55), Max: 36.8 (26 Jun 2023 08:59)  T(F): 98.3 (26 Jun 2023 12:55), Max: 98.3 (26 Jun 2023 08:59)  HR: 60 (26 Jun 2023 12:55) (60 - 66)  BP: 118/76 (26 Jun 2023 12:55) (118/76 - 139/73)  BP(mean): --  RR: 19 (26 Jun 2023 12:55) (18 - 19)  SpO2: 99% (26 Jun 2023 12:55) (96% - 99%)    Parameters below as of 26 Jun 2023 12:55  Patient On (Oxygen Delivery Method): room air      PHYSICAL EXAM:  GENERAL: NAD, well-groomed, well-developed  HEAD:  Atraumatic, Normocephalic  EYES: EOMI, PERRLA, conjunctiva and sclera clear  ENMT: No tonsillar erythema, exudates, or enlargement; Moist mucous membranes  NECK: Supple, No JVD, Normal thyroid  HEART: Regular rate and rhythm; No murmurs, rubs, or gallops  RESPIRATORY: Decreased breath sounds b/l. Mild crackles at b/l bases. No Wheezing  ABDOMEN: Soft, Nontender, Nondistended; Bowel sounds present  NEUROLOGY: A&Ox3, nonfocal, moving all extremities  EXTREMITIES:  2+ Peripheral Pulses, No clubbing, cyanosis, or edema  SKIN: warm, dry, normal color, no rash or abnormal lesions

## 2023-06-26 NOTE — H&P ADULT - PROBLEM SELECTOR PLAN 2
Patient w/ rate controlled Afib on ___. AC w/ Eliquis  - CHADSVASC=4  - c/w Eliquis 5mg BID  - c/w RC Patient w/ frequent falls over last few years. Described to have syncopal events a/w fecal incontinence during fall events. CTH on presentation w/o acute findings.   - Description is c/f seizure like activity. Pt w/o hx of seizures.   - Presentation is c/f possible mets to brain. MRI in 2022 performed w/o contrasts shows chronic infacts w/o mets  - Will d/w onc in AM about concern for mets

## 2023-06-26 NOTE — ED PROVIDER NOTE - OBJECTIVE STATEMENT
78 year old male with hx of AFib on Eliquis, HTN, HLD, COPD, DM t2, CKD, Nasopharyngeal cancer s/p chemo and radiation comes into the ED 78 year old male with hx of AFib on Eliquis, HTN, HLD, COPD, DM t2, CKD, Nasopharyngeal cancer s/p chemo and radiation comes into the ED for increased fatigue and worsening SOB. He is unable to provide much information in regards to his presentation. Spoke with his wife over the phone who reports over the past 1-2 weeks, the Pt has had worsening cough and she was concerned for an upper airway infection. Over the past few days, he has had more difficulty walking which he is normally able to do easily. Today while he was iron his shirt, his wife found he had an episode of vomiting and he had defecated on himself. She was able to convince him to come into the ED. In the ED, he states he does feel slightly SOB. He does endorse any recent fever, HA, chest pain, abd pain, nausea, blood in stool, pain/swelling to lower extremities, or urinary symptoms.

## 2023-06-26 NOTE — ED PROVIDER NOTE - CLINICAL SUMMARY MEDICAL DECISION MAKING FREE TEXT BOX
78 year old male with hx of AFib on Eliquis, HTN, HLD, COPD, DM t2, CKD, Nasopharyngeal cancer s/p chemo and radiation comes into the ED for increased fatigue and worsening SOB. Per wife, the pt has progressively been more fatigue and had an episode of vomiting and loss of bowel control. On exam, Pt is chronically ill appearing but his vitals are stable, lungs are clear to auscultation, abd nontender. Per wife, he is at his baseline mental status. He was recently treated for a pneumonia. Ddx includes but not limited to infectious etiology, anemia. Will reassess pending work up. Pt may need to be admitted to the hospital given his difficulty walking and overall failure to thrive at home.

## 2023-06-26 NOTE — H&P ADULT - PROBLEM SELECTOR PLAN 3
Pt w/ HTN on home ___ Patient w/ rate controlled Afib on Toprol 25mg qhs. AC w/ Eliquis  - CHADSVASC=4  - On Eliquis 5mg BID. Will hold PM dose d/t c/f brain mets. Will d/w Onc in AM  - c/w metoprolol succinate 25mg qhs

## 2023-06-26 NOTE — ED ADULT NURSE NOTE - OBJECTIVE STATEMENT
Received patient in room 17 c/o SOB, patient denies chest pain, fever, headache. PMHX Colon cancer, HTN, COPD. Patient is on cardiac monitor sinus rhythm w/O2 sat 100% on a room air. Patient is AOX4, airway patent, breathing unlabored and even. Labs obtained, 20G IV placed on left arm, awaiting X-ray, CT scan. Side rails up and safety maintained. Call bells within reach. Received patient in room 17 c/o SOB, patient denies chest pain, fever, headache. PMHX AFIB on Eliquis, HTN, HLD, COPD, DM, CKD, Nasopharyngeal cancer. Patient is on cardiac monitor AFIB w/O2 sat 100% on a room air. Patient is AOX4, airway patent, breathing unlabored and even. Labs obtained, 20G IV placed on left arm, awaiting X-ray, CT scan. Side rails up and safety maintained. Call bells within reach.

## 2023-06-26 NOTE — H&P ADULT - PROBLEM SELECTOR PLAN 7
DVT: HSQ  Diet:   Dispo: Pending clinical course Pt w/ T2DM not on home meds  -check A1c in AM  - ISS for now

## 2023-06-26 NOTE — ED PROVIDER NOTE - PHYSICAL EXAMINATION
GENERAL: Not in acute distress, non-toxic appearing  HEAD: normocephalic, atraumatic  HEENT: PERRLA, EOMI, normal conjunctiva, oral mucosa moist, neck supple  CARDIAC: regular rate and rhythm, normal S1 and S2,  no appreciable murmurs  PULM: clear to ascultation bilaterally, no crackles, rales, rhonchi, or wheezing  GI: abdomen nondistended, soft, nontender, no guarding or rebound tenderness  :  no suprapubic tenderness  NEURO: alert and oriented x 2 (person and place), normal speech, no gross neurologic deficit  MSK: No visible deformities, no peripheral edema, calf tenderness/redness/swelling  SKIN: No visible rashes, dry, well-perfused  PSYCH: appropriate mood and affect

## 2023-06-26 NOTE — H&P ADULT - ATTENDING COMMENTS
78 afib, HTN, HLD, COPD, DM, CKD Nasopharyngeal cancer presented with worsening SOB, cough, fatigue. RVP positive for RSV. CXR w/ LLL opacity. Treating for CAP w/ ceftriaxone + azithromycin will plan for 5 day course. Supportive care for RSV. Currently not in COPD exacerbation. Patient w/ frequent falls over last few years. Described to have syncopal events associated with fecal incontinence during fall events. CTH on presentation w/o acute findings. Upon review of chart no MRI w/ contrast noted. Will discuss with oncology in AM possible evaluation for brain metastasis while admitted. Will hold his DOAC for now pending this discussion.

## 2023-06-26 NOTE — H&P ADULT - NSHPLABSRESULTS_GEN_ALL_CORE
.  LABS:                         10.1   10.02 )-----------( 489      ( 2023 10:05 )             31.7         135  |  98  |  18  ----------------------------<  114<H>  4.7   |  21<L>  |  1.14    Ca    8.9      2023 10:05  Phos  3.0         TPro  7.7  /  Alb  3.1<L>  /  TBili  0.2  /  DBili  x   /  AST  35  /  ALT  39  /  AlkPhos  75        Urinalysis Basic - ( 2023 11:44 )    Color: Yellow / Appearance: Slightly Turbid / S.022 / pH: x  Gluc: x / Ketone: Negative  / Bili: Negative / Urobili: <2 mg/dL   Blood: x / Protein: 30 mg/dL / Nitrite: Negative   Leuk Esterase: Negative / RBC: 1 /HPF / WBC 0 /HPF   Sq Epi: x / Non Sq Epi: x / Bacteria: x            RADIOLOGY, EKG & ADDITIONAL TESTS: Reviewed.

## 2023-06-26 NOTE — H&P ADULT - HISTORY OF PRESENT ILLNESS
78 year old male with hx of AFib on Eliquis, HTN, HLD, COPD, DM t2, CKD, Nasopharyngeal cancer s/p chemo and radiation comes into the ED for increased fatigue and worsening SOB. Patient unable to provide history. Per wife/chart review, patient has had worsening cough over the last few weeks c/f URI. His dyspnea progressed w/ dyspnea on walking which is alteration from baseline. Pt had an episode of vomiting and defecation on himself prior to presenting to the ED.       In the ED, pt presented afebrile, hemodynamically stable, on Room air. CXR c/f LLL PNA. Pt given CTX, azithro.  78 year old male with hx of AFib on Eliquis, HTN, HLD, COPD, DM t2, CKD, Nasopharyngeal cancer s/p chemo and radiation comes into the ED for increased fatigue and worsening SOB. Patient unable to provide history. Per wife/chart review, patient has had worsening cough over the last few weeks c/f URI. His dyspnea progressed w/ dyspnea on walking which is alteration from baseline. Pt had an episode of vomiting and defecation on himself prior to presenting to the ED.       In the ED, pt presented afebrile, hemodynamically stable, on Room air. RVP positive for RSV. CXR c/f LLL PNA. Pt given CTX, azithro.  78 year old male with hx of AFib on Eliquis, HTN, HLD, COPD, DM t2, CKD, Nasopharyngeal cancer s/p chemo and radiation, frequent falls, comes into the ED for increased fatigue and worsening SOB. Patient unable to provide history. Per wife/chart review, patient has had worsening cough over the last few weeks c/f URI. His dyspnea progressed w/ dyspnea on walking which is alteration from baseline. Pt had an episode of vomiting and defecation on himself prior to presenting to the ED.       In the ED, pt presented afebrile, hemodynamically stable, on Room air. RVP positive for RSV. CXR c/f LLL PNA. Pt given CTX, azithro.  78 year old male with hx of AFib on Eliquis, HTN, HLD, COPD, DM t2, CKD, Nasopharyngeal cancer s/p chemo and radiation, carcinoid tumor (s/p resection), frequent falls (a/w fecal incontinence), comes into the ED for increased fatigue and worsening SOB. Patient unable to provide history. Per wife/chart review, patient has had worsening cough over the last few weeks c/f URI. His dyspnea progressed w/ dyspnea on walking which is alteration from baseline. Pt had an episode of vomiting and defecation on himself prior to presenting to the ED. Per wife at bedside patient was recently in [place] where he developed his URI symptoms. Pt notably is followed by pulm for a nodule that is c/f malignancy. Pt had bronchoscopy done in March which was negative. He received a short course of Abx at that time for presumed PNA. Pt's wife also describes a progressive increase in falls and syncopal events over the last few years. These events are often associated w/ decreased focus, some motor impairment and fecal incontinence. Pt has no jerking/seizure like activity noted. He has no hx of seizures, and denies family hx of seizures.       In the ED, pt presented afebrile, hemodynamically stable, on Room air. RVP positive for RSV. CXR c/f LLL PNA. Pt given CTX, azithro.

## 2023-06-26 NOTE — ED ADULT NURSE NOTE - NSFALLUNIVINTERV_ED_ALL_ED
Bed/Stretcher in lowest position, wheels locked, appropriate side rails in place/Call bell, personal items and telephone in reach/Instruct patient to call for assistance before getting out of bed/chair/stretcher/Non-slip footwear applied when patient is off stretcher/[place] to call system/Physically safe environment - no spills, clutter or unnecessary equipment/Purposeful proactive rounding/Room/bathroom lighting operational, light cord in reach

## 2023-06-26 NOTE — H&P ADULT - PROBLEM SELECTOR PLAN 5
Patient w/ stable COPD on home ___  - c/w ___  - Duonebs PRN if wheezing Pt w/ HLD on home pravastatin 10mg  - c/w statin

## 2023-06-26 NOTE — H&P ADULT - PROBLEM SELECTOR PLAN 6
Pt w/ T2DM on home ____  -A1c=  - ISS Patient w/ stable COPD on home Anoro 62.5/25 qd; Proventil HFA 90 PRN  - Will c/w Stiolto while in hospital as Anoro not formulary.  - Duonebs PRN if wheezing

## 2023-06-26 NOTE — H&P ADULT - TIME BILLING
Time spent on review of vital signs, labs, prior documentation, consultant notes. Patient interviewed and examined during this encounter. Plan of care was discussed with patient, and resident. Encounter documented.

## 2023-06-26 NOTE — H&P ADULT - ASSESSMENT
78 year old male with hx of AFib on Eliquis, HTN, HLD, COPD, DM t2, CKD, Nasopharyngeal cancer s/p chemo and radiation comes into the ED w/ cough and dyspnea w/ CXR c/f LLL CAP.  78 year old male with hx of AFib on Eliquis, HTN, HLD, COPD, DM t2, CKD, Nasopharyngeal cancer s/p chemo and radiation comes into the ED w/ cough and dyspnea w/ CXR c/f LLL CAP. Frequent falls w/ syncopal episodes a/w fecal incontinence c/f seizure activities possibly iso brain mets

## 2023-06-26 NOTE — ED ADULT NURSE REASSESSMENT NOTE - NS ED NURSE REASSESS COMMENT FT1
pt. remains A&Ox4, awake and resting. pt. offers no new complaints at this time. no acute distress noted. respirations even and unlabored. VS as noted.

## 2023-06-27 ENCOUNTER — APPOINTMENT (OUTPATIENT)
Dept: PHYSICAL MEDICINE AND REHAB | Facility: CLINIC | Age: 78
End: 2023-06-27

## 2023-06-27 DIAGNOSIS — R73.03 PREDIABETES: ICD-10-CM

## 2023-06-27 LAB
A1C WITH ESTIMATED AVERAGE GLUCOSE RESULT: 6.4 % — HIGH (ref 4–5.6)
ANION GAP SERPL CALC-SCNC: 13 MMOL/L — SIGNIFICANT CHANGE UP (ref 7–14)
BUN SERPL-MCNC: 15 MG/DL — SIGNIFICANT CHANGE UP (ref 7–23)
CALCIUM SERPL-MCNC: 9.6 MG/DL — SIGNIFICANT CHANGE UP (ref 8.4–10.5)
CHLORIDE SERPL-SCNC: 102 MMOL/L — SIGNIFICANT CHANGE UP (ref 98–107)
CO2 SERPL-SCNC: 22 MMOL/L — SIGNIFICANT CHANGE UP (ref 22–31)
CREAT SERPL-MCNC: 0.95 MG/DL — SIGNIFICANT CHANGE UP (ref 0.5–1.3)
CULTURE RESULTS: SIGNIFICANT CHANGE UP
EGFR: 81 ML/MIN/1.73M2 — SIGNIFICANT CHANGE UP
ESTIMATED AVERAGE GLUCOSE: 137 — SIGNIFICANT CHANGE UP
GLUCOSE BLDC GLUCOMTR-MCNC: 103 MG/DL — HIGH (ref 70–99)
GLUCOSE BLDC GLUCOMTR-MCNC: 93 MG/DL — SIGNIFICANT CHANGE UP (ref 70–99)
GLUCOSE BLDC GLUCOMTR-MCNC: 99 MG/DL — SIGNIFICANT CHANGE UP (ref 70–99)
GLUCOSE SERPL-MCNC: 109 MG/DL — HIGH (ref 70–99)
HCT VFR BLD CALC: 31.4 % — LOW (ref 39–50)
HGB BLD-MCNC: 10.2 G/DL — LOW (ref 13–17)
MAGNESIUM SERPL-MCNC: 2.1 MG/DL — SIGNIFICANT CHANGE UP (ref 1.6–2.6)
MCHC RBC-ENTMCNC: 28.7 PG — SIGNIFICANT CHANGE UP (ref 27–34)
MCHC RBC-ENTMCNC: 32.5 GM/DL — SIGNIFICANT CHANGE UP (ref 32–36)
MCV RBC AUTO: 88.5 FL — SIGNIFICANT CHANGE UP (ref 80–100)
NRBC # BLD: 0 /100 WBCS — SIGNIFICANT CHANGE UP (ref 0–0)
NRBC # FLD: 0 K/UL — SIGNIFICANT CHANGE UP (ref 0–0)
PHOSPHATE SERPL-MCNC: 3.8 MG/DL — SIGNIFICANT CHANGE UP (ref 2.5–4.5)
PLATELET # BLD AUTO: 479 K/UL — HIGH (ref 150–400)
POTASSIUM SERPL-MCNC: 4.2 MMOL/L — SIGNIFICANT CHANGE UP (ref 3.5–5.3)
POTASSIUM SERPL-SCNC: 4.2 MMOL/L — SIGNIFICANT CHANGE UP (ref 3.5–5.3)
RBC # BLD: 3.55 M/UL — LOW (ref 4.2–5.8)
RBC # FLD: 14.1 % — SIGNIFICANT CHANGE UP (ref 10.3–14.5)
SODIUM SERPL-SCNC: 137 MMOL/L — SIGNIFICANT CHANGE UP (ref 135–145)
SPECIMEN SOURCE: SIGNIFICANT CHANGE UP
WBC # BLD: 9.64 K/UL — SIGNIFICANT CHANGE UP (ref 3.8–10.5)
WBC # FLD AUTO: 9.64 K/UL — SIGNIFICANT CHANGE UP (ref 3.8–10.5)

## 2023-06-27 PROCEDURE — 99233 SBSQ HOSP IP/OBS HIGH 50: CPT | Mod: GC

## 2023-06-27 RX ORDER — APIXABAN 2.5 MG/1
5 TABLET, FILM COATED ORAL EVERY 12 HOURS
Refills: 0 | Status: DISCONTINUED | OUTPATIENT
Start: 2023-06-27 | End: 2023-06-30

## 2023-06-27 RX ADMIN — Medication 1 TABLET(S): at 15:12

## 2023-06-27 RX ADMIN — Medication 25 MILLIGRAM(S): at 21:05

## 2023-06-27 RX ADMIN — HEPARIN SODIUM 5000 UNIT(S): 5000 INJECTION INTRAVENOUS; SUBCUTANEOUS at 05:03

## 2023-06-27 RX ADMIN — APIXABAN 5 MILLIGRAM(S): 2.5 TABLET, FILM COATED ORAL at 19:00

## 2023-06-27 RX ADMIN — AZITHROMYCIN 255 MILLIGRAM(S): 500 TABLET, FILM COATED ORAL at 15:09

## 2023-06-27 RX ADMIN — CEFTRIAXONE 100 MILLIGRAM(S): 500 INJECTION, POWDER, FOR SOLUTION INTRAMUSCULAR; INTRAVENOUS at 16:26

## 2023-06-27 RX ADMIN — HEPARIN SODIUM 5000 UNIT(S): 5000 INJECTION INTRAVENOUS; SUBCUTANEOUS at 15:12

## 2023-06-27 RX ADMIN — SIMVASTATIN 20 MILLIGRAM(S): 20 TABLET, FILM COATED ORAL at 21:05

## 2023-06-27 RX ADMIN — TIOTROPIUM BROMIDE AND OLODATEROL 2 PUFF(S): 3.124; 2.736 SPRAY, METERED RESPIRATORY (INHALATION) at 15:21

## 2023-06-27 NOTE — PROGRESS NOTE ADULT - SUBJECTIVE AND OBJECTIVE BOX
Dmitry Song, PGY1    HOPERADHA  78y  Male    Complaints:  Subjective:     No acute o/n events. Pt denies subj fevers/chills, HA, dizziness, chest pain, palpitations, n/v, abd pain, dysuria, diarrhea      FAMILY HISTORY:  Family history of breast cancer      78yVital Signs Last 24 Hrs  T(C): 36.4 (27 Jun 2023 05:05), Max: 36.8 (26 Jun 2023 08:59)  T(F): 97.6 (27 Jun 2023 05:05), Max: 98.3 (26 Jun 2023 08:59)  HR: 67 (27 Jun 2023 05:05) (60 - 87)  BP: 124/73 (27 Jun 2023 05:05) (118/76 - 164/76)  BP(mean): --  RR: 19 (27 Jun 2023 05:05) (16 - 20)  SpO2: 97% (27 Jun 2023 05:05) (96% - 100%)    Parameters below as of 27 Jun 2023 05:05  Patient On (Oxygen Delivery Method): room air          PHYSICAL EXAM:  GENERAL: NAD, well-groomed, well-developed  HEAD:  Atraumatic, Normocephalic  EYES: EOMI, PERRLA, conjunctiva and sclera clear  ENMT: No tonsillar erythema, exudates, or enlargement; Moist mucous membranes, Good dentition, No lesions  NECK: Supple, No JVD, Normal thyroid  NERVOUS SYSTEM:  Alert & Oriented X3, Good concentration; Motor Strength 5/5 B/L upper and lower extremities; DTRs 2+ intact and symmetric  CHEST/LUNG: Clear to percussion bilaterally; No rales, rhonchi, wheezing, or rubs  HEART: Regular rate and rhythm; No murmurs, rubs, or gallops  ABDOMEN: Soft, Nontender, Nondistended; Bowel sounds present  EXTREMITIES:  2+ Peripheral Pulses, No clubbing, cyanosis, or edema  LYMPH: No lymphadenopathy noted  SKIN: No rashes or lesions      Consultant(s) Notes Reviewed:  [x ] YES  [ ] NO  Care Discussed with Consultants/Other Providers [ x] YES  [ ] NO    LABS:                Male  RADIOLOGY & ADDITIONAL TESTS:    Imaging Personally Reviewed:  [ ] YES  [ ] NO    MedsMEDICATIONS  (STANDING):  azithromycin  IVPB 500 milliGRAM(s) IV Intermittent every 24 hours  cefTRIAXone   IVPB 1000 milliGRAM(s) IV Intermittent every 24 hours  dextrose 5%. 1000 milliLiter(s) (50 mL/Hr) IV Continuous <Continuous>  dextrose 5%. 1000 milliLiter(s) (100 mL/Hr) IV Continuous <Continuous>  dextrose 50% Injectable 25 Gram(s) IV Push once  dextrose 50% Injectable 25 Gram(s) IV Push once  dextrose 50% Injectable 12.5 Gram(s) IV Push once  glucagon  Injectable 1 milliGRAM(s) IntraMuscular once  heparin   Injectable 5000 Unit(s) SubCutaneous every 8 hours  insulin lispro (ADMELOG) corrective regimen sliding scale   SubCutaneous three times a day before meals  insulin lispro (ADMELOG) corrective regimen sliding scale   SubCutaneous at bedtime  metoprolol succinate ER 25 milliGRAM(s) Oral <User Schedule>  multivitamin 1 Tablet(s) Oral daily  simvastatin 20 milliGRAM(s) Oral at bedtime  tiotropium 2.5 MICROgram(s)/olodaterol 2.5 MICROgram(s) (STIOLTO) Inhaler 2 Puff(s) Inhalation daily    MEDICATIONS  (PRN):  acetaminophen     Tablet .. 650 milliGRAM(s) Oral every 6 hours PRN Temp greater or equal to 38C (100.4F), Mild Pain (1 - 3), Moderate Pain (4 - 6)  albuterol    90 MICROgram(s) HFA Inhaler 2 Puff(s) Inhalation every 6 hours PRN Shortness of Breath and/or Wheezing  aluminum hydroxide/magnesium hydroxide/simethicone Suspension 30 milliLiter(s) Oral every 4 hours PRN Dyspepsia  dextrose Oral Gel 15 Gram(s) Oral once PRN Blood Glucose LESS THAN 70 milliGRAM(s)/deciliter  melatonin 3 milliGRAM(s) Oral at bedtime PRN Insomnia  ondansetron Injectable 4 milliGRAM(s) IV Push every 8 hours PRN Nausea and/or Vomiting      HEALTH ISSUES - PROBLEM Dx:  CAP (community acquired pneumonia)    Frequent falls    Afib    HTN (hypertension)    HLD (hyperlipidemia)    COPD without exacerbation    DM (diabetes mellitus)    Prophylactic measure                   Dmitry Song, PGY1    RADHA HOPE  78y  Male    Complaints:  Subjective:     No acute o/n events. Pt endorses cough. Pt denies subj fevers/chills, HA, dizziness, chest pain, palpitations, n/v, abd pain, dysuria, diarrhea      FAMILY HISTORY:  Family history of breast cancer      78yVital Signs Last 24 Hrs  T(C): 36.4 (27 Jun 2023 05:05), Max: 36.8 (26 Jun 2023 08:59)  T(F): 97.6 (27 Jun 2023 05:05), Max: 98.3 (26 Jun 2023 08:59)  HR: 67 (27 Jun 2023 05:05) (60 - 87)  BP: 124/73 (27 Jun 2023 05:05) (118/76 - 164/76)  BP(mean): --  RR: 19 (27 Jun 2023 05:05) (16 - 20)  SpO2: 97% (27 Jun 2023 05:05) (96% - 100%)    Parameters below as of 27 Jun 2023 05:05  Patient On (Oxygen Delivery Method): room air          PHYSICAL EXAM:  GENERAL: NAD, well-groomed, well-developed  HEAD:  Atraumatic, Normocephalic  EYES: EOMI, PERRLA, conjunctiva and sclera clear  ENMT: No tonsillar erythema, exudates, or enlargement; Moist mucous membranes  NECK: Supple, No JVD, Normal thyroid  HEART: Regular rate and rhythm; No murmurs, rubs, or gallops  RESPIRATORY: Decreased breath sounds b/l. faint crackles at b/l bases. No Wheezing  ABDOMEN: Soft, Nontender, Nondistended; Bowel sounds present  NEUROLOGY: A&Ox3, nonfocal, moving all extremities  EXTREMITIES:  2+ Peripheral Pulses, No clubbing, cyanosis, or edema  SKIN: warm, dry, normal color, no rash or abnormal lesions      Consultant(s) Notes Reviewed:  [x ] YES  [ ] NO  Care Discussed with Consultants/Other Providers [ x] YES  [ ] NO    LABS:                          10.2   9.64  )-----------( 479      ( 27 Jun 2023 07:15 )             31.4       06-27    137  |  102  |  15  ----------------------------<  109<H>  4.2   |  22  |  0.95    Ca    9.6      27 Jun 2023 07:15  Phos  3.8     06-27  Mg     2.10     06-27    TPro  7.7  /  Alb  3.1<L>  /  TBili  0.2  /  DBili  x   /  AST  35  /  ALT  39  /  AlkPhos  75  06-26          Urinalysis Basic - ( 27 Jun 2023 07:15 )    Color: x / Appearance: x / SG: x / pH: x  Gluc: 109 mg/dL / Ketone: x  / Bili: x / Urobili: x   Blood: x / Protein: x / Nitrite: x   Leuk Esterase: x / RBC: x / WBC x   Sq Epi: x / Non Sq Epi: x / Bacteria: x        CAPILLARY BLOOD GLUCOSE      POCT Blood Glucose.: 103 mg/dL (27 Jun 2023 12:39)                      Male  RADIOLOGY & ADDITIONAL TESTS:    Imaging Personally Reviewed:  [ ] YES  [ ] NO    MedsMEDICATIONS  (STANDING):  azithromycin  IVPB 500 milliGRAM(s) IV Intermittent every 24 hours  cefTRIAXone   IVPB 1000 milliGRAM(s) IV Intermittent every 24 hours  dextrose 5%. 1000 milliLiter(s) (50 mL/Hr) IV Continuous <Continuous>  dextrose 5%. 1000 milliLiter(s) (100 mL/Hr) IV Continuous <Continuous>  dextrose 50% Injectable 25 Gram(s) IV Push once  dextrose 50% Injectable 25 Gram(s) IV Push once  dextrose 50% Injectable 12.5 Gram(s) IV Push once  glucagon  Injectable 1 milliGRAM(s) IntraMuscular once  heparin   Injectable 5000 Unit(s) SubCutaneous every 8 hours  insulin lispro (ADMELOG) corrective regimen sliding scale   SubCutaneous three times a day before meals  insulin lispro (ADMELOG) corrective regimen sliding scale   SubCutaneous at bedtime  metoprolol succinate ER 25 milliGRAM(s) Oral <User Schedule>  multivitamin 1 Tablet(s) Oral daily  simvastatin 20 milliGRAM(s) Oral at bedtime  tiotropium 2.5 MICROgram(s)/olodaterol 2.5 MICROgram(s) (STIOLTO) Inhaler 2 Puff(s) Inhalation daily    MEDICATIONS  (PRN):  acetaminophen     Tablet .. 650 milliGRAM(s) Oral every 6 hours PRN Temp greater or equal to 38C (100.4F), Mild Pain (1 - 3), Moderate Pain (4 - 6)  albuterol    90 MICROgram(s) HFA Inhaler 2 Puff(s) Inhalation every 6 hours PRN Shortness of Breath and/or Wheezing  aluminum hydroxide/magnesium hydroxide/simethicone Suspension 30 milliLiter(s) Oral every 4 hours PRN Dyspepsia  dextrose Oral Gel 15 Gram(s) Oral once PRN Blood Glucose LESS THAN 70 milliGRAM(s)/deciliter  melatonin 3 milliGRAM(s) Oral at bedtime PRN Insomnia  ondansetron Injectable 4 milliGRAM(s) IV Push every 8 hours PRN Nausea and/or Vomiting      HEALTH ISSUES - PROBLEM Dx:  CAP (community acquired pneumonia)    Frequent falls    Afib    HTN (hypertension)    HLD (hyperlipidemia)    COPD without exacerbation    DM (diabetes mellitus)    Prophylactic measure

## 2023-06-27 NOTE — PATIENT PROFILE ADULT - FUNCTIONAL ASSESSMENT - BASIC MOBILITY 6.
3-calculated by average/Not able to assess (calculate score using [facility] averaging method)

## 2023-06-27 NOTE — PROGRESS NOTE ADULT - ASSESSMENT
78 year old male with hx of AFib on Eliquis, HTN, HLD, COPD, DM t2, CKD, Nasopharyngeal cancer s/p chemo and radiation comes into the ED w/ cough and dyspnea w/ CXR c/f LLL CAP. Frequent falls w/ syncopal episodes a/w fecal incontinence c/f seizure activities possibly iso brain mets

## 2023-06-27 NOTE — PROGRESS NOTE ADULT - PROBLEM SELECTOR PLAN 3
Patient w/ rate controlled Afib on Toprol 25mg qhs. AC w/ Eliquis  - CHADSVASC=4  - On Eliquis 5mg BID. Will hold PM dose d/t c/f brain mets. Will d/w Onc in AM  - c/w metoprolol succinate 25mg qhs Patient w/ rate controlled Afib on Toprol 25mg qhs. AC w/ Eliquis  - CHADSVASC=4  - c/w Eliquis 5mg BID.  - c/w metoprolol succinate 25mg qhs

## 2023-06-27 NOTE — PATIENT PROFILE ADULT - DOES PATIENT HAVE ADVANCE DIRECTIVE
Anesthesia Pre Eval Note    Anesthesia ROS/Med Hx    Overall Review:  EKG was reviewed and Echo was reviewed     Anesthetic Complication History:  Patient does not have a history of anesthetic complications      Pulmonary Review:    Positive for asthma  The patient is a former smoker.     Neuro/Psych Review:  Patient does not have a neuro/psych history       Cardiovascular Review:  Comments: Echo 2021:  Normal left ventricular cavity size.  Basal septal hypertrophy without obstruction.  Normal left ventricular systolic function.  Left ventricular ejection fraction, 75 %.  No regional wall motion abnormalities.  Normal diastolic function.  Mildly increased right ventricular size.  Normal right ventricular systolic function.  Moderately increased right atrial size.  Mildly thickened mitral valve.  Mild mitral annular calcification.  Difficult visualization but suspect structurally normal trileaflet aortic valve.  No aortic valve stenosis.  No aortic valve regurgitation.  Right ventricular systolic pressure 10.4 mmHg.  Echo free space anterior to the right ventricle likely represents a fat pad.  Mildly dilated ascending aorta 3.8 cm which may be normal for body size.    Positive for dysrhythmias - Paroxysmal A-fib  Positive for hypertension  Positive for hyperlipidemia    GI/HEPATIC/RENAL Review:  Patient does not have a GI/hepatic/renalhistory       End/Other Review:  Positive for diabetes - type 2  Positive for obesity class III - 40.00 - 49.99  Positive for cancer  Additional Results:  EKG:  Encounter Date: 05/09/23  -Electrocardiogram 12-Lead:                                                                 Impression    Atrial fibrillation     Left axis deviation     ALLERGIES:   -- Seasonal -- Other (See Comments)     Past Medical History:  No date: Asthma  No date: Atrial fibrillation (CMD)  No date: Basal cell carcinoma of skin      Comment:  excised, upper mid chest shave  No date: ED (erectile dysfunction)  No  date: HTN (hypertension)  No date: Impaired fasting glucose  02/03/2016: Morbid obesity with BMI of 40.0-44.9, adult (CMD)  No date: Nasal polyps  No date: Osteoarthritis  No date: Seasonal allergies    Past Surgical History:  08/13/2008: Colonoscopy diagnostic  05/01/2012: Knee scope,diagnostic      Comment:  right Knee Arthroscopy  01/01/2003: Polypectomy  07/25/2000: Sinus surgery      Comment:  bilateral       Prior to Admission medications :  Medication rivaroxaban (Xarelto) 20 MG Tab, Sig Take 1 tablet by mouth daily. Start when eliquis completed, Start Date 5/9/23, End Date , Taking? , Authorizing Provider Alex Powell MD    Medication AMIODarone (PACERONE) 200 MG tablet, Sig Take 1 tablet twice daily through 5/25/23. On 5/26/23, decrease to 1 tablet once daily until further notice., Start Date 5/9/23, End Date , Taking? , Authorizing Provider Alex Powell MD    Medication fluticasone-salmeterol (Advair Diskus) 500-50 MCG/ACT inhaler, Sig Inhale 1 puff into the lungs in the morning and 1 puff in the evening., Start Date 4/14/23, End Date , Taking? , Authorizing Provider Kali Estrada MD    Medication furosemide (LASIX) 40 MG tablet, Sig Take 1 tablet by mouth daily., Start Date 4/14/23, End Date , Taking? , Authorizing Provider Kali Estrada MD    Medication fluticasone (FLONASE) 50 MCG/ACT nasal spray, Sig Spray 2 sprays in each nostril daily., Start Date 4/14/23, End Date , Taking? , Authorizing Provider Kali Estrada MD    Medication spironolactone (ALDACTONE) 25 MG tablet, Sig Take 0.5 tablets by mouth daily., Start Date 4/14/23, End Date , Taking? , Authorizing Provider Kali Estrada MD    Medication montelukast (SINGULAIR) 10 MG tablet, Sig Take 1 tablet by mouth daily., Start Date 4/14/23, End Date , Taking? , Authorizing Provider Kali Estrada MD    Medication metFORMIN (GLUCOPHAGE-XR) 500 MG 24 hr tablet, Sig Take 2 tablets by  mouth daily (with breakfast)., Start Date 4/14/23, End Date , Taking? , Authorizing Provider Kali Estrada MD    Medication lisinopril (ZESTRIL) 10 MG tablet, Sig Take 1 tablet by mouth in the morning and 1 tablet in the evening., Start Date 4/14/23, End Date , Taking? , Authorizing Provider Kali Estrada MD    Medication atorvastatin (LIPITOR) 10 MG tablet, Sig Take 1 tablet by mouth every evening., Start Date 4/14/23, End Date , Taking? , Authorizing Provider Kali Estrada MD    Medication blood glucose test strip, Sig Test blood sugar 1 times daily as directed. Diagnosis: E11.69. Meter: Insurance preferred., Start Date 4/14/23, End Date , Taking? , Authorizing Provider Kali Estrada MD    Medication blood glucose lancets, Sig Test blood sugar 1 times daily as directed. Diagnosis: E11.69. Meter: Insurance preferred., Start Date 4/14/23, End Date , Taking? , Authorizing Provider Kali Estrada MD    Medication dilTIAZem (CARDIZEM CD) 120 MG 24 hr capsule, Sig Take 1 capsule by mouth in the morning and 1 capsule in the evening., Start Date 1/20/23, End Date , Taking? , Authorizing Provider Joseph Nieves NP    Medication hydrochlorothiazide (HYDRODIURIL) 25 MG tablet, Sig Take 1 tablet by mouth daily., Start Date 3/9/22, End Date , Taking? , Authorizing Provider Kali Estrada MD    Medication blood glucose meter, Sig Test blood sugar 1 times daily as directed. Diagnosis: E11.69. Meter: Insurance preferred., Start Date 3/9/22, End Date , Taking? , Authorizing Provider Kali Estrada MD    Medication Vitamin D, Cholecalciferol, 10 mcg (400 units) tablet, Sig Take by mouth daily., Start Date , End Date , Taking? , Authorizing Provider Kiana, August    Medication albuterol (PROAIR HFA) 108 (90 Base) MCG/ACT inhaler, Sig Inhale 2 puffs into the lungs four times daily., Start Date 8/12/20, End Date , Taking? , Authorizing Provider  Kali Estrada MD    Medication loratadine (CLARITIN) 10 MG tablet, Sig Take 10 mg by mouth daily., Start Date , End Date , Taking? , Authorizing Provider Provider, Outside            Physical Exam     Airway   Mallampati: II  TM Distance: >3 FB  Neck ROM: Full    Cardiovascular  Cardiovascular exam normal  Cardio Rhythm: Regular  Cardio Rate: Normal    Pulmonary Exam  Pulmonary exam normal  Breath sounds clear to auscultation:  Yes      Anesthesia Plan:    ASA Status: 3  Anesthesia Type: MAC    Checklist  Reviewed: NPO Status, Problem list, Medications, Allergies, Past Med History, Beta Blocker Status, Patient Summary, Lab Results and EKG  Consent/Risks Discussed Statement:  The proposed anesthetic plan, including its risks and benefits, have been discussed with the Patient along with the risks and benefits of alternatives. Questions were encouraged and answered and the patient and/or representative understands and agrees to proceed.        I discussed with the patient (and/or patient's legal representative) the risks and benefits of the proposed anesthesia plan, MAC, which may include services performed by other anesthesia providers.    Alternative anesthesia plans, if available, were reviewed with the patient (and/or patient's legal representative). Discussion has been held with the patient (and/or patient's legal representative) regarding risks of anesthesia, which include Intra-operative Awareness and emergent situations that may require change in anesthesia plan.    The patient (and/or patient's legal representative) has indicated understanding, his/her questions have been answered, and he/she wishes to proceed with the planned anesthetic.    Blood Products: Not Anticipated     No

## 2023-06-27 NOTE — PROGRESS NOTE ADULT - PROBLEM SELECTOR PLAN 7
Pt w/ T2DM not on home meds  -check A1c in AM  - ISS for now - A1C 6.4  - discontinued sliding scale

## 2023-06-27 NOTE — PATIENT PROFILE ADULT - FALL HARM RISK - HARM RISK INTERVENTIONS
Assistance with ambulation/Assistance OOB with selected safe patient handling equipment/Communicate Risk of Fall with Harm to all staff/Monitor for mental status changes/Monitor gait and stability/Reinforce activity limits and safety measures with patient and family/Reorient to person, place and time as needed/Review medications for side effects contributing to fall risk/Sit up slowly, dangle for a short time, stand at bedside before walking/Tailored Fall Risk Interventions/Toileting schedule using arm’s reach rule for commode and bathroom/Use of alarms - bed, chair and/or voice tab/Visual Cue: Yellow wristband and red socks/Bed in lowest position, wheels locked, appropriate side rails in place/Call bell, personal items and telephone in reach/Instruct patient to call for assistance before getting out of bed or chair/Non-slip footwear when patient is out of bed/[place] to call system/Physically safe environment - no spills, clutter or unnecessary equipment/Purposeful Proactive Rounding/Room/bathroom lighting operational, light cord in reach

## 2023-06-27 NOTE — PROGRESS NOTE ADULT - PROBLEM SELECTOR PLAN 6
Patient w/ stable COPD on home Anoro 62.5/25 qd; Proventil HFA 90 PRN  - Will c/w Stiolto while in hospital as Anoro not formulary.  - Duonebs PRN if wheezing Patient w/ stable COPD on home Anoro 62.5/25 qd; Proventil HFA 90 PRN  - Will c/w Stiolto while in hospital as Anoro not formulary.  - albuterol HFA PRN if wheezing

## 2023-06-27 NOTE — PROGRESS NOTE ADULT - PROBLEM SELECTOR PLAN 8
DVT: HSQ  Diet: CC/DASH  Dispo: Pending clinical course DVT: on apixaban  Diet: CC  Dispo: Pending PT eval and MRI brain for frequent falls

## 2023-06-27 NOTE — PROGRESS NOTE ADULT - PROBLEM SELECTOR PLAN 1
Patient w/ dyspnea and cough w/ imaging c/w LLL opacity c/f PNA. +RSV  - No risk factors for HAP  - Will c/w CTX for 5 days (6/26-6/30)  - Will c/w Azithro for atypical coverage, but low suspicion   - Check urine legionella  - Monitor fever/WBC curve  - Currently on RA, monitor respiratory status and c/w symptomatic management Patient w/ dyspnea and cough w/ imaging c/w LLL opacity c/f PNA. +RSV  - No risk factors for HAP  - Will c/w CTX for 5 days (6/26-6/30)  - Will c/w Azithro for atypical coverage, but low suspicion   - Check urine legionella--> pending  - Monitor fever/WBC curve  - Currently on RA, monitor respiratory status and c/w symptomatic management

## 2023-06-27 NOTE — PROGRESS NOTE ADULT - PROBLEM SELECTOR PLAN 2
Patient w/ frequent falls over last few years. Described to have syncopal events a/w fecal incontinence during fall events. CTH on presentation w/o acute findings.   - Description is c/f seizure like activity. Pt w/o hx of seizures.   - Presentation is c/f possible mets to brain. MRI in 2022 performed w/o contrasts shows chronic infacts w/o mets  - Will d/w onc in AM about concern for mets Patient w/ frequent falls over last few years. Described to have syncopal events a/w fecal incontinence during fall events. CTH on presentation w/o acute findings.   - Description is c/f seizure like activity. Pt w/o hx of seizures.   - Presentation is c/f possible mets to brain. MRI in 2022 performed w/o contrasts shows chronic infacts w/o mets  - No focal defecits on exam but will check MRI brain w/ IV contrast Patient w/ frequent falls over last few years. Described to have syncopal events a/w fecal incontinence during fall events. CTH on presentation w/o acute findings.   - Description is c/f seizure like activity. Pt w/o hx of seizures.   - Presentation is c/f possible mets to brain. MRI in 2022 performed w/o contrasts shows chronic infacts w/o mets  - No focal defects on exam but will check MRI brain w/ IV contrast given malignancy history

## 2023-06-28 LAB
ANION GAP SERPL CALC-SCNC: 11 MMOL/L — SIGNIFICANT CHANGE UP (ref 7–14)
BUN SERPL-MCNC: 16 MG/DL — SIGNIFICANT CHANGE UP (ref 7–23)
CALCIUM SERPL-MCNC: 9.5 MG/DL — SIGNIFICANT CHANGE UP (ref 8.4–10.5)
CHLORIDE SERPL-SCNC: 101 MMOL/L — SIGNIFICANT CHANGE UP (ref 98–107)
CO2 SERPL-SCNC: 23 MMOL/L — SIGNIFICANT CHANGE UP (ref 22–31)
CREAT SERPL-MCNC: 0.91 MG/DL — SIGNIFICANT CHANGE UP (ref 0.5–1.3)
EGFR: 86 ML/MIN/1.73M2 — SIGNIFICANT CHANGE UP
GLUCOSE SERPL-MCNC: 91 MG/DL — SIGNIFICANT CHANGE UP (ref 70–99)
HCT VFR BLD CALC: 32.3 % — LOW (ref 39–50)
HGB BLD-MCNC: 10.5 G/DL — LOW (ref 13–17)
LEGIONELLA AG UR QL: NEGATIVE — SIGNIFICANT CHANGE UP
MAGNESIUM SERPL-MCNC: 2 MG/DL — SIGNIFICANT CHANGE UP (ref 1.6–2.6)
MCHC RBC-ENTMCNC: 29.2 PG — SIGNIFICANT CHANGE UP (ref 27–34)
MCHC RBC-ENTMCNC: 32.5 GM/DL — SIGNIFICANT CHANGE UP (ref 32–36)
MCV RBC AUTO: 90 FL — SIGNIFICANT CHANGE UP (ref 80–100)
NRBC # BLD: 0 /100 WBCS — SIGNIFICANT CHANGE UP (ref 0–0)
NRBC # FLD: 0 K/UL — SIGNIFICANT CHANGE UP (ref 0–0)
PHOSPHATE SERPL-MCNC: 3.3 MG/DL — SIGNIFICANT CHANGE UP (ref 2.5–4.5)
PLATELET # BLD AUTO: 517 K/UL — HIGH (ref 150–400)
POTASSIUM SERPL-MCNC: 4.1 MMOL/L — SIGNIFICANT CHANGE UP (ref 3.5–5.3)
POTASSIUM SERPL-SCNC: 4.1 MMOL/L — SIGNIFICANT CHANGE UP (ref 3.5–5.3)
RBC # BLD: 3.59 M/UL — LOW (ref 4.2–5.8)
RBC # FLD: 14.2 % — SIGNIFICANT CHANGE UP (ref 10.3–14.5)
SODIUM SERPL-SCNC: 135 MMOL/L — SIGNIFICANT CHANGE UP (ref 135–145)
WBC # BLD: 7.72 K/UL — SIGNIFICANT CHANGE UP (ref 3.8–10.5)
WBC # FLD AUTO: 7.72 K/UL — SIGNIFICANT CHANGE UP (ref 3.8–10.5)

## 2023-06-28 PROCEDURE — 99232 SBSQ HOSP IP/OBS MODERATE 35: CPT | Mod: GC

## 2023-06-28 RX ORDER — SODIUM CHLORIDE 9 MG/ML
500 INJECTION, SOLUTION INTRAVENOUS ONCE
Refills: 0 | Status: COMPLETED | OUTPATIENT
Start: 2023-06-28 | End: 2023-06-28

## 2023-06-28 RX ADMIN — Medication 1 TABLET(S): at 18:31

## 2023-06-28 RX ADMIN — SIMVASTATIN 20 MILLIGRAM(S): 20 TABLET, FILM COATED ORAL at 21:33

## 2023-06-28 RX ADMIN — APIXABAN 5 MILLIGRAM(S): 2.5 TABLET, FILM COATED ORAL at 18:31

## 2023-06-28 RX ADMIN — AZITHROMYCIN 255 MILLIGRAM(S): 500 TABLET, FILM COATED ORAL at 14:55

## 2023-06-28 RX ADMIN — SODIUM CHLORIDE 500 MILLILITER(S): 9 INJECTION, SOLUTION INTRAVENOUS at 08:41

## 2023-06-28 RX ADMIN — APIXABAN 5 MILLIGRAM(S): 2.5 TABLET, FILM COATED ORAL at 05:04

## 2023-06-28 RX ADMIN — CEFTRIAXONE 100 MILLIGRAM(S): 500 INJECTION, POWDER, FOR SOLUTION INTRAMUSCULAR; INTRAVENOUS at 16:46

## 2023-06-28 RX ADMIN — Medication 25 MILLIGRAM(S): at 21:33

## 2023-06-28 NOTE — PROGRESS NOTE ADULT - PROBLEM SELECTOR PLAN 3
Patient w/ rate controlled Afib on Toprol 25mg qhs. AC w/ Eliquis  - CHADSVASC=3  - c/w Eliquis 5mg BID.  - c/w metoprolol succinate 25mg qhs

## 2023-06-28 NOTE — PHYSICAL THERAPY INITIAL EVALUATION ADULT - ADDITIONAL COMMENTS
Pt lives in an apartment with his wife; there is an elevator. Household ambulator without assistive device; community ambulator with cane.

## 2023-06-28 NOTE — PROGRESS NOTE ADULT - PROBLEM SELECTOR PLAN 2
Patient w/ frequent falls over last few years. Described to have syncopal events a/w fecal incontinence during fall events. CTH on presentation w/o acute findings. Outpatient w/u for vasovagal syncope.   - Description is c/f seizure like activity. Pt w/o hx of seizures.   - Presentation is c/f possible mets to brain. MRI in 2022 performed w/o contrasts shows chronic infacts w/o mets  - No focal defects on exam but will check MRI brain w/ IV contrast given malignancy history  - Patient w/ +orthostatics. 500cc bolus LR Patient w/ frequent falls over last few years. Described to have syncopal events a/w fecal incontinence during fall events. CTH on presentation w/o acute findings. Outpatient w/u for vasovagal syncope.   - Description is c/f seizure like activity. Pt w/o hx of seizures.   - Presentation is c/f possible mets to brain. MRI in 2022 performed w/o contrasts shows chronic infarcts w/o mets  - No focal defects on exam but will check MRI brain w/ IV contrast given malignancy history  - Patient w/ +orthostatics. 500cc bolus LR

## 2023-06-28 NOTE — PROGRESS NOTE ADULT - SUBJECTIVE AND OBJECTIVE BOX
Dmitry Song, PGY1    JAYYRADHA  78y  Male    Complaints:  Subjective:     No acute o/n events. Pt denies subj fevers/chills, HA, dizziness, chest pain, palpitations, n/v, abd pain, dysuria, diarrhea      FAMILY HISTORY:  Family history of breast cancer      78yVital Signs Last 24 Hrs  T(C): 36.6 (27 Jun 2023 20:48), Max: 36.8 (27 Jun 2023 15:19)  T(F): 97.9 (27 Jun 2023 20:48), Max: 98.3 (27 Jun 2023 15:19)  HR: 70 (27 Jun 2023 20:48) (68 - 70)  BP: 148/89 (27 Jun 2023 20:48) (148/89 - 158/80)  BP(mean): --  RR: 15 (27 Jun 2023 20:48) (15 - 18)  SpO2: 99% (27 Jun 2023 20:48) (99% - 100%)    Parameters below as of 27 Jun 2023 20:48  Patient On (Oxygen Delivery Method): room air          PHYSICAL EXAM:  GENERAL: NAD, well-groomed, well-developed  HEAD:  Atraumatic, Normocephalic  EYES: EOMI, PERRLA, conjunctiva and sclera clear  ENMT: No tonsillar erythema, exudates, or enlargement; Moist mucous membranes, Good dentition, No lesions  NECK: Supple, No JVD, Normal thyroid  NERVOUS SYSTEM:  Alert & Oriented X3, Good concentration; Motor Strength 5/5 B/L upper and lower extremities; DTRs 2+ intact and symmetric  CHEST/LUNG: Clear to percussion bilaterally; No rales, rhonchi, wheezing, or rubs  HEART: Regular rate and rhythm; No murmurs, rubs, or gallops  ABDOMEN: Soft, Nontender, Nondistended; Bowel sounds present  EXTREMITIES:  2+ Peripheral Pulses, No clubbing, cyanosis, or edema  LYMPH: No lymphadenopathy noted  SKIN: No rashes or lesions      Consultant(s) Notes Reviewed:  [x ] YES  [ ] NO  Care Discussed with Consultants/Other Providers [ x] YES  [ ] NO    LABS:                Male  RADIOLOGY & ADDITIONAL TESTS:    Imaging Personally Reviewed:  [ ] YES  [ ] NO    MedsMEDICATIONS  (STANDING):  apixaban 5 milliGRAM(s) Oral every 12 hours  azithromycin  IVPB 500 milliGRAM(s) IV Intermittent every 24 hours  cefTRIAXone   IVPB 1000 milliGRAM(s) IV Intermittent every 24 hours  metoprolol succinate ER 25 milliGRAM(s) Oral <User Schedule>  multivitamin 1 Tablet(s) Oral daily  simvastatin 20 milliGRAM(s) Oral at bedtime  tiotropium 2.5 MICROgram(s)/olodaterol 2.5 MICROgram(s) (STIOLTO) Inhaler 2 Puff(s) Inhalation daily    MEDICATIONS  (PRN):  acetaminophen     Tablet .. 650 milliGRAM(s) Oral every 6 hours PRN Temp greater or equal to 38C (100.4F), Mild Pain (1 - 3), Moderate Pain (4 - 6)  albuterol    90 MICROgram(s) HFA Inhaler 2 Puff(s) Inhalation every 6 hours PRN Shortness of Breath and/or Wheezing  aluminum hydroxide/magnesium hydroxide/simethicone Suspension 30 milliLiter(s) Oral every 4 hours PRN Dyspepsia  guaiFENesin Oral Liquid (Sugar-Free) 100 milliGRAM(s) Oral every 6 hours PRN Cough  melatonin 3 milliGRAM(s) Oral at bedtime PRN Insomnia  ondansetron Injectable 4 milliGRAM(s) IV Push every 8 hours PRN Nausea and/or Vomiting      HEALTH ISSUES - PROBLEM Dx:  CAP (community acquired pneumonia)    Frequent falls    Afib    HTN (hypertension)    HLD (hyperlipidemia)    COPD without exacerbation    Prophylactic measure    Pre-diabetes                   Dmitry Song, PGY1    JAYY RADHA  78y  Male    Complaints:  Subjective:     No acute o/n events. Pt endorses mild cough that is improving. Pt denies subj fevers/chills, HA, dizziness, chest pain, palpitations, n/v, abd pain, dysuria, diarrhea      FAMILY HISTORY:  Family history of breast cancer      78yVital Signs Last 24 Hrs  T(C): 36.6 (27 Jun 2023 20:48), Max: 36.8 (27 Jun 2023 15:19)  T(F): 97.9 (27 Jun 2023 20:48), Max: 98.3 (27 Jun 2023 15:19)  HR: 70 (27 Jun 2023 20:48) (68 - 70)  BP: 148/89 (27 Jun 2023 20:48) (148/89 - 158/80)  BP(mean): --  RR: 15 (27 Jun 2023 20:48) (15 - 18)  SpO2: 99% (27 Jun 2023 20:48) (99% - 100%)    Parameters below as of 27 Jun 2023 20:48  Patient On (Oxygen Delivery Method): room air          PHYSICAL EXAM:  GENERAL: NAD, well-groomed, well-developed  HEAD:  Atraumatic, Normocephalic  EYES: EOMI, PERRLA, conjunctiva and sclera clear  ENMT: No tonsillar erythema, exudates, or enlargement; Moist mucous membranes, Good dentition, No lesions  NECK: Supple, No JVD, Normal thyroid  NERVOUS SYSTEM:  Alert & Oriented X3, Good concentration; Motor Strength 5/5 B/L upper and lower extremities; DTRs 2+ intact and symmetric  CHEST/LUNG: Clear to percussion bilaterally; No rales, rhonchi, wheezing, or rubs  HEART: Regular rate and rhythm; No murmurs, rubs, or gallops  ABDOMEN: Soft, Nontender, Nondistended; Bowel sounds present  EXTREMITIES:  2+ Peripheral Pulses, No clubbing, cyanosis, or edema  LYMPH: No lymphadenopathy noted  SKIN: No rashes or lesions      Consultant(s) Notes Reviewed:  [x ] YES  [ ] NO  Care Discussed with Consultants/Other Providers [ x] YES  [ ] NO    LABS:                Male  RADIOLOGY & ADDITIONAL TESTS:    Imaging Personally Reviewed:  [ ] YES  [ ] NO    MedsMEDICATIONS  (STANDING):  apixaban 5 milliGRAM(s) Oral every 12 hours  azithromycin  IVPB 500 milliGRAM(s) IV Intermittent every 24 hours  cefTRIAXone   IVPB 1000 milliGRAM(s) IV Intermittent every 24 hours  metoprolol succinate ER 25 milliGRAM(s) Oral <User Schedule>  multivitamin 1 Tablet(s) Oral daily  simvastatin 20 milliGRAM(s) Oral at bedtime  tiotropium 2.5 MICROgram(s)/olodaterol 2.5 MICROgram(s) (STIOLTO) Inhaler 2 Puff(s) Inhalation daily    MEDICATIONS  (PRN):  acetaminophen     Tablet .. 650 milliGRAM(s) Oral every 6 hours PRN Temp greater or equal to 38C (100.4F), Mild Pain (1 - 3), Moderate Pain (4 - 6)  albuterol    90 MICROgram(s) HFA Inhaler 2 Puff(s) Inhalation every 6 hours PRN Shortness of Breath and/or Wheezing  aluminum hydroxide/magnesium hydroxide/simethicone Suspension 30 milliLiter(s) Oral every 4 hours PRN Dyspepsia  guaiFENesin Oral Liquid (Sugar-Free) 100 milliGRAM(s) Oral every 6 hours PRN Cough  melatonin 3 milliGRAM(s) Oral at bedtime PRN Insomnia  ondansetron Injectable 4 milliGRAM(s) IV Push every 8 hours PRN Nausea and/or Vomiting      HEALTH ISSUES - PROBLEM Dx:  CAP (community acquired pneumonia)    Frequent falls    Afib    HTN (hypertension)    HLD (hyperlipidemia)    COPD without exacerbation    Prophylactic measure    Pre-diabetes                   Dmitry Song, PGY1    RADHA HOPE  78y  Male    Complaints:  Subjective:     No acute o/n events. Pt endorses mild cough that is improving. Pt denies subj fevers/chills, HA, dizziness, chest pain, palpitations, n/v, abd pain, dysuria, diarrhea      FAMILY HISTORY:  Family history of breast cancer      78yVital Signs Last 24 Hrs  T(C): 36.6 (27 Jun 2023 20:48), Max: 36.8 (27 Jun 2023 15:19)  T(F): 97.9 (27 Jun 2023 20:48), Max: 98.3 (27 Jun 2023 15:19)  HR: 70 (27 Jun 2023 20:48) (68 - 70)  BP: 148/89 (27 Jun 2023 20:48) (148/89 - 158/80)  BP(mean): --  RR: 15 (27 Jun 2023 20:48) (15 - 18)  SpO2: 99% (27 Jun 2023 20:48) (99% - 100%)    Parameters below as of 27 Jun 2023 20:48  Patient On (Oxygen Delivery Method): room air          PHYSICAL EXAM:  GENERAL: NAD, well-groomed, well-developed  HEAD:  Atraumatic, Normocephalic  EYES: EOMI, PERRLA, conjunctiva and sclera clear  ENMT: No tonsillar erythema, exudates, or enlargement; Moist mucous membranes  NECK: Supple, No JVD, Normal thyroid  HEART: Regular rate and rhythm; No murmurs, rubs, or gallops  RESPIRATORY: Decreased breath sounds b/l. faint crackles at b/l bases. No Wheezing  ABDOMEN: Soft, Nontender, Nondistended; Bowel sounds present  NEUROLOGY: A&Ox3, nonfocal, moving all extremities  EXTREMITIES:  2+ Peripheral Pulses, No clubbing, cyanosis, or edema  SKIN: warm, dry, normal color, no rash or abnormal lesionslesions      Consultant(s) Notes Reviewed:  [x ] YES  [ ] NO  Care Discussed with Consultants/Other Providers [ x] YES  [ ] NO    LABS:                            10.5   7.72  )-----------( 517      ( 28 Jun 2023 07:20 )             32.3       06-28    135  |  101  |  16  ----------------------------<  91  4.1   |  23  |  0.91    Ca    9.5      28 Jun 2023 07:20  Phos  3.3     06-28  Mg     2.00     06-28                Urinalysis Basic - ( 28 Jun 2023 07:20 )    Color: x / Appearance: x / SG: x / pH: x  Gluc: 91 mg/dL / Ketone: x  / Bili: x / Urobili: x   Blood: x / Protein: x / Nitrite: x   Leuk Esterase: x / RBC: x / WBC x   Sq Epi: x / Non Sq Epi: x / Bacteria: x                  CAPILLARY BLOOD GLUCOSE  93 (27 Jun 2023 17:25)      POCT Blood Glucose.: 93 mg/dL (27 Jun 2023 17:13)                    Male  RADIOLOGY & ADDITIONAL TESTS:    Imaging Personally Reviewed:  [ ] YES  [ ] NO    MedsMEDICATIONS  (STANDING):  apixaban 5 milliGRAM(s) Oral every 12 hours  azithromycin  IVPB 500 milliGRAM(s) IV Intermittent every 24 hours  cefTRIAXone   IVPB 1000 milliGRAM(s) IV Intermittent every 24 hours  metoprolol succinate ER 25 milliGRAM(s) Oral <User Schedule>  multivitamin 1 Tablet(s) Oral daily  simvastatin 20 milliGRAM(s) Oral at bedtime  tiotropium 2.5 MICROgram(s)/olodaterol 2.5 MICROgram(s) (STIOLTO) Inhaler 2 Puff(s) Inhalation daily    MEDICATIONS  (PRN):  acetaminophen     Tablet .. 650 milliGRAM(s) Oral every 6 hours PRN Temp greater or equal to 38C (100.4F), Mild Pain (1 - 3), Moderate Pain (4 - 6)  albuterol    90 MICROgram(s) HFA Inhaler 2 Puff(s) Inhalation every 6 hours PRN Shortness of Breath and/or Wheezing  aluminum hydroxide/magnesium hydroxide/simethicone Suspension 30 milliLiter(s) Oral every 4 hours PRN Dyspepsia  guaiFENesin Oral Liquid (Sugar-Free) 100 milliGRAM(s) Oral every 6 hours PRN Cough  melatonin 3 milliGRAM(s) Oral at bedtime PRN Insomnia  ondansetron Injectable 4 milliGRAM(s) IV Push every 8 hours PRN Nausea and/or Vomiting      HEALTH ISSUES - PROBLEM Dx:  CAP (community acquired pneumonia)    Frequent falls    Afib    HTN (hypertension)    HLD (hyperlipidemia)    COPD without exacerbation    Prophylactic measure    Pre-diabetes

## 2023-06-28 NOTE — PHYSICAL THERAPY INITIAL EVALUATION ADULT - GENERAL OBSERVATIONS, REHAB EVAL
Outreach Attempt was made to schedule Overdue Care Gap.    The Outcome of the Outreach was Contact was not made, left message. Care Gaps discussed included Breast.  
Upon entry, pt semi-supine in bed in NAD. HR 61 bpm SpO2 96% on RA. Pt left seated in bedside chair with all tubes/lines intact, call bell in reach and in NAD. CHELSEY Dodd  notified.

## 2023-06-28 NOTE — PHYSICAL THERAPY INITIAL EVALUATION ADULT - PERTINENT HX OF CURRENT PROBLEM, REHAB EVAL
Pt is a 78 year old male present with increased fatigue, worsening SOB, worsening cough, and MORALES associated with progressive increase in falls and syncopal events, decreased focus, some motor impairment, and fecal incontinence. Pt notably is followed by pulm for a nodule that is c/f malignancy. RVP positive for RSV. CXR revealed left lower lobe patchy opacity likely secondary to pneumonia. CT head with no acute findings.

## 2023-06-28 NOTE — DIETITIAN INITIAL EVALUATION ADULT - PHYSICAL ASSESSMENT SHOULDERS
moderate Hydroxyzine Counseling: Patient advised that the medication is sedating and not to drive a car after taking this medication.  Patient informed of potential adverse effects including but not limited to dry mouth, urinary retention, and blurry vision.  The patient verbalized understanding of the proper use and possible adverse effects of hydroxyzine.  All of the patient's questions and concerns were addressed.

## 2023-06-28 NOTE — DIETITIAN INITIAL EVALUATION ADULT - ADD RECOMMEND
Recommend continue consistent carbohydrate diet + Glucerna 1 PO daily.   Obtain weekly weights.  Recommend continue multivitamin to provide micronutrient coverage.

## 2023-06-28 NOTE — PROGRESS NOTE ADULT - PROBLEM SELECTOR PLAN 1
Patient w/ dyspnea and cough w/ imaging c/w LLL opacity c/f PNA. +RSV  - No risk factors for HAP  - Will c/w CTX for 5 days (6/26-6/30)  - Will c/w Azithro for atypical coverage, but low suspicion   - Check urine legionella--> pending  - Monitor fever/WBC curve  - Currently on RA, monitor respiratory status and c/w symptomatic management Patient w/ dyspnea and cough w/ imaging c/w LLL opacity c/f PNA. +RSV  - No risk factors for HAP  - Will c/w CTX for 5 days (6/26-6/30)   - urine legionella negative. d/c azithro  - Monitor fever/WBC curve  - Currently on RA, monitor respiratory status and c/w symptomatic management

## 2023-06-28 NOTE — PHYSICAL THERAPY INITIAL EVALUATION ADULT - GAIT DEVIATIONS NOTED, PT EVAL
forward flexed posture with downward gaze/decreased janet/decreased velocity of limb motion/decreased step length/decreased stride length/decreased weight-shifting ability

## 2023-06-28 NOTE — PHYSICAL THERAPY INITIAL EVALUATION ADULT - NSPTDISCHREC_GEN_A_CORE
to address impairments in balance and gait/Home PT to address impairments in balance and gait./Home PT

## 2023-06-28 NOTE — PROGRESS NOTE ADULT - PROBLEM SELECTOR PLAN 6
Patient w/ stable COPD on home Anoro 62.5/25 qd; Proventil HFA 90 PRN  - Will c/w Stiolto while in hospital as Anoro not formulary.  - albuterol HFA PRN if wheezing

## 2023-06-28 NOTE — DIETITIAN INITIAL EVALUATION ADULT - PERTINENT MEDS FT
azithromycin IV  cefTRIAXone  IV  multivitamin   simvastatin  aluminum hydroxide/magnesium hydroxide/simethicone Suspension PRN  ondansetron IV PRN

## 2023-06-28 NOTE — PROVIDER CONTACT NOTE (OTHER) - ASSESSMENT
Orthostatic blood pressure  Lying : 120/78  Sitting 110/62  Standing 92/50  Patient denies any dizziness. no acute distress noted

## 2023-06-28 NOTE — DIETITIAN INITIAL EVALUATION ADULT - PERTINENT LABORATORY DATA
(6/28) Na 135, BUN 16, Cr 0.91, BG 91, K+ 4.1, Phos 3.3, Mg 2.00  (6/27) HbA1c 6.4%<H>  POCT: (6/27)

## 2023-06-28 NOTE — DIETITIAN INITIAL EVALUATION ADULT - OTHER INFO
Per chart, pt is 78 year old male PMH AFib, HTN, HLD, COPD, type 2 DM, CKD, nasopharyngeal cancer s/p chemo and radiation presenting with cough, frequent falls found to have PNA, RSV. Pending MRI brain.     Pt confirms NKFA, denies difficulties chewing/swallowing. Pt lives at home with wife. Pt reports variable appetite/PO intake PTA, mainly consumes takeout ("soul food") and supplements with nutrition shakes. History of type 2 DM, no noted medications PTA per H&P, HbA1c (6/27) 6.4%.     Pt reports good appetite at time of visit, breakfast tray visible with >75% consumption. Pt enjoying Glucerna shake however he prefers to dilute it with water. Pt denies current GI distress, last BM 6/26 per flowsheets. No bowel regimen ordered at this time. Fingersticks WDL.

## 2023-06-28 NOTE — DIETITIAN INITIAL EVALUATION ADULT - OTHER CALCULATIONS
Dosing weight (6/27) 120 lbs / 54.4 kg.   Recent chart weight (3/31) 135 lbs.   Apparent 15 lb (11%) weight loss x3 months, clinically significant.  Ideal Body Weight: 166 lbs / 75.5 kg +/-10%

## 2023-06-29 ENCOUNTER — TRANSCRIPTION ENCOUNTER (OUTPATIENT)
Age: 78
End: 2023-06-29

## 2023-06-29 PROCEDURE — 70552 MRI BRAIN STEM W/DYE: CPT | Mod: 26

## 2023-06-29 PROCEDURE — 99232 SBSQ HOSP IP/OBS MODERATE 35: CPT | Mod: GC

## 2023-06-29 RX ADMIN — TIOTROPIUM BROMIDE AND OLODATEROL 2 PUFF(S): 3.124; 2.736 SPRAY, METERED RESPIRATORY (INHALATION) at 11:25

## 2023-06-29 RX ADMIN — Medication 1 TABLET(S): at 11:25

## 2023-06-29 RX ADMIN — APIXABAN 5 MILLIGRAM(S): 2.5 TABLET, FILM COATED ORAL at 05:31

## 2023-06-29 RX ADMIN — CEFTRIAXONE 100 MILLIGRAM(S): 500 INJECTION, POWDER, FOR SOLUTION INTRAMUSCULAR; INTRAVENOUS at 16:09

## 2023-06-29 RX ADMIN — Medication 100 MILLIGRAM(S): at 16:46

## 2023-06-29 RX ADMIN — Medication 25 MILLIGRAM(S): at 22:24

## 2023-06-29 RX ADMIN — APIXABAN 5 MILLIGRAM(S): 2.5 TABLET, FILM COATED ORAL at 18:03

## 2023-06-29 RX ADMIN — SIMVASTATIN 20 MILLIGRAM(S): 20 TABLET, FILM COATED ORAL at 22:24

## 2023-06-29 NOTE — PROGRESS NOTE ADULT - SUBJECTIVE AND OBJECTIVE BOX
Dmitry Song, PGY1    JAYYRADHA  78y  Male    Complaints:  Subjective:     No acute o/n events. Pt denies subj fevers/chills, HA, dizziness, chest pain, palpitations, n/v, abd pain, dysuria, diarrhea      FAMILY HISTORY:  Family history of breast cancer      78yVital Signs Last 24 Hrs  T(C): 36.6 (29 Jun 2023 05:25), Max: 37.4 (28 Jun 2023 14:45)  T(F): 97.9 (29 Jun 2023 05:25), Max: 99.3 (28 Jun 2023 14:45)  HR: 79 (29 Jun 2023 05:25) (67 - 79)  BP: 139/83 (29 Jun 2023 05:25) (120/78 - 142/87)  BP(mean): --  RR: 18 (29 Jun 2023 05:25) (16 - 18)  SpO2: 97% (29 Jun 2023 05:25) (96% - 98%)    Parameters below as of 29 Jun 2023 05:25  Patient On (Oxygen Delivery Method): room air          PHYSICAL EXAM:  GENERAL: NAD, well-groomed, well-developed  HEAD:  Atraumatic, Normocephalic  EYES: EOMI, PERRLA, conjunctiva and sclera clear  ENMT: No tonsillar erythema, exudates, or enlargement; Moist mucous membranes, Good dentition, No lesions  NECK: Supple, No JVD, Normal thyroid  NERVOUS SYSTEM:  Alert & Oriented X3, Good concentration; Motor Strength 5/5 B/L upper and lower extremities; DTRs 2+ intact and symmetric  CHEST/LUNG: Clear to percussion bilaterally; No rales, rhonchi, wheezing, or rubs  HEART: Regular rate and rhythm; No murmurs, rubs, or gallops  ABDOMEN: Soft, Nontender, Nondistended; Bowel sounds present  EXTREMITIES:  2+ Peripheral Pulses, No clubbing, cyanosis, or edema  LYMPH: No lymphadenopathy noted  SKIN: No rashes or lesions      Consultant(s) Notes Reviewed:  [x ] YES  [ ] NO  Care Discussed with Consultants/Other Providers [ x] YES  [ ] NO    LABS:                Male  RADIOLOGY & ADDITIONAL TESTS:    Imaging Personally Reviewed:  [ ] YES  [ ] NO    MedsMEDICATIONS  (STANDING):  apixaban 5 milliGRAM(s) Oral every 12 hours  cefTRIAXone   IVPB 1000 milliGRAM(s) IV Intermittent every 24 hours  metoprolol succinate ER 25 milliGRAM(s) Oral <User Schedule>  multivitamin 1 Tablet(s) Oral daily  simvastatin 20 milliGRAM(s) Oral at bedtime  tiotropium 2.5 MICROgram(s)/olodaterol 2.5 MICROgram(s) (STIOLTO) Inhaler 2 Puff(s) Inhalation daily    MEDICATIONS  (PRN):  acetaminophen     Tablet .. 650 milliGRAM(s) Oral every 6 hours PRN Temp greater or equal to 38C (100.4F), Mild Pain (1 - 3), Moderate Pain (4 - 6)  albuterol    90 MICROgram(s) HFA Inhaler 2 Puff(s) Inhalation every 6 hours PRN Shortness of Breath and/or Wheezing  aluminum hydroxide/magnesium hydroxide/simethicone Suspension 30 milliLiter(s) Oral every 4 hours PRN Dyspepsia  guaiFENesin Oral Liquid (Sugar-Free) 100 milliGRAM(s) Oral every 6 hours PRN Cough  melatonin 3 milliGRAM(s) Oral at bedtime PRN Insomnia  ondansetron Injectable 4 milliGRAM(s) IV Push every 8 hours PRN Nausea and/or Vomiting      HEALTH ISSUES - PROBLEM Dx:  CAP (community acquired pneumonia)    Frequent falls    Afib    HTN (hypertension)    HLD (hyperlipidemia)    COPD without exacerbation    Prophylactic measure    Pre-diabetes                   Dmitry Song, PGY1    RADHA HOPE  78y  Male    Complaints:  Subjective:     Pt reports cough w/ bloody mucous production overnight. He denies any subjective fevers/chills, shortness of breath, chest pain, throat pain, dysphagia, changes in voice. Also denies, HA, dizziness, chest pain, palpitations, n/v, abd pain, dysuria, diarrhea      FAMILY HISTORY:  Family history of breast cancer      78yVital Signs Last 24 Hrs  T(C): 36.6 (29 Jun 2023 05:25), Max: 37.4 (28 Jun 2023 14:45)  T(F): 97.9 (29 Jun 2023 05:25), Max: 99.3 (28 Jun 2023 14:45)  HR: 79 (29 Jun 2023 05:25) (67 - 79)  BP: 139/83 (29 Jun 2023 05:25) (120/78 - 142/87)  BP(mean): --  RR: 18 (29 Jun 2023 05:25) (16 - 18)  SpO2: 97% (29 Jun 2023 05:25) (96% - 98%)    Parameters below as of 29 Jun 2023 05:25  Patient On (Oxygen Delivery Method): room air          PHYSICAL EXAM:  GENERAL: NAD, well-groomed, well-developed  HEAD:  Atraumatic, Normocephalic  EYES: EOMI, PERRLA, conjunctiva and sclera clear  ENMT: No tonsillar erythema, exudates, or enlargement; Moist mucous membranes  NECK: Supple, No JVD, Normal thyroid  HEART: Regular rate and rhythm; No murmurs, rubs, or gallops  RESPIRATORY: Clear to auscultation bilaterally. No crackles/wheeze  ABDOMEN: Soft, Nontender, Nondistended; Bowel sounds present  NEUROLOGY: A&Ox3, nonfocal, moving all extremities  EXTREMITIES:  2+ Peripheral Pulses, No clubbing, cyanosis, or edema  SKIN: warm, dry, normal color, no rash or abnormal lesionslesions    Consultant(s) Notes Reviewed:  [x ] YES  [ ] NO  Care Discussed with Consultants/Other Providers [ x] YES  [ ] NO    LABS:                          10.5   7.72  )-----------( 517      ( 28 Jun 2023 07:20 )             32.3       06-28    135  |  101  |  16  ----------------------------<  91  4.1   |  23  |  0.91    Ca    9.5      28 Jun 2023 07:20  Phos  3.3     06-28  Mg     2.00     06-28            Urinalysis Basic - ( 28 Jun 2023 07:20 )    Color: x / Appearance: x / SG: x / pH: x  Gluc: 91 mg/dL / Ketone: x  / Bili: x / Urobili: x   Blood: x / Protein: x / Nitrite: x   Leuk Esterase: x / RBC: x / WBC x   Sq Epi: x / Non Sq Epi: x / Bacteria: x      CAPILLARY BLOOD GLUCOSE  93 (27 Jun 2023 17:25)      POCT Blood Glucose.: 93 mg/dL (27 Jun 2023 17:13)          Male  RADIOLOGY & ADDITIONAL TESTS:    Imaging Personally Reviewed:  [ ] YES  [ ] NO    MedsMEDICATIONS  (STANDING):  apixaban 5 milliGRAM(s) Oral every 12 hours  cefTRIAXone   IVPB 1000 milliGRAM(s) IV Intermittent every 24 hours  metoprolol succinate ER 25 milliGRAM(s) Oral <User Schedule>  multivitamin 1 Tablet(s) Oral daily  simvastatin 20 milliGRAM(s) Oral at bedtime  tiotropium 2.5 MICROgram(s)/olodaterol 2.5 MICROgram(s) (STIOLTO) Inhaler 2 Puff(s) Inhalation daily    MEDICATIONS  (PRN):  acetaminophen     Tablet .. 650 milliGRAM(s) Oral every 6 hours PRN Temp greater or equal to 38C (100.4F), Mild Pain (1 - 3), Moderate Pain (4 - 6)  albuterol    90 MICROgram(s) HFA Inhaler 2 Puff(s) Inhalation every 6 hours PRN Shortness of Breath and/or Wheezing  aluminum hydroxide/magnesium hydroxide/simethicone Suspension 30 milliLiter(s) Oral every 4 hours PRN Dyspepsia  guaiFENesin Oral Liquid (Sugar-Free) 100 milliGRAM(s) Oral every 6 hours PRN Cough  melatonin 3 milliGRAM(s) Oral at bedtime PRN Insomnia  ondansetron Injectable 4 milliGRAM(s) IV Push every 8 hours PRN Nausea and/or Vomiting      HEALTH ISSUES - PROBLEM Dx:  CAP (community acquired pneumonia)    Frequent falls    Afib    HTN (hypertension)    HLD (hyperlipidemia)    COPD without exacerbation    Prophylactic measure    Pre-diabetes

## 2023-06-29 NOTE — DISCHARGE NOTE PROVIDER - NSDCFUSCHEDAPPT_GEN_ALL_CORE_FT
Ez Hidalgo  [facility]  [address]NG [address] [facility]  Scheduled Appointment: 08/01/2023    [facility]  VASCULAR [address]  Scheduled Appointment: 09/12/2023    Berny Sanches  [facility]  VASCULAR [address]  Scheduled Appointment: 09/19/2023

## 2023-06-29 NOTE — DISCHARGE NOTE PROVIDER - NSDCCPTREATMENT_GEN_ALL_CORE_FT
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 Scattered foci of susceptibility are seen in these   areas compatible with the presence of hemorrhagic byproducts. Diffuse cerebral volume loss with ventricular and sulcal prominence. There are foci of T2/FLAIR signal hyperintensity within the periventricular and subcortical white matter, which are nonspecific but compatible with microvascular ischemic changes.  There is an area of T1/T2 prolongation within the anterior right temporal lobe predominantly within the white matter producing localized sulcal effacement likely representing vasogenic edema. A patchy/nodular area of abnormal enhancement is seen (9:10) measuring 2 x 1 x 0.8 cm in size which surrounds an area of susceptibility artifact. An atypical appearing vessel is seen within the lateral temporal lobe which extends to the brain surface (10:68, 69) which may represent a developmental venous anomaly. No acute infarction, intracranial hemorrhage or mass lesion.  There are no areas of restricted diffusion or abnormal intracranial   enhancement. No hydrocephalus. No extra-axial fluid collections. The skull base flow voids are present. Mucosal thickening is seen most notably involving the ethmoid sinuses. Nonspecific abnormal soft tissue is seen within the right tympanomastoid cavity. The visualized osseous structures, soft tissues and partially visualized parotid glands appear normal.  IMPRESSION:  Focus of abnormal enhancement with presumed surrounding vasogenic edema within the right temporal lobe. A central zone of susceptibility is seen and there is an atypical linear vessel in this area may represent a developmental venous anomaly or prominent feeding vessel. Given the patient's clinical history, a metastatic focus is a prime consideration. A cavernoma with associated hemorrhage/edema is apossibility. Multiple areas of encephalomalacia/gliosis as seen pr

## 2023-06-29 NOTE — PROGRESS NOTE ADULT - PROBLEM SELECTOR PLAN 8
DVT: on apixaban  Diet: CC  Dispo: Pending PT eval and MRI brain for frequent falls DVT: on apixaban  Diet: CC  Dispo: Home PT pending MRI brain for frequent falls

## 2023-06-29 NOTE — DISCHARGE NOTE PROVIDER - NSDCMRMEDTOKEN_GEN_ALL_CORE_FT
Anoro Ellipta 62.5 mcg-25 mcg/inh inhalation powder: 1 puff(s) inhaled once a day  Eliquis 5 mg oral tablet: 1 tab(s) orally 2 times a day  metoprolol succinate 25 mg oral tablet, extended release: 1 tab(s) orally once a day (at bedtime)  multivitamin: 1 milligram(s) orally once a day    Last dose 3/1/23  Proventil HFA 90 mcg/inh inhalation aerosol: 2 puff(s) inhaled every 6 hours, As Needed  simvastatin 20 mg oral tablet: 1 tab(s) orally once a day (at bedtime)   Anoro Ellipta 62.5 mcg-25 mcg/inh inhalation powder: 1 puff(s) inhaled once a day  Eliquis 5 mg oral tablet: 1 tab(s) orally 2 times a day  guaiFENesin 100 mg/5 mL oral liquid: 5 milliliter(s) orally every 6 hours As needed Cough  metoprolol succinate 25 mg oral tablet, extended release: 1 tab(s) orally once a day (at bedtime)  multivitamin: 1 milligram(s) orally once a day    Last dose 3/1/23  Proventil HFA 90 mcg/inh inhalation aerosol: 2 puff(s) inhaled every 6 hours, As Needed  simvastatin 20 mg oral tablet: 1 tab(s) orally once a day (at bedtime)   Anoro Ellipta 62.5 mcg-25 mcg/inh inhalation powder: 1 puff(s) inhaled once a day  Eliquis 5 mg oral tablet: 1 tab(s) orally 2 times a day  guaiFENesin 100 mg/5 mL oral liquid: 5 milliliter(s) orally every 6 hours As needed Cough  metoprolol succinate 25 mg oral tablet, extended release: 1 tab(s) orally once a day (at bedtime)  multivitamin: 1 milligram(s) orally once a day    Last dose 3/1/23  Physical Therapy: Please participate in outpatient physical therapy as directed  Proventil HFA 90 mcg/inh inhalation aerosol: 2 puff(s) inhaled every 6 hours, As Needed  simvastatin 20 mg oral tablet: 1 tab(s) orally once a day (at bedtime)

## 2023-06-29 NOTE — DISCHARGE NOTE PROVIDER - CARE PROVIDER_API CALL
Adrian Barriga  Internal Medicine  [address] [address]  Phone: [phone]  Fax: [phone]  Established Patient  Follow Up Time: 1 week   Adrian Barriga  Internal Medicine  [address] [place] [address]  Phone: [phone]  Fax: [phone]  Established Patient  Follow Up Time: 1 week    Jose Alberto Branham  Neurosurgery  [address]  [place] [address]  Phone: [phone]  Fax: [phone]  Follow Up Time: 2 weeks

## 2023-06-29 NOTE — DISCHARGE NOTE PROVIDER - DISCHARGE SERVICE FOR PATIENT
supine on the discharge service for the patient. I have reviewed and made amendments to the documentation where necessary.

## 2023-06-29 NOTE — DISCHARGE NOTE PROVIDER - PROVIDER TOKENS
PROVIDER:[TOKEN:[[id number]],FOLLOWUP:[1 week],ESTABLISHEDPATIENT:[T]] PROVIDER:[TOKEN:[[id number]],FOLLOWUP:[1 week],ESTABLISHEDPATIENT:[T]],PROVIDER:[TOKEN:[[id number]],FOLLOWUP:[2 weeks]]

## 2023-06-29 NOTE — DISCHARGE NOTE PROVIDER - NSDCFUADDAPPT_GEN_ALL_CORE_FT
Please follow up with     1) Your PCP in 1-2 weeks  2) Neurosurgery in 1-2 weeks. Dr. Branham ([phone])  3) Please f/u with ENT your scheduled appointment is at 8/1/23  4) Please f/u with oncology at [phone]

## 2023-06-29 NOTE — PROGRESS NOTE ADULT - PROBLEM SELECTOR PLAN 2
Patient w/ frequent falls over last few years. Described to have syncopal events a/w fecal incontinence during fall events. CTH on presentation w/o acute findings. Outpatient w/u for vasovagal syncope.   - Description is c/f seizure like activity. Pt w/o hx of seizures.   - Presentation is c/f possible mets to brain. MRI in 2022 performed w/o contrasts shows chronic infarcts w/o mets  - No focal defects on exam but will check MRI brain w/ IV contrast given malignancy history  - Patient w/ +orthostatics. 500cc bolus LR

## 2023-06-29 NOTE — DISCHARGE NOTE PROVIDER - HOSPITAL COURSE
78 year old male with hx of AFib on Eliquis, HTN, HLD, COPD, DM t2, CKD, Nasopharyngeal cancer s/p chemo and radiation comes into the ED w/ cough and dyspnea w/ CXR c/f LLL CAP. Falls c/f vasovagal syncope vs orthostatics. 78 year old male with hx of AFib on Eliquis, HTN, HLD, COPD, DM t2, CKD, Nasopharyngeal cancer s/p chemo and radiation, carcinoid tumor (s/p hemicolectomy, s/p partial pancreatectomy), pulmonary nodule (c/f malignancy) comes into the ED w/ cough and dyspnea w/ CXR c/f LLL CAP. Found to be positive for RSV. Falls c/f vasovagal syncope vs orthostatics. During hospital course patient treated for CAP w/ CTX and Azithro. Azithro d/c after urine legionella was negative. He was on room air w/o any episodes of hypoxemia. He completed his 5 day course of CTX on June 30th. Patient was found to be orthostatic positive and received IV fluid bolus. Pt on Room air and afebrile throughout course. He had episode of coughing up bloody mucous, he was hemodynamically stable, possibly d/t upper respiratory bacterial/viral infection vs possible pulmonary malignancy. Additionally, patient's wife reported increased frequency of falls, believed to be 2/2 vasovagal, but possibly multifactorial w/ orthostatics. Wife reports falls are usually associated w/ fecal incontinence raising concern. Brain MRI was ordered and performed which showed _____________________    At discharge patient was stable and medically optimized. 78 year old male with hx of AFib on Eliquis, HTN, HLD, COPD, DM t2, CKD, Nasopharyngeal cancer s/p chemo and radiation, carcinoid tumor (s/p hemicolectomy, s/p partial pancreatectomy), pulmonary nodule (c/f malignancy) comes into the ED w/ cough and dyspnea w/ CXR c/f LLL CAP. Found to be positive for RSV. Falls c/f vasovagal syncope vs orthostatics. During hospital course patient treated for CAP w/ CTX and Azithro. Azithro d/c after urine legionella was negative. He was on room air w/o any episodes of hypoxemia. He completed his 5 day course of CTX on June 30th. Patient was found to be orthostatic positive and received IV fluid bolus. Pt on Room air and afebrile throughout course. He had episode of coughing up bloody mucous, he was hemodynamically stable, possibly d/t upper respiratory bacterial/viral infection vs possible pulmonary malignancy. Additionally, patient's wife reported increased frequency of falls, believed to be 2/2 vasovagal, but possibly multifactorial w/ orthostatics. Wife reports falls are usually associated w/ fecal incontinence raising concern. Brain MRI was ordered and performed which showed a region in the R temporal lobe w/ vascular supply. There was concern that the lesion was either a developmental venous anomaly vs a true metastatic lesion. NSGY evaluated pt and recommended continuing w/ current management. No need to steroids or other intervention at this time. Pt can ______ his eliquis. Pt was also seen by oncology and recommended to have outpatient f/u at [facility].     At discharge patient was stable and medically optimized. 78 year old male with hx of AFib on Eliquis, HTN, HLD, COPD, DM t2, CKD, Nasopharyngeal cancer s/p chemo and radiation, carcinoid tumor (s/p hemicolectomy, s/p partial pancreatectomy), pulmonary nodule (c/f malignancy) comes into the ED w/ cough and dyspnea w/ CXR c/f LLL CAP. Found to be positive for RSV. Falls c/f vasovagal syncope vs orthostatics. During hospital course patient treated for CAP w/ CTX and Azithro. Azithro d/c after urine legionella was negative. He was on room air w/o any episodes of hypoxemia. He completed his 5 day course of CTX on June 30th. Patient was found to be orthostatic positive and received IV fluid bolus. Pt on Room air and afebrile throughout course. He had episode of coughing up bloody mucous, he was hemodynamically stable, possibly d/t upper respiratory bacterial/viral infection vs possible pulmonary malignancy. Additionally, patient's wife reported increased frequency of falls, believed to be 2/2 vasovagal, but possibly multifactorial w/ orthostatics. Wife reports falls are usually associated w/ fecal incontinence raising concern. Brain MRI was ordered and performed which showed a region in the R temporal lobe w/ vascular supply. There was concern that the lesion was either a developmental venous anomaly vs a true metastatic lesion. NSGY evaluated pt and recommended continuing w/ current management. He will need to f/u w/ NSGY and have a repeat MRI brain to determine if metastatic vs just an anomaly. No need to steroids or other intervention at this time. Pt can resume his eliquis. Pt was also seen by oncology and recommended to have outpatient f/u at [facility].     At discharge patient was stable and medically optimized. 78 year old male with hx of AFib on Eliquis, HTN, HLD, COPD, DM t2, CKD, Nasopharyngeal cancer s/p chemo and radiation, carcinoid tumor (s/p hemicolectomy, s/p partial pancreatectomy), pulmonary nodule (c/f malignancy) comes into the ED w/ cough and dyspnea w/ CXR c/f LLL CAP. Found to be positive for RSV. Falls c/f vasovagal syncope vs orthostatics. During hospital course patient treated for CAP w/ CTX and Azithro. Azithro d/c after urine legionella was negative. He was on room air w/o any episodes of hypoxemia. He completed his 5 day course of CTX on June 30th. Patient was found to be orthostatic positive and received IV fluid bolus. Pt on Room air and afebrile throughout course. He had episode of coughing up bloody mucous, he was hemodynamically stable, possibly d/t upper respiratory bacterial/viral infection vs possible pulmonary malignancy. Additionally, patient's wife reported increased frequency of falls, believed to be 2/2 vasovagal, but possibly multifactorial w/ orthostatics. Wife reports falls are usually associated w/ fecal incontinence raising concern. Brain MRI was ordered and performed which showed a region in the R temporal lobe w/ vascular supply. There was concern that the lesion was either a developmental venous anomaly vs a true metastatic lesion. NSGY evaluated pt and recommended continuing w/ current management. He will need to f/u w/ NSGY and have a repeat MRI brain to determine if metastatic vs just an anomaly. No need to steroids or other intervention at this time. Pt can resume his eliquis. Pt was also seen by oncology and recommended to have outpatient f/u at [facility].     At discharge patient was stable and medically optimized.           Attending addendum : Ct scan findings noted  , CT chest: Nodular consolidations at the left lung base measuring up to1.6 cm in the left major fissure, with internal low-attenuation center.   Differential includes infection versus neoplasm. 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 No   evidence of metastatic disease in the abdomen and pelvis.  Per oncology chart note :No further oncologic workup inpatient and no oncologic contraindications to discharge. If confirmed to be a metastatic recurrence or a new primary malignancy, please refer patient back to [facility] if amenable.  Will DC home , pt to f/u with Oncology and Neurosx  Patient hemodynamically stable for discharge home  Time spent in discharge process is 40 min

## 2023-06-29 NOTE — DISCHARGE NOTE PROVIDER - NSDCCPCAREPLAN_GEN_ALL_CORE_FT
PRINCIPAL DISCHARGE DIAGNOSIS  Diagnosis: CAP (community acquired pneumonia)  Assessment and Plan of Treatment: You came to the hospital with upper respiratory symptoms and were found to have pneumonia on your chest imaging. You were also found to have a viral infection called RSV. You were treated with antibiotiscs and had improvements in your symptoms. You never had any fevers and never required supplemental oxygen. You don't require any further antibiotics for your pneumonia. You should continue to treat your cough with warm tea, and over the counter measures to help with your symptoms, which should resolve over the next days to week. If you develop significant shortness of breath, fevers/chills, bloody mucous production please seek medical care.      SECONDARY DISCHARGE DIAGNOSES  Diagnosis: Frequent falls  Assessment and Plan of Treatment: You came to the hospital with a history of frequent falls and passing out at home. Your outpatient doctors thought you had vasovagal syncope and orthostatic hypotension. In the hospital you had orthostatic hypotension and were given IV fluids and encouraged to stay hydrated. When you leave it may benefit you to wear compression socks and stand up slowly from a seated position. Avoid standing for long periods at a time. One other concern we had was that you may have a lesion in your brain causing your symptoms. We performed a brain MRI and found a lesion in your temporal lobe of the brain. See below for further details and explanation of the lesion. You should seek medical care if you continue to have these falls. It is important to use a cane or assistive device to help minimize fall risk. It is important to continue with physical therapy to help minimize fall risk.    Diagnosis: Temporal lobe lesion  Assessment and Plan of Treatment: You had a lesion in your brain, specifically the Right temporal lobe that was concerning for possible metastatic disease. There was debate whether this was just an anomaly in the imaging or metastatic cancer. Given your cancer history, it is hard to rule out cancer. As a result, It is important for you to follow up with neurosurgery (see follow-up section for more info). If you develop sudden weakness, severe headache, change in vision, seziure activity, please seek urgent medical care. i     PRINCIPAL DISCHARGE DIAGNOSIS  Diagnosis: CAP (community acquired pneumonia)  Assessment and Plan of Treatment: You came to the hospital with upper respiratory symptoms and were found to have pneumonia on your chest imaging. You were also found to have a viral infection called RSV. You were treated with antibiotiscs and had improvements in your symptoms. You never had any fevers and never required supplemental oxygen. You don't require any further antibiotics for your pneumonia. You should continue to treat your cough with warm tea, and over the counter measures to help with your symptoms, which should resolve over the next days to week. If you develop significant shortness of breath, fevers/chills, bloody mucous production please seek medical care.      SECONDARY DISCHARGE DIAGNOSES  Diagnosis: Temporal lobe lesion  Assessment and Plan of Treatment: You had a lesion in your brain, specifically the Right temporal lobe that was concerning for possible metastatic disease. There was debate whether this was just an anomaly in the imaging or metastatic cancer. Given your cancer history, it is hard to rule out cancer. As a result, It is important for you to follow up with neurosurgery (see follow-up section for more info). You will need a repeat MRI brain in about 1 month to determine if this is truly metastatic cancer lesion or if it is just an anomaly. If you develop sudden weakness, severe headache, change in vision, seziure activity, please seek urgent medical care.    Diagnosis: Frequent falls  Assessment and Plan of Treatment: You came to the hospital with a history of frequent falls and passing out at home. Your outpatient doctors thought you had vasovagal syncope and orthostatic hypotension. In the hospital you had orthostatic hypotension and were given IV fluids and encouraged to stay hydrated. When you leave it may benefit you to wear compression socks and stand up slowly from a seated position. Avoid standing for long periods at a time. One other concern we had was that you may have a lesion in your brain causing your symptoms. We performed a brain MRI and found a lesion in your temporal lobe of the brain. See below for further details and explanation of the lesion. You should seek medical care if you continue to have these falls. It is important to use a cane or assistive device to help minimize fall risk. It is important to continue with physical therapy to help minimize fall risk.     PRINCIPAL DISCHARGE DIAGNOSIS  Diagnosis: CAP (community acquired pneumonia)  Assessment and Plan of Treatment: You came to the hospital with upper respiratory symptoms and were found to have pneumonia on your chest imaging. You were also found to have a viral infection called RSV. You were treated with antibiotiscs and had improvements in your symptoms. You never had any fevers and never required supplemental oxygen. You don't require any further antibiotics for your pneumonia. You should continue to treat your cough with warm tea, and over the counter measures to help with your symptoms, which should resolve over the next days to week. If you develop significant shortness of breath, fevers/chills, bloody mucous production please seek medical care.      SECONDARY DISCHARGE DIAGNOSES  Diagnosis: Temporal lobe lesion  Assessment and Plan of Treatment: You had a lesion in your brain, specifically the Right temporal lobe that was concerning for possible metastatic disease. There was debate whether this was just an anomaly in the imaging or metastatic cancer. Given your cancer history, it is hard to rule out cancer. You had a CT chest, abdomen and pelvis, which did not show any evidence of metastesis in the abdomen and pelvis, but was significant for nodular lesions in the left lung concerning for infection or neoplasm. You were recently treated for a pneumonia or lung infection possibly explaining these findings. As a result, It is important for you to follow up with neurosurgery (see follow-up section for more info). You will need a repeat MRI brain in about 1 month to determine if this is truly metastatic cancer lesion or if it is just an anomaly. If you develop sudden weakness, severe headache, change in vision, seziure activity, please seek urgent medical care.    Diagnosis: Frequent falls  Assessment and Plan of Treatment: You came to the hospital with a history of frequent falls and passing out at home. Your outpatient doctors thought you had vasovagal syncope and orthostatic hypotension. In the hospital you had orthostatic hypotension and were given IV fluids and encouraged to stay hydrated. When you leave it may benefit you to wear compression socks and stand up slowly from a seated position. Avoid standing for long periods at a time. One other concern we had was that you may have a lesion in your brain causing your symptoms. We performed a brain MRI and found a lesion in your temporal lobe of the brain. See below for further details and explanation of the lesion. You should seek medical care if you continue to have these falls. It is important to use a cane or assistive device to help minimize fall risk. It is important to continue with physical therapy to help minimize fall risk.

## 2023-06-29 NOTE — PROGRESS NOTE ADULT - PROBLEM SELECTOR PLAN 1
Patient w/ dyspnea and cough w/ imaging c/w LLL opacity c/f PNA. +RSV  - No risk factors for HAP  - Will c/w CTX for 5 days (6/26-6/30)   - urine legionella negative. d/c azithro  - Monitor fever/WBC curve  - Currently on RA, monitor respiratory status and c/w symptomatic management Patient w/ dyspnea and cough w/ imaging c/w LLL opacity c/f PNA. +RSV  - No risk factors for HAP  - Will c/w CTX for 5 days (6/26-6/30)   - urine legionella negative. d/c azithro  - Monitor fever/WBC curve  - Currently on RA, monitor respiratory status and c/w symptomatic management  - Patient w/ some bloody mucous production. Small in volume. Vitals stable. Possibly 2/2 inflammatory sloughing form PNA/RSV vs potential lung malignancy that is being worked up outpatient.

## 2023-06-29 NOTE — DISCHARGE NOTE PROVIDER - NSFOLLOWUPCLINICS_GEN_ALL_ED_FT
[facility]  Hematology/Oncology  [address] [address]  Phone: [phone]  Fax:   Follow Up Time: 2 weeks

## 2023-06-29 NOTE — DISCHARGE NOTE PROVIDER - DETAILS OF MALNUTRITION DIAGNOSIS/DIAGNOSES
This patient has been assessed with a concern for Malnutrition and was treated during this hospitalization for the following Nutrition diagnosis/diagnoses:     -  06/28/2023: Severe protein-calorie malnutrition   -  06/28/2023: Underweight (BMI < 19)

## 2023-06-29 NOTE — PROGRESS NOTE ADULT - ASSESSMENT
78 year old male with hx of AFib on Eliquis, HTN, HLD, COPD, DM t2, CKD, Nasopharyngeal cancer s/p chemo and radiation comes into the ED w/ cough and dyspnea w/ CXR c/f LLL CAP. Frequent falls w/ syncopal episodes a/w fecal incontinence c/f seizure activities possibly iso brain mets 78 year old male with hx of AFib on Eliquis, HTN, HLD, COPD, DM t2, CKD, Nasopharyngeal cancer s/p chemo and radiation comes into the ED w/ cough and dyspnea w/ CXR c/f LLL CAP. Falls c/f vasovagal syncope vs orthostatics.

## 2023-06-30 DIAGNOSIS — R93.0 ABNORMAL FINDINGS ON DIAGNOSTIC IMAGING OF SKULL AND HEAD, NOT ELSEWHERE CLASSIFIED: ICD-10-CM

## 2023-06-30 DIAGNOSIS — G93.9 DISORDER OF BRAIN, UNSPECIFIED: ICD-10-CM

## 2023-06-30 LAB
ANION GAP SERPL CALC-SCNC: 11 MMOL/L — SIGNIFICANT CHANGE UP (ref 7–14)
BUN SERPL-MCNC: 16 MG/DL — SIGNIFICANT CHANGE UP (ref 7–23)
CALCIUM SERPL-MCNC: 9.1 MG/DL — SIGNIFICANT CHANGE UP (ref 8.4–10.5)
CHLORIDE SERPL-SCNC: 102 MMOL/L — SIGNIFICANT CHANGE UP (ref 98–107)
CO2 SERPL-SCNC: 22 MMOL/L — SIGNIFICANT CHANGE UP (ref 22–31)
CREAT SERPL-MCNC: 0.97 MG/DL — SIGNIFICANT CHANGE UP (ref 0.5–1.3)
EGFR: 79 ML/MIN/1.73M2 — SIGNIFICANT CHANGE UP
GLUCOSE SERPL-MCNC: 100 MG/DL — HIGH (ref 70–99)
HCT VFR BLD CALC: 33.8 % — LOW (ref 39–50)
HGB BLD-MCNC: 10.7 G/DL — LOW (ref 13–17)
MAGNESIUM SERPL-MCNC: 2 MG/DL — SIGNIFICANT CHANGE UP (ref 1.6–2.6)
MCHC RBC-ENTMCNC: 28.5 PG — SIGNIFICANT CHANGE UP (ref 27–34)
MCHC RBC-ENTMCNC: 31.7 GM/DL — LOW (ref 32–36)
MCV RBC AUTO: 90.1 FL — SIGNIFICANT CHANGE UP (ref 80–100)
NRBC # BLD: 0 /100 WBCS — SIGNIFICANT CHANGE UP (ref 0–0)
NRBC # FLD: 0 K/UL — SIGNIFICANT CHANGE UP (ref 0–0)
PHOSPHATE SERPL-MCNC: 3.6 MG/DL — SIGNIFICANT CHANGE UP (ref 2.5–4.5)
PLATELET # BLD AUTO: 546 K/UL — HIGH (ref 150–400)
POTASSIUM SERPL-MCNC: 3.9 MMOL/L — SIGNIFICANT CHANGE UP (ref 3.5–5.3)
POTASSIUM SERPL-SCNC: 3.9 MMOL/L — SIGNIFICANT CHANGE UP (ref 3.5–5.3)
RBC # BLD: 3.75 M/UL — LOW (ref 4.2–5.8)
RBC # FLD: 14.3 % — SIGNIFICANT CHANGE UP (ref 10.3–14.5)
SODIUM SERPL-SCNC: 135 MMOL/L — SIGNIFICANT CHANGE UP (ref 135–145)
WBC # BLD: 6.83 K/UL — SIGNIFICANT CHANGE UP (ref 3.8–10.5)
WBC # FLD AUTO: 6.83 K/UL — SIGNIFICANT CHANGE UP (ref 3.8–10.5)

## 2023-06-30 PROCEDURE — 99233 SBSQ HOSP IP/OBS HIGH 50: CPT

## 2023-06-30 PROCEDURE — 99221 1ST HOSP IP/OBS SF/LOW 40: CPT

## 2023-06-30 RX ADMIN — TIOTROPIUM BROMIDE AND OLODATEROL 2 PUFF(S): 3.124; 2.736 SPRAY, METERED RESPIRATORY (INHALATION) at 14:04

## 2023-06-30 RX ADMIN — APIXABAN 5 MILLIGRAM(S): 2.5 TABLET, FILM COATED ORAL at 17:34

## 2023-06-30 RX ADMIN — APIXABAN 5 MILLIGRAM(S): 2.5 TABLET, FILM COATED ORAL at 06:06

## 2023-06-30 RX ADMIN — Medication 1 TABLET(S): at 14:04

## 2023-06-30 RX ADMIN — Medication 25 MILLIGRAM(S): at 22:40

## 2023-06-30 RX ADMIN — SIMVASTATIN 20 MILLIGRAM(S): 20 TABLET, FILM COATED ORAL at 22:40

## 2023-06-30 RX ADMIN — CEFTRIAXONE 100 MILLIGRAM(S): 500 INJECTION, POWDER, FOR SOLUTION INTRAMUSCULAR; INTRAVENOUS at 17:33

## 2023-06-30 NOTE — CONSULT NOTE ADULT - PROBLEM SELECTOR RECOMMENDATION 9
No need for steroids  May follow up outpatient with Dr Branham No need for steroids  Reasonable to repeat MRI brain w/wo constrast in 4 weeks  May follow up outpatient with Dr Branham

## 2023-06-30 NOTE — PROGRESS NOTE ADULT - PROBLEM SELECTOR PLAN 4
Pt w/ HTN. Amolodipine recently discontinued  - c/w metoprolol succinate 25mg qd Patient w/ rate controlled Afib on Toprol 25mg qhs. AC w/ Eliquis  - CHADSVASC=3  - hold Eliquis 5mg BID iso possible brain met  - c/w metoprolol succinate 25mg qhs

## 2023-06-30 NOTE — PROGRESS NOTE ADULT - PROBLEM SELECTOR PLAN 6
Patient w/ stable COPD on home Anoro 62.5/25 qd; Proventil HFA 90 PRN  - Will c/w Stiolto while in hospital as Anoro not formulary.  - albuterol HFA PRN if wheezing Pt w/ HLD on home pravastatin 10mg  - c/w statin

## 2023-06-30 NOTE — PROGRESS NOTE ADULT - PROBLEM SELECTOR PLAN 1
Patient w/ dyspnea and cough w/ imaging c/w LLL opacity c/f PNA. +RSV  - No risk factors for HAP  - Will c/w CTX for 5 days (6/26-6/30)   - urine legionella negative. d/c azithro  - Monitor fever/WBC curve  - Currently on RA, monitor respiratory status and c/w symptomatic management  - Patient w/ some bloody mucous production. Small in volume. Vitals stable. Possibly 2/2 inflammatory sloughing form PNA/RSV vs potential lung malignancy that is being worked up outpatient. Pt w/ Hx of nasopharyngeal Ca w/ hx of falls a/w fecal incontinence  - MRI w/ R temporal lobe lesion w/ surrounding vasogenic edema c/f metastatic disease  - Hold Eliquis  - NSGY eval; onc eval  - Will check CT chest, abd, pelv for other metastatic disease

## 2023-06-30 NOTE — CONSULT NOTE ADULT - ASSESSMENT
79 YO male with multiple comorbidities presenting with SOB, multiple falls noted to have a focus of abnormal enhancement and edema in the right temporal lobe. No urgent neurosurgical intervention

## 2023-06-30 NOTE — PROGRESS NOTE ADULT - PROBLEM SELECTOR PLAN 7
- A1C 6.4  - discontinued sliding scale Patient w/ stable COPD on home Anoro 62.5/25 qd; Proventil HFA 90 PRN  - Will c/w Stiolto while in hospital as Anoro not formulary.  - albuterol HFA PRN if wheezing

## 2023-06-30 NOTE — PROGRESS NOTE ADULT - SUBJECTIVE AND OBJECTIVE BOX
Dmitry Song, PGY1    JAYYRADHA  78y  Male    Complaints:  Subjective:     No acute o/n events. Pt denies subj fevers/chills, HA, dizziness, chest pain, palpitations, n/v, abd pain, dysuria, diarrhea      FAMILY HISTORY:  Family history of breast cancer      78yVital Signs Last 24 Hrs  T(C): 36.5 (30 Jun 2023 06:00), Max: 37 (29 Jun 2023 16:36)  T(F): 97.7 (30 Jun 2023 06:00), Max: 98.6 (29 Jun 2023 16:36)  HR: 67 (30 Jun 2023 06:00) (62 - 72)  BP: 158/81 (30 Jun 2023 06:00) (138/89 - 158/84)  BP(mean): --  RR: 18 (30 Jun 2023 06:00) (17 - 18)  SpO2: 100% (30 Jun 2023 06:00) (99% - 100%)    Parameters below as of 30 Jun 2023 06:00  Patient On (Oxygen Delivery Method): room air          PHYSICAL EXAM:  GENERAL: NAD, well-groomed, well-developed  HEAD:  Atraumatic, Normocephalic  EYES: EOMI, PERRLA, conjunctiva and sclera clear  ENMT: No tonsillar erythema, exudates, or enlargement; Moist mucous membranes, Good dentition, No lesions  NECK: Supple, No JVD, Normal thyroid  NERVOUS SYSTEM:  Alert & Oriented X3, Good concentration; Motor Strength 5/5 B/L upper and lower extremities; DTRs 2+ intact and symmetric  CHEST/LUNG: Clear to percussion bilaterally; No rales, rhonchi, wheezing, or rubs  HEART: Regular rate and rhythm; No murmurs, rubs, or gallops  ABDOMEN: Soft, Nontender, Nondistended; Bowel sounds present  EXTREMITIES:  2+ Peripheral Pulses, No clubbing, cyanosis, or edema  LYMPH: No lymphadenopathy noted  SKIN: No rashes or lesions      Consultant(s) Notes Reviewed:  [x ] YES  [ ] NO  Care Discussed with Consultants/Other Providers [ x] YES  [ ] NO    LABS:                Male  RADIOLOGY & ADDITIONAL TESTS:    Imaging Personally Reviewed:  [ ] YES  [ ] NO    MedsMEDICATIONS  (STANDING):  apixaban 5 milliGRAM(s) Oral every 12 hours  cefTRIAXone   IVPB 1000 milliGRAM(s) IV Intermittent every 24 hours  metoprolol succinate ER 25 milliGRAM(s) Oral <User Schedule>  multivitamin 1 Tablet(s) Oral daily  simvastatin 20 milliGRAM(s) Oral at bedtime  tiotropium 2.5 MICROgram(s)/olodaterol 2.5 MICROgram(s) (STIOLTO) Inhaler 2 Puff(s) Inhalation daily    MEDICATIONS  (PRN):  acetaminophen     Tablet .. 650 milliGRAM(s) Oral every 6 hours PRN Temp greater or equal to 38C (100.4F), Mild Pain (1 - 3), Moderate Pain (4 - 6)  albuterol    90 MICROgram(s) HFA Inhaler 2 Puff(s) Inhalation every 6 hours PRN Shortness of Breath and/or Wheezing  aluminum hydroxide/magnesium hydroxide/simethicone Suspension 30 milliLiter(s) Oral every 4 hours PRN Dyspepsia  guaiFENesin Oral Liquid (Sugar-Free) 100 milliGRAM(s) Oral every 6 hours PRN Cough  melatonin 3 milliGRAM(s) Oral at bedtime PRN Insomnia  ondansetron Injectable 4 milliGRAM(s) IV Push every 8 hours PRN Nausea and/or Vomiting      HEALTH ISSUES - PROBLEM Dx:  CAP (community acquired pneumonia)    Frequent falls    Afib    HTN (hypertension)    HLD (hyperlipidemia)    COPD without exacerbation    Prophylactic measure    Pre-diabetes                   Dmitry Song, PGY1    RADHA HOPE  78y  Male    Complaints:  Subjective:     No acute o/n events. Pt denies subj fevers/chills, HA, dizziness, chest pain, palpitations, n/v, abd pain, dysuria, diarrhea      FAMILY HISTORY:  Family history of breast cancer      78yVital Signs Last 24 Hrs  T(C): 36.5 (30 Jun 2023 06:00), Max: 37 (29 Jun 2023 16:36)  T(F): 97.7 (30 Jun 2023 06:00), Max: 98.6 (29 Jun 2023 16:36)  HR: 67 (30 Jun 2023 06:00) (62 - 72)  BP: 158/81 (30 Jun 2023 06:00) (138/89 - 158/84)  BP(mean): --  RR: 18 (30 Jun 2023 06:00) (17 - 18)  SpO2: 100% (30 Jun 2023 06:00) (99% - 100%)    Parameters below as of 30 Jun 2023 06:00  Patient On (Oxygen Delivery Method): room air          PHYSICAL EXAM:  GENERAL: NAD, well-groomed, well-developed  HEAD:  Atraumatic, Normocephalic  EYES: EOMI, PERRLA, conjunctiva and sclera clear  ENMT: No tonsillar erythema, exudates, or enlargement; Moist mucous membranes  NECK: Supple, No JVD, Normal thyroid  HEART: Regular rate and rhythm; No murmurs, rubs, or gallops  RESPIRATORY: Clear to auscultation bilaterally. No crackles/wheeze  ABDOMEN: Soft, Nontender, Nondistended; Bowel sounds present  NEUROLOGY: A&Ox3, nonfocal, moving all extremities  EXTREMITIES:  2+ Peripheral Pulses, No clubbing, cyanosis, or edema  SKIN: warm, dry, normal color, no rash or abnormal lesionslesions        Consultant(s) Notes Reviewed:  [x ] YES  [ ] NO  Care Discussed with Consultants/Other Providers [ x] YES  [ ] NO    LABS:                            10.7   6.83  )-----------( 546      ( 30 Jun 2023 06:20 )             33.8       06-30    135  |  102  |  16  ----------------------------<  100<H>  3.9   |  22  |  0.97    Ca    9.1      30 Jun 2023 06:20  Phos  3.6     06-30  Mg     2.00     06-30                Urinalysis Basic - ( 30 Jun 2023 06:20 )    Color: x / Appearance: x / SG: x / pH: x  Gluc: 100 mg/dL / Ketone: x  / Bili: x / Urobili: x   Blood: x / Protein: x / Nitrite: x   Leuk Esterase: x / RBC: x / WBC x   Sq Epi: x / Non Sq Epi: x / Bacteria: x                  CAPILLARY BLOOD GLUCOSE                        Male  RADIOLOGY & ADDITIONAL TESTS:    Imaging Personally Reviewed:  [ ] YES  [ ] NO    MedsMEDICATIONS  (STANDING):  apixaban 5 milliGRAM(s) Oral every 12 hours  cefTRIAXone   IVPB 1000 milliGRAM(s) IV Intermittent every 24 hours  metoprolol succinate ER 25 milliGRAM(s) Oral <User Schedule>  multivitamin 1 Tablet(s) Oral daily  simvastatin 20 milliGRAM(s) Oral at bedtime  tiotropium 2.5 MICROgram(s)/olodaterol 2.5 MICROgram(s) (STIOLTO) Inhaler 2 Puff(s) Inhalation daily    MEDICATIONS  (PRN):  acetaminophen     Tablet .. 650 milliGRAM(s) Oral every 6 hours PRN Temp greater or equal to 38C (100.4F), Mild Pain (1 - 3), Moderate Pain (4 - 6)  albuterol    90 MICROgram(s) HFA Inhaler 2 Puff(s) Inhalation every 6 hours PRN Shortness of Breath and/or Wheezing  aluminum hydroxide/magnesium hydroxide/simethicone Suspension 30 milliLiter(s) Oral every 4 hours PRN Dyspepsia  guaiFENesin Oral Liquid (Sugar-Free) 100 milliGRAM(s) Oral every 6 hours PRN Cough  melatonin 3 milliGRAM(s) Oral at bedtime PRN Insomnia  ondansetron Injectable 4 milliGRAM(s) IV Push every 8 hours PRN Nausea and/or Vomiting      HEALTH ISSUES - PROBLEM Dx:  CAP (community acquired pneumonia)    Frequent falls    Afib    HTN (hypertension)    HLD (hyperlipidemia)    COPD without exacerbation    Prophylactic measure    Pre-diabetes

## 2023-06-30 NOTE — PROGRESS NOTE ADULT - PROBLEM SELECTOR PLAN 8
DVT: on apixaban  Diet: CC  Dispo: Home PT pending MRI brain for frequent falls - A1C 6.4  - discontinued sliding scale

## 2023-06-30 NOTE — PROGRESS NOTE ADULT - PROBLEM SELECTOR PLAN 5
Pt w/ HLD on home pravastatin 10mg  - c/w statin Pt w/ HTN. Amolodipine recently discontinued  - c/w metoprolol succinate 25mg qd

## 2023-06-30 NOTE — PROGRESS NOTE ADULT - PROBLEM SELECTOR PLAN 2
Patient w/ frequent falls over last few years. Described to have syncopal events a/w fecal incontinence during fall events. CTH on presentation w/o acute findings. Outpatient w/u for vasovagal syncope.   - Description is c/f seizure like activity. Pt w/o hx of seizures.   - Presentation is c/f possible mets to brain. MRI in 2022 performed w/o contrasts shows chronic infarcts w/o mets  - No focal defects on exam but will check MRI brain w/ IV contrast given malignancy history  - Patient w/ +orthostatics. 500cc bolus LR Patient w/ frequent falls over last few years. Described to have syncopal events a/w fecal incontinence during fall events. CTH on presentation w/o acute findings. Outpatient w/u for vasovagal syncope.   - Description is c/f seizure like activity. Pt w/o hx of seizures.   - Presentation is c/f possible mets to brain. MRI in 2022 performed w/o contrasts shows chronic infarcts w/o mets  - Patient w/ +orthostatics. 500cc bolus LR  - MRI w/ R temporal lesion w/ blood supply and surrounding vasogenic edema c/f metastatic disease  - NSGY consult; Onc consult

## 2023-06-30 NOTE — CONSULT NOTE ADULT - SUBJECTIVE AND OBJECTIVE BOX
78 year old male with hx of AFib on Eliquis, HTN, HLD, COPD, DM t2, CKD, Nasopharyngeal cancer s/p chemo and radiation, carcinoid tumor (s/p resection), frequent falls (a/w fecal incontinence), comes into the ED for increased fatigue and worsening SOB. Patient unable to provide history. Per wife/chart review, patient has had worsening cough over the last few weeks c/f URI. His dyspnea progressed w/ dyspnea on walking which is alteration from baseline. Pt had an episode of vomiting and defecation on himself prior to presenting to the ED. Per wife at bedside patient was recently in [place] where he developed his URI symptoms. Pt notably is followed by pulm for a nodule that is c/f malignancy. Pt had bronchoscopy done in March which was negative. He received a short course of Abx at that time for presumed PNA. Pt's wife also describes a progressive increase in falls and syncopal events over the last few years. These events are often associated w/ decreased focus, some motor impairment and fecal incontinence. Pt has no jerking/seizure like activity noted. He has no hx of seizures, and denies family hx of seizures.     WDWN male in NAD  Vital Signs Last 24 Hrs  T(C): 36.6 (30 Jun 2023 15:39), Max: 36.6 (30 Jun 2023 15:24)  T(F): 97.9 (30 Jun 2023 15:39), Max: 97.9 (30 Jun 2023 15:24)  HR: 57 (30 Jun 2023 15:39) (57 - 72)  BP: 174/76 (30 Jun 2023 15:39) (158/81 - 174/76)  BP(mean): --  RR: 17 (30 Jun 2023 15:39) (17 - 18)  SpO2: 99% (30 Jun 2023 15:39) (99% - 100%)    Parameters below as of 30 Jun 2023 15:39  Patient On (Oxygen Delivery Method): room air    AAO X 3  PERRLA, EOMI  CN 2-12 grossly intact  GARNER strength 5/5  No drift  SILT    < from: MR Head w/ IV Cont (06.29.23 @ 17:48) >  COMPARISON: CT head 6/26/2023. MR brain 7/23/2022. CT neck soft tissue   5/19/2022.    FINDINGS:  Redemonstrated areas of encephalomalacia and gliosis in the right   frontoparietal and left paramedian occipital lobes likely the stigmata of   prior infarctions. Scattered foci of susceptibility are seen in these   areas compatible with the presence of hemorrhagic byproducts. Diffuse   cerebral volume loss with ventricular and sulcal prominence. There are   foci of T2/FLAIR signal hyperintensity within the periventricular and   subcortical white matter, which are nonspecific but compatible with   microvascular ischemic changes.    There is an area of T1/T2 prolongation within the anterior right temporal   lobe predominantly within the white matter producing localized sulcal   effacement likely representing vasogenic edema. A patchy/nodular area of   abnormal enhancement is seen (9:10) measuring 2 x 1 x 0.8 cm in size   which surrounds an area of susceptibility artifact. An atypical appearing   vessel is seen within the lateral temporal lobe which extends to the   brain surface (10:68, 69) which may represent a developmental venous   anomaly.    No acute infarction, intracranial hemorrhage or mass lesion.    There are no areas of restricted diffusion or abnormal intracranial   enhancement.  .  No hydrocephalus. No extra-axial fluid collections. The skull base flow   voids are present.    Mucosal thickening is seen most notably involving the ethmoid sinuses.   Nonspecific abnormal soft tissue is seen within the right tympanomastoid   cavity. The visualized osseous structures, soft tissues and partially   visualized parotid glands appear normal.    IMPRESSION:  Focus of abnormal enhancement with presumed surrounding vasogenic edema   within the right temporal lobe. A central zone of susceptibility is seen   and there is an atypical linear vessel in this area may represent a   developmental venous anomaly or prominent feeding vessel. Given the   patient's clinical history, a metastatic focus is a prime consideration.   A cavernoma with associated hemorrhage/edema is apossibility.    Multiple areas of encephalomalacia/gliosis as seen previously likely the   sequelae of prior infarction.    < end of copied text >

## 2023-06-30 NOTE — PROGRESS NOTE ADULT - PROBLEM SELECTOR PLAN 3
Patient w/ rate controlled Afib on Toprol 25mg qhs. AC w/ Eliquis  - CHADSVASC=3  - c/w Eliquis 5mg BID.  - c/w metoprolol succinate 25mg qhs Patient w/ dyspnea and cough w/ imaging c/w LLL opacity c/f PNA. +RSV  - No risk factors for HAP  - Will c/w CTX for 5 days (6/26-6/30)   - urine legionella negative. d/c azithro  - Monitor fever/WBC curve  - Currently on RA, monitor respiratory status and c/w symptomatic management  - Patient w/ some bloody mucous production. Small in volume. Vitals stable. Possibly 2/2 inflammatory sloughing form PNA/RSV vs potential lung malignancy that is being worked up outpatient.

## 2023-06-30 NOTE — PROGRESS NOTE ADULT - ASSESSMENT
78 year old male with hx of AFib on Eliquis, HTN, HLD, COPD, DM t2, CKD, Nasopharyngeal cancer s/p chemo and radiation comes into the ED w/ cough and dyspnea w/ CXR c/f LLL CAP. Falls c/f vasovagal syncope vs orthostatics.

## 2023-07-01 LAB
ANION GAP SERPL CALC-SCNC: 10 MMOL/L — SIGNIFICANT CHANGE UP (ref 7–14)
BUN SERPL-MCNC: 20 MG/DL — SIGNIFICANT CHANGE UP (ref 7–23)
CALCIUM SERPL-MCNC: 9.3 MG/DL — SIGNIFICANT CHANGE UP (ref 8.4–10.5)
CHLORIDE SERPL-SCNC: 102 MMOL/L — SIGNIFICANT CHANGE UP (ref 98–107)
CO2 SERPL-SCNC: 22 MMOL/L — SIGNIFICANT CHANGE UP (ref 22–31)
CREAT SERPL-MCNC: 1.06 MG/DL — SIGNIFICANT CHANGE UP (ref 0.5–1.3)
EGFR: 71 ML/MIN/1.73M2 — SIGNIFICANT CHANGE UP
GLUCOSE SERPL-MCNC: 98 MG/DL — SIGNIFICANT CHANGE UP (ref 70–99)
HCT VFR BLD CALC: 35.8 % — LOW (ref 39–50)
HGB BLD-MCNC: 11.5 G/DL — LOW (ref 13–17)
MAGNESIUM SERPL-MCNC: 2.1 MG/DL — SIGNIFICANT CHANGE UP (ref 1.6–2.6)
MCHC RBC-ENTMCNC: 29 PG — SIGNIFICANT CHANGE UP (ref 27–34)
MCHC RBC-ENTMCNC: 32.1 GM/DL — SIGNIFICANT CHANGE UP (ref 32–36)
MCV RBC AUTO: 90.2 FL — SIGNIFICANT CHANGE UP (ref 80–100)
NRBC # BLD: 0 /100 WBCS — SIGNIFICANT CHANGE UP (ref 0–0)
NRBC # FLD: 0 K/UL — SIGNIFICANT CHANGE UP (ref 0–0)
PHOSPHATE SERPL-MCNC: 3.5 MG/DL — SIGNIFICANT CHANGE UP (ref 2.5–4.5)
PLATELET # BLD AUTO: 540 K/UL — HIGH (ref 150–400)
POTASSIUM SERPL-MCNC: 4 MMOL/L — SIGNIFICANT CHANGE UP (ref 3.5–5.3)
POTASSIUM SERPL-SCNC: 4 MMOL/L — SIGNIFICANT CHANGE UP (ref 3.5–5.3)
RBC # BLD: 3.97 M/UL — LOW (ref 4.2–5.8)
RBC # FLD: 14.1 % — SIGNIFICANT CHANGE UP (ref 10.3–14.5)
SODIUM SERPL-SCNC: 134 MMOL/L — LOW (ref 135–145)
WBC # BLD: 7.13 K/UL — SIGNIFICANT CHANGE UP (ref 3.8–10.5)
WBC # FLD AUTO: 7.13 K/UL — SIGNIFICANT CHANGE UP (ref 3.8–10.5)

## 2023-07-01 PROCEDURE — 99233 SBSQ HOSP IP/OBS HIGH 50: CPT | Mod: GC

## 2023-07-01 RX ADMIN — SIMVASTATIN 20 MILLIGRAM(S): 20 TABLET, FILM COATED ORAL at 22:07

## 2023-07-01 RX ADMIN — TIOTROPIUM BROMIDE AND OLODATEROL 2 PUFF(S): 3.124; 2.736 SPRAY, METERED RESPIRATORY (INHALATION) at 12:51

## 2023-07-01 RX ADMIN — Medication 25 MILLIGRAM(S): at 22:07

## 2023-07-01 RX ADMIN — Medication 1 TABLET(S): at 12:52

## 2023-07-01 NOTE — PROGRESS NOTE ADULT - PROBLEM SELECTOR PLAN 3
Patient w/ dyspnea and cough w/ imaging c/w LLL opacity c/f PNA. +RSV  - No risk factors for HAP  - Will c/w CTX for 5 days (6/26-6/30)   - urine legionella negative. d/c azithro  - Monitor fever/WBC curve  - Currently on RA, monitor respiratory status and c/w symptomatic management  - Patient w/ some bloody mucous production. Small in volume. Vitals stable. Possibly 2/2 inflammatory sloughing form PNA/RSV vs potential lung malignancy that is being worked up outpatient.

## 2023-07-01 NOTE — PROGRESS NOTE ADULT - PROBLEM SELECTOR PLAN 4
Patient w/ rate controlled Afib on Toprol 25mg qhs. AC w/ Eliquis  - CHADSVASC=3  - hold Eliquis 5mg BID iso possible brain met  - c/w metoprolol succinate 25mg qhs

## 2023-07-01 NOTE — PROGRESS NOTE ADULT - SUBJECTIVE AND OBJECTIVE BOX
Dmitry Song, PGY1    RADHA HOPE  78y  Male    Complaints:  Subjective:     No acute o/n events. Pt denies subj fevers/chills, HA, dizziness, chest pain, palpitations, n/v, abd pain, dysuria, diarrhea      FAMILY HISTORY:  Family history of breast cancer      78yVital Signs Last 24 Hrs  T(C): 36.6 (01 Jul 2023 05:05), Max: 36.6 (30 Jun 2023 15:24)  T(F): 97.9 (01 Jul 2023 05:05), Max: 97.9 (30 Jun 2023 15:24)  HR: 65 (01 Jul 2023 05:05) (57 - 68)  BP: 122/85 (01 Jul 2023 05:05) (122/85 - 174/76)  BP(mean): --  RR: 18 (01 Jul 2023 05:05) (17 - 18)  SpO2: 98% (01 Jul 2023 05:05) (98% - 99%)    Parameters below as of 01 Jul 2023 05:05  Patient On (Oxygen Delivery Method): room air          PHYSICAL EXAM:  GENERAL: NAD, well-groomed, well-developed  HEAD:  Atraumatic, Normocephalic  EYES: EOMI, PERRLA, conjunctiva and sclera clear  ENMT: No tonsillar erythema, exudates, or enlargement; Moist mucous membranes, Good dentition, No lesions  NECK: Supple, No JVD, Normal thyroid  NERVOUS SYSTEM:  Alert & Oriented X3, Good concentration; Motor Strength 5/5 B/L upper and lower extremities; DTRs 2+ intact and symmetric  CHEST/LUNG: Clear to percussion bilaterally; No rales, rhonchi, wheezing, or rubs  HEART: Regular rate and rhythm; No murmurs, rubs, or gallops  ABDOMEN: Soft, Nontender, Nondistended; Bowel sounds present  EXTREMITIES:  2+ Peripheral Pulses, No clubbing, cyanosis, or edema  LYMPH: No lymphadenopathy noted  SKIN: No rashes or lesions      Consultant(s) Notes Reviewed:  [x ] YES  [ ] NO  Care Discussed with Consultants/Other Providers [ x] YES  [ ] NO    LABS:                Male  RADIOLOGY & ADDITIONAL TESTS:    Imaging Personally Reviewed:  [ ] YES  [ ] NO    MedsMEDICATIONS  (STANDING):  metoprolol succinate ER 25 milliGRAM(s) Oral <User Schedule>  multivitamin 1 Tablet(s) Oral daily  simvastatin 20 milliGRAM(s) Oral at bedtime  tiotropium 2.5 MICROgram(s)/olodaterol 2.5 MICROgram(s) (STIOLTO) Inhaler 2 Puff(s) Inhalation daily    MEDICATIONS  (PRN):  acetaminophen     Tablet .. 650 milliGRAM(s) Oral every 6 hours PRN Temp greater or equal to 38C (100.4F), Mild Pain (1 - 3), Moderate Pain (4 - 6)  albuterol    90 MICROgram(s) HFA Inhaler 2 Puff(s) Inhalation every 6 hours PRN Shortness of Breath and/or Wheezing  aluminum hydroxide/magnesium hydroxide/simethicone Suspension 30 milliLiter(s) Oral every 4 hours PRN Dyspepsia  guaiFENesin Oral Liquid (Sugar-Free) 100 milliGRAM(s) Oral every 6 hours PRN Cough  melatonin 3 milliGRAM(s) Oral at bedtime PRN Insomnia  ondansetron Injectable 4 milliGRAM(s) IV Push every 8 hours PRN Nausea and/or Vomiting      HEALTH ISSUES - PROBLEM Dx:  Temporal lobe lesion    Frequent falls    CAP (community acquired pneumonia)    Afib    HTN (hypertension)    HLD (hyperlipidemia)    COPD without exacerbation    Pre-diabetes    Prophylactic measure    Abnormal head MRI                   Dmitry Song, PGY1    RADHA HOPE  78y  Male    Complaints:  Subjective:     No acute o/n events. Pt denies subj fevers/chills, HA, dizziness, chest pain, palpitations, n/v, abd pain, dysuria, diarrhea      FAMILY HISTORY:  Family history of breast cancer      78yVital Signs Last 24 Hrs  T(C): 36.6 (01 Jul 2023 05:05), Max: 36.6 (30 Jun 2023 15:24)  T(F): 97.9 (01 Jul 2023 05:05), Max: 97.9 (30 Jun 2023 15:24)  HR: 65 (01 Jul 2023 05:05) (57 - 68)  BP: 122/85 (01 Jul 2023 05:05) (122/85 - 174/76)  BP(mean): --  RR: 18 (01 Jul 2023 05:05) (17 - 18)  SpO2: 98% (01 Jul 2023 05:05) (98% - 99%)    Parameters below as of 01 Jul 2023 05:05  Patient On (Oxygen Delivery Method): room air          PHYSICAL EXAM:  GENERAL: NAD, well-groomed, well-developed  HEAD:  Atraumatic, Normocephalic  EYES: EOMI, PERRLA, conjunctiva and sclera clear  ENMT: No tonsillar erythema, exudates, or enlargement; Moist mucous membranes  NECK: Supple, No JVD, Normal thyroid  HEART: Regular rate and rhythm; No murmurs, rubs, or gallops  RESPIRATORY: Clear to auscultation bilaterally. No crackles/wheeze  ABDOMEN: Soft, Nontender, Nondistended; Bowel sounds present  NEUROLOGY: A&Ox3, nonfocal, moving all extremities  EXTREMITIES:  2+ Peripheral Pulses, No clubbing, cyanosis, or edema  SKIN: warm, dry, normal color, no rash or abnormal lesionslesionslesions      Consultant(s) Notes Reviewed:  [x ] YES  [ ] NO  Care Discussed with Consultants/Other Providers [ x] YES  [ ] NO    LABS:                Male  RADIOLOGY & ADDITIONAL TESTS:    Imaging Personally Reviewed:  [ ] YES  [ ] NO    MedsMEDICATIONS  (STANDING):  metoprolol succinate ER 25 milliGRAM(s) Oral <User Schedule>  multivitamin 1 Tablet(s) Oral daily  simvastatin 20 milliGRAM(s) Oral at bedtime  tiotropium 2.5 MICROgram(s)/olodaterol 2.5 MICROgram(s) (STIOLTO) Inhaler 2 Puff(s) Inhalation daily    MEDICATIONS  (PRN):  acetaminophen     Tablet .. 650 milliGRAM(s) Oral every 6 hours PRN Temp greater or equal to 38C (100.4F), Mild Pain (1 - 3), Moderate Pain (4 - 6)  albuterol    90 MICROgram(s) HFA Inhaler 2 Puff(s) Inhalation every 6 hours PRN Shortness of Breath and/or Wheezing  aluminum hydroxide/magnesium hydroxide/simethicone Suspension 30 milliLiter(s) Oral every 4 hours PRN Dyspepsia  guaiFENesin Oral Liquid (Sugar-Free) 100 milliGRAM(s) Oral every 6 hours PRN Cough  melatonin 3 milliGRAM(s) Oral at bedtime PRN Insomnia  ondansetron Injectable 4 milliGRAM(s) IV Push every 8 hours PRN Nausea and/or Vomiting      HEALTH ISSUES - PROBLEM Dx:  Temporal lobe lesion    Frequent falls    CAP (community acquired pneumonia)    Afib    HTN (hypertension)    HLD (hyperlipidemia)    COPD without exacerbation    Pre-diabetes    Prophylactic measure    Abnormal head MRI

## 2023-07-01 NOTE — CHART NOTE - NSCHARTNOTEFT_GEN_A_CORE
ONCOLOGY FELLOW NOTE     78M hx nasopharyngeal carcinoma s/p chemo/radiation, carcinoid tumor s/p right hemicolectomy, AFib on Eliquis, HTN, HLD, COPD, DM t2, CKD, presented to the ED w/ cough and dyspnea w/ CXR c/f LLL CAP. Falls c/f vasovagal syncope vs orthostatics. Found to have a focus of vasogenic edema in the right temporal lobe with a high suspicion for metastatic recurrence. Per neurosurgery, no urgent neurosurgical intervention or steroids needed, outpatient follow up.    The last time patient followed up at the [facility] was in 2018 with Dr. Jb Spann, who has since left [facility]. If confirmed to be a metastatic recurrence or a new primary malignancy, please refer patient back to [facility] if amenable.    Discussed with on-call attending Dr. Rasmussen.    Sid Curry MD  Hematology/Oncology Fellow PGY-4  Pager: [facility] [phone] / [facility] [phone]  After 5pm and on weekends please page on-call fellow ONCOLOGY FELLOW NOTE     78M hx nasopharyngeal carcinoma s/p chemo/radiation, carcinoid tumor s/p right hemicolectomy, AFib on Eliquis, HTN, HLD, COPD, DM t2, CKD, presented to the ED w/ cough and dyspnea w/ CXR c/f LLL CAP. Falls c/f vasovagal syncope vs orthostatics. Found to have a focus of vasogenic edema in the right temporal lobe with a high suspicion for metastatic recurrence. Per neurosurgery, no urgent neurosurgical intervention or steroids needed, outpatient follow up.    The last time patient followed up at the [facility] was in 2018 with Dr. Jb Spann, who has since left [facility].     No further oncologic workup inpatient and no oncologic contraindications to discharge. If confirmed to be a metastatic recurrence or a new primary malignancy, please refer patient back to [facility] if amenable.    Discussed with on-call attending Dr. Rasmussen.    Sid Curry MD  Hematology/Oncology Fellow PGY-4  Pager: [facility] [phone] / [place] [phone]  After 5pm and on weekends please page on-call fellow

## 2023-07-01 NOTE — PROGRESS NOTE ADULT - PROBLEM SELECTOR PLAN 2
Patient w/ frequent falls over last few years. Described to have syncopal events a/w fecal incontinence during fall events. CTH on presentation w/o acute findings. Outpatient w/u for vasovagal syncope.   - Description is c/f seizure like activity. Pt w/o hx of seizures.   - Presentation is c/f possible mets to brain. MRI in 2022 performed w/o contrasts shows chronic infarcts w/o mets  - Patient w/ +orthostatics. 500cc bolus LR  - MRI w/ R temporal lesion w/ blood supply and surrounding vasogenic edema c/f metastatic disease  - NSGY consult; Onc consult

## 2023-07-01 NOTE — PROGRESS NOTE ADULT - PROBLEM SELECTOR PLAN 1
Pt w/ Hx of nasopharyngeal Ca w/ hx of falls a/w fecal incontinence  - MRI w/ R temporal lobe lesion w/ surrounding vasogenic edema c/f metastatic disease  - Hold Eliquis  - NSGY eval; onc eval  - Will check CT chest, abd, pelv for other metastatic disease

## 2023-07-02 ENCOUNTER — TRANSCRIPTION ENCOUNTER (OUTPATIENT)
Age: 78
End: 2023-07-02

## 2023-07-02 VITALS
OXYGEN SATURATION: 97 % | RESPIRATION RATE: 18 BRPM | TEMPERATURE: 97 F | DIASTOLIC BLOOD PRESSURE: 86 MMHG | HEART RATE: 60 BPM | SYSTOLIC BLOOD PRESSURE: 132 MMHG

## 2023-07-02 LAB
ANION GAP SERPL CALC-SCNC: 14 MMOL/L — SIGNIFICANT CHANGE UP (ref 7–14)
BUN SERPL-MCNC: 20 MG/DL — SIGNIFICANT CHANGE UP (ref 7–23)
CALCIUM SERPL-MCNC: 9.3 MG/DL — SIGNIFICANT CHANGE UP (ref 8.4–10.5)
CHLORIDE SERPL-SCNC: 103 MMOL/L — SIGNIFICANT CHANGE UP (ref 98–107)
CO2 SERPL-SCNC: 21 MMOL/L — LOW (ref 22–31)
CREAT SERPL-MCNC: 0.97 MG/DL — SIGNIFICANT CHANGE UP (ref 0.5–1.3)
EGFR: 79 ML/MIN/1.73M2 — SIGNIFICANT CHANGE UP
GLUCOSE SERPL-MCNC: 95 MG/DL — SIGNIFICANT CHANGE UP (ref 70–99)
HCT VFR BLD CALC: 36.4 % — LOW (ref 39–50)
HGB BLD-MCNC: 11.6 G/DL — LOW (ref 13–17)
MAGNESIUM SERPL-MCNC: 2.1 MG/DL — SIGNIFICANT CHANGE UP (ref 1.6–2.6)
MCHC RBC-ENTMCNC: 28.4 PG — SIGNIFICANT CHANGE UP (ref 27–34)
MCHC RBC-ENTMCNC: 31.9 GM/DL — LOW (ref 32–36)
MCV RBC AUTO: 89.2 FL — SIGNIFICANT CHANGE UP (ref 80–100)
NRBC # BLD: 0 /100 WBCS — SIGNIFICANT CHANGE UP (ref 0–0)
NRBC # FLD: 0 K/UL — SIGNIFICANT CHANGE UP (ref 0–0)
PHOSPHATE SERPL-MCNC: 3.3 MG/DL — SIGNIFICANT CHANGE UP (ref 2.5–4.5)
PLATELET # BLD AUTO: 518 K/UL — HIGH (ref 150–400)
POTASSIUM SERPL-MCNC: 4.2 MMOL/L — SIGNIFICANT CHANGE UP (ref 3.5–5.3)
POTASSIUM SERPL-SCNC: 4.2 MMOL/L — SIGNIFICANT CHANGE UP (ref 3.5–5.3)
RBC # BLD: 4.08 M/UL — LOW (ref 4.2–5.8)
RBC # FLD: 14.2 % — SIGNIFICANT CHANGE UP (ref 10.3–14.5)
SODIUM SERPL-SCNC: 138 MMOL/L — SIGNIFICANT CHANGE UP (ref 135–145)
WBC # BLD: 6.19 K/UL — SIGNIFICANT CHANGE UP (ref 3.8–10.5)
WBC # FLD AUTO: 6.19 K/UL — SIGNIFICANT CHANGE UP (ref 3.8–10.5)

## 2023-07-02 PROCEDURE — 99239 HOSP IP/OBS DSCHRG MGMT >30: CPT | Mod: GC

## 2023-07-02 PROCEDURE — 71260 CT THORAX DX C+: CPT | Mod: 26

## 2023-07-02 PROCEDURE — 74178 CT ABD&PLV WO CNTR FLWD CNTR: CPT | Mod: 26

## 2023-07-02 RX ORDER — APIXABAN 2.5 MG/1
5 TABLET, FILM COATED ORAL EVERY 12 HOURS
Refills: 0 | Status: DISCONTINUED | OUTPATIENT
Start: 2023-07-02 | End: 2023-07-02

## 2023-07-02 RX ADMIN — TIOTROPIUM BROMIDE AND OLODATEROL 2 PUFF(S): 3.124; 2.736 SPRAY, METERED RESPIRATORY (INHALATION) at 09:23

## 2023-07-02 RX ADMIN — APIXABAN 5 MILLIGRAM(S): 2.5 TABLET, FILM COATED ORAL at 11:16

## 2023-07-02 RX ADMIN — Medication 1 TABLET(S): at 10:58

## 2023-07-02 NOTE — DISCHARGE NOTE NURSING/CASE MANAGEMENT/SOCIAL WORK - NSDCPNINST_GEN_ALL_CORE
Patient alert and oriented x 3 with some confusion. Vital signs stable. Skin dry and intact. Discharge instructions reviewed with patient and family at bedside

## 2023-07-02 NOTE — DISCHARGE NOTE NURSING/CASE MANAGEMENT/SOCIAL WORK - NSDCPEFALRISK_GEN_ALL_CORE
For information on Fall & Injury Prevention, visit: [email or web address] OR  [email or web address] OR  https://www.cdc.gov/steadi/patient.html

## 2023-07-02 NOTE — DISCHARGE NOTE NURSING/CASE MANAGEMENT/SOCIAL WORK - NSDCFUADDAPPT_GEN_ALL_CORE_FT
Please follow up with     1) Your PCP in 1-2 weeks  2) Neurosurgery in 1-2 weeks. Dr. Branham ([phone])  3) Please f/u with ENT your scheduled appointment is at 8/1/23  4) Please f/u with oncology at [phone]

## 2023-07-02 NOTE — DISCHARGE NOTE NURSING/CASE MANAGEMENT/SOCIAL WORK - PATIENT PORTAL LINK FT
You can access the FollowMyHealth Patient Portal offered by [facility] by registering at the following website: [email or web address]. By joining [employer] [facility]’s FollowMyHealth portal, you will also be able to view your health information using other applications (apps) compatible with our system.

## 2023-07-02 NOTE — PROGRESS NOTE ADULT - PROBLEM SELECTOR PLAN 9
DVT: on apixaban  Diet: CC  Dispo: Home PT pending MRI brain for frequent falls

## 2023-07-02 NOTE — PROVIDER CONTACT NOTE (OTHER) - SITUATION
Pt HR was 56 bmp electronically and 63 bpm when auscultated
Orthostatic blood pressure
Pt /76 HR 57 RR 17 o2sat 99% RA.

## 2023-07-02 NOTE — PROGRESS NOTE ADULT - TIME BILLING
Time spent includes direct patient care  (interview and examination of patient), discussion with other providers, support staff and/or patient's family members, review of medical records, ordering diagnostic tests and analyzing results, and documentation. .
Time spent on review of vital signs, labs. Patient interviewed and examined during this encounter. Plan of care was discussed with patient, patient wife and resident. Encounter documented.
Time spent includes direct patient care  (interview and examination of patient), discussion with other providers, support staff and/or patient's family members, review of medical records, ordering diagnostic tests and analyzing results, and documentation. .
Time spent on review of vital signs, labs, prior documentation, consultant notes. Patient interviewed and examined during this encounter. Plan of care was discussed with patient, and resident. Encounter documented.
Time spent on review of vital signs, labs, prior documentation. Patient interviewed and examined during this encounter. Plan of care was discussed with patient, patient wife and resident. Encounter documented.
Time spent on review of vital signs, labs, prior documentation, MR head imaging. Patient interviewed and examined during this encounter. Plan of care was discussed with patient, patient wife and resident team.

## 2023-07-02 NOTE — PROGRESS NOTE ADULT - ATTENDING COMMENTS
78 afib, HTN, HLD, COPD, Nasopharyngeal cancer s/p radiation presented with cough, fatigue. RVP positive for RSV. CXR w/ LLL opacity. Treated for CAP w/ ceftriaxone + azithromycin, completed course 6/30. Supportive care for RSV. Currently not in COPD exacerbation. Patient w/ frequent falls over last few years that have been escalating in frequency recently, due to frequent syncope. Attributed to vasovagal events as outpatient. CTH on presentation w/o acute findings. Upon review of chart no MRI w/ contrast performed prior. Given cancer history, w/ escalating syncopal events obtained MRI w/ contrast. MRI concerning for possible brain metastasis w/ vasogenic edema. CT chest abdomen/ pelvis ordered to assess for other metastatic sites. Onc consulted awaiting further recommendations.  Neurosurgery eval appreciated , do not recommend steroids. Case d/w Neurosx by house staff ,focus of abnormal enhancement and edema in the right temporal lobe, Neurosx recommending repeat MRI w/wo con in 4 weeks   Onc eval appreciated, no further inpatient recommended   Rest of the management as above   Disposition pending malignancy eval
78 afib, HTN, HLD, COPD, Nasopharyngeal cancer s/p radiation presented with cough, fatigue. RVP positive for RSV. CXR w/ LLL opacity. Treating for CAP w/ ceftriaxone + azithromycin will plan for 5 day course. Supportive care for RSV. Currently not in COPD exacerbation. Patient w/ frequent falls over last few years, due to syncope. CTH on presentation w/o acute findings. Upon review of chart no MRI w/ contrast noted. Given cancer history, w/ syncopal events will obtain MRI w/ contrast. PT eval appreciated home w/ home PT. Neuro exam non-focal which goes against intracranial metastasis, resumed DOAC given elevated CHADSVASC=3. Orthostatics positive hydrating. Discontinued sliding scale patient not diabetic, pre-diabetic.      Disposition pending MRI head and clinical improvement anticipate d'c 6/29 vs 6/30
78 afib, HTN, HLD, COPD, Nasopharyngeal cancer s/p radiation presented with cough, fatigue. RVP positive for RSV. CXR w/ LLL opacity. Treated for CAP w/ ceftriaxone + azithromycin, completed course 6/30. Supportive care for RSV. Currently not in COPD exacerbation. Patient w/ frequent falls over last few years that have been escalating in frequency recently, due to frequent syncope. Attributed to vasovagal events as outpatient. CTH on presentation w/o acute findings. Upon review of chart no MRI w/ contrast performed prior. Given cancer history, w/ escalating syncopal events obtained MRI w/ contrast. MRI concerning for possible brain metastasis w/ vasogenic edema. CT chest abdomen/ pelvis ordered to assess for other metastatic sites. Onc consulted awaiting further recommendations.  Neurosurgery eval appreciated , do not recommend steroids. Will hold off on further AC given head imaging findings.  Will consult Rad-onc to eval need for Radiation   plan of care d/w pt and wife at bedside   Rest of the management as above   Disposition pending malignancy eval
78 afib, HTN, HLD, COPD, Nasopharyngeal cancer s/p radiation presented with cough, fatigue. RVP positive for RSV. CXR w/ LLL opacity. Treating for CAP w/ ceftriaxone + azithromycin will plan for 5 day course. Supportive care for RSV. Currently not in COPD exacerbation. Patient w/ frequent falls over last few years, due to syncope. CTH on presentation w/o acute findings. Upon review of chart no MRI w/ contrast performed prior. Given cancer history, w/ syncopal events will obtain MRI w/ contrast (expedited). PT eval appreciated home w/ home PT. Neuro exam non-focal which goes against intracranial metastasis, resumed DOAC given elevated CHADSVASC=3. Orthostatics positive 6/28 s/p IV hydration. Discontinued sliding scale patient not diabetic, pre-diabetic.      *Patient w/ bloody sputum, will provide cup to quantify likely related to his current infection*    Disposition pending MRI head and clinical improvement anticipate d'c 6/30 vs 7/1
78 afib, HTN, HLD, COPD, Nasopharyngeal cancer s/p radiation presented with cough, fatigue. RVP positive for RSV. CXR w/ LLL opacity. Treating for CAP w/ ceftriaxone + azithromycin will plan for 5 day course. Supportive care for RSV. Currently not in COPD exacerbation. Patient w/ frequent falls over last few years. Described to have syncopal events associated with fecal incontinence during fall events. CTH on presentation w/o acute findings. Upon review of chart no MRI w/ contrast noted. Given cancer history, w/ syncopal events will obtain MRI w/ contrast. Will obtain PT eval for multiple falls. May need PEGGY on disposition. Neuro exam non-focal which goes against intracranial metastasis, resumed DOAC given elevated CHADSVASC=3. Spoke to patient wife at the bedside, there is a concern as outpatient that his syncopal events are related to vasovagal syncope. Upon further hx  reports syncope in the shower, when needing to have a bowel movement (patient reports hard stools). Also reports syncope with prolonged standing washing dishes. Will need to follow up outpatient for further monitoring and evaluation of this condition. Will obtain orthostatics for syncope. Discontinued sliding scale patient not diabetic, pre-diabetic.
78 afib, HTN, HLD, COPD, Nasopharyngeal cancer s/p radiation presented with cough, fatigue. RVP positive for RSV. CXR w/ LLL opacity. Treated for CAP w/ ceftriaxone + azithromycin, completed course 6/30. Supportive care for RSV. Currently not in COPD exacerbation. Patient w/ frequent falls over last few years that have been escalating in frequency recently, due to frequent syncope. Attributed to vasovagal events as outpatient. CTH on presentation w/o acute findings. Upon review of chart no MRI w/ contrast performed prior. Given cancer history, w/ escalating syncopal events obtained MRI w/ contrast. MRI concerning for possible brain metastasis w/ vasogenic edema. CT chest abdomen/ pelvis ordered to assess for other metastatic sites. Onc consulted awaiting further recommendations. Will get neurosurgery eval, may benefit from steroids. Will hold off on further AC given head imaging findings.     Disposition pending malignancy eval

## 2023-07-02 NOTE — PROVIDER CONTACT NOTE (OTHER) - BACKGROUND
Patient admitted for cough, fatigue
Patient with DX of fatigue, frequent fall syncopal episodes, hx of Afib.
Pt was admitted with fatigue

## 2023-07-02 NOTE — PROGRESS NOTE ADULT - NUTRITIONAL ASSESSMENT
This patient has been assessed with a concern for Malnutrition and has been determined to have a diagnosis/diagnoses of Severe protein-calorie malnutrition and Underweight (BMI < 19).    This patient is being managed with:   Diet Regular-  Consistent Carbohydrate {Evening Snack} (CSTCHOSN)  Supplement Feeding Modality:  Oral  Glucerna Shake Cans or Servings Per Day:  1       Frequency:  Daily  Entered: Jun 27 2023  9:16PM  

## 2023-07-02 NOTE — PROGRESS NOTE ADULT - SUBJECTIVE AND OBJECTIVE BOX
Mitch Gallego MD   Internal Medicine, PGY 2  Contact via TEAMS.     SUBJECTIVE / OVERNIGHT EVENTS:  - Pt seen and examined at bedside  - PAWAN    MEDICATIONS  (STANDING):  apixaban 5 milliGRAM(s) Oral every 12 hours  metoprolol succinate ER 25 milliGRAM(s) Oral <User Schedule>  multivitamin 1 Tablet(s) Oral daily  simvastatin 20 milliGRAM(s) Oral at bedtime  tiotropium 2.5 MICROgram(s)/olodaterol 2.5 MICROgram(s) (STIOLTO) Inhaler 2 Puff(s) Inhalation daily    MEDICATIONS  (PRN):  acetaminophen     Tablet .. 650 milliGRAM(s) Oral every 6 hours PRN Temp greater or equal to 38C (100.4F), Mild Pain (1 - 3), Moderate Pain (4 - 6)  albuterol    90 MICROgram(s) HFA Inhaler 2 Puff(s) Inhalation every 6 hours PRN Shortness of Breath and/or Wheezing  aluminum hydroxide/magnesium hydroxide/simethicone Suspension 30 milliLiter(s) Oral every 4 hours PRN Dyspepsia  guaiFENesin Oral Liquid (Sugar-Free) 100 milliGRAM(s) Oral every 6 hours PRN Cough  melatonin 3 milliGRAM(s) Oral at bedtime PRN Insomnia  ondansetron Injectable 4 milliGRAM(s) IV Push every 8 hours PRN Nausea and/or Vomiting          PHYSICAL EXAM:  Vital Signs Last 24 Hrs  T(C): 36.3 (02 Jul 2023 04:54), Max: 36.5 (01 Jul 2023 14:23)  T(F): 97.4 (02 Jul 2023 04:54), Max: 97.7 (01 Jul 2023 14:23)  HR: 63 (02 Jul 2023 05:15) (56 - 68)  BP: 163/88 (02 Jul 2023 04:54) (145/76 - 163/88)  BP(mean): --  RR: 18 (02 Jul 2023 04:54) (18 - 18)  SpO2: 99% (02 Jul 2023 04:54) (98% - 99%)    Parameters below as of 02 Jul 2023 04:54  Patient On (Oxygen Delivery Method): room air        CAPILLARY BLOOD GLUCOSE        I&O's Summary      CONSTITUTIONAL: NAD  HEENT: NC/AT  RESPIRATORY: Normal respiratory effort; lungs are clear to auscultation bilaterally  CARDIOVASCULAR: Regular rate and rhythm, normal S1 and S2, no murmur/rub/gallop; No lower extremity edema; Peripheral pulses are 2+ bilaterally  ABDOMEN: Nontender to palpation, normoactive bowel sounds, no rebound/guarding; No hepatosplenomegaly  MUSCLOSKELETAL: no clubbing or cyanosis of digits; no joint swelling or tenderness to palpation  EXTREMITIES: no peripheral edema, distal pulses intact   NEURO: no focal deficits   PSYCH: A+O to person, place, and time; affect appropriate    LABS:                        11.6   6.19  )-----------( 518      ( 02 Jul 2023 06:17 )             36.4     07-02    138  |  103  |  20  ----------------------------<  95  4.2   |  21<L>  |  0.97    Ca    9.3      02 Jul 2023 06:17  Phos  3.3     07-02  Mg     2.10     07-02            Urinalysis Basic - ( 02 Jul 2023 06:17 )    Color: x / Appearance: x / SG: x / pH: x  Gluc: 95 mg/dL / Ketone: x  / Bili: x / Urobili: x   Blood: x / Protein: x / Nitrite: x   Leuk Esterase: x / RBC: x / WBC x   Sq Epi: x / Non Sq Epi: x / Bacteria: x          IMAGING:    [X] All pertinent imaging reviewed by me

## 2023-07-03 ENCOUNTER — APPOINTMENT (OUTPATIENT)
Dept: OTOLARYNGOLOGY | Facility: CLINIC | Age: 78
End: 2023-07-03

## 2023-07-12 ENCOUNTER — APPOINTMENT (OUTPATIENT)
Dept: NEUROSURGERY | Facility: CLINIC | Age: 78
End: 2023-07-12
Payer: MEDICARE

## 2023-07-12 VITALS
SYSTOLIC BLOOD PRESSURE: 127 MMHG | WEIGHT: 156 LBS | HEART RATE: 64 BPM | BODY MASS INDEX: 23.11 KG/M2 | HEIGHT: 69 IN | DIASTOLIC BLOOD PRESSURE: 73 MMHG | OXYGEN SATURATION: 98 %

## 2023-07-12 PROCEDURE — 99214 OFFICE O/P EST MOD 30 MIN: CPT

## 2023-07-27 ENCOUNTER — APPOINTMENT (OUTPATIENT)
Dept: MRI IMAGING | Facility: CLINIC | Age: 78
End: 2023-07-27
Payer: MEDICARE

## 2023-07-27 ENCOUNTER — OUTPATIENT (OUTPATIENT)
Dept: OUTPATIENT SERVICES | Facility: HOSPITAL | Age: 78
LOS: 1 days | End: 2023-07-27
Payer: MEDICARE

## 2023-07-27 DIAGNOSIS — Z90.89 ACQUIRED ABSENCE OF OTHER ORGANS: Chronic | ICD-10-CM

## 2023-07-27 DIAGNOSIS — Z90.411 ACQUIRED PARTIAL ABSENCE OF PANCREAS: Chronic | ICD-10-CM

## 2023-07-27 DIAGNOSIS — Z90.49 ACQUIRED ABSENCE OF OTHER SPECIFIED PARTS OF DIGESTIVE TRACT: Chronic | ICD-10-CM

## 2023-07-27 DIAGNOSIS — G93.89 OTHER SPECIFIED DISORDERS OF BRAIN: ICD-10-CM

## 2023-07-27 DIAGNOSIS — Z98.89 OTHER SPECIFIED POSTPROCEDURAL STATES: Chronic | ICD-10-CM

## 2023-07-27 DIAGNOSIS — Z98.890 OTHER SPECIFIED POSTPROCEDURAL STATES: Chronic | ICD-10-CM

## 2023-07-27 PROCEDURE — 70553 MRI BRAIN STEM W/O & W/DYE: CPT

## 2023-07-27 PROCEDURE — 70553 MRI BRAIN STEM W/O & W/DYE: CPT | Mod: 26

## 2023-07-27 PROCEDURE — A9585: CPT

## 2023-07-27 NOTE — ASSESSMENT
[FreeTextEntry1] : Pt 77 y/o male with h/o head and neck squamous cell carcinoma who had syncope and imaging noted ill-defined right temporal enhancement with some surrounding vasogenic edema.  Presence of possible DVA and possible hemosiderin on SWI may indicate cavernous angioma.  Pt neurologically non focal at this time.  Will need short-interval follow up to assess size and determine if surgery/biopsy is needed.\par \par \par MRI with and without contrast, with GRE+ SWI\par Return in approx 2 weeks.

## 2023-07-27 NOTE — HISTORY OF PRESENT ILLNESS
[de-identified] : Hospital Course \par Discharge Date	30-Jun-2023 \par Admission Date	26-Jun-2023 13:16 \par Reason for Admission	Dyspnea \par Hospital Course	 \par 78 year old male with hx of AFib on Eliquis, HTN, HLD, COPD, DM t2, CKD, \par Nasopharyngeal cancer s/p chemo and radiation, carcinoid tumor (s/p \par hemicolectomy, s/p partial pancreatectomy), pulmonary nodule (c/f malignancy) \par comes into the ED w/ cough and dyspnea w/ CXR c/f LLL CAP. Found to be positive \par for RSV. Falls c/f vasovagal syncope vs orthostatics. During hospital course \par patient treated for CAP w/ CTX and Azithro. Azithro d/c after urine legionella \par was negative. He was on room air w/o any episodes of hypoxemia. He completed \par his 5 day course of CTX on June 30th. Patient was found to be orthostatic \par positive and received IV fluid bolus. Pt on Room air and afebrile throughout \par course. He had episode of coughing up bloody mucous, he was hemodynamically \par stable, possibly d/t upper respiratory bacterial/viral infection vs possible \par pulmonary malignancy. Additionally, patient's wife reported increased frequency \par of falls, believed to be 2/2 vasovagal, but possibly multifactorial w/ \par orthostatics. Wife reports falls are usually associated w/ fecal incontinence \par raising concern. Brain MRI was ordered and performed which showed a region in \par the R temporal lobe w/ vascular supply. There was concern that the lesion was \par either a developmental venous anomaly vs a true metastatic lesion. NSGY \par evaluated pt and recommended continuing w/ current management. He will need to \par f/u w/ NSGY and have a repeat MRI brain to determine if metastatic vs just an \par anomaly. No need to steroids or other intervention at this time. Pt can resume \par his eliquis. Pt was also seen by oncology and recommended to have outpatient \par f/u at [facility]. \par \par At discharge patient was stable and medically optimized. \par \par \par \par \par Attending addendum : Ct scan findings noted  , CT chest: Nodular consolidations \par at the left lung base measuring up to1.6 cm in the left major fissure, with \par internal low-attenuation center. \par Differential includes infection versus neoplasm. 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 No \par evidence of metastatic disease in the abdomen and pelvis. \par Per oncology chart note :No further oncologic workup inpatient and no oncologic \par contraindications to discharge. If confirmed to be a metastatic recurrence or a \par new primary malignancy, please refer patient back to [facility] if amenable. \par Will DC home , pt to f/u with Oncology and Neurosx \par Patient hemodynamically stable for discharge home \par Time spent in discharge process is 40 min \par \par Today, pt presents to office feeling much better.  He was admitted with SOB. He had RSV and pneumonia.  Has been passing out for the past year.  This time, he had vomiting, but did not pass out.  Being followed up by pulmonary for lung findings.  He is due for a month follow up MRI brain to assess for changes in right temporal lesion. Pt had an event in May when wife saw him with right head on table, staring, having hit right side of head,.

## 2023-07-27 NOTE — PHYSICAL EXAM
[General Appearance - Alert] : alert [General Appearance - In No Acute Distress] : in no acute distress [Cranial Nerves Oculomotor (III)] : extraocular motion intact [Cranial Nerves Facial (VII)] : face symmetrical [Motor Tone] : muscle tone was normal in all four extremities [Abnormal Walk] : normal gait [Coordination - Dysmetria Impaired Finger-to-Nose Bilateral] : not present [FreeTextEntry5] : No pronator drift, good

## 2023-07-27 NOTE — RESULTS/DATA
[FreeTextEntry1] : ACC: [id number] EXAM: MR BRAIN IC ORDERED BY: CHRIS PENALOZA\par \par PROCEDURE DATE: 06/29/2023\par \par \par \par INTERPRETATION: CLINICAL INDICATION: Syncope and fecal incontinence. History of colonic carcinoid tumor.\par \par TECHNIQUE: Multi-planar multi-sequential MR imaging of the brain was performed before and after the intravenous administration of contrast. 6 ml of Gadavist IV contrast was administered.\par \par COMPARISON: CT head 6/26/2023. MR brain 7/23/2022. CT neck soft tissue 5/19/2022.\par \par FINDINGS:\par Redemonstrated areas of encephalomalacia and gliosis in the right frontoparietal and left paramedian occipital lobes likely the stigmata of prior infarctions. Scattered foci of susceptibility are seen in these areas compatible with the presence of hemorrhagic byproducts. Diffuse cerebral volume loss with ventricular and sulcal prominence. There are foci of T2/FLAIR signal hyperintensity within the periventricular and subcortical white matter, which are nonspecific but compatible with microvascular ischemic changes.\par \par There is an area of T1/T2 prolongation within the anterior right temporal lobe predominantly within the white matter producing localized sulcal effacement likely representing vasogenic edema. A patchy/nodular area of abnormal enhancement is seen (9:10) measuring 2 x 1 x 0.8 cm in size which surrounds an area of susceptibility artifact. An atypical appearing vessel is seen within the lateral temporal lobe which extends to the brain surface (10:68, 69) which may represent a developmental venous anomaly.\par \par No acute infarction, intracranial hemorrhage or mass lesion.\par \par There are no areas of restricted diffusion or abnormal intracranial enhancement.\par .\par No hydrocephalus. No extra-axial fluid collections. The skull base flow voids are present.\par \par Mucosal thickening is seen most notably involving the ethmoid sinuses. Nonspecific abnormal soft tissue is seen within the right tympanomastoid cavity. The visualized osseous structures, soft tissues and partially visualized parotid glands appear normal.\par \par IMPRESSION:\par Focus of abnormal enhancement with presumed surrounding vasogenic edema within the right temporal lobe. A central zone of susceptibility is seen and there is an atypical linear vessel in this area may represent a developmental venous anomaly or prominent feeding vessel. Given the patient's clinical history, a metastatic focus is a prime consideration. A cavernoma with associated hemorrhage/edema is a possibility.\par \par Multiple areas of encephalomalacia/gliosis as seen previously likely the sequelae of prior infarction.\par \par \par Findings were discussed with Dr. Penaloza by Dr. Mcclellan on 12:49 PM 6/30/2023.\par

## 2023-08-04 ENCOUNTER — APPOINTMENT (OUTPATIENT)
Dept: NEUROSURGERY | Facility: CLINIC | Age: 78
End: 2023-08-04
Payer: MEDICARE

## 2023-08-04 PROCEDURE — 99442: CPT

## 2023-08-08 ENCOUNTER — OUTPATIENT (OUTPATIENT)
Dept: OUTPATIENT SERVICES | Facility: HOSPITAL | Age: 78
LOS: 1 days | End: 2023-08-08
Payer: MEDICARE

## 2023-08-08 ENCOUNTER — APPOINTMENT (OUTPATIENT)
Dept: NUCLEAR MEDICINE | Facility: IMAGING CENTER | Age: 78
End: 2023-08-08
Payer: MEDICARE

## 2023-08-08 DIAGNOSIS — Z00.8 ENCOUNTER FOR OTHER GENERAL EXAMINATION: ICD-10-CM

## 2023-08-08 DIAGNOSIS — Z98.89 OTHER SPECIFIED POSTPROCEDURAL STATES: Chronic | ICD-10-CM

## 2023-08-08 DIAGNOSIS — Z90.89 ACQUIRED ABSENCE OF OTHER ORGANS: Chronic | ICD-10-CM

## 2023-08-08 DIAGNOSIS — Z98.890 OTHER SPECIFIED POSTPROCEDURAL STATES: Chronic | ICD-10-CM

## 2023-08-08 DIAGNOSIS — Z90.49 ACQUIRED ABSENCE OF OTHER SPECIFIED PARTS OF DIGESTIVE TRACT: Chronic | ICD-10-CM

## 2023-08-08 DIAGNOSIS — Z90.411 ACQUIRED PARTIAL ABSENCE OF PANCREAS: Chronic | ICD-10-CM

## 2023-08-08 DIAGNOSIS — J43.9 EMPHYSEMA, UNSPECIFIED: ICD-10-CM

## 2023-08-08 PROCEDURE — 78815 PET IMAGE W/CT SKULL-THIGH: CPT | Mod: 26,PI

## 2023-08-08 PROCEDURE — 78815 PET IMAGE W/CT SKULL-THIGH: CPT

## 2023-08-08 PROCEDURE — A9552: CPT

## 2023-08-09 ENCOUNTER — NON-APPOINTMENT (OUTPATIENT)
Age: 78
End: 2023-08-09

## 2023-08-11 ENCOUNTER — NON-APPOINTMENT (OUTPATIENT)
Age: 78
End: 2023-08-11

## 2023-08-11 ENCOUNTER — APPOINTMENT (OUTPATIENT)
Dept: OTOLARYNGOLOGY | Facility: CLINIC | Age: 78
End: 2023-08-11
Payer: MEDICARE

## 2023-08-11 VITALS
DIASTOLIC BLOOD PRESSURE: 70 MMHG | WEIGHT: 124 LBS | HEIGHT: 69 IN | OXYGEN SATURATION: 98 % | BODY MASS INDEX: 18.37 KG/M2 | SYSTOLIC BLOOD PRESSURE: 170 MMHG | HEART RATE: 62 BPM

## 2023-08-11 DIAGNOSIS — C11.9 MALIGNANT NEOPLASM OF NASOPHARYNX, UNSPECIFIED: ICD-10-CM

## 2023-08-11 DIAGNOSIS — M53.82 OTHER SPECIFIED DORSOPATHIES, CERVICAL REGION: ICD-10-CM

## 2023-08-11 PROCEDURE — 31231 NASAL ENDOSCOPY DX: CPT

## 2023-08-11 PROCEDURE — 99213 OFFICE O/P EST LOW 20 MIN: CPT | Mod: 25

## 2023-08-11 NOTE — PHYSICAL EXAM
[Nasal Endoscopy Performed] : nasal endoscopy was performed, see procedure section for findings [Midline] : trachea located in midline position [Edentulous] : edentulous [Normal] : no rashes [de-identified] : Posttreatment changes, no obvious LAD. [de-identified] : No other LAD.

## 2023-08-11 NOTE — HISTORY OF PRESENT ILLNESS
[de-identified] : 78 year old male presents for follow up for NPC and OE. History of T4 N1 M0 SCCa R. NPC and s/p concurrent CXRT and consolidative chemotherapy completed in March 2017. s/p pancreatectomy in 2018.  pt with h/o syncopal episodes in early 2022. Pt completed treatment with SLP for dysphagia and cough.  Has been following-up with Dr. Shah for ear.  Today pt c/o tightness in the left neck radiating to the right neck, occurring at least 2x/day. Occurs when drinking very cold water. Pt using warm compresses and PT exercises with some relief. Occasionally coughing when eating dry foods.  Patient denies dysphagia, odynophagia, dyspnea, dysphonia, hemoptysis, epistaxis, otalgia, night sweats, chills, or fevers. Weight has been stable within a few pounds.  11/16/2022 CT lung showed new lung left mass  Repeat CT scan done 12/27/22 and 01/27/2023  PET done 8/8/23  Pt was hospitalized form 6/26-7/2 for RSV and pneumonia. MRI Brain was done and lesion was found. He is following up with  neurosurgeon Dr. Jose Alberto Branham.

## 2023-08-11 NOTE — PROCEDURE
[None] : none [Flexible Endoscope] : examined with the flexible endoscope [Serial Number: ___] : Serial Number: [unfilled] [Normal] : the middle meatus had no abnormalities [FreeTextEntry6] : Posttreatment changes in the NPx, no new lesions, no crusting, no lesions in the NC.  [de-identified] : NPC

## 2023-08-16 NOTE — DATA REVIEWED
[de-identified] : aspergillus R Melolabial Transposition Flap Text: In order to maintain form and function of the airway, and to avoid distortion, a nasolabial transposition flap with cheek advancement flap closure at the donor site was planned. After prep and local anesthesia, a Burow's triangle was excised superiorly toward the inner canthus.  An incision was made inferiorly in the nasolabial fold to the lateral commissure of the mouth, then extended superiorly on the medial cheek where a nasolabial flap was elevated by undermining the skin in the subcutaneous plane of the cheek.  The primary defect on the nose was also undermined. The flap was distally thinned, transposed into place, amputated at the tip to fit the defect, and sutured to the nose defect in a layered fashion.  See above for size of this flap.  \\n\\nTo close the donor-site defect on the cheek, a cheek advancement flap was elevated by undermining the skin of the cheek in the subcutaneous plane laterally beyond the lateral canthus and superiorly above the orbital rim. After hemostasis was obtained, the flap was advanced medially and attached with peristeal stitches to the pyriform aperture of the maxilla and to the ascending portion of the maxilla. Remaining wound edges were closed in a layered fashion.  This cheek advancement flap measured 3 x 4 cm.

## 2023-08-17 NOTE — HISTORY OF PRESENT ILLNESS
[de-identified] : Patient with T4 N1 M0 SCCa R. NPC. He is now s/p concurrent CXRT and consolidative chemotherapy completed in March 2017.   PT has a history of ear infections.  HE has not had a recent infection.  His hearing is diminished in the R ear.  Otherwise, no other complaints of dysphagia, pain, dysphonia.  \par CT Neck from 6/9/2020 was stable.  Chest Xray showed clear lungs.  Agree with resident note  75-year-old male with past medical history of CAD presents for second visit after a metal window closed on both hands on August 15 was seen in the emergency room had a dislocation of the fifth proximal joint was reduced and placed in finger splint presents with dorsal pain on the left hand today.  Unclear if received x-ray 2 days ago on left hand (did receive x-ray of left hand) states pain has gotten worse since injury.  Physical exam  Well-appearing male in no respiratory distress  Vital signs stable  Clear to auscultation bilaterally  S1-S2 no murmurs rubs or gallops  Musculoskeletal 2+ radial pulses splint to right fifth digit normal cap refill left hand dorsal swelling over second and third digits, mild discoloration, full range of motion of fingers, normal cap refill  Impression  X-ray read by attending radiologist as fractures to long finger of left hand small avulsion fractures at middle distal phalanx  Resident read initially did not show this  Called patient at home and instructed to come back for finger splint and follow-up with hand  Have informed patient to be brought immediately back to intake for splint

## 2023-08-28 ENCOUNTER — APPOINTMENT (OUTPATIENT)
Dept: NEUROLOGY | Facility: CLINIC | Age: 78
End: 2023-08-28
Payer: MEDICARE

## 2023-08-28 ENCOUNTER — NON-APPOINTMENT (OUTPATIENT)
Age: 78
End: 2023-08-28

## 2023-08-28 VITALS
WEIGHT: 131 LBS | OXYGEN SATURATION: 98 % | TEMPERATURE: 97.9 F | DIASTOLIC BLOOD PRESSURE: 83 MMHG | RESPIRATION RATE: 16 BRPM | BODY MASS INDEX: 19.4 KG/M2 | HEART RATE: 59 BPM | HEIGHT: 69 IN | SYSTOLIC BLOOD PRESSURE: 155 MMHG

## 2023-08-28 DIAGNOSIS — R27.0 ATAXIA, UNSPECIFIED: ICD-10-CM

## 2023-08-28 PROCEDURE — 99205 OFFICE O/P NEW HI 60 MIN: CPT

## 2023-08-28 RX ORDER — MEGESTROL ACETATE 40 MG/ML
400 SUSPENSION ORAL DAILY
Qty: 180 | Refills: 1 | Status: DISCONTINUED | COMMUNITY
Start: 2022-12-14 | End: 2023-08-28

## 2023-08-29 PROBLEM — R27.0 ATAXIA: Status: ACTIVE | Noted: 2023-08-29

## 2023-08-29 NOTE — PHYSICAL EXAM
[FreeTextEntry1] :  Exam as below (with documented exceptions in parentheses):  General:  thin appearing man well groomed and without deformities. KPS is 70  Mental Status:  Awake, alert and attentive. Oriented to person, place and time. Recent and remote memory intact. Normal concentration. Fluent spontaneous speech with intact naming and repetition. Normal fund of knowledge.    Cranial Nerves: II: Full visual fields. III,IV,VI:Pupils round, reactive to light. Full extraocular movements. No nystagmus. V: Normal bilateral bite, facial sensation. VII: No facial weakness. VIII: Hearing intact. IX,X: Palate midline, intact gag. XI:  Sternocleidomastoids normal. XII: Tongue protrudes midline. No dysarthria.  (LUMN facial weakness)  Motor:  Normal tone, bulk and power throughout including arms and legs, proximal and distal. No pronator drift. No abnormal movements.   Sensation: Normal in arms, legs and trunk to pin, proprioception and vibration. Negative Romberg.   Coordination: No dysmetria or dysdiadochokinesis bilaterally. Normal heel-shin testing.   Gait: Normal including heel and toe walking. Normal station.  (cannot tandem)  Reflexes: Normoactive and symmetric throughout. Absent Babinski bilaterally.  (absent LE)  Vascular: No peripheral edema/calf tenderness.   Eyes: Funduscopic exam without papilledema (deferred)  Heart: Regular rate and rhythm. Normal s1-s2. No murmurs, rubs or gallops.   Respiratory: Lungs clear to auscultation bilaterally.   Abdomen: Nontender, non-distended. No hepatosplenomegaly. Normal bowel sounds in four quadrants.

## 2023-08-29 NOTE — DISCUSSION/SUMMARY
[FreeTextEntry1] : Brain lesion (present to some degree since 7/22), adjacent to RT port for prior head and neck RT.  I favor radionecrosis, that seems to be asymptomatic and improved.   I do not think he's having seizures, sound more like syncope.   No indication presently for seizure meds Repeat MRI is coordinated for 9/23, (d/w Dr Branham today) Hold steroids unless edema markedly worsens or symptoms develop RTC 2mo

## 2023-08-29 NOTE — DATA REVIEWED
[de-identified] : Reviewed (and d/w neuroradiology today) MRI brain from 7/23, 6/23, 7/22 (non contrast), 12/19, showing development of FLAIR (in 7/22) and enhancement that is patchy in 6/23, that improved in 7/23.  There is extensive white matter change

## 2023-08-29 NOTE — HISTORY OF PRESENT ILLNESS
[FreeTextEntry1] : NEURO-ONCOLOGY  This 78 year old right handed man is referred by Dr. 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 Moncada), with concurrent cisplatin and adjuvant carbo/taxol.  He's had a number of MRIs.  In 2/22 he began to experience episodes of syncope, without clear seizure or post ictal confusion, associated with fecal but not urinary incontinence, and not with tongue bite. In 7/22, a noncontrast brain MRI revealed new FLAIR hyperintensity in the anterior right temporal lobe (in the NPC radiation port).  He had prolonged EEG without any seizure.  These events have occurred on average about once every 2mo. In 6/23 was admitted for PNA, and a recent syncopal event prompted contrast MRI showing patchy enhancement and stable FLAIR in the temporal lobe.   Follow up MRI (ordered by Dr. Branham) showed improved enhancement.  Was not on steroids.   Clinically he's had a decline in memory over last few years.  Last event was 6/23.  Gait is off because of neuropathy.  Walks with cane.   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  DM.  HTN.  Afib on antocoag.  CKD (related to chemo.  PSH:  Right hemicolectomy.  Distal pancreatectomy.   SHX:  worked as  for [email or web address].  here with wife Delta.  Prior tob, no etoh.  Used to be a long-distance runner.  FHX:  Son has dementia, unclear, in 50's.  Doesn't know about his father.

## 2023-09-06 ENCOUNTER — NON-APPOINTMENT (OUTPATIENT)
Age: 78
End: 2023-09-06

## 2023-09-07 ENCOUNTER — APPOINTMENT (OUTPATIENT)
Dept: OTOLARYNGOLOGY | Facility: CLINIC | Age: 78
End: 2023-09-07
Payer: MEDICARE

## 2023-09-07 VITALS — HEIGHT: 69 IN | WEIGHT: 130 LBS | BODY MASS INDEX: 19.26 KG/M2

## 2023-09-07 VITALS — DIASTOLIC BLOOD PRESSURE: 64 MMHG | SYSTOLIC BLOOD PRESSURE: 91 MMHG | HEART RATE: 65 BPM

## 2023-09-07 DIAGNOSIS — H72.91 UNSPECIFIED PERFORATION OF TYMPANIC MEMBRANE, RIGHT EAR: ICD-10-CM

## 2023-09-07 PROCEDURE — 99213 OFFICE O/P EST LOW 20 MIN: CPT

## 2023-09-07 PROCEDURE — 92504 EAR MICROSCOPY EXAMINATION: CPT

## 2023-09-07 PROCEDURE — 92567 TYMPANOMETRY: CPT

## 2023-09-07 PROCEDURE — 92557 COMPREHENSIVE HEARING TEST: CPT

## 2023-09-09 PROBLEM — H72.91 PERFORATION OF RIGHT TYMPANIC MEMBRANE: Status: ACTIVE | Noted: 2019-03-23

## 2023-09-09 NOTE — HISTORY OF PRESENT ILLNESS
[de-identified] : 78 year old male presents with decreased hearing History of Right otitis external and Right TM perforation and nasal cancer No use of HAs B/L hearing loss, wife believes right ear is worse than left. Hearing loss is getting progressively worse Reports being exposed to chemo and radiation.  Denies history of otalgia, otorrhea, ear infections, tinnitus, dizziness, vertigo, ear surgeries. No history of head/otologic trauma. Denies loud noise exposure.  Last audiogram conducted 12/13/2022

## 2023-09-09 NOTE — PHYSICAL EXAM
[Midline] : trachea located in midline position [Binocular Microscopic Exam] : Binocular microscopic exam was performed [Hearing Everett Test (Tuning Fork On Forehead)] : no lateralization of tone [Normal] : gait was normal [Hearing Loss Right Only] : diminished [Hearing Loss Left Only] : diminished [Rinne Test Air Conduction Persists > Bone Conduction Right] : bone conduction greater than air conduction on the right [Rinne Test Air Conduction Persists > Bone Conduction Left] : bone conduction greater than air conduction on the left [Nystagmus] : ~T no ~M nystagmus was seen [Fukuda Step Test] : Fukuda Step Test was Negative [Romberg's Sign] : Romberg's sign was absent [Fistula Sign] : Fistula Sign: Negative [Past-Pointing] : Past-Pointing: Negative [Ale-Halljeanetteke] : Ballwin-Hallpike: Negative [FreeTextEntry8] : cerumen, removed [FreeTextEntry9] : large amount of keratosis obturans, removed [de-identified] : small central pinpoint perf, dry

## 2023-09-09 NOTE — PROCEDURE
[Same] : same as the Pre Op Dx. [FreeTextEntry1] : R otorrhea [] : Removal of Cerumen [FreeTextEntry4] : none [FreeTextEntry6] : Operative microscope was used to examine/treat the ear canal, ear drum and visible middle ear landmarks. Adequate exam/treatment would not have been possible without the use of a microscope. Findings are described. Cerumen in form of keratosis obturans was removed under binocular microscopy with a combination of a suction, mac needle, alligator and/or a loop curette. The patient tolerated the procedure well and there were no complications. The included findings were noted.

## 2023-09-09 NOTE — DATA REVIEWED
[de-identified] : An audiogram was ordered and performed including pure tones, tympanometry and speech testing for the patient complaint of hearing loss i have independently reviewed the patient's audiogram from today and my findings include radha SNHL, R ear mixed

## 2023-09-12 ENCOUNTER — APPOINTMENT (OUTPATIENT)
Dept: VASCULAR SURGERY | Facility: CLINIC | Age: 78
End: 2023-09-12
Payer: MEDICARE

## 2023-09-12 PROCEDURE — 93923 UPR/LXTR ART STDY 3+ LVLS: CPT

## 2023-09-19 ENCOUNTER — APPOINTMENT (OUTPATIENT)
Dept: VASCULAR SURGERY | Facility: CLINIC | Age: 78
End: 2023-09-19

## 2023-09-23 ENCOUNTER — OUTPATIENT (OUTPATIENT)
Dept: OUTPATIENT SERVICES | Facility: HOSPITAL | Age: 78
LOS: 1 days | End: 2023-09-23
Payer: MEDICARE

## 2023-09-23 ENCOUNTER — APPOINTMENT (OUTPATIENT)
Dept: CT IMAGING | Facility: CLINIC | Age: 78
End: 2023-09-23
Payer: MEDICARE

## 2023-09-23 ENCOUNTER — APPOINTMENT (OUTPATIENT)
Dept: MRI IMAGING | Facility: CLINIC | Age: 78
End: 2023-09-23
Payer: MEDICARE

## 2023-09-23 DIAGNOSIS — Z90.49 ACQUIRED ABSENCE OF OTHER SPECIFIED PARTS OF DIGESTIVE TRACT: Chronic | ICD-10-CM

## 2023-09-23 DIAGNOSIS — Z98.890 OTHER SPECIFIED POSTPROCEDURAL STATES: Chronic | ICD-10-CM

## 2023-09-23 DIAGNOSIS — Z00.8 ENCOUNTER FOR OTHER GENERAL EXAMINATION: ICD-10-CM

## 2023-09-23 DIAGNOSIS — G93.89 OTHER SPECIFIED DISORDERS OF BRAIN: ICD-10-CM

## 2023-09-23 DIAGNOSIS — R93.89 ABNORMAL FINDINGS ON DIAGNOSTIC IMAGING OF OTHER SPECIFIED BODY STRUCTURES: ICD-10-CM

## 2023-09-23 DIAGNOSIS — Z98.89 OTHER SPECIFIED POSTPROCEDURAL STATES: Chronic | ICD-10-CM

## 2023-09-23 DIAGNOSIS — Z90.411 ACQUIRED PARTIAL ABSENCE OF PANCREAS: Chronic | ICD-10-CM

## 2023-09-23 DIAGNOSIS — Z90.89 ACQUIRED ABSENCE OF OTHER ORGANS: Chronic | ICD-10-CM

## 2023-09-23 PROCEDURE — 71250 CT THORAX DX C-: CPT | Mod: 26

## 2023-09-23 PROCEDURE — 70553 MRI BRAIN STEM W/O & W/DYE: CPT | Mod: 26

## 2023-09-23 PROCEDURE — A9585: CPT

## 2023-09-23 PROCEDURE — 71250 CT THORAX DX C-: CPT

## 2023-09-23 PROCEDURE — 70553 MRI BRAIN STEM W/O & W/DYE: CPT

## 2023-09-27 ENCOUNTER — APPOINTMENT (OUTPATIENT)
Dept: NEUROLOGY | Facility: CLINIC | Age: 78
End: 2023-09-27
Payer: MEDICARE

## 2023-09-27 VITALS
HEIGHT: 69 IN | DIASTOLIC BLOOD PRESSURE: 80 MMHG | BODY MASS INDEX: 19.26 KG/M2 | OXYGEN SATURATION: 97 % | HEART RATE: 63 BPM | TEMPERATURE: 97.5 F | SYSTOLIC BLOOD PRESSURE: 131 MMHG | RESPIRATION RATE: 16 BRPM | WEIGHT: 130 LBS

## 2023-09-27 PROCEDURE — 99214 OFFICE O/P EST MOD 30 MIN: CPT

## 2023-10-01 PROBLEM — K86.89 MASS OF PANCREAS: Status: ACTIVE | Noted: 2018-05-15

## 2023-10-02 NOTE — H&P PST ADULT - COMFORT LEVEL, ACCEPTABLE
4 Enbrel Pregnancy And Lactation Text: This medication is Pregnancy Category B and is considered safe during pregnancy. It is unknown if this medication is excreted in breast milk.

## 2023-10-06 ENCOUNTER — APPOINTMENT (OUTPATIENT)
Dept: NEUROSURGERY | Facility: CLINIC | Age: 78
End: 2023-10-06
Payer: MEDICARE

## 2023-10-06 VITALS
WEIGHT: 130 LBS | OXYGEN SATURATION: 97 % | SYSTOLIC BLOOD PRESSURE: 143 MMHG | HEART RATE: 56 BPM | HEIGHT: 69 IN | BODY MASS INDEX: 19.26 KG/M2 | DIASTOLIC BLOOD PRESSURE: 79 MMHG

## 2023-10-06 PROCEDURE — 99214 OFFICE O/P EST MOD 30 MIN: CPT

## 2023-10-14 NOTE — H&P ADULT - ASSESSMENT
FAMILY HISTORY:  Father  Still living? No  Family history of embolic stroke, Age at diagnosis: Age Unknown    Mother  Still living? Unknown  Family history of colon cancer in mother, Age at diagnosis: Age Unknown     77M presenting after suffering a mechanical fall found to have left sided 6th and 7th rib fracture and small pneumothorax.    PLAN:  - Admit to ACS service under Dr. Smith.  - Repeat CXR in AM to monitor pneumothorax  - Pain control:   Lidoderm patch over area of chest pain/tenderness  Standing Tylenol q8hrs for 48hrs (max of 3g/day), after 48hrs then can switch to 500 q6hr (max 2g/day)  PRN: Ibuprofen 200mg PO q4hr mild pain, Tramadol 25mg PO q4hr moderate pain, Tramadol 50 q4hr severe pain  - Bowel regimen: Senna and Miralax  - Repeat CXR within 36hrs  - OOB to chair within 12hrs of admission  - Ambulation within 24hrs of admission  - PT consult  - Incentive spirometry 10x / hour  - HOB >30 degrees    Discussed with attending surgeon Dr. Smith.    ANGEL Mars, PGY-2   [facility]   Acute Care Surgery   [id number]

## 2023-10-17 ENCOUNTER — APPOINTMENT (OUTPATIENT)
Dept: VASCULAR SURGERY | Facility: CLINIC | Age: 78
End: 2023-10-17
Payer: MEDICARE

## 2023-10-17 PROCEDURE — 99442: CPT

## 2023-11-15 NOTE — PATIENT PROFILE ADULT - NSPROPASSIVESMOKEEXPOSURE_GEN_A_NUR
Refill Requested:  Diazepam 5mg     Recommended Follow Up: 6 months  Next visit: Visit date not found    Controlled Med - Routed to Provider due to NO PROTOCOL. Orders have been prepped according to providers note.     Ordered date: 8/10/23 with 3 refills  Last RX dispensed (per PDMP): 11/13/23 last refill         No

## 2023-11-21 ENCOUNTER — APPOINTMENT (OUTPATIENT)
Dept: GASTROENTEROLOGY | Facility: CLINIC | Age: 78
End: 2023-11-21
Payer: MEDICARE

## 2023-11-21 ENCOUNTER — RESULT REVIEW (OUTPATIENT)
Age: 78
End: 2023-11-21

## 2023-11-21 VITALS
OXYGEN SATURATION: 97 % | DIASTOLIC BLOOD PRESSURE: 84 MMHG | HEART RATE: 64 BPM | BODY MASS INDEX: 19.26 KG/M2 | WEIGHT: 130 LBS | HEIGHT: 69 IN | SYSTOLIC BLOOD PRESSURE: 133 MMHG

## 2023-11-21 DIAGNOSIS — R93.3 ABNORMAL FINDINGS ON DIAGNOSTIC IMAGING OF OTHER PARTS OF DIGESTIVE TRACT: ICD-10-CM

## 2023-11-21 PROCEDURE — 99205 OFFICE O/P NEW HI 60 MIN: CPT

## 2023-12-02 ENCOUNTER — OUTPATIENT (OUTPATIENT)
Dept: OUTPATIENT SERVICES | Facility: HOSPITAL | Age: 78
LOS: 1 days | End: 2023-12-02
Payer: MEDICARE

## 2023-12-02 ENCOUNTER — APPOINTMENT (OUTPATIENT)
Dept: MRI IMAGING | Facility: IMAGING CENTER | Age: 78
End: 2023-12-02
Payer: MEDICARE

## 2023-12-02 DIAGNOSIS — Z98.890 OTHER SPECIFIED POSTPROCEDURAL STATES: Chronic | ICD-10-CM

## 2023-12-02 DIAGNOSIS — D49.0 NEOPLASM OF UNSPECIFIED BEHAVIOR OF DIGESTIVE SYSTEM: ICD-10-CM

## 2023-12-02 DIAGNOSIS — Z90.49 ACQUIRED ABSENCE OF OTHER SPECIFIED PARTS OF DIGESTIVE TRACT: Chronic | ICD-10-CM

## 2023-12-02 DIAGNOSIS — Z90.89 ACQUIRED ABSENCE OF OTHER ORGANS: Chronic | ICD-10-CM

## 2023-12-02 DIAGNOSIS — Z98.89 OTHER SPECIFIED POSTPROCEDURAL STATES: Chronic | ICD-10-CM

## 2023-12-02 DIAGNOSIS — Z90.411 ACQUIRED PARTIAL ABSENCE OF PANCREAS: Chronic | ICD-10-CM

## 2023-12-02 PROCEDURE — A9585: CPT

## 2023-12-02 PROCEDURE — 74183 MRI ABD W/O CNTR FLWD CNTR: CPT

## 2023-12-02 PROCEDURE — 74183 MRI ABD W/O CNTR FLWD CNTR: CPT | Mod: 26

## 2023-12-12 ENCOUNTER — APPOINTMENT (OUTPATIENT)
Dept: SURGERY | Facility: CLINIC | Age: 78
End: 2023-12-12
Payer: MEDICARE

## 2023-12-12 VITALS
WEIGHT: 130 LBS | SYSTOLIC BLOOD PRESSURE: 141 MMHG | RESPIRATION RATE: 17 BRPM | HEIGHT: 69 IN | OXYGEN SATURATION: 95 % | DIASTOLIC BLOOD PRESSURE: 79 MMHG | HEART RATE: 57 BPM | BODY MASS INDEX: 19.26 KG/M2

## 2023-12-12 DIAGNOSIS — D49.0 NEOPLASM OF UNSPECIFIED BEHAVIOR OF DIGESTIVE SYSTEM: ICD-10-CM

## 2023-12-12 PROCEDURE — 99212 OFFICE O/P EST SF 10 MIN: CPT

## 2023-12-15 ENCOUNTER — APPOINTMENT (OUTPATIENT)
Dept: ELECTROPHYSIOLOGY | Facility: CLINIC | Age: 78
End: 2023-12-15
Payer: MEDICARE

## 2023-12-15 ENCOUNTER — NON-APPOINTMENT (OUTPATIENT)
Age: 78
End: 2023-12-15

## 2023-12-15 VITALS
BODY MASS INDEX: 19.26 KG/M2 | OXYGEN SATURATION: 95 % | WEIGHT: 130 LBS | DIASTOLIC BLOOD PRESSURE: 68 MMHG | HEART RATE: 57 BPM | HEIGHT: 69 IN | SYSTOLIC BLOOD PRESSURE: 121 MMHG

## 2023-12-15 DIAGNOSIS — E78.5 HYPERLIPIDEMIA, UNSPECIFIED: ICD-10-CM

## 2023-12-15 DIAGNOSIS — I10 ESSENTIAL (PRIMARY) HYPERTENSION: ICD-10-CM

## 2023-12-15 PROCEDURE — 99214 OFFICE O/P EST MOD 30 MIN: CPT | Mod: 25

## 2023-12-15 PROCEDURE — 93000 ELECTROCARDIOGRAM COMPLETE: CPT

## 2023-12-15 NOTE — DISCUSSION/SUMMARY
[FreeTextEntry1] : Impression:  1. Syncope: recurrent syncope without prodromals. He usually gets presyncope/syncope during defecation or when he gets the urge. Likely reflex syncope. EKG performed today to assess for presence of conduction disease and reveals NSR. Orthostatic in the office was negative. Given recurrent syncope, concern for possible arrhythmogenic syncope/transient heart block. Discussed possible EP study vs CardioNet vs ILR for long term monitoring. ECHO with normal LVEF. Given frequency of syncope, prefers Loop implant for long term monitoring for arrythmias.   2. HTN: resume oral antihypertensives as prescribed. Encouraged heart healthy diet, sodium restriction, and weight loss. Continue regular f/u with Cardiologist for further HTN management.  3. HLD: resume statin therapy as prescribed and regular f/u with Cardiologist for routine lipid monitoring and management.  PLAN: For Loop implant   Sincerely,  Tim Cunningham MD [EKG obtained to assist in diagnosis and management of assessed problem(s)] : EKG obtained to assist in diagnosis and management of assessed problem(s)

## 2023-12-15 NOTE — CARDIOLOGY SUMMARY
[de-identified] : 1/15/2016: normal study  [de-identified] : 9/30/2021: normal LV systolic function, grade I diastolic dsyfunction \par  1/2016: LVEF 55-60%, grade I diastolic dysfunction \par  5/2016: LV function 55%, grade I diastolic dysfunction

## 2023-12-15 NOTE — HISTORY OF PRESENT ILLNESS
[FreeTextEntry1] : Hossein Hernandez is a 79y/o man with Hx of HTN, HLD, COPD, CKD (III), carcinoid tumor s/p hemicolectomy, nasopharyngeal carcinoma s/p chemo/RT, IPMN s/p distal pancreatectomy (2018), and paroxysmal afib, on Eliquis, who presents today for recurrent syncope. Reports he feels dizzy or passes out when he gets an urge to defecate or after defecation. Last episode was on 11/19/23. Orthostatics checked in office was negative. Saw Cardiologist and underwent multiple Holters in the past without evidence of heart block or bradyarrhythmias but did not have any syncope or near syncope while wearing. Echo on 7/21/23 showed EF 63%.  Feeling well now.  Denies chest pain, palpitations, shortness of breath, dyspnea on exertion.

## 2024-01-04 PROBLEM — D49.0 INTRADUCTAL PAPILLARY MUCINOUS NEOPLASM OF PANCREAS: Status: ACTIVE | Noted: 2019-06-12

## 2024-01-10 ENCOUNTER — OUTPATIENT (OUTPATIENT)
Dept: OUTPATIENT SERVICES | Facility: HOSPITAL | Age: 79
LOS: 1 days | Discharge: HOME | End: 2024-01-10
Payer: MEDICARE

## 2024-01-10 DIAGNOSIS — Z98.890 OTHER SPECIFIED POSTPROCEDURAL STATES: Chronic | ICD-10-CM

## 2024-01-10 DIAGNOSIS — Z90.49 ACQUIRED ABSENCE OF OTHER SPECIFIED PARTS OF DIGESTIVE TRACT: Chronic | ICD-10-CM

## 2024-01-10 DIAGNOSIS — Z90.89 ACQUIRED ABSENCE OF OTHER ORGANS: Chronic | ICD-10-CM

## 2024-01-10 DIAGNOSIS — Z98.89 OTHER SPECIFIED POSTPROCEDURAL STATES: Chronic | ICD-10-CM

## 2024-01-10 DIAGNOSIS — Z90.411 ACQUIRED PARTIAL ABSENCE OF PANCREAS: Chronic | ICD-10-CM

## 2024-01-10 DIAGNOSIS — R55 SYNCOPE AND COLLAPSE: ICD-10-CM

## 2024-01-10 PROCEDURE — 33285 INSJ SUBQ CAR RHYTHM MNTR: CPT

## 2024-01-10 RX ORDER — ALBUTEROL 90 UG/1
2 AEROSOL, METERED ORAL
Qty: 0 | Refills: 0 | DISCHARGE

## 2024-01-10 NOTE — H&P CARDIOLOGY - NSICDXPASTMEDICALHX_GEN_ALL_CORE_FT
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

## 2024-01-10 NOTE — H&P CARDIOLOGY - HISTORY OF PRESENT ILLNESS
80 y/o M w/ PMH of HTN, HLD, COPD, CKD (III), carcinoid tumor s/p hemicolectomy, nasopharyngeal carcinoma s/p chemo/RT, IPMN s/p distal pancreatectomy (2018), and paroxysmal Afib on Eliquis presents for ILR implantation. Patient with hisory of pre-syncope and syncope in the setting of bowel movements so saw Cardiologist and underwent multiple Holters in the past without evidence of heart block or bradyarrhythmias but did not have any syncope or near syncope while wearing. ILR for long term monitoring of arrthymias. 78 y/o M w/ PMH of HTN, HLD, COPD, CKD (III), carcinoid tumor s/p hemicolectomy, nasopharyngeal carcinoma s/p chemo/RT, IPMN s/p distal pancreatectomy (2018), and paroxysmal Afib on Eliquis presents for ILR implantation. Patient with hisory of pre-syncope and syncope in the setting of bowel movements so saw Cardiologist and underwent multiple Holters in the past without evidence of heart block or bradyarrhythmias but did not have any syncope or near syncope while wearing. ILR for long term monitoring of arrthymias. 78 y/o M w/ PMH of HTN, HLD, COPD, CKD (III), carcinoid tumor s/p hemicolectomy, nasopharyngeal carcinoma s/p chemo/RT, IPMN s/p distal pancreatectomy (2018), and paroxysmal Afib on Eliquis presents for ILR implantation. Patient with history of pre-syncope and syncope in the setting of bowel movements so saw Cardiologist and underwent multiple Holters in the past without evidence of heart block or bradyarrhythmias but did not have any syncope or near syncope while wearing. ILR for long term monitoring of arrhythmias. 80 y/o M w/ PMH of HTN, HLD, COPD, CKD (III), carcinoid tumor s/p hemicolectomy, nasopharyngeal carcinoma s/p chemo/RT, IPMN s/p distal pancreatectomy (2018), and paroxysmal Afib on Eliquis presents for ILR implantation. Patient with history of pre-syncope and syncope without prodrome sometimes in the setting of bowel movements so saw Cardiologist and underwent multiple Holters in the past without evidence of heart block or bradyarrhythmias but did not have any syncope or near syncope while wearing. ILR for long term monitoring of arrhythmias.

## 2024-01-10 NOTE — H&P CARDIOLOGY - NEGATIVE NEUROLOGICAL SYMPTOMS
no transient paralysis/no weakness/no paresthesias/no generalized seizures/no focal seizures/no tremors/no vertigo/no loss of sensation/no difficulty walking/no headache/no hemiparesis/no confusion/no facial palsy

## 2024-01-10 NOTE — CHART NOTE - NSCHARTNOTEFT_GEN_A_CORE
78 y/o M w/ PMH of HTN, HLD, COPD, CKD (III), carcinoid tumor s/p hemicolectomy, nasopharyngeal carcinoma s/p chemo/RT, IPMN s/p distal pancreatectomy (2018), and paroxysmal Afib on Eliquis presents for ILR implantation s/p ILR    Plan:   ILR implantation  Post-op ILR instruction has been verbally explained and given to the patient. Patient was given ID card, Booklet and home monitor. Patient expressed understanding and all questions were answered. A carbon copy is located in the patient chart  Patient is schedule for an appointment over the phone on , 1/26/24 at 1:40pm ( 4th floor Oncology building [facility] [phone], [place], [place], [address] [phone] )  You can return to normal activities 24hours after the procedure   No scrubbing the incision site for 2 weeks  No lotion, ointment, powder or direct sunlight to the incision site for 2 weeks   No swimming pool, Jacuzzi, or bath for 5 days.   Patient can shower 24 hours after procedure. Pat the area dry  Pt was instructed to call [phone] if the following occurs:      - fever with temperature > 100.6      - swelling, drainage or bleeding at the site incision       - severe chest pain, SOB, n/v 80 y/o M w/ PMH of HTN, HLD, COPD, CKD (III), carcinoid tumor s/p hemicolectomy, nasopharyngeal carcinoma s/p chemo/RT, IPMN s/p distal pancreatectomy (2018), and paroxysmal Afib on Eliquis presents for ILR implantation s/p ILR    Plan:   ILR implantation  Post-op ILR instruction has been verbally explained and given to the patient. Patient was given ID card, Booklet and home monitor. Patient expressed understanding and all questions were answered. A carbon copy is located in the patient chart  Patient is schedule for an appointment over the phone on , 1/26/24 at 1:40pm ( 4th floor Oncology building [facility] [phone], [place], [address] [phone] )  You can return to normal activities 24hours after the procedure   No scrubbing the incision site for 2 weeks  No lotion, ointment, powder or direct sunlight to the incision site for 2 weeks   No swimming pool, Jacuzzi, or bath for 5 days.   Patient can shower 24 hours after procedure. Pat the area dry  Pt was instructed to call [phone] if the following occurs:      - fever with temperature > 100.6      - swelling, drainage or bleeding at the site incision       - severe chest pain, SOB, n/v

## 2024-01-17 PROBLEM — R55 SYNCOPE AND COLLAPSE: Chronic | Status: ACTIVE | Noted: 2024-01-10

## 2024-01-22 ENCOUNTER — OUTPATIENT (OUTPATIENT)
Dept: OUTPATIENT SERVICES | Facility: HOSPITAL | Age: 79
LOS: 1 days | Discharge: HOME | End: 2024-01-22

## 2024-01-22 ENCOUNTER — OUTPATIENT (OUTPATIENT)
Dept: OUTPATIENT SERVICES | Facility: HOSPITAL | Age: 79
LOS: 1 days | End: 2024-01-22

## 2024-01-22 ENCOUNTER — APPOINTMENT (OUTPATIENT)
Dept: SPEECH THERAPY | Facility: HOSPITAL | Age: 79
End: 2024-01-22

## 2024-01-22 ENCOUNTER — NON-APPOINTMENT (OUTPATIENT)
Age: 79
End: 2024-01-22

## 2024-01-22 ENCOUNTER — APPOINTMENT (OUTPATIENT)
Dept: RADIOLOGY | Facility: HOSPITAL | Age: 79
End: 2024-01-22
Payer: MEDICARE

## 2024-01-22 DIAGNOSIS — Z98.890 OTHER SPECIFIED POSTPROCEDURAL STATES: Chronic | ICD-10-CM

## 2024-01-22 DIAGNOSIS — Z98.89 OTHER SPECIFIED POSTPROCEDURAL STATES: Chronic | ICD-10-CM

## 2024-01-22 DIAGNOSIS — Z90.49 ACQUIRED ABSENCE OF OTHER SPECIFIED PARTS OF DIGESTIVE TRACT: Chronic | ICD-10-CM

## 2024-01-22 DIAGNOSIS — Z90.89 ACQUIRED ABSENCE OF OTHER ORGANS: Chronic | ICD-10-CM

## 2024-01-22 DIAGNOSIS — R93.3 ABNORMAL FINDINGS ON DIAGNOSTIC IMAGING OF OTHER PARTS OF DIGESTIVE TRACT: ICD-10-CM

## 2024-01-22 DIAGNOSIS — Z90.411 ACQUIRED PARTIAL ABSENCE OF PANCREAS: Chronic | ICD-10-CM

## 2024-01-22 PROCEDURE — 74230 X-RAY XM SWLNG FUNCJ C+: CPT | Mod: 26

## 2024-01-22 NOTE — PLAN
[FreeTextEntry2] :  1.) Easy to Chew with Moderately Thick Liquids 2.) Feeding/Swallowing Guidelines: Upright position, small bites, chew well, Moderately Thick Liquids via TEASPOON presentation, no cup sip 3.) Aspiration Precautions 4.) Reflux Precautions 5.) Maintain Good Oral Hygiene Care 6.) Follow up with Physician (GI and ENT)  7.) Swallow Therapy to maximize swallow mechanism  SLP provided Patient and Patient's Wife (Delta) education regarding preliminary results. Patient's wife verbalized understanding and will follow up with Physicians.

## 2024-01-22 NOTE — ASSESSMENT
[FreeTextEntry1] : Patient presents with Mild Oral Stage and Moderate/Severe Pharyngeal Stage Dysphagia. The Oral Stage is characterized by adequate oral containment, slow chewing for solid with ability to break down solid, slow bolus manipulation, slow tongue motion with slow anterior to posterior transfer of the bolus for puree/solids; piecemeal transfer/deglutition for solids/puree; with adequate oral clearance post swallow.  The Pharyngeal Stage is characterized by delayed initiation of the pharyngeal swallow (Bolus head is at the vallecular for Thin Liquids), reduced laryngeal elevation with incomplete laryngeal vestibular closure, reduced tongue base retraction, and reduced pharyngeal constriction. There is mild to moderate pharyngeal clearance deficits located along base of tongue/vallecular greater than lateral pharyngeal wall/pyriforms post primary swallow for solids/puree greater than liquid trials.  Spontaneous reswallow and Liquid Wash assist with partial pharyngeal clearance.  There is Laryngeal Penetration after the swallow for Mildly Thick Liquids and Thin Liquids upon spontaneous reswallow from pharyngeal residue entering/spilling over into the laryngeal vestibule to the level of the vocal folds leading to subsequent Aspiration (Silent). Patient is not sensate to the Aspiration given no reflexive cough response. Patient is verbally cued to cough. Patient is unable to effectively clear contrast from the airway/trachea.  There is Laryngeal Penetration during the swallow for Moderately Thick liquids via cup sip self administered leaving trace residue above the level of the vocal folds.  There was No Aspiration observed before, during or after the swallow for puree, solids, moderately thick liquids. Compensatory Postural Strategies (e.g. Chin Down) could not be utilized given Patient offer neck discomfort. Small single sip and teaspoon presentation did NOT eliminate Penetration/Aspiration for Mildly Thick Liquids/Thin Liquids.   RESULTS:  1.) Laryngeal Penetration after the swallow for Mildly Thick Liquids and Thin Liquids upon spontaneous reswallow from pharyngeal residue entering/spilling over into the laryngeal vestibule to the level of the vocal folds leading to subsequent Aspiration (Silent).    2.) Laryngeal Penetration during the swallow for Moderately Thick liquids via cup sip self administered leaving trace residue above the level of the vocal folds. 3.) No Aspiration observed before, during or after the swallow for puree, solids, moderately thick liquids via teaspoon presentation. 4.) Compensatory Postural Strategies (e.g. Chin Down) could not be utilized given Patient offer neck discomfort. Small single sip and teaspoon presentation did NOT eliminate Penetration/Aspiration for Mildly Thick Liquids/Thin Liquids.   Of Note:  There is Mild Pooling of Contrast visualized in the location of the mandible (lateral view) of unclear etiology. When Patient's head turn for anterior posterior view, the pooling of contrast appears on the right side of the oral cavity of unclear etiology (as per discussion with Radiologist).   Of Note: Patient is also on scheduled for a Barium Swallow/Esophagram (See Radiologist report).

## 2024-01-22 NOTE — HISTORY OF PRESENT ILLNESS
[FreeTextEntry1] : Patient arrived to Radiology for an OutPatient Modified Barium Swallow Study. Patient was accompanied by his wife (Delta). 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) IPMN (intraductal papillary mucinous neoplasm) (239.0) (D49.0) Keratosis obturans of left external ear canal (380.21) (H60.42) Kidney disease, chronic, stage III (GFR 30-59 ml/min) (585.3) (N18.30) Lesion of pancreas (577.9) (K86.9) Loss of appetite for more than 2 weeks (783.0) (R63.0) Lung nodules (793.19) (R91.8) Mass of pancreas (577.8) (K86.89) Mixed conductive and sensorineural hearing loss of right ear with restricted hearing of left ear (389.22) (H90.A31) 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) Neck muscle weakness (723.9) (M53.82) 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,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) (I67.89,W88.8XXA) Neuropathic pain (729.2) (M79.2) Otitis externa of right ear (380.10) (H60.91) Otorrhea of right ear (388.60) (H92.11) Perforation of right tympanic membrane (384.20) (H72.91) Pre-operative exam (V72.84) (Z01.818) Radiation dermatitis (692.82) (L58.9) Radiation fibrosis Renal cyst, right (753.10) (N28.1) Sinus bradycardia (427.89) (R00.1) Stage 2 chronic kidney disease (585.2) (N18.2) Swelling of head or neck (784.2) (R22.0,R22.1) Syncope and collapse (780.2) (R55) Urinary frequency (788.41) (R35.0) Weight loss, unintentional (783.21) (R63.4)     Past Medical History Chemotherapy induced nausea and vomiting (787.01,E933.1) (R11.2,T45.1X5A) History of Conductive hearing loss of right ear with restricted hearing of left ear (389.05) (H90.A11) History of COPD, mild (496) (J44.9) Emphysema/COPD (492.8) (J43.9) History of Foreign body of right middle ear, sequela (908.5) (T16.1XXS) History of hearing loss (V12.49) (Z86.69) History of hypercholesterolemia (V12.29) (Z86.39) History of otitis media (V12.49) (Z86.69) History of urinary tract infection (V13.02) (Z87.440) 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 He is accompanied with his wife for this office visit. Though he reports he is eating a regular consistency diet, he often coughs while he eats. Per his wife he was evaluated by SLP and he was given instructions on techniques to perform to minimize his symptoms during meals. Mr. Hernandez had a chest CT dated 9/23/23 that reported a focal hypoattenuating density within the right lateral aspect of the upper esophagus new from the prior exam may represent debris, consider upper endoscopy as indicated. His wife also said that patient has had episodes of ' passing out" and is being evaluated by cardiology and neurology.  Of Note: Patient is known to the [facility] and ENT Clinic.  Speech Pathology Clinic Note on 2/22/2023 - Patient accompanied by family. Therapy focused on improving overall strength and coordination of swallowing function for safe PO intake. Reported that coughing is reduced when eating, inconsistently using head rotation, completing HEP on daily basis. Ply Patient provided with 5 cc of moderately thick/honey thick liquids x 10 at which time reduced laryngeal elevation noted, no cough with head in neutral position, patient reported feeling better with use of modification. Recommended patient consume honey thick/moderately thick liquids t prevent aspiration with noted cough, small single sips - education regarding commercial thickening agents. Patient completed relaxation exercises to reduce musculo-skeletal tension in the neck, jaw and shoulders. In addition, mandibular ROM tasks completed with 15% improvement with max cues. Laryngeal elevation tasks completed with 15% improvement with moderate cues. BOT retraction tasks completed with 15% improvement. Pharyngeal contractility tasks completed with 15% improvement.  HEP provided re: mandibular ROM Will continue Patient verbalized understanding and returned demonstration.    ENT Note 8/11/2023 - Patient with T4 N1 M0 SCCa R. NPC. He is now s/p concurrent CXRT and consolidative chemotherapy completed in March 2017.  He has CHERRI from this standpoint clinically and his last imaging showed the same.  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  I will arrange for evaluation by colleagues in Vascular Surgery.  I have asked him to continue using nasal saline irrigations and nasal saline gel.  He was also noted to have a pancreatic lesion and has now undergone surgery which revealed a lesion with high grade dysplasia but no invasive disease.  He had a PET in the TM AD which migrated into the middle ear and was removed by Dr. De La Rosa.  She had offered him tympanoplasty but he is not sure whether he wants to proceed.  His otologic issues are being managed by Dr. De La Rosa and he is doing much better.  Clinically and radiographically he has CHERRI in the neck.  He is reassured.  He has a MRI earlier this year which was concerning for a metastatic brain lesion but followup imaging shows improvement, including the recent PET/CT.  He is being followed by Dr. Branham for this.  I have explained to him that an isolated brain metastatic from the NPC, though possible, would be unlikely, specially considering his duration since treatment and with CHERRI througout.  He is considered cured from his primary disease and has the option of annual surveillance for SPT and survivorship issues and he would like to pursue this option.     Today, Patient and Wife reports Pulmonary Physician concerns given CXR results for COPD and recent "Pneumothorax" and "RSV" "Pneumonia" back in June 2023. Patient's wife also reports concerns for "swelling of the esophagus" finding on a CT scan.  Patient's wife report "coughing" when eating dry food.  Patient and Wife reports received swallowing therapy in the past at the [facility] last year (February 2023 was the last session).  Patient eats Regular with Thin Liquids.   This Modified Barium Swallow Study is to objectively assess the Physiology of the Oral and Pharyngeal Stage swallowing mechanism for treatment plan for least restrictive diet.   Of Note: Patient is also on scheduled for a Barium Swallow/Esophagram (See Radiologist report).  Referring MD: Dr. Rafael Mcclain

## 2024-01-23 ENCOUNTER — NON-APPOINTMENT (OUTPATIENT)
Age: 79
End: 2024-01-23

## 2024-01-23 ENCOUNTER — APPOINTMENT (OUTPATIENT)
Dept: ELECTROPHYSIOLOGY | Facility: CLINIC | Age: 79
End: 2024-01-23

## 2024-01-24 ENCOUNTER — APPOINTMENT (OUTPATIENT)
Dept: NEUROLOGY | Facility: CLINIC | Age: 79
End: 2024-01-24
Payer: MEDICARE

## 2024-01-24 ENCOUNTER — APPOINTMENT (OUTPATIENT)
Dept: MRI IMAGING | Facility: IMAGING CENTER | Age: 79
End: 2024-01-24
Payer: MEDICARE

## 2024-01-24 ENCOUNTER — OUTPATIENT (OUTPATIENT)
Dept: OUTPATIENT SERVICES | Facility: HOSPITAL | Age: 79
LOS: 1 days | End: 2024-01-24
Payer: MEDICARE

## 2024-01-24 VITALS
BODY MASS INDEX: 19.26 KG/M2 | OXYGEN SATURATION: 99 % | RESPIRATION RATE: 16 BRPM | HEART RATE: 60 BPM | DIASTOLIC BLOOD PRESSURE: 89 MMHG | HEIGHT: 69 IN | TEMPERATURE: 97.8 F | SYSTOLIC BLOOD PRESSURE: 159 MMHG | WEIGHT: 130 LBS

## 2024-01-24 VITALS — SYSTOLIC BLOOD PRESSURE: 143 MMHG | HEART RATE: 65 BPM | DIASTOLIC BLOOD PRESSURE: 81 MMHG

## 2024-01-24 DIAGNOSIS — Z90.49 ACQUIRED ABSENCE OF OTHER SPECIFIED PARTS OF DIGESTIVE TRACT: Chronic | ICD-10-CM

## 2024-01-24 DIAGNOSIS — Z98.890 OTHER SPECIFIED POSTPROCEDURAL STATES: Chronic | ICD-10-CM

## 2024-01-24 DIAGNOSIS — Z98.89 OTHER SPECIFIED POSTPROCEDURAL STATES: Chronic | ICD-10-CM

## 2024-01-24 DIAGNOSIS — Z90.89 ACQUIRED ABSENCE OF OTHER ORGANS: Chronic | ICD-10-CM

## 2024-01-24 DIAGNOSIS — G93.89 OTHER SPECIFIED DISORDERS OF BRAIN: ICD-10-CM

## 2024-01-24 DIAGNOSIS — Z90.411 ACQUIRED PARTIAL ABSENCE OF PANCREAS: Chronic | ICD-10-CM

## 2024-01-24 PROCEDURE — A9585: CPT

## 2024-01-24 PROCEDURE — 70553 MRI BRAIN STEM W/O & W/DYE: CPT | Mod: 26

## 2024-01-24 PROCEDURE — 70553 MRI BRAIN STEM W/O & W/DYE: CPT

## 2024-01-24 PROCEDURE — 99214 OFFICE O/P EST MOD 30 MIN: CPT

## 2024-01-24 NOTE — DATA REVIEWED
[de-identified] : Reviewed (and d/w neuroradiology today) MRI brain from 1/24 compared with 9/23 compared with 7/23, 6/23, 7/22 (non contrast), 12/19, showing development of FLAIR (in 7/22) and enhancement that is patchy in 6/23, that improved in 7/23 a bit increased and more nodular on 1/24 MRI

## 2024-01-24 NOTE — HISTORY OF PRESENT ILLNESS
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 Moncada), with concurrent cisplatin and adjuvant carbo/taxol.  He's had a number of MRIs.  In 2/22 he began to experience episodes of syncope, without clear seizure or post ictal confusion, associated with fecal but not urinary incontinence, and not with tongue bite. In 7/22, a noncontrast brain MRI revealed new FLAIR hyperintensity in the anterior right temporal lobe (in the NPC radiation port).  He had prolonged EEG without any seizure.  These events have occurred on average about once every 2mo. In 6/23 was admitted for PNA, and a recent syncopal event prompted contrast MRI showing patchy enhancement and stable FLAIR in the temporal lobe.   Follow up MRI (ordered by Dr. Branham) showed improved enhancement.  Was not on steroids.   Clinically he's had a decline in memory over last few years.   Gait is off because of neuropathy.  Walks with cane.   INTERVAL HISTORY:  MRI with slightly increased nodular enhancement and stable edema in temporal lobe (d/w neuroradiology by phone today).  He had one episode of BM-related syncope in 11/23, with return to normal within minutes.  Otherwise stable.   Loop recorder placed.   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  DM.  HTN.  Afib on antocoag.  CKD (related to chemo.  PSH:  Right hemicolectomy.  Distal pancreatectomy.   SHX:  worked as  for [employer].  here with wife Delta.  Prior tob, no etoh.  Used to be a long-distance runner.  FHX:  Son has dementia, unclear, in 50's.  Doesn't know about his father.

## 2024-01-24 NOTE — DISCUSSION/SUMMARY
[FreeTextEntry1] : Brain lesion (present to some degree since 7/22), adjacent to RT port for prior head and neck RT.  I favor radionecrosis, that seems to be asymptomatic.  Events do not sound like seizure.   No indication presently for seizure meds Repeat MRI is coordinated for 3mo  Hold steroids unless edema markedly worsens or symptoms develop RTC 3mo

## 2024-01-26 ENCOUNTER — APPOINTMENT (OUTPATIENT)
Dept: ELECTROPHYSIOLOGY | Facility: CLINIC | Age: 79
End: 2024-01-26

## 2024-01-26 DIAGNOSIS — R13.12 DYSPHAGIA, OROPHARYNGEAL PHASE: ICD-10-CM

## 2024-01-29 ENCOUNTER — APPOINTMENT (OUTPATIENT)
Dept: NEUROSURGERY | Facility: CLINIC | Age: 79
End: 2024-01-29
Payer: MEDICARE

## 2024-01-29 VITALS
HEART RATE: 55 BPM | HEIGHT: 69 IN | OXYGEN SATURATION: 97 % | BODY MASS INDEX: 19.26 KG/M2 | DIASTOLIC BLOOD PRESSURE: 74 MMHG | WEIGHT: 130 LBS | SYSTOLIC BLOOD PRESSURE: 143 MMHG

## 2024-01-29 PROCEDURE — 99214 OFFICE O/P EST MOD 30 MIN: CPT

## 2024-02-03 NOTE — ASSESSMENT
[FreeTextEntry1] : IMPRESSION:  Pt 79 y/o male with h/o head and neck squamous cell carcinoma who had syncope and imaging noted ill-defined right temporal enhancement with some surrounding vasogenic edema. Presence of possible DVA and possible hemosiderin on SWI may indicate cavernous angioma. Pt neurologically non focal at this time. Doing short interval follow up to assess size and determine if surgery/biopsy is needed. New MRI head with stable lesion, followed up with Dr. Andino after the imaging and was told to repeat imaging in 3 months. Swallowing issue to be managed by GI.   PLAN: Continue PT for neck/swallowing issues as advised by other providers.  MRI Head W+W/O contrast in 3 months (ordered by neuro oncology team) Will follow up after the above imaging.  Follow up sooner with any warning symptoms.

## 2024-02-03 NOTE — PHYSICAL EXAM
[General Appearance - Alert] : alert [General Appearance - In No Acute Distress] : in no acute distress [Oriented To Time, Place, And Person] : oriented to person, place, and time [Impaired Insight] : insight and judgment were intact [Affect] : the affect was normal [Memory Recent] : recent memory was not impaired [Person] : oriented to person [Place] : oriented to place [Time] : oriented to time [Short Term Intact] : short term memory intact [Cranial Nerves Optic (II)] : visual acuity intact bilaterally,  pupils equal round and reactive to light [Cranial Nerves Oculomotor (III)] : extraocular motion intact [Cranial Nerves Facial (VII)] : face symmetrical [Cranial Nerves Glossopharyngeal (IX)] : tongue and palate midline [Cranial Nerves Vestibulocochlear (VIII)] : hearing was intact bilaterally [Cranial Nerves Accessory (XI - Cranial And Spinal)] : head turning and shoulder shrug symmetric [Cranial Nerves Hypoglossal (XII)] : there was no tongue deviation with protrusion [Motor Tone] : muscle tone was normal in all four extremities [Motor Strength] : muscle strength was normal in all four extremities [Balance] : balance was intact [Sclera] : the sclera and conjunctiva were normal [PERRL With Normal Accommodation] : pupils were equal in size, round, reactive to light, with normal accommodation [Outer Ear] : the ears and nose were normal in appearance [Both Tympanic Membranes Were Examined] : both tympanic membranes were normal [Neck Appearance] : the appearance of the neck was normal [] : no respiratory distress [Respiration, Rhythm And Depth] : normal respiratory rhythm and effort [Apical Impulse] : the apical impulse was normal [Heart Rate And Rhythm] : heart rate was normal and rhythm regular [Arterial Pulses Carotid] : carotid pulses were normal with no bruits [Abdominal Aorta] : the abdominal aorta was normal [No CVA Tenderness] : no ~M costovertebral angle tenderness [No Spinal Tenderness] : no spinal tenderness [Abnormal Walk] : normal gait [Involuntary Movements] : no involuntary movements were seen [Coordination - Dysmetria Impaired Finger-to-Nose Bilateral] : not present [FreeTextEntry5] : No pronator drift, good  [FreeTextEntry8] : no drift, mild imbalance at tandem

## 2024-02-03 NOTE — HISTORY OF PRESENT ILLNESS
[FreeTextEntry1] : Pt 77 y/o male with h/o head and neck squamous cell carcinoma who had syncope and imaging noted ill-defined right temporal enhancement with some surrounding vasogenic edema. Presence of possible DVA and possible hemosiderin on SWI may indicate cavernous angioma. Pt neurologically non focal at this time. Pt advised short interval to follow up to assess siz.  Pt returned with no special complaints. Pt is able to walk with no support at home. He also was able to do all his personal routines with no issues. Pt denies any headache, dizziness, weakness or seizures. Pt following with Dr. Andino after the recent MRI and he was advised to follow up in 3 months. He saw Dr. Abraham for his neurological needs, had EEG done in Oct 2023. Pt on Eliquis for Afib managed by cardiologist Dr. Hubbard, now on Holter monitor for the evaluation of syncopal attacks. He hasn't had an episode of syncopal attacks after Nov 2023. Pt with GI for swallowing issues.

## 2024-02-06 ENCOUNTER — APPOINTMENT (OUTPATIENT)
Dept: OTOLARYNGOLOGY | Facility: CLINIC | Age: 79
End: 2024-02-06

## 2024-02-13 ENCOUNTER — APPOINTMENT (OUTPATIENT)
Dept: OTOLARYNGOLOGY | Facility: CLINIC | Age: 79
End: 2024-02-13

## 2024-02-27 ENCOUNTER — APPOINTMENT (OUTPATIENT)
Dept: OTOLARYNGOLOGY | Facility: CLINIC | Age: 79
End: 2024-02-27
Payer: MEDICARE

## 2024-02-27 PROCEDURE — 92526 ORAL FUNCTION THERAPY: CPT | Mod: GN

## 2024-02-29 ENCOUNTER — APPOINTMENT (OUTPATIENT)
Dept: GASTROENTEROLOGY | Facility: CLINIC | Age: 79
End: 2024-02-29
Payer: MEDICARE

## 2024-02-29 VITALS
DIASTOLIC BLOOD PRESSURE: 77 MMHG | HEART RATE: 74 BPM | OXYGEN SATURATION: 93 % | WEIGHT: 130 LBS | HEIGHT: 69 IN | BODY MASS INDEX: 19.26 KG/M2 | SYSTOLIC BLOOD PRESSURE: 138 MMHG

## 2024-02-29 DIAGNOSIS — R19.4 CHANGE IN BOWEL HABIT: ICD-10-CM

## 2024-02-29 DIAGNOSIS — R13.10 DYSPHAGIA, UNSPECIFIED: ICD-10-CM

## 2024-02-29 PROCEDURE — 99215 OFFICE O/P EST HI 40 MIN: CPT

## 2024-03-05 NOTE — PHYSICAL THERAPY INITIAL EVALUATION ADULT - ORIENTATION, REHAB EVAL
oriented to person, place, time and situation
Patient requests all Lab, Cardiology, and Radiology Results on their Discharge Instructions

## 2024-03-07 ENCOUNTER — APPOINTMENT (OUTPATIENT)
Dept: OTOLARYNGOLOGY | Facility: CLINIC | Age: 79
End: 2024-03-07

## 2024-03-11 PROBLEM — R13.10 DYSPHAGIA: Status: ACTIVE | Noted: 2022-12-13

## 2024-03-11 PROBLEM — R19.4 ALTERED BOWEL HABITS: Status: ACTIVE | Noted: 2024-03-11

## 2024-03-11 NOTE — CONSULT LETTER
[Dear  ___] : Dear  [unfilled], [Consult Letter:] : I had the pleasure of evaluating your patient, [unfilled]. [Please see my note below.] : Please see my note below. [Consult Closing:] : Thank you very much for allowing me to participate in the care of this patient.  If you have any questions, please do not hesitate to contact me. [Sincerely,] : Sincerely, [FreeTextEntry3] : Rafael Mcclain MD, MPH, CHARLES, YAZG Chief of Clinical Quality in Gastroenterology, [facility] Associate Chief of Gastroenterology, [facility] Interim Director of Endoscopy, [address], [address] [facility], [id number] 24 hours [phone]

## 2024-03-11 NOTE — ASSESSMENT
[FreeTextEntry1] : Impression:  Altered bowel habits Likely vasovagal syncope 2022, in setting of straining/Hood stool type 1 now adequate, easy to pass stools on bowel regimen  Dysphagia, oropharyngeal, in setting of XRT to neck. Had modified barium swallow and barium esophagram in Jan 2024 significant for silent aspiration with thin liquids.  Esophagus with mild motility abnormalities of unclear significance.  No PET/CT avid esophageal lesion in 2023.   Plan: Swallow therapy. Pt to discuss with neurologist workup of possible rare seizure events (already had negative video EEG) Bowel regimen as above, add MiraLAX PRN if necessary (has used this before) No need for Pepcid unless symptoms of heartburn or esophageal dysphagia. No EGD, risk> benefits with syncopal episodes.  f/u 6 months

## 2024-03-11 NOTE — PHYSICAL EXAM
[Alert] : alert [Sclera] : the sclera and conjunctiva were normal [Auscultation Breath Sounds / Voice Sounds] : lungs were clear to auscultation bilaterally [Heart Rate And Rhythm] : heart rate was normal and rhythm regular [Normal Affect] : the affect was normal [de-identified] : Soft, nondistended, nontender [de-identified] : No jaundice [de-identified] : No confusion

## 2024-03-11 NOTE — HISTORY OF PRESENT ILLNESS
[FreeTextEntry1] : Patient follows up for constipation.  On bowel regimen:  Smooth move tea, prune juice, hydration, milk (lactose intolerance) Senna too strong, associated with syncope. 'Last syncope 2/5/42 @ 742 PM, washing dishes. ILR 1/10/24, shaking artifact at time of syncopal episode.  Dr. Brasher asks if there is a GERD component for which acid suppression would be recommended.  No fever, blood in stool, abdominal pain melena, weight loss. No chest pain, dyspnea, dizziness.

## 2024-03-12 ENCOUNTER — APPOINTMENT (OUTPATIENT)
Dept: OTOLARYNGOLOGY | Facility: CLINIC | Age: 79
End: 2024-03-12

## 2024-03-19 ENCOUNTER — APPOINTMENT (OUTPATIENT)
Dept: OTOLARYNGOLOGY | Facility: CLINIC | Age: 79
End: 2024-03-19
Payer: MEDICARE

## 2024-03-19 PROCEDURE — 92526 ORAL FUNCTION THERAPY: CPT | Mod: GN

## 2024-03-26 ENCOUNTER — APPOINTMENT (OUTPATIENT)
Dept: OTOLARYNGOLOGY | Facility: CLINIC | Age: 79
End: 2024-03-26

## 2024-04-12 NOTE — ED ADULT TRIAGE NOTE - CHIEF COMPLAINT QUOTE
Resume activity as tolerated.    Pt c/o worsening SOB. Hx of COPD, HTN, colon CA in remission. Denies CP, HA, dizziness, palpitations.

## 2024-04-23 ENCOUNTER — APPOINTMENT (OUTPATIENT)
Dept: OTOLARYNGOLOGY | Facility: CLINIC | Age: 79
End: 2024-04-23
Payer: MEDICARE

## 2024-04-23 PROCEDURE — 92507 TX SP LANG VOICE COMM INDIV: CPT | Mod: GN

## 2024-04-28 RX ADMIN — ACETAMINOPHEN 650 MILLIGRAM(S): 325 TABLET ORAL at 13:35

## 2024-04-29 NOTE — ASSESSMENT
[Maintenance] : Maintenance: The patient has quit for more than 6 months
[Procedure: _________] : a [unfilled] procedure visit

## 2024-05-07 ENCOUNTER — APPOINTMENT (OUTPATIENT)
Dept: CT IMAGING | Facility: IMAGING CENTER | Age: 79
End: 2024-05-07
Payer: MEDICARE

## 2024-05-07 ENCOUNTER — APPOINTMENT (OUTPATIENT)
Dept: NEUROLOGY | Facility: CLINIC | Age: 79
End: 2024-05-07
Payer: MEDICARE

## 2024-05-07 ENCOUNTER — OUTPATIENT (OUTPATIENT)
Dept: OUTPATIENT SERVICES | Facility: HOSPITAL | Age: 79
LOS: 1 days | End: 2024-05-07
Payer: MEDICARE

## 2024-05-07 ENCOUNTER — APPOINTMENT (OUTPATIENT)
Dept: MRI IMAGING | Facility: IMAGING CENTER | Age: 79
End: 2024-05-07
Payer: MEDICARE

## 2024-05-07 VITALS
SYSTOLIC BLOOD PRESSURE: 135 MMHG | WEIGHT: 130 LBS | HEART RATE: 70 BPM | RESPIRATION RATE: 16 BRPM | BODY MASS INDEX: 19.26 KG/M2 | DIASTOLIC BLOOD PRESSURE: 75 MMHG | HEIGHT: 69 IN | OXYGEN SATURATION: 95 %

## 2024-05-07 DIAGNOSIS — Z98.89 OTHER SPECIFIED POSTPROCEDURAL STATES: Chronic | ICD-10-CM

## 2024-05-07 DIAGNOSIS — Z98.890 OTHER SPECIFIED POSTPROCEDURAL STATES: Chronic | ICD-10-CM

## 2024-05-07 DIAGNOSIS — R93.89 ABNORMAL FINDINGS ON DIAGNOSTIC IMAGING OF OTHER SPECIFIED BODY STRUCTURES: ICD-10-CM

## 2024-05-07 DIAGNOSIS — Z90.49 ACQUIRED ABSENCE OF OTHER SPECIFIED PARTS OF DIGESTIVE TRACT: Chronic | ICD-10-CM

## 2024-05-07 DIAGNOSIS — Z90.411 ACQUIRED PARTIAL ABSENCE OF PANCREAS: Chronic | ICD-10-CM

## 2024-05-07 DIAGNOSIS — Z90.89 ACQUIRED ABSENCE OF OTHER ORGANS: Chronic | ICD-10-CM

## 2024-05-07 PROCEDURE — 99215 OFFICE O/P EST HI 40 MIN: CPT

## 2024-05-07 PROCEDURE — 70553 MRI BRAIN STEM W/O & W/DYE: CPT | Mod: 26

## 2024-05-07 PROCEDURE — A9585: CPT

## 2024-05-07 PROCEDURE — 71250 CT THORAX DX C-: CPT | Mod: 26

## 2024-05-07 PROCEDURE — 71250 CT THORAX DX C-: CPT

## 2024-05-07 PROCEDURE — 70553 MRI BRAIN STEM W/O & W/DYE: CPT

## 2024-05-07 RX ORDER — CHROMIUM 200 MCG
TABLET ORAL
Refills: 0 | Status: DISCONTINUED | COMMUNITY
End: 2024-05-07

## 2024-05-09 NOTE — DISCUSSION/SUMMARY
[FreeTextEntry1] : Brain lesion (present to some degree since 7/22), adjacent to RT port for prior head and neck RT. Lesion seems more tumoral now.  Case discussed extensively at [facility] with Dr. Moncada and Dr. Branham.  Seems the area of growth was treated to 40 Gy; this plus radiographic appearance make RTN less likely now and either RT induced tumor or late marginal failure of NPC more possible.   Discussed possible resection which they want to delay till after cruise.  Discussed empiric starting of seizure meds.   Prescribed keppra 500 bid Counseled to call us at time of events Plan to see Dr. Branham to discuss surgery in early July and MRI prior RTC early July

## 2024-05-09 NOTE — HISTORY OF PRESENT ILLNESS
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 Moncada), with concurrent cisplatin and adjuvant carbo/taxol.  He's had a number of MRIs.  In 2/22 he began to experience episodes of syncope, without clear seizure or post ictal confusion, associated with fecal but not urinary incontinence, and not with tongue bite. In 7/22, a noncontrast brain MRI revealed new FLAIR hyperintensity in the anterior right temporal lobe (in the NPC radiation port).  He had prolonged EEG without any seizure.  These events have occurred on average about once every 2mo. In 6/23 was admitted for PNA, and a recent syncopal event prompted contrast MRI showing patchy enhancement and stable FLAIR in the temporal lobe.   Follow up MRI (ordered by Dr. Branham) showed improved enhancement.  Was not on steroids.   Clinically he's had a decline in memory over last few years.   Gait is off because of neuropathy.  Walks with cane.   INTERVAL HISTORY:  MRI with continued increase in now solid enhancement medial right temporal lobe.   Perfusion test failure.  Clinically had an episode of LOC with fecal incontinence again.  Per cardio, nothing on loop recorder. Otherwise well. Going on a cruise in late 6/24.   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  DM.  HTN.  Afib on antocoag.  CKD (related to chemo.  PSH:  Right hemicolectomy.  Distal pancreatectomy.   SHX:  worked as  for [employer].  here with wife Delta.  Prior tob, no etoh.  Used to be a long-distance runner.  FHX:  Son has dementia, unclear, in 50's.  Doesn't know about his father.

## 2024-05-09 NOTE — DATA REVIEWED
[de-identified] : Reviewed (and d/w neuroradiology today) MRI brain from 1/24 compared with 9/23 compared with 7/23, 6/23, 7/22 (non contrast), 12/19, showing development of FLAIR (in 7/22) and enhancement that is patchy in 6/23, that improved in 7/23 a bit increased and more nodular on 1/24 MRI, further increased and more solid on 5/24

## 2024-05-13 ENCOUNTER — APPOINTMENT (OUTPATIENT)
Dept: NEUROSURGERY | Facility: CLINIC | Age: 79
End: 2024-05-13
Payer: MEDICARE

## 2024-05-13 VITALS
HEART RATE: 65 BPM | DIASTOLIC BLOOD PRESSURE: 86 MMHG | OXYGEN SATURATION: 96 % | HEIGHT: 69 IN | BODY MASS INDEX: 19.26 KG/M2 | SYSTOLIC BLOOD PRESSURE: 158 MMHG | WEIGHT: 130 LBS

## 2024-05-13 PROCEDURE — 99215 OFFICE O/P EST HI 40 MIN: CPT

## 2024-05-15 NOTE — HISTORY OF PRESENT ILLNESS
[FreeTextEntry1] : Pt 77 y/o male with h/o head and neck squamous cell carcinoma who had syncope and imaging noted ill-defined right temporal enhancement with some surrounding vasogenic edema. Presence of possible DVA and possible hemosiderin on SWI may indicate cavernous angioma. Pt had appointment with Dr. akbar on 5/7/2024 with scan. His case was discussed in Tumor board and was recommended tumor resection and biopsy.   Pt returned for follow up after a recent imaging. He has no special complaints. Pt denies any headache, dizziness, weakness or seizures. He is able to do all his personal routines with no issues. Pt is on Eliquis for Afib and on Holter monitor. He hasn't had an episode of syncopal attacks after Nov 2023. Pt going for cruise in the end of June 2024, wanted to do the surgical procedure after returning. Pt was started on Keppra by Dr. Akbar and was feeling very sleepy and tired, reduced to 250 mg BID for now.

## 2024-05-15 NOTE — ASSESSMENT
[FreeTextEntry1] : IMPRESSION:  Pt 79 y/o male with h/o head and neck squamous cell carcinoma who had syncope and imaging noted ill-defined right temporal enhancement with some surrounding vasogenic edema. Presence of possible DVA and possible hemosiderin on SWI may indicate cavernous angioma.  New MRI head with enhancing lesion needed to be removed as per TB recommendation. During this visit, Dr. Branham reviewed the imaging and gave treatment recommendations via Teams. Pt would like to do the surgery after the Cruise trip which will be end in June 2024. Pt was started on Keppra 500mg which was reduced to 250 mg BID (sleepy).  PLAN:  Recommended surgical intervention -Temporal Craniotomy for excision of mass and partial temporal lobe resection and biopsy is discussed in detail today.  Risks related of surgery discussed including but not limited to infection, nerve damage, temporary or permanent weakness or numbness, hemorrhage, cardiac events, possible Visual perception change, speech trouble, scar and indentation, off Eliquis may risk of stroke. Explained that his AC be Off minimum 48 hours prior and resume two weeks after surgery.  Need clearance for surgery from Pulm/ cardiac given on Eliquis. Possible post op care and need of rehabilitation discussed.  May need to increase Keppra prior to surgery May plan craniotomy for 7/9/24.  MRI Head w+ w/o contrast in [place] middle week, prior to the trip MR Angio Head w+ w/o contrast, along with other imaging MR Angio neck w+ w/o contrast along with other imaging. May do TEB after the imaging Follow up sooner with any warning symptoms.

## 2024-05-15 NOTE — PHYSICAL EXAM
[General Appearance - Alert] : alert [General Appearance - In No Acute Distress] : in no acute distress [Oriented To Time, Place, And Person] : oriented to person, place, and time [Impaired Insight] : insight and judgment were intact [Affect] : the affect was normal [Memory Recent] : recent memory was not impaired [Person] : oriented to person [Place] : oriented to place [Time] : oriented to time [Short Term Intact] : short term memory intact [Cranial Nerves Optic (II)] : visual acuity intact bilaterally,  pupils equal round and reactive to light [Cranial Nerves Oculomotor (III)] : extraocular motion intact [Cranial Nerves Facial (VII)] : face symmetrical [Cranial Nerves Vestibulocochlear (VIII)] : hearing was intact bilaterally [Cranial Nerves Glossopharyngeal (IX)] : tongue and palate midline [Cranial Nerves Accessory (XI - Cranial And Spinal)] : head turning and shoulder shrug symmetric [Cranial Nerves Hypoglossal (XII)] : there was no tongue deviation with protrusion [Motor Tone] : muscle tone was normal in all four extremities [Motor Strength] : muscle strength was normal in all four extremities [Balance] : balance was intact [Sclera] : the sclera and conjunctiva were normal [PERRL With Normal Accommodation] : pupils were equal in size, round, reactive to light, with normal accommodation [Outer Ear] : the ears and nose were normal in appearance [Both Tympanic Membranes Were Examined] : both tympanic membranes were normal [Neck Appearance] : the appearance of the neck was normal [] : no respiratory distress [Respiration, Rhythm And Depth] : normal respiratory rhythm and effort [Apical Impulse] : the apical impulse was normal [Heart Rate And Rhythm] : heart rate was normal and rhythm regular [Arterial Pulses Carotid] : carotid pulses were normal with no bruits [Abdominal Aorta] : the abdominal aorta was normal [No CVA Tenderness] : no ~M costovertebral angle tenderness [No Spinal Tenderness] : no spinal tenderness [Abnormal Walk] : normal gait [Involuntary Movements] : no involuntary movements were seen [Coordination - Dysmetria Impaired Finger-to-Nose Bilateral] : not present [FreeTextEntry5] : No pronator drift, good  [FreeTextEntry8] : no drift, mild imbalance at tandem

## 2024-05-20 RX ORDER — UMECLIDINIUM BROMIDE AND VILANTEROL TRIFENATATE 62.5; 25 UG/1; UG/1
62.5-25 POWDER RESPIRATORY (INHALATION)
Qty: 3 | Refills: 2 | Status: ACTIVE | COMMUNITY
Start: 2019-04-10 | End: 1900-01-01

## 2024-05-23 ENCOUNTER — APPOINTMENT (OUTPATIENT)
Dept: OTOLARYNGOLOGY | Facility: CLINIC | Age: 79
End: 2024-05-23
Payer: MEDICARE

## 2024-05-23 VITALS
BODY MASS INDEX: 19.26 KG/M2 | DIASTOLIC BLOOD PRESSURE: 80 MMHG | WEIGHT: 130 LBS | SYSTOLIC BLOOD PRESSURE: 147 MMHG | HEIGHT: 69 IN

## 2024-05-23 DIAGNOSIS — H90.A31 MIXED CONDUCTIVE AND SENSORINEURAL HEARING LOSS, UNILATERAL, RIGHT EAR WITH RESTRICTED HEARING ON THE  CONTRALATERAL SIDE: ICD-10-CM

## 2024-05-23 DIAGNOSIS — H90.3 SENSORINEURAL HEARING LOSS, BILATERAL: ICD-10-CM

## 2024-05-23 DIAGNOSIS — H60.42 CHOLESTEATOMA OF LEFT EXTERNAL EAR: ICD-10-CM

## 2024-05-23 PROCEDURE — 92504 EAR MICROSCOPY EXAMINATION: CPT | Mod: 59

## 2024-05-23 PROCEDURE — 92557 COMPREHENSIVE HEARING TEST: CPT

## 2024-05-23 PROCEDURE — 99213 OFFICE O/P EST LOW 20 MIN: CPT

## 2024-05-23 PROCEDURE — 92567 TYMPANOMETRY: CPT

## 2024-05-23 NOTE — PHYSICAL EXAM
[Midline] : trachea located in midline position [Binocular Microscopic Exam] : Binocular microscopic exam was performed [Hearing Loss Right Only] : diminished [Hearing Loss Left Only] : diminished [Hearing Everett Test (Tuning Fork On Forehead)] : no lateralization of tone [Normal] : the right tympanic membrane was normal [Rinne Test Air Conduction Persists > Bone Conduction Right] : bone conduction greater than air conduction on the right [Rinne Test Air Conduction Persists > Bone Conduction Left] : bone conduction greater than air conduction on the left [Nystagmus] : ~T no ~M nystagmus was seen [Fukuda Step Test] : Fukuda Step Test was Negative [Romberg's Sign] : Romberg's sign was absent [Fistula Sign] : Fistula Sign: Negative [Past-Pointing] : Past-Pointing: Negative [Ale-Halljeanetteke] : Selma-Hallpike: Negative [FreeTextEntry9] : moderate amount of keratinous cerumen, removed

## 2024-05-23 NOTE — HISTORY OF PRESENT ILLNESS
[de-identified] : 79 year old male presents with decreased hearing History of Right otitis external and Right TM perforation and nasal cancer Reports being exposed to chemo and radiation.  Reports hearing is about the same. Craniotomy scheduled for July 9th for right temporal lobe lesion.  Has not went for hearing aids.  Denies history of otalgia, otorrhea, ear infections, tinnitus, dizziness, vertigo, ear surgeries. No history of head/otologic trauma. Denies loud noise exposure.

## 2024-05-23 NOTE — DATA REVIEWED
[de-identified] : An audiogram was ordered and performed including pure tones, tympanometry and speech testing for the patient complaint of hearing loss i have independently reviewed the patient's audiogram from today and my findings include radha SNHL, R ear mixed

## 2024-05-23 NOTE — PROCEDURE
[Same] : same as the Pre Op Dx. [] : Removal of Cerumen [FreeTextEntry1] : R otorrhea [FreeTextEntry4] : none [FreeTextEntry6] : Operative microscope was used to examine/treat the ear canal, ear drum and visible middle ear landmarks. Adequate exam/treatment would not have been possible without the use of a microscope. Findings are described. Cerumen in form of keratosis obturans was removed under binocular microscopy with a combination of a suction, mac needle, alligator and/or a loop curette. The patient tolerated the procedure well and there were no complications. The included findings were noted.

## 2024-05-31 RX ORDER — ALBUTEROL SULFATE 90 UG/1
108 (90 BASE) INHALANT RESPIRATORY (INHALATION)
Qty: 3 | Refills: 5 | Status: ACTIVE | COMMUNITY
Start: 2019-04-10 | End: 1900-01-01

## 2024-06-06 ENCOUNTER — APPOINTMENT (OUTPATIENT)
Dept: MRI IMAGING | Facility: CLINIC | Age: 79
End: 2024-06-06

## 2024-06-06 ENCOUNTER — APPOINTMENT (OUTPATIENT)
Dept: MRI IMAGING | Facility: CLINIC | Age: 79
End: 2024-06-06
Payer: MEDICARE

## 2024-06-06 ENCOUNTER — OUTPATIENT (OUTPATIENT)
Dept: OUTPATIENT SERVICES | Facility: HOSPITAL | Age: 79
LOS: 1 days | End: 2024-06-06
Payer: MEDICARE

## 2024-06-06 DIAGNOSIS — Z90.89 ACQUIRED ABSENCE OF OTHER ORGANS: Chronic | ICD-10-CM

## 2024-06-06 DIAGNOSIS — G93.89 OTHER SPECIFIED DISORDERS OF BRAIN: ICD-10-CM

## 2024-06-06 DIAGNOSIS — Z98.89 OTHER SPECIFIED POSTPROCEDURAL STATES: Chronic | ICD-10-CM

## 2024-06-06 PROCEDURE — 70546 MR ANGIOGRAPH HEAD W/O&W/DYE: CPT

## 2024-06-06 PROCEDURE — 70549 MR ANGIOGRAPH NECK W/O&W/DYE: CPT

## 2024-06-06 PROCEDURE — 70546 MR ANGIOGRAPH HEAD W/O&W/DYE: CPT | Mod: 26,59

## 2024-06-06 PROCEDURE — 70553 MRI BRAIN STEM W/O & W/DYE: CPT

## 2024-06-06 PROCEDURE — 70549 MR ANGIOGRAPH NECK W/O&W/DYE: CPT | Mod: 26

## 2024-06-06 PROCEDURE — 70553 MRI BRAIN STEM W/O & W/DYE: CPT | Mod: 26

## 2024-06-10 ENCOUNTER — APPOINTMENT (OUTPATIENT)
Dept: PULMONOLOGY | Facility: CLINIC | Age: 79
End: 2024-06-10
Payer: MEDICARE

## 2024-06-10 VITALS
DIASTOLIC BLOOD PRESSURE: 82 MMHG | HEIGHT: 69 IN | HEART RATE: 85 BPM | BODY MASS INDEX: 19.31 KG/M2 | WEIGHT: 130.38 LBS | TEMPERATURE: 98 F | RESPIRATION RATE: 15 BRPM | OXYGEN SATURATION: 96 % | SYSTOLIC BLOOD PRESSURE: 131 MMHG

## 2024-06-10 DIAGNOSIS — G93.89 OTHER SPECIFIED DISORDERS OF BRAIN: ICD-10-CM

## 2024-06-10 DIAGNOSIS — J43.9 EMPHYSEMA, UNSPECIFIED: ICD-10-CM

## 2024-06-10 DIAGNOSIS — R93.89 ABNORMAL FINDINGS ON DIAGNOSTIC IMAGING OF OTHER SPECIFIED BODY STRUCTURES: ICD-10-CM

## 2024-06-10 PROCEDURE — G2211 COMPLEX E/M VISIT ADD ON: CPT

## 2024-06-10 PROCEDURE — 99215 OFFICE O/P EST HI 40 MIN: CPT

## 2024-06-10 NOTE — ASSESSMENT
[FreeTextEntry1] : Patient is currently doing well from a pulmonary standpoint.  He is maintained and stable on his daily Anoro for COPD   He has no complaints of dyspnea with exertion.  He has not required albuterol. His most recent CT chest last month showed improvement in 1 nodule, a new nodule which is likely inflammatory. Given his ongoing dysphagia, swallow therapy, likely related to some form of chronic aspiration.  He is scheduled for a [place] cruise with his wife and family at the end of this month, and then for the craniotomy with excision of mass for July 9. Patient is currently optimized from a pulmonary standpoint to undergo the surgical procedure.

## 2024-06-10 NOTE — REVIEW OF SYSTEMS
[facility] at ARH Our Lady of the Way Gala Hare MD, [facility]                         [Negative] : Endocrine

## 2024-06-10 NOTE — HISTORY OF PRESENT ILLNESS
[Former] : former [TextBox_4] : Patient is accompanied by his wife, he is here today for pulmonary clearance for scheduled temporal craniotomy and excision of mass for a brain lesion on July 9 at [place].  79-year-old male, former smoker.  History of COPD on Anoro.  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  History of abnormal CT chest, with inflammatory changes though that seems to come and go, most recent bronchoscopy was negative.  He also seems to have dysphagia,  receiving ongoing swallow therapy, thickening of liquids, regular solids.  Likely with chronic aspiration which is likely what we have been finding on recurrent abnormalities on CT.  He is feeling very well.  He uses the Anoro daily.  He has not required albuterol.  He does not have any shortness of breath or dyspnea with exertion.  The occasional cough is usually more related to dysphagia.

## 2024-06-16 ENCOUNTER — NON-APPOINTMENT (OUTPATIENT)
Age: 79
End: 2024-06-16

## 2024-06-17 ENCOUNTER — NON-APPOINTMENT (OUTPATIENT)
Age: 79
End: 2024-06-17

## 2024-06-17 ENCOUNTER — APPOINTMENT (OUTPATIENT)
Dept: NEUROSURGERY | Facility: CLINIC | Age: 79
End: 2024-06-17
Payer: MEDICARE

## 2024-06-17 VITALS
HEART RATE: 70 BPM | WEIGHT: 130 LBS | BODY MASS INDEX: 19.26 KG/M2 | HEIGHT: 69 IN | DIASTOLIC BLOOD PRESSURE: 66 MMHG | SYSTOLIC BLOOD PRESSURE: 105 MMHG | OXYGEN SATURATION: 95 %

## 2024-06-17 PROCEDURE — 99214 OFFICE O/P EST MOD 30 MIN: CPT

## 2024-06-18 ENCOUNTER — OUTPATIENT (OUTPATIENT)
Dept: OUTPATIENT SERVICES | Facility: HOSPITAL | Age: 79
LOS: 1 days | End: 2024-06-18
Payer: MEDICARE

## 2024-06-18 VITALS
RESPIRATION RATE: 16 BRPM | SYSTOLIC BLOOD PRESSURE: 108 MMHG | HEART RATE: 84 BPM | DIASTOLIC BLOOD PRESSURE: 72 MMHG | TEMPERATURE: 98 F | HEIGHT: 70 IN | OXYGEN SATURATION: 98 % | WEIGHT: 130.07 LBS

## 2024-06-18 DIAGNOSIS — Z90.411 ACQUIRED PARTIAL ABSENCE OF PANCREAS: Chronic | ICD-10-CM

## 2024-06-18 DIAGNOSIS — G93.89 OTHER SPECIFIED DISORDERS OF BRAIN: ICD-10-CM

## 2024-06-18 DIAGNOSIS — I25.10 ATHEROSCLEROTIC HEART DISEASE OF NATIVE CORONARY ARTERY WITHOUT ANGINA PECTORIS: ICD-10-CM

## 2024-06-18 DIAGNOSIS — Z90.49 ACQUIRED ABSENCE OF OTHER SPECIFIED PARTS OF DIGESTIVE TRACT: Chronic | ICD-10-CM

## 2024-06-18 DIAGNOSIS — Z01.818 ENCOUNTER FOR OTHER PREPROCEDURAL EXAMINATION: ICD-10-CM

## 2024-06-18 DIAGNOSIS — Z98.890 OTHER SPECIFIED POSTPROCEDURAL STATES: Chronic | ICD-10-CM

## 2024-06-18 DIAGNOSIS — Z29.9 ENCOUNTER FOR PROPHYLACTIC MEASURES, UNSPECIFIED: ICD-10-CM

## 2024-06-18 DIAGNOSIS — E11.9 TYPE 2 DIABETES MELLITUS WITHOUT COMPLICATIONS: ICD-10-CM

## 2024-06-18 DIAGNOSIS — Z98.89 OTHER SPECIFIED POSTPROCEDURAL STATES: Chronic | ICD-10-CM

## 2024-06-18 DIAGNOSIS — Z90.89 ACQUIRED ABSENCE OF OTHER ORGANS: Chronic | ICD-10-CM

## 2024-06-18 DIAGNOSIS — G93.9 DISORDER OF BRAIN, UNSPECIFIED: ICD-10-CM

## 2024-06-18 LAB
ANION GAP SERPL CALC-SCNC: 12 MMOL/L — SIGNIFICANT CHANGE UP (ref 5–17)
BLD GP AB SCN SERPL QL: NEGATIVE — SIGNIFICANT CHANGE UP
BUN SERPL-MCNC: 24 MG/DL — HIGH (ref 7–23)
CALCIUM SERPL-MCNC: 10.2 MG/DL — SIGNIFICANT CHANGE UP (ref 8.4–10.5)
CHLORIDE SERPL-SCNC: 105 MMOL/L — SIGNIFICANT CHANGE UP (ref 96–108)
CO2 SERPL-SCNC: 24 MMOL/L — SIGNIFICANT CHANGE UP (ref 22–31)
CREAT SERPL-MCNC: 1.24 MG/DL — SIGNIFICANT CHANGE UP (ref 0.5–1.3)
EGFR: 59 ML/MIN/1.73M2 — LOW
GLUCOSE SERPL-MCNC: 103 MG/DL — HIGH (ref 70–99)
HCT VFR BLD CALC: 36.4 % — LOW (ref 39–50)
HGB BLD-MCNC: 11.7 G/DL — LOW (ref 13–17)
MCHC RBC-ENTMCNC: 30.2 PG — SIGNIFICANT CHANGE UP (ref 27–34)
MCHC RBC-ENTMCNC: 32.1 GM/DL — SIGNIFICANT CHANGE UP (ref 32–36)
MCV RBC AUTO: 93.8 FL — SIGNIFICANT CHANGE UP (ref 80–100)
MRSA PCR RESULT.: SIGNIFICANT CHANGE UP
NRBC # BLD: 0 /100 WBCS — SIGNIFICANT CHANGE UP (ref 0–0)
PLATELET # BLD AUTO: 281 K/UL — SIGNIFICANT CHANGE UP (ref 150–400)
POTASSIUM SERPL-MCNC: 3.9 MMOL/L — SIGNIFICANT CHANGE UP (ref 3.5–5.3)
POTASSIUM SERPL-SCNC: 3.9 MMOL/L — SIGNIFICANT CHANGE UP (ref 3.5–5.3)
RBC # BLD: 3.88 M/UL — LOW (ref 4.2–5.8)
RBC # FLD: 14.5 % — SIGNIFICANT CHANGE UP (ref 10.3–14.5)
RH IG SCN BLD-IMP: NEGATIVE — SIGNIFICANT CHANGE UP
S AUREUS DNA NOSE QL NAA+PROBE: SIGNIFICANT CHANGE UP
SODIUM SERPL-SCNC: 141 MMOL/L — SIGNIFICANT CHANGE UP (ref 135–145)
WBC # BLD: 4.39 K/UL — SIGNIFICANT CHANGE UP (ref 3.8–10.5)
WBC # FLD AUTO: 4.39 K/UL — SIGNIFICANT CHANGE UP (ref 3.8–10.5)

## 2024-06-18 PROCEDURE — 87640 STAPH A DNA AMP PROBE: CPT

## 2024-06-18 PROCEDURE — 83036 HEMOGLOBIN GLYCOSYLATED A1C: CPT

## 2024-06-18 PROCEDURE — G0463: CPT

## 2024-06-18 PROCEDURE — 87641 MR-STAPH DNA AMP PROBE: CPT

## 2024-06-18 PROCEDURE — 80048 BASIC METABOLIC PNL TOTAL CA: CPT

## 2024-06-18 PROCEDURE — 86850 RBC ANTIBODY SCREEN: CPT

## 2024-06-18 PROCEDURE — 86901 BLOOD TYPING SEROLOGIC RH(D): CPT

## 2024-06-18 PROCEDURE — 85027 COMPLETE CBC AUTOMATED: CPT

## 2024-06-18 PROCEDURE — 86900 BLOOD TYPING SEROLOGIC ABO: CPT

## 2024-06-18 RX ORDER — UMECLIDINIUM BROMIDE AND VILANTEROL TRIFENATATE 62.5; 25 UG/1; UG/1
1 POWDER RESPIRATORY (INHALATION)
Qty: 0 | Refills: 0 | DISCHARGE

## 2024-06-18 RX ORDER — SIMVASTATIN 20 MG/1
1 TABLET, FILM COATED ORAL
Qty: 0 | Refills: 0 | DISCHARGE

## 2024-06-18 RX ORDER — APIXABAN 2.5 MG/1
1 TABLET, FILM COATED ORAL
Qty: 0 | Refills: 0 | DISCHARGE

## 2024-06-18 RX ORDER — METOPROLOL TARTRATE 50 MG
1 TABLET ORAL
Qty: 0 | Refills: 0 | DISCHARGE

## 2024-06-19 ENCOUNTER — APPOINTMENT (OUTPATIENT)
Dept: NEUROLOGY | Facility: CLINIC | Age: 79
End: 2024-06-19
Payer: MEDICARE

## 2024-06-19 VITALS
HEIGHT: 69 IN | HEART RATE: 68 BPM | TEMPERATURE: 98.2 F | SYSTOLIC BLOOD PRESSURE: 122 MMHG | OXYGEN SATURATION: 87 % | DIASTOLIC BLOOD PRESSURE: 76 MMHG | WEIGHT: 130 LBS | RESPIRATION RATE: 16 BRPM | BODY MASS INDEX: 19.26 KG/M2
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
## 2024-06-19 DIAGNOSIS — R55 SYNCOPE AND COLLAPSE: ICD-10-CM

## 2024-06-19 DIAGNOSIS — I67.89 OTHER CEREBROVASCULAR DISEASE: ICD-10-CM

## 2024-06-19 PROBLEM — U07.1 COVID-19: Chronic | Status: ACTIVE | Noted: 2024-06-18

## 2024-06-19 LAB
A1C WITH ESTIMATED AVERAGE GLUCOSE RESULT: 6.1 % — HIGH (ref 4–5.6)
ESTIMATED AVERAGE GLUCOSE: 128 MG/DL — HIGH (ref 68–114)

## 2024-06-19 PROCEDURE — 99213 OFFICE O/P EST LOW 20 MIN: CPT

## 2024-06-19 RX ORDER — LEVETIRACETAM 250 MG/1
250 TABLET, FILM COATED ORAL
Qty: 180 | Refills: 3 | Status: ACTIVE | COMMUNITY
Start: 2024-05-07 | End: 1900-01-01

## 2024-06-19 NOTE — DATA REVIEWED
[de-identified] : Reviewed (and d/w neuroradiology today) MRI brain from 1/24 compared with 9/23 compared with 7/23, 6/23, 7/22 (non contrast), 12/19, showing development of FLAIR (in 7/22) and enhancement that is patchy in 6/23, that improved in 7/23 a bit increased and more nodular on 1/24 MRI, further increased and more solid on 5/24

## 2024-06-19 NOTE — HISTORY OF PRESENT ILLNESS
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 Moncada), with concurrent cisplatin and adjuvant carbo/taxol.  He's had a number of MRIs.  In 2/22 he began to experience episodes of syncope, without clear seizure or post ictal confusion, associated with fecal but not urinary incontinence, and not with tongue bite. In 7/22, a noncontrast brain MRI revealed new FLAIR hyperintensity in the anterior right temporal lobe (in the NPC radiation port).  He had prolonged EEG without any seizure.  These events have occurred on average about once every 2mo. In 6/23 was admitted for PNA, and a recent syncopal event prompted contrast MRI showing patchy enhancement and stable FLAIR in the temporal lobe.   Follow up MRI (ordered by Dr. Branham) showed improved enhancement, but then serially worsened.  Was not on steroids.   Clinically he's had a decline in memory over last few years.   Gait is off because of neuropathy.  Walks with cane.   INTERVAL HISTORY:  Plan now for surgery in July to biopsy right temporal lesion based on tumor board consensus.  He did not tolerate keppra 500 bid, but better on 250 bid without collapse or LOC. Going on cruise next week.   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  DM.  HTN.  Afib on antocoag.  CKD (related to chemo.  PSH:  Right hemicolectomy.  Distal pancreatectomy.   SHX:  worked as  for [employer].  here with wife Delta.  Prior tob, no etoh.  Used to be a long-distance runner.  FHX:  Son has dementia, unclear, in 50's.  Doesn't know about his father.

## 2024-06-26 NOTE — REVIEW OF SYSTEMS
Regarding: Chest Tightness  ----- Message from Destinee KENNEDY sent at 6/26/2024 12:07 PM EDT -----  Pt sent the following [email or web address] message:    Werner Blankenship,     I am having tightness in my sternal area and I'm pretty sure it is my GERD. I'm taking the nexium in the am and famotadine in the pm but it doesn't seem to be working the past week or so. I have taken tums intermittently  the past week and it helps a little and doesn't last long. What else can I try??     [Loss Of Hearing] : hearing loss [Cough] : cough [Fever] : no fever [Chills] : no chills [Feeling Poorly] : not feeling poorly [Recent Weight Gain (___ Lbs)] : no recent weight gain [Recent Weight Loss (___ Lbs)] : no recent weight loss [Eyesight Problems] : no eyesight problems [As Noted in HPI] : as noted in HPI [Earache] : no earache [Nosebleeds] : no nosebleeds [Nasal Discharge] : no nasal discharge [Sore Throat] : no sore throat [Hoarseness] : no hoarseness [Heart Rate Is Slow] : the heart rate was not slow [Heart Rate Is Fast] : the heart rate was not fast [Chest Pain] : no chest pain [Palpitations] : no palpitations [Shortness Of Breath] : no shortness of breath [Wheezing] : no wheezing [SOB on Exertion] : no shortness of breath during exertion [Abdominal Pain] : no abdominal pain [Vomiting] : no vomiting [Constipation] : no constipation [Diarrhea] : no diarrhea [Dysuria] : no dysuria [Incontinence] : no incontinence [Hesitancy] : no urinary hesitancy [Nocturia] : no nocturia [Negative] : Genitourinary [FreeTextEntry6] : mucus tid

## 2024-07-01 NOTE — ASSESSMENT
[FreeTextEntry1] : IMPRESSION:  Pt 79 y/o male with h/o head and neck squamous cell carcinoma who had syncope and imaging noted ill-defined right temporal enhancement with some surrounding vasogenic edema. Presence of possible DVA and possible hemosiderin on SWI may indicate cavernous angioma.  New MRI head with enhancing lesion needed to be removed as per TB recommendation.  Pt been scheduled for surgery on 7/9/24. Cardiology and pulmonology reportedly cleared for surgery (awaited documents).  Pt is on Keppra 250 mg BID.  Had detailed explanation of risk/benefit of surgery in the last visit. MRI showed no change in the right temporal enhancing mass possible tumor vs radiation necrosis.  MRA of neck (6/12/24 ) with new Left vertebral occlusion with retrograde flow, will discuss further if it need further work up.   PLAN: Scheduled for Temporal Craniotomy for excision of mass and partial temporal lobe resection and biopsy on 7/9/2024.  To be off Eliquis from 7/7/2024. Clearance for surgery from Pulm/ cardiac , still documents awaited. Explained the approach and shaving of right side of the head, explained that there might be some indentations on the part of incision.    Possible post op care and need of rehabilitation discussed again  May need to increase Keppra prior to surgery. F/U as post op , call with any concerns

## 2024-07-01 NOTE — ADDENDUM
[FreeTextEntry1] : Discussed the vertebral occlusion with INR colleague.  It seems that it is a progressive process, likely atherosclerotic, There is a good right cerebral artery,  No further work up required at this time.

## 2024-07-01 NOTE — HISTORY OF PRESENT ILLNESS
[FreeTextEntry1] : Pt 79 y/o male with h/o head and neck squamous cell carcinoma who had syncope and imaging noted ill-defined right temporal enhancement with some surrounding vasogenic edema. Presence of possible DVA and possible hemosiderin on SWI may indicate cavernous angioma. Pt had appointment with Dr. akbar on 5/7/2024 with scan. His case was discussed in Tumor board and was recommended tumor resection and biopsy.   Pt returned for follow up after a recent imaging. He has no special complaints. Pt denies any headache, dizziness, weakness or seizures. He is able to do all his personal routines with no issues. Pt is on Eliquis for Afib and on Holter monitor. He hasn't had an episode of syncopal attacks after Nov 2023.  Pt was started on Keppra by Dr. Akbar and was feeling very sleepy and tired, reduced to 250 mg BID for now. Pt was seen his cardiology and the pulmonology who cleared him for surgery. Pt heading to cruise this Saturday.

## 2024-07-09 NOTE — H&P PST ADULT - MUSCULOSKELETAL
Detail Level: Generalized Sunscreen Recommendations: Elta MD Detail Level: Simple details… detailed exam decreased ROM due to pain/diminished strength

## 2024-07-11 ENCOUNTER — APPOINTMENT (OUTPATIENT)
Dept: OTOLARYNGOLOGY | Facility: CLINIC | Age: 79
End: 2024-07-11

## 2024-07-23 RX ORDER — ALBUTEROL 90 MCG
2 AEROSOL REFILL (GRAM) INHALATION
Refills: 0 | DISCHARGE

## 2024-07-23 RX ORDER — LEVETIRACETAM 100 MG/ML
1 INJECTION INTRAVENOUS
Refills: 0 | DISCHARGE

## 2024-07-24 ENCOUNTER — APPOINTMENT (OUTPATIENT)
Dept: NEUROLOGY | Facility: CLINIC | Age: 79
End: 2024-07-24
Payer: MEDICARE

## 2024-07-24 VITALS
OXYGEN SATURATION: 95 % | WEIGHT: 130 LBS | DIASTOLIC BLOOD PRESSURE: 75 MMHG | BODY MASS INDEX: 19.26 KG/M2 | HEART RATE: 70 BPM | RESPIRATION RATE: 16 BRPM | SYSTOLIC BLOOD PRESSURE: 117 MMHG | TEMPERATURE: 98 F | HEIGHT: 69 IN

## 2024-07-24 DIAGNOSIS — R55 SYNCOPE AND COLLAPSE: ICD-10-CM

## 2024-07-24 PROCEDURE — 99214 OFFICE O/P EST MOD 30 MIN: CPT

## 2024-07-24 NOTE — HISTORY OF PRESENT ILLNESS
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 Moncada), with concurrent cisplatin and adjuvant carbo/taxol.  He's had a number of MRIs.  In 2/22 he began to experience episodes of syncope, without clear seizure or post ictal confusion, associated with fecal but not urinary incontinence, and not with tongue bite. In 7/22, a noncontrast brain MRI revealed new FLAIR hyperintensity in the anterior right temporal lobe (in the NPC radiation port).  He had prolonged EEG without any seizure.  These events have occurred on average about once every 2mo. In 6/23 was admitted for PNA, and a recent syncopal event prompted contrast MRI showing patchy enhancement and stable FLAIR in the temporal lobe.   Follow up MRI (ordered by Dr. Branham) showed improved enhancement, but then serially worsened.  Was not on steroids. Started empiric keppra in 5/24.   Clinically he's had a decline in memory over last few years.   Gait is off because of neuropathy.  Walks with cane.   INTERVAL HISTORY:  Remains on keppra 250 bid, well tolerated, but had at least one LOC episode.  Enjoyed cruise.  No headaches.   Plan for surgery next month.   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  DM.  HTN.  Afib on antocoag.  CKD (related to chemo.  PSH:  Right hemicolectomy.  Distal pancreatectomy.   SHX:  worked as  for [employer].  here with wife Delta.  Prior tob, no etoh.  Used to be a long-distance runner.  FHX:  Son has dementia, unclear, in 50's.  Doesn't know about his father.

## 2024-07-24 NOTE — DATA REVIEWED
[de-identified] : Reviewed (and d/w neuroradiology today) MRI brain from 1/24 compared with 9/23 compared with 7/23, 6/23, 7/22 (non contrast), 12/19, showing development of FLAIR (in 7/22) and enhancement that is patchy in 6/23, that improved in 7/23 a bit increased and more nodular on 1/24 MRI, further increased and more solid on 5/24

## 2024-07-24 NOTE — DISCUSSION/SUMMARY
[FreeTextEntry1] : Brain lesion (present to some degree since 7/22), adjacent to RT port for prior head and neck RT. Lesion seems more tumoral now.  Plan for biopsy and maybe resection. Seems the area of growth was treated to 40 Gy; this plus radiographic appearance make RTN less likely now and either RT induced tumor or late marginal failure of NPC more possible.   Keppra 250 BID Counseled to call us at time of events Plan for surgery in August RTC late August

## 2024-07-29 ENCOUNTER — APPOINTMENT (OUTPATIENT)
Dept: OTOLARYNGOLOGY | Facility: CLINIC | Age: 79
End: 2024-07-29
Payer: MEDICARE

## 2024-07-29 VITALS
WEIGHT: 130 LBS | HEART RATE: 52 BPM | SYSTOLIC BLOOD PRESSURE: 173 MMHG | HEIGHT: 69.5 IN | BODY MASS INDEX: 18.82 KG/M2 | DIASTOLIC BLOOD PRESSURE: 79 MMHG

## 2024-07-29 DIAGNOSIS — C11.9 MALIGNANT NEOPLASM OF NASOPHARYNX, UNSPECIFIED: ICD-10-CM

## 2024-07-29 DIAGNOSIS — I67.89 OTHER CEREBROVASCULAR DISEASE: ICD-10-CM

## 2024-07-29 DIAGNOSIS — R13.10 DYSPHAGIA, UNSPECIFIED: ICD-10-CM

## 2024-07-29 DIAGNOSIS — H90.A31 MIXED CONDUCTIVE AND SENSORINEURAL HEARING LOSS, UNILATERAL, RIGHT EAR WITH RESTRICTED HEARING ON THE  CONTRALATERAL SIDE: ICD-10-CM
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
## 2024-07-29 PROCEDURE — 99214 OFFICE O/P EST MOD 30 MIN: CPT | Mod: 25

## 2024-07-29 PROCEDURE — 31231 NASAL ENDOSCOPY DX: CPT

## 2024-07-29 NOTE — PHYSICAL EXAM
[Nasal Endoscopy Performed] : nasal endoscopy was performed, see procedure section for findings [Midline] : trachea located in midline position [Edentulous] : edentulous [Normal] : no rashes [de-identified] : Posttreatment changes, no obvious LAD. [de-identified] : No other LAD.

## 2024-07-29 NOTE — HISTORY OF PRESENT ILLNESS
[de-identified] : 79  year old male presents for follow up for NPC and OE. History of T4 N1 M0 SCCa R. NPC and s/p concurrent CXRT and consolidative chemotherapy completed in March 2017. s/p pancreatectomy in 2018.  pt with h/o syncopal episodes in early 2022. Pt is working with SLP for dysphagia and cough- thicken liquid. Has been following-up with Dr. Shah for ear.  11/16/2022 CT lung showed new lung left mass - f.u with Dr. Lisker- [facility]. last ct chest 5/2024. pt with MRI Brain was done and lesion was found. He is following up with  neurosurgeon Dr. Jose Alberto Branham.  Pt is here today for ENT clearance for Temporal Craniotomy for excision of mass and partial temporal lobe resection and biopsy on 8/6/2024. Pt has mild dysphagia, no pain, no nose bleeding.

## 2024-07-29 NOTE — PROCEDURE
[None] : none [Flexible Endoscope] : examined with the flexible endoscope [Serial Number: ___] : Serial Number: [unfilled] [Normal] : the middle meatus had no abnormalities [FreeTextEntry6] : After patient consents, a nasal endoscopy is performed.  Posttreatment changes in the NPx, no new lesions, no crusting, no lesions in the NC.  [de-identified] : NPC

## 2024-07-29 NOTE — REASON FOR VISIT
[Subsequent Evaluation] : a subsequent evaluation for [FreeTextEntry2] : hx of NPC and need ent clearance

## 2024-07-31 ENCOUNTER — APPOINTMENT (OUTPATIENT)
Dept: PULMONOLOGY | Facility: CLINIC | Age: 79
End: 2024-07-31
Payer: MEDICARE

## 2024-07-31 VITALS
HEART RATE: 72 BPM | SYSTOLIC BLOOD PRESSURE: 118 MMHG | OXYGEN SATURATION: 97 % | WEIGHT: 126 LBS | TEMPERATURE: 98 F | BODY MASS INDEX: 18.66 KG/M2 | HEIGHT: 69 IN | DIASTOLIC BLOOD PRESSURE: 71 MMHG

## 2024-07-31 PROCEDURE — 99213 OFFICE O/P EST LOW 20 MIN: CPT

## 2024-08-01 NOTE — HISTORY OF PRESENT ILLNESS
[Former] : former [Never] : never [TextBox_4] : Patient presents with wife for pulmonary clearance for scheduled temporal craniotomy and excision of mass for a brain lesion on 8/6/24 at [place].  79-year-old male, former smoker. History of COPD on Anoro. 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 History of abnormal CT chest, with inflammatory changes though that seems to come and go, most recent bronchoscopy was negative. He also seems to have dysphagia, receiving ongoing swallow therapy, thickening of liquids, regular solids. Likely with chronic aspiration which is likely what we have been finding on recurrent abnormalities on CT.    Patient previously cleared for surgery, went on cruise with family, tested positive for Covid. Endorses some fatigue, is feeling well overall. He uses the Anoro daily. He has not required albuterol. He does not have any shortness of breath or dyspnea with exertion. The occasional cough is usually more related to dysphagia.

## 2024-08-01 NOTE — ASSESSMENT
[FreeTextEntry1] : Patinent presents with wife for pulmonary clearance for scheduled temporal craniotomy and excision of mass for a brain lesion on 8/6/24 at [place].  79-year-old male, former smoker. History of COPD on Anoro. 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 History of abnormal CT chest, with inflammatory changes though that seems to come and go, most recent bronchoscopy was negative. He also seems to have dysphagia, receiving ongoing swallow therapy, thickening of liquids, regular solids. Likely with chronic aspiration which is likely what we have been finding on recurrent abnormalities on CT.    Patient previously cleared for surgery, went on cruise with family, tested positive for Covid. Endorses some fatigue, is feeling well overall. He uses the Anoro daily. He has not required albuterol. He does not have any shortness of breath or dyspnea with exertion. The occasional cough is usually more related to dysphagia.    -Patient optimized from pulmonary standpoint for temporal craniotomy and excision of mass for a brain lesion on 8/6/24 at [place] -Continue use of Anoro and albuterol PRN  attending: agree with above

## 2024-08-01 NOTE — HISTORY OF PRESENT ILLNESS
[Former] : former [Never] : never [TextBox_4] : Patient presents with wife for pulmonary clearance for scheduled temporal craniotomy and excision of mass for a brain lesion on 8/6/24 at [facility].  79-year-old male, former smoker. History of COPD on Anoro. 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 History of abnormal CT chest, with inflammatory changes though that seems to come and go, most recent bronchoscopy was negative. He also seems to have dysphagia, receiving ongoing swallow therapy, thickening of liquids, regular solids. Likely with chronic aspiration which is likely what we have been finding on recurrent abnormalities on CT.    Patient previously cleared for surgery, went on cruise with family, tested positive for Covid. Endorses some fatigue, is feeling well overall. He uses the Anoro daily. He has not required albuterol. He does not have any shortness of breath or dyspnea with exertion. The occasional cough is usually more related to dysphagia.

## 2024-08-01 NOTE — ASSESSMENT
[FreeTextEntry1] : Patinent presents with wife for pulmonary clearance for scheduled temporal craniotomy and excision of mass for a brain lesion on 8/6/24 at [facility].  79-year-old male, former smoker. History of COPD on Anoro. 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 History of abnormal CT chest, with inflammatory changes though that seems to come and go, most recent bronchoscopy was negative. He also seems to have dysphagia, receiving ongoing swallow therapy, thickening of liquids, regular solids. Likely with chronic aspiration which is likely what we have been finding on recurrent abnormalities on CT.    Patient previously cleared for surgery, went on cruise with family, tested positive for Covid. Endorses some fatigue, is feeling well overall. He uses the Anoro daily. He has not required albuterol. He does not have any shortness of breath or dyspnea with exertion. The occasional cough is usually more related to dysphagia.    -Patient optimized from pulmonary standpoint for temporal craniotomy and excision of mass for a brain lesion on 8/6/24 at [facility] -Continue use of Anoro and albuterol PRN  attending: agree with above

## 2024-08-02 ENCOUNTER — OUTPATIENT (OUTPATIENT)
Dept: OUTPATIENT SERVICES | Facility: HOSPITAL | Age: 79
LOS: 1 days | End: 2024-08-02
Payer: MEDICARE

## 2024-08-02 ENCOUNTER — APPOINTMENT (OUTPATIENT)
Dept: MRI IMAGING | Facility: CLINIC | Age: 79
End: 2024-08-02

## 2024-08-02 ENCOUNTER — OUTPATIENT (OUTPATIENT)
Dept: OUTPATIENT SERVICES | Facility: HOSPITAL | Age: 79
LOS: 1 days | End: 2024-08-02

## 2024-08-02 DIAGNOSIS — Z90.89 ACQUIRED ABSENCE OF OTHER ORGANS: Chronic | ICD-10-CM

## 2024-08-02 DIAGNOSIS — Z98.890 OTHER SPECIFIED POSTPROCEDURAL STATES: Chronic | ICD-10-CM

## 2024-08-02 DIAGNOSIS — Z00.8 ENCOUNTER FOR OTHER GENERAL EXAMINATION: ICD-10-CM

## 2024-08-02 DIAGNOSIS — Z98.89 OTHER SPECIFIED POSTPROCEDURAL STATES: Chronic | ICD-10-CM

## 2024-08-02 DIAGNOSIS — Z90.411 ACQUIRED PARTIAL ABSENCE OF PANCREAS: Chronic | ICD-10-CM

## 2024-08-02 DIAGNOSIS — G93.89 OTHER SPECIFIED DISORDERS OF BRAIN: ICD-10-CM

## 2024-08-02 PROCEDURE — 70553 MRI BRAIN STEM W/O & W/DYE: CPT

## 2024-08-02 PROCEDURE — A9585: CPT

## 2024-08-02 PROCEDURE — 70553 MRI BRAIN STEM W/O & W/DYE: CPT | Mod: 26

## 2024-08-06 ENCOUNTER — APPOINTMENT (OUTPATIENT)
Dept: NEUROSURGERY | Facility: HOSPITAL | Age: 79
End: 2024-08-06

## 2024-08-06 ENCOUNTER — RESULT REVIEW (OUTPATIENT)
Age: 79
End: 2024-08-06

## 2024-08-06 ENCOUNTER — INPATIENT (INPATIENT)
Facility: HOSPITAL | Age: 79
LOS: 3 days | Discharge: HOME | DRG: 72 | End: 2024-08-10
Attending: NEUROLOGICAL SURGERY | Admitting: NEUROLOGICAL SURGERY
Payer: MEDICARE

## 2024-08-06 VITALS
HEART RATE: 67 BPM | OXYGEN SATURATION: 97 % | WEIGHT: 130.07 LBS | DIASTOLIC BLOOD PRESSURE: 81 MMHG | SYSTOLIC BLOOD PRESSURE: 139 MMHG | RESPIRATION RATE: 18 BRPM | TEMPERATURE: 97 F | HEIGHT: 70 IN

## 2024-08-06 DIAGNOSIS — Z90.89 ACQUIRED ABSENCE OF OTHER ORGANS: Chronic | ICD-10-CM

## 2024-08-06 DIAGNOSIS — Z90.49 ACQUIRED ABSENCE OF OTHER SPECIFIED PARTS OF DIGESTIVE TRACT: Chronic | ICD-10-CM

## 2024-08-06 DIAGNOSIS — Z98.890 OTHER SPECIFIED POSTPROCEDURAL STATES: Chronic | ICD-10-CM

## 2024-08-06 DIAGNOSIS — Z90.411 ACQUIRED PARTIAL ABSENCE OF PANCREAS: Chronic | ICD-10-CM

## 2024-08-06 DIAGNOSIS — G93.89 OTHER SPECIFIED DISORDERS OF BRAIN: ICD-10-CM

## 2024-08-06 DIAGNOSIS — Z98.89 OTHER SPECIFIED POSTPROCEDURAL STATES: Chronic | ICD-10-CM

## 2024-08-06 LAB
ANION GAP SERPL CALC-SCNC: 12 MMOL/L — SIGNIFICANT CHANGE UP (ref 5–17)
APTT BLD: 35.5 SEC — SIGNIFICANT CHANGE UP (ref 24.5–35.6)
BUN SERPL-MCNC: 15 MG/DL — SIGNIFICANT CHANGE UP (ref 7–23)
CALCIUM SERPL-MCNC: 8.7 MG/DL — SIGNIFICANT CHANGE UP (ref 8.4–10.5)
CHLORIDE SERPL-SCNC: 105 MMOL/L — SIGNIFICANT CHANGE UP (ref 96–108)
CO2 SERPL-SCNC: 23 MMOL/L — SIGNIFICANT CHANGE UP (ref 22–31)
CREAT SERPL-MCNC: 0.89 MG/DL — SIGNIFICANT CHANGE UP (ref 0.5–1.3)
EGFR: 87 ML/MIN/1.73M2 — SIGNIFICANT CHANGE UP
EGFR: 87 ML/MIN/1.73M2 — SIGNIFICANT CHANGE UP
GAS PNL BLDA: SIGNIFICANT CHANGE UP
GAS PNL BLDA: SIGNIFICANT CHANGE UP
GLUCOSE BLDC GLUCOMTR-MCNC: 112 MG/DL — HIGH (ref 70–99)
GLUCOSE BLDC GLUCOMTR-MCNC: 94 MG/DL — SIGNIFICANT CHANGE UP (ref 70–99)
GLUCOSE SERPL-MCNC: 128 MG/DL — HIGH (ref 70–99)
HCT VFR BLD CALC: 31.8 % — LOW (ref 39–50)
HGB BLD-MCNC: 10.4 G/DL — LOW (ref 13–17)
INR BLD: 1.26 RATIO — HIGH (ref 0.85–1.18)
MAGNESIUM SERPL-MCNC: 2.2 MG/DL — SIGNIFICANT CHANGE UP (ref 1.6–2.6)
MCHC RBC-ENTMCNC: 30 PG — SIGNIFICANT CHANGE UP (ref 27–34)
MCHC RBC-ENTMCNC: 32.7 GM/DL — SIGNIFICANT CHANGE UP (ref 32–36)
MCV RBC AUTO: 91.6 FL — SIGNIFICANT CHANGE UP (ref 80–100)
NRBC # BLD: 0 /100 WBCS — SIGNIFICANT CHANGE UP (ref 0–0)
NRBC BLD-RTO: 0 /100 WBCS — SIGNIFICANT CHANGE UP (ref 0–0)
PHOSPHATE SERPL-MCNC: 3.1 MG/DL — SIGNIFICANT CHANGE UP (ref 2.5–4.5)
PLATELET # BLD AUTO: 228 K/UL — SIGNIFICANT CHANGE UP (ref 150–400)
POTASSIUM SERPL-MCNC: 4.2 MMOL/L — SIGNIFICANT CHANGE UP (ref 3.5–5.3)
POTASSIUM SERPL-SCNC: 4.2 MMOL/L — SIGNIFICANT CHANGE UP (ref 3.5–5.3)
PROTHROM AB SERPL-ACNC: 13.1 SEC — HIGH (ref 9.5–13)
RBC # BLD: 3.47 M/UL — LOW (ref 4.2–5.8)
RBC # FLD: 15.1 % — HIGH (ref 10.3–14.5)
SODIUM SERPL-SCNC: 140 MMOL/L — SIGNIFICANT CHANGE UP (ref 135–145)
WBC # BLD: 3.99 K/UL — SIGNIFICANT CHANGE UP (ref 3.8–10.5)
WBC # FLD AUTO: 3.99 K/UL — SIGNIFICANT CHANGE UP (ref 3.8–10.5)

## 2024-08-06 PROCEDURE — 88331 PATH CONSLTJ SURG 1 BLK 1SPC: CPT | Mod: 26

## 2024-08-06 PROCEDURE — 88307 TISSUE EXAM BY PATHOLOGIST: CPT | Mod: 26

## 2024-08-06 PROCEDURE — 61510 CRNEC TREPH EXC BRN TUM STTL: CPT

## 2024-08-06 PROCEDURE — 99291 CRITICAL CARE FIRST HOUR: CPT

## 2024-08-06 PROCEDURE — 88334 PATH CONSLTJ SURG CYTO XM EA: CPT | Mod: 26,59

## 2024-08-06 PROCEDURE — 61781 SCAN PROC CRANIAL INTRA: CPT

## 2024-08-06 RX ORDER — SENNA 187 MG
2 TABLET ORAL AT BEDTIME
Refills: 0 | Status: DISCONTINUED | OUTPATIENT
Start: 2024-08-06 | End: 2024-08-10

## 2024-08-06 RX ORDER — METOPROLOL SUCCINATE 50 MG/1
25 TABLET, EXTENDED RELEASE ORAL DAILY
Refills: 0 | Status: DISCONTINUED | OUTPATIENT
Start: 2024-08-06 | End: 2024-08-06

## 2024-08-06 RX ORDER — ONDANSETRON HCL/PF 4 MG/2 ML
4 VIAL (ML) INJECTION EVERY 6 HOURS
Refills: 0 | Status: DISCONTINUED | OUTPATIENT
Start: 2024-08-06 | End: 2024-08-10

## 2024-08-06 RX ORDER — CLINDAMYCIN PHOSPHATE 150 MG/ML
900 VIAL (ML) INJECTION ONCE
Refills: 0 | Status: DISCONTINUED | OUTPATIENT
Start: 2024-08-06 | End: 2024-08-06

## 2024-08-06 RX ORDER — LIDOCAINE HCL/PF 10 MG/ML
0.2 VIAL (ML) INJECTION ONCE
Refills: 0 | Status: DISCONTINUED | OUTPATIENT
Start: 2024-08-06 | End: 2024-08-06

## 2024-08-06 RX ORDER — ALBUTEROL SULFATE 2.5 MG/3ML
2 VIAL, NEBULIZER (ML) INHALATION EVERY 6 HOURS
Refills: 0 | Status: DISCONTINUED | OUTPATIENT
Start: 2024-08-06 | End: 2024-08-10

## 2024-08-06 RX ORDER — SODIUM CHLORIDE 9 G/1000ML
1000 INJECTION, SOLUTION INTRAVENOUS
Refills: 0 | Status: DISCONTINUED | OUTPATIENT
Start: 2024-08-06 | End: 2024-08-06

## 2024-08-06 RX ORDER — DEXAMETHASONE 0.5 MG/1
4 TABLET ORAL EVERY 6 HOURS
Refills: 0 | Status: DISCONTINUED | OUTPATIENT
Start: 2024-08-06 | End: 2024-08-07

## 2024-08-06 RX ORDER — NICARDIPINE HCL 30 MG
5 CAPSULE ORAL
Qty: 40 | Refills: 0 | Status: DISCONTINUED | OUTPATIENT
Start: 2024-08-06 | End: 2024-08-06

## 2024-08-06 RX ORDER — ONDANSETRON HCL/PF 4 MG/2 ML
4 VIAL (ML) INJECTION ONCE
Refills: 0 | Status: DISCONTINUED | OUTPATIENT
Start: 2024-08-06 | End: 2024-08-06

## 2024-08-06 RX ORDER — FENTANYL CITRATE-0.9 % NACL/PF 100MCG/2ML
25 SYRINGE (ML) INTRAVENOUS
Refills: 0 | Status: DISCONTINUED | OUTPATIENT
Start: 2024-08-06 | End: 2024-08-06

## 2024-08-06 RX ORDER — NICARDIPINE HCL 30 MG
5 CAPSULE ORAL
Qty: 40 | Refills: 0 | Status: DISCONTINUED | OUTPATIENT
Start: 2024-08-06 | End: 2024-08-07

## 2024-08-06 RX ORDER — POLYETHYLENE GLYCOL 3350 17 G/17G
17 POWDER, FOR SOLUTION ORAL DAILY
Refills: 0 | Status: DISCONTINUED | OUTPATIENT
Start: 2024-08-06 | End: 2024-08-10

## 2024-08-06 RX ORDER — VANCOMYCIN HCL IN 5 % DEXTROSE 1.5G/250ML
1000 PLASTIC BAG, INJECTION (ML) INTRAVENOUS EVERY 12 HOURS
Refills: 0 | Status: COMPLETED | OUTPATIENT
Start: 2024-08-06 | End: 2024-08-07

## 2024-08-06 RX ORDER — BUDESONIDE AND FORMOTEROL FUMARATE DIHYDRATE 80; 4.5 UG/1; UG/1
2 AEROSOL RESPIRATORY (INHALATION) DAILY
Refills: 0 | Status: DISCONTINUED | OUTPATIENT
Start: 2024-08-06 | End: 2024-08-10

## 2024-08-06 RX ORDER — OXYCODONE HYDROCHLORIDE 30 MG/1
10 TABLET ORAL EVERY 4 HOURS
Refills: 0 | Status: DISCONTINUED | OUTPATIENT
Start: 2024-08-06 | End: 2024-08-10

## 2024-08-06 RX ORDER — CARVEDILOL 3.12 MG/1
12.5 TABLET, FILM COATED ORAL EVERY 12 HOURS
Refills: 0 | Status: DISCONTINUED | OUTPATIENT
Start: 2024-08-06 | End: 2024-08-08

## 2024-08-06 RX ORDER — LABETALOL HYDROCHLORIDE 200 MG/1
10 TABLET, FILM COATED ORAL
Refills: 0 | Status: DISCONTINUED | OUTPATIENT
Start: 2024-08-06 | End: 2024-08-06

## 2024-08-06 RX ORDER — LEVETIRACETAM 10 MG/ML
500 INJECTION, SOLUTION INTRAVENOUS EVERY 12 HOURS
Refills: 0 | Status: DISCONTINUED | OUTPATIENT
Start: 2024-08-06 | End: 2024-08-07

## 2024-08-06 RX ORDER — OXYCODONE HYDROCHLORIDE 30 MG/1
5 TABLET ORAL EVERY 4 HOURS
Refills: 0 | Status: DISCONTINUED | OUTPATIENT
Start: 2024-08-06 | End: 2024-08-10

## 2024-08-06 RX ORDER — ACETAMINOPHEN 500 MG/5ML
650 LIQUID (ML) ORAL EVERY 6 HOURS
Refills: 0 | Status: DISCONTINUED | OUTPATIENT
Start: 2024-08-06 | End: 2024-08-10

## 2024-08-06 RX ADMIN — Medication 25 MG/HR: at 16:15

## 2024-08-06 RX ADMIN — CARVEDILOL 12.5 MILLIGRAM(S): 3.12 TABLET, FILM COATED ORAL at 22:35

## 2024-08-06 RX ADMIN — LABETALOL HYDROCHLORIDE 10 MILLIGRAM(S): 200 TABLET, FILM COATED ORAL at 15:10

## 2024-08-06 RX ADMIN — DEXAMETHASONE 4 MILLIGRAM(S): 0.5 TABLET ORAL at 18:38

## 2024-08-06 RX ADMIN — Medication 25 MG/HR: at 19:31

## 2024-08-06 RX ADMIN — Medication 40 MILLIGRAM(S): at 22:35

## 2024-08-06 RX ADMIN — Medication 20 MILLIGRAM(S): at 22:34

## 2024-08-06 RX ADMIN — Medication 10 MILLIGRAM(S): at 15:30

## 2024-08-06 RX ADMIN — LEVETIRACETAM 500 MILLIGRAM(S): 10 INJECTION, SOLUTION INTRAVENOUS at 18:38

## 2024-08-06 RX ADMIN — Medication 250 MILLIGRAM(S): at 18:38

## 2024-08-06 RX ADMIN — Medication 60 MILLILITER(S): at 19:24

## 2024-08-06 RX ADMIN — Medication 2 TABLET(S): at 22:34

## 2024-08-07 ENCOUNTER — TRANSCRIPTION ENCOUNTER (OUTPATIENT)
Age: 79
End: 2024-08-07

## 2024-08-07 LAB
ANION GAP SERPL CALC-SCNC: 14 MMOL/L — SIGNIFICANT CHANGE UP (ref 5–17)
BUN SERPL-MCNC: 15 MG/DL — SIGNIFICANT CHANGE UP (ref 7–23)
CALCIUM SERPL-MCNC: 8.7 MG/DL — SIGNIFICANT CHANGE UP (ref 8.4–10.5)
CHLORIDE SERPL-SCNC: 106 MMOL/L — SIGNIFICANT CHANGE UP (ref 96–108)
CO2 SERPL-SCNC: 20 MMOL/L — LOW (ref 22–31)
CREAT SERPL-MCNC: 0.77 MG/DL — SIGNIFICANT CHANGE UP (ref 0.5–1.3)
EGFR: 90 ML/MIN/1.73M2 — SIGNIFICANT CHANGE UP
EGFR: 90 ML/MIN/1.73M2 — SIGNIFICANT CHANGE UP
GLUCOSE SERPL-MCNC: 174 MG/DL — HIGH (ref 70–99)
HCT VFR BLD CALC: 33.1 % — LOW (ref 39–50)
HGB BLD-MCNC: 10.9 G/DL — LOW (ref 13–17)
MAGNESIUM SERPL-MCNC: 2 MG/DL — SIGNIFICANT CHANGE UP (ref 1.6–2.6)
MCHC RBC-ENTMCNC: 29.7 PG — SIGNIFICANT CHANGE UP (ref 27–34)
MCHC RBC-ENTMCNC: 32.9 GM/DL — SIGNIFICANT CHANGE UP (ref 32–36)
MCV RBC AUTO: 90.2 FL — SIGNIFICANT CHANGE UP (ref 80–100)
NRBC # BLD: 0 /100 WBCS — SIGNIFICANT CHANGE UP (ref 0–0)
NRBC BLD-RTO: 0 /100 WBCS — SIGNIFICANT CHANGE UP (ref 0–0)
PHOSPHATE SERPL-MCNC: 2 MG/DL — LOW (ref 2.5–4.5)
PLATELET # BLD AUTO: 237 K/UL — SIGNIFICANT CHANGE UP (ref 150–400)
POTASSIUM SERPL-MCNC: 3.6 MMOL/L — SIGNIFICANT CHANGE UP (ref 3.5–5.3)
POTASSIUM SERPL-SCNC: 3.6 MMOL/L — SIGNIFICANT CHANGE UP (ref 3.5–5.3)
RBC # BLD: 3.67 M/UL — LOW (ref 4.2–5.8)
RBC # FLD: 14.8 % — HIGH (ref 10.3–14.5)
SODIUM SERPL-SCNC: 140 MMOL/L — SIGNIFICANT CHANGE UP (ref 135–145)
WBC # BLD: 8.99 K/UL — SIGNIFICANT CHANGE UP (ref 3.8–10.5)
WBC # FLD AUTO: 8.99 K/UL — SIGNIFICANT CHANGE UP (ref 3.8–10.5)

## 2024-08-07 PROCEDURE — 99233 SBSQ HOSP IP/OBS HIGH 50: CPT

## 2024-08-07 PROCEDURE — 93970 EXTREMITY STUDY: CPT | Mod: 26

## 2024-08-07 PROCEDURE — 70450 CT HEAD/BRAIN W/O DYE: CPT | Mod: 26

## 2024-08-07 RX ORDER — POTASSIUM PHOSPHATE, MONOBASIC POTASSIUM PHOSPHATE, DIBASIC INJECTION, 236; 224 MG/ML; MG/ML
30 SOLUTION, CONCENTRATE INTRAVENOUS ONCE
Refills: 0 | Status: COMPLETED | OUTPATIENT
Start: 2024-08-07 | End: 2024-08-07

## 2024-08-07 RX ORDER — DEXAMETHASONE 0.5 MG/1
2 TABLET ORAL DAILY
Refills: 0 | Status: CANCELLED | OUTPATIENT
Start: 2024-08-20 | End: 2024-08-10

## 2024-08-07 RX ORDER — DEXAMETHASONE 0.5 MG/1
2 TABLET ORAL EVERY 12 HOURS
Refills: 0 | Status: CANCELLED | OUTPATIENT
Start: 2024-08-18 | End: 2024-08-10

## 2024-08-07 RX ORDER — DEXAMETHASONE 0.5 MG/1
4 TABLET ORAL EVERY 6 HOURS
Refills: 0 | Status: DISCONTINUED | OUTPATIENT
Start: 2024-08-07 | End: 2024-08-10

## 2024-08-07 RX ORDER — DEXAMETHASONE 0.5 MG/1
4 TABLET ORAL EVERY 12 HOURS
Refills: 0 | Status: CANCELLED | OUTPATIENT
Start: 2024-08-14 | End: 2024-08-10

## 2024-08-07 RX ORDER — DEXAMETHASONE 0.5 MG/1
4 TABLET ORAL EVERY 8 HOURS
Refills: 0 | Status: CANCELLED | OUTPATIENT
Start: 2024-08-12 | End: 2024-08-10

## 2024-08-07 RX ORDER — DEXAMETHASONE 0.5 MG/1
TABLET ORAL
Refills: 0 | Status: DISCONTINUED | OUTPATIENT
Start: 2024-08-07 | End: 2024-08-10

## 2024-08-07 RX ORDER — DEXAMETHASONE 0.5 MG/1
2 TABLET ORAL EVERY 8 HOURS
Refills: 0 | Status: CANCELLED | OUTPATIENT
Start: 2024-08-16 | End: 2024-08-10

## 2024-08-07 RX ORDER — LEVETIRACETAM 10 MG/ML
500 INJECTION, SOLUTION INTRAVENOUS
Refills: 0 | Status: DISCONTINUED | OUTPATIENT
Start: 2024-08-07 | End: 2024-08-10

## 2024-08-07 RX ADMIN — DEXAMETHASONE 4 MILLIGRAM(S): 0.5 TABLET ORAL at 05:53

## 2024-08-07 RX ADMIN — Medication 40 MILLIGRAM(S): at 12:35

## 2024-08-07 RX ADMIN — Medication 1 APPLICATION(S): at 12:35

## 2024-08-07 RX ADMIN — BUDESONIDE AND FORMOTEROL FUMARATE DIHYDRATE 2 PUFF(S): 80; 4.5 AEROSOL RESPIRATORY (INHALATION) at 11:20

## 2024-08-07 RX ADMIN — Medication 60 MILLILITER(S): at 09:59

## 2024-08-07 RX ADMIN — Medication 250 MILLIGRAM(S): at 05:33

## 2024-08-07 RX ADMIN — LEVETIRACETAM 500 MILLIGRAM(S): 10 INJECTION, SOLUTION INTRAVENOUS at 16:52

## 2024-08-07 RX ADMIN — Medication 20 MILLIGRAM(S): at 22:00

## 2024-08-07 RX ADMIN — CARVEDILOL 12.5 MILLIGRAM(S): 3.12 TABLET, FILM COATED ORAL at 05:34

## 2024-08-07 RX ADMIN — LEVETIRACETAM 500 MILLIGRAM(S): 10 INJECTION, SOLUTION INTRAVENOUS at 05:33

## 2024-08-07 RX ADMIN — Medication 10 MILLIGRAM(S): at 16:10

## 2024-08-07 RX ADMIN — Medication 2 TABLET(S): at 22:00

## 2024-08-07 RX ADMIN — DEXAMETHASONE 4 MILLIGRAM(S): 0.5 TABLET ORAL at 00:50

## 2024-08-07 RX ADMIN — DEXAMETHASONE 4 MILLIGRAM(S): 0.5 TABLET ORAL at 16:52

## 2024-08-07 RX ADMIN — Medication 40 MILLIEQUIVALENT(S): at 05:34

## 2024-08-07 RX ADMIN — Medication 40 MILLIEQUIVALENT(S): at 09:59

## 2024-08-07 RX ADMIN — CARVEDILOL 12.5 MILLIGRAM(S): 3.12 TABLET, FILM COATED ORAL at 16:52

## 2024-08-07 RX ADMIN — DEXAMETHASONE 4 MILLIGRAM(S): 0.5 TABLET ORAL at 12:35

## 2024-08-07 RX ADMIN — POTASSIUM PHOSPHATE, MONOBASIC POTASSIUM PHOSPHATE, DIBASIC INJECTION, 83.33 MILLIMOLE(S): 236; 224 SOLUTION, CONCENTRATE INTRAVENOUS at 04:35

## 2024-08-08 DIAGNOSIS — J44.9 CHRONIC OBSTRUCTIVE PULMONARY DISEASE, UNSPECIFIED: ICD-10-CM

## 2024-08-08 DIAGNOSIS — I48.0 PAROXYSMAL ATRIAL FIBRILLATION: ICD-10-CM

## 2024-08-08 LAB
ANION GAP SERPL CALC-SCNC: 12 MMOL/L — SIGNIFICANT CHANGE UP (ref 5–17)
BUN SERPL-MCNC: 22 MG/DL — SIGNIFICANT CHANGE UP (ref 7–23)
CALCIUM SERPL-MCNC: 9.3 MG/DL — SIGNIFICANT CHANGE UP (ref 8.4–10.5)
CHLORIDE SERPL-SCNC: 107 MMOL/L — SIGNIFICANT CHANGE UP (ref 96–108)
CO2 SERPL-SCNC: 23 MMOL/L — SIGNIFICANT CHANGE UP (ref 22–31)
CREAT SERPL-MCNC: 0.87 MG/DL — SIGNIFICANT CHANGE UP (ref 0.5–1.3)
EGFR: 87 ML/MIN/1.73M2 — SIGNIFICANT CHANGE UP
EGFR: 87 ML/MIN/1.73M2 — SIGNIFICANT CHANGE UP
GLUCOSE SERPL-MCNC: 123 MG/DL — HIGH (ref 70–99)
HCT VFR BLD CALC: 35.7 % — LOW (ref 39–50)
HGB BLD-MCNC: 11.3 G/DL — LOW (ref 13–17)
MAGNESIUM SERPL-MCNC: 2.2 MG/DL — SIGNIFICANT CHANGE UP (ref 1.6–2.6)
MCHC RBC-ENTMCNC: 29.7 PG — SIGNIFICANT CHANGE UP (ref 27–34)
MCHC RBC-ENTMCNC: 31.7 GM/DL — LOW (ref 32–36)
MCV RBC AUTO: 93.7 FL — SIGNIFICANT CHANGE UP (ref 80–100)
NRBC # BLD: 0 /100 WBCS — SIGNIFICANT CHANGE UP (ref 0–0)
NRBC BLD-RTO: 0 /100 WBCS — SIGNIFICANT CHANGE UP (ref 0–0)
PHOSPHATE SERPL-MCNC: 3.1 MG/DL — SIGNIFICANT CHANGE UP (ref 2.5–4.5)
PLATELET # BLD AUTO: 261 K/UL — SIGNIFICANT CHANGE UP (ref 150–400)
POTASSIUM SERPL-MCNC: 4.4 MMOL/L — SIGNIFICANT CHANGE UP (ref 3.5–5.3)
POTASSIUM SERPL-SCNC: 4.4 MMOL/L — SIGNIFICANT CHANGE UP (ref 3.5–5.3)
RBC # BLD: 3.81 M/UL — LOW (ref 4.2–5.8)
RBC # FLD: 15.5 % — HIGH (ref 10.3–14.5)
SODIUM SERPL-SCNC: 142 MMOL/L — SIGNIFICANT CHANGE UP (ref 135–145)
WBC # BLD: 16.26 K/UL — HIGH (ref 3.8–10.5)
WBC # FLD AUTO: 16.26 K/UL — HIGH (ref 3.8–10.5)

## 2024-08-08 PROCEDURE — 70553 MRI BRAIN STEM W/O & W/DYE: CPT | Mod: 26

## 2024-08-08 PROCEDURE — 99223 1ST HOSP IP/OBS HIGH 75: CPT

## 2024-08-08 RX ORDER — ENOXAPARIN SODIUM 100 MG/ML
40 INJECTION SUBCUTANEOUS
Refills: 0 | Status: DISCONTINUED | OUTPATIENT
Start: 2024-08-08 | End: 2024-08-10

## 2024-08-08 RX ORDER — METOPROLOL SUCCINATE 50 MG/1
25 TABLET, EXTENDED RELEASE ORAL
Refills: 0 | Status: DISCONTINUED | OUTPATIENT
Start: 2024-08-08 | End: 2024-08-10

## 2024-08-08 RX ADMIN — BUDESONIDE AND FORMOTEROL FUMARATE DIHYDRATE 2 PUFF(S): 80; 4.5 AEROSOL RESPIRATORY (INHALATION) at 10:10

## 2024-08-08 RX ADMIN — DEXAMETHASONE 4 MILLIGRAM(S): 0.5 TABLET ORAL at 06:07

## 2024-08-08 RX ADMIN — DEXAMETHASONE 4 MILLIGRAM(S): 0.5 TABLET ORAL at 16:50

## 2024-08-08 RX ADMIN — LEVETIRACETAM 500 MILLIGRAM(S): 10 INJECTION, SOLUTION INTRAVENOUS at 06:07

## 2024-08-08 RX ADMIN — Medication 40 MILLIGRAM(S): at 06:07

## 2024-08-08 RX ADMIN — CARVEDILOL 12.5 MILLIGRAM(S): 3.12 TABLET, FILM COATED ORAL at 06:07

## 2024-08-08 RX ADMIN — DEXAMETHASONE 4 MILLIGRAM(S): 0.5 TABLET ORAL at 00:00

## 2024-08-08 RX ADMIN — METOPROLOL SUCCINATE 25 MILLIGRAM(S): 50 TABLET, EXTENDED RELEASE ORAL at 16:52

## 2024-08-08 RX ADMIN — POLYETHYLENE GLYCOL 3350 17 GRAM(S): 17 POWDER, FOR SOLUTION ORAL at 10:09

## 2024-08-08 RX ADMIN — DEXAMETHASONE 4 MILLIGRAM(S): 0.5 TABLET ORAL at 10:10

## 2024-08-08 RX ADMIN — DEXAMETHASONE 4 MILLIGRAM(S): 0.5 TABLET ORAL at 23:00

## 2024-08-08 RX ADMIN — ENOXAPARIN SODIUM 40 MILLIGRAM(S): 100 INJECTION SUBCUTANEOUS at 16:52

## 2024-08-08 RX ADMIN — Medication 10 MILLIGRAM(S): at 16:50

## 2024-08-08 RX ADMIN — Medication 20 MILLIGRAM(S): at 22:59

## 2024-08-08 RX ADMIN — LEVETIRACETAM 500 MILLIGRAM(S): 10 INJECTION, SOLUTION INTRAVENOUS at 16:50

## 2024-08-09 DIAGNOSIS — I10 ESSENTIAL (PRIMARY) HYPERTENSION: ICD-10-CM

## 2024-08-09 LAB — SURGICAL PATHOLOGY STUDY: SIGNIFICANT CHANGE UP

## 2024-08-09 PROCEDURE — 99233 SBSQ HOSP IP/OBS HIGH 50: CPT

## 2024-08-09 RX ORDER — AMLODIPINE BESYLATE 10 MG/1
5 TABLET ORAL DAILY
Refills: 0 | Status: DISCONTINUED | OUTPATIENT
Start: 2024-08-09 | End: 2024-08-10

## 2024-08-09 RX ADMIN — DEXAMETHASONE 4 MILLIGRAM(S): 0.5 TABLET ORAL at 17:28

## 2024-08-09 RX ADMIN — DEXAMETHASONE 4 MILLIGRAM(S): 0.5 TABLET ORAL at 06:00

## 2024-08-09 RX ADMIN — POLYETHYLENE GLYCOL 3350 17 GRAM(S): 17 POWDER, FOR SOLUTION ORAL at 11:02

## 2024-08-09 RX ADMIN — AMLODIPINE BESYLATE 5 MILLIGRAM(S): 10 TABLET ORAL at 09:43

## 2024-08-09 RX ADMIN — DEXAMETHASONE 4 MILLIGRAM(S): 0.5 TABLET ORAL at 11:02

## 2024-08-09 RX ADMIN — LEVETIRACETAM 500 MILLIGRAM(S): 10 INJECTION, SOLUTION INTRAVENOUS at 17:28

## 2024-08-09 RX ADMIN — DEXAMETHASONE 4 MILLIGRAM(S): 0.5 TABLET ORAL at 23:02

## 2024-08-09 RX ADMIN — Medication 650 MILLIGRAM(S): at 20:26

## 2024-08-09 RX ADMIN — Medication 650 MILLIGRAM(S): at 20:56

## 2024-08-09 RX ADMIN — LEVETIRACETAM 500 MILLIGRAM(S): 10 INJECTION, SOLUTION INTRAVENOUS at 06:00

## 2024-08-09 RX ADMIN — Medication 20 MILLIGRAM(S): at 22:55

## 2024-08-09 RX ADMIN — ENOXAPARIN SODIUM 40 MILLIGRAM(S): 100 INJECTION SUBCUTANEOUS at 17:27

## 2024-08-09 RX ADMIN — Medication 40 MILLIGRAM(S): at 06:00

## 2024-08-09 RX ADMIN — BUDESONIDE AND FORMOTEROL FUMARATE DIHYDRATE 2 PUFF(S): 80; 4.5 AEROSOL RESPIRATORY (INHALATION) at 11:03

## 2024-08-09 RX ADMIN — METOPROLOL SUCCINATE 25 MILLIGRAM(S): 50 TABLET, EXTENDED RELEASE ORAL at 17:28

## 2024-08-10 ENCOUNTER — TRANSCRIPTION ENCOUNTER (OUTPATIENT)
Age: 79
End: 2024-08-10

## 2024-08-10 VITALS
SYSTOLIC BLOOD PRESSURE: 138 MMHG | OXYGEN SATURATION: 94 % | DIASTOLIC BLOOD PRESSURE: 67 MMHG | RESPIRATION RATE: 17 BRPM | HEART RATE: 60 BPM | TEMPERATURE: 98 F

## 2024-08-10 LAB
ANION GAP SERPL CALC-SCNC: 14 MMOL/L — SIGNIFICANT CHANGE UP (ref 5–17)
BUN SERPL-MCNC: 27 MG/DL — HIGH (ref 7–23)
CALCIUM SERPL-MCNC: 8.9 MG/DL — SIGNIFICANT CHANGE UP (ref 8.4–10.5)
CHLORIDE SERPL-SCNC: 103 MMOL/L — SIGNIFICANT CHANGE UP (ref 96–108)
CO2 SERPL-SCNC: 22 MMOL/L — SIGNIFICANT CHANGE UP (ref 22–31)
CREAT SERPL-MCNC: 0.89 MG/DL — SIGNIFICANT CHANGE UP (ref 0.5–1.3)
EGFR: 87 ML/MIN/1.73M2 — SIGNIFICANT CHANGE UP
EGFR: 87 ML/MIN/1.73M2 — SIGNIFICANT CHANGE UP
GLUCOSE SERPL-MCNC: 144 MG/DL — HIGH (ref 70–99)
HCT VFR BLD CALC: 39.5 % — SIGNIFICANT CHANGE UP (ref 39–50)
HGB BLD-MCNC: 12.4 G/DL — LOW (ref 13–17)
MCHC RBC-ENTMCNC: 29.5 PG — SIGNIFICANT CHANGE UP (ref 27–34)
MCHC RBC-ENTMCNC: 31.4 GM/DL — LOW (ref 32–36)
MCV RBC AUTO: 93.8 FL — SIGNIFICANT CHANGE UP (ref 80–100)
NRBC # BLD: 0 /100 WBCS — SIGNIFICANT CHANGE UP (ref 0–0)
NRBC BLD-RTO: 0 /100 WBCS — SIGNIFICANT CHANGE UP (ref 0–0)
PLATELET # BLD AUTO: 272 K/UL — SIGNIFICANT CHANGE UP (ref 150–400)
POTASSIUM SERPL-MCNC: 4.2 MMOL/L — SIGNIFICANT CHANGE UP (ref 3.5–5.3)
POTASSIUM SERPL-SCNC: 4.2 MMOL/L — SIGNIFICANT CHANGE UP (ref 3.5–5.3)
RBC # BLD: 4.21 M/UL — SIGNIFICANT CHANGE UP (ref 4.2–5.8)
RBC # FLD: 15.4 % — HIGH (ref 10.3–14.5)
SODIUM SERPL-SCNC: 139 MMOL/L — SIGNIFICANT CHANGE UP (ref 135–145)
WBC # BLD: 7.84 K/UL — SIGNIFICANT CHANGE UP (ref 3.8–10.5)
WBC # FLD AUTO: 7.84 K/UL — SIGNIFICANT CHANGE UP (ref 3.8–10.5)

## 2024-08-10 PROCEDURE — 71045 X-RAY EXAM CHEST 1 VIEW: CPT | Mod: 26

## 2024-08-10 RX ORDER — SENNA 187 MG
2 TABLET ORAL
Qty: 0 | Refills: 0 | DISCHARGE
Start: 2024-08-10

## 2024-08-10 RX ORDER — ACETAMINOPHEN 500 MG/5ML
2 LIQUID (ML) ORAL
Qty: 0 | Refills: 0 | DISCHARGE
Start: 2024-08-10

## 2024-08-10 RX ORDER — METOPROLOL SUCCINATE 50 MG/1
1 TABLET, EXTENDED RELEASE ORAL
Qty: 0 | Refills: 0 | DISCHARGE
Start: 2024-08-10

## 2024-08-10 RX ORDER — AMLODIPINE BESYLATE 10 MG/1
1 TABLET ORAL
Qty: 30 | Refills: 0
Start: 2024-08-10

## 2024-08-10 RX ORDER — DEXAMETHASONE 0.5 MG/1
2 TABLET ORAL
Qty: 41 | Refills: 0
Start: 2024-08-10

## 2024-08-10 RX ORDER — POLYETHYLENE GLYCOL 3350 17 G/17G
17 POWDER, FOR SOLUTION ORAL
Qty: 0 | Refills: 0 | DISCHARGE
Start: 2024-08-10

## 2024-08-10 RX ORDER — LEVETIRACETAM 10 MG/ML
1 INJECTION, SOLUTION INTRAVENOUS
Qty: 0 | Refills: 0 | DISCHARGE
Start: 2024-08-10

## 2024-08-10 RX ADMIN — DEXAMETHASONE 4 MILLIGRAM(S): 0.5 TABLET ORAL at 11:00

## 2024-08-10 RX ADMIN — DEXAMETHASONE 4 MILLIGRAM(S): 0.5 TABLET ORAL at 06:11

## 2024-08-10 RX ADMIN — BUDESONIDE AND FORMOTEROL FUMARATE DIHYDRATE 2 PUFF(S): 80; 4.5 AEROSOL RESPIRATORY (INHALATION) at 11:02

## 2024-08-10 RX ADMIN — AMLODIPINE BESYLATE 5 MILLIGRAM(S): 10 TABLET ORAL at 06:10

## 2024-08-10 RX ADMIN — Medication 40 MILLIGRAM(S): at 07:14

## 2024-08-10 RX ADMIN — POLYETHYLENE GLYCOL 3350 17 GRAM(S): 17 POWDER, FOR SOLUTION ORAL at 11:00

## 2024-08-10 RX ADMIN — LEVETIRACETAM 500 MILLIGRAM(S): 10 INJECTION, SOLUTION INTRAVENOUS at 06:10

## 2024-08-15 ENCOUNTER — APPOINTMENT (OUTPATIENT)
Dept: NEUROSURGERY | Facility: CLINIC | Age: 79
End: 2024-08-15
Payer: MEDICARE

## 2024-08-15 ENCOUNTER — APPOINTMENT (OUTPATIENT)
Dept: ULTRASOUND IMAGING | Facility: CLINIC | Age: 79
End: 2024-08-15

## 2024-08-15 ENCOUNTER — INPATIENT (INPATIENT)
Facility: HOSPITAL | Age: 79
LOS: 26 days | Discharge: NURSING, REHAB OR LONG-TERM CARE | DRG: 301 | End: 2024-09-11
Attending: NEUROLOGICAL SURGERY | Admitting: STUDENT IN AN ORGANIZED HEALTH CARE EDUCATION/TRAINING PROGRAM
Payer: MEDICARE

## 2024-08-15 VITALS
DIASTOLIC BLOOD PRESSURE: 75 MMHG | BODY MASS INDEX: 18.66 KG/M2 | SYSTOLIC BLOOD PRESSURE: 126 MMHG | WEIGHT: 126 LBS | OXYGEN SATURATION: 96 % | HEART RATE: 56 BPM | HEIGHT: 69 IN

## 2024-08-15 VITALS
RESPIRATION RATE: 16 BRPM | DIASTOLIC BLOOD PRESSURE: 92 MMHG | WEIGHT: 130.07 LBS | OXYGEN SATURATION: 95 % | SYSTOLIC BLOOD PRESSURE: 166 MMHG | HEART RATE: 60 BPM | HEIGHT: 70 IN

## 2024-08-15 DIAGNOSIS — Z90.411 ACQUIRED PARTIAL ABSENCE OF PANCREAS: Chronic | ICD-10-CM

## 2024-08-15 DIAGNOSIS — Z90.89 ACQUIRED ABSENCE OF OTHER ORGANS: Chronic | ICD-10-CM

## 2024-08-15 DIAGNOSIS — Z98.890 OTHER SPECIFIED POSTPROCEDURAL STATES: ICD-10-CM

## 2024-08-15 DIAGNOSIS — I82.409 ACUTE EMBOLISM AND THROMBOSIS OF UNSPECIFIED DEEP VEINS OF UNSPECIFIED LOWER EXTREMITY: ICD-10-CM

## 2024-08-15 DIAGNOSIS — M79.89 OTHER SPECIFIED SOFT TISSUE DISORDERS: ICD-10-CM

## 2024-08-15 DIAGNOSIS — Z98.89 OTHER SPECIFIED POSTPROCEDURAL STATES: Chronic | ICD-10-CM

## 2024-08-15 DIAGNOSIS — Z90.49 ACQUIRED ABSENCE OF OTHER SPECIFIED PARTS OF DIGESTIVE TRACT: Chronic | ICD-10-CM

## 2024-08-15 DIAGNOSIS — Z98.890 OTHER SPECIFIED POSTPROCEDURAL STATES: Chronic | ICD-10-CM

## 2024-08-15 LAB
ALBUMIN SERPL ELPH-MCNC: 3.7 G/DL — SIGNIFICANT CHANGE UP (ref 3.3–5)
ALP SERPL-CCNC: 67 U/L — SIGNIFICANT CHANGE UP (ref 40–120)
ALT FLD-CCNC: 46 U/L — HIGH (ref 10–45)
ANION GAP SERPL CALC-SCNC: 17 MMOL/L — SIGNIFICANT CHANGE UP (ref 5–17)
APTT BLD: 28.4 SEC — SIGNIFICANT CHANGE UP (ref 24.5–35.6)
AST SERPL-CCNC: 20 U/L — SIGNIFICANT CHANGE UP (ref 10–40)
BASOPHILS # BLD AUTO: 0 K/UL — SIGNIFICANT CHANGE UP (ref 0–0.2)
BASOPHILS NFR BLD AUTO: 0 % — SIGNIFICANT CHANGE UP (ref 0–2)
BILIRUB SERPL-MCNC: 0.3 MG/DL — SIGNIFICANT CHANGE UP (ref 0.2–1.2)
BUN SERPL-MCNC: 34 MG/DL — HIGH (ref 7–23)
BURR CELLS BLD QL SMEAR: PRESENT — SIGNIFICANT CHANGE UP
CALCIUM SERPL-MCNC: 9.4 MG/DL — SIGNIFICANT CHANGE UP (ref 8.4–10.5)
CHLORIDE SERPL-SCNC: 102 MMOL/L — SIGNIFICANT CHANGE UP (ref 96–108)
CO2 SERPL-SCNC: 18 MMOL/L — LOW (ref 22–31)
CREAT SERPL-MCNC: 0.81 MG/DL — SIGNIFICANT CHANGE UP (ref 0.5–1.3)
EGFR: 89 ML/MIN/1.73M2 — SIGNIFICANT CHANGE UP
EOSINOPHIL # BLD AUTO: 0 K/UL — SIGNIFICANT CHANGE UP (ref 0–0.5)
EOSINOPHIL NFR BLD AUTO: 0 % — SIGNIFICANT CHANGE UP (ref 0–6)
GLUCOSE SERPL-MCNC: 106 MG/DL — HIGH (ref 70–99)
HCT VFR BLD CALC: 42.7 % — SIGNIFICANT CHANGE UP (ref 39–50)
HGB BLD-MCNC: 13.1 G/DL — SIGNIFICANT CHANGE UP (ref 13–17)
INR BLD: 1.14 RATIO — SIGNIFICANT CHANGE UP (ref 0.85–1.18)
LYMPHOCYTES # BLD AUTO: 0.69 K/UL — LOW (ref 1–3.3)
LYMPHOCYTES # BLD AUTO: 5.2 % — LOW (ref 13–44)
MANUAL SMEAR VERIFICATION: SIGNIFICANT CHANGE UP
MCHC RBC-ENTMCNC: 29.3 PG — SIGNIFICANT CHANGE UP (ref 27–34)
MCHC RBC-ENTMCNC: 30.7 GM/DL — LOW (ref 32–36)
MCV RBC AUTO: 95.5 FL — SIGNIFICANT CHANGE UP (ref 80–100)
METAMYELOCYTES # FLD: 3.5 % — HIGH (ref 0–0)
MONOCYTES # BLD AUTO: 0.81 K/UL — SIGNIFICANT CHANGE UP (ref 0–0.9)
MONOCYTES NFR BLD AUTO: 6.1 % — SIGNIFICANT CHANGE UP (ref 2–14)
MYELOCYTES NFR BLD: 0.9 % — HIGH (ref 0–0)
NEUTROPHILS # BLD AUTO: 11.14 K/UL — HIGH (ref 1.8–7.4)
NEUTROPHILS NFR BLD AUTO: 84.3 % — HIGH (ref 43–77)
OVALOCYTES BLD QL SMEAR: SLIGHT — SIGNIFICANT CHANGE UP
PLAT MORPH BLD: NORMAL — SIGNIFICANT CHANGE UP
PLATELET # BLD AUTO: 252 K/UL — SIGNIFICANT CHANGE UP (ref 150–400)
POIKILOCYTOSIS BLD QL AUTO: SIGNIFICANT CHANGE UP
POTASSIUM SERPL-MCNC: 4.1 MMOL/L — SIGNIFICANT CHANGE UP (ref 3.5–5.3)
POTASSIUM SERPL-SCNC: 4.1 MMOL/L — SIGNIFICANT CHANGE UP (ref 3.5–5.3)
PROT SERPL-MCNC: 7.6 G/DL — SIGNIFICANT CHANGE UP (ref 6–8.3)
PROTHROM AB SERPL-ACNC: 12.5 SEC — SIGNIFICANT CHANGE UP (ref 9.5–13)
RBC # BLD: 4.47 M/UL — SIGNIFICANT CHANGE UP (ref 4.2–5.8)
RBC # FLD: 15.6 % — HIGH (ref 10.3–14.5)
RBC BLD AUTO: ABNORMAL
SODIUM SERPL-SCNC: 137 MMOL/L — SIGNIFICANT CHANGE UP (ref 135–145)
WBC # BLD: 13.21 K/UL — HIGH (ref 3.8–10.5)
WBC # FLD AUTO: 13.21 K/UL — HIGH (ref 3.8–10.5)

## 2024-08-15 PROCEDURE — 99024 POSTOP FOLLOW-UP VISIT: CPT

## 2024-08-15 PROCEDURE — 99285 EMERGENCY DEPT VISIT HI MDM: CPT

## 2024-08-15 PROCEDURE — 93970 EXTREMITY STUDY: CPT | Mod: 26

## 2024-08-15 RX ORDER — METOPROLOL TARTRATE 100 MG/1
25 TABLET ORAL DAILY
Refills: 0 | Status: DISCONTINUED | OUTPATIENT
Start: 2024-08-15 | End: 2024-08-19

## 2024-08-15 RX ORDER — OXYCODONE HYDROCHLORIDE 5 MG/1
5 TABLET ORAL EVERY 4 HOURS
Refills: 0 | Status: DISCONTINUED | OUTPATIENT
Start: 2024-08-15 | End: 2024-08-16

## 2024-08-15 RX ORDER — SENNA 187 MG
2 TABLET ORAL AT BEDTIME
Refills: 0 | Status: DISCONTINUED | OUTPATIENT
Start: 2024-08-15 | End: 2024-08-16

## 2024-08-15 RX ORDER — LEVETIRACETAM 1000 MG/1
250 TABLET ORAL
Refills: 0 | Status: DISCONTINUED | OUTPATIENT
Start: 2024-08-15 | End: 2024-08-18

## 2024-08-15 RX ORDER — AMLODIPINE BESYLATE 5 MG/1
5 TABLET ORAL
Refills: 0 | Status: ACTIVE | COMMUNITY

## 2024-08-15 RX ORDER — POLYETHYLENE GLYCOL 3350 17 G/17G
17 POWDER, FOR SOLUTION ORAL DAILY
Refills: 0 | Status: DISCONTINUED | OUTPATIENT
Start: 2024-08-15 | End: 2024-08-16

## 2024-08-15 RX ORDER — ACETAMINOPHEN 325 MG/1
650 TABLET ORAL EVERY 6 HOURS
Refills: 0 | Status: DISCONTINUED | OUTPATIENT
Start: 2024-08-15 | End: 2024-09-11

## 2024-08-15 RX ORDER — SIMVASTATIN 10 MG
20 TABLET ORAL AT BEDTIME
Refills: 0 | Status: DISCONTINUED | OUTPATIENT
Start: 2024-08-15 | End: 2024-08-19

## 2024-08-15 RX ORDER — AMLODIPINE BESYLATE 10 MG/1
5 TABLET ORAL ONCE
Refills: 0 | Status: COMPLETED | OUTPATIENT
Start: 2024-08-15 | End: 2024-08-15

## 2024-08-15 RX ORDER — OXYCODONE HYDROCHLORIDE 5 MG/1
10 TABLET ORAL EVERY 4 HOURS
Refills: 0 | Status: DISCONTINUED | OUTPATIENT
Start: 2024-08-15 | End: 2024-08-16

## 2024-08-15 NOTE — ED PROVIDER NOTE - PHYSICAL EXAMINATION
VITAL SIGNS: I have reviewed nursing notes and confirm.  CONSTITUTIONAL:  in no acute distress. No SOB or work of breathing   SKIN: Skin exam is warm and dry, no acute rash.  HEAD: Normocephalic; atraumatic.  EYES: PERRL, EOM intact; conjunctiva and sclera clear.  ENT: No nasal discharge; airway clear. Throat clear.  NECK: Supple; non tender.    CARD: Regular rate and rhythm.  RESP: No wheezes,  no rales or rhonchi.   ABD:  soft; non-distended; non-tender;   EXT: Normal ROM. No clubbing, cyanosis. RUE edematous in comparison to L w/o tenderness, erythema    NEURO: Alert, oriented. Grossly unremarkable. No focal deficits.  moves all extremities,  normal gait   PSYCH: Cooperative, appropriate.

## 2024-08-15 NOTE — ED ADULT NURSE NOTE - NSICDXPASTMEDICALHX_GEN_ALL_CORE_FT
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

## 2024-08-15 NOTE — ED PROVIDER NOTE - ATTENDING CONTRIBUTION TO CARE
80 y/o M with h/o HTN, T2DM (diet controlled), HLD, COPD, CKD (III), carcinoid tumor s/p hemicolectomy, nasopharyngeal carcinoma s/p chemo/RT (c/b dysphagia receiving ongoing swallow therapy, thickening of liquids, regular solids, last bronchoscopy 2023 was negative), IPMN s/p distal pancreatectomy (2018), and paroxysmal A-fib, on Eliquis held after status post RIGHT anterolateral temporal lobectomy for resection of hemorrhagic lesion, Patient now presents with chief complaint of right upper extremity dvt diagnosed on outpatient duplex. ordered by Dr. Bird.   Will get iv, cbc, cmp, pt/inr, heparin weight, neurosurgery consult   Will follow up on labs, therapeutics, imaging, reassess and disposition as clinically indicated.  *The above represents an initial assessment/impression. Please refer to my progress notes below for potential changes in patient clinical course*

## 2024-08-15 NOTE — ED PROVIDER NOTE - OBJECTIVE STATEMENT
Patient is a 79y M w/ h/o HTN, T2DM, HLD, Stage III CKD, COPD,  carcinoid tumor s/p hemicolectomy, nasopharyngeal carcinoma s/p chemo/radiation,  IPMN s/p distal pancreatectomy (2018), and paroxysmal A-fib, on Eliquis, & recent R temporal lobe lesion (08/06/2024) who presents to ED w/ c/o DVT. Wife at bedside stated today patient was at postoperative follow up appointment w/ surgeon, Dr. Goldstein, when PE revealed swollen RUE w/o tenderness, erythema, SOB, difficulty breathing. Patient referred to STAT imaging by office, venous studies revealed DVT involving the subclavian, axillary and brachial veins and Superficial thrombus in the right basilic vein. Patient endorses medical compliance to meds + anticoagulations.

## 2024-08-15 NOTE — ED ADULT NURSE NOTE - NSICDXPASTSURGICALHX_GEN_ALL_CORE_FT
PAST SURGICAL HISTORY:  H/O endoscopy last one 06/2018    H/O hemicolectomy     History of appendectomy     History of loop recorder     History of partial pancreatectomy     S/P tonsillectomy 1959

## 2024-08-15 NOTE — ED ADULT NURSE NOTE - OBJECTIVE STATEMENT
79 y.o M sent in from PCP p/w c/o R arm swelling. A+Ox4. Pts wife states this morning was helping pt get dressed and noticed RUE swelling. Brought pt to MD and had scan performed showing DVT in subclavian, axillary, and brachial veins as well as a superficial thrombus of in R basilic vein. Pt denies any CP, SOB, MORALES, abd pain, urinary sx, numbness/ tingling of extremity. Normal ROM noted. Has craniotomy on 8/6/24 for lesion, has a-fib but stopped eliquis since surgery. Distal pulses noted to RUE. No other complaints at this time, wife at bedside, comfort and safety maintained.

## 2024-08-15 NOTE — ED PROVIDER NOTE - RAPID ASSESSMENT
79 ys m with craniotomy with Dr. Branham 8/6/24 here for eval after RUE doppler revealed DVT involving the subclavian, axillary and brachial veins and Superficial thrombus in the right basilic vein. per pts wife she denies pt experiencing any shortness of breath or difficulty breathing, pt was on eliquis for Afib prior to operation and is not currently on AC.     Patient was rapidly assessed via a telemedicine and/or role of Quick Triage Doctor; a limited history, physical exam and assessment was performed. The patient will be seen and further evaluated in the main emergency department. The remainder of care and evaluation will be conducted by the primary emergency medicine team. Receiving team will follow up on labs, imaging and serially reassess patient as indicated. All further decisions regarding patient care, evaluation and disposition are at the discretion of the receiving primary emergency department team. 79 ys m with craniotomy with Dr. Branham 8/6/24 here for eval after RUE doppler revealed DVT involving the subclavian, axillary and brachial veins and Superficial thrombus in the right basilic vein. per pts wife she denies pt experiencing any shortness of breath or difficulty breathing, pt was on eliquis for Afib prior to operation and is not currently on AC.     Patient was rapidly assessed via a telemedicine and/or role of Quick Triage Doctor; a limited history, physical exam and assessment was performed. The patient will be seen and further evaluated in the main emergency department. The remainder of care and evaluation will be conducted by the primary emergency medicine team. Receiving team will follow up on labs, imaging and serially reassess patient as indicated. All further decisions regarding patient care, evaluation and disposition are at the discretion of the receiving primary emergency department team.    PT seen as Q-Pa in triage. Full evaluation to be performed once pt is transferred to Main ED. Immediately available for evaluation, consultation.  RAHUL Pratt MD, FACEP

## 2024-08-15 NOTE — ED ADULT NURSE NOTE - NSFALLUNIVINTERV_ED_ALL_ED
Bed/Stretcher in lowest position, wheels locked, appropriate side rails in place/Call bell, personal items and telephone in reach/Instruct patient to call for assistance before getting out of bed/chair/stretcher/Non-slip footwear applied when patient is off stretcher/[place] to call system/Physically safe environment - no spills, clutter or unnecessary equipment/Purposeful proactive rounding/Room/bathroom lighting operational, light cord in reach

## 2024-08-15 NOTE — H&P ADULT - ASSESSMENT
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 Today presents to ED for RUE doppler w/ DVT involving the subclavian, axillary and brachial veins and Superficial thrombus in the right basilic vein. Plts/coags wnl. Exam: unchanged from discharge,  awake, Ox2 (self and place), EOS, mild L facial, FC, BUE 5/5, BLE 5/5    - please obtain CTH prior to beginning any AC    - vascular surgery (Dr. Benitez consult)

## 2024-08-15 NOTE — CONSULT NOTE ADULT - ASSESSMENT
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 Today presents to ED for RUE doppler w/ DVT involving the subclavian, axillary and brachial veins and Superficial thrombus in the right basilic vein. Plts/coags wnl. Exam: unchanged from discharge,  awake, Ox2 (self and place), EOS, mild L facial, FC, BUE 5/5, BLE 5/5    While there is no absolute neurosurgical contraindication to systemic anti coagulation, there does exist an increased risk of intracranial hemorrhage which can result in significant morbidity including but not limited to headache, seizure, stroke, paralysis, and in severe cases even death.    The risks and benefits of starting systemic anticoagulation must be considered carefully in the setting of the patients medical history and current clinical condition.    - Recommend NO bolus with low ptt goal of ~60-65  - Recommend obtaining follow up CTH after patient is therapeutic and to notify neurosurgery immediately with any changes in the patients neurologic exam 79M hx recent admission for and R crani for lesion w/ Dr. 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 Today presents to ED for RUE doppler w/ DVT involving the subclavian, axillary and brachial veins and Superficial thrombus in the right basilic vein. Plts/coags wnl. Exam: unchanged from discharge,  awake, Ox2 (self and place), EOS, mild L facial, FC, BUE 5/5, BLE 5/5    - please obtain CTH prior to beginning any AC    - vascular surgery (Dr. Benitez consult)    While there is no absolute neurosurgical contraindication to systemic anti coagulation, there does exist an increased risk of intracranial hemorrhage which can result in significant morbidity including but not limited to headache, seizure, stroke, paralysis, and in severe cases even death.    The risks and benefits of starting systemic anticoagulation must be considered carefully in the setting of the patients medical history and current clinical condition.    - Recommend NO bolus with low ptt goal of ~60-65  - Recommend obtaining follow up CTH after patient is therapeutic and to notify neurosurgery immediately with any changes in the patients neurologic exam

## 2024-08-15 NOTE — ED PROVIDER NOTE - NS ED ROS FT
CONSTITUTIONAL - no  fever, no diaphoresis, no weight change  SKIN - no rash  HEMATOLOGIC - no bleeding, no bruising  EYES - no eye pain, no blurred vision  ENT - no change in hearing, no pain  RESPIRATORY - no shortness of breath, no cough  CARDIAC - no chest pain, no palpitations  GI - no abd pain, no nausea, no vomiting, no diarrhea, no constipation, no bleeding  GENITO-URINARY - no discharge, no dysuria; no hematuria,   ENDO - no polydipsia, no polyuria, no heat/no cold intolerance  MUSCULOSKELETAL - no joint pain, no redness (+) Swollen RUE   NEUROLOGIC - no weakness, no headache, no anesthesia, no paresthesias  PSYCH - no anxiety, non suicidal, non homicidal, no hallucination, no depression

## 2024-08-15 NOTE — CONSULT NOTE ADULT - SUBJECTIVE AND OBJECTIVE BOX
p (1480)     79M hx recent admission for and R crani for lesion w/ Dr. 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 Today presents to ED for RUE doppler w/ DVT involving the subclavian, axillary and brachial veins and Superficial thrombus in the right basilic vein. Plts/coags wnl. Exam: unchanged from discharge,  awake, Ox2 (self and place), EOS, mild L facial, FC, BUE 5/5, BLE 5/5    --Anticoagulation:    =====================  PAST MEDICAL HISTORY   HTN (hypertension)    High cholesterol    Low back pain    Benign carcinoid tumor    COPD (chronic obstructive pulmonary disease)    Atrial fibrillation, unspecified type    Acute kidney injury    Stage 3 chronic kidney disease    Pulmonary emphysema, unspecified emphysema type    Nasopharynx cancer    Disease of pancreas, unspecified    Hearing loss    Pancreatic mass    Radiation fibrosis    ETOH abuse    Other nonspecific abnormal finding of lung field    Abnormal chest CT    Type 2 diabetes mellitus    Dysphagia    History of cancer chemotherapy    S/P radiation therapy    Pneumonia    Syncope    Pneumonia due to COVID-19 virus      PAST SURGICAL HISTORY   History of appendectomy    H/O hemicolectomy    S/P tonsillectomy    H/O endoscopy    History of partial pancreatectomy    History of loop recorder      penicillin (Other)  ACE inhibitors (Other)  Bee Stings- anaphylaxis (Other)      MEDICATIONS:  Antibiotics:    Neuro:    Other:      SOCIAL HISTORY:   Occupation:   Marital Status:     FAMILY HISTORY:  Family history of breast cancer        ROS: Negative except per HPI    LABS:  PT/INR - ( 15 Aug 2024 20:26 )   PT: 12.5 sec;   INR: 1.14 ratio         PTT - ( 15 Aug 2024 20:26 )  PTT:28.4 sec                        13.1   13.21 )-----------( 252      ( 15 Aug 2024 19:21 )             42.7     08-15    137  |  102  |  34<H>  ----------------------------<  106<H>  4.1   |  18<L>  |  0.81    Ca    9.4      15 Aug 2024 19:21    TPro  7.6  /  Alb  3.7  /  TBili  0.3  /  DBili  x   /  AST  20  /  ALT  46<H>  /  AlkPhos  67  08-15

## 2024-08-15 NOTE — ED PROVIDER NOTE - CLINICAL SUMMARY MEDICAL DECISION MAKING FREE TEXT BOX
Patient is a 79y M w/ h/o HTN, T2DM, HLD, Stage III CKD, COPD,  carcinoid tumor s/p hemicolectomy, nasopharyngeal carcinoma s/p chemo/radiation,  IPMN s/p distal pancreatectomy (2018), and paroxysmal A-fib, on Eliquis, & recent R temporal lobe lesion (08/06/2024) who presents to ED w/ c/o DVT. Wife at bedside stated today patient was at postoperative follow up appointment w/ surgeon, Dr. Goldstein, when PE revealed swollen RUE w/o tenderness, erythema, SOB, difficulty breathing. Patient referred to STAT imaging by office, venous studies revealed DVT involving the subclavian, axillary and brachial veins and Superficial thrombus in the right basilic vein. Patient endorses medical compliance to meds + anticoagulations.   Vitals: /92  PE remarkable for non-toxic appearing patient, no work of breathing or SOB, +RUE edematous when compared to L, no erythema, bruising, tenderness to palpation.     Will order PT/PTT/INR, CBC, CMP to evaluate for DVT management.

## 2024-08-15 NOTE — ED PROVIDER NOTE - CHILD ABUSE FACILITY
[address] [address]  [address] [address] LKS:2/20/1969 [id number]    9/27/2017 Time Session Began: 0900  Time Session Ended: 5352    Session Type:45 Minute Therapy (71788)    Others Present: none    Intervention: Cognitive, Supportive    Suicide/Homicide/Violence Ideation: No    If Yes, explain: n/a    Current Outpatient Prescriptions   Medication Sig   â¢ lisinopril (ZESTRIL) 20 MG tablet Take 1 tablet by mouth every morning. â¢ anastrozole (ARIMIDEX) 1 MG tablet TAKE 1 TABLET BY MOUTH DAILY   â¢ atorvastatin (LIPITOR) 40 MG tablet Take 1 tablet by mouth nightly. â¢ cyclobenzaprine (FLEXERIL) 10 MG tablet Take 1 tablet by mouth 3 times daily as needed for Muscle spasms. â¢ DISPENSE Please screen for PCW services. â¢ atenolol (TENORMIN) 25 MG tablet Take 1 tablet by mouth in the morning   â¢ EMBRACE LANCETS ULTRA THIN 30G Misc Use to check blood sugar 3 times a day (8,12,4) dx: 250.00   â¢ Insulin Pen Needle (B-D U/F PEN NEEDLE) 31G X 5 MM Misc Check Blood Glucose DAILY. E11.9.   â¢ insulin glargine (LANTUS SOLOSTAR) 100 UNIT/ML injection Inject 18 Units into the skin nightly. â¢ benztropine (COGENTIN) 0.5 MG tablet Take 0.5 mg by mouth 2 times daily. â¢ cyanocobalamin 100 MCG tablet Take 100 mcg by mouth daily. â¢ Pyridoxine HCl (VITAMIN B-6) 100 MG tablet Take 100 mg by mouth daily. â¢ Cholecalciferol (VITAMIN D3) 2000 UNITS capsule Take 2,000 Units by mouth daily. â¢ albuterol (PROAIR HFA) 108 (90 BASE) MCG/ACT inhaler Inhale 2 puffs into the lungs 4 times daily as needed for Shortness of Breath or Wheezing. â¢ fluticasone (FLOVENT DISKUS) 50 MCG/BLIST AEROSOL POWDER, BREATH ACTIVATED Inhale 1 puff into the lungs 2 times daily (before meals). â¢ acetaminophen-codeine (TYLENOL NO.3) 300-30 MG per tablet Take 1 tablet by mouth every 4 hours as needed for Pain.    â¢ lurasidone (LATUDA) 40 MG tablet Take 60 mg by mouth daily (with breakfast). Indications: Schizophrenia      No current facility-administered medications for this visit. Change in Medication(s) Reported: No  If Yes, explain: n/a    Patient/Family Education Provided: Yes  Patient/Family Displays Understanding: Yes    If No, explain: n/a    Chief complaint in patient's own words: I don't know what to do about. ... Progress Note containing chief complaint and symptoms/problems related to the complaint:    (Data/Action/Response/Plan)    D: the patient presented casually dressed, well groomed. Easily engaged, hyperverbal - overly detailed. Mood -reports occasional irritability, some bright days  The patient updated writer on status of young man about whom she spoke at last session, ways she sought to keep to her plan to focus on the  which she notes was effective. The patient spend the bulk of the session processing decision about future of relationship with male friend. She ultimately affirmed desire not to accept marriage proposal but is unsure of how to communicate same, not wanting to hurt this individual.  A: the writer reinforced patient's integrity with respect to ways dealt with concerns for someone else's welfare, ability to see a larger picture. With respect to relationship, writer invited client to look at broad patterns vs how things are every once in a while. Affirmed thoughts re: need to consider dealing with behaviors on a daily basis if . Writer also recommended talking with individual prior to waiting for a proposal given that she seems fairly firm re: her thoughts. Modeled ways to communicate same, reinforcing wisdom received re: not leaving statements open-ended. Also processed options for response to his potential hurt feelings, that this not become the basis for making a decision she does not believe is healthy for her. R: patient open to intervention  P: revisit coping with stressors.     Need for Community Resources Assessed: Yes    Resources Needed: No    If Yes, what resources: n/a    Primary Diagnosis: Bipolar Mixed   : 1 Mild    Treatment Plan: Unchanged    Discharge Plan: N/A    Next Appointment: one month  Jose Walton PHD [facility]

## 2024-08-16 ENCOUNTER — RESULT REVIEW (OUTPATIENT)
Age: 79
End: 2024-08-16

## 2024-08-16 DIAGNOSIS — E11.9 TYPE 2 DIABETES MELLITUS WITHOUT COMPLICATIONS: ICD-10-CM

## 2024-08-16 DIAGNOSIS — I10 ESSENTIAL (PRIMARY) HYPERTENSION: ICD-10-CM

## 2024-08-16 DIAGNOSIS — J44.9 CHRONIC OBSTRUCTIVE PULMONARY DISEASE, UNSPECIFIED: ICD-10-CM

## 2024-08-16 DIAGNOSIS — G93.89 OTHER SPECIFIED DISORDERS OF BRAIN: ICD-10-CM

## 2024-08-16 DIAGNOSIS — I48.0 PAROXYSMAL ATRIAL FIBRILLATION: ICD-10-CM

## 2024-08-16 DIAGNOSIS — I26.99 OTHER PULMONARY EMBOLISM WITHOUT ACUTE COR PULMONALE: ICD-10-CM

## 2024-08-16 DIAGNOSIS — Z29.9 ENCOUNTER FOR PROPHYLACTIC MEASURES, UNSPECIFIED: ICD-10-CM

## 2024-08-16 LAB
ANION GAP SERPL CALC-SCNC: 10 MMOL/L — SIGNIFICANT CHANGE UP (ref 5–17)
APTT BLD: 26.7 SEC — SIGNIFICANT CHANGE UP (ref 24.5–35.6)
APTT BLD: 27.3 SEC — SIGNIFICANT CHANGE UP (ref 24.5–35.6)
APTT BLD: 59.9 SEC — HIGH (ref 24.5–35.6)
BUN SERPL-MCNC: 29 MG/DL — HIGH (ref 7–23)
CALCIUM SERPL-MCNC: 9.4 MG/DL — SIGNIFICANT CHANGE UP (ref 8.4–10.5)
CHLORIDE SERPL-SCNC: 99 MMOL/L — SIGNIFICANT CHANGE UP (ref 96–108)
CO2 SERPL-SCNC: 27 MMOL/L — SIGNIFICANT CHANGE UP (ref 22–31)
CREAT SERPL-MCNC: 0.84 MG/DL — SIGNIFICANT CHANGE UP (ref 0.5–1.3)
EGFR: 88 ML/MIN/1.73M2 — SIGNIFICANT CHANGE UP
GLUCOSE BLDC GLUCOMTR-MCNC: 88 MG/DL — SIGNIFICANT CHANGE UP (ref 70–99)
GLUCOSE SERPL-MCNC: 101 MG/DL — HIGH (ref 70–99)
HCT VFR BLD CALC: 34.8 % — LOW (ref 39–50)
HCT VFR BLD CALC: 39.5 % — SIGNIFICANT CHANGE UP (ref 39–50)
HGB BLD-MCNC: 11.3 G/DL — LOW (ref 13–17)
HGB BLD-MCNC: 12.4 G/DL — LOW (ref 13–17)
INR BLD: 1.15 RATIO — SIGNIFICANT CHANGE UP (ref 0.85–1.18)
MCHC RBC-ENTMCNC: 29.6 PG — SIGNIFICANT CHANGE UP (ref 27–34)
MCHC RBC-ENTMCNC: 30 PG — SIGNIFICANT CHANGE UP (ref 27–34)
MCHC RBC-ENTMCNC: 31.4 GM/DL — LOW (ref 32–36)
MCHC RBC-ENTMCNC: 32.5 GM/DL — SIGNIFICANT CHANGE UP (ref 32–36)
MCV RBC AUTO: 92.3 FL — SIGNIFICANT CHANGE UP (ref 80–100)
MCV RBC AUTO: 94.3 FL — SIGNIFICANT CHANGE UP (ref 80–100)
MRSA PCR RESULT.: SIGNIFICANT CHANGE UP
NRBC # BLD: 0 /100 WBCS — SIGNIFICANT CHANGE UP (ref 0–0)
NRBC # BLD: 0 /100 WBCS — SIGNIFICANT CHANGE UP (ref 0–0)
PLATELET # BLD AUTO: 190 K/UL — SIGNIFICANT CHANGE UP (ref 150–400)
PLATELET # BLD AUTO: 259 K/UL — SIGNIFICANT CHANGE UP (ref 150–400)
POTASSIUM SERPL-MCNC: 4.1 MMOL/L — SIGNIFICANT CHANGE UP (ref 3.5–5.3)
POTASSIUM SERPL-SCNC: 4.1 MMOL/L — SIGNIFICANT CHANGE UP (ref 3.5–5.3)
PROTHROM AB SERPL-ACNC: 12.6 SEC — SIGNIFICANT CHANGE UP (ref 9.5–13)
RBC # BLD: 3.77 M/UL — LOW (ref 4.2–5.8)
RBC # BLD: 4.19 M/UL — LOW (ref 4.2–5.8)
RBC # FLD: 15.2 % — HIGH (ref 10.3–14.5)
RBC # FLD: 15.8 % — HIGH (ref 10.3–14.5)
S AUREUS DNA NOSE QL NAA+PROBE: SIGNIFICANT CHANGE UP
SODIUM SERPL-SCNC: 136 MMOL/L — SIGNIFICANT CHANGE UP (ref 135–145)
WBC # BLD: 11.73 K/UL — HIGH (ref 3.8–10.5)
WBC # BLD: 6.9 K/UL — SIGNIFICANT CHANGE UP (ref 3.8–10.5)
WBC # FLD AUTO: 11.73 K/UL — HIGH (ref 3.8–10.5)
WBC # FLD AUTO: 6.9 K/UL — SIGNIFICANT CHANGE UP (ref 3.8–10.5)

## 2024-08-16 PROCEDURE — 99223 1ST HOSP IP/OBS HIGH 75: CPT

## 2024-08-16 PROCEDURE — 93306 TTE W/DOPPLER COMPLETE: CPT | Mod: 26

## 2024-08-16 PROCEDURE — 71275 CT ANGIOGRAPHY CHEST: CPT | Mod: 26

## 2024-08-16 PROCEDURE — 70450 CT HEAD/BRAIN W/O DYE: CPT | Mod: 26

## 2024-08-16 PROCEDURE — 99232 SBSQ HOSP IP/OBS MODERATE 35: CPT

## 2024-08-16 PROCEDURE — 93970 EXTREMITY STUDY: CPT | Mod: 26

## 2024-08-16 RX ORDER — POLYETHYLENE GLYCOL 3350 17 G/17G
17 POWDER, FOR SOLUTION ORAL DAILY
Refills: 0 | Status: DISCONTINUED | OUTPATIENT
Start: 2024-08-16 | End: 2024-08-19

## 2024-08-16 RX ORDER — DEXAMETHASONE 0.75 MG
TABLET ORAL
Refills: 0 | Status: DISCONTINUED | OUTPATIENT
Start: 2024-08-16 | End: 2024-08-18

## 2024-08-16 RX ORDER — DEXAMETHASONE 0.75 MG
2 TABLET ORAL DAILY
Refills: 0 | Status: DISCONTINUED | OUTPATIENT
Start: 2024-08-18 | End: 2024-08-18

## 2024-08-16 RX ORDER — DEXAMETHASONE 0.75 MG
2 TABLET ORAL EVERY 12 HOURS
Refills: 0 | Status: COMPLETED | OUTPATIENT
Start: 2024-08-16 | End: 2024-08-18

## 2024-08-16 RX ORDER — SENNA 187 MG
2 TABLET ORAL AT BEDTIME
Refills: 0 | Status: DISCONTINUED | OUTPATIENT
Start: 2024-08-16 | End: 2024-08-19

## 2024-08-16 RX ORDER — BUDESONIDE AND FORMOTEROL FUMARATE 80; 4.5 UG/1; UG/1
2 AEROSOL, METERED RESPIRATORY (INHALATION)
Refills: 0 | Status: DISCONTINUED | OUTPATIENT
Start: 2024-08-16 | End: 2024-09-11

## 2024-08-16 RX ORDER — TIOTROPIUM BROMIDE INHALATION SPRAY 3.12 UG/1
2 SPRAY, METERED RESPIRATORY (INHALATION) DAILY
Refills: 0 | Status: DISCONTINUED | OUTPATIENT
Start: 2024-08-16 | End: 2024-09-11

## 2024-08-16 RX ORDER — HEPARIN SODIUM,BOVINE 1000/ML
1000 VIAL (ML) INJECTION
Qty: 25000 | Refills: 0 | Status: DISCONTINUED | OUTPATIENT
Start: 2024-08-16 | End: 2024-08-17

## 2024-08-16 RX ORDER — PANTOPRAZOLE SODIUM 40 MG
40 TABLET, DELAYED RELEASE (ENTERIC COATED) ORAL
Refills: 0 | Status: DISCONTINUED | OUTPATIENT
Start: 2024-08-16 | End: 2024-08-18

## 2024-08-16 RX ADMIN — Medication 9 UNIT(S)/HR: at 16:44

## 2024-08-16 RX ADMIN — Medication 2 MILLIGRAM(S): at 17:10

## 2024-08-16 RX ADMIN — LEVETIRACETAM 250 MILLIGRAM(S): 1000 TABLET ORAL at 00:08

## 2024-08-16 RX ADMIN — METOPROLOL TARTRATE 25 MILLIGRAM(S): 100 TABLET ORAL at 00:08

## 2024-08-16 RX ADMIN — LEVETIRACETAM 250 MILLIGRAM(S): 1000 TABLET ORAL at 13:03

## 2024-08-16 RX ADMIN — Medication 20 MILLIGRAM(S): at 01:05

## 2024-08-16 NOTE — CONSULT NOTE ADULT - ATTENDING COMMENTS
Events that transpired leading to the patient's current hospitalization along with the patient's hospital course was gone over.  The patient's past medical history/surgical status family history social history was gone over.  Agree with 14 point review of systems and physical examination as noted above.    Right upper extremity Doppler demonstrating DVT involving the subclavian, axillary and brachial veins along with superficial thrombus in the right basilic vein patient denies any swelling/discomfort in the right upper extremity/shoulder.  Denies any shortness of breath.    Patient had provoked right upper extremity DVT following recent admission for right temporal craniotomy and excision of mass temporal lobar resection on 7/9/2024.  Also has history of carcinoid tumor status post hemicolectomy and nasopharyngeal carcinoma status post chemo/radiation.    Patient underwent CTA that was positive for low risk pulmonary embolism.  Negative DVT study for lower extremity clot.  Recommend patient be continued on heparin drip uptitrate as tolerated.  Goal PTT 55-65.  Closely monitor for signs and symptoms of bleeding.  Recommend repeat CT scan of the head as per neurosurgery to assess for intracranial bleed.  Once the patient is clinically doing better recommend transition to oral anticoagulant/Eliquis.    All questions and concerns of the patient and his wife who was at his bedside were addressed.    I have spent 75 minutes of time on the encounter which excludes teaching and separately reported services.

## 2024-08-16 NOTE — HISTORY OF PRESENT ILLNESS
[FreeTextEntry1] : Pt 77 y/o male with h/o head and neck squamous cell carcinoma who had syncope and imaging noted ill-defined right temporal enhancement with some surrounding vasogenic edema. Presence of possible DVA and possible hemosiderin on SWI may indicate cavernous angioma. Pt had appointment with Dr. akbar on 5/7/2024 with scan. His case was discussed in Tumor board and was recommended tumor resection and biopsy.   S/P right anterolateral temporal lobectomy for resection of hemorrhagic lesion on 8/6/2024, pathology consistent with treatment effect no malignancy.  Pt returned for POA, 8 days after craniotomy.  He has no special complaints. Pt denies any headache, dizziness, weakness or seizures. however, pt report with swelling in his right arm since hospitalization. No evidence of any cannulation in that arm from the admission. His surgical wound healing well with no signs of infection. He is able to do all his personal routines with no issues. Pt is on hold Eliquis for Afib after surgery.  He hasn't had an episode of syncopal attacks after Nov 2023.  Pt is on Keppra 250 mg BID, has appointment with Dr. Akbar next week.

## 2024-08-16 NOTE — CONSULT NOTE ADULT - ASSESSMENT
78 y/o M with h/o HTN, T2DM (diet controlled), HLD, COPD, CKD (III), carcinoid tumor s/p hemicolectomy, nasopharyngeal carcinoma s/p chemo/RT (c/b dysphagia ), IPMN s/p distal pancreatectomy (2018), and paroxysmal A-fib, on Eliquis s/p craniotomy 8/6 for resection hemorrhagic lesion. Now presented to the ED for RUE swelling, found to have DVT + PE.

## 2024-08-16 NOTE — CONSULT NOTE ADULT - PROBLEM SELECTOR RECOMMENDATION 7
being started on hep gtt    Home meds same as dc meds from last admission.   Updated wife over the phone.

## 2024-08-16 NOTE — ADDENDUM
[FreeTextEntry1] : Pt was sent to ER after the radiologist call on below duplex report.   EXAM: [id number] - US DPLX UPR EXT VEINS COMPL BI  - ORDERED BY: LILLIAN CALDERA   PROCEDURE DATE:  08/15/2024    INTERPRETATION:  CLINICAL INFORMATION: Swelling postcraniotomy  COMPARISON: None available.  TECHNIQUE: Duplex sonography of the BILATERAL upper extremity veins with color and spectral Doppler, with and without compression.  FINDINGS:  RIGHT: The right internal jugular vein is patent and compressible where applicable.  The cephalic vein (superficial vein) is patent and without thrombus. The radial and ulnar veins are patent  There is expansion of the subclavian vein which is filled with thrombus and demonstrates minimal flow. There is a similar appearance involving the axillary and brachial veins. There is superficial thrombus involving the basilic vein.  LEFT: The left internal jugular, subclavian, axillary and brachial veins are patent and compressible where applicable.  The basilic vein (superficial vein) is patent and without thrombus.  The cephalic vein (superficial vein) is patent and without thrombus. Radial and ulnar veins are patent  Doppler examination shows normal spontaneous and phasic flow.  IMPRESSION: Right sided DVT involving the subclavian, axillary and brachial veins. Superficial thrombus in the right basilic vein. Results were discussed with nurse practitioner Jf at 3:05 PM on 8/15/2024. Patient was told to go to the ER per the nurse practitioner

## 2024-08-16 NOTE — ASSESSMENT
[FreeTextEntry1] : IMPRESSION:  Pt 79 y/o male with h/o head and neck squamous cell carcinoma who had syncope and imaging noted ill-defined right temporal enhancement with some surrounding vasogenic edema. Presence of possible DVA and possible hemosiderin on SWI may indicate cavernous angioma.  New MRI head with enhancing lesion needed to be removed as per TB recommendation.  Pt undergone right anterolateral temporal lobectomy for resection of hemorrhagic lesion on 8/6/2024, pathology consistent with treatment effect no malignancy. Surgical wound healing well with no signs of infection. Pt with right forearm swelling after the hospitalization, will send for urgent doppler study.  PLAN:  Advised patient to report any fevers, redness at or drainage from surgical incision to us immediately  MRI Head w+ w/o contrast in 4 weeks US Duplex b/l arms ASAP F/U with Dr. Andino as scheduled Will call with duplex report/ Eliquis decision F/U in 6 weeks

## 2024-08-16 NOTE — CONSULT NOTE ADULT - PROBLEM SELECTOR RECOMMENDATION 3
s/p craniotomy 8/6 for resection hemorrhagic lesion  Complete steroid taper per neurosurgery  Repeat CTH while on hep gtt per neurosurgery  on keppra 250mg BID (was 500mg BID x 7 days post op but 250mg was home dose prior)

## 2024-08-16 NOTE — PHYSICAL THERAPY INITIAL EVALUATION ADULT - ADDITIONAL COMMENTS
PTA pt was independent with functional mobility and ADLs. Pt lives in an apartment with his wife +elevator, pt owns RW

## 2024-08-16 NOTE — CONSULT NOTE ADULT - SUBJECTIVE AND OBJECTIVE BOX
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      ETOH abuse  quit in 2016      Other nonspecific abnormal finding of lung field      Abnormal chest CT      Type 2 diabetes mellitus      Dysphagia      History of cancer chemotherapy      S/P radiation therapy      Pneumonia      Syncope      Pneumonia due to COVID-19 virus      History of appendectomy      H/O hemicolectomy      S/P tonsillectomy  1959      H/O endoscopy  last one 06/2018      History of partial pancreatectomy      History of loop recorder          Review of Systems:   CONSTITUTIONAL: No fever, chills  HEENT: No sore throat, vision changes  RESPIRATORY: No cough, wheezing, shortness of breath  CARDIOVASCULAR: No chest pain, palpitations, leg edema  GASTROINTESTINAL: No abdominal pain, nausea, vomiting, diarrhea or constipation. No melena or hematochezia.  GENITOURINARY: No dysuria, frequency, hematuria  NEUROLOGICAL: No headaches, memory loss, loss of strength, numbness, or tremors  SKIN: No itching, burning, rashes, or lesions   MUSCULOSKELETAL: No joint pain or swelling; No muscle, back, or extremity pain  PSYCHIATRIC: No depression, anxiety  HEME/LYMPH: No easy bruising, or bleeding gums      Allergies    penicillin (Other)  ACE inhibitors (Other)  Bee Stings- anaphylaxis (Other)    Intolerances        Social History:     FAMILY HISTORY:  Family history of breast cancer        MEDICATIONS  (STANDING):  albuterol    0.083% 2.5 milliGRAM(s) Nebulizer every 6 hours  albuterol    90 MICROgram(s) HFA Inhaler 1 Puff(s) Inhalation every 4 hours  budesonide 160 MICROgram(s)/formoterol 4.5 MICROgram(s) Inhaler 2 Puff(s) Inhalation two times a day  heparin  Infusion 1000 Unit(s)/Hr (9 mL/Hr) IV Continuous <Continuous>  levETIRAcetam 250 milliGRAM(s) Oral two times a day  metoprolol succinate ER 25 milliGRAM(s) Oral daily  simvastatin 20 milliGRAM(s) Oral at bedtime  tiotropium 2.5 MICROgram(s) Inhaler 2 Puff(s) Inhalation daily    MEDICATIONS  (PRN):  acetaminophen     Tablet .. 650 milliGRAM(s) Oral every 6 hours PRN Mild Pain (1 - 3)  bisacodyl 5 milliGRAM(s) Oral daily PRN Constipation  polyethylene glycol 3350 17 Gram(s) Oral daily PRN Constipation  senna 2 Tablet(s) Oral at bedtime PRN Constipation        CAPILLARY BLOOD GLUCOSE      POCT Blood Glucose.: 88 mg/dL (16 Aug 2024 07:12)    I&O's Summary      Physical Exam:  Vital Signs Last 24 Hrs  T(C): 36.6 (16 Aug 2024 13:09), Max: 36.6 (16 Aug 2024 13:09)  T(F): 97.8 (16 Aug 2024 13:09), Max: 97.8 (16 Aug 2024 13:09)  HR: 59 (16 Aug 2024 13:09) (51 - 61)  BP: 109/65 (16 Aug 2024 13:09) (109/65 - 184/89)  BP(mean): 120 (15 Aug 2024 22:07) (120 - 120)  RR: 18 (16 Aug 2024 13:09) (16 - 18)  SpO2: 94% (16 Aug 2024 13:09) (94% - 97%)    Parameters below as of 16 Aug 2024 13:09  Patient On (Oxygen Delivery Method): room air        CONSTITUTIONAL: NAD, well-developed, well-groomed  NECK: Supple, no palpable masses; no thyromegaly  RESPIRATORY: Normal respiratory effort; lungs are clear to auscultation bilaterally  CARDIOVASCULAR: normal S1 and S2, no murmur/rub/gallop; No lower extremity edema  ABDOMEN: Nontender to palpation, normoactive bowel sounds, no rebound/guarding; No hepatosplenomegaly  MUSCULOSKELETAL:  no clubbing or cyanosis of digits; no joint swelling or tenderness to palpation  PSYCH: A+O to person, place, and time; affect appropriate  NEUROLOGY: CN 2-12 are intact and symmetric; no gross sensory deficits   SKIN: No rashes; no palpable lesions    LABS:                        12.4   6.90  )-----------( 259      ( 16 Aug 2024 00:26 )             39.5     08-16    136  |  99  |  29<H>  ----------------------------<  101<H>  4.1   |  27  |  0.84    Ca    9.4      16 Aug 2024 00:26    TPro  7.6  /  Alb  3.7  /  TBili  0.3  /  DBili  x   /  AST  20  /  ALT  46<H>  /  AlkPhos  67  08-15    PT/INR - ( 16 Aug 2024 00:26 )   PT: 12.6 sec;   INR: 1.15 ratio         PTT - ( 16 Aug 2024 00:26 )  PTT:27.3 sec      Urinalysis Basic - ( 16 Aug 2024 00:26 )    Color: x / Appearance: x / SG: x / pH: x  Gluc: 101 mg/dL / Ketone: x  / Bili: x / Urobili: x   Blood: x / Protein: x / Nitrite: x   Leuk Esterase: x / RBC: x / WBC x   Sq Epi: x / Non Sq Epi: x / Bacteria: x          RADIOLOGY & ADDITIONAL TESTS:  Results Reviewed:   Imaging Personally Reviewed:  Electrocardiogram Personally Reviewed:    COORDINATION OF CARE:  Care Discussed with Consultants/Other Providers [Y/N]:  Prior or Outpatient Records Reviewed [Y/N]: [facility] Division of Hospital Medicine  Asiya Chen DO  [employer]     HPI:  79M hx recent admission for and R crani for lesion w/ Dr. 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 Today presents to ED for RUE doppler w/ DVT involving the subclavian, axillary and brachial veins and Superficial thrombus in the right basilic vein.    Patient seen in the ER, no CP, SOB, wheezing, dizziness, fever, chills, abd pain, N/V. Endorsing some productive cough. Collateral obtained via wife over the phone - patient was having some coughing at home after dc. Was on mildly thick liquids in the hospital last admission but wife unsure if her thickening agents at home were working well. Also notes that patient had not fully recovered after recent covid.     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  ROM limited Mouth opening & neck  ETOH abuse  quit in 2016  Other nonspecific abnormal finding of lung field  Abnormal chest CT  Type 2 diabetes mellitus  Dyspagia  History of cancer chemotherapy  S/P radiation therapy  Pneumonia  Syncope  Pneumonia due to COVID-19 virus  History of appendectomy  H/O hemicolectomy  S/P tonsillectomy  1959  H/O endoscopy  last one 06/2018  History of partial pancreatectomy  History of loop recorder          Review of Systems:   CONSTITUTIONAL: No fever, chills  HEENT: No sore throat, vision changes  RESPIRATORY: No cough, wheezing, shortness of breath  CARDIOVASCULAR: No chest pain, palpitations, leg edema  GASTROINTESTINAL: No abdominal pain, nausea, vomiting, diarrhea or constipation. No melena or hematochezia.  GENITOURINARY: No dysuria, frequency, hematuria  NEUROLOGICAL: No headaches, memory loss, loss of strength, numbness, or tremors  SKIN: No itching, burning, rashes, or lesions   MUSCULOSKELETAL: No joint pain or swelling; No muscle, back, or extremity pain  PSYCHIATRIC: No depression, anxiety  HEME/LYMPH: No easy bruising, or bleeding gums      Allergies    penicillin (Other)  ACE inhibitors (Other)  Bee Stings- anaphylaxis (Other)    Intolerances        Social History: former smoker quit 2016     FAMILY HISTORY:  Family history of breast cancer        MEDICATIONS  (STANDING):  albuterol    0.083% 2.5 milliGRAM(s) Nebulizer every 6 hours  albuterol    90 MICROgram(s) HFA Inhaler 1 Puff(s) Inhalation every 4 hours  budesonide 160 MICROgram(s)/formoterol 4.5 MICROgram(s) Inhaler 2 Puff(s) Inhalation two times a day  heparin  Infusion 1000 Unit(s)/Hr (9 mL/Hr) IV Continuous <Continuous>  levETIRAcetam 250 milliGRAM(s) Oral two times a day  metoprolol succinate ER 25 milliGRAM(s) Oral daily  simvastatin 20 milliGRAM(s) Oral at bedtime  tiotropium 2.5 MICROgram(s) Inhaler 2 Puff(s) Inhalation daily    MEDICATIONS  (PRN):  acetaminophen     Tablet .. 650 milliGRAM(s) Oral every 6 hours PRN Mild Pain (1 - 3)  bisacodyl 5 milliGRAM(s) Oral daily PRN Constipation  polyethylene glycol 3350 17 Gram(s) Oral daily PRN Constipation  senna 2 Tablet(s) Oral at bedtime PRN Constipation        CAPILLARY BLOOD GLUCOSE      POCT Blood Glucose.: 88 mg/dL (16 Aug 2024 07:12)    I&O's Summary      Physical Exam:  Vital Signs Last 24 Hrs  T(C): 36.6 (16 Aug 2024 13:09), Max: 36.6 (16 Aug 2024 13:09)  T(F): 97.8 (16 Aug 2024 13:09), Max: 97.8 (16 Aug 2024 13:09)  HR: 59 (16 Aug 2024 13:09) (51 - 61)  BP: 109/65 (16 Aug 2024 13:09) (109/65 - 184/89)  BP(mean): 120 (15 Aug 2024 22:07) (120 - 120)  RR: 18 (16 Aug 2024 13:09) (16 - 18)  SpO2: 94% (16 Aug 2024 13:09) (94% - 97%)    Parameters below as of 16 Aug 2024 13:09  Patient On (Oxygen Delivery Method): room air        CONSTITUTIONAL: NAD, thin, well-groomed, cranial incision covered by hat   RESPIRATORY: Normal respiratory effort; lungs are clear to auscultation bilaterally  CARDIOVASCULAR: RRR, normal S1 and S2; No lower extremity edema  ABDOMEN: Nontender to palpation, normoactive bowel sounds, no rebound/guarding   MUSCULOSKELETAL: no clubbing or cyanosis of digits; no joint swelling or tenderness to palpation. RUE swelling   PSYCH: A+O to person, place, and time; affect appropriate  NEUROLOGY: moving all extremities, strength grossly intact   SKIN: No rashes; no palpable lesions    LABS:                        12.4   6.90  )-----------( 259      ( 16 Aug 2024 00:26 )             39.5     08-16    136  |  99  |  29<H>  ----------------------------<  101<H>  4.1   |  27  |  0.84    Ca    9.4      16 Aug 2024 00:26    TPro  7.6  /  Alb  3.7  /  TBili  0.3  /  DBili  x   /  AST  20  /  ALT  46<H>  /  AlkPhos  67  08-15    PT/INR - ( 16 Aug 2024 00:26 )   PT: 12.6 sec;   INR: 1.15 ratio         PTT - ( 16 Aug 2024 00:26 )  PTT:27.3 sec      Urinalysis Basic - ( 16 Aug 2024 00:26 )    Color: x / Appearance: x / SG: x / pH: x  Gluc: 101 mg/dL / Ketone: x  / Bili: x / Urobili: x   Blood: x / Protein: x / Nitrite: x   Leuk Esterase: x / RBC: x / WBC x   Sq Epi: x / Non Sq Epi: x / Bacteria: x          RADIOLOGY & ADDITIONAL TESTS:  Results Reviewed:   Imaging Personally Reviewed:  Electrocardiogram Personally Reviewed:    COORDINATION OF CARE:  Care Discussed with Consultants/Other Providers [Y/N]: neurosurgery   Prior or Outpatient Records Reviewed [Y/N]:

## 2024-08-16 NOTE — CONSULT NOTE ADULT - SUBJECTIVE AND OBJECTIVE BOX
Vascular Cardiology Consult Note    DIRECT SERVICE NUMBER:  [phone]                 CC:  DVT    HPI:      79M hx recent admission for and R crani for lesion w/ Dr. 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 Presented to ED for RUE doppler w/ DVT involving the subclavian, axillary and brachial veins and Superficial thrombus in the right basilic vein.        Allergies    penicillin (Other)  ACE inhibitors (Other)  Bee Stings- anaphylaxis (Other)    Intolerances    	    MEDICATIONS:  metoprolol succinate ER 25 milliGRAM(s) Oral daily        acetaminophen     Tablet .. 650 milliGRAM(s) Oral every 6 hours PRN  levETIRAcetam 250 milliGRAM(s) Oral two times a day  oxyCODONE    IR 5 milliGRAM(s) Oral every 4 hours PRN    bisacodyl 5 milliGRAM(s) Oral daily PRN  polyethylene glycol 3350 17 Gram(s) Oral daily PRN  senna 2 Tablet(s) Oral at bedtime PRN    simvastatin 20 milliGRAM(s) Oral at bedtime        PAST MEDICAL & SURGICAL HISTORY:  HTN (hypertension)      High cholesterol      Low back pain      Benign carcinoid tumor      COPD (chronic obstructive pulmonary disease)      Atrial fibrillation, unspecified type  Dxd in 01/2016-on Eliquis      Acute kidney injury  09/2016      Stage 3 chronic kidney disease      Pulmonary emphysema, unspecified emphysema type      Nasopharynx cancer  07/2016- s/p Chemo &RT      Disease of pancreas, unspecified      Hearing loss      Pancreatic mass      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  ROM limited Mouth opening & neck      ETOH abuse  quit in 2016      Other nonspecific abnormal finding of lung field      Abnormal chest CT      Type 2 diabetes mellitus      Dysphagia      History of cancer chemotherapy      S/P radiation therapy      Pneumonia      Syncope      Pneumonia due to COVID-19 virus      History of appendectomy      H/O hemicolectomy      S/P tonsillectomy  1959      H/O endoscopy  last one 06/2018      History of partial pancreatectomy      History of loop recorder          FAMILY HISTORY:  Family history of breast cancer        SOCIAL HISTORY:  unchanged    REVIEW OF SYSTEMS:    CONSTITUTIONAL: No fever, weight loss, or fatigue  EYES: No eye pain, visual disturbances, or discharge  ENT:  No difficulty hearing, tinnitus, vertigo; No sinus or throat pain  NECK: No pain or stiffness  RESPIRATORY:  No shortness of breath   CARDIOVASCULAR:  No Chest pain   GASTROINTESTINAL: No abdominal or epigastric pain. No nausea, vomiting, or hematemesis; No diarrhea or constipation. No melena or hematochezia.  GENITOURINARY: No dysuria, frequency, hematuria, or incontinence  NEUROLOGICAL: No headaches, memory loss, loss of strength, numbness, or tremors  LYMPH Nodes: No enlarged glands  ENDOCRINE: No heat or cold intolerance; No hair loss  MUSCULOSKELETAL: No joint pain or swelling; No muscle, back, or extremity pain  PSYCHIATRIC: No depression, anxiety, mood swings, or difficulty sleeping  HEME/LYMPH: No easy bruising, or bleeding gums  ALLERGY AND IMMUNOLOGIC: No hives or eczema	    [ x] All others negative	  [ ] Unable to obtain    PHYSICAL EXAM:  T(C): 36.3 (08-16-24 @ 04:11), Max: 36.3 (08-15-24 @ 17:20)  HR: 51 (08-16-24 @ 04:11) (51 - 61)  BP: 150/74 (08-16-24 @ 04:11) (150/74 - 184/89)  RR: 18 (08-16-24 @ 04:11) (16 - 18)  SpO2: 94% (08-16-24 @ 04:11) (94% - 97%)  Wt(kg): --  I&O's Summary      Appearance:  	  HEENT:   Normal oral mucosa, PERRL, EOMI	  Lymphatic: No lymphadenopathy  Cardiovascular:  S1S2 RRR   Respiratory:  	Clear  Psychiatry:  Alert and oriented  Gastrointestinal:  Soft, Non-tender, + BS	  Skin: No rashes, No ecchymoses, No cyanosis	  Extremities:    Foot Exam:        LABS:	 	    CBC Full  -  ( 16 Aug 2024 00:26 )  WBC Count : 6.90 K/uL  Hemoglobin : 12.4 g/dL  Hematocrit : 39.5 %  Platelet Count - Automated : 259 K/uL  Mean Cell Volume : 94.3 fl  Mean Cell Hemoglobin : 29.6 pg  Mean Cell Hemoglobin Concentration : 31.4 gm/dL  Auto Neutrophil # : x  Auto Lymphocyte # : x  Auto Monocyte # : x  Auto Eosinophil # : x  Auto Basophil # : x  Auto Neutrophil % : x  Auto Lymphocyte % : x  Auto Monocyte % : x  Auto Eosinophil % : x  Auto Basophil % : x    08-16    136  |  99  |  29<H>  ----------------------------<  101<H>  4.1   |  27  |  0.84  08-15    137  |  102  |  34<H>  ----------------------------<  106<H>  4.1   |  18<L>  |  0.81    Ca    9.4      16 Aug 2024 00:26  Ca    9.4      15 Aug 2024 19:21    TPro  7.6  /  Alb  3.7  /  TBili  0.3  /  DBili  x   /  AST  20  /  ALT  46<H>  /  AlkPhos  67  08-15

## 2024-08-16 NOTE — CONSULT NOTE ADULT - PROBLEM SELECTOR RECOMMENDATION 2
CTA also w/ Emphysema. New mucoid impactions within bilateral lower lungs and tree-in-bud opacities likely representing COPD exacerbation. Recommend follow-up chest CT in 3 months to determine resolution.    Patient w/o wheezing, repeat S/S eval for the cough. Chest PT, incentive spirometer, Acapella     Home regimen: Anoro + Proventil - Symbicort/spiriva while admitted

## 2024-08-16 NOTE — PROGRESS NOTE ADULT - SUBJECTIVE AND OBJECTIVE BOX
SUBJECTIVE: Patient evaluated in the Emergency Dept. patient clinically well appearing in good spirits, in no acute distress. Discussed findings of R subclavian DVT and newly diagnosed PE with patient. Given CTH this AM negative for heme, cleared by Dr. Branham/Dr. Page to begin heparin gtt for DVT/ PE as recommended by Vasc cards. With low PTT goal as patient is POD 10 from craniotomy. Risk/benefit discussed with patient     Vital Signs Last 24 Hrs  T(C): 36.6 (08-16-24 @ 13:09), Max: 36.6 (08-16-24 @ 13:09)  T(F): 97.8 (08-16-24 @ 13:09), Max: 97.8 (08-16-24 @ 13:09)  HR: 59 (08-16-24 @ 13:09) (51 - 61)  BP: 109/65 (08-16-24 @ 13:09) (109/65 - 184/89)  BP(mean): 120 (08-15-24 @ 22:07) (120 - 120)  RR: 18 (08-16-24 @ 13:09) (16 - 18)  SpO2: 94% (08-16-24 @ 13:09) (94% - 97%)    PHYSICAL EXAM:  Constitutional: No Acute Distress   Neurological: Awake, Alert, Oriented x2 (person and place), EOMI, PERRL, trace L facial, briskly follows commands, moving all extremities with symmetric at least 4+/5 strength, no sensory deficits.   Incision: Previous R Crani incision closed with monocryl, clean, dry intact   Pulmonary: Clear lungs   Cardiovascular: no murmurs   Gastrointestinal: Soft, Non-tender, Non-distended abdomen   Extremities: No calf tenderness bilaterally, no edema     LABS:                      12.4   6.90  )-----------( 259      ( 16 Aug 2024 00:26 )             39.5    08-16    136  |  99  |  29<H>  ----------------------------<  101<H>  4.1   |  27  |  0.84  Ca    9.4      16 Aug 2024 00:26    TPro  7.6  /  Alb  3.7  /  TBili  0.3  /  DBili  x   /  AST  20  /  ALT  46<H>  /  AlkPhos  67  08-15  PT/INR - ( 16 Aug 2024 00:26 )   PT: 12.6 sec;   INR: 1.15 ratio    PTT - ( 16 Aug 2024 00:26 )  PTT:27.3 sec    IMAGING:   < from: CT Angio Chest PE Protocol w/ IV Cont (08.16.24 @ 09:32) >  ACC: [id number] EXAM:  CT ANGIO CHEST PULM ART [facility]   ORDERED BY: JACKELYN ARNOLD   PROCEDURE DATE:  08/16/2024    INTERPRETATION:  CLINICAL INFORMATION: Right subclavian DVT, rule out PE  COMPARISON: CT chest 5/7/2024    FINDINGS:  AIRWAYS/LUNGS/PLEURA: Emphysema. Bronchiectasis and mucoid impaction of   right middle lobe and bilateral lower lobe bronchi new from prior exam.   Tree-in-bud opacities within the right upper lobe and right middle lobe.   Mild bibasilar atelectasis. Previously noted bandlike atelectasis within   the anteromedial segment of left lower lobe is unchanged. No pleural   effusion.    MEDIASTINUM AND JAVIER: No lymphadenopathy.    PULMONARY ANGIOGRAM: Small emboli within the segmental branches of right   upper lobe pulmonary artery.    VESSELS: Thoracic and abdominal aortic calcifications. Small amount of   reflux of contrast into the IVC.    HEART: Heart size is normal. No pericardial effusion. No definite CT   evidence of right heart strain. Coronary calcifications.    VISUALIZED UPPER ABDOMEN: 3 x 2.2 cm right renal cyst. Partial   pancreatectomy.`    CHEST WALL AND BONES: Left chest loop recorder. Degenerative joint   disease with flowing syndesmophytes.    IMPRESSION:  1.  Segmental emboli within right upper lobe pulmonary artery. No   significant CT evidence of right heart strain.    2.  Emphysema. New mucoid impactions within bilateral lower lungs and   tree-in-bud opacities likely representing COPD exacerbation. Recommend   follow-up chest CT in 3 months to determine resolution.    Findings were discussed with primary team provider Jackelyn Arnold PA-C.    --- End of Report ---  SHAYE AGUIRRE MD; Resident Radiologist  This document has been electronically signed.  REZA DEMARCO MD; Attending Radiologist  This document has been electronically signed. Aug 16 2024 12:48PM    MEDICATIONS  (STANDING):  heparin  Infusion 1000 Unit(s)/Hr (10 mL/Hr) IV Continuous <Continuous>  levETIRAcetam 250 milliGRAM(s) Oral two times a day  metoprolol succinate ER 25 milliGRAM(s) Oral daily  simvastatin 20 milliGRAM(s) Oral at bedtime    MEDICATIONS  (PRN):  acetaminophen     Tablet .. 650 milliGRAM(s) Oral every 6 hours PRN Mild Pain (1 - 3)  bisacodyl 5 milliGRAM(s) Oral daily PRN Constipation  oxyCODONE    IR 5 milliGRAM(s) Oral every 4 hours PRN Moderate Pain (4 - 6)  polyethylene glycol 3350 17 Gram(s) Oral daily PRN Constipation  senna 2 Tablet(s) Oral at bedtime PRN Constipation    DIET: Regular    SUBJECTIVE: Patient evaluated in the Emergency Dept. patient clinically well appearing in good spirits, in no acute distress. Discussed findings of R subclavian DVT and newly diagnosed PE with patient and his wife at bedside. Given CTH this AM negative for heme, cleared by Dr. Branham/Dr. Page to begin heparin gtt for DVT/ PE as recommended by Vasc cards. With low PTT goal as patient is POD 10 from craniotomy. Risk/benefit of systemic AC with heparin discussed with patient and his wife including but not limited to headache, seizure, stroke, paralysis, and in severe cases even death, patient and wife Delta display understanding     Vital Signs Last 24 Hrs  T(C): 36.6 (08-16-24 @ 13:09), Max: 36.6 (08-16-24 @ 13:09)  T(F): 97.8 (08-16-24 @ 13:09), Max: 97.8 (08-16-24 @ 13:09)  HR: 59 (08-16-24 @ 13:09) (51 - 61)  BP: 109/65 (08-16-24 @ 13:09) (109/65 - 184/89)  BP(mean): 120 (08-15-24 @ 22:07) (120 - 120)  RR: 18 (08-16-24 @ 13:09) (16 - 18)  SpO2: 94% (08-16-24 @ 13:09) (94% - 97%)    PHYSICAL EXAM:  Constitutional: No Acute Distress   Neurological: Awake, Alert, Oriented x2 (person and place, not oriented to year), EOMI, PERRL, trace L facial, briskly follows commands, moving all extremities with symmetric at least 4+/5 strength, no sensory deficits.   Incision: Previous R Crani incision closed with monocryl, clean, dry intact   Pulmonary: Clear lungs   Cardiovascular: no murmurs   Gastrointestinal: Soft, Non-tender, Non-distended abdomen   Extremities: No calf tenderness bilaterally,  some swelling of proximal RUE when compared to LUE no discoloration nontender pulses 2+    LABS:                      12.4   6.90  )-----------( 259      ( 16 Aug 2024 00:26 )             39.5    08-16    136  |  99  |  29<H>  ----------------------------<  101<H>  4.1   |  27  |  0.84  Ca    9.4      16 Aug 2024 00:26    TPro  7.6  /  Alb  3.7  /  TBili  0.3  /  DBili  x   /  AST  20  /  ALT  46<H>  /  AlkPhos  67  08-15  PT/INR - ( 16 Aug 2024 00:26 )   PT: 12.6 sec;   INR: 1.15 ratio    PTT - ( 16 Aug 2024 00:26 )  PTT:27.3 sec    IMAGING:   < from: CT Angio Chest PE Protocol w/ IV Cont (08.16.24 @ 09:32) >  ACC: [id number] EXAM:  CT ANGIO CHEST PULM ART [facility]   ORDERED BY: JACKELYN ARNOLD   PROCEDURE DATE:  08/16/2024    INTERPRETATION:  CLINICAL INFORMATION: Right subclavian DVT, rule out PE  COMPARISON: CT chest 5/7/2024    FINDINGS:  AIRWAYS/LUNGS/PLEURA: Emphysema. Bronchiectasis and mucoid impaction of   right middle lobe and bilateral lower lobe bronchi new from prior exam.   Tree-in-bud opacities within the right upper lobe and right middle lobe.   Mild bibasilar atelectasis. Previously noted bandlike atelectasis within   the anteromedial segment of left lower lobe is unchanged. No pleural   effusion.    MEDIASTINUM AND JAVIER: No lymphadenopathy.    PULMONARY ANGIOGRAM: Small emboli within the segmental branches of right   upper lobe pulmonary artery.    VESSELS: Thoracic and abdominal aortic calcifications. Small amount of   reflux of contrast into the IVC.    HEART: Heart size is normal. No pericardial effusion. No definite CT   evidence of right heart strain. Coronary calcifications.    VISUALIZED UPPER ABDOMEN: 3 x 2.2 cm right renal cyst. Partial   pancreatectomy.`    CHEST WALL AND BONES: Left chest loop recorder. Degenerative joint   disease with flowing syndesmophytes.    IMPRESSION:  1.  Segmental emboli within right upper lobe pulmonary artery. No   significant CT evidence of right heart strain.    2.  Emphysema. New mucoid impactions within bilateral lower lungs and   tree-in-bud opacities likely representing COPD exacerbation. Recommend   follow-up chest CT in 3 months to determine resolution.    Findings were discussed with primary team provider Jackelyn Arnold PA-C.    --- End of Report ---  SHAYE AGUIRRE MD; Resident Radiologist  This document has been electronically signed.  REZA DEMARCO MD; Attending Radiologist  This document has been electronically signed. Aug 16 2024 12:48PM    MEDICATIONS  (STANDING):  heparin  Infusion 1000 Unit(s)/Hr (10 mL/Hr) IV Continuous <Continuous>  levETIRAcetam 250 milliGRAM(s) Oral two times a day  metoprolol succinate ER 25 milliGRAM(s) Oral daily  simvastatin 20 milliGRAM(s) Oral at bedtime    MEDICATIONS  (PRN):  acetaminophen     Tablet .. 650 milliGRAM(s) Oral every 6 hours PRN Mild Pain (1 - 3)  bisacodyl 5 milliGRAM(s) Oral daily PRN Constipation  oxyCODONE    IR 5 milliGRAM(s) Oral every 4 hours PRN Moderate Pain (4 - 6)  polyethylene glycol 3350 17 Gram(s) Oral daily PRN Constipation  senna 2 Tablet(s) Oral at bedtime PRN Constipation    DIET: Regular, mild thick

## 2024-08-16 NOTE — PHYSICAL EXAM
[General Appearance - Alert] : alert [General Appearance - In No Acute Distress] : in no acute distress [Oriented To Time, Place, And Person] : oriented to person, place, and time [Impaired Insight] : insight and judgment were intact [Affect] : the affect was normal [Memory Recent] : recent memory was not impaired [Person] : oriented to person [Place] : oriented to place [Time] : oriented to time [Short Term Intact] : short term memory intact [Cranial Nerves Optic (II)] : visual acuity intact bilaterally,  pupils equal round and reactive to light [Cranial Nerves Oculomotor (III)] : extraocular motion intact [Cranial Nerves Facial (VII)] : face symmetrical [Cranial Nerves Vestibulocochlear (VIII)] : hearing was intact bilaterally [Cranial Nerves Glossopharyngeal (IX)] : tongue and palate midline [Cranial Nerves Accessory (XI - Cranial And Spinal)] : head turning and shoulder shrug symmetric [Cranial Nerves Hypoglossal (XII)] : there was no tongue deviation with protrusion [Motor Tone] : muscle tone was normal in all four extremities [Motor Strength] : muscle strength was normal in all four extremities [Balance] : balance was intact [Sclera] : the sclera and conjunctiva were normal [PERRL With Normal Accommodation] : pupils were equal in size, round, reactive to light, with normal accommodation [Outer Ear] : the ears and nose were normal in appearance [Both Tympanic Membranes Were Examined] : both tympanic membranes were normal [Neck Appearance] : the appearance of the neck was normal [] : no respiratory distress [Respiration, Rhythm And Depth] : normal respiratory rhythm and effort [Apical Impulse] : the apical impulse was normal [Heart Rate And Rhythm] : heart rate was normal and rhythm regular [Arterial Pulses Carotid] : carotid pulses were normal with no bruits [Abdominal Aorta] : the abdominal aorta was normal [No CVA Tenderness] : no ~M costovertebral angle tenderness [No Spinal Tenderness] : no spinal tenderness [Abnormal Walk] : normal gait [Involuntary Movements] : no involuntary movements were seen [Coordination - Dysmetria Impaired Finger-to-Nose Bilateral] : not present [FreeTextEntry5] : No pronator drift, good , reduced peripheral vision on right eye [FreeTextEntry8] : no drift, mild imbalance at tandem [FreeTextEntry1] : right peripheral vision reduced

## 2024-08-16 NOTE — PHYSICAL THERAPY INITIAL EVALUATION ADULT - PERTINENT HX OF CURRENT PROBLEM, REHAB EVAL
Patient evaluated in the Emergency Dept. patient clinically well appearing in good spirits, in no acute distress. Discussed findings of R subclavian DVT and newly diagnosed PE with patient and his wife at bedside. Given CTH this AM negative for heme, cleared by Dr. Branham/Dr. Page to begin heparin gtt for DVT/ PE as recommended by Vasc cards. With low PTT goal as patient is POD 10 from craniotomy. Risk/benefit of systemic AC with heparin discussed with patient and his wife including but not limited to headache, seizure, stroke, paralysis, and in severe cases even death, patient and wife Delta display understanding

## 2024-08-16 NOTE — ADDENDUM
[FreeTextEntry1] : Pt was sent to ER after the radiologist call on below duplex report.   EXAM: [id number] - US DPLX UPR EXT VEINS COMPL BI  - ORDERED BY: LILLIAN CALDERA   PROCEDURE DATE:  08/15/2024    INTERPRETATION:  CLINICAL INFORMATION: Swelling postcraniotomy  COMPARISON: None available.  TECHNIQUE: Duplex sonography of the BILATERAL upper extremity veins with color and spectral Doppler, with and without compression.  FINDINGS:  RIGHT: The right internal jugular vein is patent and compressible where applicable.  The cephalic vein (superficial vein) is patent and without thrombus. The radial and ulnar veins are patent  There is expansion of the subclavian vein which is filled with thrombus and demonstrates minimal flow. There is a similar appearance involving the axillary and brachial veins. There is superficial thrombus involving the basilic vein.  LEFT: The left internal jugular, subclavian, axillary and brachial veins are patent and compressible where applicable.  The basilic vein (superficial vein) is patent and without thrombus.  The cephalic vein (superficial vein) is patent and without thrombus. Radial and ulnar veins are patent  Doppler examination shows normal spontaneous and phasic flow.  IMPRESSION: Right sided DVT involving the subclavian, axillary and brachial veins. Superficial thrombus in the right basilic vein. Results were discussed with nurse practitioner Jf at 3:05 PM on 8/15/2024. Patient was told to go to the ER per the nurse practitioner

## 2024-08-16 NOTE — CONSULT NOTE ADULT - PROBLEM SELECTOR RECOMMENDATION 4
c/w metoprolol 25mg ER - will need a new script on dc. only had 12.5mg dose at home from prior before dose increased last admission     Started on norvasc 5mg last admission - patient was taking this per wife. Hold for now- last . Resume if SBP > 140 persistently

## 2024-08-16 NOTE — REASON FOR VISIT
[Spouse] : spouse [de-identified] : s/p craniotomy for brain lesion [de-identified] : 8/6/24 [de-identified] : 8 [de-identified] : 2

## 2024-08-16 NOTE — CONSULT NOTE ADULT - ASSESSMENT
Assessment:  1. RUE DVT   -Subclavian, axillary and brachial veins and Superficial thrombus in the right basilic vein   -Recent admission for Right Temporal Craniotomy and Excision of Mass Temporal Lobar Resection Frozen Section on 7/9/24.  -hx of carcinoid tumor s/p hemicolectomy, nasopharyngeal carcinoma s/p chemo/RT      Plan:  1. Recommend therapeutic AC when safe from neurosurgical prospective.   2. Pharmacologic DVT prophylaxis in interim   3. Obtain B/L LE Duplex. If negative start Mechanical LE dvt prophylaxis while stationary.   4. Obtain CTa PE study  5. Obtain TTE  6. Elevate RUE and wrap with ACE bandage for swelling.           Thank you      Vascular Cardiology Service    Please call with any questions:   DIRECT SERVICE NUMBER:  [phone]              Assessment:  1. RUE DVT   -Subclavian, axillary and brachial veins and Superficial thrombus in the right basilic vein   -Recent admission for Right Temporal Craniotomy and Excision of Mass Temporal Lobar Resection Frozen Section on 7/9/24.  -hx of carcinoid tumor s/p hemicolectomy, nasopharyngeal carcinoma s/p chemo/RT      Plan:  1. Recommend therapeutic AC when safe from neurosurgical prospective.   2. Pharmacologic DVT prophylaxis in interim   3. Obtain B/L LE Duplex. If negative start Mechanical LE dvt prophylaxis while stationary.   4. Obtain CTa PE study  5. Obtain TTE            Thank you      Vascular Cardiology Service    Please call with any questions:   DIRECT SERVICE NUMBER:  [phone]              Assessment:  1. RUE DVT   -Subclavian, axillary and brachial veins and Superficial thrombus in the right basilic vein   -Recent admission for Right Temporal Craniotomy and Excision of Mass Temporal Lobar Resection Frozen Section on 7/9/24.  -hx of carcinoid tumor s/p hemicolectomy, nasopharyngeal carcinoma s/p chemo/RT      Plan:  1. Recommend therapeutic AC when safe from neurosurgical prospective.   2. Pharmacologic DVT prophylaxis in interim   3. Obtain B/L LE Duplex. If negative start Mechanical LE dvt prophylaxis while stationary.   4. Obtain CTa PE study  5. Obtain TTE     Updates:   1. CTa + for Low risk PE  2. LE duplex negative from DVT.   3. Discussed with NSGY. Okay to start Heparin gtt without bolus with lower PTT goal.   4. Repeat CTH per NSGY to monitor for ICH      Thank you      Vascular Cardiology Service    Please call with any questions:   DIRECT SERVICE NUMBER:  [phone]          No

## 2024-08-16 NOTE — REASON FOR VISIT
[Spouse] : spouse [de-identified] : s/p craniotomy for brain lesion [de-identified] : 8 [de-identified] : 8/6/24 [de-identified] : 2

## 2024-08-17 LAB
ANION GAP SERPL CALC-SCNC: 10 MMOL/L — SIGNIFICANT CHANGE UP (ref 5–17)
APTT BLD: 33.5 SEC — SIGNIFICANT CHANGE UP (ref 24.5–35.6)
APTT BLD: 37.2 SEC — HIGH (ref 24.5–35.6)
APTT BLD: 82.5 SEC — HIGH (ref 24.5–35.6)
BUN SERPL-MCNC: 25 MG/DL — HIGH (ref 7–23)
CALCIUM SERPL-MCNC: 8.1 MG/DL — LOW (ref 8.4–10.5)
CHLORIDE SERPL-SCNC: 105 MMOL/L — SIGNIFICANT CHANGE UP (ref 96–108)
CO2 SERPL-SCNC: 23 MMOL/L — SIGNIFICANT CHANGE UP (ref 22–31)
CREAT SERPL-MCNC: 0.92 MG/DL — SIGNIFICANT CHANGE UP (ref 0.5–1.3)
EGFR: 84 ML/MIN/1.73M2 — SIGNIFICANT CHANGE UP
GLUCOSE SERPL-MCNC: 127 MG/DL — HIGH (ref 70–99)
HCT VFR BLD CALC: 33.1 % — LOW (ref 39–50)
HGB BLD-MCNC: 10.7 G/DL — LOW (ref 13–17)
MCHC RBC-ENTMCNC: 30.1 PG — SIGNIFICANT CHANGE UP (ref 27–34)
MCHC RBC-ENTMCNC: 32.3 GM/DL — SIGNIFICANT CHANGE UP (ref 32–36)
MCV RBC AUTO: 93 FL — SIGNIFICANT CHANGE UP (ref 80–100)
NRBC # BLD: 0 /100 WBCS — SIGNIFICANT CHANGE UP (ref 0–0)
PLATELET # BLD AUTO: 207 K/UL — SIGNIFICANT CHANGE UP (ref 150–400)
POTASSIUM SERPL-MCNC: 4 MMOL/L — SIGNIFICANT CHANGE UP (ref 3.5–5.3)
POTASSIUM SERPL-SCNC: 4 MMOL/L — SIGNIFICANT CHANGE UP (ref 3.5–5.3)
RBC # BLD: 3.56 M/UL — LOW (ref 4.2–5.8)
RBC # FLD: 15.7 % — HIGH (ref 10.3–14.5)
SODIUM SERPL-SCNC: 138 MMOL/L — SIGNIFICANT CHANGE UP (ref 135–145)
WBC # BLD: 10.36 K/UL — SIGNIFICANT CHANGE UP (ref 3.8–10.5)
WBC # FLD AUTO: 10.36 K/UL — SIGNIFICANT CHANGE UP (ref 3.8–10.5)

## 2024-08-17 PROCEDURE — 99232 SBSQ HOSP IP/OBS MODERATE 35: CPT

## 2024-08-17 RX ORDER — HEPARIN SODIUM,BOVINE 1000/ML
900 VIAL (ML) INJECTION
Qty: 25000 | Refills: 0 | Status: DISCONTINUED | OUTPATIENT
Start: 2024-08-17 | End: 2024-08-18

## 2024-08-17 RX ADMIN — METOPROLOL TARTRATE 25 MILLIGRAM(S): 100 TABLET ORAL at 05:19

## 2024-08-17 RX ADMIN — Medication 2.5 MILLIGRAM(S): at 00:03

## 2024-08-17 RX ADMIN — Medication 2 MILLIGRAM(S): at 05:16

## 2024-08-17 RX ADMIN — Medication 2.5 MILLIGRAM(S): at 11:37

## 2024-08-17 RX ADMIN — Medication 7 UNIT(S)/HR: at 10:27

## 2024-08-17 RX ADMIN — Medication 8 UNIT(S)/HR: at 11:36

## 2024-08-17 RX ADMIN — BUDESONIDE AND FORMOTEROL FUMARATE 2 PUFF(S): 80; 4.5 AEROSOL, METERED RESPIRATORY (INHALATION) at 05:16

## 2024-08-17 RX ADMIN — Medication 900 UNIT(S)/HR: at 20:52

## 2024-08-17 RX ADMIN — LEVETIRACETAM 250 MILLIGRAM(S): 1000 TABLET ORAL at 11:36

## 2024-08-17 RX ADMIN — BUDESONIDE AND FORMOTEROL FUMARATE 2 PUFF(S): 80; 4.5 AEROSOL, METERED RESPIRATORY (INHALATION) at 17:24

## 2024-08-17 RX ADMIN — Medication 2.5 MILLIGRAM(S): at 17:25

## 2024-08-17 RX ADMIN — Medication 2 MILLIGRAM(S): at 17:24

## 2024-08-17 RX ADMIN — Medication 2.5 MILLIGRAM(S): at 05:16

## 2024-08-17 RX ADMIN — Medication 9 UNIT(S)/HR: at 07:30

## 2024-08-17 RX ADMIN — LEVETIRACETAM 250 MILLIGRAM(S): 1000 TABLET ORAL at 00:04

## 2024-08-17 RX ADMIN — Medication 20 MILLIGRAM(S): at 00:02

## 2024-08-17 RX ADMIN — Medication 20 MILLIGRAM(S): at 22:02

## 2024-08-17 RX ADMIN — Medication 40 MILLIGRAM(S): at 05:20

## 2024-08-17 RX ADMIN — POLYETHYLENE GLYCOL 3350 17 GRAM(S): 17 POWDER, FOR SOLUTION ORAL at 11:36

## 2024-08-17 NOTE — PROGRESS NOTE ADULT - PROBLEM SELECTOR PLAN 1
UE US: Right sided DVT involving the subclavian, axillary and brachial veins. Superficial thrombus in the right basilic vein.  CTA w/ Segmental emboli within right upper lobe pulmonary artery. TTE WNL    Vascular cardiology following, pt now started on hep gtt w/o bolus for lower ptt goal.

## 2024-08-17 NOTE — PROGRESS NOTE ADULT - ASSESSMENT
80 y/o M with h/o HTN, T2DM (diet controlled), HLD, COPD, CKD (III), carcinoid tumor s/p hemicolectomy, nasopharyngeal carcinoma s/p chemo/RT (c/b dysphagia ), IPMN s/p distal pancreatectomy (2018), and paroxysmal A-fib, on Eliquis s/p craniotomy 8/6 for resection hemorrhagic lesion. Now presented to the ED for RUE swelling, found to have DVT + PE.

## 2024-08-17 NOTE — PROGRESS NOTE ADULT - SUBJECTIVE AND OBJECTIVE BOX
SUBJECTIVE:   Patient was seen and evaluated at bedside. Patient is resting in bed and is in no new acute distress.   OVERNIGHT EVENTS:   none   Vital Signs Last 24 Hrs  T(C): 36.4 (17 Aug 2024 04:39), Max: 36.6 (16 Aug 2024 13:09)  T(F): 97.5 (17 Aug 2024 04:39), Max: 97.8 (16 Aug 2024 13:09)  HR: 59 (17 Aug 2024 04:39) (55 - 60)  BP: 127/77 (17 Aug 2024 04:39) (109/65 - 161/79)  BP(mean): --  RR: 18 (17 Aug 2024 04:39) (16 - 18)  SpO2: 96% (17 Aug 2024 04:39) (94% - 96%)    Parameters below as of 17 Aug 2024 04:39  Patient On (Oxygen Delivery Method): room air        PHYSICAL EXAM:    General: No Acute Distress     Neurological: Awake, alert oriented to person, place and time, Following Commands, PERRL, EOMI, Face Symmetrical, Speech Fluent, Moving all extremities, Muscle Strength normal in all four extremities, No Drift, Sensation to Light Touch Intact    Pulmonary: Clear to Auscultation, No Rales, No Rhonchi, No Wheezes     Cardiovascular: S1, S2, Regular Rate and Rhythm     Gastrointestinal: Soft, Nontender, Nondistended     Incision: clean and dry     LABS:                        10.7   10.36 )-----------( 207      ( 17 Aug 2024 04:50 )             33.1    08-17    138  |  105  |  25<H>  ----------------------------<  127<H>  4.0   |  23  |  0.92    Ca    8.1<L>      17 Aug 2024 04:50    TPro  7.6  /  Alb  3.7  /  TBili  0.3  /  DBili  x   /  AST  20  /  ALT  46<H>  /  AlkPhos  67  08-15  PT/INR - ( 16 Aug 2024 00:26 )   PT: 12.6 sec;   INR: 1.15 ratio         PTT - ( 17 Aug 2024 10:28 )  PTT:33.5 sec      08-16 @ 07:01  -  08-17 @ 07:00  --------------------------------------------------------  IN: 117 mL / OUT: 2 mL / NET: 115 mL    08-17 @ 07:01 - 08-17 @ 11:30  --------------------------------------------------------  IN: 300 mL / OUT: 0 mL / NET: 300 mL      DRAINS:     MEDICATIONS:  Antibiotics:    Neuro:  acetaminophen     Tablet .. 650 milliGRAM(s) Oral every 6 hours PRN Mild Pain (1 - 3)  levETIRAcetam 250 milliGRAM(s) Oral two times a day    Cardiac:  metoprolol succinate ER 25 milliGRAM(s) Oral daily    Pulm:  albuterol    0.083% 2.5 milliGRAM(s) Nebulizer every 6 hours  albuterol    90 MICROgram(s) HFA Inhaler 1 Puff(s) Inhalation every 4 hours  budesonide 160 MICROgram(s)/formoterol 4.5 MICROgram(s) Inhaler 2 Puff(s) Inhalation two times a day  tiotropium 2.5 MICROgram(s) Inhaler 2 Puff(s) Inhalation daily    GI/:  bisacodyl 5 milliGRAM(s) Oral daily PRN Constipation  pantoprazole    Tablet 40 milliGRAM(s) Oral before breakfast  polyethylene glycol 3350 17 Gram(s) Oral daily  senna 2 Tablet(s) Oral at bedtime    Other:   dexAMETHasone     Tablet 2 milliGRAM(s) Oral every 12 hours  dexAMETHasone     Tablet   Oral   heparin  Infusion 1000 Unit(s)/Hr IV Continuous <Continuous>  simvastatin 20 milliGRAM(s) Oral at bedtime    DIET: [] Regular [] CCD [] Renal [] Puree [] Dysphagia [] Tube Feeds:   regular -mildly tick liquids   IMAGING:   ACC: [id number] EXAM:  CT BRAIN   ORDERED BY:  BRODY NUNEZ     PROCEDURE DATE:  08/16/2024          INTERPRETATION:  EXAMINATION: CT HEAD    CLINICAL INDICATION: recent intracranial surgery  TECHNIQUE: CT images of the head were obtained without contrast. Coronal   and sagittal reconstructions were performed. Dose reduction techniques   were utilized including but not limited to automated exposure control   (AEC), iterative reconstruction technique, and/or mA and/or kV dose   adjustments basedon patient size.  COMPARISON: Head MRI 8/8/2024. Head CT 8/7/2024. Head MRI 8/2/2024.    FINDINGS:    A right pterional craniotomies again noted, with a resection cavity in   the right temporal lobe. There is vasogenic edema dorsal to the resection   cavity, but this has not progressed. There is a thin epidural collection   of fluid and gas beneath the flap measuring 5 mm in thickness, likely   postsurgical change/sealant material. Pneumocephalus noted on the prior   CT has resolved.    There arechronic infarcts in the right frontal and parietal lobes. This   is stable.    Moderate generalized cerebral volume loss, with distention of the sulci   and concomitant ex-vacuo ventricular dilatation. Moderate nonspecific low   attenuation in the periventricular and subcortical white matter, likely   due to small vessel disease.    No acute intracranial hemorrhage. No midline shift or herniation. No CT   evidence of acute territorial infarction, although MRI with DWI would be   more sensitive.    There is mild fluid or mucous in the right mastoid, unchanged. The   sinuses and the left mastoid are clear.    Limited views of the orbits and visualized soft tissues of the neck,   face, scalp, skull base, and calvarium are otherwise unremarkable.    IMPRESSION:    1.  Postsurgical changes and chronic infarcts, without acute intracranial   hemorrhage.        Patient Name: RADHA HOPE  MRN: [id number], Accession: [id number]  DOS: 08/16/24 12:43 AM    --- End of Report ---            AMBROSE MELARA MD; Attending Radiologist  This document has been electronically signed. Aug 16 2024 12:56AM  ACC: [id number] EXAM:  CT ANGIO CHEST PULM ART [facility]   ORDERED BY: JACKELYN ARNOLD     PROCEDURE DATE:  08/16/2024          INTERPRETATION:  CLINICAL INFORMATION: Right subclavian DVT, rule out PE    COMPARISON: CT chest 5/7/2024    CONTRAST/COMPLICATIONS:  IV Contrast: Omnipaque 350  60 cc administered   40 cc discarded  Oral Contrast: NONE  Complications: None reported at time of study completion    PROCEDURE:  CT Angiogram of the chest was obtained with intravenous contrast. Three   dimensional maximum intensity projection (MIP) images were generated.    FINDINGS:    AIRWAYS/LUNGS/PLEURA: Emphysema. Bronchiectasis and mucoid impaction of   right middle lobe and bilateral lower lobe bronchi new from prior exam.   Tree-in-bud opacities within the right upper lobe and right middle lobe.   Mild bibasilar atelectasis. Previously noted bandlike atelectasis within   the anteromedial segment of left lower lobe is unchanged. No pleural   effusion.    MEDIASTINUM AND JAVIER: No lymphadenopathy.    PULMONARY ANGIOGRAM: Small emboli within the segmental branches of right   upper lobe pulmonary artery.    VESSELS: Thoracic and abdominal aortic calcifications. Small amount of   reflux of contrast into the IVC.    HEART: Heart size is normal. No pericardial effusion. No definite CT   evidence of right heart strain. Coronary calcifications.    VISUALIZED UPPER ABDOMEN: 3 x 2.2 cm right renal cyst. Partial   pancreatectomy.`    CHEST WALL AND BONES: Left chest loop recorder. Degenerative joint   disease with flowing syndesmophytes.    IMPRESSION:    1.  Segmental emboli within right upper lobe pulmonary artery. No   significant CT evidence of right heart strain.    2.  Emphysema. New mucoid impactions within bilateral lower lungs and   tree-in-bud opacities likely representing COPD exacerbation. Recommend   follow-up chest CT in 3 months to determine resolution.    Findings were discussed with primary team provider Jackelyn Arnold PA-C.    --- End of Report ---          SHAYE AGUIRRE MD; Resident Radiologist  This document has been electronically signed.  REZA DEMARCO MD; Attending Radiologist  This document has been electronically signed. Aug 16 2024 12:48PM  EXAM: [id number] - US DPLX UPR EXT VEINS COMPL BI  - ORDERED BY: LILLIAN CALDERA      PROCEDURE DATE:  08/15/2024           INTERPRETATION:  CLINICAL INFORMATION: Swelling postcraniotomy    COMPARISON: None available.    TECHNIQUE: Duplex sonography of the BILATERAL upper extremity veins with   color and spectral Doppler, with and without compression.    FINDINGS:    RIGHT:  The right internal jugular vein is patent and compressible where   applicable.  The cephalic vein (superficial vein) is patent and without   thrombus. The radial and ulnar veins are patent    There is expansion of the subclavian vein which is filled with thrombus   and demonstrates minimal flow. There is a similar appearance involving   the axillary and brachial veins. There is superficial thrombus involving   the basilic vein.    LEFT:  The left internal jugular, subclavian, axillary and brachial veins are   patent and compressible where applicable.  The basilic vein (superficial   vein) is patent and without thrombus.  The cephalic vein (superficial   vein) is patent and without thrombus. Radial and ulnar veins are patent    Doppler examination shows normal spontaneous and phasic flow.    IMPRESSION:  Right sided DVT involving the subclavian, axillary and brachial veins.   Superficial thrombus in the right basilic vein.  Results were discussed with nurse practitioner Jf at 3:05 PM on   8/15/2024. Patient was told to go to the ER per the nurse practitioner    --- End of Report ---               IRA ESTRADA; Attending Radiologist   This document has been electronically signed. Aug 15 2024  3:05PM

## 2024-08-17 NOTE — PROGRESS NOTE ADULT - PROBLEM SELECTOR PLAN 2
CTA also w/ Emphysema. New mucoid impactions within bilateral lower lungs and tree-in-bud opacities likely representing COPD exacerbation. Recommend follow-up chest CT in 3 months to determine resolution.    Patient w/o wheezing, repeat S/S eval for the cough. Chest PT, incentive spirometer, Acapella     Home regimen: Anoro + Proventil - Symbicort/spiriva while admitted.

## 2024-08-17 NOTE — PROGRESS NOTE ADULT - SUBJECTIVE AND OBJECTIVE BOX
[facility] Division of Hospital Medicine  Heavenly Alegria MD  Available via MS Teams      SUBJECTIVE / OVERNIGHT EVENTS: No acute events overnight. Pt feels well. No current complaints.     ADDITIONAL REVIEW OF SYSTEMS: ROS reviewed and found to be negative    MEDICATIONS  (STANDING):  albuterol    0.083% 2.5 milliGRAM(s) Nebulizer every 6 hours  albuterol    90 MICROgram(s) HFA Inhaler 1 Puff(s) Inhalation every 4 hours  budesonide 160 MICROgram(s)/formoterol 4.5 MICROgram(s) Inhaler 2 Puff(s) Inhalation two times a day  dexAMETHasone     Tablet 2 milliGRAM(s) Oral every 12 hours  dexAMETHasone     Tablet   Oral   heparin  Infusion 1000 Unit(s)/Hr (8 mL/Hr) IV Continuous <Continuous>  levETIRAcetam 250 milliGRAM(s) Oral two times a day  metoprolol succinate ER 25 milliGRAM(s) Oral daily  pantoprazole    Tablet 40 milliGRAM(s) Oral before breakfast  polyethylene glycol 3350 17 Gram(s) Oral daily  senna 2 Tablet(s) Oral at bedtime  simvastatin 20 milliGRAM(s) Oral at bedtime  tiotropium 2.5 MICROgram(s) Inhaler 2 Puff(s) Inhalation daily    MEDICATIONS  (PRN):  acetaminophen     Tablet .. 650 milliGRAM(s) Oral every 6 hours PRN Mild Pain (1 - 3)  bisacodyl 5 milliGRAM(s) Oral daily PRN Constipation      I&O's Summary    16 Aug 2024 07:01  -  17 Aug 2024 07:00  --------------------------------------------------------  IN: 117 mL / OUT: 2 mL / NET: 115 mL    17 Aug 2024 07:01  -  17 Aug 2024 13:26  --------------------------------------------------------  IN: 580 mL / OUT: 0 mL / NET: 580 mL        PHYSICAL EXAM:  Vital Signs Last 24 Hrs  T(C): 36.5 (17 Aug 2024 12:40), Max: 36.5 (16 Aug 2024 16:13)  T(F): 97.7 (17 Aug 2024 12:40), Max: 97.7 (16 Aug 2024 16:13)  HR: 68 (17 Aug 2024 12:40) (55 - 68)  BP: 110/71 (17 Aug 2024 12:40) (110/71 - 161/79)  BP(mean): --  RR: 20 (17 Aug 2024 12:40) (16 - 20)  SpO2: 95% (17 Aug 2024 12:40) (94% - 96%)    Parameters below as of 17 Aug 2024 12:40  Patient On (Oxygen Delivery Method): room air      CONSTITUTIONAL: NAD, thin, well-groomed, cranial incision covered by hat   RESPIRATORY: Normal respiratory effort; lungs are clear to auscultation bilaterally  CARDIOVASCULAR: RRR, normal S1 and S2; No lower extremity edema  ABDOMEN: Nontender to palpation, normoactive bowel sounds, no rebound/guarding   MUSCULOSKELETAL: no clubbing or cyanosis of digits; no joint swelling or tenderness to palpation. RUE swelling   PSYCH: A+O to person, place, and time; affect appropriate  NEUROLOGY: moving all extremities, strength grossly intact   SKIN: No rashes; no palpable lesions    LABS:                        10.7   10.36 )-----------( 207      ( 17 Aug 2024 04:50 )             33.1     08-17    138  |  105  |  25<H>  ----------------------------<  127<H>  4.0   |  23  |  0.92    Ca    8.1<L>      17 Aug 2024 04:50    TPro  7.6  /  Alb  3.7  /  TBili  0.3  /  DBili  x   /  AST  20  /  ALT  46<H>  /  AlkPhos  67  08-15    PT/INR - ( 16 Aug 2024 00:26 )   PT: 12.6 sec;   INR: 1.15 ratio         PTT - ( 17 Aug 2024 10:28 )  PTT:33.5 sec      Urinalysis Basic - ( 17 Aug 2024 04:50 )    Color: x / Appearance: x / SG: x / pH: x  Gluc: 127 mg/dL / Ketone: x  / Bili: x / Urobili: x   Blood: x / Protein: x / Nitrite: x   Leuk Esterase: x / RBC: x / WBC x   Sq Epi: x / Non Sq Epi: x / Bacteria: x            RADIOLOGY & ADDITIONAL TESTS:  New Imaging Personally Reviewed Today:  New Electrocardiogram Personally Reviewed Today:  Other Results Reviewed Today:   Prior or Outpatient Records Reviewed Today with Summary:    COORDINATION OF CARE:  Consultant Communication and Details of Discussion (where applicable):

## 2024-08-17 NOTE — PROGRESS NOTE ADULT - ASSESSMENT
HPI:  79M hx recent admission for and R crani for lesion w/ Dr. 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 Today presents to ED for RUE doppler w/ DVT involving the subclavian, axillary and brachial veins and Superficial thrombus in the right basilic vein. Plts/coags wnl. Exam: unchanged from discharge,  awake, Ox2 (self and place), EOS, mild L facial, FC, BUE 5/5, BLE 5/5      PROCEDURE:  none       PLAN:  Neuro:   1 Out of bed   2 continue pt/ot   3 Keppra to prevent seizure   4 prn pain meds   5 outpatient PT once medically stable   6 brain lesion path from 8/6 -radiation necrosis   7 decadron taper to control cerebral edema   8 ct head Monday     Respiratory:   1 room air   2 COPD - continue Symbicort and albuterol   3 + pulmonary embolism- on heparin drip     CV:  1 blood pressure stable     Endocrine:   1 stable     Heme/Onc:             DVT ppx: + right upper extremity dvt -now on heparin drip   1 + right upper dvt and PE   2 continue on heparin drip goal PTT 55-65   3 PTT supra therapeutic - hep drip held for one hour and resumed at 800u/h     Renal:   1 voiding   2 iv lock     ID:   1 afebrile     GI:   1 regular diet with mildly thick liquids   2 + bm   3 continue senna and miralax   4 Protonix     Social/Family: answered all question     Discharge planning: home once medically stable

## 2024-08-18 LAB
ANION GAP SERPL CALC-SCNC: 12 MMOL/L — SIGNIFICANT CHANGE UP (ref 5–17)
APTT BLD: 147.6 SEC — CRITICAL HIGH (ref 24.5–35.6)
APTT BLD: 155.7 SEC — CRITICAL HIGH (ref 24.5–35.6)
BUN SERPL-MCNC: 24 MG/DL — HIGH (ref 7–23)
CALCIUM SERPL-MCNC: 8.5 MG/DL — SIGNIFICANT CHANGE UP (ref 8.4–10.5)
CHLORIDE SERPL-SCNC: 104 MMOL/L — SIGNIFICANT CHANGE UP (ref 96–108)
CO2 SERPL-SCNC: 19 MMOL/L — LOW (ref 22–31)
CREAT SERPL-MCNC: 0.83 MG/DL — SIGNIFICANT CHANGE UP (ref 0.5–1.3)
EGFR: 88 ML/MIN/1.73M2 — SIGNIFICANT CHANGE UP
GLUCOSE BLDC GLUCOMTR-MCNC: 108 MG/DL — HIGH (ref 70–99)
GLUCOSE BLDC GLUCOMTR-MCNC: 120 MG/DL — HIGH (ref 70–99)
GLUCOSE SERPL-MCNC: 134 MG/DL — HIGH (ref 70–99)
HCT VFR BLD CALC: 31.2 % — LOW (ref 39–50)
HCT VFR BLD CALC: 33.6 % — LOW (ref 39–50)
HGB BLD-MCNC: 10 G/DL — LOW (ref 13–17)
HGB BLD-MCNC: 10.2 G/DL — LOW (ref 13–17)
MCHC RBC-ENTMCNC: 29.8 PG — SIGNIFICANT CHANGE UP (ref 27–34)
MCHC RBC-ENTMCNC: 29.9 PG — SIGNIFICANT CHANGE UP (ref 27–34)
MCHC RBC-ENTMCNC: 30.4 GM/DL — LOW (ref 32–36)
MCHC RBC-ENTMCNC: 32.1 GM/DL — SIGNIFICANT CHANGE UP (ref 32–36)
MCV RBC AUTO: 92.9 FL — SIGNIFICANT CHANGE UP (ref 80–100)
MCV RBC AUTO: 98.5 FL — SIGNIFICANT CHANGE UP (ref 80–100)
NRBC # BLD: 0 /100 WBCS — SIGNIFICANT CHANGE UP (ref 0–0)
NRBC # BLD: 0 /100 WBCS — SIGNIFICANT CHANGE UP (ref 0–0)
PLATELET # BLD AUTO: 140 K/UL — LOW (ref 150–400)
PLATELET # BLD AUTO: 214 K/UL — SIGNIFICANT CHANGE UP (ref 150–400)
POTASSIUM SERPL-MCNC: 4.4 MMOL/L — SIGNIFICANT CHANGE UP (ref 3.5–5.3)
POTASSIUM SERPL-SCNC: 4.4 MMOL/L — SIGNIFICANT CHANGE UP (ref 3.5–5.3)
RBC # BLD: 3.36 M/UL — LOW (ref 4.2–5.8)
RBC # BLD: 3.41 M/UL — LOW (ref 4.2–5.8)
RBC # FLD: 15.9 % — HIGH (ref 10.3–14.5)
RBC # FLD: 16 % — HIGH (ref 10.3–14.5)
SODIUM SERPL-SCNC: 135 MMOL/L — SIGNIFICANT CHANGE UP (ref 135–145)
WBC # BLD: 7.71 K/UL — SIGNIFICANT CHANGE UP (ref 3.8–10.5)
WBC # BLD: 8.94 K/UL — SIGNIFICANT CHANGE UP (ref 3.8–10.5)
WBC # FLD AUTO: 7.71 K/UL — SIGNIFICANT CHANGE UP (ref 3.8–10.5)
WBC # FLD AUTO: 8.94 K/UL — SIGNIFICANT CHANGE UP (ref 3.8–10.5)

## 2024-08-18 PROCEDURE — 70450 CT HEAD/BRAIN W/O DYE: CPT | Mod: 26

## 2024-08-18 PROCEDURE — 99232 SBSQ HOSP IP/OBS MODERATE 35: CPT

## 2024-08-18 RX ORDER — LEVETIRACETAM 1000 MG/1
250 TABLET ORAL EVERY 12 HOURS
Refills: 0 | Status: DISCONTINUED | OUTPATIENT
Start: 2024-08-18 | End: 2024-08-19

## 2024-08-18 RX ORDER — DEXAMETHASONE 0.75 MG
2 TABLET ORAL EVERY 12 HOURS
Refills: 0 | Status: COMPLETED | OUTPATIENT
Start: 2024-08-18 | End: 2024-08-19

## 2024-08-18 RX ORDER — SODIUM CHLORIDE 9 MG/ML
1000 INJECTION INTRAMUSCULAR; INTRAVENOUS; SUBCUTANEOUS
Refills: 0 | Status: DISCONTINUED | OUTPATIENT
Start: 2024-08-18 | End: 2024-08-19

## 2024-08-18 RX ORDER — HEPARIN SODIUM,BOVINE 1000/ML
900 VIAL (ML) INJECTION
Qty: 25000 | Refills: 0 | Status: DISCONTINUED | OUTPATIENT
Start: 2024-08-18 | End: 2024-08-18

## 2024-08-18 RX ORDER — DEXTROSE 15 G/33 G
25 GEL IN PACKET (GRAM) ORAL ONCE
Refills: 0 | Status: DISCONTINUED | OUTPATIENT
Start: 2024-08-18 | End: 2024-08-29

## 2024-08-18 RX ORDER — ENOXAPARIN SODIUM 100 MG/ML
60 INJECTION SUBCUTANEOUS EVERY 12 HOURS
Refills: 0 | Status: DISCONTINUED | OUTPATIENT
Start: 2024-08-18 | End: 2024-08-26

## 2024-08-18 RX ORDER — DEXTROSE 15 G/33 G
15 GEL IN PACKET (GRAM) ORAL ONCE
Refills: 0 | Status: DISCONTINUED | OUTPATIENT
Start: 2024-08-18 | End: 2024-08-29

## 2024-08-18 RX ORDER — DEXTROSE 15 G/33 G
12.5 GEL IN PACKET (GRAM) ORAL ONCE
Refills: 0 | Status: DISCONTINUED | OUTPATIENT
Start: 2024-08-18 | End: 2024-08-29

## 2024-08-18 RX ORDER — GLUCAGON INJECTION, SOLUTION 1 MG/.2ML
1 INJECTION, SOLUTION SUBCUTANEOUS ONCE
Refills: 0 | Status: DISCONTINUED | OUTPATIENT
Start: 2024-08-18 | End: 2024-09-05

## 2024-08-18 RX ORDER — PANTOPRAZOLE SODIUM 40 MG
40 TABLET, DELAYED RELEASE (ENTERIC COATED) ORAL DAILY
Refills: 0 | Status: DISCONTINUED | OUTPATIENT
Start: 2024-08-18 | End: 2024-08-19

## 2024-08-18 RX ADMIN — Medication 2 MILLIGRAM(S): at 18:17

## 2024-08-18 RX ADMIN — SODIUM CHLORIDE 70 MILLILITER(S): 9 INJECTION INTRAMUSCULAR; INTRAVENOUS; SUBCUTANEOUS at 21:59

## 2024-08-18 RX ADMIN — LEVETIRACETAM 250 MILLIGRAM(S): 1000 TABLET ORAL at 00:02

## 2024-08-18 RX ADMIN — Medication 2.5 MILLIGRAM(S): at 00:03

## 2024-08-18 RX ADMIN — Medication 9 UNIT(S)/HR: at 02:32

## 2024-08-18 RX ADMIN — Medication 2 MILLIGRAM(S): at 05:07

## 2024-08-18 RX ADMIN — BUDESONIDE AND FORMOTEROL FUMARATE 2 PUFF(S): 80; 4.5 AEROSOL, METERED RESPIRATORY (INHALATION) at 18:18

## 2024-08-18 RX ADMIN — ENOXAPARIN SODIUM 60 MILLIGRAM(S): 100 INJECTION SUBCUTANEOUS at 12:39

## 2024-08-18 RX ADMIN — Medication 2.5 MILLIGRAM(S): at 18:18

## 2024-08-18 RX ADMIN — Medication 40 MILLIGRAM(S): at 12:43

## 2024-08-18 RX ADMIN — BUDESONIDE AND FORMOTEROL FUMARATE 2 PUFF(S): 80; 4.5 AEROSOL, METERED RESPIRATORY (INHALATION) at 05:08

## 2024-08-18 RX ADMIN — Medication 2.5 MILLIGRAM(S): at 12:42

## 2024-08-18 RX ADMIN — SODIUM CHLORIDE 70 MILLILITER(S): 9 INJECTION INTRAMUSCULAR; INTRAVENOUS; SUBCUTANEOUS at 12:41

## 2024-08-18 RX ADMIN — METOPROLOL TARTRATE 25 MILLIGRAM(S): 100 TABLET ORAL at 05:06

## 2024-08-18 RX ADMIN — Medication 40 MILLIGRAM(S): at 05:10

## 2024-08-18 RX ADMIN — Medication 2.5 MILLIGRAM(S): at 23:16

## 2024-08-18 RX ADMIN — LEVETIRACETAM 250 MILLIGRAM(S): 1000 TABLET ORAL at 18:18

## 2024-08-18 RX ADMIN — TIOTROPIUM BROMIDE INHALATION SPRAY 2 PUFF(S): 3.12 SPRAY, METERED RESPIRATORY (INHALATION) at 12:00

## 2024-08-18 RX ADMIN — Medication 2.5 MILLIGRAM(S): at 05:09

## 2024-08-18 NOTE — SWALLOW BEDSIDE ASSESSMENT ADULT - SWALLOW EVAL: DIAGNOSIS
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 Pt p/w known oropharyngeal dysphagia, prior MBS in 1/2024 w/ recommendations for regular solids/moderately thick liquids. Now presenting w/ overt s/s aspiration s/p moderately thick liquids via tsp. Spouse reports intolerance PTA. MBS recommended to reassess swallow profile.

## 2024-08-18 NOTE — PROGRESS NOTE ADULT - SUBJECTIVE AND OBJECTIVE BOX
[facility] Division of Hospital Medicine  Heavenly Alegria MD  Available via MS Teams      SUBJECTIVE / OVERNIGHT EVENTS: No acute events overnight. Pt has no current complaints.     ADDITIONAL REVIEW OF SYSTEMS: ROS reviewed and found to be negative     MEDICATIONS  (STANDING):  albuterol    0.083% 2.5 milliGRAM(s) Nebulizer every 6 hours  albuterol    90 MICROgram(s) HFA Inhaler 1 Puff(s) Inhalation every 4 hours  budesonide 160 MICROgram(s)/formoterol 4.5 MICROgram(s) Inhaler 2 Puff(s) Inhalation two times a day  dexAMETHasone  Injectable 2 milliGRAM(s) IV Push every 12 hours  dextrose 5%. 1000 milliLiter(s) (50 mL/Hr) IV Continuous <Continuous>  dextrose 5%. 1000 milliLiter(s) (100 mL/Hr) IV Continuous <Continuous>  dextrose 50% Injectable 12.5 Gram(s) IV Push once  dextrose 50% Injectable 25 Gram(s) IV Push once  dextrose 50% Injectable 25 Gram(s) IV Push once  enoxaparin Injectable 60 milliGRAM(s) SubCutaneous every 12 hours  glucagon  Injectable 1 milliGRAM(s) IntraMuscular once  insulin lispro (ADMELOG) corrective regimen sliding scale   SubCutaneous every 6 hours  levETIRAcetam   Injectable 250 milliGRAM(s) IV Push every 12 hours  metoprolol succinate ER 25 milliGRAM(s) Oral daily  pantoprazole  Injectable 40 milliGRAM(s) IV Push daily  polyethylene glycol 3350 17 Gram(s) Oral daily  senna 2 Tablet(s) Oral at bedtime  simvastatin 20 milliGRAM(s) Oral at bedtime  sodium chloride 0.9%. 1000 milliLiter(s) (70 mL/Hr) IV Continuous <Continuous>  tiotropium 2.5 MICROgram(s) Inhaler 2 Puff(s) Inhalation daily    MEDICATIONS  (PRN):  acetaminophen     Tablet .. 650 milliGRAM(s) Oral every 6 hours PRN Mild Pain (1 - 3)  bisacodyl 5 milliGRAM(s) Oral daily PRN Constipation  dextrose Oral Gel 15 Gram(s) Oral once PRN Blood Glucose LESS THAN 70 milliGRAM(s)/deciliter      I&O's Summary    17 Aug 2024 07:01  -  18 Aug 2024 07:00  --------------------------------------------------------  IN: 580 mL / OUT: 0 mL / NET: 580 mL        PHYSICAL EXAM:  Vital Signs Last 24 Hrs  T(C): 36.4 (18 Aug 2024 16:10), Max: 36.9 (17 Aug 2024 21:27)  T(F): 97.5 (18 Aug 2024 16:10), Max: 98.5 (17 Aug 2024 21:27)  HR: 58 (18 Aug 2024 16:10) (56 - 62)  BP: 128/81 (18 Aug 2024 16:10) (116/76 - 147/81)  BP(mean): --  RR: 18 (18 Aug 2024 16:10) (18 - 18)  SpO2: 96% (18 Aug 2024 16:10) (94% - 97%)    Parameters below as of 18 Aug 2024 16:10  Patient On (Oxygen Delivery Method): room air        CONSTITUTIONAL: NAD, thin, well-groomed, cranial incision covered by hat   RESPIRATORY: Normal respiratory effort; lungs are clear to auscultation bilaterally  CARDIOVASCULAR: RRR, normal S1 and S2; No lower extremity edema  ABDOMEN: Nontender to palpation, normoactive bowel sounds, no rebound/guarding   MUSCULOSKELETAL: no clubbing or cyanosis of digits; no joint swelling or tenderness to palpation. RUE swelling   PSYCH: A+O to person, place, and time; affect appropriate  NEUROLOGY: moving all extremities, strength grossly intact   SKIN: No rashes; no palpable lesions    LABS:                        10.2   7.71  )-----------( 140      ( 18 Aug 2024 06:40 )             33.6     08-18    135  |  104  |  24<H>  ----------------------------<  134<H>  4.4   |  19<L>  |  0.83    Ca    8.5      18 Aug 2024 06:40      PTT - ( 18 Aug 2024 04:06 )  PTT:155.7 sec      Urinalysis Basic - ( 18 Aug 2024 06:40 )    Color: x / Appearance: x / SG: x / pH: x  Gluc: 134 mg/dL / Ketone: x  / Bili: x / Urobili: x   Blood: x / Protein: x / Nitrite: x   Leuk Esterase: x / RBC: x / WBC x   Sq Epi: x / Non Sq Epi: x / Bacteria: x            RADIOLOGY & ADDITIONAL TESTS:  New Imaging Personally Reviewed Today:  New Electrocardiogram Personally Reviewed Today:  Other Results Reviewed Today:   Prior or Outpatient Records Reviewed Today with Summary:    COORDINATION OF CARE:  Consultant Communication and Details of Discussion (where applicable):

## 2024-08-18 NOTE — PROGRESS NOTE ADULT - SUBJECTIVE AND OBJECTIVE BOX
SUBJECTIVE:   Patient was seen and evaluated at bedside. Patient is resting in bed and is in no new acute distress.   OVERNIGHT EVENTS:   none   Vital Signs Last 24 Hrs  T(C): 36.6 (18 Aug 2024 04:37), Max: 36.9 (17 Aug 2024 21:27)  T(F): 97.8 (18 Aug 2024 04:37), Max: 98.5 (17 Aug 2024 21:27)  HR: 56 (18 Aug 2024 04:37) (56 - 68)  BP: 146/89 (18 Aug 2024 04:37) (110/71 - 146/89)  BP(mean): --  RR: 18 (18 Aug 2024 04:37) (18 - 20)  SpO2: 96% (18 Aug 2024 04:37) (95% - 97%)    Parameters below as of 18 Aug 2024 04:37  Patient On (Oxygen Delivery Method): room air        PHYSICAL EXAM:    General: No Acute Distress     Neurological:   Awake, alert oriented to person, place and time, Following Commands, PERRL, EOMI, Face Symmetrical, Speech Fluent, Moving all extremities, Muscle Strength normal in all four extremities, No Drift, Sensation to Light Touch Intact    Pulmonary: Clear to Auscultation, No Rales, No Rhonchi, No Wheezes     Cardiovascular: S1, S2, Regular Rate and Rhythm     Gastrointestinal: Soft, Nontender, Nondistended     Incision: clean and dry     LABS:                        10.2   7.71  )-----------( 140      ( 18 Aug 2024 06:40 )             33.6    08-18    135  |  104  |  24<H>  ----------------------------<  134<H>  4.4   |  19<L>  |  0.83    Ca    8.5      18 Aug 2024 06:40    PTT - ( 18 Aug 2024 04:06 )  PTT:155.7 sec      08-17 @ 07:01  -  08-18 @ 07:00  --------------------------------------------------------  IN: 580 mL / OUT: 0 mL / NET: 580 mL      DRAINS:     MEDICATIONS:  Antibiotics:    Neuro:  acetaminophen     Tablet .. 650 milliGRAM(s) Oral every 6 hours PRN Mild Pain (1 - 3)  levETIRAcetam 250 milliGRAM(s) Oral two times a day    Cardiac:  metoprolol succinate ER 25 milliGRAM(s) Oral daily    Pulm:  albuterol    0.083% 2.5 milliGRAM(s) Nebulizer every 6 hours  albuterol    90 MICROgram(s) HFA Inhaler 1 Puff(s) Inhalation every 4 hours  budesonide 160 MICROgram(s)/formoterol 4.5 MICROgram(s) Inhaler 2 Puff(s) Inhalation two times a day  tiotropium 2.5 MICROgram(s) Inhaler 2 Puff(s) Inhalation daily    GI/:  bisacodyl 5 milliGRAM(s) Oral daily PRN Constipation  pantoprazole    Tablet 40 milliGRAM(s) Oral before breakfast  polyethylene glycol 3350 17 Gram(s) Oral daily  senna 2 Tablet(s) Oral at bedtime    Other:   dexAMETHasone     Tablet 2 milliGRAM(s) Oral daily  dexAMETHasone     Tablet   Oral   heparin  Infusion 900 Unit(s)/Hr IV Continuous <Continuous>  simvastatin 20 milliGRAM(s) Oral at bedtime  sodium chloride 0.9%. 1000 milliLiter(s) IV Continuous <Continuous>    DIET: [] Regular [] CCD [] Renal [] Puree [] Dysphagia [] Tube Feeds:   npo   IMAGING:   ACC: [id number] [facility]   ORDERED BY: JACKELYN ARNOLD     PROCEDURE DATE:  08/16/2024          INTERPRETATION:  CLINICAL INFORMATION: Right subclavian DVT, rule out PE    COMPARISON: CT chest 5/7/2024    CONTRAST/COMPLICATIONS:  IV Contrast: Omnipaque 350  60 cc administered   40 cc discarded  Oral Contrast: NONE  Complications: None reported at time of study completion    PROCEDURE:  CT Angiogram of the chest was obtained with intravenous contrast. Three   dimensional maximum intensity projection (MIP) images were generated.    FINDINGS:    AIRWAYS/LUNGS/PLEURA: Emphysema. Bronchiectasis and mucoid impaction of   right middle lobe and bilateral lower lobe bronchi new from prior exam.   Tree-in-bud opacities within the right upper lobe and right middle lobe.   Mild bibasilar atelectasis. Previously noted bandlike atelectasis within   the anteromedial segment of left lower lobe is unchanged. No pleural   effusion.    MEDIASTINUM AND JAVIER: No lymphadenopathy.    PULMONARY ANGIOGRAM: Small emboli within the segmental branches of right   upper lobe pulmonary artery.    VESSELS: Thoracic and abdominal aortic calcifications. Small amount of   reflux of contrast into the IVC.    HEART: Heart size is normal. No pericardial effusion. No definite CT   evidence of right heart strain. Coronary calcifications.    VISUALIZED UPPER ABDOMEN: 3 x 2.2 cm right renal cyst. Partial   pancreatectomy.`    CHEST WALL AND BONES: Left chest loop recorder. Degenerative joint   disease with flowing syndesmophytes.    IMPRESSION:    1.  Segmental emboli within right upper lobe pulmonary artery. No   significant CT evidence of right heart strain.    2.  Emphysema. New mucoid impactions within bilateral lower lungs and   tree-in-bud opacities likely representing COPD exacerbation. Recommend   follow-up chest CT in 3 months to determine resolution.    Findings were discussed with primary team provider Jackelyn Arnold PA-C.    --- End of Report ---          SHAYE AGUIRRE MD; Resident Radiologist  This document has been electronically signed.  REZA DEMARCO MD; Attending Radiologist  This document has been electronically signed. Aug 16 2024 12:48PM  ACC: [id number] EXAM:  CT BRAIN   ORDERED BY:  BRODY NUNEZ     PROCEDURE DATE:  08/16/2024          INTERPRETATION:  EXAMINATION: CT HEAD    CLINICAL INDICATION: recent intracranial surgery  TECHNIQUE: CT images of the head were obtained without contrast. Coronal   and sagittal reconstructions were performed. Dose reduction techniques   were utilized including but not limited to automated exposure control   (AEC), iterative reconstruction technique, and/or mA and/or kV dose   adjustments basedon patient size.  COMPARISON: Head MRI 8/8/2024. Head CT 8/7/2024. Head MRI 8/2/2024.    FINDINGS:    A right pterional craniotomies again noted, with a resection cavity in   the right temporal lobe. There is vasogenic edema dorsal to the resection   cavity, but this has not progressed. There is a thin epidural collection   of fluid and gas beneath the flap measuring 5 mm in thickness, likely   postsurgical change/sealant material. Pneumocephalus noted on the prior   CT has resolved.    There arechronic infarcts in the right frontal and parietal lobes. This   is stable.    Moderate generalized cerebral volume loss, with distention of the sulci   and concomitant ex-vacuo ventricular dilatation. Moderate nonspecific low   attenuation in the periventricular and subcortical white matter, likely   due to small vessel disease.    No acute intracranial hemorrhage. No midline shift or herniation. No CT   evidence of acute territorial infarction, although MRI with DWI would be   more sensitive.    There is mild fluid or mucous in the right mastoid, unchanged. The   sinuses and the left mastoid are clear.    Limited views of the orbits and visualized soft tissues of the neck,   face, scalp, skull base, and calvarium are otherwise unremarkable.    IMPRESSION:    1.  Postsurgical changes and chronic infarcts, without acute intracranial   hemorrhage.        Patient Name: RADHA HOPE  MRN: [id number], Accession: [id number]  DOS: 08/16/24 12:43 AM    --- End of Report ---            AMBROSE MELARA MD; Attending Radiologist  This document has been electronically signed. Aug 16 2024 12:56AM  EXAM: [id number] - US DPLX UPR EXT VEINS COMPL BI  - ORDERED BY: LILLIAN CALDERA      PROCEDURE DATE:  08/15/2024           INTERPRETATION:  CLINICAL INFORMATION: Swelling postcraniotomy    COMPARISON: None available.    TECHNIQUE: Duplex sonography of the BILATERAL upper extremity veins with   color and spectral Doppler, with and without compression.    FINDINGS:    RIGHT:  The right internal jugular vein is patent and compressible where   applicable.  The cephalic vein (superficial vein) is patent and without   thrombus. The radial and ulnar veins are patent    There is expansion of the subclavian vein which is filled with thrombus   and demonstrates minimal flow. There is a similar appearance involving   the axillary and brachial veins. There is superficial thrombus involving   the basilic vein.    LEFT:  The left internal jugular, subclavian, axillary and brachial veins are   patent and compressible where applicable.  The basilic vein (superficial   vein) is patent and without thrombus.  The cephalic vein (superficial   vein) is patent and without thrombus. Radial and ulnar veins are patent    Doppler examination shows normal spontaneous and phasic flow.    IMPRESSION:  Right sided DVT involving the subclavian, axillary and brachial veins.   Superficial thrombus in the right basilic vein.  Results were discussed with nurse practitioner Jf at 3:05 PM on   8/15/2024. Patient was told to go to the ER per the nurse practitioner    --- End of Report ---               IRA ESTRADA; Attending Radiologist   This document has been electronically signed. Aug 15 2024  3:05PM

## 2024-08-18 NOTE — SWALLOW BEDSIDE ASSESSMENT ADULT - COMMENTS
COPD without exacerbation: CTA 8/16 also w/ Emphysema. New mucoid impactions within bilateral lower lungs and tree-in-bud opacities likely representing COPD exacerbation. Recommend follow-up chest CT in 3 months to determine resolution.  Patient w/o wheezing, repeat S/S eval for the cough. Chest PT, incentive spirometer, Acapella  8/10 CXR IMPRESSION: Bilateral lower lung field patchy opacities may represent atelectasis.    *Pt w/ OP MBS 1/22/2024 at [facility]; There was evidence of aspiration with thin and mildly thick liquids.  Most recent OP tx 3/20/2024 "+asthenic vocal quality with audible secretions. Patient was recently recommended to thicken his liquids to moderately thick consistency following a recent modified barium swallow study and he does this occasionally. Patient completed the effortful swallow and gregory maneuver each x10 following a model to ~60% accuracy benefits from cues and modeling. Patient completed oral-motor exercises (lingual, labial, buccal, mandibular) grading, pursing, retracting, holding to ~85% accuracy Patient observed consuming large rapid sips of thin fluids this session benefitting from cues to slow rate. "

## 2024-08-18 NOTE — SWALLOW BEDSIDE ASSESSMENT ADULT - PHARYNGEAL PHASE
congested cough s/p intake; w/ expectoration of phlegm/Delayed pharyngeal swallow/Decreased laryngeal elevation

## 2024-08-18 NOTE — SWALLOW BEDSIDE ASSESSMENT ADULT - SLP PERTINENT HISTORY OF CURRENT PROBLEM
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 Today presents to ED for RUE doppler; found to have DVT + PE. Endorsing some productive cough. Collateral obtained via wife over the phone - patient was having some coughing at home after dc. Was on mildly thick liquids in the hospital last admission but wife unsure if her thickening agents at home were working well. Also notes that patient had not fully recovered after recent covid.

## 2024-08-18 NOTE — PROGRESS NOTE ADULT - ASSESSMENT
HPI:  79M hx recent admission for and R crani for lesion w/ Dr. 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 Today presents to ED for RUE doppler w/ DVT involving the subclavian, axillary and brachial veins and Superficial thrombus in the right basilic vein. Plts/coags wnl. Exam: unchanged from discharge,  awake, Ox2 (self and place), EOS, mild L facial, FC, BUE 5/5, BLE 5/5      PROCEDURE:  none       PLAN:  Neuro:   1 Out of bed   2 continue pt/ot   3 Keppra to prevent seizure   4 prn pain meds   5 outpatient PT once medically stable   6 brain lesion path from 8/6 -radiation necrosis   7 decadron taper to control cerebral edema   8 ct head today     Respiratory:   1 room air   2 COPD - continue Symbicort and albuterol   3 + pulmonary embolism- on full AC     CV:  1 blood pressure stable     Endocrine:   1 stable     Heme/Onc:             DVT ppx: + right upper extremity dvt -heparin drip to be transitioned to lovenox injection if ct head stable.   1 + right upper dvt and PE   3 d/u platelets in am -thrombocytopenia     Renal:   1 voiding   2 iv lock     ID:   1 afebrile     GI:   1 regular diet with mildly thick liquids   2 + bm   3 continue senna and miralax   4 Protonix     Social/Family: answered all question     Discharge planning: home once medically stable            Problem/Plan - 1:  ·  Problem: Mass of temporal lobe.      Problem/Plan - 2:  ·  Problem: Pulmonary embolism.      Problem/Plan - 3:  ·  Problem: HTN (hypertension).      Problem/Plan - 4:  ·  Problem: Type 2 diabetes mellitus.      Problem/Plan - 5:  ·  Problem: COPD without exacerbation.        Additional Information:  Additional Information: 36 minutes spent.    Attestation Statements:    Attestation Statements:  Attending and PA/NP shared services statement (NON-critical care):     PA/NP to bill.     I independently performed the documented history, exam, and medical decision making.       HPI:  79M hx recent admission for and R crani for lesion w/ Dr. 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 Today presents to ED for RUE doppler w/ DVT involving the subclavian, axillary and brachial veins and Superficial thrombus in the right basilic vein. Plts/coags wnl. Exam: unchanged from discharge,  awake, Ox2 (self and place), EOS, mild L facial, FC, BUE 5/5, BLE 5/5      PROCEDURE:  none       PLAN:  Neuro:   1 Out of bed   2 continue pt/ot   3 Keppra to prevent seizure   4 prn pain meds   5 outpatient PT once medically stable   6 brain lesion path from 8/6 -radiation necrosis   7 decadron taper to control cerebral edema   8 ct head today     Respiratory:   1 room air   2 COPD - continue Symbicort and albuterol   3 + pulmonary embolism- on full AC     CV:  1 blood pressure stable   2 ECHO 8/16 ef 71 %   Endocrine:   1 stable   2 A1C 63 - sliding scale while npo   Heme/Onc:             DVT ppx: + right upper extremity dvt -heparin drip to be transitioned to lovenox injection if ct head stable.   1 + right upper dvt and PE   2 f/u  platelets in am -thrombocytopenia     Renal:   1 voiding   2 NS @70     ID:   1 afebrile     GI:   1 failed bed side swallow eval today - mbs in am    2 + bm   3 continue senna and miralax   4 Protonix     Social/Family: answered all question     Discharge planning: home once medically stable            Problem/Plan - 1:  ·  Problem: Mass of temporal lobe.      Problem/Plan - 2:  ·  Problem: Pulmonary embolism.      Problem/Plan - 3:  ·  Problem: HTN (hypertension).      Problem/Plan - 4:  ·  Problem: Type 2 diabetes mellitus.      Problem/Plan - 5:  ·  Problem: COPD without exacerbation.        Additional Information:  Additional Information: 36 minutes spent.    Attestation Statements:    Attestation Statements:  Attending and PA/NP shared services statement (NON-critical care):     PA/NP to bill.     I independently performed the documented history, exam, and medical decision making.

## 2024-08-18 NOTE — PROGRESS NOTE ADULT - PROBLEM SELECTOR PLAN 1
UE US: Right sided DVT involving the subclavian, axillary and brachial veins. Superficial thrombus in the right basilic vein.  CTA w/ Segmental emboli within right upper lobe pulmonary artery. TTE WNL    Vascular cardiology following, was started on Heparin gtt on 16th with lower target goal, changed to FD lovenox 8/18  Platelets 8/18 140. 250 on 8/16 to 140, low probability of NESHA, trend plts UE US: Right sided DVT involving the subclavian, axillary and brachial veins. Superficial thrombus in the right basilic vein.  CTA w/ Segmental emboli within right upper lobe pulmonary artery. TTE WNL    Vascular cardiology following, was started on Heparin gtt on 16th with lower target goal, changed to FD lovenox 8/18  Platelets 8/18 140. 250 on 8/16 to 140, low probability of HIT, trend plts

## 2024-08-19 DIAGNOSIS — R13.10 DYSPHAGIA, UNSPECIFIED: ICD-10-CM

## 2024-08-19 DIAGNOSIS — Z46.59 ENCOUNTER FOR FITTING AND ADJUSTMENT OF OTHER GASTROINTESTINAL APPLIANCE AND DEVICE: ICD-10-CM

## 2024-08-19 LAB
ANION GAP SERPL CALC-SCNC: 11 MMOL/L — SIGNIFICANT CHANGE UP (ref 5–17)
APTT BLD: 30 SEC — SIGNIFICANT CHANGE UP (ref 24.5–35.6)
BUN SERPL-MCNC: 21 MG/DL — SIGNIFICANT CHANGE UP (ref 7–23)
CALCIUM SERPL-MCNC: 8.4 MG/DL — SIGNIFICANT CHANGE UP (ref 8.4–10.5)
CHLORIDE SERPL-SCNC: 105 MMOL/L — SIGNIFICANT CHANGE UP (ref 96–108)
CO2 SERPL-SCNC: 22 MMOL/L — SIGNIFICANT CHANGE UP (ref 22–31)
CREAT SERPL-MCNC: 0.86 MG/DL — SIGNIFICANT CHANGE UP (ref 0.5–1.3)
EGFR: 88 ML/MIN/1.73M2 — SIGNIFICANT CHANGE UP
GLUCOSE BLDC GLUCOMTR-MCNC: 105 MG/DL — HIGH (ref 70–99)
GLUCOSE BLDC GLUCOMTR-MCNC: 114 MG/DL — HIGH (ref 70–99)
GLUCOSE BLDC GLUCOMTR-MCNC: 121 MG/DL — HIGH (ref 70–99)
GLUCOSE BLDC GLUCOMTR-MCNC: 139 MG/DL — HIGH (ref 70–99)
GLUCOSE BLDC GLUCOMTR-MCNC: 90 MG/DL — SIGNIFICANT CHANGE UP (ref 70–99)
GLUCOSE SERPL-MCNC: 107 MG/DL — HIGH (ref 70–99)
HCT VFR BLD CALC: 34 % — LOW (ref 39–50)
HGB BLD-MCNC: 10.7 G/DL — LOW (ref 13–17)
INR BLD: 1.23 RATIO — HIGH (ref 0.85–1.18)
MCHC RBC-ENTMCNC: 30 PG — SIGNIFICANT CHANGE UP (ref 27–34)
MCHC RBC-ENTMCNC: 31.5 GM/DL — LOW (ref 32–36)
MCV RBC AUTO: 95.2 FL — SIGNIFICANT CHANGE UP (ref 80–100)
NRBC # BLD: 0 /100 WBCS — SIGNIFICANT CHANGE UP (ref 0–0)
PLATELET # BLD AUTO: 206 K/UL — SIGNIFICANT CHANGE UP (ref 150–400)
POTASSIUM SERPL-MCNC: 3.9 MMOL/L — SIGNIFICANT CHANGE UP (ref 3.5–5.3)
POTASSIUM SERPL-SCNC: 3.9 MMOL/L — SIGNIFICANT CHANGE UP (ref 3.5–5.3)
PROTHROM AB SERPL-ACNC: 12.8 SEC — SIGNIFICANT CHANGE UP (ref 9.5–13)
RBC # BLD: 3.57 M/UL — LOW (ref 4.2–5.8)
RBC # FLD: 15.9 % — HIGH (ref 10.3–14.5)
SODIUM SERPL-SCNC: 138 MMOL/L — SIGNIFICANT CHANGE UP (ref 135–145)
WBC # BLD: 9.51 K/UL — SIGNIFICANT CHANGE UP (ref 3.8–10.5)
WBC # FLD AUTO: 9.51 K/UL — SIGNIFICANT CHANGE UP (ref 3.8–10.5)

## 2024-08-19 PROCEDURE — 99221 1ST HOSP IP/OBS SF/LOW 40: CPT | Mod: 25

## 2024-08-19 PROCEDURE — 71045 X-RAY EXAM CHEST 1 VIEW: CPT | Mod: 26

## 2024-08-19 PROCEDURE — 31231 NASAL ENDOSCOPY DX: CPT

## 2024-08-19 PROCEDURE — 99232 SBSQ HOSP IP/OBS MODERATE 35: CPT

## 2024-08-19 PROCEDURE — 74230 X-RAY XM SWLNG FUNCJ C+: CPT | Mod: 26

## 2024-08-19 PROCEDURE — 99233 SBSQ HOSP IP/OBS HIGH 50: CPT

## 2024-08-19 RX ORDER — SENNA 187 MG
2 TABLET ORAL AT BEDTIME
Refills: 0 | Status: DISCONTINUED | OUTPATIENT
Start: 2024-08-19 | End: 2024-09-02

## 2024-08-19 RX ORDER — LEVETIRACETAM 1000 MG/1
250 TABLET ORAL
Refills: 0 | Status: DISCONTINUED | OUTPATIENT
Start: 2024-08-19 | End: 2024-08-30

## 2024-08-19 RX ORDER — SIMVASTATIN 10 MG
20 TABLET ORAL AT BEDTIME
Refills: 0 | Status: DISCONTINUED | OUTPATIENT
Start: 2024-08-19 | End: 2024-09-06

## 2024-08-19 RX ORDER — AMLODIPINE BESYLATE 10 MG/1
5 TABLET ORAL DAILY
Refills: 0 | Status: DISCONTINUED | OUTPATIENT
Start: 2024-08-19 | End: 2024-09-06

## 2024-08-19 RX ORDER — METOPROLOL TARTRATE 100 MG/1
12.5 TABLET ORAL EVERY 12 HOURS
Refills: 0 | Status: DISCONTINUED | OUTPATIENT
Start: 2024-08-19 | End: 2024-09-06

## 2024-08-19 RX ORDER — POLYETHYLENE GLYCOL 3350 17 G/17G
17 POWDER, FOR SOLUTION ORAL DAILY
Refills: 0 | Status: DISCONTINUED | OUTPATIENT
Start: 2024-08-19 | End: 2024-08-21

## 2024-08-19 RX ORDER — HYDRALAZINE HCL 50 MG
5 TABLET ORAL ONCE
Refills: 0 | Status: COMPLETED | OUTPATIENT
Start: 2024-08-19 | End: 2024-08-19

## 2024-08-19 RX ORDER — PANTOPRAZOLE SODIUM 40 MG
40 TABLET, DELAYED RELEASE (ENTERIC COATED) ORAL
Refills: 0 | Status: DISCONTINUED | OUTPATIENT
Start: 2024-08-19 | End: 2024-08-30

## 2024-08-19 RX ORDER — SODIUM CHLORIDE 9 MG/ML
1000 INJECTION INTRAMUSCULAR; INTRAVENOUS; SUBCUTANEOUS
Refills: 0 | Status: DISCONTINUED | OUTPATIENT
Start: 2024-08-19 | End: 2024-08-19

## 2024-08-19 RX ORDER — SODIUM CHLORIDE 9 MG/ML
1000 INJECTION INTRAMUSCULAR; INTRAVENOUS; SUBCUTANEOUS
Refills: 0 | Status: DISCONTINUED | OUTPATIENT
Start: 2024-08-19 | End: 2024-08-25

## 2024-08-19 RX ADMIN — Medication 2 TABLET(S): at 21:24

## 2024-08-19 RX ADMIN — ENOXAPARIN SODIUM 60 MILLIGRAM(S): 100 INJECTION SUBCUTANEOUS at 18:39

## 2024-08-19 RX ADMIN — AMLODIPINE BESYLATE 5 MILLIGRAM(S): 10 TABLET ORAL at 18:39

## 2024-08-19 RX ADMIN — Medication 2.5 MILLIGRAM(S): at 12:25

## 2024-08-19 RX ADMIN — BUDESONIDE AND FORMOTEROL FUMARATE 2 PUFF(S): 80; 4.5 AEROSOL, METERED RESPIRATORY (INHALATION) at 06:49

## 2024-08-19 RX ADMIN — Medication 2 MILLIGRAM(S): at 06:48

## 2024-08-19 RX ADMIN — METOPROLOL TARTRATE 12.5 MILLIGRAM(S): 100 TABLET ORAL at 18:39

## 2024-08-19 RX ADMIN — Medication 2.5 MILLIGRAM(S): at 06:55

## 2024-08-19 RX ADMIN — Medication 5 MILLIGRAM(S): at 19:42

## 2024-08-19 RX ADMIN — Medication 2.5 MILLIGRAM(S): at 18:25

## 2024-08-19 RX ADMIN — ENOXAPARIN SODIUM 60 MILLIGRAM(S): 100 INJECTION SUBCUTANEOUS at 06:49

## 2024-08-19 RX ADMIN — LEVETIRACETAM 250 MILLIGRAM(S): 1000 TABLET ORAL at 06:45

## 2024-08-19 RX ADMIN — Medication 20 MILLIGRAM(S): at 21:24

## 2024-08-19 RX ADMIN — TIOTROPIUM BROMIDE INHALATION SPRAY 2 PUFF(S): 3.12 SPRAY, METERED RESPIRATORY (INHALATION) at 12:25

## 2024-08-19 RX ADMIN — BUDESONIDE AND FORMOTEROL FUMARATE 2 PUFF(S): 80; 4.5 AEROSOL, METERED RESPIRATORY (INHALATION) at 18:26

## 2024-08-19 NOTE — DIETITIAN INITIAL EVALUATION ADULT - ENERGY INTAKE
NPO - Pt NPO x 2 days. Now S/p MBS (8/19), speech language pathologist recommended pt remains NPO with non-oral means of nutrition.   - NG tube placed today by ENT (8/19) given hx of nasopharyngeal ca.

## 2024-08-19 NOTE — ADVANCED PRACTICE NURSE CONSULT - REASON FOR CONSULT
Midline Catheter Insertion Note    Catheter type: 4F  : Bard  Power injectable: Yes  LOT# [id number]                                                                                                                                                                                      EXP DATE  2025-10-30                            Procedure assisted by: ROSETTA Swenson RN  Time out was preformed, confirming the patient's first and last name, date of birth, MR #, procedure, and correct site prior to start of procedure.    Patient was placed with HOB 30 degrees. Patient placement site was prepped with chlorhexidine solution, then draped using maximum sterile barrier protection. 2ml of 1% lidocaine given. Using the Bard Site Rite 8, the catheter was placed using the Modified Seldinger Technique. Strict adherence to outline aseptic technique including handwashing, glove and gown, utilizing mask and cap, plus draping the patient with a sterile drape was observed. Upon completion of line placement, the insertion site was covered with a sterile occlusive CHG dressing. Pt tolerated procedure well.     All materials used for catheter insertion, including the intact guide wires, were accounted for at the end of the procedure.  Number of attempts: 1  Complications/Comments: None    Emergency Placement: No  Site: New  Anatomical Site of insertion: Left Basilic vein  Catheter size/length: 4F, 20cm  US guided Bard single lumen power midline placed

## 2024-08-19 NOTE — DIETITIAN INITIAL EVALUATION ADULT - EDUCATION DIETARY MODIFICATIONS
Provided general information on enteral nutrition; goal of meeting pt's estimated caloric and protein needs; discussed alternative modes of nutrition; pt's spouse with questions regarding long term use of enteral nutrition and insurance, d/c issues; encouraged her to discuss with medical team, case management. Pt and spouse receptive to education and made aware RD remains available upon request for further education and TF adjustments./teach back/(2) meets goals/outcomes/verbalization

## 2024-08-19 NOTE — PROGRESS NOTE ADULT - ASSESSMENT
80 yo male with PMH of HTN, T2DM (diet controlled), HLD, COPD, CKD 3, carcinoid tumor s/p hemicolectomy, nasopharyngeal carcinoma s/p chemo/RT (c/b dysphagia ), IPMN s/p distal pancreatectomy (2018), and paroxysmal Afib on Eliquis s/p craniotomy 8/6 for resection of hemorrhagic lesion. Now presented to the ED for RUE swelling, found to have RUE DVT involving subclavian, axillary, and brachial veins and PE.    IMPRESSION:  Right sided DVT involving the subclavian, axillary and brachial veins.   Superficial thrombus in the right basilic vein.  Results were discussed with nurse practitioner Jf at 3:05 PM on   8/15/2024. Patient was told to go to the ER per the nurse practitioner 78 yo male with PMH of HTN, T2DM (diet controlled), HLD, COPD, CKD 3, carcinoid tumor s/p hemicolectomy, nasopharyngeal carcinoma s/p chemo/RT (c/b dysphagia ), IPMN s/p distal pancreatectomy (2018), and paroxysmal Afib on Eliquis s/p craniotomy 8/6 for resection of hemorrhagic lesion. Now presented to the ED for RUE swelling, found to have RUE DVT involving subclavian, axillary, and brachial veins and PE.

## 2024-08-19 NOTE — DIETITIAN INITIAL EVALUATION ADULT - NS FNS DIET ORDER
Diet, NPO with Tube Feed:   Tube Feeding Modality: Nasogastric  Glucerna 1.2 Gabriele (GLUCERNARTH)  Total Volume for 24 Hours (mL): 1440  Continuous  Starting Tube Feed Rate {mL per Hour}: 20  Increase Tube Feed Rate by (mL): 10     Every 6 hours  Until Goal Tube Feed Rate (mL per Hour): 60  Tube Feed Duration (in Hours): 24  Tube Feed Start Time: 15:30 (08-19-24 @ 15:03)

## 2024-08-19 NOTE — DIETITIAN INITIAL EVALUATION ADULT - OTHER INFO
- S/p craniotomy 8/6 for resection hemorrhagic lesion  - COPD without exacerbation.   - CKD Stage 3. Electrolytes WNL.   - T2DM. A1C of 6.3% (7/23) - reflects fair glycemic control in consideration of age. POCTs x 24 hrs WNL - noted pt NPO during this time. Ordered for SSI.     Wt Hx:  - Reports UBW of 59.1 kg; endorses pt has been wt stable. Per chart, dosing wt of 58.8 kg (8/15).   - No further wt hx available per [facility] at this time. Per previous RD note (6/28/23), wt hx: 54.4 kg (6/27/23), 61.4 kg (3/31/23).   Noted possible upward wt trend of 8% x 1 year. RD will continue to monitor weight trends as available/able.   - IBW: 75.5 kg (based on ht of 70 in)

## 2024-08-19 NOTE — PROGRESS NOTE ADULT - SUBJECTIVE AND OBJECTIVE BOX
Lizet Page MD  Division of Hospital Medicine  [facility]   Available on Microsoft Teams (Mon-Fri 8am-5pm)    * messages preferred prior to calls  Other Times:  [phone]      Patient is a 79y old  Male who presents with a chief complaint of post-operative DVT (19 Aug 2024 12:20)      SUBJECTIVE / OVERNIGHT EVENTS: no acute events overnight but failed MBS earlier this morning. frustrated as wants to eat but we discussed risk of aspiration at this time and amenable to NGT placement. no fever, chills, headache, dizziness, chest pain, nor dyspnea. just hungry.  ADDITIONAL REVIEW OF SYSTEMS:    MEDICATIONS  (STANDING):  albuterol    0.083% 2.5 milliGRAM(s) Nebulizer every 6 hours  albuterol    90 MICROgram(s) HFA Inhaler 1 Puff(s) Inhalation every 4 hours  budesonide 160 MICROgram(s)/formoterol 4.5 MICROgram(s) Inhaler 2 Puff(s) Inhalation two times a day  dextrose 5%. 1000 milliLiter(s) (50 mL/Hr) IV Continuous <Continuous>  dextrose 5%. 1000 milliLiter(s) (100 mL/Hr) IV Continuous <Continuous>  dextrose 50% Injectable 25 Gram(s) IV Push once  dextrose 50% Injectable 12.5 Gram(s) IV Push once  dextrose 50% Injectable 25 Gram(s) IV Push once  enoxaparin Injectable 60 milliGRAM(s) SubCutaneous every 12 hours  glucagon  Injectable 1 milliGRAM(s) IntraMuscular once  insulin lispro (ADMELOG) corrective regimen sliding scale   SubCutaneous every 6 hours  levETIRAcetam   Injectable 250 milliGRAM(s) IV Push every 12 hours  metoprolol succinate ER 25 milliGRAM(s) Oral daily  pantoprazole  Injectable 40 milliGRAM(s) IV Push daily  polyethylene glycol 3350 17 Gram(s) Oral daily  senna 2 Tablet(s) Oral at bedtime  simvastatin 20 milliGRAM(s) Oral at bedtime  sodium chloride 0.9%. 1000 milliLiter(s) (70 mL/Hr) IV Continuous <Continuous>  tiotropium 2.5 MICROgram(s) Inhaler 2 Puff(s) Inhalation daily    MEDICATIONS  (PRN):  acetaminophen     Tablet .. 650 milliGRAM(s) Oral every 6 hours PRN Mild Pain (1 - 3)  bisacodyl 5 milliGRAM(s) Oral daily PRN Constipation  dextrose Oral Gel 15 Gram(s) Oral once PRN Blood Glucose LESS THAN 70 milliGRAM(s)/deciliter      CAPILLARY BLOOD GLUCOSE      POCT Blood Glucose.: 121 mg/dL (19 Aug 2024 11:32)  POCT Blood Glucose.: 114 mg/dL (19 Aug 2024 05:41)  POCT Blood Glucose.: 139 mg/dL (19 Aug 2024 00:01)  POCT Blood Glucose.: 120 mg/dL (18 Aug 2024 17:13)    I&O's Summary    18 Aug 2024 07:01  -  19 Aug 2024 07:00  --------------------------------------------------------  IN: 0 mL / OUT: 200 mL / NET: -200 mL    19 Aug 2024 07:01  -  19 Aug 2024 14:12  --------------------------------------------------------  IN: 0 mL / OUT: 400 mL / NET: -400 mL        PHYSICAL EXAM:  Vital Signs Last 24 Hrs  T(C): 36.5 (19 Aug 2024 12:56), Max: 36.8 (18 Aug 2024 20:05)  T(F): 97.7 (19 Aug 2024 12:56), Max: 98.2 (18 Aug 2024 20:05)  HR: 64 (19 Aug 2024 12:56) (56 - 64)  BP: 164/99 (19 Aug 2024 12:56) (128/81 - 164/99)  BP(mean): --  RR: 18 (19 Aug 2024 12:56) (18 - 18)  SpO2: 95% (19 Aug 2024 12:56) (95% - 97%)    Parameters below as of 19 Aug 2024 12:56  Patient On (Oxygen Delivery Method): room air      CONSTITUTIONAL: NAD, well-developed, well-groomed  EYES: PERRLA; conjunctiva and sclera clear  ENMT: Moist oral mucosa, no pharyngeal injection or exudates; normal dentition  NECK: Supple, no palpable masses; no thyromegaly  RESPIRATORY: Normal respiratory effort; lungs are clear to auscultation bilaterally, no wheeze nor crackles  CARDIOVASCULAR: Regular rate and rhythm, normal S1 and S2, no murmur/rub/gallop; No lower extremity edema  ABDOMEN: Soft, Nondistended, Nontender to palpation, normoactive bowel sounds  MUSCULOSKELETAL: No clubbing or cyanosis of digits; no joint swelling or tenderness to palpation  PSYCH: A+O to person, place, and time; affect appropriate  NEUROLOGY: CN 2-12 are intact and symmetric; no gross sensory deficits, grossly 5/5 strength throughout  SKIN: No rashes; no palpable lesions, +Crani site c/d/i    LABS:                        10.7   9.51  )-----------( 206      ( 19 Aug 2024 06:12 )             34.0     08-19    138  |  105  |  21  ----------------------------<  107<H>  3.9   |  22  |  0.86    Ca    8.4      19 Aug 2024 06:12      PT/INR - ( 19 Aug 2024 06:12 )   PT: 12.8 sec;   INR: 1.23 ratio         PTT - ( 19 Aug 2024 06:12 )  PTT:30.0 sec      Urinalysis Basic - ( 19 Aug 2024 06:12 )    Color: x / Appearance: x / SG: x / pH: x  Gluc: 107 mg/dL / Ketone: x  / Bili: x / Urobili: x   Blood: x / Protein: x / Nitrite: x   Leuk Esterase: x / RBC: x / WBC x   Sq Epi: x / Non Sq Epi: x / Bacteria: x      RADIOLOGY & ADDITIONAL TESTS:  Results Reviewed: no leukocytosis, H/H stable  Imaging Personally Reviewed:  Electrocardiogram Personally Reviewed:    COORDINATION OF CARE:  Care Discussed with Consultants/Other Providers [Y]: Neurosurgery ALYSON Yates  Prior or Outpatient Records Reviewed [Y/N]:

## 2024-08-19 NOTE — CONSULT NOTE ADULT - ASSESSMENT
78 y/o M with h/o HTN, T2DM (diet controlled), HLD, COPD, CKD (III), carcinoid tumor s/p hemicolectomy, nasopharyngeal carcinoma s/p chemo/RT (c/b dysphagia ), IPMN s/p distal pancreatectomy (2018), and paroxysmal A-fib, on Eliquis s/p craniotomy 8/6 for resection hemorrhagic lesion.    ENT consulted for NGT placement given hx of nasopharyngeal cancer. Technically successful placement of NGT through R nares with Bridle attachment.

## 2024-08-19 NOTE — SWALLOW VFSS/MBS ASSESSMENT ADULT - SLP GENERAL OBSERVATIONS
Pt encountered in radiology, secure in ZO chair. Pt awake and alert, cooperative, following directions for the purposes of the exam. Pt encountered in radiology, secure in ZO chair. Pt awake and alert, cooperative, able to follow simple directions, but inconsistent in following directions accurately for the purposes of strategy implementation during the exam. Pt noted with head in partially head down posture at baseline.

## 2024-08-19 NOTE — PROGRESS NOTE ADULT - ATTENDING COMMENTS
Events that transpired over the last 24 hours were reviewed.  The patient is currently sitting 60 degrees.  He denies any cardiopulmonary complaints at rest.  Denies any fevers, chills, sweats, light sensation, dizzy or palpitation.  The patient is currently on Lovenox.  Agree with 14 point review of systems and physical examination as noted above.  No swelling noted in either right or left upper extremity.    Discussed with patient and family findings on imaging studies that demonstrated right upper extremity DVT of subclavian, axillary and brachial veins and superficial thrombus in the right basilic vein.  Patient is status post recent admission for right temporal craniotomy with excision of mass temporal lobar resection on 7/9/2024.  Also found to have a pulmonary embolism.  He is hemodynamically stable.    Continue Lovenox 60 mg subcu twice daily.  Closely monitor for signs and symptoms of bleeding.  Patient was told to avoid all NSAIDs.  May take Tylenol for discomfort.    As an outpatient the patient will need to have surveillance imaging (upper extremity venous duplex study and TTE).    All questions and concerns of the patient, his wife and daughter was at his bedside were addressed    I have spent 45 minutes of time on the encounter which excludes teaching and separately reported services.

## 2024-08-19 NOTE — DIETITIAN INITIAL EVALUATION ADULT - CALCULATED FROM (G/KG)
How Severe Are Your Spot(S)?: mild
What Is The Reason For Today's Visit?: Full Body Skin Examination
What Is The Reason For Today's Visit? (Being Monitored For X): concerning skin lesions on an annual basis
70.56

## 2024-08-19 NOTE — DIETITIAN INITIAL EVALUATION ADULT - NSFNSGIIOFT_GEN_A_CORE
08-18-24 @ 07:01  -  08-19-24 @ 07:00  --------------------------------------------------------  OUT:    Stool (mL): 0 mL  Total OUT: 0 mL    Total NET: 0 mL

## 2024-08-19 NOTE — ADVANCED PRACTICE NURSE CONSULT - REASON FOR CONSULT
Vascular Access Team    Evaluation for: Bedside MIDLINE placement  Requested by name:Trudy Medina  Date/Time:8/19/2024    Indication for MIDLINE: Access  Allergy to CHG or Heparin or Lidocaine: no    Platelets(>20): 206  INR(<3):1.23  eGFR(>40):88  Pending blood cultures: N/A  Anticoagulants/antiplatelets: Lovenox 40mg  Arms DVT: Rt arm   Mastectomy: no  Fistula:No  IR or Nephrology or ID clearance needed: No    Consent obtained: N/A    Pending: None    Plan: Bedside midline order evaluated. Please contact ROSETTA RN #[id number] with any questions..

## 2024-08-19 NOTE — CONSULT NOTE ADULT - SUBJECTIVE AND OBJECTIVE BOX
CC: NGT placement    HPI: 78 y/o M with h/o HTN, T2DM (diet controlled), HLD, COPD, CKD (III), carcinoid tumor s/p hemicolectomy, nasopharyngeal carcinoma s/p chemo/RT (c/b dysphagia ), IPMN s/p distal pancreatectomy (2018), and paroxysmal A-fib, on Eliquis s/p craniotomy 8/6 for resection hemorrhagic lesion.    ENT consulted for NGT placement given hx of nasopharyngeal cancer.    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  Disease of pancreas, unspecified  Hearing loss  Pancreatic mass  Radiation fibrosis, from RT for nasopharyngeal ca, ROM limited Mouth opening & neck  ETOH abuse, quit in 2016  Other nonspecific abnormal finding of lung field  Abnormal chest CT  Type 2 diabetes mellitus  Dysphagia  History of cancer chemotherapy, S/P radiation therapy  Pneumonia  Syncope  Pneumonia due to COVID-19 virus  History of appendectomy  H/O hemicolectomy  S/P tonsillectomy, 1959  H/O endoscopy, last one 06/2018  History of partial pancreatectomy  History of loop recorder    Allergies    penicillin (Other)  ACE inhibitors (Other)  Bee Stings- anaphylaxis (Other)    Intolerances    MEDICATIONS  (STANDING):  albuterol    0.083% 2.5 milliGRAM(s) Nebulizer every 6 hours  albuterol    90 MICROgram(s) HFA Inhaler 1 Puff(s) Inhalation every 4 hours  budesonide 160 MICROgram(s)/formoterol 4.5 MICROgram(s) Inhaler 2 Puff(s) Inhalation two times a day  dextrose 5%. 1000 milliLiter(s) (100 mL/Hr) IV Continuous <Continuous>  dextrose 5%. 1000 milliLiter(s) (50 mL/Hr) IV Continuous <Continuous>  dextrose 50% Injectable 25 Gram(s) IV Push once  dextrose 50% Injectable 12.5 Gram(s) IV Push once  dextrose 50% Injectable 25 Gram(s) IV Push once  enoxaparin Injectable 60 milliGRAM(s) SubCutaneous every 12 hours  glucagon  Injectable 1 milliGRAM(s) IntraMuscular once  insulin lispro (ADMELOG) corrective regimen sliding scale   SubCutaneous every 6 hours  levETIRAcetam   Injectable 250 milliGRAM(s) IV Push every 12 hours  metoprolol succinate ER 25 milliGRAM(s) Oral daily  pantoprazole  Injectable 40 milliGRAM(s) IV Push daily  polyethylene glycol 3350 17 Gram(s) Oral daily  senna 2 Tablet(s) Oral at bedtime  simvastatin 20 milliGRAM(s) Oral at bedtime  sodium chloride 0.9%. 1000 milliLiter(s) (70 mL/Hr) IV Continuous <Continuous>  tiotropium 2.5 MICROgram(s) Inhaler 2 Puff(s) Inhalation daily    MEDICATIONS  (PRN):  acetaminophen     Tablet .. 650 milliGRAM(s) Oral every 6 hours PRN Mild Pain (1 - 3)  bisacodyl 5 milliGRAM(s) Oral daily PRN Constipation  dextrose Oral Gel 15 Gram(s) Oral once PRN Blood Glucose LESS THAN 70 milliGRAM(s)/deciliter    Social History: Non-contributory     Family history: Non-contributory     ROS:   ENT: all negative except as noted in HPI   CV: denies palpitations  Pulm: denies SOB, cough, hemoptysis    Vital Signs Last 24 Hrs  T(C): 36.3 (19 Aug 2024 08:58), Max: 36.8 (18 Aug 2024 20:05)  T(F): 97.4 (19 Aug 2024 08:58), Max: 98.2 (18 Aug 2024 20:05)  HR: 60 (19 Aug 2024 08:58) (56 - 62)  BP: 155/82 (19 Aug 2024 08:58) (128/81 - 160/77)  BP(mean): --  RR: 18 (19 Aug 2024 08:58) (18 - 18)  SpO2: 95% (19 Aug 2024 08:58) (94% - 97%)    Parameters below as of 19 Aug 2024 08:58  Patient On (Oxygen Delivery Method): room air                        10.7   9.51  )-----------( 206      ( 19 Aug 2024 06:12 )             34.0    08-19    138  |  105  |  21  ----------------------------<  107<H>  3.9   |  22  |  0.86    Ca    8.4      19 Aug 2024 06:12     PT/INR - ( 19 Aug 2024 06:12 )   PT: 12.8 sec;   INR: 1.23 ratio         PTT - ( 19 Aug 2024 06:12 )  PTT:30.0 sec    PHYSICAL EXAM:  Gen: NAD  Skin: No rashes, bruises, or lesions  Head: Right-sided well healing scar.  Face: no edema, erythema, or fluctuance.   Eyes: no scleral injection  Nose: Nares bilaterally patent, no discharge  Neck: Flat, supple, no lymphadenopathy, trachea midline, no masses  Lymphatic: No lymphadenopathy  Resp: breathing easily, no stridor  CV: no peripheral edema/cyanosis    Fiberoptic Indirect laryngoscopy:  (Scope #2 used)    Reason for Laryngoscopy: NGT placement    Patient was unable to cooperate with mirror.     Small septal perforation posteriorly with radiation in the nasopharynx. Thick secretions noted in the nasopharynx as well. Esophageal inlet and post cricoid region unremarkable.     Procedure: NGT placement  Diagnosis: dysphagia  Verbal consent obtained from patient. Lube applied to small keofeed tube. Keofeed advanced through right nare without resistance under visualization using indirect laryngoscope. Tip of tube visualized in pyriform sinus. Pt asked to swallow. Tube advanced through esophageal inlet without resistance. Placement confirmed with auscultation of air in stomach. Pending CXR confirmation.

## 2024-08-19 NOTE — PROGRESS NOTE ADULT - SUBJECTIVE AND OBJECTIVE BOX
Vascular Cardiology  Progress note    SERVICE LINE [phone]              EMAIL [email or web address]   OFFICE [phone]    CC:      INTERVAL HISTORY: no acute events. transitioned to therapeutic lovenox.     Allergies    penicillin (Other)  ACE inhibitors (Other)  Bee Stings- anaphylaxis (Other)    Intolerances    	    MEDICATIONS:  enoxaparin Injectable 60 milliGRAM(s) SubCutaneous every 12 hours  metoprolol tartrate 12.5 milliGRAM(s) Oral every 12 hours      albuterol    0.083% 2.5 milliGRAM(s) Nebulizer every 6 hours  albuterol    90 MICROgram(s) HFA Inhaler 1 Puff(s) Inhalation every 4 hours  budesonide 160 MICROgram(s)/formoterol 4.5 MICROgram(s) Inhaler 2 Puff(s) Inhalation two times a day  tiotropium 2.5 MICROgram(s) Inhaler 2 Puff(s) Inhalation daily    acetaminophen     Tablet .. 650 milliGRAM(s) Oral every 6 hours PRN  levETIRAcetam   Injectable 250 milliGRAM(s) IV Push every 12 hours    bisacodyl 5 milliGRAM(s) Oral daily PRN  pantoprazole  Injectable 40 milliGRAM(s) IV Push daily  polyethylene glycol 3350 17 Gram(s) Oral daily  senna 2 Tablet(s) Oral at bedtime    dextrose 50% Injectable 25 Gram(s) IV Push once  dextrose 50% Injectable 25 Gram(s) IV Push once  dextrose 50% Injectable 12.5 Gram(s) IV Push once  dextrose Oral Gel 15 Gram(s) Oral once PRN  glucagon  Injectable 1 milliGRAM(s) IntraMuscular once  insulin lispro (ADMELOG) corrective regimen sliding scale   SubCutaneous every 6 hours  simvastatin 20 milliGRAM(s) Oral at bedtime    dextrose 5%. 1000 milliLiter(s) IV Continuous <Continuous>  dextrose 5%. 1000 milliLiter(s) IV Continuous <Continuous>  sodium chloride 0.9%. 1000 milliLiter(s) IV Continuous <Continuous>      PAST MEDICAL & SURGICAL HISTORY:  HTN (hypertension)      High cholesterol      Low back pain      Benign carcinoid tumor      COPD (chronic obstructive pulmonary disease)      Atrial fibrillation, unspecified type  Dxd in 01/2016-on Eliquis      Acute kidney injury  09/2016      Stage 3 chronic kidney disease      Pulmonary emphysema, unspecified emphysema type      Nasopharynx cancer  07/2016- s/p Chemo &RT      Disease of pancreas, unspecified      Hearing loss      Pancreatic mass      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  ROM limited Mouth opening & neck      ETOH abuse  quit in 2016      Other nonspecific abnormal finding of lung field      Abnormal chest CT      Type 2 diabetes mellitus      Dysphagia      History of cancer chemotherapy      S/P radiation therapy      Pneumonia      Syncope      Pneumonia due to COVID-19 virus      History of appendectomy      H/O hemicolectomy      S/P tonsillectomy  1959      H/O endoscopy  last one 06/2018      History of partial pancreatectomy      History of loop recorder          FAMILY HISTORY:  Family history of breast cancer     : unchanged    SOCIAL HISTORY:  unchanged    REVIEW OF SYSTEMS:  CONSTITUTIONAL: No fever, weight loss, or fatigue  EYES: No eye pain, visual disturbances, or discharge  ENMT:  No difficulty hearing, tinnitus, vertigo; No sinus or throat pain  NECK: No pain or stiffness  RESPIRATORY: No cough, wheezing, chills or hemoptysis; No Shortness of Breath  CARDIOVASCULAR: No chest pain, palpitations, passing out, dizziness, or leg swelling  GASTROINTESTINAL: No abdominal or epigastric pain. No nausea, vomiting, or hematemesis; No diarrhea or constipation. No melena or hematochezia.  GENITOURINARY: No dysuria, frequency, hematuria, or incontinence  NEUROLOGICAL: No headaches, memory loss, loss of strength, numbness, or tremors  SKIN: No itching, burning, rashes, or lesions   LYMPH Nodes: No enlarged glands  ENDOCRINE: No heat or cold intolerance; No hair loss  MUSCULOSKELETAL: No joint pain or swelling; No muscle, back, or extremity pain  PSYCHIATRIC: No depression, anxiety, mood swings, or difficulty sleeping  HEME/LYMPH: No easy bruising, or bleeding gums  ALLERY AND IMMUNOLOGIC: No hives or eczema	    [ x] All others negative	  [ ] Unable to obtain    PHYSICAL EXAM:  T(C): 36.5 (08-19-24 @ 12:56), Max: 36.8 (08-18-24 @ 20:05)  HR: 64 (08-19-24 @ 12:56) (56 - 64)  BP: 164/99 (08-19-24 @ 12:56) (128/81 - 164/99)  RR: 18 (08-19-24 @ 12:56) (18 - 18)  SpO2: 95% (08-19-24 @ 12:56) (95% - 97%)  Wt(kg): --  I&O's Summary    18 Aug 2024 07:01  -  19 Aug 2024 07:00  --------------------------------------------------------  IN: 0 mL / OUT: 200 mL / NET: -200 mL    19 Aug 2024 07:01  -  19 Aug 2024 15:18  --------------------------------------------------------  IN: 0 mL / OUT: 400 mL / NET: -400 mL        Appearance: Normal	  HEENT:   Normal oral mucosa, PERRL, EOMI	  Carotid:  Right: No bruit    Left:  No bruit  Lymphatic: No lymphadenopathy  Cardiovascular: Normal S1 S2, No JVD, No murmurs, No edema  Respiratory: Lungs clear to auscultation	  Psychiatry: A & O x 3, Mood & affect appropriate  Gastrointestinal:  Soft, Non-tender, + BS	  Skin: No rashes, No ecchymoses, No cyanosis	  Neurologic: Non-focal  Extremities: Normal range of motion, No clubbing, cyanosis.  Vascular:   Right Femoral:  Palpable   Left Femoral:  Palpable  Right Popliteal: Palpable   Left Popliteal:  palpable  Right DP:  Palpable             Left DP:  Palpable  Right PT:  Palpable              Left PT:  Palpable      LABS:	 	    CBC Full  -  ( 19 Aug 2024 06:12 )  WBC Count : 9.51 K/uL  Hemoglobin : 10.7 g/dL  Hematocrit : 34.0 %  Platelet Count - Automated : 206 K/uL  Mean Cell Volume : 95.2 fl  Mean Cell Hemoglobin : 30.0 pg  Mean Cell Hemoglobin Concentration : 31.5 gm/dL  Auto Neutrophil # : x  Auto Lymphocyte # : x  Auto Monocyte # : x  Auto Eosinophil # : x  Auto Basophil # : x  Auto Neutrophil % : x  Auto Lymphocyte % : x  Auto Monocyte % : x  Auto Eosinophil % : x  Auto Basophil % : x    08-19    138  |  105  |  21  ----------------------------<  107<H>  3.9   |  22  |  0.86  08-18    135  |  104  |  24<H>  ----------------------------<  134<H>  4.4   |  19<L>  |  0.83    Ca    8.4      19 Aug 2024 06:12  Ca    8.5      18 Aug 2024 06:40

## 2024-08-19 NOTE — DIETITIAN INITIAL EVALUATION ADULT - NSFNSGIASSESSMENTFT_GEN_A_CORE
No N/V nor diarrhea/constipation reported; last BM on 8/17 - likely in setting of pt's NPO status. Ordered for Miralax, senna, dulcolax.  Ordered for PPI.

## 2024-08-19 NOTE — PROGRESS NOTE ADULT - PROBLEM SELECTOR PLAN 2
- CTA also w/ Emphysema. New mucoid impactions within bilateral lower lungs and tree-in-bud opacities likely representing COPD exacerbation. Recommend follow-up chest CT in 3 months to determine resolution.  - clinically patient without wheeze  - c/w chest PT, incentive spirometer, acapella  - home regimen: anoro + proventil  - c/w symbicort and spiriva while admitted. can resume home inhalers on discharge

## 2024-08-19 NOTE — DIETITIAN INITIAL EVALUATION ADULT - ADD RECOMMEND
[X] For glycemic control, consider EN regimen via NG tube: Glucerna 1.2 @ 10 mL/hr and titrate upwards as electrolytes WNL/pt tolerates to goal rate of 60 mL/hr x 24 hrs; EN regimen to provide: 1440 mL total volume, 1728 kcal/day, 86.4 g protein/day, 1159 mL free water; meets 29 kcal/kg, 1.46 g protein/kg; based on dosing wt of 58.8 kg. Defer free water flushes to team.            [X] Trend electrolytes as able.            [X] RD remains available upon request for TF formulary/rate adjustments prn.   [X] Consider adding nephro-Liang once daily for micronutrient coverage/wound healing, pending no medical contraindications.   [X] Monitor BMs as able  [X] Malnutrition/BMI <19 Sticker placed in chart  [X] RD will continue to monitor EN provision, GI tolerance, weight trends, skin integrity, BMs, labs/electrolytes prn.   RD remains available upon request.  [X] For glycemic control, consider EN regimen via NG tube: Glucerna 1.2 @ 20 mL/hr and titrate upwards by 10 mL/hr q4hrs as electrolytes WNL/pt tolerates to goal rate of 60 mL/hr x 24 hrs; EN regimen to provide: 1440 mL total volume, 1728 kcal/day, 86.4 g protein/day, 1159 mL free water; meets 29 kcal/kg, 1.46 g protein/kg; based on dosing wt of 58.8 kg. Defer free water flushes to team.            [X] Trend electrolytes as able.            [X] RD remains available upon request for TF formulary/rate adjustments prn.   [X] Consider adding nephro-Liang once daily for micronutrient coverage/wound healing, pending no medical contraindications.   [X] Monitor BMs as able  [X] Malnutrition/BMI <19 Sticker placed in chart  [X] RD will continue to monitor EN provision, GI tolerance, weight trends, skin integrity, BMs, labs/electrolytes prn.   RD remains available upon request.

## 2024-08-19 NOTE — DIETITIAN INITIAL EVALUATION ADULT - OTHER CALCULATIONS
Used current dosing wt of 58.8 kg in consideration of advanced age, BMI <19.   Defer fluid needs to team.

## 2024-08-19 NOTE — PROGRESS NOTE ADULT - ASSESSMENT
Assessment:  1. RUE DVT   -Subclavian, axillary and brachial veins and Superficial thrombus in the right basilic vein   -Recent admission for Right Temporal Craniotomy and Excision of Mass Temporal Lobar Resection Frozen Section on 7/9/24.  -hx of carcinoid tumor s/p hemicolectomy, nasopharyngeal carcinoma s/p chemo/RT  2. PE  -Low risk.       Plan:  1. Now on therapeutic lovenox starting 8/18  2. Can continue Lovenox outpt if no bleeding.   3. F/up with vasc cards outpt.        Thank you      Vascular Cardiology Service    Please call with any questions:   DIRECT SERVICE NUMBER:  [phone]

## 2024-08-19 NOTE — DIETITIAN INITIAL EVALUATION ADULT - PHYSCIAL ASSESSMENT
Unable to assess below facial region at this time; RD will continue to reassess as able/appropriate and as pt allows.

## 2024-08-19 NOTE — DIETITIAN INITIAL EVALUATION ADULT - PERTINENT LABORATORY DATA
08-19    138  |  105  |  21  ----------------------------<  107<H>  3.9   |  22  |  0.86    Ca    8.4      19 Aug 2024 06:12    POCT Blood Glucose.: 105 mg/dL (08-19-24 @ 17:26)  A1C with Estimated Average Glucose Result: 6.3 % (07-23-24 @ 10:46)  A1C with Estimated Average Glucose Result: 6.1 % (06-18-24 @ 13:03)

## 2024-08-19 NOTE — DIETITIAN INITIAL EVALUATION ADULT - ORAL INTAKE PTA/DIET HISTORY
Pt known to RD service with previous severe protein calorie malnutrition dx (6/28/23). Per pt PTA: Reports fair appetite/PO intake; consuming meals 3x/day. Reports no micronutrient supplementation; endorses occasionally having Glucerna Protein Shake (per serving provides 10 g PRO, 220 nasir). No known food allergies; endorses possible milk intolerance (rxn: diarrhea) - however endorses tolerating Glucerna based products. Reports consuming regular textured foods and thickened liquids (moderately thickened/honey consistency) - however, was manipulating consistency of liquids PTA as pt having difficulty swallowing, complaining it was too thick. Further endorses numerous episodes of pt coughing with meals PTA.

## 2024-08-19 NOTE — PROGRESS NOTE ADULT - SUBJECTIVE AND OBJECTIVE BOX
SUBJECTIVE: patient seen and evaluated with wife present.     Vital Signs Last 24 Hrs  T(C): 36.3 (08-19-24 @ 08:58), Max: 36.8 (08-18-24 @ 20:05)  T(F): 97.4 (08-19-24 @ 08:58), Max: 98.2 (08-18-24 @ 20:05)  HR: 60 (08-19-24 @ 08:58) (56 - 62)  BP: 155/82 (08-19-24 @ 08:58) (128/81 - 160/77)  BP(mean): --  RR: 18 (08-19-24 @ 08:58) (18 - 18)  SpO2: 95% (08-19-24 @ 08:58) (94% - 97%)    PHYSICAL EXAM:  Constitutional: No Acute Distress, thin male   Neurological: Awake, alert   Incision: R crani incision   Pulmonary: Clear lungs    Cardiovascular: Regular rate and rhythm   Gastrointestinal: Soft, Non-tender, Non-distended, +bowel sounds x 4  Extremities: No calf tenderness bilaterally, Mild RUE swelling     LABS:                      10.7   9.51  )-----------( 206      ( 19 Aug 2024 06:12 )             34.0    08-19  138  |  105  |  21  ----------------------------<  107<H>  3.9   |  22  |  0.86  Ca    8.4      19 Aug 2024 06:12    PT/INR - ( 19 Aug 2024 06:12 )   PT: 12.8 sec;   INR: 1.23 ratio    PTT - ( 19 Aug 2024 06:12 )  PTT:30.0 sec    08-18 @ 07:01  -  08-19 @ 07:00  --------------------------------------------------------  IN: 0 mL / OUT: 200 mL / NET: -200 mL      IMAGING:       MEDICATIONS  (STANDING):  albuterol    0.083% 2.5 milliGRAM(s) Nebulizer every 6 hours  albuterol    90 MICROgram(s) HFA Inhaler 1 Puff(s) Inhalation every 4 hours  budesonide 160 MICROgram(s)/formoterol 4.5 MICROgram(s) Inhaler 2 Puff(s) Inhalation two times a day  dextrose 5%. 1000 milliLiter(s) (50 mL/Hr) IV Continuous <Continuous>  dextrose 5%. 1000 milliLiter(s) (100 mL/Hr) IV Continuous <Continuous>  dextrose 50% Injectable 25 Gram(s) IV Push once  dextrose 50% Injectable 25 Gram(s) IV Push once  dextrose 50% Injectable 12.5 Gram(s) IV Push once  enoxaparin Injectable 60 milliGRAM(s) SubCutaneous every 12 hours  glucagon  Injectable 1 milliGRAM(s) IntraMuscular once  insulin lispro (ADMELOG) corrective regimen sliding scale   SubCutaneous every 6 hours  levETIRAcetam   Injectable 250 milliGRAM(s) IV Push every 12 hours  metoprolol succinate ER 25 milliGRAM(s) Oral daily  pantoprazole  Injectable 40 milliGRAM(s) IV Push daily  polyethylene glycol 3350 17 Gram(s) Oral daily  senna 2 Tablet(s) Oral at bedtime  simvastatin 20 milliGRAM(s) Oral at bedtime  sodium chloride 0.9%. 1000 milliLiter(s) (70 mL/Hr) IV Continuous <Continuous>  tiotropium 2.5 MICROgram(s) Inhaler 2 Puff(s) Inhalation daily    MEDICATIONS  (PRN):  acetaminophen     Tablet .. 650 milliGRAM(s) Oral every 6 hours PRN Mild Pain (1 - 3)  bisacodyl 5 milliGRAM(s) Oral daily PRN Constipation  dextrose Oral Gel 15 Gram(s) Oral once PRN Blood Glucose LESS THAN 70 milliGRAM(s)/deciliter    DIET: NPO    SUBJECTIVE: patient seen and evaluated. Patient failed MBS, NGT placed with ENT using scope. Patient tolerating procedure, feeds started. Vitals and labs reviewed   - Discussed clinical updates with wife at bedside.     Vital Signs Last 24 Hrs  T(C): 36.3 (08-19-24 @ 08:58), Max: 36.8 (08-18-24 @ 20:05)  T(F): 97.4 (08-19-24 @ 08:58), Max: 98.2 (08-18-24 @ 20:05)  HR: 60 (08-19-24 @ 08:58) (56 - 62)  BP: 155/82 (08-19-24 @ 08:58) (128/81 - 160/77)  BP(mean): --  RR: 18 (08-19-24 @ 08:58) (18 - 18)  SpO2: 95% (08-19-24 @ 08:58) (94% - 97%)    PHYSICAL EXAM:  Constitutional: No Acute Distress   Neurological: Awake, Alert, Oriented x2 (person and place, not oriented to year), EOMI, PERRL, trace L facial, briskly follows commands, moving all extremities with symmetric at least 4+/5 strength, no sensory deficits.   Incision: Previous R Crani incision closed with monocryl, clean, dry intact   Pulmonary: Clear lungs   Cardiovascular: no murmurs   Gastrointestinal: Soft, Non-tender, Non-distended abdomen   Extremities: No calf tenderness bilaterally,  some swelling of proximal RUE when compared to LUE no discoloration nontender pulses 2+    LABS:                      10.7   9.51  )-----------( 206      ( 19 Aug 2024 06:12 )             34.0    08-19  138  |  105  |  21  ----------------------------<  107<H>  3.9   |  22  |  0.86  Ca    8.4      19 Aug 2024 06:12    PT/INR - ( 19 Aug 2024 06:12 )   PT: 12.8 sec;   INR: 1.23 ratio    PTT - ( 19 Aug 2024 06:12 )  PTT:30.0 sec    08-18 @ 07:01  -  08-19 @ 07:00  --------------------------------------------------------  IN: 0 mL / OUT: 200 mL / NET: -200 mL    IMAGING:   < from: Xray Chest 1 View- PORTABLE-Urgent (Xray Chest 1 View- PORTABLE-Urgent .) (08.19.24 @ 13:28) >  ACC: [id number] EXAM:  XR CHEST PORTABLE URGENT 1V   ORDERED BY: JACKELYN ARNOLD   PROCEDURE DATE:  08/19/2024    INTERPRETATION:  EXAMINATION: XR CHEST URGENT    CLINICAL INDICATION: confirm NGT placement  TECHNIQUE: Single frontal view of the chest was obtained.  COMPARISON: Chest x-ray 8/10/2024.    FINDINGS:  LINES/TUBES: Nasogastric tube with distal tip in the stomach.  LUNGS/PLEURA: No focal consolidation. No pleural effusion. No   pneumothorax.  HEART: Loop recorder is present. The heart size cannot be accurately   assessed on this projection.    BONES: No acute osseous abnormalities.    IMPRESSION:  Nasogastric tube with distal tip in the stomach.  --- End of Report ---  BLAKE CARVAJAL MD; Resident Radiologist  This document has been electronically signed.  MARIELA CHURCHILL MD; Attending Radiologist  This document has been electronically signed. Aug 19 2024  3:36PM  < end of copied text >    MEDICATIONS  (STANDING):  albuterol    0.083% 2.5 milliGRAM(s) Nebulizer every 6 hours  albuterol    90 MICROgram(s) HFA Inhaler 1 Puff(s) Inhalation every 4 hours  budesonide 160 MICROgram(s)/formoterol 4.5 MICROgram(s) Inhaler 2 Puff(s) Inhalation two times a day  dextrose 5%. 1000 milliLiter(s) (50 mL/Hr) IV Continuous <Continuous>  dextrose 5%. 1000 milliLiter(s) (100 mL/Hr) IV Continuous <Continuous>  dextrose 50% Injectable 25 Gram(s) IV Push once  dextrose 50% Injectable 25 Gram(s) IV Push once  dextrose 50% Injectable 12.5 Gram(s) IV Push once  enoxaparin Injectable 60 milliGRAM(s) SubCutaneous every 12 hours  glucagon  Injectable 1 milliGRAM(s) IntraMuscular once  insulin lispro (ADMELOG) corrective regimen sliding scale   SubCutaneous every 6 hours  levETIRAcetam   Injectable 250 milliGRAM(s) IV Push every 12 hours  metoprolol succinate ER 25 milliGRAM(s) Oral daily  pantoprazole  Injectable 40 milliGRAM(s) IV Push daily  polyethylene glycol 3350 17 Gram(s) Oral daily  senna 2 Tablet(s) Oral at bedtime  simvastatin 20 milliGRAM(s) Oral at bedtime  sodium chloride 0.9%. 1000 milliLiter(s) (70 mL/Hr) IV Continuous <Continuous>  tiotropium 2.5 MICROgram(s) Inhaler 2 Puff(s) Inhalation daily    MEDICATIONS  (PRN):  acetaminophen     Tablet .. 650 milliGRAM(s) Oral every 6 hours PRN Mild Pain (1 - 3)  bisacodyl 5 milliGRAM(s) Oral daily PRN Constipation  dextrose Oral Gel 15 Gram(s) Oral once PRN Blood Glucose LESS THAN 70 milliGRAM(s)/deciliter    DIET: NGT

## 2024-08-19 NOTE — CONSULT NOTE ADULT - PROBLEM SELECTOR RECOMMENDATION 9
- Successful placement of NGT, 55 cm, with indirect laryngoscopy. Secured with Bridle attachment.  - Chest XR pending, please hold off on feeds until position is confirmed with imaging.
UE US: Right sided DVT involving the subclavian, axillary and brachial veins. Superficial thrombus in the right basilic vein.  CTA w/ Segmental emboli within right upper lobe pulmonary artery. TTE WNL    Vascular cardiology following, pt now started on hep gtt w/o bolus for lower ptt goal

## 2024-08-19 NOTE — DIETITIAN INITIAL EVALUATION ADULT - ETIOLOGY
inability to meet estimated nutrient needs secondary to dysphagia, COPD increased physiological demands

## 2024-08-19 NOTE — PROGRESS NOTE ADULT - ASSESSMENT
ASSESSMENT: 79M hx recent admission for and R crani for lesion w/ Dr. 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 Today presents to ED for RUE doppler w/ DVT involving the subclavian, axillary and brachial veins and Superficial thrombus in the right basilic vein. Plts/coags wnl. Exam: unchanged from discharge,  awake, Ox2 (self and place), EOS, mild L facial, FC, BUE 5/5, BLE 5/5    NEURO: Neurochecks q4h   CT Head 8/18 no ICH - prior to switch from heparin to SQL therapeutic dosing. Repeat in the AM prior to DOAC   Continue Keppra 250 mg BID for sz ppx  Pain control w/ Tylenol prn   Hospitalist following for co-management   PT/OT, out of bed     PULM: On room air, Sat>92%   CTA PE study 8/16- Segmental PE within R upper lobe pulmonary artery. No significant CT evidence of right heart strain  CT 8/16 w/ Emphysema. New mucoid impactions within bilateral lower lungs and tree-in-bud opacities, copd. Rec repeat ct in 3 months. Incentive spirometer and Chest PT for secretion facilitation. Acapella ordered   continue home inhalers     CV: -160mmHg, on tele   TTE 8/16 EF 71%   h/o HTN , PO metoprolol on hold while NPO (if needed will switch to IVP dosing)   statin     RENAL: NS @70cc/hr while NPO, voiding     GI: #dysphagia- failed MBS today for signs of aspiration. Family considering NGT feeds   Continue bowel regimen with miralax and senna. LBM 8/18    ENDO: No active issues     HEME/ONC: H/H stable, Platelets WNL   + R subclavian, axillary and brachial DVT- Vascular cardiology on consult- Rec CTA Chest PE study which showed new R segmental PE (No RV strain), started on heparin gtt now transitioned to therapeutic SQL 60 mg BID, will f/u     ID: afebrile, no leukocytosis     Discussed with Dr. Branham   [id number]  ASSESSMENT: 79M hx recent admission for and R crani for lesion w/ Dr. 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 Today presents to ED for RUE doppler w/ DVT involving the subclavian, axillary and brachial veins and Superficial thrombus in the right basilic vein. Plts/coags wnl. Exam: unchanged from discharge,  awake, Ox2 (self and place), EOS, mild L facial, FC, BUE 5/5, BLE 5/5    NEURO: Neurochecks q4h   CT Head 8/18 no ICH - prior to switch from heparin to SQL therapeutic dosing. Repeat in the AM prior to DOAC   Continue Keppra 250 mg BID for sz ppx  Pain control w/ Tylenol prn   Hospitalist following for co-management   PT/OT, out of bed     PULM: On room air, Sat>92%   CTA PE study 8/16- Segmental PE within R upper lobe pulmonary artery. No significant CT evidence of right heart strain  CT 8/16 w/ Emphysema. New mucoid impactions within bilateral lower lungs and tree-in-bud opacities, copd. Rec repeat ct in 3 months. Incentive spirometer and Chest PT for secretion facilitation. Acapella ordered   continue home inhalers     CV: -160mmHg, on tele   TTE 8/16 EF 71%   h/o HTN , continue metoprolol tartrate   statin     RENAL: NS @50cc/hr (IVL when at goal with TF), voiding     GI: #dysphagia- failed MBS today for signs of aspiration. NGT placed with ENT at bedside, glucerna TF started   Continue bowel regimen with miralax and senna. LBM 8/18    ENDO: No active issues     HEME/ONC: H/H stable, Platelets WNL   + R subclavian, axillary and brachial DVT- Vascular cardiology on consult- Rec CTA Chest PE study which showed new R segmental PE (No RV strain), started on heparin gtt now transitioned to therapeutic SQL 60 mg BID, will f/u     ID: afebrile, no leukocytosis     Discussed with Dr. Branham   [id number]

## 2024-08-19 NOTE — DIETITIAN INITIAL EVALUATION ADULT - SIGNS/SYMPTOMS
moderate muscle, moderate/severe fat wasting; BMI = 18.7 s/p craniotomy 8/6 for resection hemorrhagic lesion

## 2024-08-19 NOTE — PROGRESS NOTE ADULT - PROBLEM SELECTOR PLAN 1
and RUE DVT  - duplex of RUE: Right sided DVT involving the subclavian, axillary and brachial veins. Superficial thrombus in the right basilic vein  - CT Angio chest: segmental emboli within RUL pulmonary artery with no evidence of RV strain  - TTE unremarkable  - Vascular Cardiology consulted, and started on hep gtt on 8/16 now transitioned to therapeutic lovenox on 8/18  - transient drop in PLT on 8/18 now resolved. unclear etiology. monitor

## 2024-08-19 NOTE — DIETITIAN INITIAL EVALUATION ADULT - REASON INDICATOR FOR ASSESSMENT
RD Assessment Indicated for BMI <19  Source: Pt, Wife (at bedside), Team, Electronic Medical Record   Chart reviewed, events noted.

## 2024-08-19 NOTE — DIETITIAN INITIAL EVALUATION ADULT - PERTINENT MEDS FT
MEDICATIONS  (STANDING):  albuterol    0.083% 2.5 milliGRAM(s) Nebulizer every 6 hours  albuterol    90 MICROgram(s) HFA Inhaler 1 Puff(s) Inhalation every 4 hours  budesonide 160 MICROgram(s)/formoterol 4.5 MICROgram(s) Inhaler 2 Puff(s) Inhalation two times a day  dextrose 5%. 1000 milliLiter(s) (50 mL/Hr) IV Continuous <Continuous>  dextrose 5%. 1000 milliLiter(s) (100 mL/Hr) IV Continuous <Continuous>  dextrose 50% Injectable 25 Gram(s) IV Push once  dextrose 50% Injectable 12.5 Gram(s) IV Push once  dextrose 50% Injectable 25 Gram(s) IV Push once  enoxaparin Injectable 60 milliGRAM(s) SubCutaneous every 12 hours  glucagon  Injectable 1 milliGRAM(s) IntraMuscular once  insulin lispro (ADMELOG) corrective regimen sliding scale   SubCutaneous every 6 hours  levETIRAcetam   Injectable 250 milliGRAM(s) IV Push every 12 hours  metoprolol tartrate 12.5 milliGRAM(s) Oral every 12 hours  pantoprazole  Injectable 40 milliGRAM(s) IV Push daily  polyethylene glycol 3350 17 Gram(s) Oral daily  senna 2 Tablet(s) Oral at bedtime  simvastatin 20 milliGRAM(s) Oral at bedtime  sodium chloride 0.9%. 1000 milliLiter(s) (70 mL/Hr) IV Continuous <Continuous>  tiotropium 2.5 MICROgram(s) Inhaler 2 Puff(s) Inhalation daily    MEDICATIONS  (PRN):  acetaminophen     Tablet .. 650 milliGRAM(s) Oral every 6 hours PRN Mild Pain (1 - 3)  bisacodyl 5 milliGRAM(s) Oral daily PRN Constipation  dextrose Oral Gel 15 Gram(s) Oral once PRN Blood Glucose LESS THAN 70 milliGRAM(s)/deciliter

## 2024-08-20 LAB
GLUCOSE BLDC GLUCOMTR-MCNC: 104 MG/DL — HIGH (ref 70–99)
GLUCOSE BLDC GLUCOMTR-MCNC: 107 MG/DL — HIGH (ref 70–99)
GLUCOSE BLDC GLUCOMTR-MCNC: 114 MG/DL — HIGH (ref 70–99)
GLUCOSE BLDC GLUCOMTR-MCNC: 117 MG/DL — HIGH (ref 70–99)

## 2024-08-20 PROCEDURE — 70450 CT HEAD/BRAIN W/O DYE: CPT | Mod: 26

## 2024-08-20 PROCEDURE — 99232 SBSQ HOSP IP/OBS MODERATE 35: CPT

## 2024-08-20 RX ADMIN — LEVETIRACETAM 250 MILLIGRAM(S): 1000 TABLET ORAL at 05:02

## 2024-08-20 RX ADMIN — Medication 20 MILLIGRAM(S): at 21:21

## 2024-08-20 RX ADMIN — TIOTROPIUM BROMIDE INHALATION SPRAY 2 PUFF(S): 3.12 SPRAY, METERED RESPIRATORY (INHALATION) at 11:58

## 2024-08-20 RX ADMIN — Medication 40 MILLIGRAM(S): at 05:03

## 2024-08-20 RX ADMIN — LEVETIRACETAM 250 MILLIGRAM(S): 1000 TABLET ORAL at 17:00

## 2024-08-20 RX ADMIN — ENOXAPARIN SODIUM 60 MILLIGRAM(S): 100 INJECTION SUBCUTANEOUS at 17:00

## 2024-08-20 RX ADMIN — Medication 2.5 MILLIGRAM(S): at 00:05

## 2024-08-20 RX ADMIN — ENOXAPARIN SODIUM 60 MILLIGRAM(S): 100 INJECTION SUBCUTANEOUS at 05:02

## 2024-08-20 RX ADMIN — Medication 2.5 MILLIGRAM(S): at 17:00

## 2024-08-20 RX ADMIN — POLYETHYLENE GLYCOL 3350 17 GRAM(S): 17 POWDER, FOR SOLUTION ORAL at 12:20

## 2024-08-20 RX ADMIN — SODIUM CHLORIDE 50 MILLILITER(S): 9 INJECTION INTRAMUSCULAR; INTRAVENOUS; SUBCUTANEOUS at 05:04

## 2024-08-20 RX ADMIN — BUDESONIDE AND FORMOTEROL FUMARATE 2 PUFF(S): 80; 4.5 AEROSOL, METERED RESPIRATORY (INHALATION) at 05:02

## 2024-08-20 RX ADMIN — METOPROLOL TARTRATE 12.5 MILLIGRAM(S): 100 TABLET ORAL at 05:03

## 2024-08-20 RX ADMIN — METOPROLOL TARTRATE 12.5 MILLIGRAM(S): 100 TABLET ORAL at 17:00

## 2024-08-20 RX ADMIN — Medication 2.5 MILLIGRAM(S): at 05:02

## 2024-08-20 RX ADMIN — Medication 1 TABLET(S): at 11:57

## 2024-08-20 RX ADMIN — Medication 2.5 MILLIGRAM(S): at 12:02

## 2024-08-20 RX ADMIN — Medication 2 TABLET(S): at 21:22

## 2024-08-20 NOTE — PROGRESS NOTE ADULT - SUBJECTIVE AND OBJECTIVE BOX
Lizet Page MD  Division of Hospital Medicine  [facility]  Spectra: [phone]      Patient is a 79y old  Male who presents with a chief complaint of Acute embolism and thrombosis of deep vein of lower extremity     (19 Aug 2024 17:28)      SUBJECTIVE / OVERNIGHT EVENTS: no acute events overnight. no fever, chills, chest pain, nor dyspnea. s/p NGT placement by ENT yesterday with no issues.  ADDITIONAL REVIEW OF SYSTEMS:    MEDICATIONS  (STANDING):  albuterol    0.083% 2.5 milliGRAM(s) Nebulizer every 6 hours  albuterol    90 MICROgram(s) HFA Inhaler 1 Puff(s) Inhalation every 4 hours  amLODIPine   Tablet 5 milliGRAM(s) Oral daily  budesonide 160 MICROgram(s)/formoterol 4.5 MICROgram(s) Inhaler 2 Puff(s) Inhalation two times a day  dextrose 5%. 1000 milliLiter(s) (100 mL/Hr) IV Continuous <Continuous>  dextrose 5%. 1000 milliLiter(s) (50 mL/Hr) IV Continuous <Continuous>  dextrose 50% Injectable 25 Gram(s) IV Push once  dextrose 50% Injectable 12.5 Gram(s) IV Push once  dextrose 50% Injectable 25 Gram(s) IV Push once  enoxaparin Injectable 60 milliGRAM(s) SubCutaneous every 12 hours  glucagon  Injectable 1 milliGRAM(s) IntraMuscular once  insulin lispro (ADMELOG) corrective regimen sliding scale   SubCutaneous every 6 hours  levETIRAcetam 250 milliGRAM(s) Oral two times a day  metoprolol tartrate 12.5 milliGRAM(s) Oral every 12 hours  Nephro-corine 1 Tablet(s) Oral daily  pantoprazole   Suspension 40 milliGRAM(s) Oral before breakfast  polyethylene glycol 3350 17 Gram(s) Oral daily  senna 2 Tablet(s) Oral at bedtime  simvastatin 20 milliGRAM(s) Oral at bedtime  sodium chloride 0.9%. 1000 milliLiter(s) (50 mL/Hr) IV Continuous <Continuous>  tiotropium 2.5 MICROgram(s) Inhaler 2 Puff(s) Inhalation daily    MEDICATIONS  (PRN):  acetaminophen     Tablet .. 650 milliGRAM(s) Oral every 6 hours PRN Mild Pain (1 - 3)  bisacodyl 5 milliGRAM(s) Oral daily PRN Constipation  dextrose Oral Gel 15 Gram(s) Oral once PRN Blood Glucose LESS THAN 70 milliGRAM(s)/deciliter      CAPILLARY BLOOD GLUCOSE      POCT Blood Glucose.: 114 mg/dL (20 Aug 2024 12:09)  POCT Blood Glucose.: 104 mg/dL (20 Aug 2024 05:36)  POCT Blood Glucose.: 90 mg/dL (19 Aug 2024 23:56)  POCT Blood Glucose.: 105 mg/dL (19 Aug 2024 17:26)    I&O's Summary    19 Aug 2024 07:01  -  20 Aug 2024 07:00  --------------------------------------------------------  IN: 750 mL / OUT: 520 mL / NET: 230 mL    20 Aug 2024 07:01  -  20 Aug 2024 13:05  --------------------------------------------------------  IN: 0 mL / OUT: 1 mL / NET: -1 mL        PHYSICAL EXAM:  Vital Signs Last 24 Hrs  T(C): 36.3 (20 Aug 2024 07:34), Max: 36.7 (19 Aug 2024 18:21)  T(F): 97.4 (20 Aug 2024 07:34), Max: 98.1 (19 Aug 2024 18:21)  HR: 57 (20 Aug 2024 12:15) (57 - 81)  BP: 197/96 (20 Aug 2024 12:15) (125/79 - 197/96)  BP(mean): --  RR: 20 (20 Aug 2024 07:34) (18 - 20)  SpO2: 93% (20 Aug 2024 12:15) (93% - 98%)    Parameters below as of 20 Aug 2024 12:15  Patient On (Oxygen Delivery Method): room air    CONSTITUTIONAL: NAD, well-developed, well-groomed  EYES: PERRLA; conjunctiva and sclera clear  ENMT: Moist oral mucosa, +NGT in nares  NECK: Supple, no palpable masses; no thyromegaly  RESPIRATORY: Normal respiratory effort; lungs are clear to auscultation bilaterally, no wheeze nor crackles  CARDIOVASCULAR: Regular rate and rhythm, normal S1 and S2, no murmur/rub/gallop; No lower extremity edema  ABDOMEN: Soft, Nondistended, Nontender to palpation, normoactive bowel sounds  MUSCULOSKELETAL: No clubbing or cyanosis of digits; no joint swelling or tenderness to palpation  PSYCH: A+O to person, place, and time; affect appropriate  NEUROLOGY: CN 2-12 are intact and symmetric; no gross sensory deficits, grossly 5/5 strength throughout  SKIN: No rashes; no palpable lesions, +crani site c/d/i    LABS:                        10.7   9.51  )-----------( 206      ( 19 Aug 2024 06:12 )             34.0     08-19    138  |  105  |  21  ----------------------------<  107<H>  3.9   |  22  |  0.86    Ca    8.4      19 Aug 2024 06:12      PT/INR - ( 19 Aug 2024 06:12 )   PT: 12.8 sec;   INR: 1.23 ratio         PTT - ( 19 Aug 2024 06:12 )  PTT:30.0 sec      Urinalysis Basic - ( 19 Aug 2024 06:12 )    Color: x / Appearance: x / SG: x / pH: x  Gluc: 107 mg/dL / Ketone: x  / Bili: x / Urobili: x   Blood: x / Protein: x / Nitrite: x   Leuk Esterase: x / RBC: x / WBC x   Sq Epi: x / Non Sq Epi: x / Bacteria: x        RADIOLOGY & ADDITIONAL TESTS:  Results Reviewed:   Imaging Personally Reviewed:  Electrocardiogram Personally Reviewed:    COORDINATION OF CARE:  Care Discussed with Consultants/Other Providers [Y]: Neurosurgery ALYSON Lewis  Prior or Outpatient Records Reviewed [Y/N]:

## 2024-08-20 NOTE — PROGRESS NOTE ADULT - ASSESSMENT
ASSESSMENT: 79M hx recent admission for and R crani for lesion w/ Dr. 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 Today presents to ED for RUE doppler w/ DVT involving the subclavian, axillary and brachial veins and Superficial thrombus in the right basilic vein. Plts/coags wnl. Exam: unchanged from discharge,  awake, Ox2 (self and place), EOS, mild L facial, FC, BUE 5/5, BLE 5/5    NEURO: Neurochecks q4h   CT Head 8/18 no ICH - prior to switch from heparin to SQL therapeutic dosing. Holding of DOAC initiation given failed MBS and possibly needing PEG.    Continue Keppra 250 mg BID for sz ppx  Pain control w/ Tylenol prn   Hospitalist following for co-management   PT/OT, out of bed daily with assistance    PULM: On room air, Sat>92%   CTA PE study 8/16- Segmental PE within R upper lobe pulmonary artery. No significant CT evidence of right heart strain  CT 8/16 w/ Emphysema. New mucoid impactions within bilateral lower lungs and tree-in-bud opacities, copd. Rec repeat ct in 3 months. Incentive spirometer and Chest PT for secretion facilitation. Acapella ordered   continue home inhalers     CV: -160mmHg, on tele   TTE 8/16 EF 71%   h/o HTN , continue metoprolol tartrate and amlodipine 5mg daily- improved.   statin     RENAL: NS @50cc/hr (IVL when at goal with TF), voiding     GI: #dysphagia- failed MBS 8/19 for signs of aspiration. NGT placed with ENT at bedside, glucerna TF started   Continue bowel regimen with miralax and senna. LBM 8/18    ENDO: No active issues     HEME/ONC: H/H stable, Platelets WNL   + R subclavian, axillary and brachial DVT- Vascular cardiology on consult- Rec CTA Chest PE study which showed new R segmental PE (No RV strain), started on heparin gtt now transitioned to therapeutic SQL 60 mg BID, will trend CBC    ID: afebrile, no leukocytosis   f/u am labs    Discussed with Dr. Branham   [id number]

## 2024-08-20 NOTE — PROGRESS NOTE ADULT - SUBJECTIVE AND OBJECTIVE BOX
SUBJECTIVE: patient seen and evaluated. Patient failed MBS and NGT placed yesterday with ENT using scope. He's frustrated with not eating.   wife updated at bedside.     Vital Signs Last 24 Hrs  T(C): 36.9 (20 Aug 2024 16:50), Max: 36.9 (20 Aug 2024 16:50)  T(F): 98.5 (20 Aug 2024 16:50), Max: 98.5 (20 Aug 2024 16:50)  HR: 58 (20 Aug 2024 16:50) (57 - 81)  BP: 157/91 (20 Aug 2024 16:50) (125/79 - 197/96)  BP(mean): --  RR: 18 (20 Aug 2024 16:50) (18 - 20)  SpO2: 94% (20 Aug 2024 16:50) (93% - 98%)    Parameters below as of 20 Aug 2024 16:50  Patient On (Oxygen Delivery Method): room air    PHYSICAL EXAM:  Constitutional: No Acute Distress   Neurological: Awake, Alert, Oriented x2 (person and place, not oriented to year), EOMI, PERRL, trace L facial, briskly follows commands, moving all extremities with symmetric at least 4+/5 strength, no sensory deficits.   Incision: Previous R Crani incision closed with monocryl, clean, dry intact   Pulmonary: Clear lungs   Cardiovascular: no murmurs   Gastrointestinal: Soft, Non-tender, Non-distended abdomen   Extremities: No calf tenderness bilaterally,  some swelling of proximal RUE when compared to LUE no discoloration nontender pulses 2+    LABS:                                 10.7   9.51  )-----------( 206      ( 19 Aug 2024 06:12 )             34.0   08-19    138  |  105  |  21  ----------------------------<  107<H>  3.9   |  22  |  0.86    Ca    8.4      19 Aug 2024 06:12    IMAGING:   < from: Xray Chest 1 View- PORTABLE-Urgent (Xray Chest 1 View- PORTABLE-Urgent .) (08.19.24 @ 13:28) >  ACC: [id number] EXAM:  XR CHEST PORTABLE URGENT 1V   ORDERED BY: JACKELYN ARNOLD   PROCEDURE DATE:  08/19/2024    INTERPRETATION:  EXAMINATION: XR CHEST URGENT    CLINICAL INDICATION: confirm NGT placement  TECHNIQUE: Single frontal view of the chest was obtained.  COMPARISON: Chest x-ray 8/10/2024.    FINDINGS:  LINES/TUBES: Nasogastric tube with distal tip in the stomach.  LUNGS/PLEURA: No focal consolidation. No pleural effusion. No   pneumothorax.  HEART: Loop recorder is present. The heart size cannot be accurately   assessed on this projection.    BONES: No acute osseous abnormalities.    IMPRESSION:  Nasogastric tube with distal tip in the stomach.  --- End of Report ---  BLAKE CARVAJAL MD; Resident Radiologist  This document has been electronically signed.  MARIELA CHURCHILL MD; Attending Radiologist  This document has been electronically signed. Aug 19 2024  3:36PM  < end of copied text >    MEDICATIONS  (STANDING):  albuterol    0.083% 2.5 milliGRAM(s) Nebulizer every 6 hours  albuterol    90 MICROgram(s) HFA Inhaler 1 Puff(s) Inhalation every 4 hours  amLODIPine   Tablet 5 milliGRAM(s) Oral daily  budesonide 160 MICROgram(s)/formoterol 4.5 MICROgram(s) Inhaler 2 Puff(s) Inhalation two times a day  dextrose 5%. 1000 milliLiter(s) (100 mL/Hr) IV Continuous <Continuous>  dextrose 5%. 1000 milliLiter(s) (50 mL/Hr) IV Continuous <Continuous>  dextrose 50% Injectable 25 Gram(s) IV Push once  dextrose 50% Injectable 25 Gram(s) IV Push once  dextrose 50% Injectable 12.5 Gram(s) IV Push once  enoxaparin Injectable 60 milliGRAM(s) SubCutaneous every 12 hours  glucagon  Injectable 1 milliGRAM(s) IntraMuscular once  insulin lispro (ADMELOG) corrective regimen sliding scale   SubCutaneous every 6 hours  levETIRAcetam 250 milliGRAM(s) Oral two times a day  metoprolol tartrate 12.5 milliGRAM(s) Oral every 12 hours  Nephro-corine 1 Tablet(s) Oral daily  pantoprazole   Suspension 40 milliGRAM(s) Oral before breakfast  polyethylene glycol 3350 17 Gram(s) Oral daily  senna 2 Tablet(s) Oral at bedtime  simvastatin 20 milliGRAM(s) Oral at bedtime  sodium chloride 0.9%. 1000 milliLiter(s) (50 mL/Hr) IV Continuous <Continuous>  tiotropium 2.5 MICROgram(s) Inhaler 2 Puff(s) Inhalation daily    MEDICATIONS  (PRN):  acetaminophen     Tablet .. 650 milliGRAM(s) Oral every 6 hours PRN Mild Pain (1 - 3)  bisacodyl 5 milliGRAM(s) Oral daily PRN Constipation  dextrose Oral Gel 15 Gram(s) Oral once PRN Blood Glucose LESS THAN 70 milliGRAM(s)/deciliter    DIET: NGT

## 2024-08-20 NOTE — PROGRESS NOTE ADULT - ASSESSMENT
78 yo male with PMH of HTN, T2DM (diet controlled), HLD, COPD, CKD 3, carcinoid tumor s/p hemicolectomy, nasopharyngeal carcinoma s/p chemo/RT (c/b dysphagia ), IPMN s/p distal pancreatectomy (2018), and paroxysmal Afib on Eliquis s/p craniotomy 8/6 for resection of hemorrhagic lesion. Now presented to the ED for RUE swelling, found to have RUE DVT involving subclavian, axillary, and brachial veins and PE.

## 2024-08-21 LAB
ANION GAP SERPL CALC-SCNC: 14 MMOL/L — SIGNIFICANT CHANGE UP (ref 5–17)
BASOPHILS # BLD AUTO: 0.01 K/UL — SIGNIFICANT CHANGE UP (ref 0–0.2)
BASOPHILS NFR BLD AUTO: 0.2 % — SIGNIFICANT CHANGE UP (ref 0–2)
BUN SERPL-MCNC: 22 MG/DL — SIGNIFICANT CHANGE UP (ref 7–23)
CALCIUM SERPL-MCNC: 9 MG/DL — SIGNIFICANT CHANGE UP (ref 8.4–10.5)
CHLORIDE SERPL-SCNC: 101 MMOL/L — SIGNIFICANT CHANGE UP (ref 96–108)
CO2 SERPL-SCNC: 23 MMOL/L — SIGNIFICANT CHANGE UP (ref 22–31)
CREAT SERPL-MCNC: 0.92 MG/DL — SIGNIFICANT CHANGE UP (ref 0.5–1.3)
EGFR: 84 ML/MIN/1.73M2 — SIGNIFICANT CHANGE UP
EOSINOPHIL # BLD AUTO: 0.01 K/UL — SIGNIFICANT CHANGE UP (ref 0–0.5)
EOSINOPHIL NFR BLD AUTO: 0.2 % — SIGNIFICANT CHANGE UP (ref 0–6)
GLUCOSE BLDC GLUCOMTR-MCNC: 102 MG/DL — HIGH (ref 70–99)
GLUCOSE BLDC GLUCOMTR-MCNC: 109 MG/DL — HIGH (ref 70–99)
GLUCOSE BLDC GLUCOMTR-MCNC: 120 MG/DL — HIGH (ref 70–99)
GLUCOSE BLDC GLUCOMTR-MCNC: 130 MG/DL — HIGH (ref 70–99)
GLUCOSE SERPL-MCNC: 106 MG/DL — HIGH (ref 70–99)
HCT VFR BLD CALC: 33.9 % — LOW (ref 39–50)
HGB BLD-MCNC: 11.3 G/DL — LOW (ref 13–17)
IMM GRANULOCYTES NFR BLD AUTO: 1.3 % — HIGH (ref 0–0.9)
LYMPHOCYTES # BLD AUTO: 1.01 K/UL — SIGNIFICANT CHANGE UP (ref 1–3.3)
LYMPHOCYTES # BLD AUTO: 16.1 % — SIGNIFICANT CHANGE UP (ref 13–44)
MAGNESIUM SERPL-MCNC: 1.9 MG/DL — SIGNIFICANT CHANGE UP (ref 1.6–2.6)
MCHC RBC-ENTMCNC: 30.6 PG — SIGNIFICANT CHANGE UP (ref 27–34)
MCHC RBC-ENTMCNC: 33.3 GM/DL — SIGNIFICANT CHANGE UP (ref 32–36)
MCV RBC AUTO: 91.9 FL — SIGNIFICANT CHANGE UP (ref 80–100)
MONOCYTES # BLD AUTO: 0.59 K/UL — SIGNIFICANT CHANGE UP (ref 0–0.9)
MONOCYTES NFR BLD AUTO: 9.4 % — SIGNIFICANT CHANGE UP (ref 2–14)
NEUTROPHILS # BLD AUTO: 4.59 K/UL — SIGNIFICANT CHANGE UP (ref 1.8–7.4)
NEUTROPHILS NFR BLD AUTO: 72.8 % — SIGNIFICANT CHANGE UP (ref 43–77)
NRBC # BLD: 0 /100 WBCS — SIGNIFICANT CHANGE UP (ref 0–0)
PHOSPHATE SERPL-MCNC: 4.4 MG/DL — SIGNIFICANT CHANGE UP (ref 2.5–4.5)
PLATELET # BLD AUTO: 253 K/UL — SIGNIFICANT CHANGE UP (ref 150–400)
POTASSIUM SERPL-MCNC: 3.7 MMOL/L — SIGNIFICANT CHANGE UP (ref 3.5–5.3)
POTASSIUM SERPL-SCNC: 3.7 MMOL/L — SIGNIFICANT CHANGE UP (ref 3.5–5.3)
RBC # BLD: 3.69 M/UL — LOW (ref 4.2–5.8)
RBC # FLD: 16.3 % — HIGH (ref 10.3–14.5)
SODIUM SERPL-SCNC: 138 MMOL/L — SIGNIFICANT CHANGE UP (ref 135–145)
WBC # BLD: 6.29 K/UL — SIGNIFICANT CHANGE UP (ref 3.8–10.5)
WBC # FLD AUTO: 6.29 K/UL — SIGNIFICANT CHANGE UP (ref 3.8–10.5)

## 2024-08-21 PROCEDURE — 99024 POSTOP FOLLOW-UP VISIT: CPT

## 2024-08-21 PROCEDURE — 99232 SBSQ HOSP IP/OBS MODERATE 35: CPT

## 2024-08-21 RX ORDER — CHLORHEXIDINE GLUCONATE 40 MG/ML
1 SOLUTION TOPICAL
Refills: 0 | Status: DISCONTINUED | OUTPATIENT
Start: 2024-08-21 | End: 2024-09-04

## 2024-08-21 RX ORDER — POTASSIUM CHLORIDE 10 MEQ
20 TABLET, EXT RELEASE, PARTICLES/CRYSTALS ORAL ONCE
Refills: 0 | Status: COMPLETED | OUTPATIENT
Start: 2024-08-21 | End: 2024-08-21

## 2024-08-21 RX ORDER — POLYETHYLENE GLYCOL 3350 17 G/17G
17 POWDER, FOR SOLUTION ORAL
Refills: 0 | Status: DISCONTINUED | OUTPATIENT
Start: 2024-08-21 | End: 2024-08-28

## 2024-08-21 RX ADMIN — Medication 40 MILLIGRAM(S): at 06:01

## 2024-08-21 RX ADMIN — Medication 1 TABLET(S): at 12:04

## 2024-08-21 RX ADMIN — POLYETHYLENE GLYCOL 3350 17 GRAM(S): 17 POWDER, FOR SOLUTION ORAL at 17:07

## 2024-08-21 RX ADMIN — Medication 2.5 MILLIGRAM(S): at 17:07

## 2024-08-21 RX ADMIN — AMLODIPINE BESYLATE 5 MILLIGRAM(S): 10 TABLET ORAL at 06:01

## 2024-08-21 RX ADMIN — BUDESONIDE AND FORMOTEROL FUMARATE 2 PUFF(S): 80; 4.5 AEROSOL, METERED RESPIRATORY (INHALATION) at 17:07

## 2024-08-21 RX ADMIN — Medication 2.5 MILLIGRAM(S): at 00:21

## 2024-08-21 RX ADMIN — Medication 100 GRAM(S): at 09:26

## 2024-08-21 RX ADMIN — LEVETIRACETAM 250 MILLIGRAM(S): 1000 TABLET ORAL at 17:12

## 2024-08-21 RX ADMIN — TIOTROPIUM BROMIDE INHALATION SPRAY 2 PUFF(S): 3.12 SPRAY, METERED RESPIRATORY (INHALATION) at 12:04

## 2024-08-21 RX ADMIN — LEVETIRACETAM 250 MILLIGRAM(S): 1000 TABLET ORAL at 06:01

## 2024-08-21 RX ADMIN — Medication 2.5 MILLIGRAM(S): at 12:04

## 2024-08-21 RX ADMIN — BUDESONIDE AND FORMOTEROL FUMARATE 2 PUFF(S): 80; 4.5 AEROSOL, METERED RESPIRATORY (INHALATION) at 06:00

## 2024-08-21 RX ADMIN — Medication 20 MILLIGRAM(S): at 21:12

## 2024-08-21 RX ADMIN — METOPROLOL TARTRATE 12.5 MILLIGRAM(S): 100 TABLET ORAL at 17:08

## 2024-08-21 RX ADMIN — ENOXAPARIN SODIUM 60 MILLIGRAM(S): 100 INJECTION SUBCUTANEOUS at 06:00

## 2024-08-21 RX ADMIN — ENOXAPARIN SODIUM 60 MILLIGRAM(S): 100 INJECTION SUBCUTANEOUS at 17:08

## 2024-08-21 RX ADMIN — Medication 2.5 MILLIGRAM(S): at 06:00

## 2024-08-21 RX ADMIN — Medication 20 MILLIEQUIVALENT(S): at 09:26

## 2024-08-21 RX ADMIN — Medication 2.5 MILLIGRAM(S): at 23:17

## 2024-08-21 NOTE — PROGRESS NOTE ADULT - SUBJECTIVE AND OBJECTIVE BOX
SUBJECTIVE:   Patient was seen and evaluated at bedside. Patient is resting in bed and is in no new acute distress.   OVERNIGHT EVENTS:   none   Vital Signs Last 24 Hrs  T(C): 36.7 (21 Aug 2024 08:30), Max: 37 (20 Aug 2024 20:53)  T(F): 98 (21 Aug 2024 08:30), Max: 98.6 (20 Aug 2024 20:53)  HR: 60 (21 Aug 2024 08:30) (57 - 63)  BP: 121/62 (21 Aug 2024 08:30) (119/70 - 197/96)  BP(mean): --  RR: 18 (21 Aug 2024 08:30) (18 - 18)  SpO2: 93% (21 Aug 2024 08:30) (93% - 97%)    Parameters below as of 21 Aug 2024 08:30  Patient On (Oxygen Delivery Method): room air        PHYSICAL EXAM:    General: No Acute Distress     Neurological: Awake, alert oriented to person, place and time, has some mild confusion and hypophonic,  Following Commands, PERRL, EOMI, Face Symmetrical, Speech Fluent, Moving all extremities, Muscle Strength normal in all four extremities, No Drift, Sensation to Light Touch Intact    Pulmonary: Clear to Auscultation, No Rales, No Rhonchi, No Wheezes     Cardiovascular: S1, S2, Regular Rate and Rhythm     Gastrointestinal: Soft, Nontender, Nondistended     Incision: clean and dry     LABS:                        11.3   6.29  )-----------( 253      ( 21 Aug 2024 06:56 )             33.9    08-21    138  |  101  |  22  ----------------------------<  106<H>  3.7   |  23  |  0.92    Ca    9.0      21 Aug 2024 06:57  Phos  4.4     08-21  Mg     1.9     08-21 08-20 @ 07:01  -  08-21 @ 07:00  --------------------------------------------------------  IN: 660 mL / OUT: 251 mL / NET: 409 mL      DRAINS:     MEDICATIONS:  Antibiotics:    Neuro:  acetaminophen     Tablet .. 650 milliGRAM(s) Oral every 6 hours PRN Mild Pain (1 - 3)  levETIRAcetam 250 milliGRAM(s) Oral two times a day    Cardiac:  amLODIPine   Tablet 5 milliGRAM(s) Oral daily  metoprolol tartrate 12.5 milliGRAM(s) Oral every 12 hours    Pulm:  albuterol    0.083% 2.5 milliGRAM(s) Nebulizer every 6 hours  albuterol    90 MICROgram(s) HFA Inhaler 1 Puff(s) Inhalation every 4 hours  budesonide 160 MICROgram(s)/formoterol 4.5 MICROgram(s) Inhaler 2 Puff(s) Inhalation two times a day  tiotropium 2.5 MICROgram(s) Inhaler 2 Puff(s) Inhalation daily    GI/:  bisacodyl 5 milliGRAM(s) Oral daily PRN Constipation  pantoprazole   Suspension 40 milliGRAM(s) Oral before breakfast  polyethylene glycol 3350 17 Gram(s) Oral daily  senna 2 Tablet(s) Oral at bedtime    Other:   dextrose 5%. 1000 milliLiter(s) IV Continuous <Continuous>  dextrose 5%. 1000 milliLiter(s) IV Continuous <Continuous>  dextrose 50% Injectable 25 Gram(s) IV Push once  dextrose 50% Injectable 12.5 Gram(s) IV Push once  dextrose 50% Injectable 25 Gram(s) IV Push once  dextrose Oral Gel 15 Gram(s) Oral once PRN Blood Glucose LESS THAN 70 milliGRAM(s)/deciliter  enoxaparin Injectable 60 milliGRAM(s) SubCutaneous every 12 hours  glucagon  Injectable 1 milliGRAM(s) IntraMuscular once  insulin lispro (ADMELOG) corrective regimen sliding scale   SubCutaneous every 6 hours  Nephro-corine 1 Tablet(s) Oral daily  simvastatin 20 milliGRAM(s) Oral at bedtime  sodium chloride 0.9%. 1000 milliLiter(s) IV Continuous <Continuous>    DIET: [] Regular [] CCD [] Renal [] Puree [] Dysphagia [] Tube Feeds:   glucerna tube feeds   IMAGING:   ACC: [id number] EXAM:  CT BRAIN   ORDERED BY: JACKELYN ARNOLD     PROCEDURE DATE:  08/20/2024          INTERPRETATION:  CLINICAL INFORMATION: prev R crani for tumor    COMPARISON: 8/8/2024    TECHNIQUE:  Serial axial images were obtained from the skull base to the   vertex using multi-slice helical technique. Sagittal and coronal   reformats were obtained.    FINDINGS:    VENTRICLES AND SULCI: Age appropriate involutional changes.  INTRA-AXIAL: Evidence of prior infarcts as seen on the prior in the MCA   territory at the level of the right frontal and right parietal regions as   well as in the left PCA territory in the left paramedian parietal   occipital region. Focal area of lucency in the left frontal lobe region.   All of these are seen 8/18/2024. Microvascular ischemic changes involving   the periventricular and subcortical white matter. Right temporal   encephalomalacia.  EXTRA-AXIAL: No mass or fluid collection. Basal cisterns are normal in   appearance.    VISUALIZED SINUSES:  Clear.  TYMPANOMASTOID CAVITIES:  Clear.  VISUALIZED ORBITS: Normal.  CALVARIUM: Right temporal craniotomy    MISCELLANEOUS: None.      IMPRESSION:  No significant interval change 8/18/2024 in postop changes at the level   of the right temporal lobe. Evidence of prior infarcts as described.        --- End of Report ---            RAMESH SIMENTAL MD; Attending Radiologist  This document has been electronically signed. Aug 20 2024  1:23PM    EXAM: [id number] - US DPLX UPR EXT VEINS COMPL BI  - ORDERED BY: LILLIAN CALDERA      PROCEDURE DATE:  08/15/2024           INTERPRETATION:  CLINICAL INFORMATION: Swelling postcraniotomy    COMPARISON: None available.    TECHNIQUE: Duplex sonography of the BILATERAL upper extremity veins with   color and spectral Doppler, with and without compression.    FINDINGS:    RIGHT:  The right internal jugular vein is patent and compressible where   applicable.  The cephalic vein (superficial vein) is patent and without   thrombus. The radial and ulnar veins are patent    There is expansion of the subclavian vein which is filled with thrombus   and demonstrates minimal flow. There is a similar appearance involving   the axillary and brachial veins. There is superficial thrombus involving   the basilic vein.    LEFT:  The left internal jugular, subclavian, axillary and brachial veins are   patent and compressible where applicable.  The basilic vein (superficial   vein) is patent and without thrombus.  The cephalic vein (superficial   vein) is patent and without thrombus. Radial and ulnar veins are patent    Doppler examination shows normal spontaneous and phasic flow.    IMPRESSION:  Right sided DVT involving the subclavian, axillary and brachial veins.   Superficial thrombus in the right basilic vein.  Results were discussed with nurse practitioner Jf at 3:05 PM on   8/15/2024. Patient was told to go to the ER per the nurse practitioner    --- End of Report ---               IRA ESTRADA; Attending Radiologist   This document has been electronically signed. Aug 15 2024  3:05PM

## 2024-08-21 NOTE — PROGRESS NOTE ADULT - ASSESSMENT
HPI:  79M hx recent admission for and R crani for lesion w/ Dr. 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 Today presents to ED for RUE doppler w/ DVT involving the subclavian, axillary and brachial veins and Superficial thrombus in the right basilic vein. Plts/coags wnl. Exam: unchanged from discharge,  awake, Ox2 (self and place), EOS, mild L facial, FC, BUE 5/5, BLE 5/5      PROCEDURE:  none       PLAN:  Neuro:   1 Out of bed   2 continue pt/ot   3 Keppra to prevent seizure   4 prn pain meds   5 outpatient PT once medically stable   6 brain lesion path from 8/6 -radiation necrosis   7 decadron taper to control cerebral edema -finished   8 ct head stable on 8/20     Respiratory:   1 room air   2 COPD - continue Symbicort, tiptrpium and albuterol   3 + pulmonary embolism- on full AC with sql     CV:  1 blood pressure stable -continue metoprolol and amlodipine   2 ECHO 8/16 ef 71 %   Endocrine:   1 stable   2 A1C 6.3 - sliding scale   Heme/Onc:             DVT ppx: + right upper extremity dvt -on therapeutic lovenoc 60 bid   1 + right upper dvt and PE   2 platelets are stable     Renal:   1 voiding   2 NS @50     ID:   1 afebrile     GI:   1 failed mbs on 8/19   2 + bm   3 continue senna and miralax   4 Protonix   5 swallow eval in am   6 bm on 8/21     Social/Family: answered all question     Discharge planning: home once medically stable            Problem/Plan - 1:  ·  Problem: Mass of temporal lobe.      Problem/Plan - 2:  ·  Problem: Pulmonary embolism.      Problem/Plan - 3:  ·  Problem: HTN (hypertension).      Problem/Plan - 4:  ·  Problem: Type 2 diabetes mellitus.      Problem/Plan - 5:  ·  Problem: COPD without exacerbation.        Additional Information:  Additional Information: 36 minutes spent.    Attestation Statements:    Attestation Statements:  Attending and PA/NP shared services statement (NON-critical care):     PA/NP to bill.     I independently performed the documented history, exam, and medical decision making.

## 2024-08-21 NOTE — PROGRESS NOTE ADULT - ASSESSMENT
78 yo male with PMH of HTN, T2DM (diet controlled), HLD, COPD, CKD 3, carcinoid tumor s/p hemicolectomy, nasopharyngeal carcinoma s/p chemo/RT (c/b dysphagia ), IPMN s/p distal pancreatectomy (2018), and paroxysmal Afib on Eliquis s/p craniotomy 8/6 for resection of hemorrhagic lesion. Now presented to the ED for RUE swelling, found to have RUE DVT involving subclavian, axillary, and brachial veins and PE. Course also c/b dysphagia now s/p NGT placement.

## 2024-08-21 NOTE — PROGRESS NOTE ADULT - SUBJECTIVE AND OBJECTIVE BOX
Lizet Page MD  Division of Hospital Medicine  [facility]  Spectra: [phone]      Patient is a 79y old  Male who presents with a chief complaint of post-operative DVT (21 Aug 2024 10:17)      SUBJECTIVE / OVERNIGHT EVENTS: no acute events overnight. +constipation having difficulty moving his bowels but passing flatus. no fever, chills, chest pain, nor dyspnea.   ADDITIONAL REVIEW OF SYSTEMS:    MEDICATIONS  (STANDING):  albuterol    0.083% 2.5 milliGRAM(s) Nebulizer every 6 hours  albuterol    90 MICROgram(s) HFA Inhaler 1 Puff(s) Inhalation every 4 hours  amLODIPine   Tablet 5 milliGRAM(s) Oral daily  budesonide 160 MICROgram(s)/formoterol 4.5 MICROgram(s) Inhaler 2 Puff(s) Inhalation two times a day  chlorhexidine 2% Cloths 1 Application(s) Topical <User Schedule>  dextrose 5%. 1000 milliLiter(s) (100 mL/Hr) IV Continuous <Continuous>  dextrose 5%. 1000 milliLiter(s) (50 mL/Hr) IV Continuous <Continuous>  dextrose 50% Injectable 25 Gram(s) IV Push once  dextrose 50% Injectable 12.5 Gram(s) IV Push once  dextrose 50% Injectable 25 Gram(s) IV Push once  enoxaparin Injectable 60 milliGRAM(s) SubCutaneous every 12 hours  glucagon  Injectable 1 milliGRAM(s) IntraMuscular once  insulin lispro (ADMELOG) corrective regimen sliding scale   SubCutaneous every 6 hours  levETIRAcetam 250 milliGRAM(s) Oral two times a day  metoprolol tartrate 12.5 milliGRAM(s) Oral every 12 hours  Nephro-corine 1 Tablet(s) Oral daily  pantoprazole   Suspension 40 milliGRAM(s) Oral before breakfast  polyethylene glycol 3350 17 Gram(s) Oral two times a day  senna 2 Tablet(s) Oral at bedtime  simvastatin 20 milliGRAM(s) Oral at bedtime  sodium chloride 0.9%. 1000 milliLiter(s) (50 mL/Hr) IV Continuous <Continuous>  tiotropium 2.5 MICROgram(s) Inhaler 2 Puff(s) Inhalation daily    MEDICATIONS  (PRN):  acetaminophen     Tablet .. 650 milliGRAM(s) Oral every 6 hours PRN Mild Pain (1 - 3)  bisacodyl 5 milliGRAM(s) Oral daily PRN Constipation  dextrose Oral Gel 15 Gram(s) Oral once PRN Blood Glucose LESS THAN 70 milliGRAM(s)/deciliter      CAPILLARY BLOOD GLUCOSE      POCT Blood Glucose.: 120 mg/dL (21 Aug 2024 11:16)  POCT Blood Glucose.: 109 mg/dL (21 Aug 2024 06:09)  POCT Blood Glucose.: 102 mg/dL (21 Aug 2024 00:23)  POCT Blood Glucose.: 107 mg/dL (20 Aug 2024 21:07)  POCT Blood Glucose.: 117 mg/dL (20 Aug 2024 16:07)    I&O's Summary    20 Aug 2024 07:01  -  21 Aug 2024 07:00  --------------------------------------------------------  IN: 660 mL / OUT: 251 mL / NET: 409 mL        PHYSICAL EXAM:  Vital Signs Last 24 Hrs  T(C): 36.7 (21 Aug 2024 13:02), Max: 37 (20 Aug 2024 20:53)  T(F): 98.1 (21 Aug 2024 13:02), Max: 98.6 (20 Aug 2024 20:53)  HR: 71 (21 Aug 2024 13:02) (58 - 71)  BP: 130/75 (21 Aug 2024 13:02) (119/70 - 157/91)  BP(mean): --  RR: 18 (21 Aug 2024 13:02) (18 - 18)  SpO2: 94% (21 Aug 2024 13:02) (93% - 97%)    Parameters below as of 21 Aug 2024 13:02  Patient On (Oxygen Delivery Method): room air    CONSTITUTIONAL: NAD, well-developed, well-groomed  EYES: PERRLA; conjunctiva and sclera clear  ENMT: Moist oral mucosa, +NGT in nares  NECK: Supple, no palpable masses; no thyromegaly  RESPIRATORY: Normal respiratory effort; lungs are clear to auscultation bilaterally, no wheeze nor crackles  CARDIOVASCULAR: Regular rate and rhythm, normal S1 and S2, no murmur/rub/gallop; No lower extremity edema  ABDOMEN: Soft, Nondistended, Nontender to palpation, normoactive bowel sounds  MUSCULOSKELETAL: No clubbing or cyanosis of digits; no joint swelling or tenderness to palpation  PSYCH: A+O to person, place, and time; affect appropriate  NEUROLOGY: CN 2-12 are intact and symmetric; no gross sensory deficits, grossly 5/5 strength throughout  SKIN: No rashes; no palpable lesions, +crani site c/d/i      LABS:                        11.3   6.29  )-----------( 253      ( 21 Aug 2024 06:56 )             33.9     08-21    138  |  101  |  22  ----------------------------<  106<H>  3.7   |  23  |  0.92    Ca    9.0      21 Aug 2024 06:57  Phos  4.4     08-21  Mg     1.9     08-21        Urinalysis Basic - ( 21 Aug 2024 06:57 )    Color: x / Appearance: x / SG: x / pH: x  Gluc: 106 mg/dL / Ketone: x  / Bili: x / Urobili: x   Blood: x / Protein: x / Nitrite: x   Leuk Esterase: x / RBC: x / WBC x   Sq Epi: x / Non Sq Epi: x / Bacteria: x        RADIOLOGY & ADDITIONAL TESTS:  Results Reviewed: no leukocytosis, H/H stable, Cr stable  Imaging Personally Reviewed:  Electrocardiogram Personally Reviewed:    COORDINATION OF CARE:  Care Discussed with Consultants/Other Providers [Y]: Neurosurgery ALYSON Barry  Prior or Outpatient Records Reviewed [Y/N]:

## 2024-08-22 LAB
ANION GAP SERPL CALC-SCNC: 9 MMOL/L — SIGNIFICANT CHANGE UP (ref 5–17)
BUN SERPL-MCNC: 26 MG/DL — HIGH (ref 7–23)
CALCIUM SERPL-MCNC: 8.7 MG/DL — SIGNIFICANT CHANGE UP (ref 8.4–10.5)
CHLORIDE SERPL-SCNC: 103 MMOL/L — SIGNIFICANT CHANGE UP (ref 96–108)
CO2 SERPL-SCNC: 25 MMOL/L — SIGNIFICANT CHANGE UP (ref 22–31)
CREAT SERPL-MCNC: 0.89 MG/DL — SIGNIFICANT CHANGE UP (ref 0.5–1.3)
EGFR: 87 ML/MIN/1.73M2 — SIGNIFICANT CHANGE UP
GLUCOSE BLDC GLUCOMTR-MCNC: 112 MG/DL — HIGH (ref 70–99)
GLUCOSE BLDC GLUCOMTR-MCNC: 115 MG/DL — HIGH (ref 70–99)
GLUCOSE BLDC GLUCOMTR-MCNC: 125 MG/DL — HIGH (ref 70–99)
GLUCOSE BLDC GLUCOMTR-MCNC: 128 MG/DL — HIGH (ref 70–99)
GLUCOSE SERPL-MCNC: 133 MG/DL — HIGH (ref 70–99)
HCT VFR BLD CALC: 34.3 % — LOW (ref 39–50)
HGB BLD-MCNC: 11 G/DL — LOW (ref 13–17)
MAGNESIUM SERPL-MCNC: 2.1 MG/DL — SIGNIFICANT CHANGE UP (ref 1.6–2.6)
MCHC RBC-ENTMCNC: 30.1 PG — SIGNIFICANT CHANGE UP (ref 27–34)
MCHC RBC-ENTMCNC: 32.1 GM/DL — SIGNIFICANT CHANGE UP (ref 32–36)
MCV RBC AUTO: 94 FL — SIGNIFICANT CHANGE UP (ref 80–100)
NRBC # BLD: 0 /100 WBCS — SIGNIFICANT CHANGE UP (ref 0–0)
PHOSPHATE SERPL-MCNC: 4 MG/DL — SIGNIFICANT CHANGE UP (ref 2.5–4.5)
PLATELET # BLD AUTO: 234 K/UL — SIGNIFICANT CHANGE UP (ref 150–400)
POTASSIUM SERPL-MCNC: 4.3 MMOL/L — SIGNIFICANT CHANGE UP (ref 3.5–5.3)
POTASSIUM SERPL-SCNC: 4.3 MMOL/L — SIGNIFICANT CHANGE UP (ref 3.5–5.3)
RBC # BLD: 3.65 M/UL — LOW (ref 4.2–5.8)
RBC # FLD: 16.6 % — HIGH (ref 10.3–14.5)
SODIUM SERPL-SCNC: 137 MMOL/L — SIGNIFICANT CHANGE UP (ref 135–145)
WBC # BLD: 6.62 K/UL — SIGNIFICANT CHANGE UP (ref 3.8–10.5)
WBC # FLD AUTO: 6.62 K/UL — SIGNIFICANT CHANGE UP (ref 3.8–10.5)

## 2024-08-22 PROCEDURE — 99232 SBSQ HOSP IP/OBS MODERATE 35: CPT

## 2024-08-22 PROCEDURE — 74230 X-RAY XM SWLNG FUNCJ C+: CPT | Mod: 26

## 2024-08-22 RX ADMIN — BUDESONIDE AND FORMOTEROL FUMARATE 2 PUFF(S): 80; 4.5 AEROSOL, METERED RESPIRATORY (INHALATION) at 05:32

## 2024-08-22 RX ADMIN — Medication 2.5 MILLIGRAM(S): at 05:31

## 2024-08-22 RX ADMIN — ENOXAPARIN SODIUM 60 MILLIGRAM(S): 100 INJECTION SUBCUTANEOUS at 17:17

## 2024-08-22 RX ADMIN — Medication 2.5 MILLIGRAM(S): at 12:14

## 2024-08-22 RX ADMIN — LEVETIRACETAM 250 MILLIGRAM(S): 1000 TABLET ORAL at 17:17

## 2024-08-22 RX ADMIN — Medication 2 TABLET(S): at 22:20

## 2024-08-22 RX ADMIN — METOPROLOL TARTRATE 12.5 MILLIGRAM(S): 100 TABLET ORAL at 17:17

## 2024-08-22 RX ADMIN — Medication 1 TABLET(S): at 12:14

## 2024-08-22 RX ADMIN — LEVETIRACETAM 250 MILLIGRAM(S): 1000 TABLET ORAL at 05:31

## 2024-08-22 RX ADMIN — CHLORHEXIDINE GLUCONATE 1 APPLICATION(S): 40 SOLUTION TOPICAL at 05:46

## 2024-08-22 RX ADMIN — ENOXAPARIN SODIUM 60 MILLIGRAM(S): 100 INJECTION SUBCUTANEOUS at 05:31

## 2024-08-22 RX ADMIN — BUDESONIDE AND FORMOTEROL FUMARATE 2 PUFF(S): 80; 4.5 AEROSOL, METERED RESPIRATORY (INHALATION) at 17:19

## 2024-08-22 RX ADMIN — TIOTROPIUM BROMIDE INHALATION SPRAY 2 PUFF(S): 3.12 SPRAY, METERED RESPIRATORY (INHALATION) at 12:15

## 2024-08-22 RX ADMIN — Medication 2.5 MILLIGRAM(S): at 17:18

## 2024-08-22 RX ADMIN — Medication 40 MILLIGRAM(S): at 05:33

## 2024-08-22 RX ADMIN — Medication 20 MILLIGRAM(S): at 22:21

## 2024-08-22 NOTE — SWALLOW VFSS/MBS ASSESSMENT ADULT - ORAL PHASE
spillover to the valleculae prior to swallow trigger; mild oral residue that is cleared with a repeat swallow
spillover to the valleculae prior to swallow trigger; mild oral residue that is cleared with a repeat swallow

## 2024-08-22 NOTE — SWALLOW VFSS/MBS ASSESSMENT ADULT - ROSENBEK'S PENETRATION ASPIRATION SCALE
(8) contrast passes glottis, visible subglottic residue remains, absent patient response (aspiration)
(8) contrast passes glottis, visible subglottic residue remains, absent patient response (aspiration)

## 2024-08-22 NOTE — PROGRESS NOTE ADULT - ASSESSMENT
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 Today presents to ED for RUE doppler w/ DVT involving the subclavian, axillary and brachial veins and Superficial thrombus in the right basilic vein. Plts/coags wnl. Exam: unchanged from discharge,  awake, Ox2 (self and place), EOS, mild L facial, FC, BUE 5/5, BLE 5/5      PROCEDURE: s/p SSS stenting 8/19      PLAN:  Neuro:   1 Out of bed   2 continue pt/ot   3 Keppra to prevent seizure   4 prn pain meds   5 outpatient PT once medically stable   6 brain lesion path from 8/6 -radiation necrosis   7 decadron taper to control cerebral edema -finished   8 ct head stable on 8/20     Respiratory:   1 room air   2 COPD - continue Symbicort, tiptrpium and albuterol   3 + pulmonary embolism- on full AC with sql     CV:  1 blood pressure stable -continue metoprolol and amlodipine   2 ECHO 8/16 ef 71 %   Endocrine:   1 stable   2 A1C 6.3 - sliding scale   Heme/Onc:             DVT ppx: + right upper extremity dvt -on therapeutic lovenoc 60 bid   1 + right upper dvt and PE   2 platelets are stable     Renal:   1 voiding   2 NS @50     ID:   1 afebrile     GI:   1 failed mbs on 8/19   2 + bm   3 continue senna and miralax   4 Protonix   5 swallow eval in am   6 bm on 8/21     Social/Family: answered all question     Discharge planning: home once medically stable            Problem/Plan - 1:  ·  Problem: Mass of temporal lobe.      Problem/Plan - 2:  ·  Problem: Pulmonary embolism.      Problem/Plan - 3:  ·  Problem: HTN (hypertension).      Problem/Plan - 4:  ·  Problem: Type 2 diabetes mellitus.      Problem/Plan - 5:  ·  Problem: COPD without exacerbation.        Additional Information:  Additional Information: 36 minutes spent.    Attestation Statements:    Attestation Statements:  Attending and PA/NP shared services statement (NON-critical care):     PA/NP to bill.     I independently performed the documented history, exam, and medical decision making.

## 2024-08-22 NOTE — SWALLOW VFSS/MBS ASSESSMENT ADULT - SLP GENERAL OBSERVATIONS
Pt encountered in radiology, secure in ZO chair. Pt awake and alert, cooperative, able to follow simple directions, but inconsistent in following directions accurately for the purposes of strategy implementation during the exam. Pt noted with head in partially head down posture at baseline.

## 2024-08-22 NOTE — SWALLOW VFSS/MBS ASSESSMENT ADULT - ADDITIONAL RECOMMENDATIONS
Maintain good oral hygiene.   Restorative swallow therapy. Will continue to follow while patient is in-house.  Swallow Goals: 1. Pt/family/caregiver will demonstrate understanding and carryover of dysphagia management (aspiration risks, etc.).  2. Pt will complete dysphagia exercises to improve swallow function.
Maintain good oral hygiene.   Restorative swallow therapy. Will continue to follow while patient is in-house.  Swallow Goals: 1. Pt/family/caregiver will demonstrate understanding and carryover of dysphagia management (aspiration risks, etc.).  2. Pt will complete dysphagia exercises to improve swallow function.

## 2024-08-22 NOTE — SWALLOW VFSS/MBS ASSESSMENT ADULT - UNSUCCESSFUL STRATEGIES TRIALED DURING PROCEDURE
chin tuck/hard swallow/head turn to the left/nonproductive volitional cough following clinician cue
chin tuck/hard swallow/head turn to the left/nonproductive volitional cough following clinician cue

## 2024-08-22 NOTE — SWALLOW VFSS/MBS ASSESSMENT ADULT - SLP PERTINENT HISTORY OF CURRENT PROBLEM
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 Today presents to ED for RUE doppler; found to have DVT + PE. Endorsing some productive cough. Collateral obtained via wife over the phone - patient was having some coughing at home after dc. Was on mildly thick liquids in the hospital last admission but wife unsure if her thickening agents at home were working well. Also notes that patient had not fully recovered after recent covid.
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 Today presents to ED for RUE doppler; found to have DVT + PE. Endorsing some productive cough. Collateral obtained via wife over the phone - patient was having some coughing at home after dc. Was on mildly thick liquids in the hospital last admission but wife unsure if her thickening agents at home were working well. Also notes that patient had not fully recovered after recent covid.

## 2024-08-22 NOTE — PROGRESS NOTE ADULT - SUBJECTIVE AND OBJECTIVE BOX
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 Today presents to ED for RUE doppler w/ DVT involving the subclavian, axillary and brachial veins and Superficial thrombus in the right basilic vein. Plts/coags wnl. Exam: unchanged from discharge,  awake, Ox2 (self and place), EOS, mild L facial, FC, BUE 5/5, BLE 5/5       Post-op day # 3 s/p SSS stenting    Overnight event: patient fell overnight but          Vital Signs Last 24 Hrs  T(C): 37.1 (22 Aug 2024 08:28), Max: 37.2 (22 Aug 2024 05:00)  T(F): 98.7 (22 Aug 2024 08:28), Max: 99 (22 Aug 2024 05:00)  HR: 70 (22 Aug 2024 08:28) (62 - 71)  BP: 119/68 (22 Aug 2024 08:28) (99/61 - 139/74)  BP(mean): --  RR: 19 (22 Aug 2024 08:28) (18 - 19)  SpO2: 94% (22 Aug 2024 08:28) (94% - 95%)    Parameters below as of 22 Aug 2024 08:28  Patient On (Oxygen Delivery Method): room air                              11.0   6.62  )-----------( 234      ( 22 Aug 2024 06:00 )             34.3    08-22    137  |  103  |  26<H>  ----------------------------<  133<H>  4.3   |  25  |  0.89    Ca    8.7      22 Aug 2024 06:00  Phos  4.0     08-22  Mg     2.1     08-22       Stroke Core Measures      DRAIN OUTPUT:     NEUROIMAGING:     PHYSICAL EXAM:    General: No Acute Distress     Neurological: Awake, alert oriented to person, place and time, Following Commands, PERRL, EOMI, Face Symmetrical, Speech Fluent, Moving all extremities, Muscle Strength normal in all four extremities, No Drift, Sensation to Light Touch Intact    Pulmonary: Clear to Auscultation, No Rales, No Rhonchi, No Wheezes     Cardiovascular: S1, S2, Regular Rate and Rhythm     Gastrointestinal: Soft, Nontender, Nondistended     Incision:     MEDICATIONS:   Antibiotics:    Neuro:  acetaminophen     Tablet .. 650 milliGRAM(s) Oral every 6 hours PRN Mild Pain (1 - 3)  levETIRAcetam 250 milliGRAM(s) Oral two times a day    Anticoagulation:  enoxaparin Injectable 60 milliGRAM(s) SubCutaneous every 12 hours    Cardiology:  amLODIPine   Tablet 5 milliGRAM(s) Oral daily  metoprolol tartrate 12.5 milliGRAM(s) Oral every 12 hours    Endo:   dextrose 50% Injectable 25 Gram(s) IV Push once  dextrose 50% Injectable 12.5 Gram(s) IV Push once  dextrose 50% Injectable 25 Gram(s) IV Push once  dextrose Oral Gel 15 Gram(s) Oral once PRN  glucagon  Injectable 1 milliGRAM(s) IntraMuscular once  insulin lispro (ADMELOG) corrective regimen sliding scale   SubCutaneous every 6 hours  simvastatin 20 milliGRAM(s) Oral at bedtime    Pulm:  albuterol    0.083% 2.5 milliGRAM(s) Nebulizer every 6 hours  albuterol    90 MICROgram(s) HFA Inhaler 1 Puff(s) Inhalation every 4 hours  budesonide 160 MICROgram(s)/formoterol 4.5 MICROgram(s) Inhaler 2 Puff(s) Inhalation two times a day  tiotropium 2.5 MICROgram(s) Inhaler 2 Puff(s) Inhalation daily    GI/:  bisacodyl 5 milliGRAM(s) Oral daily PRN  pantoprazole   Suspension 40 milliGRAM(s) Oral before breakfast  polyethylene glycol 3350 17 Gram(s) Oral two times a day  senna 2 Tablet(s) Oral at bedtime    Other:  chlorhexidine 2% Cloths 1 Application(s) Topical <User Schedule>  dextrose 5%. 1000 milliLiter(s) IV Continuous <Continuous>  dextrose 5%. 1000 milliLiter(s) IV Continuous <Continuous>  Nephro-corine 1 Tablet(s) Oral daily  sodium chloride 0.9%. 1000 milliLiter(s) IV Continuous <Continuous>  albuterol    0.083% 2.5 milliGRAM(s) Nebulizer every 6 hours  albuterol    90 MICROgram(s) HFA Inhaler 1 Puff(s) Inhalation every 4 hours  budesonide 160 MICROgram(s)/formoterol 4.5 MICROgram(s) Inhaler 2 Puff(s) Inhalation two times a day  tiotropium 2.5 MICROgram(s) Inhaler 2 Puff(s) Inhalation daily   bisacodyl 5 milliGRAM(s) Oral daily PRN  pantoprazole   Suspension 40 milliGRAM(s) Oral before breakfast  polyethylene glycol 3350 17 Gram(s) Oral two times a day  senna 2 Tablet(s) Oral at bedtime

## 2024-08-22 NOTE — SWALLOW VFSS/MBS ASSESSMENT ADULT - DIAGNOSTIC IMPRESSIONS
80yo M PMHx nasopharyngeal carcinoma s/p chemo/RT, recent admission for R crani for RIGHT anterolateral temporal lobectomy resection of hemorrhagic lesion, frozen c/w radiation necrosis. Pt now readmitted for RUE doppler, found to have DVT + PE, endorsing productive cough. Pt presents with evidence of an oropharyngeal dysphagia notable for aspiration of conservative textures. Pt with oral phase of swallow notable for adequate oral containment, spillover to the valleculae prior to swallow trigger and mild lingual residue that clears with reswallow. Pharyngeal phase of swallow is notable for fairly timely pharyngeal swallow trigger, severe oral retention in setting of poor pharyngeal bolus propulsion, and absent epiglottic retroflexion. Pt with post swallow laryngeal penetration over the laryngeal surface of the epiglottis, suspected over the aryepiglottic folds, and most consistently over the arytenoids with up to severe subsequent aspiration of large amounts from significant flowing pharyngeal residue. Pt with inconsistent cough response to airway invasion which is not effective for ejecting material from the airway. Use of head turn, chin tuck, cued cough, cued reswallows were not fully effective for improving airway invasion. However, combined use of R head turn with Chin tuck improved airway protection to a degree and reduced amount and frequency of aspiration for puree and moderately thick liquids. Use of these strategies did NOT eliminate aspiration. Pt is deemed at high risk of aspiration with oral feeding at this time.

## 2024-08-22 NOTE — SWALLOW VFSS/MBS ASSESSMENT ADULT - RECOMMENDED CONSISTENCY
NPO, with non-oral nutrition/hydration/medications.
NPO, with non-oral nutrition/hydration/medications.    If Pt and family demonstrate understanding and acceptance of aspiration risks and possible complications, and if decision is made to pursue oral feeding, would suggest the following diet, which may reduce frequency and degree of aspiration, but will not eliminate it:  Puree / Moderately thick With R HEAD TURN and CHIN TUCK and multiple swallows and frequent cough.

## 2024-08-22 NOTE — SWALLOW VFSS/MBS ASSESSMENT ADULT - PHARYNGEAL PHASE COMMENTS
Pt with post swallow laryngeal penetration over the laryngeal surface of the epiglottis, suspected over the aryepiglottic folds, and most consistently over the arytenoids after the swallow from pharyngeal residue. Pt with inconsistent cough response to airway invasion, with cough that intermittently clears material from the laryngeal vestibule, but with very limited ability to clear subglottic material. Use of head turn, chin tuck, cued cough, cued reswallows were not effective for improving airway invasion.
Pt with post swallow laryngeal penetration over the laryngeal surface of the epiglottis, suspected over the aryepiglottic folds, and most consistently over the arytenoids after the swallow from pharyngeal residue. Pt with inconsistent cough response to airway invasion, with cough that intermittently clears material from the laryngeal vestibule, but with very limited ability to clear subglottic material. Use of head turn, chin tuck, cued cough, cued reswallows were not effective for improving airway invasion.

## 2024-08-22 NOTE — PROGRESS NOTE ADULT - SUBJECTIVE AND OBJECTIVE BOX
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 Today presents to ED for RUE doppler w/ DVT involving the subclavian, axillary and brachial veins and Superficial thrombus in the right basilic vein. Plts/coags wnl. Exam: unchanged from discharge,  awake, Ox2 (self and place), EOS, mild L facial, FC, BUE 5/5, BLE 5/5     Patient was admitted for further evaluation and treatment     Overnight event: no acute event, patiuent wants to pass swallowing eval so that he can eat and go home        Vital Signs Last 24 Hrs  T(C): 37.1 (22 Aug 2024 08:28), Max: 37.2 (22 Aug 2024 05:00)  T(F): 98.7 (22 Aug 2024 08:28), Max: 99 (22 Aug 2024 05:00)  HR: 70 (22 Aug 2024 08:28) (62 - 71)  BP: 119/68 (22 Aug 2024 08:28) (99/61 - 139/74)  BP(mean): --  RR: 19 (22 Aug 2024 08:28) (18 - 19)  SpO2: 94% (22 Aug 2024 08:28) (94% - 95%)    Parameters below as of 22 Aug 2024 08:28  Patient On (Oxygen Delivery Method): room air                              11.0   6.62  )-----------( 234      ( 22 Aug 2024 06:00 )             34.3    08-22    137  |  103  |  26<H>  ----------------------------<  133<H>  4.3   |  25  |  0.89    Ca    8.7      22 Aug 2024 06:00  Phos  4.0     08-22  Mg     2.1     08-22       Stroke Core Measures      DRAIN OUTPUT:     NEUROIMAGING:     PHYSICAL EXAM:    General: No Acute Distress     Neurological: Awake, alert oriented to person, place , Following Commands, PERRL, EOMI, Face Symmetrical, Speech Fluent, Moving all extremities, Muscle Strength normal in all four extremities, No Drift, Sensation to Light Touch Intact    Pulmonary: Clear to Auscultation, No Rales, No Rhonchi, No Wheezes     Cardiovascular: S1, S2, Regular Rate and Rhythm     Gastrointestinal: Soft, Nontender, Nondistended     Incision: intact    MEDICATIONS:   Antibiotics:    Neuro:  acetaminophen     Tablet .. 650 milliGRAM(s) Oral every 6 hours PRN Mild Pain (1 - 3)  levETIRAcetam 250 milliGRAM(s) Oral two times a day    Anticoagulation:  enoxaparin Injectable 60 milliGRAM(s) SubCutaneous every 12 hours    Cardiology:  amLODIPine   Tablet 5 milliGRAM(s) Oral daily  metoprolol tartrate 12.5 milliGRAM(s) Oral every 12 hours    Endo:   dextrose 50% Injectable 25 Gram(s) IV Push once  dextrose 50% Injectable 12.5 Gram(s) IV Push once  dextrose 50% Injectable 25 Gram(s) IV Push once  dextrose Oral Gel 15 Gram(s) Oral once PRN  glucagon  Injectable 1 milliGRAM(s) IntraMuscular once  insulin lispro (ADMELOG) corrective regimen sliding scale   SubCutaneous every 6 hours  simvastatin 20 milliGRAM(s) Oral at bedtime    Pulm:  albuterol    0.083% 2.5 milliGRAM(s) Nebulizer every 6 hours  albuterol    90 MICROgram(s) HFA Inhaler 1 Puff(s) Inhalation every 4 hours  budesonide 160 MICROgram(s)/formoterol 4.5 MICROgram(s) Inhaler 2 Puff(s) Inhalation two times a day  tiotropium 2.5 MICROgram(s) Inhaler 2 Puff(s) Inhalation daily    GI/:  bisacodyl 5 milliGRAM(s) Oral daily PRN  pantoprazole   Suspension 40 milliGRAM(s) Oral before breakfast  polyethylene glycol 3350 17 Gram(s) Oral two times a day  senna 2 Tablet(s) Oral at bedtime    Other:  chlorhexidine 2% Cloths 1 Application(s) Topical <User Schedule>  dextrose 5%. 1000 milliLiter(s) IV Continuous <Continuous>  dextrose 5%. 1000 milliLiter(s) IV Continuous <Continuous>  Nephro-corine 1 Tablet(s) Oral daily  sodium chloride 0.9%. 1000 milliLiter(s) IV Continuous <Continuous>  albuterol    0.083% 2.5 milliGRAM(s) Nebulizer every 6 hours  albuterol    90 MICROgram(s) HFA Inhaler 1 Puff(s) Inhalation every 4 hours  budesonide 160 MICROgram(s)/formoterol 4.5 MICROgram(s) Inhaler 2 Puff(s) Inhalation two times a day  tiotropium 2.5 MICROgram(s) Inhaler 2 Puff(s) Inhalation daily   bisacodyl 5 milliGRAM(s) Oral daily PRN  pantoprazole   Suspension 40 milliGRAM(s) Oral before breakfast  polyethylene glycol 3350 17 Gram(s) Oral two times a day  senna 2 Tablet(s) Oral at bedtime

## 2024-08-22 NOTE — PROGRESS NOTE ADULT - ASSESSMENT
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 Today presents to ED for RUE doppler w/ DVT involving the subclavian, axillary and brachial veins and Superficial thrombus in the right basilic vein. Plts/coags wnl. Exam: unchanged from discharge,  awake, Ox2 (self and place), EOS, mild L facial, FC, BUE 5/5, BLE 5/5    Procedure: none    PLAN:  Neuro:   1 Out of bed   2 continue pt/ot   3 Keppra to prevent seizure   4 prn pain meds   5 outpatient PT once medically stable   6 brain lesion path from 8/6 -radiation necrosis   7 decadron taper to control cerebral edema currently off  8 ct head stable on 8/20     Respiratory:   1 room air  satting > 94%  2 COPD - continue Symbicort, tiptrpium and albuterol   3 + pulmonary embolism- on full AC with SQL    CV:  1 blood pressure stable -continue metoprolol and amlodipine   2 ECHO 8/16 EF 71 %     GI:   1 failed mbs on 8/22  2 + bm 8/20  3 continue senna and miralax   4 Protonix   5 will discuss Peg with the family    Endocrine:   1 stable   2 A1C 6.3 - sliding scale normoglycemic    Renal:   1 voiding   2 NS @50     ID:   1 afebrile     Heme/Onc:             DVT ppx: + right upper extremity dvt -on therapeutic lovenoc 60 bid   1 + right upper dvt and PE   2 platelets are stable      Social/Family: answered all question , earlier will discuss Peg with them    Discharge planning: home once medically stable     Will discuss with Dr Branham  [phone]

## 2024-08-23 LAB
GLUCOSE BLDC GLUCOMTR-MCNC: 101 MG/DL — HIGH (ref 70–99)
GLUCOSE BLDC GLUCOMTR-MCNC: 114 MG/DL — HIGH (ref 70–99)
GLUCOSE BLDC GLUCOMTR-MCNC: 119 MG/DL — HIGH (ref 70–99)
GLUCOSE BLDC GLUCOMTR-MCNC: 132 MG/DL — HIGH (ref 70–99)

## 2024-08-23 PROCEDURE — 99222 1ST HOSP IP/OBS MODERATE 55: CPT

## 2024-08-23 PROCEDURE — 99232 SBSQ HOSP IP/OBS MODERATE 35: CPT

## 2024-08-23 PROCEDURE — 99497 ADVNCD CARE PLAN 30 MIN: CPT

## 2024-08-23 RX ADMIN — Medication 40 MILLIGRAM(S): at 05:42

## 2024-08-23 RX ADMIN — ENOXAPARIN SODIUM 60 MILLIGRAM(S): 100 INJECTION SUBCUTANEOUS at 05:39

## 2024-08-23 RX ADMIN — LEVETIRACETAM 250 MILLIGRAM(S): 1000 TABLET ORAL at 05:41

## 2024-08-23 RX ADMIN — Medication 2.5 MILLIGRAM(S): at 23:17

## 2024-08-23 RX ADMIN — Medication 2.5 MILLIGRAM(S): at 00:08

## 2024-08-23 RX ADMIN — Medication 20 MILLIGRAM(S): at 21:49

## 2024-08-23 RX ADMIN — Medication 2 TABLET(S): at 21:49

## 2024-08-23 RX ADMIN — Medication 1 TABLET(S): at 11:15

## 2024-08-23 RX ADMIN — AMLODIPINE BESYLATE 5 MILLIGRAM(S): 10 TABLET ORAL at 05:40

## 2024-08-23 RX ADMIN — METOPROLOL TARTRATE 12.5 MILLIGRAM(S): 100 TABLET ORAL at 17:46

## 2024-08-23 RX ADMIN — Medication 2.5 MILLIGRAM(S): at 17:46

## 2024-08-23 RX ADMIN — BUDESONIDE AND FORMOTEROL FUMARATE 2 PUFF(S): 80; 4.5 AEROSOL, METERED RESPIRATORY (INHALATION) at 05:39

## 2024-08-23 RX ADMIN — METOPROLOL TARTRATE 12.5 MILLIGRAM(S): 100 TABLET ORAL at 05:40

## 2024-08-23 RX ADMIN — POLYETHYLENE GLYCOL 3350 17 GRAM(S): 17 POWDER, FOR SOLUTION ORAL at 05:41

## 2024-08-23 RX ADMIN — CHLORHEXIDINE GLUCONATE 1 APPLICATION(S): 40 SOLUTION TOPICAL at 05:42

## 2024-08-23 RX ADMIN — LEVETIRACETAM 250 MILLIGRAM(S): 1000 TABLET ORAL at 17:44

## 2024-08-23 RX ADMIN — Medication 2.5 MILLIGRAM(S): at 05:38

## 2024-08-23 RX ADMIN — TIOTROPIUM BROMIDE INHALATION SPRAY 2 PUFF(S): 3.12 SPRAY, METERED RESPIRATORY (INHALATION) at 11:15

## 2024-08-23 RX ADMIN — BUDESONIDE AND FORMOTEROL FUMARATE 2 PUFF(S): 80; 4.5 AEROSOL, METERED RESPIRATORY (INHALATION) at 17:44

## 2024-08-23 RX ADMIN — Medication 2.5 MILLIGRAM(S): at 11:15

## 2024-08-23 RX ADMIN — ENOXAPARIN SODIUM 60 MILLIGRAM(S): 100 INJECTION SUBCUTANEOUS at 17:44

## 2024-08-23 NOTE — PROGRESS NOTE ADULT - SUBJECTIVE AND OBJECTIVE BOX
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 Today presents to ED for RUE doppler w/ DVT involving the subclavian, axillary and brachial veins and Superficial thrombus in the right basilic vein. Plts/coags wnl. Exam: unchanged from discharge,  awake, Ox2 (self and place), EOS, mild L facial, FC, BUE 5/5, BLE 5/5     Patient was admitted for further evaluation and treatment     Overnight event: no acute event, patient failed swallowing eval yesterday and most likely will need a PEG    Vital Signs Last 24 Hrs  T(C): 37.1 (23 Aug 2024 08:45), Max: 37.3 (23 Aug 2024 00:07)  T(F): 98.8 (23 Aug 2024 08:45), Max: 99.2 (23 Aug 2024 00:07)  HR: 63 (23 Aug 2024 08:45) (63 - 77)  BP: 119/72 (23 Aug 2024 08:45) (109/65 - 148/81)  BP(mean): --  RR: 18 (23 Aug 2024 08:45) (18 - 19)  SpO2: 93% (23 Aug 2024 08:45) (93% - 100%)    Parameters below as of 23 Aug 2024 08:45  Patient On (Oxygen Delivery Method): room air                              11.0   6.62  )-----------( 234      ( 22 Aug 2024 06:00 )             34.3    08-22    137  |  103  |  26<H>  ----------------------------<  133<H>  4.3   |  25  |  0.89    Ca    8.7      22 Aug 2024 06:00  Phos  4.0     08-22  Mg     2.1     08-22       Stroke Core Measures      DRAIN OUTPUT:     NEUROIMAGING:     PHYSICAL EXAM:    General: No Acute Distress     Neurological: Awake, alert oriented to person, place and year, Following Commands, PERRL, EOMI, Face Symmetrical, Speech Fluent, Moving all extremities, Muscle Strength normal in all four extremities, No Drift, Sensation to Light Touch Intact    Pulmonary: Clear to Auscultation, No Rales, No Rhonchi, No Wheezes     Cardiovascular: S1, S2, Regular Rate and Rhythm     Gastrointestinal: Soft, Nontender, Nondistended     Incision: healed and intact    MEDICATIONS:   Antibiotics:    Neuro:  acetaminophen     Tablet .. 650 milliGRAM(s) Oral every 6 hours PRN Mild Pain (1 - 3)  levETIRAcetam 250 milliGRAM(s) Oral two times a day    Anticoagulation:  enoxaparin Injectable 60 milliGRAM(s) SubCutaneous every 12 hours    Cardiology:  amLODIPine   Tablet 5 milliGRAM(s) Oral daily  metoprolol tartrate 12.5 milliGRAM(s) Oral every 12 hours    Endo:   dextrose 50% Injectable 25 Gram(s) IV Push once  dextrose 50% Injectable 12.5 Gram(s) IV Push once  dextrose 50% Injectable 25 Gram(s) IV Push once  dextrose Oral Gel 15 Gram(s) Oral once PRN  glucagon  Injectable 1 milliGRAM(s) IntraMuscular once  insulin lispro (ADMELOG) corrective regimen sliding scale   SubCutaneous every 6 hours  simvastatin 20 milliGRAM(s) Oral at bedtime    Pulm:  albuterol    0.083% 2.5 milliGRAM(s) Nebulizer every 6 hours  albuterol    90 MICROgram(s) HFA Inhaler 1 Puff(s) Inhalation every 4 hours  budesonide 160 MICROgram(s)/formoterol 4.5 MICROgram(s) Inhaler 2 Puff(s) Inhalation two times a day  tiotropium 2.5 MICROgram(s) Inhaler 2 Puff(s) Inhalation daily    GI/:  bisacodyl 5 milliGRAM(s) Oral daily PRN  pantoprazole   Suspension 40 milliGRAM(s) Oral before breakfast  polyethylene glycol 3350 17 Gram(s) Oral two times a day  senna 2 Tablet(s) Oral at bedtime    Other:  chlorhexidine 2% Cloths 1 Application(s) Topical <User Schedule>  dextrose 5%. 1000 milliLiter(s) IV Continuous <Continuous>  dextrose 5%. 1000 milliLiter(s) IV Continuous <Continuous>  Nephro-corine 1 Tablet(s) Oral daily  sodium chloride 0.9%. 1000 milliLiter(s) IV Continuous <Continuous>  albuterol    0.083% 2.5 milliGRAM(s) Nebulizer every 6 hours  albuterol    90 MICROgram(s) HFA Inhaler 1 Puff(s) Inhalation every 4 hours  budesonide 160 MICROgram(s)/formoterol 4.5 MICROgram(s) Inhaler 2 Puff(s) Inhalation two times a day  tiotropium 2.5 MICROgram(s) Inhaler 2 Puff(s) Inhalation daily   bisacodyl 5 milliGRAM(s) Oral daily PRN  pantoprazole   Suspension 40 milliGRAM(s) Oral before breakfast  polyethylene glycol 3350 17 Gram(s) Oral two times a day  senna 2 Tablet(s) Oral at bedtime

## 2024-08-23 NOTE — CONSULT NOTE ADULT - ASSESSMENT
78 yo male with PMH of HTN, T2DM (diet controlled), HLD, COPD, CKD 3, carcinoid tumor s/p hemicolectomy, nasopharyngeal carcinoma s/p chemo/RT (c/b dysphagia ), IPMN s/p distal pancreatectomy (2018), and paroxysmal Afib on Eliquis s/p craniotomy 8/6 for resection of hemorrhagic lesion. Now presented to the ED for RUE swelling, found to have RUE DVT involving subclavian, axillary, and brachial veins and PE.    Pt was known to have cough w/ PO previously and was evaluated w/ OP MBS earlier this year - using thickened fluids at home   Post op course further complicated by Dysphagia - failed multiple SLP evaluations with recs: NPO   Pt and family are now agreeable to PEG placement for which GI is consulted    #Dysphagia  #remote history of head/Neck CA sp Chemo + XRT  #sp hemicolectomy  #sp partial pancreatectomy  #DVT+PE (on AC)    RECS:  -Indications, risks, benefits and alternatives of PEG discussed with pt and family. PEG placement does not eliminate risk of aspiration (oral secretions, reflux/regurgitation). Risk of infection, bleeding, perforation also reviewed. pt and family wish to proceed  -rec CT AP to evaluate stomach position (given extensive scar tissue and surgical scars in epigastric +mid abdomen) and assess if there is an adequate percutaneous window for PEG to help determine if endoscopic or IR guided access would be the best option  -Pending review of CT findings and discussion with Radiology; anticipate PEG attempt early to mid week  -Will have to address AC once CT review and timing of PEG attempt are determined  -continue NGT feeds in meantime    Discussed with pt and family  Discussed with Neurosurgery    Please call GI service over weekend prn with acute GI concerns   GI service : [phone]    Avtar Mixon PA-C  [facility] Teaching GI Service  Available on TEAMS Mon-Fri 8a-4p  After hours and weekend coverage [phone]

## 2024-08-23 NOTE — PROGRESS NOTE ADULT - CONVERSATION DETAILS
Patient, wife, and daughter present during conversation regarding nutrition. We reviewed that despite repeat swallow eval with MBS on 8/22/24, he remains recommended for NPO status given aspiration risk. He reiterates his biggest goal is to go home and be with his family. We discussed that in order to do so, he would require a feeding modality to receive all his necessary medications and nutrition; as is overall goal is to "live a long good life" and pursue ongoing treatment and medical attention needed, pleasure feeds did not seem to align with this goal. After much thought, input from his wife and daughter bedside, and discussion; patient decided to proceed with PEG placement and to continue outpatient swallow therapy in hopes to eventually have his swallow function improve enough to safely eat by mouth again.    Neurosurgery team made aware of above.    Advanced care planning time spent: 30 minutes

## 2024-08-23 NOTE — CONSULT NOTE ADULT - SUBJECTIVE AND OBJECTIVE BOX
Patient is a 79y old  Male who presents with a chief complaint of post-operative DVT (23 Aug 2024 13:22)      HPI:    80 yo male with PMH of HTN, T2DM (diet controlled), HLD, COPD, CKD 3, carcinoid tumor s/p hemicolectomy, nasopharyngeal carcinoma s/p chemo/RT (c/b dysphagia ), IPMN s/p distal pancreatectomy (2018), and paroxysmal Afib on Eliquis s/p craniotomy 8/6 for resection of hemorrhagic lesion. Now presented to the ED for RUE swelling, found to have RUE DVT involving subclavian, axillary, and brachial veins and PE.    Pt was known to have cough w/ PO previously and was evaluated w/ OP MBS earlier this year - using thickened fluids at home   Post op course further complicated by Dysphagia - failed multiple SLP evaluations with recs: NPO   Pt and family are now agreeable to PEG placement for which GI is consulted    Does require some self suctioning  +previous history of abdominal surgeries s/p partial pancreatectomy and hemicolectomy. Wife notes they were told he had extensive scar tissue at time of hemicolectomy    Pt on AC for PE + DVT (Lovenox)      PAST MEDICAL & SURGICAL HISTORY:  HTN (hypertension)      High cholesterol      Low back pain      Benign carcinoid tumor      COPD (chronic obstructive pulmonary disease)      Atrial fibrillation, unspecified type  Dxd in 01/2016-on Eliquis      Acute kidney injury  09/2016      Stage 3 chronic kidney disease      Pulmonary emphysema, unspecified emphysema type      Nasopharynx cancer  07/2016- s/p Chemo &RT      Disease of pancreas, unspecified      Hearing loss      Pancreatic mass      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  ROM limited Mouth opening & neck      ETOH abuse  quit in 2016      Other nonspecific abnormal finding of lung field      Abnormal chest CT      Type 2 diabetes mellitus      Dysphagia      History of cancer chemotherapy      S/P radiation therapy      Pneumonia      Syncope      Pneumonia due to COVID-19 virus      History of appendectomy      H/O hemicolectomy      S/P tonsillectomy  1959      H/O endoscopy  last one 06/2018      History of partial pancreatectomy      History of loop recorder          Allergies  penicillin (Other)  ACE inhibitors (Other)  Bee Stings- anaphylaxis (Other)      MEDICATIONS  (STANDING):  albuterol    0.083% 2.5 milliGRAM(s) Nebulizer every 6 hours  albuterol    90 MICROgram(s) HFA Inhaler 1 Puff(s) Inhalation every 4 hours  amLODIPine   Tablet 5 milliGRAM(s) Oral daily  budesonide 160 MICROgram(s)/formoterol 4.5 MICROgram(s) Inhaler 2 Puff(s) Inhalation two times a day  chlorhexidine 2% Cloths 1 Application(s) Topical <User Schedule>  dextrose 5%. 1000 milliLiter(s) (100 mL/Hr) IV Continuous <Continuous>  dextrose 5%. 1000 milliLiter(s) (50 mL/Hr) IV Continuous <Continuous>  dextrose 50% Injectable 25 Gram(s) IV Push once  dextrose 50% Injectable 25 Gram(s) IV Push once  dextrose 50% Injectable 12.5 Gram(s) IV Push once  enoxaparin Injectable 60 milliGRAM(s) SubCutaneous every 12 hours  glucagon  Injectable 1 milliGRAM(s) IntraMuscular once  insulin lispro (ADMELOG) corrective regimen sliding scale   SubCutaneous every 6 hours  levETIRAcetam 250 milliGRAM(s) Oral two times a day  metoprolol tartrate 12.5 milliGRAM(s) Oral every 12 hours  Nephro-corine 1 Tablet(s) Oral daily  pantoprazole   Suspension 40 milliGRAM(s) Oral before breakfast  polyethylene glycol 3350 17 Gram(s) Oral two times a day  senna 2 Tablet(s) Oral at bedtime  simvastatin 20 milliGRAM(s) Oral at bedtime  sodium chloride 0.9%. 1000 milliLiter(s) (50 mL/Hr) IV Continuous <Continuous>  tiotropium 2.5 MICROgram(s) Inhaler 2 Puff(s) Inhalation daily    MEDICATIONS  (PRN):  acetaminophen     Tablet .. 650 milliGRAM(s) Oral every 6 hours PRN Mild Pain (1 - 3)  bisacodyl 5 milliGRAM(s) Oral daily PRN Constipation  dextrose Oral Gel 15 Gram(s) Oral once PRN Blood Glucose LESS THAN 70 milliGRAM(s)/deciliter      Social History:      lives with spouse  former ETOH    Family History   IBD (  ) Yes   (  ) No  GI Malignancy (  )  Yes    (  ) No        Advanced Directives: (   X  ) None    (      ) DNR    (     ) DNI    (     ) Health Care Proxy:     Review of Systems:    see HPI- remainder 10 point ROS negative      Vital Signs Last 24 Hrs  T(C): 36.9 (23 Aug 2024 13:42), Max: 37.3 (23 Aug 2024 00:07)  T(F): 98.4 (23 Aug 2024 13:42), Max: 99.2 (23 Aug 2024 00:07)  HR: 65 (23 Aug 2024 13:42) (63 - 77)  BP: 125/70 (23 Aug 2024 13:42) (109/65 - 125/70)  BP(mean): --  RR: 18 (23 Aug 2024 13:42) (18 - 19)  SpO2: 94% (23 Aug 2024 13:42) (93% - 98%)    Parameters below as of 23 Aug 2024 13:42  Patient On (Oxygen Delivery Method): room air      PHYSICAL EXAM:    GENERAL:: thin AA male +NGT w/ bridle in place. spouse and daughter at bedside. Occasional cough and self-suctioning  NECK: No JVD  RESP:: grossly clear  CV:: S1 and S2, RRR,  GI:: BS+, soft, thin ND NT + upper/ mid abdominal scars: +midline abdominal scar (from mid epigastric region to pubis) and small scar at lateral LUQ  MUSCULOSKELETAL: No clubbing or cyanosis of digits; no joint swelling or tenderness to palpation  PSYCH: A+O to person, place, and time; affect appropriate  NEUROLOGY: CN 2-12 are intact and symmetric; no gross sensory deficits, grossly 5/5 strength throughout  SKIN: No rashes; no palpable lesions, +crani site c/d/i      LABS:                        11.0   6.62  )-----------( 234      ( 22 Aug 2024 06:00 )             34.3     08-22    137  |  103  |  26<H>  ----------------------------<  133<H>  4.3   |  25  |  0.89    Ca    8.7      22 Aug 2024 06:00  Phos  4.0     08-22  Mg     2.1     08-22            RADIOLOGY & ADDITIONAL TESTS:  ACC: [id number] EXAM:  CT BRAIN   ORDERED BY: JACKELYN ARNOLD     PROCEDURE DATE:  08/20/2024          INTERPRETATION:  CLINICAL INFORMATION: prev R crani for tumor    COMPARISON: 8/8/2024    TECHNIQUE:  Serial axial images were obtained from the skull base to the   vertex using multi-slice helical technique. Sagittal and coronal   reformats were obtained.    FINDINGS:    VENTRICLES AND SULCI: Age appropriate involutional changes.  INTRA-AXIAL: Evidence of prior infarcts as seen on the prior in the MCA   territory at the level of the right frontal and right parietal regions as   well as in the left PCA territory in the left paramedian parietal   occipital region. Focal area of lucency in the left frontal lobe region.   All of these are seen 8/18/2024. Microvascular ischemic changes involving   the periventricular and subcortical white matter. Right temporal   encephalomalacia.  EXTRA-AXIAL: No mass or fluid collection. Basal cisterns are normal in   appearance.    VISUALIZED SINUSES:  Clear.  TYMPANOMASTOID CAVITIES:  Clear.  VISUALIZED ORBITS: Normal.  CALVARIUM: Right temporal craniotomy    MISCELLANEOUS: None.      IMPRESSION:  No significant interval change 8/18/2024 in postop changes at the level   of the right temporal lobe. Evidence of prior infarcts as described.      ACC: [id number] EXAM:  XR CHEST PORTABLE URGENT 1V   ORDERED BY: JACKELYN ARNOLD     PROCEDURE DATE:  08/19/2024          INTERPRETATION:  EXAMINATION: XR CHEST URGENT    CLINICAL INDICATION: confirm NGT placement    TECHNIQUE: Single frontal view of the chest was obtained.    COMPARISON: Chest x-ray 8/10/2024.    FINDINGS:    LINES/TUBES: Nasogastric tube with distal tip in the stomach.    LUNGS/PLEURA: No focal consolidation. No pleural effusion. No   pneumothorax.    HEART: Loop recorder is present. The heart size cannot be accurately   assessed on this projection.    BONES: No acute osseous abnormalities.    IMPRESSION:  Nasogastric tube with distal tip in the stomach.    --- End of Report ---      ACC: [id number] EXAM:  CT ANGIO CHEST [facility]   ORDERED BY: JACKELYN ARNOLD     PROCEDURE DATE:  08/16/2024          INTERPRETATION:  CLINICAL INFORMATION: Right subclavian DVT, rule out PE    COMPARISON: CT chest 5/7/2024    CONTRAST/COMPLICATIONS:  IV Contrast: Omnipaque 350  60 cc administered   40 cc discarded  Oral Contrast: NONE  Complications: None reported at time of study completion    PROCEDURE:  CT Angiogram of the chest was obtained with intravenous contrast. Three   dimensional maximum intensity projection (MIP) images were generated.    FINDINGS:    AIRWAYS/LUNGS/PLEURA: Emphysema. Bronchiectasis and mucoid impaction of   right middle lobe and bilateral lower lobe bronchi new from prior exam.   Tree-in-bud opacities within the right upper lobe and right middle lobe.   Mild bibasilar atelectasis. Previously noted bandlike atelectasis within   the anteromedial segment of left lower lobe is unchanged. No pleural   effusion.    MEDIASTINUM AND JAVIER: No lymphadenopathy.    PULMONARY ANGIOGRAM: Small emboli within the segmental branches of right   upper lobe pulmonary artery.    VESSELS: Thoracic and abdominal aortic calcifications. Small amount of   reflux of contrast into the IVC.    HEART: Heart size is normal. No pericardial effusion. No definite CT   evidence of right heart strain. Coronary calcifications.    VISUALIZED UPPER ABDOMEN: 3 x 2.2 cm right renal cyst. Partial   pancreatectomy.`    CHEST WALL AND BONES: Left chest loop recorder. Degenerative joint   disease with flowing syndesmophytes.    IMPRESSION:    1.  Segmental emboli within right upper lobe pulmonary artery. No   significant CT evidence of right heart strain.    2.  Emphysema. New mucoid impactions within bilateral lower lungs and   tree-in-bud opacities likely representing COPD exacerbation. Recommend   follow-up chest CT in 3 months to determine resolution.

## 2024-08-23 NOTE — PROGRESS NOTE ADULT - ASSESSMENT
78 yo male with PMH of HTN, T2DM (diet controlled), HLD, COPD, CKD 3, carcinoid tumor s/p hemicolectomy, nasopharyngeal carcinoma s/p chemo/RT (c/b dysphagia ), IPMN s/p distal pancreatectomy (2018), and paroxysmal Afib on Eliquis s/p craniotomy 8/6 for resection of hemorrhagic lesion. Now presented to the ED for RUE swelling, found to have RUE DVT involving subclavian, axillary, and brachial veins and PE. Course also c/b dysphagia now s/p NGT placement. Planned for PEG placement.

## 2024-08-23 NOTE — PROGRESS NOTE ADULT - ASSESSMENT
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 Today presents to ED for RUE doppler w/ DVT involving the subclavian, axillary and brachial veins and Superficial thrombus in the right basilic vein. Plts/coags wnl. Exam: unchanged from discharge,  awake, Ox2 (self and place), EOS, mild L facial, FC, BUE 5/5, BLE 5/5  Patient was admitted for further evaluation and treatment    Procedure: none    PLAN:  Neuro:   1 Out of bed   2 continue pt/ot   3 Keppra to prevent seizure   4 prn pain meds   5 outpatient PT once medically stable   6 brain lesion path from 8/6 -radiation necrosis   7 decadron taper to control cerebral edema currently off  8 ct head stable on 8/20   9 Dispo: out patient PT    Respiratory:   1 room air  satting > 93%  2 COPD - continue Symbicort, tiptrpium and albuterol   3 + pulmonary embolism- on full AC with SQL    CV:  1 blood pressure stable -continue metoprolol and amlodipine   2 ECHO 8/16 EF 71 %     GI:   1 failed mbs on 8/22  2 + bm 8/20  3 continue senna and miralax   4 Protonix  5 will need Peg       Endocrine:   1 stable   2 A1C 6.3 - sliding scale normoglycemic    Renal:   1 voiding   2 NS @50     ID:   1 afebrile     Heme/Onc:             DVT ppx: + right upper extremity dvt -on therapeutic lovenoc 60 bid   1 + right upper dvt and PE   2 platelets are stable      Social/Family: Peg discussed with his wife, she wants to feed him and take him home. She was explained about the risk of feeding him, will speak to [facility] regarding palliative care consultDischarge planning: home once medically stable     Will discuss with Dr Branham  [phone]

## 2024-08-23 NOTE — PROGRESS NOTE ADULT - TIME BILLING
- Ordering, reviewing, and interpreting labs, testing, and imaging.  - Independently obtaining a review of systems and performing a physical exam  - Reviewing consultant documentation/recommendations.  - Counselling and educating patient regarding interpretation of aforementioned items and plan of care. - Ordering, reviewing, and interpreting labs, testing, and imaging.  - Independently obtaining a review of systems and performing a physical exam  - Reviewing consultant documentation/recommendations.  - Counselling and educating patient regarding interpretation of aforementioned items and plan of care.  - Advanced Care planning

## 2024-08-23 NOTE — PROGRESS NOTE ADULT - SUBJECTIVE AND OBJECTIVE BOX
Lizet Page MD  Division of Hospital Medicine  [facility]  Spectra: [phone]      Patient is a 79y old  Male who presents with a chief complaint of post-operative DVT (23 Aug 2024 10:25)      SUBJECTIVE / OVERNIGHT EVENTS: failed repeat MBS again. we had lengthy discussion with both wife and daughter present regarding PEG and ultimately, he decided to proceed with PEG. neurosurgery team made aware. other than being unhappy about failing MBS, offers no other complaints other than wanting to back to bed.  ADDITIONAL REVIEW OF SYSTEMS:    MEDICATIONS  (STANDING):  albuterol    0.083% 2.5 milliGRAM(s) Nebulizer every 6 hours  albuterol    90 MICROgram(s) HFA Inhaler 1 Puff(s) Inhalation every 4 hours  amLODIPine   Tablet 5 milliGRAM(s) Oral daily  budesonide 160 MICROgram(s)/formoterol 4.5 MICROgram(s) Inhaler 2 Puff(s) Inhalation two times a day  chlorhexidine 2% Cloths 1 Application(s) Topical <User Schedule>  dextrose 5%. 1000 milliLiter(s) (100 mL/Hr) IV Continuous <Continuous>  dextrose 5%. 1000 milliLiter(s) (50 mL/Hr) IV Continuous <Continuous>  dextrose 50% Injectable 25 Gram(s) IV Push once  dextrose 50% Injectable 12.5 Gram(s) IV Push once  dextrose 50% Injectable 25 Gram(s) IV Push once  enoxaparin Injectable 60 milliGRAM(s) SubCutaneous every 12 hours  glucagon  Injectable 1 milliGRAM(s) IntraMuscular once  insulin lispro (ADMELOG) corrective regimen sliding scale   SubCutaneous every 6 hours  levETIRAcetam 250 milliGRAM(s) Oral two times a day  metoprolol tartrate 12.5 milliGRAM(s) Oral every 12 hours  Nephro-corine 1 Tablet(s) Oral daily  pantoprazole   Suspension 40 milliGRAM(s) Oral before breakfast  polyethylene glycol 3350 17 Gram(s) Oral two times a day  senna 2 Tablet(s) Oral at bedtime  simvastatin 20 milliGRAM(s) Oral at bedtime  sodium chloride 0.9%. 1000 milliLiter(s) (50 mL/Hr) IV Continuous <Continuous>  tiotropium 2.5 MICROgram(s) Inhaler 2 Puff(s) Inhalation daily    MEDICATIONS  (PRN):  acetaminophen     Tablet .. 650 milliGRAM(s) Oral every 6 hours PRN Mild Pain (1 - 3)  bisacodyl 5 milliGRAM(s) Oral daily PRN Constipation  dextrose Oral Gel 15 Gram(s) Oral once PRN Blood Glucose LESS THAN 70 milliGRAM(s)/deciliter      CAPILLARY BLOOD GLUCOSE      POCT Blood Glucose.: 101 mg/dL (23 Aug 2024 11:22)  POCT Blood Glucose.: 132 mg/dL (23 Aug 2024 05:31)  POCT Blood Glucose.: 115 mg/dL (22 Aug 2024 23:41)    I&O's Summary    22 Aug 2024 07:01  -  23 Aug 2024 07:00  --------------------------------------------------------  IN: 0 mL / OUT: 400 mL / NET: -400 mL    23 Aug 2024 07:01  -  23 Aug 2024 13:22  --------------------------------------------------------  IN: 0 mL / OUT: 200 mL / NET: -200 mL        PHYSICAL EXAM:  Vital Signs Last 24 Hrs  T(C): 37.1 (23 Aug 2024 08:45), Max: 37.3 (23 Aug 2024 00:07)  T(F): 98.8 (23 Aug 2024 08:45), Max: 99.2 (23 Aug 2024 00:07)  HR: 63 (23 Aug 2024 08:45) (63 - 77)  BP: 119/72 (23 Aug 2024 08:45) (109/65 - 148/81)  BP(mean): --  RR: 18 (23 Aug 2024 08:45) (18 - 19)  SpO2: 93% (23 Aug 2024 08:45) (93% - 98%)    Parameters below as of 23 Aug 2024 08:45  Patient On (Oxygen Delivery Method): room air    CONSTITUTIONAL: NAD, well-developed, well-groomed  EYES: PERRLA; conjunctiva and sclera clear  ENMT: Moist oral mucosa, +NGT in nares  NECK: Supple, no palpable masses; no thyromegaly  RESPIRATORY: Normal respiratory effort; lungs are clear to auscultation bilaterally, no wheeze nor crackles  CARDIOVASCULAR: Regular rate and rhythm, normal S1 and S2, no murmur/rub/gallop; No lower extremity edema  ABDOMEN: Soft, Nondistended, Nontender to palpation, normoactive bowel sounds  MUSCULOSKELETAL: No clubbing or cyanosis of digits; no joint swelling or tenderness to palpation  PSYCH: A+O to person, place, and time; affect appropriate  NEUROLOGY: CN 2-12 are intact and symmetric; no gross sensory deficits, grossly 5/5 strength throughout  SKIN: No rashes; no palpable lesions, +crani site c/d/i    LABS:                        11.0   6.62  )-----------( 234      ( 22 Aug 2024 06:00 )             34.3     08-22    137  |  103  |  26<H>  ----------------------------<  133<H>  4.3   |  25  |  0.89    Ca    8.7      22 Aug 2024 06:00  Phos  4.0     08-22  Mg     2.1     08-22        Urinalysis Basic - ( 22 Aug 2024 06:00 )    Color: x / Appearance: x / SG: x / pH: x  Gluc: 133 mg/dL / Ketone: x  / Bili: x / Urobili: x   Blood: x / Protein: x / Nitrite: x   Leuk Esterase: x / RBC: x / WBC x   Sq Epi: x / Non Sq Epi: x / Bacteria: x          RADIOLOGY & ADDITIONAL TESTS:  Results Reviewed:   Imaging Personally Reviewed:  Electrocardiogram Personally Reviewed:    COORDINATION OF CARE:  Care Discussed with Consultants/Other Providers [Y]: Neurosurgery ALYSON Ryan  Prior or Outpatient Records Reviewed [Y/N]:

## 2024-08-23 NOTE — CONSULT NOTE ADULT - NS ATTEND AMEND GEN_ALL_CORE FT
Agree with above note. Briefly, Pt. is a 80 yo male with PMH of HTN, T2DM (diet controlled), HLD, COPD, CKD 3, carcinoid tumor s/p hemicolectomy, nasopharyngeal carcinoma s/p chemo/RT (c/b dysphagia ), IPMN s/p distal pancreatectomy (2018), and paroxysmal Afib on Eliquis s/p craniotomy 8/6 for resection of hemorrhagic lesion. Now presented to the ED for RUE swelling, found to have RUE DVT involving subclavian, axillary, and brachial veins and PE.    Pt was known to have cough w/ PO previously and was evaluated w/ OP MBS earlier this year - using thickened fluids at home   Post op course further complicated by Dysphagia - failed multiple SLP evaluations with recs: NPO   Pt and family are now agreeable to PEG placement for which GI is consulted  GI Issues:    1. Dysphagia  2. remote history of head/Neck CA sp Chemo + XRT  3. sp hemicolectomy  4. sp partial pancreatectomy  5. DVT+PE (on AC)    Plan:  -Indications, risks, benefits and alternatives of PEG discussed with pt and family. PEG placement does not eliminate risk of aspiration (oral secretions, reflux/regurgitation). Risk of infection, bleeding, perforation also reviewed. pt and family wish to proceed  -rec CT AP to evaluate stomach position (given extensive scar tissue and surgical scars in epigastric +mid abdomen) and assess if there is an adequate percutaneous window for PEG to help determine if endoscopic or IR guided access would be the best option  -Pending review of CT findings and discussion with Radiology; anticipate PEG attempt early to mid week  -Will have to address AC once CT review and timing of PEG attempt are determined  -continue NGT feeds in meantime    Elpidio Rich MD  [facility]
nasal endoscopy demonstrates posterior septal perforation and radiation changes in nasopharynx as well as crusting on right middle turbinate  NGT placed without issue   Bridle placed   confirm placement with xray prior to feeds

## 2024-08-24 LAB
GLUCOSE BLDC GLUCOMTR-MCNC: 125 MG/DL — HIGH (ref 70–99)
GLUCOSE BLDC GLUCOMTR-MCNC: 139 MG/DL — HIGH (ref 70–99)

## 2024-08-24 PROCEDURE — 99232 SBSQ HOSP IP/OBS MODERATE 35: CPT

## 2024-08-24 RX ADMIN — Medication 20 MILLIGRAM(S): at 21:25

## 2024-08-24 RX ADMIN — Medication 40 MILLIGRAM(S): at 05:45

## 2024-08-24 RX ADMIN — ENOXAPARIN SODIUM 60 MILLIGRAM(S): 100 INJECTION SUBCUTANEOUS at 17:08

## 2024-08-24 RX ADMIN — LEVETIRACETAM 250 MILLIGRAM(S): 1000 TABLET ORAL at 05:46

## 2024-08-24 RX ADMIN — ENOXAPARIN SODIUM 60 MILLIGRAM(S): 100 INJECTION SUBCUTANEOUS at 05:44

## 2024-08-24 RX ADMIN — CHLORHEXIDINE GLUCONATE 1 APPLICATION(S): 40 SOLUTION TOPICAL at 06:23

## 2024-08-24 RX ADMIN — AMLODIPINE BESYLATE 5 MILLIGRAM(S): 10 TABLET ORAL at 05:44

## 2024-08-24 RX ADMIN — BUDESONIDE AND FORMOTEROL FUMARATE 2 PUFF(S): 80; 4.5 AEROSOL, METERED RESPIRATORY (INHALATION) at 05:46

## 2024-08-24 RX ADMIN — POLYETHYLENE GLYCOL 3350 17 GRAM(S): 17 POWDER, FOR SOLUTION ORAL at 05:47

## 2024-08-24 RX ADMIN — Medication 2.5 MILLIGRAM(S): at 17:07

## 2024-08-24 RX ADMIN — LEVETIRACETAM 250 MILLIGRAM(S): 1000 TABLET ORAL at 17:08

## 2024-08-24 RX ADMIN — Medication 2.5 MILLIGRAM(S): at 12:30

## 2024-08-24 RX ADMIN — METOPROLOL TARTRATE 12.5 MILLIGRAM(S): 100 TABLET ORAL at 05:44

## 2024-08-24 RX ADMIN — Medication 2.5 MILLIGRAM(S): at 05:45

## 2024-08-24 RX ADMIN — Medication 1 TABLET(S): at 12:30

## 2024-08-24 RX ADMIN — BUDESONIDE AND FORMOTEROL FUMARATE 2 PUFF(S): 80; 4.5 AEROSOL, METERED RESPIRATORY (INHALATION) at 17:07

## 2024-08-24 RX ADMIN — POLYETHYLENE GLYCOL 3350 17 GRAM(S): 17 POWDER, FOR SOLUTION ORAL at 17:09

## 2024-08-24 RX ADMIN — TIOTROPIUM BROMIDE INHALATION SPRAY 2 PUFF(S): 3.12 SPRAY, METERED RESPIRATORY (INHALATION) at 12:30

## 2024-08-24 RX ADMIN — METOPROLOL TARTRATE 12.5 MILLIGRAM(S): 100 TABLET ORAL at 17:10

## 2024-08-24 NOTE — PROGRESS NOTE ADULT - ASSESSMENT
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 Today presents to ED for RUE doppler w/ DVT involving the subclavian, axillary and brachial veins and Superficial thrombus in the right basilic vein. Plts/coags wnl. Exam: unchanged from discharge,  awake, Ox2 (self and place), EOS, mild L facial, FC, BUE 5/5, BLE 5/5  Patient was admitted for further evaluation and treatment    Procedure: none    PLAN:  Neuro:   1 Out of bed   2 continue pt/ot   3 Keppra to prevent seizure   4 prn pain meds   5 outpatient PT once medically stable   6 brain lesion path from 8/6 -radiation necrosis   7 decadron taper to control cerebral edema currently off  8 ct head stable on 8/20   9 Dispo: out patient PT    Respiratory:   1 room air  satting > 93%  2 COPD - continue Symbicort, tiptrpium and albuterol   3 + pulmonary embolism- on full AC with SQL    CV:  1 blood pressure stable -continue metoprolol and amlodipine   2 ECHO 8/16 EF 71 %     GI:   1 failed mbs on 8/22  2 + bm 8/20  3 continue senna and miralax   4 Protonix  5 will need Peg, GI saw and awaiting CT abd/pelvis       Endocrine:   1 stable   2 A1C 6.3 - sliding scale normoglycemic    Renal:   1 voiding   2 NS @50     ID:   1 afebrile     Heme/Onc:             DVT ppx: + right upper extremity dvt -on therapeutic lovenoc 60 bid   1 + right upper dvt and PE   2 platelets are stable      spoke with patient and wife and they are in agreement with PEG, will continue to support them    Will discuss with Dr Branham  [phone]

## 2024-08-24 NOTE — PROGRESS NOTE ADULT - SUBJECTIVE AND OBJECTIVE BOX
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 Today presents to ED for RUE doppler w/ DVT involving the subclavian, axillary and brachial veins and Superficial thrombus in the right basilic vein. Plts/coags wnl. Exam: unchanged from discharge,  awake, Ox2 (self and place), EOS, mild L facial, FC, BUE 5/5, BLE 5/5     Patient was admitted for further evaluation and treatment     Overnight event: no acute event, patient failed swallow eval and will need a PEG    Vital Signs Last 24 Hrs  T(C): 37.1 (24 Aug 2024 17:00), Max: 37.1 (24 Aug 2024 17:00)  T(F): 98.7 (24 Aug 2024 17:00), Max: 98.7 (24 Aug 2024 17:00)  HR: 64 (24 Aug 2024 17:00) (58 - 64)  BP: 115/64 (24 Aug 2024 17:00) (97/62 - 145/77)  BP(mean): --  RR: 18 (24 Aug 2024 17:00) (18 - 18)  SpO2: 96% (24 Aug 2024 17:00) (94% - 99%)    Parameters below as of 24 Aug 2024 17:00  Patient On (Oxygen Delivery Method): nasal cannula    LABS: no new labs    NEUROIMAGING:   no new imaging    PHYSICAL EXAM:    General: No Acute Distress     Neurological: Awake, alert oriented to person, place and year, Following Commands, PERRL, EOMI, Face Symmetrical, Speech Fluent, Moving all extremities, Muscle Strength normal in all four extremities, No Drift, Sensation to Light Touch Intact    Pulmonary: Clear to Auscultation, No Rales, No Rhonchi, No Wheezes     Cardiovascular: S1, S2, Regular Rate and Rhythm     Gastrointestinal: Soft, Nontender, Nondistended     Incision: healed and intact    MEDICATIONS:   Neuro:  acetaminophen     Tablet .. 650 milliGRAM(s) Oral every 6 hours PRN Mild Pain (1 - 3)  levETIRAcetam 250 milliGRAM(s) Oral two times a day    Anticoagulation:  enoxaparin Injectable 60 milliGRAM(s) SubCutaneous every 12 hours    Cardiology:  amLODIPine   Tablet 5 milliGRAM(s) Oral daily  metoprolol tartrate 12.5 milliGRAM(s) Oral every 12 hours    Endo:   dextrose 50% Injectable 25 Gram(s) IV Push once  dextrose 50% Injectable 12.5 Gram(s) IV Push once  dextrose 50% Injectable 25 Gram(s) IV Push once  dextrose Oral Gel 15 Gram(s) Oral once PRN  glucagon  Injectable 1 milliGRAM(s) IntraMuscular once  insulin lispro (ADMELOG) corrective regimen sliding scale   SubCutaneous every 6 hours  simvastatin 20 milliGRAM(s) Oral at bedtime    Pulm:  albuterol    0.083% 2.5 milliGRAM(s) Nebulizer every 6 hours  albuterol    90 MICROgram(s) HFA Inhaler 1 Puff(s) Inhalation every 4 hours  budesonide 160 MICROgram(s)/formoterol 4.5 MICROgram(s) Inhaler 2 Puff(s) Inhalation two times a day  tiotropium 2.5 MICROgram(s) Inhaler 2 Puff(s) Inhalation daily    GI/:  bisacodyl 5 milliGRAM(s) Oral daily PRN  pantoprazole   Suspension 40 milliGRAM(s) Oral before breakfast  polyethylene glycol 3350 17 Gram(s) Oral two times a day  senna 2 Tablet(s) Oral at bedtime    Other:  chlorhexidine 2% Cloths 1 Application(s) Topical <User Schedule>  dextrose 5%. 1000 milliLiter(s) IV Continuous <Continuous>  dextrose 5%. 1000 milliLiter(s) IV Continuous <Continuous>  Nephro-corine 1 Tablet(s) Oral daily  sodium chloride 0.9%. 1000 milliLiter(s) IV Continuous <Continuous>  albuterol    0.083% 2.5 milliGRAM(s) Nebulizer every 6 hours  albuterol    90 MICROgram(s) HFA Inhaler 1 Puff(s) Inhalation every 4 hours  budesonide 160 MICROgram(s)/formoterol 4.5 MICROgram(s) Inhaler 2 Puff(s) Inhalation two times a day  tiotropium 2.5 MICROgram(s) Inhaler 2 Puff(s) Inhalation daily   bisacodyl 5 milliGRAM(s) Oral daily PRN  pantoprazole   Suspension 40 milliGRAM(s) Oral before breakfast  polyethylene glycol 3350 17 Gram(s) Oral two times a day  senna 2 Tablet(s) Oral at bedtime

## 2024-08-25 LAB
GLUCOSE BLDC GLUCOMTR-MCNC: 102 MG/DL — HIGH (ref 70–99)
GLUCOSE BLDC GLUCOMTR-MCNC: 108 MG/DL — HIGH (ref 70–99)
GLUCOSE BLDC GLUCOMTR-MCNC: 111 MG/DL — HIGH (ref 70–99)
GLUCOSE BLDC GLUCOMTR-MCNC: 116 MG/DL — HIGH (ref 70–99)
GLUCOSE BLDC GLUCOMTR-MCNC: 133 MG/DL — HIGH (ref 70–99)

## 2024-08-25 PROCEDURE — 99231 SBSQ HOSP IP/OBS SF/LOW 25: CPT

## 2024-08-25 RX ADMIN — AMLODIPINE BESYLATE 5 MILLIGRAM(S): 10 TABLET ORAL at 05:09

## 2024-08-25 RX ADMIN — BUDESONIDE AND FORMOTEROL FUMARATE 2 PUFF(S): 80; 4.5 AEROSOL, METERED RESPIRATORY (INHALATION) at 05:10

## 2024-08-25 RX ADMIN — Medication 20 MILLIGRAM(S): at 21:02

## 2024-08-25 RX ADMIN — TIOTROPIUM BROMIDE INHALATION SPRAY 2 PUFF(S): 3.12 SPRAY, METERED RESPIRATORY (INHALATION) at 11:37

## 2024-08-25 RX ADMIN — BUDESONIDE AND FORMOTEROL FUMARATE 2 PUFF(S): 80; 4.5 AEROSOL, METERED RESPIRATORY (INHALATION) at 17:13

## 2024-08-25 RX ADMIN — Medication 1 TABLET(S): at 11:38

## 2024-08-25 RX ADMIN — Medication 2.5 MILLIGRAM(S): at 11:38

## 2024-08-25 RX ADMIN — Medication 2.5 MILLIGRAM(S): at 17:13

## 2024-08-25 RX ADMIN — ENOXAPARIN SODIUM 60 MILLIGRAM(S): 100 INJECTION SUBCUTANEOUS at 17:13

## 2024-08-25 RX ADMIN — METOPROLOL TARTRATE 12.5 MILLIGRAM(S): 100 TABLET ORAL at 05:09

## 2024-08-25 RX ADMIN — Medication 40 MILLIGRAM(S): at 05:23

## 2024-08-25 RX ADMIN — LEVETIRACETAM 250 MILLIGRAM(S): 1000 TABLET ORAL at 17:14

## 2024-08-25 RX ADMIN — ENOXAPARIN SODIUM 60 MILLIGRAM(S): 100 INJECTION SUBCUTANEOUS at 05:10

## 2024-08-25 RX ADMIN — CHLORHEXIDINE GLUCONATE 1 APPLICATION(S): 40 SOLUTION TOPICAL at 05:01

## 2024-08-25 RX ADMIN — METOPROLOL TARTRATE 12.5 MILLIGRAM(S): 100 TABLET ORAL at 17:13

## 2024-08-25 RX ADMIN — Medication 2.5 MILLIGRAM(S): at 05:10

## 2024-08-25 RX ADMIN — LEVETIRACETAM 250 MILLIGRAM(S): 1000 TABLET ORAL at 05:09

## 2024-08-25 NOTE — PROGRESS NOTE ADULT - SUBJECTIVE AND OBJECTIVE BOX
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 Today presents to ED for RUE doppler w/ DVT involving the subclavian, axillary and brachial veins and Superficial thrombus in the right basilic vein. Plts/coags wnl. Exam: unchanged from discharge,  awake, Ox2 (self and place), EOS, mild L facial, FC, BUE 5/5, BLE 5/5  Patient was admitted for further evaluation and treatment.      Overnight event: no acute event, frustrated about not being able to eat, patient failed swallow eval and will need a PEG- scheduled tues, CT abd/pelvis done for pre PEG planning    Vital Signs Last 24 Hrs  T(C): 37.1 (25 Aug 2024 12:54), Max: 37.2 (24 Aug 2024 21:00)  T(F): 98.7 (25 Aug 2024 12:54), Max: 98.9 (24 Aug 2024 21:00)  HR: 64 (25 Aug 2024 12:54) (59 - 66)  BP: 114/68 (25 Aug 2024 12:54) (114/68 - 155/72)  BP(mean): --  RR: 18 (25 Aug 2024 12:54) (18 - 18)  SpO2: 95% (25 Aug 2024 12:54) (94% - 97%)    Parameters below as of 25 Aug 2024 12:54  Patient On (Oxygen Delivery Method): room air    LABS: no new labs    NEUROIMAGING:   no new imaging    PHYSICAL EXAM:    General: No Acute Distress     Neurological: Awake, alert oriented to person, place and year, Following Commands, PERRL, EOMI, Face Symmetrical, Speech Fluent, Moving all extremities, Muscle Strength normal in all four extremities, No Drift, Sensation to Light Touch Intact    Pulmonary: Clear to Auscultation, No Rales, No Rhonchi, No Wheezes     Cardiovascular: S1, S2, Regular Rate and Rhythm     Gastrointestinal: Soft, Nontender, Nondistended     Incision: healing and intact + suture    MEDICATIONS:   Neuro:  acetaminophen     Tablet .. 650 milliGRAM(s) Oral every 6 hours PRN Mild Pain (1 - 3)  levETIRAcetam 250 milliGRAM(s) Oral two times a day    Anticoagulation:  enoxaparin Injectable 60 milliGRAM(s) SubCutaneous every 12 hours    Cardiology:  amLODIPine   Tablet 5 milliGRAM(s) Oral daily  metoprolol tartrate 12.5 milliGRAM(s) Oral every 12 hours    Endo:   dextrose 50% Injectable 25 Gram(s) IV Push once  dextrose 50% Injectable 12.5 Gram(s) IV Push once  dextrose 50% Injectable 25 Gram(s) IV Push once  dextrose Oral Gel 15 Gram(s) Oral once PRN  glucagon  Injectable 1 milliGRAM(s) IntraMuscular once  insulin lispro (ADMELOG) corrective regimen sliding scale   SubCutaneous every 6 hours  simvastatin 20 milliGRAM(s) Oral at bedtime    Pulm:  albuterol    0.083% 2.5 milliGRAM(s) Nebulizer every 6 hours  albuterol    90 MICROgram(s) HFA Inhaler 1 Puff(s) Inhalation every 4 hours  budesonide 160 MICROgram(s)/formoterol 4.5 MICROgram(s) Inhaler 2 Puff(s) Inhalation two times a day  tiotropium 2.5 MICROgram(s) Inhaler 2 Puff(s) Inhalation daily    GI/:  bisacodyl 5 milliGRAM(s) Oral daily PRN  pantoprazole   Suspension 40 milliGRAM(s) Oral before breakfast  polyethylene glycol 3350 17 Gram(s) Oral two times a day  senna 2 Tablet(s) Oral at bedtime    Other:  chlorhexidine 2% Cloths 1 Application(s) Topical <User Schedule>  dextrose 5%. 1000 milliLiter(s) IV Continuous <Continuous>  dextrose 5%. 1000 milliLiter(s) IV Continuous <Continuous>  Nephro-corine 1 Tablet(s) Oral daily  sodium chloride 0.9%. 1000 milliLiter(s) IV Continuous <Continuous>  albuterol    0.083% 2.5 milliGRAM(s) Nebulizer every 6 hours  albuterol    90 MICROgram(s) HFA Inhaler 1 Puff(s) Inhalation every 4 hours  budesonide 160 MICROgram(s)/formoterol 4.5 MICROgram(s) Inhaler 2 Puff(s) Inhalation two times a day  tiotropium 2.5 MICROgram(s) Inhaler 2 Puff(s) Inhalation daily   bisacodyl 5 milliGRAM(s) Oral daily PRN  pantoprazole   Suspension 40 milliGRAM(s) Oral before breakfast  polyethylene glycol 3350 17 Gram(s) Oral two times a day  senna 2 Tablet(s) Oral at bedtime

## 2024-08-25 NOTE — PROGRESS NOTE ADULT - ASSESSMENT
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 Today presents to ED for RUE doppler w/ DVT involving the subclavian, axillary and brachial veins and Superficial thrombus in the right basilic vein. Plts/coags wnl. Exam: unchanged from discharge,  awake, Ox2 (self and place), EOS, mild L facial, FC, BUE 5/5, BLE 5/5  Patient was admitted for further evaluation and treatment    Procedure: none    PLAN:  Neuro:   1 Out of bed   2 continue pt/ot   3 Keppra to prevent seizure   4 prn pain meds   5 brain lesion path from 8/6 -radiation necrosis   6 decadron tapered to off now for cerebral edema   7 CT head stable on 8/20   8 Dispo: out patient PT     Respiratory:   1 room air  satting > 93%  2 COPD - continue Symbicort, tiptrpium and albuterol   3 + acute R UE DVT and acute pulmonary embolism- on full AC with SQL 60mg BID    CV:  1 blood pressure stable -continue metoprolol and amlodipine   2 ECHO 8/16 EF 71 %     GI:   1 failed MBS on 8/22  2 LBM 8/22  3 continue senna and miralax   4 Protonix  5 will need Peg, GI saw and awaiting CT abd/pelvis completed and awaiting review for pre PEG planning  6 on glucerna 1.2 @ 60 and tolerating    Endocrine:   1 stable   2 A1C 6.3 - sliding scale normoglycemic    Renal:   1 voiding   2 will IVL    ID:   1 afebrile     Heme/Onc:                1 + acute right upper extremity dvt and acute PE; on therapeutic lovenox 60 bid  2 platelets are stable      spoke with patient and wife and they are in agreement with PEG, will continue to support them    Will discuss with Dr Branham  [phone]

## 2024-08-26 ENCOUNTER — APPOINTMENT (OUTPATIENT)
Dept: NEUROLOGY | Facility: CLINIC | Age: 79
End: 2024-08-26

## 2024-08-26 LAB
ALBUMIN SERPL ELPH-MCNC: 3.1 G/DL — LOW (ref 3.3–5)
ALP SERPL-CCNC: 83 U/L — SIGNIFICANT CHANGE UP (ref 40–120)
ALT FLD-CCNC: 99 U/L — HIGH (ref 10–45)
ANION GAP SERPL CALC-SCNC: 10 MMOL/L — SIGNIFICANT CHANGE UP (ref 5–17)
APTT BLD: 46.7 SEC — HIGH (ref 24.5–35.6)
AST SERPL-CCNC: 61 U/L — HIGH (ref 10–40)
BASOPHILS # BLD AUTO: 0.02 K/UL — SIGNIFICANT CHANGE UP (ref 0–0.2)
BASOPHILS NFR BLD AUTO: 0.5 % — SIGNIFICANT CHANGE UP (ref 0–2)
BILIRUB SERPL-MCNC: 0.3 MG/DL — SIGNIFICANT CHANGE UP (ref 0.2–1.2)
BLD GP AB SCN SERPL QL: NEGATIVE — SIGNIFICANT CHANGE UP
BUN SERPL-MCNC: 28 MG/DL — HIGH (ref 7–23)
CALCIUM SERPL-MCNC: 9.6 MG/DL — SIGNIFICANT CHANGE UP (ref 8.4–10.5)
CHLORIDE SERPL-SCNC: 100 MMOL/L — SIGNIFICANT CHANGE UP (ref 96–108)
CO2 SERPL-SCNC: 24 MMOL/L — SIGNIFICANT CHANGE UP (ref 22–31)
CREAT SERPL-MCNC: 0.91 MG/DL — SIGNIFICANT CHANGE UP (ref 0.5–1.3)
EGFR: 85 ML/MIN/1.73M2 — SIGNIFICANT CHANGE UP
EOSINOPHIL # BLD AUTO: 0.12 K/UL — SIGNIFICANT CHANGE UP (ref 0–0.5)
EOSINOPHIL NFR BLD AUTO: 2.9 % — SIGNIFICANT CHANGE UP (ref 0–6)
GLUCOSE BLDC GLUCOMTR-MCNC: 100 MG/DL — HIGH (ref 70–99)
GLUCOSE BLDC GLUCOMTR-MCNC: 100 MG/DL — HIGH (ref 70–99)
GLUCOSE BLDC GLUCOMTR-MCNC: 120 MG/DL — HIGH (ref 70–99)
GLUCOSE BLDC GLUCOMTR-MCNC: 123 MG/DL — HIGH (ref 70–99)
GLUCOSE BLDC GLUCOMTR-MCNC: 97 MG/DL — SIGNIFICANT CHANGE UP (ref 70–99)
GLUCOSE SERPL-MCNC: 115 MG/DL — HIGH (ref 70–99)
HCT VFR BLD CALC: 35.4 % — LOW (ref 39–50)
HGB BLD-MCNC: 11.3 G/DL — LOW (ref 13–17)
IMM GRANULOCYTES NFR BLD AUTO: 0.5 % — SIGNIFICANT CHANGE UP (ref 0–0.9)
INR BLD: 1.14 RATIO — SIGNIFICANT CHANGE UP (ref 0.85–1.18)
LYMPHOCYTES # BLD AUTO: 0.78 K/UL — LOW (ref 1–3.3)
LYMPHOCYTES # BLD AUTO: 19 % — SIGNIFICANT CHANGE UP (ref 13–44)
MCHC RBC-ENTMCNC: 30.1 PG — SIGNIFICANT CHANGE UP (ref 27–34)
MCHC RBC-ENTMCNC: 31.9 GM/DL — LOW (ref 32–36)
MCV RBC AUTO: 94.1 FL — SIGNIFICANT CHANGE UP (ref 80–100)
MONOCYTES # BLD AUTO: 0.54 K/UL — SIGNIFICANT CHANGE UP (ref 0–0.9)
MONOCYTES NFR BLD AUTO: 13.1 % — SIGNIFICANT CHANGE UP (ref 2–14)
NEUTROPHILS # BLD AUTO: 2.63 K/UL — SIGNIFICANT CHANGE UP (ref 1.8–7.4)
NEUTROPHILS NFR BLD AUTO: 64 % — SIGNIFICANT CHANGE UP (ref 43–77)
NRBC # BLD: 0 /100 WBCS — SIGNIFICANT CHANGE UP (ref 0–0)
PLATELET # BLD AUTO: 224 K/UL — SIGNIFICANT CHANGE UP (ref 150–400)
POTASSIUM SERPL-MCNC: 4.9 MMOL/L — SIGNIFICANT CHANGE UP (ref 3.5–5.3)
POTASSIUM SERPL-SCNC: 4.9 MMOL/L — SIGNIFICANT CHANGE UP (ref 3.5–5.3)
PROT SERPL-MCNC: 6.6 G/DL — SIGNIFICANT CHANGE UP (ref 6–8.3)
PROTHROM AB SERPL-ACNC: 12.5 SEC — SIGNIFICANT CHANGE UP (ref 9.5–13)
RBC # BLD: 3.76 M/UL — LOW (ref 4.2–5.8)
RBC # FLD: 16.6 % — HIGH (ref 10.3–14.5)
RH IG SCN BLD-IMP: NEGATIVE — SIGNIFICANT CHANGE UP
SODIUM SERPL-SCNC: 134 MMOL/L — LOW (ref 135–145)
WBC # BLD: 4.11 K/UL — SIGNIFICANT CHANGE UP (ref 3.8–10.5)
WBC # FLD AUTO: 4.11 K/UL — SIGNIFICANT CHANGE UP (ref 3.8–10.5)

## 2024-08-26 PROCEDURE — 99233 SBSQ HOSP IP/OBS HIGH 50: CPT

## 2024-08-26 PROCEDURE — 99232 SBSQ HOSP IP/OBS MODERATE 35: CPT

## 2024-08-26 RX ORDER — DEXTROSE, SODIUM ACETATE, POTASSIUM CHLORIDE, POTASSIUM PHOSPHATE, AND SODIUM CHLORIDE 5; .15; .13; .28; .091 G/100ML; G/100ML; G/100ML; G/100ML; G/100ML
1000 INJECTION, SOLUTION INTRAVENOUS
Refills: 0 | Status: DISCONTINUED | OUTPATIENT
Start: 2024-08-26 | End: 2024-08-27

## 2024-08-26 RX ORDER — VANCOMYCIN/0.9 % SOD CHLORIDE 1.75G/25
750 PLASTIC BAG, INJECTION (ML) INTRAVENOUS ONCE
Refills: 0 | Status: COMPLETED | OUTPATIENT
Start: 2024-08-27 | End: 2024-08-27

## 2024-08-26 RX ADMIN — LEVETIRACETAM 250 MILLIGRAM(S): 1000 TABLET ORAL at 05:20

## 2024-08-26 RX ADMIN — ENOXAPARIN SODIUM 60 MILLIGRAM(S): 100 INJECTION SUBCUTANEOUS at 05:20

## 2024-08-26 RX ADMIN — AMLODIPINE BESYLATE 5 MILLIGRAM(S): 10 TABLET ORAL at 05:20

## 2024-08-26 RX ADMIN — POLYETHYLENE GLYCOL 3350 17 GRAM(S): 17 POWDER, FOR SOLUTION ORAL at 17:33

## 2024-08-26 RX ADMIN — METOPROLOL TARTRATE 12.5 MILLIGRAM(S): 100 TABLET ORAL at 05:20

## 2024-08-26 RX ADMIN — LEVETIRACETAM 250 MILLIGRAM(S): 1000 TABLET ORAL at 17:16

## 2024-08-26 RX ADMIN — Medication 2.5 MILLIGRAM(S): at 05:21

## 2024-08-26 RX ADMIN — CHLORHEXIDINE GLUCONATE 1 APPLICATION(S): 40 SOLUTION TOPICAL at 05:18

## 2024-08-26 RX ADMIN — METOPROLOL TARTRATE 12.5 MILLIGRAM(S): 100 TABLET ORAL at 17:15

## 2024-08-26 RX ADMIN — TIOTROPIUM BROMIDE INHALATION SPRAY 2 PUFF(S): 3.12 SPRAY, METERED RESPIRATORY (INHALATION) at 12:44

## 2024-08-26 RX ADMIN — BUDESONIDE AND FORMOTEROL FUMARATE 2 PUFF(S): 80; 4.5 AEROSOL, METERED RESPIRATORY (INHALATION) at 05:20

## 2024-08-26 RX ADMIN — Medication 2.5 MILLIGRAM(S): at 12:43

## 2024-08-26 RX ADMIN — Medication 20 MILLIGRAM(S): at 21:13

## 2024-08-26 RX ADMIN — Medication 2 TABLET(S): at 21:13

## 2024-08-26 RX ADMIN — Medication 1 TABLET(S): at 15:43

## 2024-08-26 RX ADMIN — Medication 40 MILLIGRAM(S): at 05:20

## 2024-08-26 NOTE — PROGRESS NOTE ADULT - SUBJECTIVE AND OBJECTIVE BOX
[facility] Division of Hospital Medicine  Clau Kamara MD  Available via MS Teams  Spectra [phone]    SUBJECTIVE / OVERNIGHT EVENTS: patient seen and examined this morning. denies any pain, SOB. he is eager to leave the hospital.       MEDICATIONS  (STANDING):  albuterol    0.083% 2.5 milliGRAM(s) Nebulizer every 6 hours  albuterol    90 MICROgram(s) HFA Inhaler 1 Puff(s) Inhalation every 4 hours  amLODIPine   Tablet 5 milliGRAM(s) Oral daily  budesonide 160 MICROgram(s)/formoterol 4.5 MICROgram(s) Inhaler 2 Puff(s) Inhalation two times a day  chlorhexidine 2% Cloths 1 Application(s) Topical <User Schedule>  dextrose 5%. 1000 milliLiter(s) (50 mL/Hr) IV Continuous <Continuous>  dextrose 5%. 1000 milliLiter(s) (100 mL/Hr) IV Continuous <Continuous>  dextrose 50% Injectable 25 Gram(s) IV Push once  dextrose 50% Injectable 12.5 Gram(s) IV Push once  dextrose 50% Injectable 25 Gram(s) IV Push once  glucagon  Injectable 1 milliGRAM(s) IntraMuscular once  insulin lispro (ADMELOG) corrective regimen sliding scale   SubCutaneous every 6 hours  levETIRAcetam 250 milliGRAM(s) Oral two times a day  metoprolol tartrate 12.5 milliGRAM(s) Oral every 12 hours  Nephro-corine 1 Tablet(s) Oral daily  pantoprazole   Suspension 40 milliGRAM(s) Oral before breakfast  polyethylene glycol 3350 17 Gram(s) Oral two times a day  senna 2 Tablet(s) Oral at bedtime  simvastatin 20 milliGRAM(s) Oral at bedtime  tiotropium 2.5 MICROgram(s) Inhaler 2 Puff(s) Inhalation daily    MEDICATIONS  (PRN):  acetaminophen     Tablet .. 650 milliGRAM(s) Oral every 6 hours PRN Mild Pain (1 - 3)  bisacodyl 5 milliGRAM(s) Oral daily PRN Constipation  dextrose Oral Gel 15 Gram(s) Oral once PRN Blood Glucose LESS THAN 70 milliGRAM(s)/deciliter      I&O's Summary      PHYSICAL EXAM:  Vital Signs Last 24 Hrs  T(C): 36.7 (26 Aug 2024 13:12), Max: 37.1 (25 Aug 2024 18:07)  T(F): 98.1 (26 Aug 2024 13:12), Max: 98.8 (26 Aug 2024 00:02)  HR: 62 (26 Aug 2024 13:12) (60 - 100)  BP: 128/62 (26 Aug 2024 13:12) (105/68 - 145/73)  BP(mean): --  RR: 18 (26 Aug 2024 13:12) (18 - 18)  SpO2: 95% (26 Aug 2024 13:12) (95% - 100%)    Parameters below as of 26 Aug 2024 13:12  Patient On (Oxygen Delivery Method): room air      CONSTITUTIONAL: NAD, + NG tube  RESPIRATORY: Normal respiratory effort; lungs are clear to auscultation bilaterally  CARDIOVASCULAR: normal S1 and S2; No lower extremity edema  ABDOMEN: Nontender to palpation, normoactive bowel sounds, no rebound/guarding  MUSCULOSKELETAL: no clubbing or cyanosis of digits;   PSYCH: A+O to person, place; affect appropriate  NEUROLOGY: moves all extremities, follows commands      LABS:                        11.3   4.11  )-----------( 224      ( 26 Aug 2024 05:45 )             35.4     08-26    134<L>  |  100  |  28<H>  ----------------------------<  115<H>  4.9   |  24  |  0.91    Ca    9.6      26 Aug 2024 05:45    TPro  6.6  /  Alb  3.1<L>  /  TBili  0.3  /  DBili  x   /  AST  61<H>  /  ALT  99<H>  /  AlkPhos  83  08-26    PT/INR - ( 26 Aug 2024 05:45 )   PT: 12.5 sec;   INR: 1.14 ratio         PTT - ( 26 Aug 2024 05:45 )  PTT:46.7 sec      Urinalysis Basic - ( 26 Aug 2024 05:45 )    Color: x / Appearance: x / SG: x / pH: x  Gluc: 115 mg/dL / Ketone: x  / Bili: x / Urobili: x   Blood: x / Protein: x / Nitrite: x   Leuk Esterase: x / RBC: x / WBC x   Sq Epi: x / Non Sq Epi: x / Bacteria: x                COMMUNICATION:  Care Discussed with Consultants/Other Providers and Details of Discussion: neurosurgery PA([place])

## 2024-08-26 NOTE — PROGRESS NOTE ADULT - SUBJECTIVE AND OBJECTIVE BOX
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      ETOH abuse  quit in 2016      Other nonspecific abnormal finding of lung field      Abnormal chest CT      Type 2 diabetes mellitus      Dysphagia      History of cancer chemotherapy      S/P radiation therapy      Pneumonia      Syncope      Pneumonia due to COVID-19 virus      History of appendectomy      H/O hemicolectomy      S/P tonsillectomy  1959      H/O endoscopy  last one 06/2018      History of partial pancreatectomy      History of loop recorder        Vital Signs Last 24 Hrs  T(C): 37.1 (26 Aug 2024 05:20), Max: 37.1 (25 Aug 2024 12:54)  T(F): 98.7 (26 Aug 2024 05:20), Max: 98.8 (26 Aug 2024 00:02)  HR: 60 (26 Aug 2024 05:20) (59 - 100)  BP: 123/80 (26 Aug 2024 05:20) (105/68 - 133/77)  BP(mean): --  RR: 18 (26 Aug 2024 05:20) (18 - 18)  SpO2: 100% (26 Aug 2024 05:20) (95% - 100%)    Parameters below as of 26 Aug 2024 05:20  Patient On (Oxygen Delivery Method): room air                              11.3   4.11  )-----------( 224      ( 26 Aug 2024 05:45 )             35.4    08-26    134<L>  |  100  |  28<H>  ----------------------------<  115<H>  4.9   |  24  |  0.91    Ca    9.6      26 Aug 2024 05:45    TPro  6.6  /  Alb  3.1<L>  /  TBili  0.3  /  DBili  x   /  AST  61<H>  /  ALT  99<H>  /  AlkPhos  83  08-26  PT/INR - ( 26 Aug 2024 05:45 )   PT: 12.5 sec;   INR: 1.14 ratio         PTT - ( 26 Aug 2024 05:45 )  PTT:46.7 sec   Stroke Core Measures      DRAIN OUTPUT:     NEUROIMAGING:     PHYSICAL EXAM:    General: No Acute Distress     Neurological: Awake, alert oriented to person, place and time, Following Commands, PERRL, EOMI, Face Symmetrical, Speech Fluent, Moving all extremities, Muscle Strength normal in all four extremities, No Drift, Sensation to Light Touch Intact    Pulmonary: Clear to Auscultation, No Rales, No Rhonchi, No Wheezes     Cardiovascular: S1, S2, Regular Rate and Rhythm     Gastrointestinal: Soft, Nontender, Nondistended     Incision:     MEDICATIONS:   Antibiotics:    Neuro:  acetaminophen     Tablet .. 650 milliGRAM(s) Oral every 6 hours PRN Mild Pain (1 - 3)  levETIRAcetam 250 milliGRAM(s) Oral two times a day    Anticoagulation:  enoxaparin Injectable 60 milliGRAM(s) SubCutaneous every 12 hours    Cardiology:  amLODIPine   Tablet 5 milliGRAM(s) Oral daily  metoprolol tartrate 12.5 milliGRAM(s) Oral every 12 hours    Endo:   dextrose 50% Injectable 25 Gram(s) IV Push once  dextrose 50% Injectable 12.5 Gram(s) IV Push once  dextrose 50% Injectable 25 Gram(s) IV Push once  dextrose Oral Gel 15 Gram(s) Oral once PRN  glucagon  Injectable 1 milliGRAM(s) IntraMuscular once  insulin lispro (ADMELOG) corrective regimen sliding scale   SubCutaneous every 6 hours  simvastatin 20 milliGRAM(s) Oral at bedtime    Pulm:  albuterol    0.083% 2.5 milliGRAM(s) Nebulizer every 6 hours  albuterol    90 MICROgram(s) HFA Inhaler 1 Puff(s) Inhalation every 4 hours  budesonide 160 MICROgram(s)/formoterol 4.5 MICROgram(s) Inhaler 2 Puff(s) Inhalation two times a day  tiotropium 2.5 MICROgram(s) Inhaler 2 Puff(s) Inhalation daily    GI/:  bisacodyl 5 milliGRAM(s) Oral daily PRN  pantoprazole   Suspension 40 milliGRAM(s) Oral before breakfast  polyethylene glycol 3350 17 Gram(s) Oral two times a day  senna 2 Tablet(s) Oral at bedtime    Other:  chlorhexidine 2% Cloths 1 Application(s) Topical <User Schedule>  dextrose 5%. 1000 milliLiter(s) IV Continuous <Continuous>  dextrose 5%. 1000 milliLiter(s) IV Continuous <Continuous>  Nephro-corine 1 Tablet(s) Oral daily  albuterol    0.083% 2.5 milliGRAM(s) Nebulizer every 6 hours  albuterol    90 MICROgram(s) HFA Inhaler 1 Puff(s) Inhalation every 4 hours  budesonide 160 MICROgram(s)/formoterol 4.5 MICROgram(s) Inhaler 2 Puff(s) Inhalation two times a day  tiotropium 2.5 MICROgram(s) Inhaler 2 Puff(s) Inhalation daily   bisacodyl 5 milliGRAM(s) Oral daily PRN  pantoprazole   Suspension 40 milliGRAM(s) Oral before breakfast  polyethylene glycol 3350 17 Gram(s) Oral two times a day  senna 2 Tablet(s) Oral at bedtime    79M hx recent admission for and R crani for lesion w/ Dr. 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 Today presents to ED for RUE doppler w/ DVT involving the subclavian, axillary and brachial veins and Superficial thrombus in the right basilic vein. Plts/coags wnl. Exam: unchanged from discharge,  awake, Ox2 (self and place), EOS, mild L facial, FC, BUE 5/5, BLE 5/5  Patient was admitted for further evaluation and treatment    Procedure: none    Overnight event: no acute event    Vital Signs Last 24 Hrs  T(C): 37.1 (26 Aug 2024 05:20), Max: 37.1 (25 Aug 2024 12:54)  T(F): 98.7 (26 Aug 2024 05:20), Max: 98.8 (26 Aug 2024 00:02)  HR: 60 (26 Aug 2024 05:20) (59 - 100)  BP: 123/80 (26 Aug 2024 05:20) (105/68 - 133/77)  BP(mean): --  RR: 18 (26 Aug 2024 05:20) (18 - 18)  SpO2: 100% (26 Aug 2024 05:20) (95% - 100%)    Parameters below as of 26 Aug 2024 05:20  Patient On (Oxygen Delivery Method): room air                              11.3   4.11  )-----------( 224      ( 26 Aug 2024 05:45 )             35.4    08-26    134<L>  |  100  |  28<H>  ----------------------------<  115<H>  4.9   |  24  |  0.91    Ca    9.6      26 Aug 2024 05:45    TPro  6.6  /  Alb  3.1<L>  /  TBili  0.3  /  DBili  x   /  AST  61<H>  /  ALT  99<H>  /  AlkPhos  83  08-26  PT/INR - ( 26 Aug 2024 05:45 )   PT: 12.5 sec;   INR: 1.14 ratio         PTT - ( 26 Aug 2024 05:45 )  PTT:46.7 sec   Stroke Core Measures      DRAIN OUTPUT:     NEUROIMAGING:     PHYSICAL EXAM:    General: No Acute Distress     Neurological: Awake, alert oriented to person, place and time, Following Commands, PERRL, EOMI, Face Symmetrical, Speech Fluent, Moving all extremities, Muscle Strength normal in all four extremities, No Drift, Sensation to Light Touch Intact    Pulmonary: Clear to Auscultation, No Rales, No Rhonchi, No Wheezes     Cardiovascular: S1, S2, Regular Rate and Rhythm     Gastrointestinal: Soft, Nontender, Nondistended     Incision:     MEDICATIONS:   Antibiotics:    Neuro:  acetaminophen     Tablet .. 650 milliGRAM(s) Oral every 6 hours PRN Mild Pain (1 - 3)  levETIRAcetam 250 milliGRAM(s) Oral two times a day    Anticoagulation:  enoxaparin Injectable 60 milliGRAM(s) SubCutaneous every 12 hours    Cardiology:  amLODIPine   Tablet 5 milliGRAM(s) Oral daily  metoprolol tartrate 12.5 milliGRAM(s) Oral every 12 hours    Endo:   dextrose 50% Injectable 25 Gram(s) IV Push once  dextrose 50% Injectable 12.5 Gram(s) IV Push once  dextrose 50% Injectable 25 Gram(s) IV Push once  dextrose Oral Gel 15 Gram(s) Oral once PRN  glucagon  Injectable 1 milliGRAM(s) IntraMuscular once  insulin lispro (ADMELOG) corrective regimen sliding scale   SubCutaneous every 6 hours  simvastatin 20 milliGRAM(s) Oral at bedtime    Pulm:  albuterol    0.083% 2.5 milliGRAM(s) Nebulizer every 6 hours  albuterol    90 MICROgram(s) HFA Inhaler 1 Puff(s) Inhalation every 4 hours  budesonide 160 MICROgram(s)/formoterol 4.5 MICROgram(s) Inhaler 2 Puff(s) Inhalation two times a day  tiotropium 2.5 MICROgram(s) Inhaler 2 Puff(s) Inhalation daily    GI/:  bisacodyl 5 milliGRAM(s) Oral daily PRN  pantoprazole   Suspension 40 milliGRAM(s) Oral before breakfast  polyethylene glycol 3350 17 Gram(s) Oral two times a day  senna 2 Tablet(s) Oral at bedtime    Other:  chlorhexidine 2% Cloths 1 Application(s) Topical <User Schedule>  dextrose 5%. 1000 milliLiter(s) IV Continuous <Continuous>  dextrose 5%. 1000 milliLiter(s) IV Continuous <Continuous>  Nephro-corine 1 Tablet(s) Oral daily  albuterol    0.083% 2.5 milliGRAM(s) Nebulizer every 6 hours  albuterol    90 MICROgram(s) HFA Inhaler 1 Puff(s) Inhalation every 4 hours  budesonide 160 MICROgram(s)/formoterol 4.5 MICROgram(s) Inhaler 2 Puff(s) Inhalation two times a day  tiotropium 2.5 MICROgram(s) Inhaler 2 Puff(s) Inhalation daily   bisacodyl 5 milliGRAM(s) Oral daily PRN  pantoprazole   Suspension 40 milliGRAM(s) Oral before breakfast  polyethylene glycol 3350 17 Gram(s) Oral two times a day  senna 2 Tablet(s) Oral at bedtime

## 2024-08-26 NOTE — PROGRESS NOTE ADULT - PROBLEM SELECTOR PLAN 1
and RUE DVT  - duplex of RUE: Right sided DVT involving the subclavian, axillary and brachial veins. Superficial thrombus in the right basilic vein  - CT Angio chest: segmental emboli within RUL pulmonary artery with no evidence of RV strain  - TTE unremarkable  - Vascular Cardiology consulted, and started on hep gtt on 8/16, transitioned to therapeutic lovenox on 8/18

## 2024-08-26 NOTE — PROGRESS NOTE ADULT - ASSESSMENT
78 yo male with PMH of HTN, T2DM (diet controlled), HLD, COPD, CKD 3, carcinoid tumor s/p hemicolectomy, nasopharyngeal carcinoma s/p chemo/RT (c/b dysphagia ), IPMN s/p distal pancreatectomy (2018), and paroxysmal Afib on Eliquis s/p craniotomy 8/6 for resection of hemorrhagic lesion presented to the ED for RUE swelling, found to have RUE DVT involving subclavian, axillary, and brachial veins and PE. Course also c/b dysphagia s/p NGT placement awaiting PEG placement.

## 2024-08-26 NOTE — CHART NOTE - NSCHARTNOTEFT_GEN_A_CORE
NUTRITION FOLLOW UP NOTE    PATIENT SEEN FOR: 1st Malnutrition/Tube Feeding Follow Up    SOURCE: [X] Patient  [x] Current Medical Record  [] RN  [X] Family/support person at bedside  [] Patient unavailable/inappropriate  [] Other:    CHART REVIEWED/EVENTS NOTED.  [X] No changes to nutrition care plan to note  [X] Nutrition Status:  - failed MBS on 8/19  - s/p NGT placement with ENT on 8/19  - pending PEG placement tentatively scheduled for 8/27.      DIET ORDER:   Diet, NPO after Midnight:      NPO Start Date: 26-Aug-2024,   NPO Start Time: 23:59 (08-26-24)  Diet, NPO with Tube Feed:   Tube Feeding Modality: Nasogastric  Glucerna 1.2 Gabriele (GLUCERNARTH)  Total Volume for 24 Hours (mL): 1440  Continuous  Starting Tube Feed Rate {mL per Hour}: 20  Increase Tube Feed Rate by (mL): 10     Every 6 hours  Until Goal Tube Feed Rate (mL per Hour): 60  Tube Feed Duration (in Hours): 24  Tube Feed Start Time: 15:30 (08-19-24)      CURRENT DIET ORDER IS:  [] Appropriate:  [] Inadequate:  [X] Other: see nutrition plan of care recommendations below     NUTRITION INTAKE/PROVISION:  [] PO:  [X] Enteral Nutrition: EN Order Provides:  1440 ml formula, 1728 kcal,  86 g protein, 1159 ml free water; meets 29 kcal/kg and 1.46 g protein/kg, based dosing weight of 58.8 kg (8/15/24)  Current Pump Rate: 60ml/hr   [] Parenteral Nutrition:    ANTHROPOMETRICS:  Drug Dosing Weight  Height (cm): 177.8 (15 Aug 2024 15:58)  Weight (kg): 58.8 (15 Aug 2024 20:01)  BMI (kg/m2): 18.6 (15 Aug 2024 20:01)  Weights:   -- No further weights to comment on     NUTRITIONALLY PERTINENT MEDICATIONS  (STANDING):  amLODIPine   Tablet 5 milliGRAM(s) Oral daily  dextrose 5%. 1000 milliLiter(s) (100 mL/Hr) IV Continuous <Continuous>  dextrose 5%. 1000 milliLiter(s) (50 mL/Hr) IV Continuous <Continuous>  dextrose 50% Injectable 25 Gram(s) IV Push once  dextrose 50% Injectable 12.5 Gram(s) IV Push once  dextrose 50% Injectable 25 Gram(s) IV Push once  glucagon  Injectable 1 milliGRAM(s) IntraMuscular once  insulin lispro (ADMELOG) corrective regimen sliding scale   SubCutaneous every 6 hours  levETIRAcetam 250 milliGRAM(s) Oral two times a day  metoprolol tartrate 12.5 milliGRAM(s) Oral every 12 hours  Nephro-corine 1 Tablet(s) Oral daily  pantoprazole   Suspension 40 milliGRAM(s) Oral before breakfast  polyethylene glycol 3350 17 Gram(s) Oral two times a day  senna 2 Tablet(s) Oral at bedtime  simvastatin 20 milliGRAM(s) Oral at bedtime  tiotropium 2.5 MICROgram(s) Inhaler 2 Puff(s) Inhalation daily    MEDICATIONS  (PRN):  acetaminophen     Tablet .. 650 milliGRAM(s) Oral every 6 hours PRN Mild Pain (1 - 3)  bisacodyl 5 milliGRAM(s) Oral daily PRN Constipation  dextrose Oral Gel 15 Gram(s) Oral once PRN Blood Glucose LESS THAN 70 milliGRAM(s)/deciliter      NUTRITIONALLY PERTINENT LABS:  08-26 Na134 mmol/L<L> Glu 115 mg/dL<H> K+ 4.9 mmol/L Cr  0.91 mg/dL BUN 28 mg/dL<H> 08-22 Phos 4.0 mg/dL 08-26 Alb 3.1 g/dL<L>08-26 ALT 99 U/L<H> AST 61 U/L<H> Alkaline Phosphatase 83 U/L    A1C with Estimated Average Glucose Result: 6.3 % (07-23-24 @ 10:46)  A1C with Estimated Average Glucose Result: 6.1 % (06-18-24 @ 13:03)      Finger Sticks:  POCT Blood Glucose.: 100 mg/dL (08-26 @ 11:14)  POCT Blood Glucose.: 100 mg/dL (08-26 @ 11:07)  POCT Blood Glucose.: 120 mg/dL (08-26 @ 05:07)  POCT Blood Glucose.: 102 mg/dL (08-25 @ 23:42)  POCT Blood Glucose.: 111 mg/dL (08-25 @ 17:11)      NUTRITIONALLY PERTINENT MEDICATIONS/LABS:  [x] Reviewed  [X] Relevant notes on medications/labs:  -- Hyponatremic   -- SSI q6hr for  glycemic control     EDEMA:  [x] Reviewed  [X] Relevant notes: No edema noted per flow sheets     GI/ I&O:  [x] Reviewed  [X] Relevant notes:  Last bowel movement on 8/25/24, ordered for senna Dulcolax and Miralax   [] Other:    SKIN:   [X] No pressure injuries documented, per nursing flowsheet  [] Pressure injury previously noted  [] Change in pressure injury documentation:  [] Other:    ESTIMATED NEEDS:  [] No change:  [X] Updated:  Energy: 1,764- 2,058 kcal/day (30-35 kcal/kg)  Protein: 90. g/day (1.2-1.5 g/kg)  Fluid:   ml/day or [X] defer to team  Based on:  -Estimated energy needs based on dosing weight of 58.8 kg (8/15/24)  - Estimated protein needs based on IBW of 166 lbs/ 75.3 kg     NUTRITION DIAGNOSIS:  [X] Prior Dx: 1. Underweight 2. Chronic Moderate Malnutrition 3. Increased Nutrient Needs   [] New Dx:    EDUCATION:  [] Yes:  [X] Not appropriate/warranted    NUTRITION CARE PLAN:  1. Diet: As tolerated after PEG placement recommend  Glucerna 1.2 @ 15 mL/hr and advance by 10mL q4H until goal rate of 65 mL/hr x24 hrs is reached. Regimen at goal provides 1560 mL total volume, 1256 mL free water, 1872 kcal/d and 94 g pro/day (32 kcal/kg based on dosing weight of 58.8 kg and 1.25 g/kg based on IBW weight 75.3  kg)   2. Supplements: Not Applicable   3. Multivitamin/mineral supplementation: Consider changing Nephro-corine to Multivitamin 1x/Day if no medical contraindications to aid in micronutrient coverage        [] Achieved - Continue current nutrition intervention(s)  [] Current medical condition precludes nutrition intervention at this time.    MONITORING AND EVALUATION:   RD remains available upon request and will follow up per protocol.    Tianna Shipman, MS,RDN,CDN   Available on [facility] TEAMS

## 2024-08-26 NOTE — PROGRESS NOTE ADULT - SUBJECTIVE AND OBJECTIVE BOX
INTERVAL HPI/OVERNIGHT EVENTS:  tolerating NGT feeds  pt and family still wanting to proceed with attempt at PEG tube  s/p CT 8/24 - results pending; images reviewed    last BM 8/25 x 1  no nausea or vomiting  no rectal bleeding or melena    MEDICATIONS  (STANDING):  albuterol    0.083% 2.5 milliGRAM(s) Nebulizer every 6 hours  albuterol    90 MICROgram(s) HFA Inhaler 1 Puff(s) Inhalation every 4 hours  amLODIPine   Tablet 5 milliGRAM(s) Oral daily  budesonide 160 MICROgram(s)/formoterol 4.5 MICROgram(s) Inhaler 2 Puff(s) Inhalation two times a day  chlorhexidine 2% Cloths 1 Application(s) Topical <User Schedule>  dextrose 5%. 1000 milliLiter(s) (100 mL/Hr) IV Continuous <Continuous>  dextrose 5%. 1000 milliLiter(s) (50 mL/Hr) IV Continuous <Continuous>  dextrose 50% Injectable 25 Gram(s) IV Push once  dextrose 50% Injectable 12.5 Gram(s) IV Push once  dextrose 50% Injectable 25 Gram(s) IV Push once  glucagon  Injectable 1 milliGRAM(s) IntraMuscular once  insulin lispro (ADMELOG) corrective regimen sliding scale   SubCutaneous every 6 hours  levETIRAcetam 250 milliGRAM(s) Oral two times a day  metoprolol tartrate 12.5 milliGRAM(s) Oral every 12 hours  Nephro-corine 1 Tablet(s) Oral daily  pantoprazole   Suspension 40 milliGRAM(s) Oral before breakfast  polyethylene glycol 3350 17 Gram(s) Oral two times a day  senna 2 Tablet(s) Oral at bedtime  simvastatin 20 milliGRAM(s) Oral at bedtime  tiotropium 2.5 MICROgram(s) Inhaler 2 Puff(s) Inhalation daily    MEDICATIONS  (PRN):  acetaminophen     Tablet .. 650 milliGRAM(s) Oral every 6 hours PRN Mild Pain (1 - 3)  bisacodyl 5 milliGRAM(s) Oral daily PRN Constipation  dextrose Oral Gel 15 Gram(s) Oral once PRN Blood Glucose LESS THAN 70 milliGRAM(s)/deciliter      Allergies    penicillin (Other)  ACE inhibitors (Other)  Bee Stings- anaphylaxis (Other)      Review of Systems:  see HPI- remainder 10 point ROS negative      Vital Signs Last 24 Hrs  T(C): 36.7 (26 Aug 2024 13:12), Max: 37.1 (25 Aug 2024 18:07)  T(F): 98.1 (26 Aug 2024 13:12), Max: 98.8 (26 Aug 2024 00:02)  HR: 62 (26 Aug 2024 13:12) (60 - 100)  BP: 128/62 (26 Aug 2024 13:12) (105/68 - 145/73)  BP(mean): --  RR: 18 (26 Aug 2024 13:12) (18 - 18)  SpO2: 95% (26 Aug 2024 13:12) (95% - 100%)    Parameters below as of 26 Aug 2024 13:12  Patient On (Oxygen Delivery Method): room air      PHYSICAL EXAM:  GENERAL:: thin AA male +NGT w/ bridle in place. spouse  at bedside. Occasional cough and self-suctioning  NECK: No JVD  RESP:: grossly clear  CV:: S1 and S2, RRR,  GI:: BS+, soft, thin ND NT + upper/ mid abdominal scars: +midline abdominal scar (from mid epigastric region to pubis) and small scar at lateral LUQ  MUSCULOSKELETAL: No clubbing or cyanosis of digits; no joint swelling or tenderness to palpation  PSYCH: A+O to person, place, and time; affect appropriate  NEUROLOGY: CN 2-12 are intact and symmetric; no gross sensory deficits, grossly 5/5 strength throughout  SKIN: No rashes; no palpable lesions, +crani site c/d/i      LABS:                        11.3   4.11  )-----------( 224      ( 26 Aug 2024 05:45 )             35.4     08-26    134<L>  |  100  |  28<H>  ----------------------------<  115<H>  4.9   |  24  |  0.91    Ca    9.6      26 Aug 2024 05:45    TPro  6.6  /  Alb  3.1<L>  /  TBili  0.3  /  DBili  x   /  AST  61<H>  /  ALT  99<H>  /  AlkPhos  83  08-26    PT/INR - ( 26 Aug 2024 05:45 )   PT: 12.5 sec;   INR: 1.14 ratio    PTT - ( 26 Aug 2024 05:45 )  PTT:46.7 sec        RADIOLOGY & ADDITIONAL TESTS:

## 2024-08-26 NOTE — PROGRESS NOTE ADULT - TIME BILLING
reviewing medical record, evaluating the patient, discussing plan of care with patient and neurosurgery, documenting in EMR.

## 2024-08-26 NOTE — PROGRESS NOTE ADULT - ASSESSMENT
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 Today presents to ED for RUE doppler w/ DVT involving the subclavian, axillary and brachial veins and Superficial thrombus in the right basilic vein. Plts/coags wnl. Exam: unchanged from discharge,  awake, Ox2 (self and place), EOS, mild L facial, FC, BUE 5/5, BLE 5/5  Patient was admitted for further evaluation and treatment    Procedure: none    PLAN:  Neuro:   1 Out of bed   2 continue pt/ot   3 Keppra to prevent seizure   4 prn pain meds   5 brain lesion path from 8/6 -radiation necrosis   6 decadron tapered to off now for cerebral edema   7 CT head stable on 8/20   8 Dispo: out patient PT     Respiratory:   1 room air  satting > 93%  2 COPD - continue Symbicort, tiptrpium and albuterol   3 + acute R UE DVT and acute pulmonary embolism- on full AC with SQL 60mg BID    CV:  1 blood pressure stable -continue metoprolol and amlodipine   2 ECHO 8/16 EF 71 %     GI:   1 failed MBS on 8/22  2 LBM 8/22  3 continue senna and miralax   4 Protonix  5 will need Peg, GI saw and awaiting CT abd/pelvis completed and awaiting review for pre PEG planning  6 on glucerna 1.2 @ 60 and tolerating    Endocrine:   1 stable   2 A1C 6.3 - sliding scale normoglycemic    Renal:   1 voiding   2 will IVL    ID:   1 afebrile     Heme/Onc:                1 + acute right upper extremity dvt and acute PE; on therapeutic lovenox 60 bid  2 platelets are stable      spoke with patient and wife and they are in agreement with PEG, will continue to support them    Will discuss with Dr Branham  [phone]                        79M hx recent admission for and R crani for lesion w/ Dr. 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 Today presents to ED for RUE doppler w/ DVT involving the subclavian, axillary and brachial veins and Superficial thrombus in the right basilic vein. Plts/coags wnl. Exam: unchanged from discharge,  awake, Ox2 (self and place), EOS, mild L facial, FC, BUE 5/5, BLE 5/5  Patient was admitted for further evaluation and treatment    Procedure: none    PLAN:  Neuro:   1 Out of bed   2 continue pt/ot   3 Keppra to prevent seizure   4 prn pain meds   5 brain lesion path from 8/6 -radiation necrosis   6 off decadron    7 CT head stable on 8/20   8 Dispo: out patient PT     Respiratory:   1 room air  satting > 95%  2 COPD - continue Symbicort, tiptrpium and albuterol   3 + acute R UE DVT and acute pulmonary embolism- on full AC with SQL 60mg BID    CV:  1 blood pressure stable -continue metoprolol and amlodipine   2 ECHO 8/16 EF 71 %     GI:   1 failed MBS on 8/22  2 LBM 8/25  3 continue senna and miralax   4 Protonix  5GI following, pos Peg in am,  CT abd/pelvis completed  6 on glucerna 1.2 @ 60 and tolerating    Endocrine:   1 stable   2 A1C 6.3 - sliding scale normoglycemic    Renal:   1 voiding   2 IVL    ID:   1 afebrile     Heme/Onc:                1 + acute right upper extremity dvt and acute PE; on therapeutic lovenox 60 bid  2 platelets are stable      spoke with patient and wife and they are in agreement with PEG, will continue to support them    Will discuss with Dr Branham  [phone]

## 2024-08-26 NOTE — PROGRESS NOTE ADULT - SUBJECTIVE AND OBJECTIVE BOX
Vascular Cardiology Progress Note    DIRECT PROVIDER NUMBER: [phone] / Available on TEAMS    CC:  post-operative DVT    Interval Events:    No complaints noted.  No upper or lower extremity edema.     Allergies  penicillin (Other)  ACE inhibitors (Other)  Bee Stings- anaphylaxis (Other)    MEDICATIONS:  amLODIPine   Tablet 5 milliGRAM(s) Oral daily  metoprolol tartrate 12.5 milliGRAM(s) Oral every 12 hours  albuterol    0.083% 2.5 milliGRAM(s) Nebulizer every 6 hours  albuterol    90 MICROgram(s) HFA Inhaler 1 Puff(s) Inhalation every 4 hours  budesonide 160 MICROgram(s)/formoterol 4.5 MICROgram(s) Inhaler 2 Puff(s) Inhalation two times a day  tiotropium 2.5 MICROgram(s) Inhaler 2 Puff(s) Inhalation daily  acetaminophen     Tablet .. 650 milliGRAM(s) Oral every 6 hours PRN  levETIRAcetam 250 milliGRAM(s) Oral two times a day  bisacodyl 5 milliGRAM(s) Oral daily PRN  pantoprazole   Suspension 40 milliGRAM(s) Oral before breakfast  polyethylene glycol 3350 17 Gram(s) Oral two times a day  senna 2 Tablet(s) Oral at bedtime  glucagon  Injectable 1 milliGRAM(s) IntraMuscular once  insulin lispro (ADMELOG) corrective regimen sliding scale   SubCutaneous every 6 hours  simvastatin 20 milliGRAM(s) Oral at bedtime  chlorhexidine 2% Cloths 1 Application(s) Topical <User Schedule>  Nephro-corine 1 Tablet(s) Oral daily  sodium chloride 0.9% with potassium chloride 20 mEq/L 1000 milliLiter(s) IV Continuous <Continuous>    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  ROM limited Mouth opening & neck  ETOH abuse  quit in 2016  Other nonspecific abnormal finding of lung field  Abnormal chest CT  Type 2 diabetes mellitus  Dysphagia  History of cancer chemotherapy  S/P radiation therapy  Pneumonia  Syncope  Pneumonia due to COVID-19 virus  History of appendectomy  H/O hemicolectomy  S/P tonsillectomy  1959  H/O endoscopy  last one 06/2018  History of partial pancreatectomy  History of loop recorder    FAMILY HISTORY:  Family history of breast cancer    SOCIAL HISTORY:  unchanged    REVIEW OF SYSTEMS:  14 Point ROS negative except per subjective and HPI    PHYSICAL EXAM:  T(C): 36.7 (08-26-24 @ 13:12), Max: 37.1 (08-25-24 @ 18:07)  HR: 62 (08-26-24 @ 13:12) (60 - 100)  BP: 128/62 (08-26-24 @ 13:12) (105/68 - 145/73)  RR: 18 (08-26-24 @ 13:12) (18 - 18)  SpO2: 95% (08-26-24 @ 13:12) (95% - 100%)  Wt(kg): --  I&O's Summary    Appearance: NAD  Cardiovascular:  RRR, S1 and S2  Respiratory: CTA B/L  Gastrointestinal:  Soft, Non-tender, + BS	  Skin: No cyanosis	  Extremities: No LE edema, bilateral calves soft  No UE edema, palpable radial pulse bilaterally         LABS:	 	  CBC Full  -  ( 26 Aug 2024 05:45 )  WBC Count : 4.11 K/uL  Hemoglobin : 11.3 g/dL  Hematocrit : 35.4 %  Platelet Count - Automated : 224 K/uL  Mean Cell Volume : 94.1 fl  Mean Cell Hemoglobin : 30.1 pg  Mean Cell Hemoglobin Concentration : 31.9 gm/dL  Auto Neutrophil # : 2.63 K/uL  Auto Lymphocyte # : 0.78 K/uL  Auto Monocyte # : 0.54 K/uL  Auto Eosinophil # : 0.12 K/uL  Auto Basophil # : 0.02 K/uL  Auto Neutrophil % : 64.0 %  Auto Lymphocyte % : 19.0 %  Auto Monocyte % : 13.1 %  Auto Eosinophil % : 2.9 %  Auto Basophil % : 0.5 %    08-26    134<L>  |  100  |  28<H>  ----------------------------<  115<H>  4.9   |  24  |  0.91    Ca    9.6      26 Aug 2024 05:45    TPro  6.6  /  Alb  3.1<L>  /  TBili  0.3  /  DBili  x   /  AST  61<H>  /  ALT  99<H>  /  AlkPhos  83  08-26      Assessment:  1. R UE DVT involving subclavian and axillary vein  2. Segmental PE  3. Paroxysmal Afib  4. S/p craniotomy 8/6 for resection hemorrhagic lesion  5. HTN    Plan:  1. Currently on Lovenox 1 mg/kg BID, recommend continuing,   2. No evidence of phlegmasia. No upper extremity edema. Distal pulses intact.  3. Post PEG, and once no further procedures are needed can transition to DOAC (i.e. Eliquis 5 mg BID).   4. Appreciate excellent Neurosurgical care.        Thank you  ALYSON Rico, MS, [facility]  Vascular Cardiology Service    Please call with any questions:   DIRECT SERVICE NUMBER: [phone] / Available on TEAMS

## 2024-08-26 NOTE — PROGRESS NOTE ADULT - ASSESSMENT
80 yo male with PMH of HTN, T2DM (diet controlled), HLD, COPD, CKD 3, carcinoid tumor s/p hemicolectomy, nasopharyngeal carcinoma s/p chemo/RT (c/b dysphagia ), IPMN s/p distal pancreatectomy (2018), and paroxysmal Afib on Eliquis s/p craniotomy 8/6 for resection of hemorrhagic lesion. Now presented to the ED for RUE swelling, found to have RUE DVT involving subclavian, axillary, and brachial veins and PE.    Pt was known to have cough w/ PO previously and was evaluated w/ OP MBS earlier this year - using thickened fluids at home   Post op course further complicated by Dysphagia - failed multiple SLP evaluations with recs: NPO   Pt and family are now agreeable to PEG placement for which GI is consulted    #Dysphagia  #remote history of head/Neck CA sp Chemo + XRT  #sp hemicolectomy  #sp partial pancreatectomy  #DVT+PE (on AC)    RECS:  -Indications, risks, benefits and alternatives of PEG discussed with pt and family. PEG placement does not eliminate risk of aspiration (oral secretions, reflux/regurgitation). Risk of infection, bleeding, perforation also reviewed. pt and family wish to proceed  -will order additional laxatives to clear enteral contrast and stool from colon (noted on CT images)  -follow up officia CT report  -will attempt EGD w/ PEG placement Tues 8/27. If no percutaneous window at time of EGD then will aske IR to review and evaluate for perc G tube access  -continue NGT feeds in meantime; hold at MN  -AC on hold from tonight's dose in anticipation on EGD/PEG in AM per d/w Neurosurgery  -IV VAnco x 1 dose for meghan-op ppx (PCN allergy)  -on PPI for Gi ppx    Discussed with pt and family  Discussed with Neurosurgery      Avtar Mixon PA-C  [facility] Teaching GI Service  Available on TEAMS Mon-Fri 8a-4p  After hours and weekend coverage [phone]

## 2024-08-27 LAB
GLUCOSE BLDC GLUCOMTR-MCNC: 107 MG/DL — HIGH (ref 70–99)
GLUCOSE BLDC GLUCOMTR-MCNC: 82 MG/DL — SIGNIFICANT CHANGE UP (ref 70–99)
GLUCOSE BLDC GLUCOMTR-MCNC: 99 MG/DL — SIGNIFICANT CHANGE UP (ref 70–99)

## 2024-08-27 PROCEDURE — 74019 RADEX ABDOMEN 2 VIEWS: CPT | Mod: 26

## 2024-08-27 DEVICE — KIT ENDO SAFTEY PEG PULL STD 20 FR ENDOVIVE: Type: IMPLANTABLE DEVICE | Status: FUNCTIONAL

## 2024-08-27 RX ORDER — SODIUM CHLORIDE 9 MG/ML
1000 INJECTION INTRAMUSCULAR; INTRAVENOUS; SUBCUTANEOUS
Refills: 0 | Status: DISCONTINUED | OUTPATIENT
Start: 2024-08-27 | End: 2024-08-28

## 2024-08-27 RX ORDER — ACETAMINOPHEN 325 MG/1
1000 TABLET ORAL ONCE
Refills: 0 | Status: COMPLETED | OUTPATIENT
Start: 2024-08-27 | End: 2024-08-27

## 2024-08-27 RX ADMIN — AMLODIPINE BESYLATE 5 MILLIGRAM(S): 10 TABLET ORAL at 05:20

## 2024-08-27 RX ADMIN — LEVETIRACETAM 250 MILLIGRAM(S): 1000 TABLET ORAL at 05:20

## 2024-08-27 RX ADMIN — Medication 296 MILLILITER(S): at 01:47

## 2024-08-27 RX ADMIN — Medication 20 MILLIGRAM(S): at 21:22

## 2024-08-27 RX ADMIN — BUDESONIDE AND FORMOTEROL FUMARATE 2 PUFF(S): 80; 4.5 AEROSOL, METERED RESPIRATORY (INHALATION) at 05:19

## 2024-08-27 RX ADMIN — Medication 250 MILLIGRAM(S): at 09:15

## 2024-08-27 RX ADMIN — LEVETIRACETAM 250 MILLIGRAM(S): 1000 TABLET ORAL at 21:21

## 2024-08-27 RX ADMIN — ACETAMINOPHEN 1000 MILLIGRAM(S): 325 TABLET ORAL at 16:09

## 2024-08-27 RX ADMIN — ACETAMINOPHEN 400 MILLIGRAM(S): 325 TABLET ORAL at 16:09

## 2024-08-27 RX ADMIN — METOPROLOL TARTRATE 12.5 MILLIGRAM(S): 100 TABLET ORAL at 05:20

## 2024-08-27 RX ADMIN — Medication 2.5 MILLIGRAM(S): at 05:20

## 2024-08-27 RX ADMIN — Medication 2.5 MILLIGRAM(S): at 18:06

## 2024-08-27 RX ADMIN — METOPROLOL TARTRATE 12.5 MILLIGRAM(S): 100 TABLET ORAL at 21:21

## 2024-08-27 RX ADMIN — CHLORHEXIDINE GLUCONATE 1 APPLICATION(S): 40 SOLUTION TOPICAL at 05:20

## 2024-08-27 RX ADMIN — Medication 2.5 MILLIGRAM(S): at 00:00

## 2024-08-27 RX ADMIN — BUDESONIDE AND FORMOTEROL FUMARATE 2 PUFF(S): 80; 4.5 AEROSOL, METERED RESPIRATORY (INHALATION) at 18:06

## 2024-08-27 NOTE — PROGRESS NOTE ADULT - SUBJECTIVE AND OBJECTIVE BOX
SUBJECTIVE:   no acute overnight events, preop for PEG today with GI     Vital Signs Last 24 Hrs  T(C): 36.4 (08-27-24 @ 09:00), Max: 36.9 (08-26-24 @ 09:30)  T(F): 97.5 (08-27-24 @ 09:00), Max: 98.5 (08-26-24 @ 09:30)  HR: 57 (08-27-24 @ 09:00) (57 - 68)  BP: 136/80 (08-27-24 @ 09:00) (105/62 - 145/73)  BP(mean): --  RR: 18 (08-27-24 @ 09:00) (18 - 18)  SpO2: 97% (08-27-24 @ 09:00) (93% - 98%)    PHYSICAL EXAM:    Constitutional: No Acute Distress     Neurological: Awake, Ox2 (self and place), FC, moving all extremities strong     Incision: clean, dry and intact (from OR 8/6)    Pulmonary: Clear to Auscultation    Cardiovascular: Regular rate and rhythm     Gastrointestinal: Soft, Non-tender    Extremities: No calf tenderness bilaterally          LABS:                          11.3   4.11  )-----------( 224      ( 26 Aug 2024 05:45 )             35.4    08-26    134<L>  |  100  |  28<H>  ----------------------------<  115<H>  4.9   |  24  |  0.91    Ca    9.6      26 Aug 2024 05:45    TPro  6.6  /  Alb  3.1<L>  /  TBili  0.3  /  DBili  x   /  AST  61<H>  /  ALT  99<H>  /  AlkPhos  83  08-26  PT/INR - ( 26 Aug 2024 05:45 )   PT: 12.5 sec;   INR: 1.14 ratio         PTT - ( 26 Aug 2024 05:45 )  PTT:46.7 sec    08-26 @ 07:01  -  08-27 @ 07:00  --------------------------------------------------------  IN: 0 mL / OUT: 50 mL / NET: -50 mL        IMAGING:   ACC: [id number] EXAM: XR SWAL FUNC ALESHA VID CON STDY ORDERED BY: JACKIE MEJIA RYAN    PROCEDURE DATE: 08/22/2024        INTERPRETATION: CLINICAL INFORMATION: Dysphagia.; evaluation of swallowing function    TECHNIQUE: A Modified Barium Swallow was performed. The patient was given oral contrast in varied consistencies to swallow and fluoroscopy was performed with a speech therapist from the Speech and Hearing Department.    FLUORO TIME: 4.2 minute(s)  DAP= 4.46 uGy*m2  Ref. Air Kerma= 18.01 mGy    FINDINGS/  IMPRESSION:    There is a feeding tube noted in the posterior pharynx. There is laryngeal penetration and aspiration with moderately thick liquids and puree.    For further information and recommendations, please refer to the speech pathologist final report which is available for review in the electronic medical record.    --- End of Report ---           GEMINI PHILIP DO; Resident Radiologist  This document has been electronically signed.  MARIELA CHURCHILL MD; Attending Radiologist  This document has been electronically signed. Aug 22 2024 2:50PM    MEDICATIONS:  Antibiotics:  vancomycin  IVPB 750 milliGRAM(s) IV Intermittent once    Neuro:  acetaminophen     Tablet .. 650 milliGRAM(s) Oral every 6 hours PRN Mild Pain (1 - 3)  levETIRAcetam 250 milliGRAM(s) Oral two times a day    Cardiac:  amLODIPine   Tablet 5 milliGRAM(s) Oral daily  metoprolol tartrate 12.5 milliGRAM(s) Oral every 12 hours    Pulm:  albuterol    0.083% 2.5 milliGRAM(s) Nebulizer every 6 hours  albuterol    90 MICROgram(s) HFA Inhaler 1 Puff(s) Inhalation every 4 hours  budesonide 160 MICROgram(s)/formoterol 4.5 MICROgram(s) Inhaler 2 Puff(s) Inhalation two times a day  tiotropium 2.5 MICROgram(s) Inhaler 2 Puff(s) Inhalation daily    GI/:  bisacodyl 5 milliGRAM(s) Oral daily PRN Constipation  pantoprazole   Suspension 40 milliGRAM(s) Oral before breakfast  polyethylene glycol 3350 17 Gram(s) Oral two times a day  senna 2 Tablet(s) Oral at bedtime    Other:   chlorhexidine 2% Cloths 1 Application(s) Topical <User Schedule>  dextrose 5%. 1000 milliLiter(s) IV Continuous <Continuous>  dextrose 5%. 1000 milliLiter(s) IV Continuous <Continuous>  dextrose 50% Injectable 25 Gram(s) IV Push once  dextrose 50% Injectable 12.5 Gram(s) IV Push once  dextrose 50% Injectable 25 Gram(s) IV Push once  dextrose Oral Gel 15 Gram(s) Oral once PRN Blood Glucose LESS THAN 70 milliGRAM(s)/deciliter  glucagon  Injectable 1 milliGRAM(s) IntraMuscular once  insulin lispro (ADMELOG) corrective regimen sliding scale   SubCutaneous every 6 hours  Nephro-corine 1 Tablet(s) Oral daily  simvastatin 20 milliGRAM(s) Oral at bedtime  sodium chloride 0.9% with potassium chloride 20 mEq/L 1000 milliLiter(s) IV Continuous <Continuous>        DIET: NPO after midnight

## 2024-08-27 NOTE — PROGRESS NOTE ADULT - ASSESSMENT
ASSESSMENT: HPI:  79M hx recent admission for and R crani for lesion w/ Dr. 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 Today presents to ED for RUE doppler w/ DVT involving the subclavian, axillary and brachial veins and Superficial thrombus in the right basilic vein. Plts/coags wnl. Exam: unchanged from discharge,  awake, Ox2 (self and place), EOS, mild L facial, FC, BUE 5/5, BLE 5/5    NEURO:  q4 neurochecks/q4 vitals   8/20 CTH: stable   Continue Keppra for seizure prophyalxis   Pain control prn w/ Tylenol   Activity: OOB as tolerated     PULM:  Incentive spirometry, mobilize as tolerated  8/16 CTA Chest:  Segmental emboli within right upper lobe pulmonary artery. No significant CT evidence of right heart strain.  -Vascular cardiology following   Also found to have Emphysema. New mucoid impactions within bilateral lower lungs and tree-in-bud opacities likely representing COPD exacerbation.   -Satting between 93-98% on RA   -Continue Albuterol inhalation as well as inhaler, Symbicort and Spiriva   -Recommend follow-up chest CT in 3 months to determine resolution    CV:  Keep -150mmHg, d/c a-line in AM    RENAL:  IVF until good PO intake, d/c castillo in AM    GI:  Diet: Dysphagia screen and then advance diet as tolerated  GI prophylaxis [] not indicated [] PPI [] other:  Bowel regimen [] colace [] senna [] other:    ENDO:   Goal euglycemia (-180)    HEME/ONC:  VTE prophylaxis: [] SCDs [] chemoprophylaxis [] hold chemoprophylaxis due to: [] high risk of DVT/PE on admission due to:    ID:  Shanita-op antibiotics    MISC:    SOCIAL/FAMILY:  [] awaiting [] updated at bedside [] family meeting    CODE STATUS:  [] Full Code [] DNR [] DNI [] Palliative/Comfort Care    DISPOSITION:  [] ICU [] Stroke Unit [] Floor [] EMU [] RCU [] PCU    [] Patient is at high risk of neurologic deterioration/death due to:     Time seen:  Time spent: ___ [] critical care minutes    Contact: [phone] ASSESSMENT: HPI:  79M hx recent admission for and R crani for lesion w/ Dr. 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 Today presents to ED for RUE doppler w/ DVT involving the subclavian, axillary and brachial veins and Superficial thrombus in the right basilic vein. Plts/coags wnl. Exam: unchanged from discharge,  awake, Ox2 (self and place), EOS, mild L facial, FC, BUE 5/5, BLE 5/5    NEURO:  q4 neurochecks/q4 vitals   8/20 CTH: stable   Continue Keppra for seizure prophylaxis   Pain control prn w/ Tylenol   Activity: OOB as tolerated     PULM:  Incentive spirometry, mobilize as tolerated  8/16 CTA Chest:  Segmental emboli within right upper lobe pulmonary artery. No significant CT evidence of right heart strain.  -Vascular cardiology following   Also found to have Emphysema. New mucoid impactions within bilateral lower lungs and tree-in-bud opacities likely representing COPD exacerbation.   -Satting between 93-98% on RA   -Continue Albuterol inhalation as well as inhaler, Symbicort and Spiriva   -Recommend follow-up chest CT in 3 months to determine resolution    CV:  Keep SBP   Continue Amlodipine, Metoprolol for HTN   Continue Simvastatin for Hyperlipidemia     RENAL:  IVL   NS@70 while NPO   Last Na 134, will repeat BMP after PEG placement     GI:  Diet: NPO after midnight   GI prophylaxis: Protonix   Bowel regimen: senna, miralax, dulocolax PRN   Last BM 8/25   Scheduled for PEG placement today with GI  -Per GI, If no percutaneous window at time of EGD, will need IR to review and evaluate for perc G tube access    ENDO:   Goal euglycemia (-180)  ISS, monitor FS     HEME/ONC:  VTE prophylaxis: SCDs  8/15 UED: Right sided DVT involving the subclavian, axillary and brachial veins  8/16 LED: negative for DVT   Vascular Surgery following   - on therapeutic Lovenox for DVT held for PEG today   - Post PEG, will plan to transition to DOAC     ID:  Afebrile  Vanco x1 for periop ppx    MISC:  Hospitalist following for co-management     DISPOSITION:  Outpatient PT w/ rolling walker when medically stable     Plan to be d/w DR. Branham  #[phone]

## 2024-08-28 LAB
ANION GAP SERPL CALC-SCNC: 13 MMOL/L — SIGNIFICANT CHANGE UP (ref 5–17)
BUN SERPL-MCNC: 24 MG/DL — HIGH (ref 7–23)
CALCIUM SERPL-MCNC: 9.1 MG/DL — SIGNIFICANT CHANGE UP (ref 8.4–10.5)
CHLORIDE SERPL-SCNC: 103 MMOL/L — SIGNIFICANT CHANGE UP (ref 96–108)
CO2 SERPL-SCNC: 21 MMOL/L — LOW (ref 22–31)
CREAT SERPL-MCNC: 0.9 MG/DL — SIGNIFICANT CHANGE UP (ref 0.5–1.3)
EGFR: 86 ML/MIN/1.73M2 — SIGNIFICANT CHANGE UP
GLUCOSE BLDC GLUCOMTR-MCNC: 103 MG/DL — HIGH (ref 70–99)
GLUCOSE BLDC GLUCOMTR-MCNC: 76 MG/DL — SIGNIFICANT CHANGE UP (ref 70–99)
GLUCOSE BLDC GLUCOMTR-MCNC: 85 MG/DL — SIGNIFICANT CHANGE UP (ref 70–99)
GLUCOSE BLDC GLUCOMTR-MCNC: 90 MG/DL — SIGNIFICANT CHANGE UP (ref 70–99)
GLUCOSE SERPL-MCNC: 75 MG/DL — SIGNIFICANT CHANGE UP (ref 70–99)
HCT VFR BLD CALC: 33.3 % — LOW (ref 39–50)
HGB BLD-MCNC: 10.6 G/DL — LOW (ref 13–17)
MAGNESIUM SERPL-MCNC: 2.3 MG/DL — SIGNIFICANT CHANGE UP (ref 1.6–2.6)
MCHC RBC-ENTMCNC: 30.1 PG — SIGNIFICANT CHANGE UP (ref 27–34)
MCHC RBC-ENTMCNC: 31.8 GM/DL — LOW (ref 32–36)
MCV RBC AUTO: 94.6 FL — SIGNIFICANT CHANGE UP (ref 80–100)
NRBC # BLD: 0 /100 WBCS — SIGNIFICANT CHANGE UP (ref 0–0)
PHOSPHATE SERPL-MCNC: 3 MG/DL — SIGNIFICANT CHANGE UP (ref 2.5–4.5)
PLATELET # BLD AUTO: 212 K/UL — SIGNIFICANT CHANGE UP (ref 150–400)
POTASSIUM SERPL-MCNC: 4.6 MMOL/L — SIGNIFICANT CHANGE UP (ref 3.5–5.3)
POTASSIUM SERPL-SCNC: 4.6 MMOL/L — SIGNIFICANT CHANGE UP (ref 3.5–5.3)
RBC # BLD: 3.52 M/UL — LOW (ref 4.2–5.8)
RBC # FLD: 16.4 % — HIGH (ref 10.3–14.5)
SODIUM SERPL-SCNC: 137 MMOL/L — SIGNIFICANT CHANGE UP (ref 135–145)
WBC # BLD: 7.51 K/UL — SIGNIFICANT CHANGE UP (ref 3.8–10.5)
WBC # FLD AUTO: 7.51 K/UL — SIGNIFICANT CHANGE UP (ref 3.8–10.5)

## 2024-08-28 PROCEDURE — 93970 EXTREMITY STUDY: CPT | Mod: 26

## 2024-08-28 PROCEDURE — 99222 1ST HOSP IP/OBS MODERATE 55: CPT

## 2024-08-28 PROCEDURE — 99232 SBSQ HOSP IP/OBS MODERATE 35: CPT

## 2024-08-28 RX ORDER — POLYETHYLENE GLYCOL 3350 17 G/17G
17 POWDER, FOR SOLUTION ORAL DAILY
Refills: 0 | Status: DISCONTINUED | OUTPATIENT
Start: 2024-08-29 | End: 2024-09-02

## 2024-08-28 RX ORDER — ENOXAPARIN SODIUM 100 MG/ML
60 INJECTION SUBCUTANEOUS
Refills: 0 | Status: DISCONTINUED | OUTPATIENT
Start: 2024-08-28 | End: 2024-08-30

## 2024-08-28 RX ADMIN — Medication 2.5 MILLIGRAM(S): at 13:18

## 2024-08-28 RX ADMIN — ENOXAPARIN SODIUM 60 MILLIGRAM(S): 100 INJECTION SUBCUTANEOUS at 18:24

## 2024-08-28 RX ADMIN — Medication 2.5 MILLIGRAM(S): at 23:34

## 2024-08-28 RX ADMIN — CHLORHEXIDINE GLUCONATE 1 APPLICATION(S): 40 SOLUTION TOPICAL at 05:24

## 2024-08-28 RX ADMIN — BUDESONIDE AND FORMOTEROL FUMARATE 2 PUFF(S): 80; 4.5 AEROSOL, METERED RESPIRATORY (INHALATION) at 05:22

## 2024-08-28 RX ADMIN — Medication 1 TABLET(S): at 13:19

## 2024-08-28 RX ADMIN — METOPROLOL TARTRATE 12.5 MILLIGRAM(S): 100 TABLET ORAL at 18:15

## 2024-08-28 RX ADMIN — Medication 2.5 MILLIGRAM(S): at 05:23

## 2024-08-28 RX ADMIN — TIOTROPIUM BROMIDE INHALATION SPRAY 2 PUFF(S): 3.12 SPRAY, METERED RESPIRATORY (INHALATION) at 13:19

## 2024-08-28 RX ADMIN — LEVETIRACETAM 250 MILLIGRAM(S): 1000 TABLET ORAL at 05:23

## 2024-08-28 RX ADMIN — LEVETIRACETAM 250 MILLIGRAM(S): 1000 TABLET ORAL at 18:24

## 2024-08-28 RX ADMIN — Medication 2.5 MILLIGRAM(S): at 18:15

## 2024-08-28 RX ADMIN — AMLODIPINE BESYLATE 5 MILLIGRAM(S): 10 TABLET ORAL at 05:24

## 2024-08-28 RX ADMIN — Medication 20 MILLIGRAM(S): at 23:22

## 2024-08-28 RX ADMIN — Medication 2.5 MILLIGRAM(S): at 00:59

## 2024-08-28 RX ADMIN — SODIUM CHLORIDE 70 MILLILITER(S): 9 INJECTION INTRAMUSCULAR; INTRAVENOUS; SUBCUTANEOUS at 07:17

## 2024-08-28 RX ADMIN — METOPROLOL TARTRATE 12.5 MILLIGRAM(S): 100 TABLET ORAL at 05:24

## 2024-08-28 RX ADMIN — ACETAMINOPHEN 650 MILLIGRAM(S): 325 TABLET ORAL at 13:18

## 2024-08-28 RX ADMIN — BUDESONIDE AND FORMOTEROL FUMARATE 2 PUFF(S): 80; 4.5 AEROSOL, METERED RESPIRATORY (INHALATION) at 18:16

## 2024-08-28 NOTE — PROGRESS NOTE ADULT - TIME BILLING
reviewing medical record, evaluating the patient, discussing plan of care with patient/family and neurosurgery, documenting in EMR. No

## 2024-08-28 NOTE — PROGRESS NOTE ADULT - SUBJECTIVE AND OBJECTIVE BOX
INTERVAL HPI/OVERNIGHT EVENTS:  s/p PEG placement yesterday  scant amount of blood noted at PEG site gauze overnight per RN - received meds and water via PEG    feeds still on hold  denies abdominal pain; slight discomfort at PEG site - increased with cough/straining  occ cough - self suctions    MEDICATIONS  (STANDING):  albuterol    0.083% 2.5 milliGRAM(s) Nebulizer every 6 hours  albuterol    90 MICROgram(s) HFA Inhaler 1 Puff(s) Inhalation every 4 hours  amLODIPine   Tablet 5 milliGRAM(s) Oral daily  budesonide 160 MICROgram(s)/formoterol 4.5 MICROgram(s) Inhaler 2 Puff(s) Inhalation two times a day  chlorhexidine 2% Cloths 1 Application(s) Topical <User Schedule>  dextrose 5%. 1000 milliLiter(s) (100 mL/Hr) IV Continuous <Continuous>  dextrose 5%. 1000 milliLiter(s) (50 mL/Hr) IV Continuous <Continuous>  dextrose 50% Injectable 25 Gram(s) IV Push once  dextrose 50% Injectable 12.5 Gram(s) IV Push once  dextrose 50% Injectable 25 Gram(s) IV Push once  glucagon  Injectable 1 milliGRAM(s) IntraMuscular once  insulin lispro (ADMELOG) corrective regimen sliding scale   SubCutaneous every 6 hours  levETIRAcetam 250 milliGRAM(s) Oral two times a day  metoprolol tartrate 12.5 milliGRAM(s) Oral every 12 hours  Nephro-corine 1 Tablet(s) Oral daily  pantoprazole   Suspension 40 milliGRAM(s) Oral before breakfast  polyethylene glycol 3350 17 Gram(s) Oral two times a day  senna 2 Tablet(s) Oral at bedtime  simvastatin 20 milliGRAM(s) Oral at bedtime  sodium chloride 0.9%. 1000 milliLiter(s) (70 mL/Hr) IV Continuous <Continuous>  tiotropium 2.5 MICROgram(s) Inhaler 2 Puff(s) Inhalation daily    MEDICATIONS  (PRN):  acetaminophen     Tablet .. 650 milliGRAM(s) Oral every 6 hours PRN Mild Pain (1 - 3)  bisacodyl 5 milliGRAM(s) Oral daily PRN Constipation  dextrose Oral Gel 15 Gram(s) Oral once PRN Blood Glucose LESS THAN 70 milliGRAM(s)/deciliter      Allergies    penicillin (Other)  ACE inhibitors (Other)  Bee Stings- anaphylaxis (Other)      Review of Systems:  see HPI- remainder 10 point ROS negative      Vital Signs Last 24 Hrs  T(C): 36.9 (28 Aug 2024 08:34), Max: 37.3 (28 Aug 2024 00:41)  T(F): 98.4 (28 Aug 2024 08:34), Max: 99.1 (28 Aug 2024 00:41)  HR: 69 (28 Aug 2024 08:34) (55 - 73)  BP: 127/67 (28 Aug 2024 08:34) (110/65 - 165/80)  BP(mean): 120 (27 Aug 2024 12:26) (120 - 120)  RR: 16 (28 Aug 2024 08:34) (15 - 20)  SpO2: 95% (28 Aug 2024 08:34) (95% - 100%)    Parameters below as of 28 Aug 2024 08:34  Patient On (Oxygen Delivery Method): room air    PHYSICAL EXAM:  GENERAL:: thin AA male awake, alert appears comfortable. spouse  at bedside. Occasional cough and self-suctioning  NECK: No JVD  RESP:: grossly clear  CV:: S1 and S2, RRR,  GI:: BS+, soft, thin ND NT Abdominal binder opened - scant dried blood under PEG bumper w/ scant blood staining of guaze under PEG bumper.  PEG site cleaned; no active bleeding, induration, swelling or edema  PEG bumper between 3.5 and 4 cm at skin level lightly touching skin and spins easily 360 degrees. +midline abdominal scar (from mid epigastric region to pubis) and small scar at lateral LUQ, WH LLQ scar c/w appy.  Abdominal binder replaced.  MUSCULOSKELETAL: No clubbing or cyanosis of digits; no joint swelling or tenderness to palpation  PSYCH: A+O to person, place, and time; affect appropriate  NEUROLOGY: CN 2-12 are intact and symmetric; no gross sensory deficits, grossly 5/5 strength throughout  SKIN: No rashes; no palpable lesions, +crani site c/d/i      LABS:      RADIOLOGY & ADDITIONAL TESTS:

## 2024-08-28 NOTE — PROGRESS NOTE ADULT - ASSESSMENT
78 yo male with PMH of HTN, T2DM (diet controlled), HLD, COPD, CKD 3, carcinoid tumor s/p hemicolectomy, nasopharyngeal carcinoma s/p chemo/RT (c/b dysphagia ), IPMN s/p distal pancreatectomy (2018), and paroxysmal Afib on Eliquis s/p craniotomy 8/6 for resection of hemorrhagic lesion presented to the ED for RUE swelling, found to have RUE DVT involving subclavian, axillary, and brachial veins and PE. Course also c/b dysphagia s/p PEG 8/27.

## 2024-08-28 NOTE — PROGRESS NOTE ADULT - ASSESSMENT
78 yo male with PMH of HTN, T2DM (diet controlled), HLD, COPD, CKD 3, carcinoid tumor s/p hemicolectomy, nasopharyngeal carcinoma s/p chemo/RT (c/b dysphagia ), IPMN s/p distal pancreatectomy (2018), and paroxysmal Afib on Eliquis s/p craniotomy 8/6 for resection of hemorrhagic lesion. Now presented to the ED for RUE swelling, found to have RUE DVT involving subclavian, axillary, and brachial veins and PE.    Pt was known to have cough w/ PO previously and was evaluated w/ OP MBS earlier this year - using thickened fluids at home   Post op course further complicated by Dysphagia - failed multiple SLP evaluations with recs: NPO   Pt and family are now agreeable to PEG placement for which GI is consulted    #Dysphagia  sp EGD + PEG placement 8/27/24 8/28 scant dried blood under PEG bumper; no active bleeding, induration, swelling or edema  PEG bumper between 3.5 and 4 cm at skin level  #remote history of head/Neck CA sp Chemo + XRT  #sp hemicolectomy  #sp partial pancreatectomy  #DVT+PE - AC held x 24 hours for PEG    RECS:  -start PEG feeds and titrate to goal  -check CBC today please  -ok to restart AC (Lovenox) tonight  -monitor PEG site  -local PEG care (cleanse daily and pat dry, nothing under PEG bumper please), abdominal binder on at all times please to prevent accidental PEG trauma/tugging/dislodgement  -PPI for GI ppx  -oral care and suction PRN    Discussed with pt and family  Discussed with Neurosurgery team    Avtar Mixon PA-C  [facility] Teaching GI Service  Available on TEAMS Mon-Fri 8a-4p  After hours and weekend coverage [phone]

## 2024-08-28 NOTE — PROGRESS NOTE ADULT - SUBJECTIVE AND OBJECTIVE BOX
SUBJECTIVE:   postop bleeding from peg site, dressing changed   no other acute overnight events     Vital Signs Last 24 Hrs  T(C): 36.9 (08-28-24 @ 08:34), Max: 37.3 (08-28-24 @ 00:41)  T(F): 98.4 (08-28-24 @ 08:34), Max: 99.1 (08-28-24 @ 00:41)  HR: 69 (08-28-24 @ 08:34) (55 - 73)  BP: 127/67 (08-28-24 @ 08:34) (110/65 - 165/80)  BP(mean): 120 (08-27-24 @ 12:26) (120 - 120)  RR: 16 (08-28-24 @ 08:34) (15 - 20)  SpO2: 95% (08-28-24 @ 08:34) (95% - 100%)    PHYSICAL EXAM:    Constitutional: No Acute Distress     Neurological: Awake, alert, Ox3, FC, moving all extremities strong     Incision: clean, dry and intact (from OR 8/6)    Pulmonary: Clear to Auscultation    Cardiovascular: Regular rate and rhythm     Gastrointestinal: Soft, Non-tender    Extremities: No calf tenderness bilaterally          LABS:             08-27 @ 07:01  -  08-28 @ 07:00  --------------------------------------------------------  IN: 0 mL / OUT: 200 mL / NET: -200 mL        IMAGING:         ACC: [id number] EXAM: XR SWAL FUNC ALESHA VID CON STDY ORDERED BY: JACKIE DAVIS    PROCEDURE DATE: 08/22/2024        INTERPRETATION: CLINICAL INFORMATION: Dysphagia.; evaluation of swallowing function    TECHNIQUE: A Modified Barium Swallow was performed. The patient was given oral contrast in varied consistencies to swallow and fluoroscopy was performed with a speech therapist from the Speech and Hearing Department.    FLUORO TIME: 4.2 minute(s)  DAP= 4.46 uGy*m2  Ref. Air Kerma= 18.01 mGy    FINDINGS/  IMPRESSION:    There is a feeding tube noted in the posterior pharynx. There is laryngeal penetration and aspiration with moderately thick liquids and puree.    For further information and recommendations, please refer to the speech pathologist final report which is available for review in the electronic medical record.    --- End of Report ---       GEMINI PHILIP DO; Resident Radiologist  This document has been electronically signed.  MARIELA CHURCHILL MD; Attending Radiologist  This document has been electronically signed. Aug 22 2024 2:50PM    MEDICATIONS:  Neuro:  acetaminophen     Tablet .. 650 milliGRAM(s) Oral every 6 hours PRN Mild Pain (1 - 3)  levETIRAcetam 250 milliGRAM(s) Oral two times a day    Cardiac:  amLODIPine   Tablet 5 milliGRAM(s) Oral daily  metoprolol tartrate 12.5 milliGRAM(s) Oral every 12 hours    Pulm:  albuterol    0.083% 2.5 milliGRAM(s) Nebulizer every 6 hours  albuterol    90 MICROgram(s) HFA Inhaler 1 Puff(s) Inhalation every 4 hours  budesonide 160 MICROgram(s)/formoterol 4.5 MICROgram(s) Inhaler 2 Puff(s) Inhalation two times a day  tiotropium 2.5 MICROgram(s) Inhaler 2 Puff(s) Inhalation daily    GI/:  bisacodyl 5 milliGRAM(s) Oral daily PRN Constipation  pantoprazole   Suspension 40 milliGRAM(s) Oral before breakfast  polyethylene glycol 3350 17 Gram(s) Oral two times a day  senna 2 Tablet(s) Oral at bedtime    Other:   chlorhexidine 2% Cloths 1 Application(s) Topical <User Schedule>  dextrose 5%. 1000 milliLiter(s) IV Continuous <Continuous>  dextrose 5%. 1000 milliLiter(s) IV Continuous <Continuous>  dextrose 50% Injectable 25 Gram(s) IV Push once  dextrose 50% Injectable 25 Gram(s) IV Push once  dextrose 50% Injectable 12.5 Gram(s) IV Push once  dextrose Oral Gel 15 Gram(s) Oral once PRN Blood Glucose LESS THAN 70 milliGRAM(s)/deciliter  glucagon  Injectable 1 milliGRAM(s) IntraMuscular once  insulin lispro (ADMELOG) corrective regimen sliding scale   SubCutaneous every 6 hours  Nephro-corine 1 Tablet(s) Oral daily  simvastatin 20 milliGRAM(s) Oral at bedtime  sodium chloride 0.9%. 1000 milliLiter(s) IV Continuous <Continuous>        DIET: NPO

## 2024-08-28 NOTE — CONSULT NOTE ADULT - SUBJECTIVE AND OBJECTIVE BOX
Patient is a 79y old  Male who presents with a chief complaint of post-operative DVT (28 Aug 2024 14:22)    HPI:  Patient is an 79 year old man who was admitted on 8/15 with RUE swelling, found to have RUE DVT, PE. Patient seen today, wife at bedside.     REVIEW OF SYSTEMS  Denies chest pain, SOB, N/V, F/C    VITALS  T(C): 36.7 (08-28-24 @ 11:59), Max: 37.3 (08-28-24 @ 00:41)  HR: 68 (08-28-24 @ 11:59) (60 - 73)  BP: 123/70 (08-28-24 @ 11:59) (123/70 - 162/66)  RR: 16 (08-28-24 @ 11:59) (16 - 18)  SpO2: 96% (08-28-24 @ 11:59) (95% - 96%)  Wt(kg): --    PAST MEDICAL & SURGICAL HISTORY  HTN (hypertension)    High cholesterol    Low back pain    Benign carcinoid tumor    COPD (chronic obstructive pulmonary disease)    Atrial fibrillation, unspecified type    Acute kidney injury    Stage 3 chronic kidney disease    Pulmonary emphysema, unspecified emphysema type    Nasopharynx cancer    Disease of pancreas, unspecified    Hearing loss    Pancreatic mass    Radiation fibrosis    ETOH abuse    Other nonspecific abnormal finding of lung field    Abnormal chest CT    Type 2 diabetes mellitus    Dysphagia    History of cancer chemotherapy    S/P radiation therapy    Pneumonia    Syncope    Pneumonia due to COVID-19 virus    History of appendectomy    H/O hemicolectomy    S/P tonsillectomy    H/O endoscopy    History of partial pancreatectomy    History of loop recorder        FUNCTIONAL HISTORY  Lives with wife, elevator apt   Independent AMB and ADLs PTA     CURRENT FUNCTIONAL STATUS  [facility] 8/22  MBS   oropharyngeal dysphagia  NPO     PT  8/27  bed mobility min assist   transfers supervision with RW  gait supervision with RW x 100 feet   impaired balance     RECENT LABS/IMAGING  CBC Full  -  ( 28 Aug 2024 13:37 )  WBC Count : 7.51 K/uL  RBC Count : 3.52 M/uL  Hemoglobin : 10.6 g/dL  Hematocrit : 33.3 %  Platelet Count - Automated : 212 K/uL  Mean Cell Volume : 94.6 fl  Mean Cell Hemoglobin : 30.1 pg  Mean Cell Hemoglobin Concentration : 31.8 gm/dL  Auto Neutrophil # : x  Auto Lymphocyte # : x  Auto Monocyte # : x  Auto Eosinophil # : x  Auto Basophil # : x  Auto Neutrophil % : x  Auto Lymphocyte % : x  Auto Monocyte % : x  Auto Eosinophil % : x  Auto Basophil % : x    08-28    137  |  103  |  24<H>  ----------------------------<  75  4.6   |  21<L>  |  0.90    Ca    9.1      28 Aug 2024 13:37  Phos  3.0     08-28  Mg     2.3     08-28      Urinalysis Basic - ( 28 Aug 2024 13:37 )    Color: x / Appearance: x / SG: x / pH: x  Gluc: 75 mg/dL / Ketone: x  / Bili: x / Urobili: x   Blood: x / Protein: x / Nitrite: x   Leuk Esterase: x / RBC: x / WBC x   Sq Epi: x / Non Sq Epi: x / Bacteria: x    < from: CT Head No Cont (08.20.24 @ 11:46) >      IMPRESSION:  No significant interval change 8/18/2024 in postop changes at the level   of the right temporal lobe. Evidence of prior infarcts as described.      < end of copied text >    < from: MR Head w/wo IV Cont (08.02.24 @ 10:59) >    IMPRESSION:    Since prior MRI brain 6/6/2024, mild increase in size of heterogeneous   lesion in the anterior right temporal lobe.      < end of copied text >    < from: CT Angio Chest PE Protocol w/ IV Cont (08.16.24 @ 09:32) >    IMPRESSION:    1.  Segmental emboli within right upper lobe pulmonary artery. No   significant CT evidence of right heart strain.    2.  Emphysema. New mucoid impactions within bilateral lower lungs and   tree-in-bud opacities likely representing COPD exacerbation. Recommend   follow-up chest CT in 3 months to determine resolution.      < end of copied text >      ALLERGIES  penicillin (Other)  ACE inhibitors (Other)  Bee Stings- anaphylaxis (Other)      MEDICATIONS   acetaminophen     Tablet .. 650 milliGRAM(s) Oral every 6 hours PRN  albuterol    0.083% 2.5 milliGRAM(s) Nebulizer every 6 hours  albuterol    90 MICROgram(s) HFA Inhaler 1 Puff(s) Inhalation every 4 hours  amLODIPine   Tablet 5 milliGRAM(s) Oral daily  bisacodyl 5 milliGRAM(s) Oral daily PRN  budesonide 160 MICROgram(s)/formoterol 4.5 MICROgram(s) Inhaler 2 Puff(s) Inhalation two times a day  chlorhexidine 2% Cloths 1 Application(s) Topical <User Schedule>  dextrose 5%. 1000 milliLiter(s) IV Continuous <Continuous>  dextrose 5%. 1000 milliLiter(s) IV Continuous <Continuous>  dextrose 50% Injectable 25 Gram(s) IV Push once  dextrose 50% Injectable 12.5 Gram(s) IV Push once  dextrose 50% Injectable 25 Gram(s) IV Push once  dextrose Oral Gel 15 Gram(s) Oral once PRN  enoxaparin Injectable 60 milliGRAM(s) SubCutaneous <User Schedule>  glucagon  Injectable 1 milliGRAM(s) IntraMuscular once  insulin lispro (ADMELOG) corrective regimen sliding scale   SubCutaneous every 6 hours  levETIRAcetam 250 milliGRAM(s) Oral two times a day  metoprolol tartrate 12.5 milliGRAM(s) Oral every 12 hours  Nephro-corine 1 Tablet(s) Oral daily  pantoprazole   Suspension 40 milliGRAM(s) Oral before breakfast  senna 2 Tablet(s) Oral at bedtime  simvastatin 20 milliGRAM(s) Oral at bedtime  tiotropium 2.5 MICROgram(s) Inhaler 2 Puff(s) Inhalation daily      ----------------------------------------------------------------------------------------  PHYSICAL EXAM  Constitutional - NAD, Comfortable, in bed  HEENT - incision healing   Neck - Supple, No limited ROM  Chest - Breathing comfortably on room air   Cardiovascular - S1S2   Extremities - No C/C/E, No calf tenderness   Neurologic Exam -    follows commands                 Cognitive - Awake, Alert, AAO to self, place: hospital, year, not month     Communication - Fluent, No dysarthria       Motor - moves all ext      Sensory - Intact to LT     Psychiatric - Mood stable, Affect WNL  ----------------------------------------------------------------------------------------  ASSESSMENT/PLAN  79yMale h/o HTN, DM, HLD, COPD, CKD3, carcinoid tumor s/p hemicolectomy, nasopharyngeal carcinoma s/p chemo, RT, afib on eliquis, recent craniotomy 8/6 for resection of hemorrhagic lesion with functional deficits due to dysphagia, gait instability  PE/RUE DVT, CT angio with segmental emboli RUL, on lovenox   s/p craniotomy for temporal lobe mass, on keppra for seizure prophylaxis   dysphagia, s/p MBS, PEG 8/27  COPD, CTA with emphysema  Pain - Tylenol  Rehab - Will continue to follow for ongoing rehab needs and recommendations.    continue bedside therapy, PT/SLP   OT evaluation   patient requires supervision with mobility due to impaired balance   dysphagia treatment   would benefit from continued inpatient rehabilitation/PEGGY               55 minutes spent, reviewing chart, PT/OT/SLP notes, imaging, previous hospitalizations, exam, education about inpatient rehabilitation, and discussion with  and rehabilitation team

## 2024-08-28 NOTE — PROGRESS NOTE ADULT - SUBJECTIVE AND OBJECTIVE BOX
[facility] Division of Hospital Medicine  Clau Kamara MD  Available via MS Teams  Spectra [phone]    SUBJECTIVE / OVERNIGHT EVENTS: patient seen and examined this morning. s/p PEG yesterday. no new complaints. some discomfort around PEG site but SOB.       MEDICATIONS  (STANDING):  albuterol    0.083% 2.5 milliGRAM(s) Nebulizer every 6 hours  albuterol    90 MICROgram(s) HFA Inhaler 1 Puff(s) Inhalation every 4 hours  amLODIPine   Tablet 5 milliGRAM(s) Oral daily  budesonide 160 MICROgram(s)/formoterol 4.5 MICROgram(s) Inhaler 2 Puff(s) Inhalation two times a day  chlorhexidine 2% Cloths 1 Application(s) Topical <User Schedule>  dextrose 5%. 1000 milliLiter(s) (50 mL/Hr) IV Continuous <Continuous>  dextrose 5%. 1000 milliLiter(s) (100 mL/Hr) IV Continuous <Continuous>  dextrose 50% Injectable 25 Gram(s) IV Push once  dextrose 50% Injectable 25 Gram(s) IV Push once  dextrose 50% Injectable 12.5 Gram(s) IV Push once  enoxaparin Injectable 60 milliGRAM(s) SubCutaneous <User Schedule>  glucagon  Injectable 1 milliGRAM(s) IntraMuscular once  insulin lispro (ADMELOG) corrective regimen sliding scale   SubCutaneous every 6 hours  levETIRAcetam 250 milliGRAM(s) Oral two times a day  metoprolol tartrate 12.5 milliGRAM(s) Oral every 12 hours  Nephro-corine 1 Tablet(s) Oral daily  pantoprazole   Suspension 40 milliGRAM(s) Oral before breakfast  senna 2 Tablet(s) Oral at bedtime  simvastatin 20 milliGRAM(s) Oral at bedtime  tiotropium 2.5 MICROgram(s) Inhaler 2 Puff(s) Inhalation daily    MEDICATIONS  (PRN):  acetaminophen     Tablet .. 650 milliGRAM(s) Oral every 6 hours PRN Mild Pain (1 - 3)  bisacodyl 5 milliGRAM(s) Oral daily PRN Constipation  dextrose Oral Gel 15 Gram(s) Oral once PRN Blood Glucose LESS THAN 70 milliGRAM(s)/deciliter      I&O's Summary    27 Aug 2024 07:01  -  28 Aug 2024 07:00  --------------------------------------------------------  IN: 0 mL / OUT: 200 mL / NET: -200 mL    28 Aug 2024 07:01  -  28 Aug 2024 14:22  --------------------------------------------------------  IN: 0 mL / OUT: 125 mL / NET: -125 mL        PHYSICAL EXAM:  Vital Signs Last 24 Hrs  T(C): 36.7 (28 Aug 2024 11:59), Max: 37.3 (28 Aug 2024 00:41)  T(F): 98 (28 Aug 2024 11:59), Max: 99.1 (28 Aug 2024 00:41)  HR: 68 (28 Aug 2024 11:59) (60 - 73)  BP: 123/70 (28 Aug 2024 11:59) (123/70 - 162/66)  BP(mean): --  RR: 16 (28 Aug 2024 11:59) (16 - 18)  SpO2: 96% (28 Aug 2024 11:59) (95% - 96%)    Parameters below as of 28 Aug 2024 08:34  Patient On (Oxygen Delivery Method): room air      CONSTITUTIONAL: NAD, frail appearing  RESPIRATORY: Normal respiratory effort; lungs are clear to auscultation bilaterally  CARDIOVASCULAR: normal S1 and S2; No lower extremity edema  ABDOMEN: Nontender to palpation, normoactive bowel sounds, + PEG with no active oozing noted  MUSCULOSKELETAL: no clubbing or cyanosis of digits  PSYCH: A+O to person, place; affect appropriate  NEUROLOGY: moves all extremities, follows commands      LABS:                        10.6   7.51  )-----------( 212      ( 28 Aug 2024 13:37 )             33.3     08-28    137  |  103  |  24<H>  ----------------------------<  75  4.6   |  21<L>  |  0.90    Ca    9.1      28 Aug 2024 13:37  Phos  3.0     08-28  Mg     2.3     08-28            Urinalysis Basic - ( 28 Aug 2024 13:37 )    Color: x / Appearance: x / SG: x / pH: x  Gluc: 75 mg/dL / Ketone: x  / Bili: x / Urobili: x   Blood: x / Protein: x / Nitrite: x   Leuk Esterase: x / RBC: x / WBC x   Sq Epi: x / Non Sq Epi: x / Bacteria: x                COMMUNICATION:  Care Discussed with Consultants/Other Providers and Details of Discussion: neurosurgery PA(Emily)

## 2024-08-28 NOTE — PROGRESS NOTE ADULT - ASSESSMENT
ASSESSMENT: HPI:  79M hx recent admission for and R crani for lesion w/ Dr. 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 Today presents to ED for RUE doppler w/ DVT involving the subclavian, axillary and brachial veins and Superficial thrombus in the right basilic vein. Plts/coags wnl. Exam: unchanged from discharge,  awake, Ox2 (self and place), EOS, mild L facial, FC, BUE 5/5, BLE 5/5    NEURO:  q4 neurochecks/q4 vitals   8/20 CTH: stable   On Keppra for seizure prophylaxis   Pain control prn w/ Tylenol   Activity: OOB as tolerated     PULM:  Incentive spirometry, mobilize as tolerated  8/16 CTA Chest:  Segmental emboli within right upper lobe pulmonary artery. No significant CT evidence of right heart strain.  -Vascular cardiology following   Also found to have Emphysema. New mucoid impactions within bilateral lower lungs and tree-in-bud opacities likely representing COPD exacerbation.   -Satting between 93-98% on RA   -Continue Albuterol inhalation as well as inhaler, Symbicort and Spiriva   -Recommend follow-up chest CT in 3 months to determine resolution    CV:  Keep SBP   Continue Amlodipine, Metoprolol for HTN   Continue Simvastatin for Hyperlipidemia     RENAL:  IVL   Continue NS@70 while NPO   Last Na 134, will repeat BMP this AM     GI:  Diet: NPO  GI prophylaxis: Protonix   Bowel regimen: senna, miralax, dulocolax PRN   Last BM 8/25   s/p PEG on 8/27 with GI   -okay for meds and water, will plan to start feeds today     ENDO:   Goal euglycemia (-180)  ISS, monitor FS     HEME/ONC:  VTE prophylaxis: SCDs  8/15 UED: Right sided DVT involving the subclavian, axillary and brachial veins  -Repeat UED today   8/16 LED: negative for DVT   Vascular Surgery following   - on therapeutic Lovenox held for PEG, will resume Lovenox 60mg BID   -will f/u with neurosurgeon on when to transition to DOAC     ID:  Afebrile    MISC:  Hospitalist following for co-management     DISPOSITION:  PM&R to see     Plan to be d/w DR. Branham  #[id number]     ASSESSMENT: HPI:  79M hx recent admission for and R crani for lesion w/ Dr. 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 Today presents to ED for RUE doppler w/ DVT involving the subclavian, axillary and brachial veins and Superficial thrombus in the right basilic vein. Plts/coags wnl. Exam: unchanged from discharge,  awake, Ox2 (self and place), EOS, mild L facial, FC, BUE 5/5, BLE 5/5    NEURO:  q4 neurochecks/q4 vitals   8/20 CTH: stable   On Keppra for seizure prophylaxis   Pain control prn w/ Tylenol   Activity: OOB as tolerated     PULM:  Incentive spirometry, mobilize as tolerated  8/16 CTA Chest:  Segmental emboli within right upper lobe pulmonary artery. No significant CT evidence of right heart strain.  -Vascular cardiology following   Also found to have Emphysema. New mucoid impactions within bilateral lower lungs and tree-in-bud opacities likely representing COPD exacerbation.   -Satting between 93-98% on RA   -Continue Albuterol inhalation as well as inhaler, Symbicort and Spiriva   -Recommend follow-up chest CT in 3 months to determine resolution    CV:  Keep SBP   Continue Amlodipine, Metoprolol for HTN   Continue Simvastatin for Hyperlipidemia   On Eliquis at home for afib - will resume upon d/c to rehab per neurosurgeon     RENAL:  IVL   Continue NS@70 while NPO   Last Na 134, will repeat BMP this AM     GI:  Diet: NPO  GI prophylaxis: Protonix   Bowel regimen: senna, miralax, dulocolax PRN   Last BM 8/25   s/p PEG on 8/27 with GI   -okay for meds and water, will plan to start feeds today     ENDO:   Goal euglycemia (-180)  ISS, monitor FS     HEME/ONC:  VTE prophylaxis: SCDs  8/15 UED: Right sided DVT involving the subclavian, axillary and brachial veins  -Repeat UED today   8/16 LED: negative for DVT   Vascular Surgery following   - on therapeutic Lovenox held for PEG, will resume Lovenox 60mg BID and then transition to pt's home med Eliquis upon d/c    ID:  Afebrile    MISC:  Hospitalist following for co-management     DISPOSITION:  PM&R to see     Plan to be d/w DR. Branham  #[id number]

## 2024-08-28 NOTE — PROGRESS NOTE ADULT - PROBLEM SELECTOR PLAN 1
and RUE DVT  - duplex of RUE: Right sided DVT involving the subclavian, axillary and brachial veins. Superficial thrombus in the right basilic vein  - CT Angio chest: segmental emboli within RUL pulmonary artery with no evidence of RV strain  - TTE unremarkable  - Vascular Cardiology consulted, and started on hep gtt on 8/16, transitioned to therapeutic lovenox on 8/18. - resume lovenox vs eliquis if no plan for further intervention

## 2024-08-29 ENCOUNTER — APPOINTMENT (OUTPATIENT)
Dept: GASTROENTEROLOGY | Facility: CLINIC | Age: 79
End: 2024-08-29

## 2024-08-29 LAB
ADD ON TEST-SPECIMEN IN LAB: SIGNIFICANT CHANGE UP
ALBUMIN SERPL ELPH-MCNC: 2.8 G/DL — LOW (ref 3.3–5)
ALP SERPL-CCNC: 82 U/L — SIGNIFICANT CHANGE UP (ref 40–120)
ALT FLD-CCNC: 70 U/L — HIGH (ref 10–45)
ANION GAP SERPL CALC-SCNC: 9 MMOL/L — SIGNIFICANT CHANGE UP (ref 5–17)
AST SERPL-CCNC: 42 U/L — HIGH (ref 10–40)
BILIRUB DIRECT SERPL-MCNC: <0.1 MG/DL — SIGNIFICANT CHANGE UP (ref 0–0.3)
BILIRUB INDIRECT FLD-MCNC: >0.2 MG/DL — SIGNIFICANT CHANGE UP (ref 0.2–1)
BILIRUB SERPL-MCNC: 0.3 MG/DL — SIGNIFICANT CHANGE UP (ref 0.2–1.2)
BUN SERPL-MCNC: 20 MG/DL — SIGNIFICANT CHANGE UP (ref 7–23)
CALCIUM SERPL-MCNC: 8.9 MG/DL — SIGNIFICANT CHANGE UP (ref 8.4–10.5)
CHLORIDE SERPL-SCNC: 104 MMOL/L — SIGNIFICANT CHANGE UP (ref 96–108)
CO2 SERPL-SCNC: 21 MMOL/L — LOW (ref 22–31)
CREAT SERPL-MCNC: 0.85 MG/DL — SIGNIFICANT CHANGE UP (ref 0.5–1.3)
EGFR: 88 ML/MIN/1.73M2 — SIGNIFICANT CHANGE UP
GLUCOSE BLDC GLUCOMTR-MCNC: 111 MG/DL — HIGH (ref 70–99)
GLUCOSE BLDC GLUCOMTR-MCNC: 116 MG/DL — HIGH (ref 70–99)
GLUCOSE BLDC GLUCOMTR-MCNC: 124 MG/DL — HIGH (ref 70–99)
GLUCOSE BLDC GLUCOMTR-MCNC: 137 MG/DL — HIGH (ref 70–99)
GLUCOSE BLDC GLUCOMTR-MCNC: 149 MG/DL — HIGH (ref 70–99)
GLUCOSE SERPL-MCNC: 118 MG/DL — HIGH (ref 70–99)
HCT VFR BLD CALC: 29.5 % — LOW (ref 39–50)
HCT VFR BLD CALC: 30.9 % — LOW (ref 39–50)
HGB BLD-MCNC: 9.4 G/DL — LOW (ref 13–17)
HGB BLD-MCNC: 9.8 G/DL — LOW (ref 13–17)
MAGNESIUM SERPL-MCNC: 2.3 MG/DL — SIGNIFICANT CHANGE UP (ref 1.6–2.6)
MCHC RBC-ENTMCNC: 29.7 PG — SIGNIFICANT CHANGE UP (ref 27–34)
MCHC RBC-ENTMCNC: 30.3 PG — SIGNIFICANT CHANGE UP (ref 27–34)
MCHC RBC-ENTMCNC: 31.7 GM/DL — LOW (ref 32–36)
MCHC RBC-ENTMCNC: 31.9 GM/DL — LOW (ref 32–36)
MCV RBC AUTO: 93.6 FL — SIGNIFICANT CHANGE UP (ref 80–100)
MCV RBC AUTO: 95.2 FL — SIGNIFICANT CHANGE UP (ref 80–100)
NRBC # BLD: 0 /100 WBCS — SIGNIFICANT CHANGE UP (ref 0–0)
NRBC # BLD: 0 /100 WBCS — SIGNIFICANT CHANGE UP (ref 0–0)
PHOSPHATE SERPL-MCNC: 2.8 MG/DL — SIGNIFICANT CHANGE UP (ref 2.5–4.5)
PLATELET # BLD AUTO: 181 K/UL — SIGNIFICANT CHANGE UP (ref 150–400)
PLATELET # BLD AUTO: 190 K/UL — SIGNIFICANT CHANGE UP (ref 150–400)
POTASSIUM SERPL-MCNC: 4.3 MMOL/L — SIGNIFICANT CHANGE UP (ref 3.5–5.3)
POTASSIUM SERPL-SCNC: 4.3 MMOL/L — SIGNIFICANT CHANGE UP (ref 3.5–5.3)
PROT SERPL-MCNC: 6.2 G/DL — SIGNIFICANT CHANGE UP (ref 6–8.3)
RBC # BLD: 3.1 M/UL — LOW (ref 4.2–5.8)
RBC # BLD: 3.3 M/UL — LOW (ref 4.2–5.8)
RBC # FLD: 15.9 % — HIGH (ref 10.3–14.5)
RBC # FLD: 16.3 % — HIGH (ref 10.3–14.5)
SODIUM SERPL-SCNC: 134 MMOL/L — LOW (ref 135–145)
WBC # BLD: 5.29 K/UL — SIGNIFICANT CHANGE UP (ref 3.8–10.5)
WBC # BLD: 5.32 K/UL — SIGNIFICANT CHANGE UP (ref 3.8–10.5)
WBC # FLD AUTO: 5.29 K/UL — SIGNIFICANT CHANGE UP (ref 3.8–10.5)
WBC # FLD AUTO: 5.32 K/UL — SIGNIFICANT CHANGE UP (ref 3.8–10.5)

## 2024-08-29 PROCEDURE — 99232 SBSQ HOSP IP/OBS MODERATE 35: CPT

## 2024-08-29 RX ADMIN — ENOXAPARIN SODIUM 60 MILLIGRAM(S): 100 INJECTION SUBCUTANEOUS at 05:55

## 2024-08-29 RX ADMIN — AMLODIPINE BESYLATE 5 MILLIGRAM(S): 10 TABLET ORAL at 05:51

## 2024-08-29 RX ADMIN — Medication 2 TABLET(S): at 21:25

## 2024-08-29 RX ADMIN — Medication 2.5 MILLIGRAM(S): at 05:51

## 2024-08-29 RX ADMIN — ENOXAPARIN SODIUM 60 MILLIGRAM(S): 100 INJECTION SUBCUTANEOUS at 17:59

## 2024-08-29 RX ADMIN — POLYETHYLENE GLYCOL 3350 17 GRAM(S): 17 POWDER, FOR SOLUTION ORAL at 11:20

## 2024-08-29 RX ADMIN — ACETAMINOPHEN 650 MILLIGRAM(S): 325 TABLET ORAL at 11:49

## 2024-08-29 RX ADMIN — Medication 20 MILLIGRAM(S): at 21:25

## 2024-08-29 RX ADMIN — METOPROLOL TARTRATE 12.5 MILLIGRAM(S): 100 TABLET ORAL at 05:51

## 2024-08-29 RX ADMIN — LEVETIRACETAM 250 MILLIGRAM(S): 1000 TABLET ORAL at 05:51

## 2024-08-29 RX ADMIN — Medication 40 MILLIGRAM(S): at 07:13

## 2024-08-29 RX ADMIN — Medication 1 TABLET(S): at 11:20

## 2024-08-29 RX ADMIN — LEVETIRACETAM 250 MILLIGRAM(S): 1000 TABLET ORAL at 17:59

## 2024-08-29 RX ADMIN — BUDESONIDE AND FORMOTEROL FUMARATE 2 PUFF(S): 80; 4.5 AEROSOL, METERED RESPIRATORY (INHALATION) at 17:58

## 2024-08-29 RX ADMIN — Medication 2.5 MILLIGRAM(S): at 23:21

## 2024-08-29 RX ADMIN — TIOTROPIUM BROMIDE INHALATION SPRAY 2 PUFF(S): 3.12 SPRAY, METERED RESPIRATORY (INHALATION) at 11:20

## 2024-08-29 RX ADMIN — Medication 2.5 MILLIGRAM(S): at 11:19

## 2024-08-29 RX ADMIN — CHLORHEXIDINE GLUCONATE 1 APPLICATION(S): 40 SOLUTION TOPICAL at 05:52

## 2024-08-29 RX ADMIN — ACETAMINOPHEN 650 MILLIGRAM(S): 325 TABLET ORAL at 11:19

## 2024-08-29 RX ADMIN — METOPROLOL TARTRATE 12.5 MILLIGRAM(S): 100 TABLET ORAL at 17:58

## 2024-08-29 RX ADMIN — BUDESONIDE AND FORMOTEROL FUMARATE 2 PUFF(S): 80; 4.5 AEROSOL, METERED RESPIRATORY (INHALATION) at 05:51

## 2024-08-29 RX ADMIN — Medication 2.5 MILLIGRAM(S): at 17:58

## 2024-08-29 NOTE — PROGRESS NOTE ADULT - ASSESSMENT
ASSESSMENT: HPI:  79M hx recent admission for and R crani for lesion w/ Dr. 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 Today presents to ED for RUE doppler w/ DVT involving the subclavian, axillary and brachial veins and Superficial thrombus in the right basilic vein. Plts/coags wnl. Exam: unchanged from discharge,  awake, Ox2 (self and place), EOS, mild L facial, FC, BUE 5/5, BLE 5/5    NEURO:  q4 neurochecks/q4 vitals   8/20 CTH: stable   On Keppra for seizure prophylaxis   Pain control prn w/ Tylenol   Activity: OOB as tolerated     PULM:  Incentive spirometry, mobilize as tolerated  8/16 CTA Chest:  Segmental emboli within right upper lobe pulmonary artery. No significant CT evidence of right heart strain.  -Vascular cardiology following   Also found to have Emphysema. New mucoid impactions within bilateral lower lungs and tree-in-bud opacities likely representing COPD exacerbation.   -Satting between 93-98% on RA   -Continue Albuterol inhalation as well as inhaler, Symbicort and Spiriva   -Recommend follow-up chest CT in 3 months to determine resolution    CV:  Keep SBP   Continue Amlodipine, Metoprolol for HTN   Continue Simvastatin for Hyperlipidemia   On Eliquis at home for afib - will resume upon d/c to rehab per neurosurgeon     RENAL:  IVL   Continue NS@70 while NPO   Last Na 134, will repeat BMP this AM     GI:  Diet: NPO  GI prophylaxis: Protonix   Bowel regimen: senna, miralax, dulocolax PRN   Last BM 8/25   s/p PEG on 8/27 with GI   -okay for meds and water, will plan to start feeds today     ENDO:   Goal euglycemia (-180)  ISS, monitor FS     HEME/ONC:  VTE prophylaxis: SCDs  8/15 UED: Right sided DVT involving the subclavian, axillary and brachial veins  -Repeat UED today   8/16 LED: negative for DVT   Vascular Surgery following   - on therapeutic Lovenox held for PEG, will resume Lovenox 60mg BID and then transition to pt's home med Eliquis upon d/c    ID:  Afebrile    MISC:  Hospitalist following for co-management     DISPOSITION:  PM&R to see     Plan to be d/w DR. Branham  #[id number]     79M hx recent admission for and R crani for lesion w/ Dr. 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 Today presents to ED for RUE doppler w/ DVT involving the subclavian, axillary and brachial veins and Superficial thrombus in the right basilic vein.      PLAN    NEURO:  q4 neurochecks/q4 vitals   8/20 CTH: stable   On Keppra for seizure prophylaxis   Pain control prn w/ Tylenol   Activity: OOB as tolerated     PULM:  Incentive spirometry, mobilize as tolerated  8/16 CTA Chest:  Segmental emboli within right upper lobe pulmonary artery. No significant CT evidence of right heart strain.  -Vascular cardiology following   Also found to have Emphysema. New mucoid impactions within bilateral lower lungs and tree-in-bud opacities likely representing COPD exacerbation.   -Satting between 95-99% on RA   -Continue Albuterol inhalation as well as inhaler, Symbicort and Spiriva   -Recommend follow-up chest CT in 3 months to determine resolution    CV:  Keep SBP   Continue Amlodipine, Metoprolol for HTN   Continue Simvastatin for Hyperlipidemia   On Eliquis at home for afib - will resume upon d/c to rehab per neurosurgeon     RENAL:  IVL   Last Na 134, will repeat BMP this AM     GI:  Diet: On Glucerna via Peg  GI prophylaxis: Protonix   Bowel regimen: senna, miralax, dulocolax PRN   Last BM 8/28      ENDO:   Goal euglycemia (-180)  ISS, monitor FS     HEME/ONC:  VTE prophylaxis: SCDs  8/15 UED: Right sided DVT involving the subclavian, axillary and brachial veins  -Repeat UED 8/28 no propagation   8/16 LED: negative for DVT   Vascular Surgery following   - on therapeutic Lovenox held for PEG, will resume Lovenox 60mg BID and then transition to pt's home med Eliquis upon d/c    ID:  Afebrile    MISC:  Hospitalist following for co-management     DISPOSITION:   PMR saw PEGGY recommended, Wife at bedside was updated      Plan to be d/w DR. Branham  #[id number]

## 2024-08-29 NOTE — OCCUPATIONAL THERAPY INITIAL EVALUATION ADULT - ADDITIONAL COMMENTS
Pt lives in an apartment with his wife +elevator, pt owns rollator, W/C, tub transfer bench, cane, rollator. tub shower

## 2024-08-29 NOTE — OCCUPATIONAL THERAPY INITIAL EVALUATION ADULT - PERTINENT HX OF CURRENT PROBLEM, REHAB EVAL
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 Today presents to ED for RUE doppler w/ DVT involving the subclavian, axillary and brachial veins and Superficial thrombus in the right basilic vein.  Post-op day # 2 s/p PEG

## 2024-08-29 NOTE — PROGRESS NOTE ADULT - ASSESSMENT
78 yo male with PMH of HTN, T2DM (diet controlled), HLD, COPD, CKD 3, carcinoid tumor s/p hemicolectomy, nasopharyngeal carcinoma s/p chemo/RT (c/b dysphagia ), IPMN s/p distal pancreatectomy (2018), and paroxysmal Afib on Eliquis s/p craniotomy 8/6 for resection of hemorrhagic lesion. Now presented to the ED for RUE swelling, found to have RUE DVT involving subclavian, axillary, and brachial veins and PE.    Pt was known to have cough w/ PO previously and was evaluated w/ OP MBS earlier this year - using thickened fluids at home   Post op course further complicated by Dysphagia - failed multiple SLP evaluations with recs: NPO   Pt and family are now agreeable to PEG placement for which GI is consulted    #Dysphagia  sp EGD + PEG placement 8/27/24 8/28 scant dried blood under PEG bumper; no active bleeding, induration, swelling or edema  PEG bumper between 3.5 and 4 cm at skin level  8/29 scant oozing, slight downtrend in Hgb. Surgicell placed at PEG tract  #remote history of head/Neck CA sp Chemo + XRT  #sp hemicolectomy  #sp partial pancreatectomy  #DVT+PE   AC held x 24 hours for PEG  AC (Lovenox) restarted 8/28 pm.   8/29 AM scant PEG site oozing, sl Hgb downtrend. Topical surgicell applied at PEG site    RECS:  -Keep surgicell in place; do not remove  -ok to continue PEG feeds and titrate to goal  -check CBC 6pm and in AM please   -ok to continue AC   -monitor CBC and PEG site  -local PEG care (cleanse daily and pat dry), abdominal binder on at all times please to prevent accidental PEG trauma/tugging/dislodgement  -PPI for GI ppx  -oral care and suction PRN    Discussed with pt and spouse at bedside; all questions answered  Discussed with Neurosurgery team and Medicine attending    [facility] Non Teaching GI Service  Available on TEAMS Mon-Fri 8a-4p  After hours and weekend coverage [phone]   78 yo male with PMH of HTN, T2DM (diet controlled), HLD, COPD, CKD 3, carcinoid tumor s/p hemicolectomy, nasopharyngeal carcinoma s/p chemo/RT (c/b dysphagia ), IPMN s/p distal pancreatectomy (2018), and paroxysmal Afib on Eliquis s/p craniotomy 8/6 for resection of hemorrhagic lesion. Now presented to the ED for RUE swelling, found to have RUE DVT involving subclavian, axillary, and brachial veins and PE.    Pt was known to have cough w/ PO previously and was evaluated w/ OP MBS earlier this year - using thickened fluids at home   Post op course further complicated by Dysphagia - failed multiple SLP evaluations with recs: NPO   Pt and family are now agreeable to PEG placement for which GI is consulted    #Dysphagia  sp EGD + PEG placement 8/27/24 8/28 scant dried blood under PEG bumper; no active bleeding, induration, swelling or edema  PEG bumper between 3.5 and 4 cm at skin level  8/29 scant oozing, slight downtrend in Hgb. Surgicell placed at PEG tract  #remote history of head/Neck CA sp Chemo + XRT  #sp hemicolectomy  #sp partial pancreatectomy  #DVT+PE   AC held x 24 hours for PEG  AC (Lovenox) restarted 8/28 pm.   8/29 AM scant PEG site oozing, sl Hgb downtrend. Topical surgicell applied at PEG site    RECS:  -Keep surgicell in place; do not remove  -ok to continue PEG feeds and titrate to goal  -check CBC 6pm and in AM please   -ok to continue AC   -monitor CBC and PEG site  -local PEG care (cleanse daily and pat dry), abdominal binder on at all times please to prevent accidental PEG trauma/tugging/dislodgement  -PPI for GI ppx  -oral care and suction PRN      Discussed with pt and spouse at bedside; all questions answered  Discussed with Neurosurgery team and Medicine attending    [facility] Non Teaching GI Service  Available on TEAMS Mon-Fri 8a-4p  After hours and weekend coverage [phone]   Sample

## 2024-08-29 NOTE — PROGRESS NOTE ADULT - SUBJECTIVE AND OBJECTIVE BOX
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      ETOH abuse  quit in 2016      Other nonspecific abnormal finding of lung field      Abnormal chest CT      Type 2 diabetes mellitus      Dysphagia      History of cancer chemotherapy      S/P radiation therapy      Pneumonia      Syncope      Pneumonia due to COVID-19 virus      History of appendectomy      H/O hemicolectomy      S/P tonsillectomy  1959      H/O endoscopy  last one 06/2018      History of partial pancreatectomy      History of loop recorder        Vital Signs Last 24 Hrs  T(C): 36.7 (29 Aug 2024 09:06), Max: 37.3 (29 Aug 2024 05:50)  T(F): 98 (29 Aug 2024 09:06), Max: 99.2 (29 Aug 2024 05:50)  HR: 81 (29 Aug 2024 09:06) (66 - 81)  BP: 133/71 (29 Aug 2024 09:06) (112/71 - 133/71)  BP(mean): --  RR: 18 (29 Aug 2024 09:06) (16 - 18)  SpO2: 99% (29 Aug 2024 09:06) (95% - 99%)    Parameters below as of 29 Aug 2024 09:06  Patient On (Oxygen Delivery Method): room air                              9.4    5.29  )-----------( 190      ( 29 Aug 2024 06:40 )             29.5    08-29    134<L>  |  104  |  20  ----------------------------<  118<H>  4.3   |  21<L>  |  0.85    Ca    8.9      29 Aug 2024 06:35  Phos  2.8     08-29  Mg     2.3     08-29       Stroke Core Measures      DRAIN OUTPUT:     NEUROIMAGING:     PHYSICAL EXAM:    General: No Acute Distress     Neurological: Awake, alert oriented to person, place and time, Following Commands, PERRL, EOMI, Face Symmetrical, Speech Fluent, Moving all extremities, Muscle Strength normal in all four extremities, No Drift, Sensation to Light Touch Intact    Pulmonary: Clear to Auscultation, No Rales, No Rhonchi, No Wheezes     Cardiovascular: S1, S2, Regular Rate and Rhythm     Gastrointestinal: Soft, Nontender, Nondistended     Incision:     MEDICATIONS:   Antibiotics:    Neuro:  acetaminophen     Tablet .. 650 milliGRAM(s) Oral every 6 hours PRN Mild Pain (1 - 3)  levETIRAcetam 250 milliGRAM(s) Oral two times a day    Anticoagulation:  enoxaparin Injectable 60 milliGRAM(s) SubCutaneous <User Schedule>    Cardiology:  amLODIPine   Tablet 5 milliGRAM(s) Oral daily  metoprolol tartrate 12.5 milliGRAM(s) Oral every 12 hours    Endo:   dextrose 50% Injectable 25 Gram(s) IV Push once  dextrose 50% Injectable 12.5 Gram(s) IV Push once  dextrose 50% Injectable 25 Gram(s) IV Push once  dextrose Oral Gel 15 Gram(s) Oral once PRN  glucagon  Injectable 1 milliGRAM(s) IntraMuscular once  insulin lispro (ADMELOG) corrective regimen sliding scale   SubCutaneous every 6 hours  simvastatin 20 milliGRAM(s) Oral at bedtime    Pulm:  albuterol    0.083% 2.5 milliGRAM(s) Nebulizer every 6 hours  albuterol    90 MICROgram(s) HFA Inhaler 1 Puff(s) Inhalation every 4 hours  budesonide 160 MICROgram(s)/formoterol 4.5 MICROgram(s) Inhaler 2 Puff(s) Inhalation two times a day  tiotropium 2.5 MICROgram(s) Inhaler 2 Puff(s) Inhalation daily    GI/:  bisacodyl 5 milliGRAM(s) Oral daily PRN  pantoprazole   Suspension 40 milliGRAM(s) Oral before breakfast  polyethylene glycol 3350 17 Gram(s) Oral daily  senna 2 Tablet(s) Oral at bedtime    Other:  chlorhexidine 2% Cloths 1 Application(s) Topical <User Schedule>  dextrose 5%. 1000 milliLiter(s) IV Continuous <Continuous>  dextrose 5%. 1000 milliLiter(s) IV Continuous <Continuous>  Nephro-corine 1 Tablet(s) Oral daily  albuterol    0.083% 2.5 milliGRAM(s) Nebulizer every 6 hours  albuterol    90 MICROgram(s) HFA Inhaler 1 Puff(s) Inhalation every 4 hours  budesonide 160 MICROgram(s)/formoterol 4.5 MICROgram(s) Inhaler 2 Puff(s) Inhalation two times a day  tiotropium 2.5 MICROgram(s) Inhaler 2 Puff(s) Inhalation daily   bisacodyl 5 milliGRAM(s) Oral daily PRN  pantoprazole   Suspension 40 milliGRAM(s) Oral before breakfast  polyethylene glycol 3350 17 Gram(s) Oral daily  senna 2 Tablet(s) Oral at bedtime    79M hx recent admission for and R crani for lesion w/ Dr. 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 Today presents to ED for RUE doppler w/ DVT involving the subclavian, axillary and brachial veins and Superficial thrombus in the right basilic vein.    Post-op day # 2 s/p PEG    Overnight event; Peg site has had + bloody oozing was packed with surgicel by GI    Vital Signs Last 24 Hrs  T(C): 36.7 (29 Aug 2024 09:06), Max: 37.3 (29 Aug 2024 05:50)  T(F): 98 (29 Aug 2024 09:06), Max: 99.2 (29 Aug 2024 05:50)  HR: 81 (29 Aug 2024 09:06) (66 - 81)  BP: 133/71 (29 Aug 2024 09:06) (112/71 - 133/71)  BP(mean): --  RR: 18 (29 Aug 2024 09:06) (16 - 18)  SpO2: 99% (29 Aug 2024 09:06) (95% - 99%)    Parameters below as of 29 Aug 2024 09:06  Patient On (Oxygen Delivery Method): room air                              9.4    5.29  )-----------( 190      ( 29 Aug 2024 06:40 )             29.5    08-29    134<L>  |  104  |  20  ----------------------------<  118<H>  4.3   |  21<L>  |  0.85    Ca    8.9      29 Aug 2024 06:35  Phos  2.8     08-29  Mg     2.3     08-29       Stroke Core Measures      DRAIN OUTPUT:     NEUROIMAGING:     PHYSICAL EXAM:    General: No Acute Distress     Neurological: Awake, alert oriented to person, place , Following Commands, PERRL, EOMI, Face Symmetrical, Speech Fluent, Moving all extremities, Muscle Strength normal in all four extremities, No Drift, Sensation to Light Touch Intact    Pulmonary: Clear to Auscultation, No Rales, No Rhonchi, No Wheezes     Cardiovascular: S1, S2, Regular Rate and Rhythm     Gastrointestinal: Soft, Nontender, Nondistended     Incision:     MEDICATIONS:   Antibiotics:    Neuro:  acetaminophen     Tablet .. 650 milliGRAM(s) Oral every 6 hours PRN Mild Pain (1 - 3)  levETIRAcetam 250 milliGRAM(s) Oral two times a day    Anticoagulation:  enoxaparin Injectable 60 milliGRAM(s) SubCutaneous <User Schedule>    Cardiology:  amLODIPine   Tablet 5 milliGRAM(s) Oral daily  metoprolol tartrate 12.5 milliGRAM(s) Oral every 12 hours    Endo:   dextrose 50% Injectable 25 Gram(s) IV Push once  dextrose 50% Injectable 12.5 Gram(s) IV Push once  dextrose 50% Injectable 25 Gram(s) IV Push once  dextrose Oral Gel 15 Gram(s) Oral once PRN  glucagon  Injectable 1 milliGRAM(s) IntraMuscular once  insulin lispro (ADMELOG) corrective regimen sliding scale   SubCutaneous every 6 hours  simvastatin 20 milliGRAM(s) Oral at bedtime    Pulm:  albuterol    0.083% 2.5 milliGRAM(s) Nebulizer every 6 hours  albuterol    90 MICROgram(s) HFA Inhaler 1 Puff(s) Inhalation every 4 hours  budesonide 160 MICROgram(s)/formoterol 4.5 MICROgram(s) Inhaler 2 Puff(s) Inhalation two times a day  tiotropium 2.5 MICROgram(s) Inhaler 2 Puff(s) Inhalation daily    GI/:  bisacodyl 5 milliGRAM(s) Oral daily PRN  pantoprazole   Suspension 40 milliGRAM(s) Oral before breakfast  polyethylene glycol 3350 17 Gram(s) Oral daily  senna 2 Tablet(s) Oral at bedtime    Other:  chlorhexidine 2% Cloths 1 Application(s) Topical <User Schedule>  dextrose 5%. 1000 milliLiter(s) IV Continuous <Continuous>  dextrose 5%. 1000 milliLiter(s) IV Continuous <Continuous>  Nephro-corine 1 Tablet(s) Oral daily  albuterol    0.083% 2.5 milliGRAM(s) Nebulizer every 6 hours  albuterol    90 MICROgram(s) HFA Inhaler 1 Puff(s) Inhalation every 4 hours  budesonide 160 MICROgram(s)/formoterol 4.5 MICROgram(s) Inhaler 2 Puff(s) Inhalation two times a day  tiotropium 2.5 MICROgram(s) Inhaler 2 Puff(s) Inhalation daily   bisacodyl 5 milliGRAM(s) Oral daily PRN  pantoprazole   Suspension 40 milliGRAM(s) Oral before breakfast  polyethylene glycol 3350 17 Gram(s) Oral daily  senna 2 Tablet(s) Oral at bedtime

## 2024-08-29 NOTE — PROGRESS NOTE ADULT - SUBJECTIVE AND OBJECTIVE BOX
**** INCOMPLETE NOTE ****    INTERVAL HPI/OVERNIGHT EVENTS:    MEDICATIONS  (STANDING):  albuterol    0.083% 2.5 milliGRAM(s) Nebulizer every 6 hours  albuterol    90 MICROgram(s) HFA Inhaler 1 Puff(s) Inhalation every 4 hours  amLODIPine   Tablet 5 milliGRAM(s) Oral daily  budesonide 160 MICROgram(s)/formoterol 4.5 MICROgram(s) Inhaler 2 Puff(s) Inhalation two times a day  chlorhexidine 2% Cloths 1 Application(s) Topical <User Schedule>  dextrose 5%. 1000 milliLiter(s) (100 mL/Hr) IV Continuous <Continuous>  dextrose 5%. 1000 milliLiter(s) (50 mL/Hr) IV Continuous <Continuous>  dextrose 50% Injectable 25 Gram(s) IV Push once  dextrose 50% Injectable 25 Gram(s) IV Push once  dextrose 50% Injectable 12.5 Gram(s) IV Push once  enoxaparin Injectable 60 milliGRAM(s) SubCutaneous <User Schedule>  glucagon  Injectable 1 milliGRAM(s) IntraMuscular once  insulin lispro (ADMELOG) corrective regimen sliding scale   SubCutaneous every 6 hours  levETIRAcetam 250 milliGRAM(s) Oral two times a day  metoprolol tartrate 12.5 milliGRAM(s) Oral every 12 hours  Nephro-corine 1 Tablet(s) Oral daily  pantoprazole   Suspension 40 milliGRAM(s) Oral before breakfast  polyethylene glycol 3350 17 Gram(s) Oral daily  senna 2 Tablet(s) Oral at bedtime  simvastatin 20 milliGRAM(s) Oral at bedtime  tiotropium 2.5 MICROgram(s) Inhaler 2 Puff(s) Inhalation daily    MEDICATIONS  (PRN):  acetaminophen     Tablet .. 650 milliGRAM(s) Oral every 6 hours PRN Mild Pain (1 - 3)  bisacodyl 5 milliGRAM(s) Oral daily PRN Constipation  dextrose Oral Gel 15 Gram(s) Oral once PRN Blood Glucose LESS THAN 70 milliGRAM(s)/deciliter      Allergies  penicillin (Other)  ACE inhibitors (Other)  Bee Stings- anaphylaxis (Other)      Review of Systems:      Vital Signs Last 24 Hrs  T(C): 36.7 (29 Aug 2024 09:06), Max: 37.3 (29 Aug 2024 05:50)  T(F): 98 (29 Aug 2024 09:06), Max: 99.2 (29 Aug 2024 05:50)  HR: 81 (29 Aug 2024 09:06) (66 - 81)  BP: 133/71 (29 Aug 2024 09:06) (112/71 - 133/71)  BP(mean): --  RR: 18 (29 Aug 2024 09:06) (16 - 18)  SpO2: 99% (29 Aug 2024 09:06) (95% - 99%)    Parameters below as of 29 Aug 2024 09:06  Patient On (Oxygen Delivery Method): room air    PHYSICAL EXAM:      LABS:                        9.4    5.29  )-----------( 190      ( 29 Aug 2024 06:40 )             29.5   Hemoglobin: 10.6 g/dL (08.28.24 @ 13:37)   Hemoglobin: 11.3 g/dL (08.26.24 @ 05:45)     08-29    134<L>  |  104  |  20  ----------------------------<  118<H>  4.3   |  21<L>  |  0.85    Ca    8.9      29 Aug 2024 06:35  Phos  2.8     08-29  Mg     2.3     08-29        Urinalysis Basic - ( 29 Aug 2024 06:35 )    Color: x / Appearance: x / SG: x / pH: x  Gluc: 118 mg/dL / Ketone: x  / Bili: x / Urobili: x   Blood: x / Protein: x / Nitrite: x   Leuk Esterase: x / RBC: x / WBC x   Sq Epi: x / Non Sq Epi: x / Bacteria: x      LIVER FUNCTIONS - ( 26 Aug 2024 05:45 )  Alb: 3.1 g/dL / Pro: 6.6 g/dL / ALK PHOS: 83 U/L / ALT: 99 U/L / AST: 61 U/L / GGT: x             RADIOLOGY & ADDITIONAL TESTS:     INTERVAL HPI/OVERNIGHT EVENTS:    tolerating PEG feeds  mild incisional discomfort w/ cough/straining  no melena or rectal bleeding  restarted on Lovenox 8/28 PM      no fever or chills  no CP or SOB    MEDICATIONS  (STANDING):  albuterol    0.083% 2.5 milliGRAM(s) Nebulizer every 6 hours  albuterol    90 MICROgram(s) HFA Inhaler 1 Puff(s) Inhalation every 4 hours  amLODIPine   Tablet 5 milliGRAM(s) Oral daily  budesonide 160 MICROgram(s)/formoterol 4.5 MICROgram(s) Inhaler 2 Puff(s) Inhalation two times a day  chlorhexidine 2% Cloths 1 Application(s) Topical <User Schedule>  dextrose 5%. 1000 milliLiter(s) (100 mL/Hr) IV Continuous <Continuous>  dextrose 5%. 1000 milliLiter(s) (50 mL/Hr) IV Continuous <Continuous>  dextrose 50% Injectable 25 Gram(s) IV Push once  dextrose 50% Injectable 25 Gram(s) IV Push once  dextrose 50% Injectable 12.5 Gram(s) IV Push once  enoxaparin Injectable 60 milliGRAM(s) SubCutaneous <User Schedule>  glucagon  Injectable 1 milliGRAM(s) IntraMuscular once  insulin lispro (ADMELOG) corrective regimen sliding scale   SubCutaneous every 6 hours  levETIRAcetam 250 milliGRAM(s) Oral two times a day  metoprolol tartrate 12.5 milliGRAM(s) Oral every 12 hours  Nephro-corine 1 Tablet(s) Oral daily  pantoprazole   Suspension 40 milliGRAM(s) Oral before breakfast  polyethylene glycol 3350 17 Gram(s) Oral daily  senna 2 Tablet(s) Oral at bedtime  simvastatin 20 milliGRAM(s) Oral at bedtime  tiotropium 2.5 MICROgram(s) Inhaler 2 Puff(s) Inhalation daily    MEDICATIONS  (PRN):  acetaminophen     Tablet .. 650 milliGRAM(s) Oral every 6 hours PRN Mild Pain (1 - 3)  bisacodyl 5 milliGRAM(s) Oral daily PRN Constipation  dextrose Oral Gel 15 Gram(s) Oral once PRN Blood Glucose LESS THAN 70 milliGRAM(s)/deciliter      Allergies  penicillin (Other)  ACE inhibitors (Other)  Bee Stings- anaphylaxis (Other)      Review of Systems:  see HPI- remainder 10 point ROS negative    Vital Signs Last 24 Hrs  T(C): 36.7 (29 Aug 2024 09:06), Max: 37.3 (29 Aug 2024 05:50)  T(F): 98 (29 Aug 2024 09:06), Max: 99.2 (29 Aug 2024 05:50)  HR: 81 (29 Aug 2024 09:06) (66 - 81)  BP: 133/71 (29 Aug 2024 09:06) (112/71 - 133/71)  BP(mean): --  RR: 18 (29 Aug 2024 09:06) (16 - 18)  SpO2: 99% (29 Aug 2024 09:06) (95% - 99%)    Parameters below as of 29 Aug 2024 09:06  Patient On (Oxygen Delivery Method): room air    PHYSICAL EXAM:  GENERAL:: thin AA male awake, alert appears comfortable. Seen during AM care. PEG feeds on hold for AM care  NECK: No JVD  RESP:: grossly clear  CV:: S1 and S2, RRR  GI:: BS+, soft, thin ND NT Abdominal binder opened - scant  amount of blood under PEG bumper.  PEG site cleaned; scant/minimal oozing at PEG incision; PEG site flushed with 40 cc free water and PEG lavage/return of tube feed/gastric contents - no coffee ground or makenna heme. Surgicell placed at incision site around PEG tubing  no further oozing noted; PEG bumper between 3.5 and 4 cm at skin level lightly touching skin and spins easily 360 degrees. +midline abdominal scar (from mid epigastric region to pubis) and small scar at lateral LUQ, WH LLQ scar c/w appy.  Abdominal binder replaced.  MUSCULOSKELETAL: No clubbing or cyanosis of digits; no joint swelling or tenderness to palpation  PSYCH: A+O to person, place, and time; affect appropriate  NEUROLOGY: CN 2-12 are intact and symmetric; no gross sensory deficits, grossly 5/5 strength throughout  SKIN: No rashes; no palpable lesions, +crani site c/d/i    LABS:                        9.4    5.29  )-----------( 190      ( 29 Aug 2024 06:40 )             29.5   Hemoglobin: 10.6 g/dL (08.28.24 @ 13:37)   Hemoglobin: 11.3 g/dL (08.26.24 @ 05:45)     08-29    134<L>  |  104  |  20  ----------------------------<  118<H>  4.3   |  21<L>  |  0.85    Ca    8.9      29 Aug 2024 06:35  Phos  2.8     08-29  Mg     2.3     08-29      RADIOLOGY & ADDITIONAL TESTS:

## 2024-08-30 ENCOUNTER — TRANSCRIPTION ENCOUNTER (OUTPATIENT)
Age: 79
End: 2024-08-30

## 2024-08-30 LAB
BLD GP AB SCN SERPL QL: NEGATIVE — SIGNIFICANT CHANGE UP
GLUCOSE BLDC GLUCOMTR-MCNC: 128 MG/DL — HIGH (ref 70–99)
GLUCOSE BLDC GLUCOMTR-MCNC: 134 MG/DL — HIGH (ref 70–99)
GLUCOSE BLDC GLUCOMTR-MCNC: 144 MG/DL — HIGH (ref 70–99)
HCT VFR BLD CALC: 25.9 % — LOW (ref 39–50)
HCT VFR BLD CALC: 26.7 % — LOW (ref 39–50)
HCT VFR BLD CALC: 27.8 % — LOW (ref 39–50)
HGB BLD-MCNC: 8.3 G/DL — LOW (ref 13–17)
HGB BLD-MCNC: 8.5 G/DL — LOW (ref 13–17)
HGB BLD-MCNC: 9.2 G/DL — LOW (ref 13–17)
MCHC RBC-ENTMCNC: 29.7 PG — SIGNIFICANT CHANGE UP (ref 27–34)
MCHC RBC-ENTMCNC: 30.3 PG — SIGNIFICANT CHANGE UP (ref 27–34)
MCHC RBC-ENTMCNC: 30.6 PG — SIGNIFICANT CHANGE UP (ref 27–34)
MCHC RBC-ENTMCNC: 31.8 GM/DL — LOW (ref 32–36)
MCHC RBC-ENTMCNC: 32 GM/DL — SIGNIFICANT CHANGE UP (ref 32–36)
MCHC RBC-ENTMCNC: 33.1 GM/DL — SIGNIFICANT CHANGE UP (ref 32–36)
MCV RBC AUTO: 92.4 FL — SIGNIFICANT CHANGE UP (ref 80–100)
MCV RBC AUTO: 93.4 FL — SIGNIFICANT CHANGE UP (ref 80–100)
MCV RBC AUTO: 94.5 FL — SIGNIFICANT CHANGE UP (ref 80–100)
NRBC # BLD: 0 /100 WBCS — SIGNIFICANT CHANGE UP (ref 0–0)
PLATELET # BLD AUTO: 193 K/UL — SIGNIFICANT CHANGE UP (ref 150–400)
PLATELET # BLD AUTO: 203 K/UL — SIGNIFICANT CHANGE UP (ref 150–400)
PLATELET # BLD AUTO: 216 K/UL — SIGNIFICANT CHANGE UP (ref 150–400)
RBC # BLD: 2.74 M/UL — LOW (ref 4.2–5.8)
RBC # BLD: 2.86 M/UL — LOW (ref 4.2–5.8)
RBC # BLD: 3.01 M/UL — LOW (ref 4.2–5.8)
RBC # FLD: 15.5 % — HIGH (ref 10.3–14.5)
RBC # FLD: 16.1 % — HIGH (ref 10.3–14.5)
RBC # FLD: 16.2 % — HIGH (ref 10.3–14.5)
RH IG SCN BLD-IMP: NEGATIVE — SIGNIFICANT CHANGE UP
WBC # BLD: 5.35 K/UL — SIGNIFICANT CHANGE UP (ref 3.8–10.5)
WBC # BLD: 5.98 K/UL — SIGNIFICANT CHANGE UP (ref 3.8–10.5)
WBC # BLD: 6.04 K/UL — SIGNIFICANT CHANGE UP (ref 3.8–10.5)
WBC # FLD AUTO: 5.35 K/UL — SIGNIFICANT CHANGE UP (ref 3.8–10.5)
WBC # FLD AUTO: 5.98 K/UL — SIGNIFICANT CHANGE UP (ref 3.8–10.5)
WBC # FLD AUTO: 6.04 K/UL — SIGNIFICANT CHANGE UP (ref 3.8–10.5)

## 2024-08-30 PROCEDURE — 93970 EXTREMITY STUDY: CPT | Mod: 26

## 2024-08-30 PROCEDURE — 99232 SBSQ HOSP IP/OBS MODERATE 35: CPT

## 2024-08-30 PROCEDURE — 99233 SBSQ HOSP IP/OBS HIGH 50: CPT

## 2024-08-30 RX ORDER — PANTOPRAZOLE SODIUM 40 MG
40 TABLET, DELAYED RELEASE (ENTERIC COATED) ORAL DAILY
Refills: 0 | Status: DISCONTINUED | OUTPATIENT
Start: 2024-08-31 | End: 2024-09-02

## 2024-08-30 RX ORDER — METOPROLOL TARTRATE 100 MG/1
12.5 TABLET ORAL
Qty: 0 | Refills: 0 | DISCHARGE
Start: 2024-08-30

## 2024-08-30 RX ORDER — LEVETIRACETAM 1000 MG/1
250 TABLET ORAL EVERY 12 HOURS
Refills: 0 | Status: DISCONTINUED | OUTPATIENT
Start: 2024-08-30 | End: 2024-09-11

## 2024-08-30 RX ORDER — DEXTROSE, SODIUM ACETATE, POTASSIUM CHLORIDE, POTASSIUM PHOSPHATE, AND SODIUM CHLORIDE 5; .15; .13; .28; .091 G/100ML; G/100ML; G/100ML; G/100ML; G/100ML
1000 INJECTION, SOLUTION INTRAVENOUS
Refills: 0 | Status: DISCONTINUED | OUTPATIENT
Start: 2024-08-30 | End: 2024-08-31

## 2024-08-30 RX ORDER — ENOXAPARIN SODIUM 100 MG/ML
60 INJECTION SUBCUTANEOUS
Qty: 0 | Refills: 0 | DISCHARGE
Start: 2024-08-30

## 2024-08-30 RX ORDER — LEVETIRACETAM 1000 MG/1
1 TABLET ORAL
Qty: 0 | Refills: 0 | DISCHARGE
Start: 2024-08-30

## 2024-08-30 RX ORDER — ACETAMINOPHEN 325 MG/1
1000 TABLET ORAL ONCE
Refills: 0 | Status: COMPLETED | OUTPATIENT
Start: 2024-08-30 | End: 2024-08-30

## 2024-08-30 RX ADMIN — CHLORHEXIDINE GLUCONATE 1 APPLICATION(S): 40 SOLUTION TOPICAL at 05:34

## 2024-08-30 RX ADMIN — LEVETIRACETAM 250 MILLIGRAM(S): 1000 TABLET ORAL at 05:23

## 2024-08-30 RX ADMIN — ACETAMINOPHEN 1000 MILLIGRAM(S): 325 TABLET ORAL at 14:21

## 2024-08-30 RX ADMIN — Medication 2 TABLET(S): at 22:20

## 2024-08-30 RX ADMIN — TIOTROPIUM BROMIDE INHALATION SPRAY 2 PUFF(S): 3.12 SPRAY, METERED RESPIRATORY (INHALATION) at 11:31

## 2024-08-30 RX ADMIN — Medication 2.5 MILLIGRAM(S): at 05:23

## 2024-08-30 RX ADMIN — Medication 1 TABLET(S): at 11:30

## 2024-08-30 RX ADMIN — Medication 40 MILLIGRAM(S): at 05:24

## 2024-08-30 RX ADMIN — ENOXAPARIN SODIUM 60 MILLIGRAM(S): 100 INJECTION SUBCUTANEOUS at 05:24

## 2024-08-30 RX ADMIN — METOPROLOL TARTRATE 12.5 MILLIGRAM(S): 100 TABLET ORAL at 05:23

## 2024-08-30 RX ADMIN — POLYETHYLENE GLYCOL 3350 17 GRAM(S): 17 POWDER, FOR SOLUTION ORAL at 11:30

## 2024-08-30 RX ADMIN — Medication 20 MILLIGRAM(S): at 22:20

## 2024-08-30 RX ADMIN — AMLODIPINE BESYLATE 5 MILLIGRAM(S): 10 TABLET ORAL at 05:23

## 2024-08-30 RX ADMIN — BUDESONIDE AND FORMOTEROL FUMARATE 2 PUFF(S): 80; 4.5 AEROSOL, METERED RESPIRATORY (INHALATION) at 05:25

## 2024-08-30 RX ADMIN — Medication 2.5 MILLIGRAM(S): at 23:27

## 2024-08-30 RX ADMIN — Medication 2.5 MILLIGRAM(S): at 11:31

## 2024-08-30 RX ADMIN — LEVETIRACETAM 400 MILLIGRAM(S): 1000 TABLET ORAL at 17:59

## 2024-08-30 RX ADMIN — ACETAMINOPHEN 400 MILLIGRAM(S): 325 TABLET ORAL at 14:06

## 2024-08-30 NOTE — PROGRESS NOTE ADULT - SUBJECTIVE AND OBJECTIVE BOX
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 Today presents to ED for RUE doppler w/ DVT involving the subclavian, axillary and brachial veins and Superficial thrombus in the right basilic vein.      Post-op day # 3 s/p PEG    Overnight event: There was some bloody oozing noted around peg site yesterday, surgicel was applied earlier  however the bleeding was worse and dressing was changed twice. no issue overnight and the dressing has been dry since    Vital Signs Last 24 Hrs  T(C): 36.8 (30 Aug 2024 09:16), Max: 37.3 (29 Aug 2024 20:17)  T(F): 98.3 (30 Aug 2024 09:16), Max: 99.2 (29 Aug 2024 20:17)  HR: 70 (30 Aug 2024 09:16) (68 - 80)  BP: 144/72 (30 Aug 2024 09:16) (102/67 - 157/81)  BP(mean): --  RR: 18 (30 Aug 2024 09:16) (17 - 18)  SpO2: 99% (30 Aug 2024 09:16) (95% - 100%)    Parameters below as of 30 Aug 2024 09:16  Patient On (Oxygen Delivery Method): room air                              9.8    5.32  )-----------( 181      ( 29 Aug 2024 18:01 )             30.9    08-29    134<L>  |  104  |  20  ----------------------------<  118<H>  4.3   |  21<L>  |  0.85    Ca    8.9      29 Aug 2024 06:35  Phos  2.8     08-29  Mg     2.3     08-29    TPro  6.2  /  Alb  2.8<L>  /  TBili  0.3  /  DBili  <0.1  /  AST  42<H>  /  ALT  70<H>  /  AlkPhos  82  08-29     Stroke Core Measures      DRAIN OUTPUT:     NEUROIMAGING:     PHYSICAL EXAM:    General: No Acute Distress     Neurological: Awake, alert oriented to person, place and time, Following Commands, PERRL, EOMI, Face Symmetrical, Speech Fluent, Moving all extremities, Muscle Strength normal in all four extremities, No Drift, Sensation to Light Touch Intact    Pulmonary: Clear to Auscultation, No Rales, No Rhonchi, No Wheezes     Cardiovascular: S1, S2, Regular Rate and Rhythm     Gastrointestinal: Soft, Nontender, Nondistended     Incision:     MEDICATIONS:   Antibiotics:    Neuro:  acetaminophen     Tablet .. 650 milliGRAM(s) Oral every 6 hours PRN Mild Pain (1 - 3)  levETIRAcetam 250 milliGRAM(s) Oral two times a day    Anticoagulation:  enoxaparin Injectable 60 milliGRAM(s) SubCutaneous <User Schedule>    Cardiology:  amLODIPine   Tablet 5 milliGRAM(s) Oral daily  metoprolol tartrate 12.5 milliGRAM(s) Oral every 12 hours    Endo:   glucagon  Injectable 1 milliGRAM(s) IntraMuscular once  simvastatin 20 milliGRAM(s) Oral at bedtime    Pulm:  albuterol    0.083% 2.5 milliGRAM(s) Nebulizer every 6 hours  albuterol    90 MICROgram(s) HFA Inhaler 1 Puff(s) Inhalation every 4 hours  budesonide 160 MICROgram(s)/formoterol 4.5 MICROgram(s) Inhaler 2 Puff(s) Inhalation two times a day  tiotropium 2.5 MICROgram(s) Inhaler 2 Puff(s) Inhalation daily    GI/:  bisacodyl 5 milliGRAM(s) Oral daily PRN  pantoprazole   Suspension 40 milliGRAM(s) Oral before breakfast  polyethylene glycol 3350 17 Gram(s) Oral daily  senna 2 Tablet(s) Oral at bedtime    Other:  chlorhexidine 2% Cloths 1 Application(s) Topical <User Schedule>  Nephro-corine 1 Tablet(s) Oral daily  albuterol    0.083% 2.5 milliGRAM(s) Nebulizer every 6 hours  albuterol    90 MICROgram(s) HFA Inhaler 1 Puff(s) Inhalation every 4 hours  budesonide 160 MICROgram(s)/formoterol 4.5 MICROgram(s) Inhaler 2 Puff(s) Inhalation two times a day  tiotropium 2.5 MICROgram(s) Inhaler 2 Puff(s) Inhalation daily   bisacodyl 5 milliGRAM(s) Oral daily PRN  pantoprazole   Suspension 40 milliGRAM(s) Oral before breakfast  polyethylene glycol 3350 17 Gram(s) Oral daily  senna 2 Tablet(s) Oral at bedtime

## 2024-08-30 NOTE — PROGRESS NOTE ADULT - ASSESSMENT
80 yo male with PMH of HTN, T2DM (diet controlled), HLD, COPD, CKD 3, carcinoid tumor s/p hemicolectomy, nasopharyngeal carcinoma s/p chemo/RT (c/b dysphagia ), IPMN s/p distal pancreatectomy (2018), and paroxysmal Afib on Eliquis s/p craniotomy 8/6 for resection of hemorrhagic lesion presented to the ED for RUE swelling, found to have RUE DVT involving subclavian, axillary, and brachial veins and PE. Course also c/b dysphagia s/p PEG 8/27, c/b bleeding around PEG site with acute blood loss anemia.

## 2024-08-30 NOTE — DISCHARGE NOTE PROVIDER - NSDCMRMEDTOKEN_GEN_ALL_CORE_FT
amLODIPine 5 mg oral tablet: 1 tab(s) orally once a day  Anoro Ellipta 62.5 mcg-25 mcg/inh inhalation powder: 1 puff(s) inhaled once a day  enoxaparin: 60 milligram(s) subcutaneous 2 times a day For DVT  levETIRAcetam 250 mg oral tablet: 1 tab(s) orally 2 times a day  metoprolol: 12.5 milligram(s) orally 2 times a day  pantoprazole 40 mg oral delayed release tablet: 1 tab(s) orally once a day (before a meal)  polyethylene glycol 3350 oral powder for reconstitution: 17 gram(s) orally once a day  Proventil HFA 90 mcg/inh inhalation aerosol: 2 puff(s) inhaled prn  senna leaf extract oral tablet: 2 tab(s) orally once a day (at bedtime)  simvastatin 20 mg oral tablet: 1 tab(s) orally once a day (at bedtime)   acetaminophen 325 mg oral tablet: 2 tab(s) by gastrostomy tube every 6 hours as needed for Mild Pain (1 - 3)  amLODIPine 5 mg oral tablet: 1 tab(s) orally once a day  apixaban 5 mg oral tablet: 1 tab(s) by gastrostomy tube every 12 hours  atorvastatin 10 mg oral tablet: 1 tab(s) by gastrostomy tube once a day (at bedtime)  ciprofloxacin 500 mg oral tablet: 1 tab(s) by gastrostomy tube every 12 hours till 9/16  insulin: 0.1 unit(s) subcutaneous 3 times a day admelog low sliding scale tid before meals and and at bed time.  levETIRAcetam 250 mg oral tablet: 1 tab(s) by gastrostomy tube 2 times a day  metoprolol: 12.5 milligram(s) by gastrostomy tube 2 times a day  pantoprazole 40 mg oral delayed release tablet: 1 tab(s) orally once a day (before a meal)  polyethylene glycol 3350 oral powder for reconstitution: 17 gram(s) orally once a day  Proventil HFA 90 mcg/inh inhalation aerosol: 2 puff(s) inhaled prn as needed for  shortness of breath and/or wheezing  senna leaf extract oral tablet: 2 tab(s) orally once a day (at bedtime)  Symbicort 160 mcg-4.5 mcg/inh inhalation aerosol: 2 puff(s) inhaled 2 times a day  tiotropium 2.5 mcg/inh inhalation aerosol: 2 puff(s) inhaled 2 times a day   acetaminophen 325 mg oral tablet: 2 tab(s) by gastrostomy tube every 6 hours as needed for Mild Pain (1 - 3)  amLODIPine 5 mg oral tablet: 1 tab(s) orally once a day  apixaban 5 mg oral tablet: 1 tab(s) by gastrostomy tube every 12 hours  atorvastatin 10 mg oral tablet: 1 tab(s) by gastrostomy tube once a day (at bedtime)  ciprofloxacin 500 mg oral tablet: 1 tab(s) by gastrostomy tube every 12 hours till 9/16  insulin: 0.1 unit(s) subcutaneous 3 times a day admelog low sliding scale tid before meals and and at bed time.  levETIRAcetam 250 mg oral tablet: 1 tab(s) by gastrostomy tube 2 times a day  Metamucil [employer] Fiber Sugar-free 3.4 g/5.4 g oral powder for reconstitution: 3.4 gram(s) by gastrostomy tube 2 times a day  metoprolol: 12.5 milligram(s) by gastrostomy tube 2 times a day  pantoprazole 40 mg oral delayed release tablet: 1 tab(s) orally once a day (before a meal)  polyethylene glycol 3350 oral powder for reconstitution: 17 gram(s) orally once a day  Proventil HFA 90 mcg/inh inhalation aerosol: 2 puff(s) inhaled prn as needed for  shortness of breath and/or wheezing  senna leaf extract oral tablet: 2 tab(s) orally once a day (at bedtime)  Symbicort 160 mcg-4.5 mcg/inh inhalation aerosol: 2 puff(s) inhaled 2 times a day  tiotropium 2.5 mcg/inh inhalation aerosol: 2 puff(s) inhaled 2 times a day   acetaminophen 325 mg oral tablet: 2 tab(s) by gastrostomy tube every 6 hours as needed for Mild Pain (1 - 3)  amLODIPine 5 mg oral tablet: 1 tab(s) by gastrostomy tube once a day  apixaban 5 mg oral tablet: 1 tab(s) by gastrostomy tube every 12 hours  atorvastatin 10 mg oral tablet: 1 tab(s) by gastrostomy tube once a day (at bedtime)  ciprofloxacin 500 mg oral tablet: 1 tab(s) by gastrostomy tube every 12 hours till 9/16  insulin: 0.1 unit(s) subcutaneous 3 times a day admelog low sliding scale tid before meals and and at bed time.  levETIRAcetam 250 mg oral tablet: 1 tab(s) by gastrostomy tube 2 times a day  Metamucil [employer] Fiber Sugar-free 3.4 g/5.4 g oral powder for reconstitution: 3.4 gram(s) by gastrostomy tube 2 times a day  metoprolol: 12.5 milligram(s) by gastrostomy tube 2 times a day  pantoprazole 4 mg/mL oral suspension: 10 milliliter(s) by gastrostomy tube once a day 40 mg  polyethylene glycol 3350 oral powder for reconstitution: 17 gram(s) by gastrostomy tube once a day  Proventil HFA 90 mcg/inh inhalation aerosol: 2 puff(s) inhaled prn as needed for  shortness of breath and/or wheezing  senna leaf extract oral tablet: 2 tab(s) by gastrostomy tube once a day (at bedtime)  Symbicort 160 mcg-4.5 mcg/inh inhalation aerosol: 2 puff(s) inhaled 2 times a day  tiotropium 2.5 mcg/inh inhalation aerosol: 2 puff(s) inhaled 2 times a day

## 2024-08-30 NOTE — DISCHARGE NOTE PROVIDER - IS THE PATIENT BEING DISCHARGED WITH A URINARY DRAINAGE DEVICE
Called pt regarding his Januvia. Pt will pick it up on the [facility] while he is in the process of applying for  assistance program.    Instructed pt per provider Presley BOSWELL to decrease levemir to 30 units BID and maintain novolog at 15 units before meals once he restarts his Januvia. Pt verbalized understanding to the above information.     Heather Mccall, AdeliaD No

## 2024-08-30 NOTE — DISCHARGE NOTE PROVIDER - REASON FOR ADMISSION
Post-operative DVT PE/DVT, found to be aspirating failed S&S s/p PEG w/ GI on 8/27, course complicated by bleeding around PEG site that has resolved, also found to have Klebsiella UTI and Bactermia post-operative DVT

## 2024-08-30 NOTE — DISCHARGE NOTE NURSING/CASE MANAGEMENT/SOCIAL WORK - PATIENT PORTAL LINK FT
You can access the FollowMyHealth Patient Portal offered by [facility] by registering at the following website: [email or web address]. By joining [facility] [employer]’s FollowMyHealth portal, you will also be able to view your health information using other applications (apps) compatible with our system.

## 2024-08-30 NOTE — DISCHARGE NOTE PROVIDER - NSDCCPCAREPLAN_GEN_ALL_CORE_FT
PRINCIPAL DISCHARGE DIAGNOSIS  Diagnosis: DVT (deep venous thrombosis)  Assessment and Plan of Treatment: Continue therapeutic Lovenox and please transition to Eliquis 5 mg bid while in Rehab      SECONDARY DISCHARGE DIAGNOSES  Diagnosis: HTN (hypertension)  Assessment and Plan of Treatment: Continue your home medication and follow up with your primary care physician    Diagnosis: COPD without exacerbation  Assessment and Plan of Treatment: Continue pulmonary treatment and follow up with your primary care physician    Diagnosis: Type 2 diabetes mellitus  Assessment and Plan of Treatment: Continue your home medication and follow up with your primary care physician    Diagnosis: Dysphagia  Assessment and Plan of Treatment: S/p Peg please evaluate for follow up swallowing eval    Diagnosis: Prophylactic measure  Assessment and Plan of Treatment: Please continue Protonix    Diagnosis: Paroxysmal atrial fibrillation  Assessment and Plan of Treatment: Continue your home medication and follow up with your primary care physician    Diagnosis: Hyperlipidemia  Assessment and Plan of Treatment: Continue your home medication and follow up with your primary care physician     PRINCIPAL DISCHARGE DIAGNOSIS  Diagnosis: DVT (deep venous thrombosis)  Assessment and Plan of Treatment: You were found to have a right subclavian / axillary and a left subclavian and basilic DVT last ultrasound 9/4, along with a right small segmental PE on CTA Chest on 8/16. Please continue Eliquis 5mg twice a day. Please make an appointment and follow up with Dr. Gagnon in 1-2 weeks after discharge from rehab.      SECONDARY DISCHARGE DIAGNOSES  Diagnosis: S/P craniotomy  Assessment and Plan of Treatment: 8/6/24 s/p right anterolateral temporal lobectomy for resection of mass with Dr. Branham.   Please make an appointment and follow up with Dr. Branham in 1-2 weeks after discharge from rehab facility. You have an absorbable sutures on your incision. You may shower however please DO NOT scrub at incision site. Keep incision clean and dry. Please continue your Keppra for seizure prophylaxis. Keppra helps control and prevent seizures.  The most common side effects include diarrhea or constipation, excessive sleepiness, irritability and mood changes.  Rare and sometimes serious side effects include rash. You may take Tylenol (Acetaminophen) as needed for pain control. Please do not exceed 4g/per day of Tylenol. Please do NOT take any NSAIDs (Advil, Aleve, Motrin, Ibuprofen) as these medications can increase your risk of bleeding.    Diagnosis: Dysphagia  Assessment and Plan of Treatment: You had a PEG placed by Dr. Savage on 8/27/24, please make an appointment and follow up with him in 1-2 weeks after discharge from rehab. Your course was complicated by bleeding around the PEG site, and anemia that required transfusion. Your CBC is stable now and you had an EGD on 9/3/24 that was stable. Please continue Protonix 40mg twice a day.    Diagnosis: Bacteremia  Assessment and Plan of Treatment: You were found to have Klebsiella bacteremia on 9/6, and was treated with ______. Please make an appointment and follow up with Dr. Song in 1-2 weeks after discharge from rehab.    Diagnosis: Acute UTI  Assessment and Plan of Treatment: You were found to have Klebsiella UTI on 9/6, and was treated with _____. Please make an appointment and follow up with Dr. Song in 1-2 weeks after dischaarge from rehab.    Diagnosis: HTN (hypertension)  Assessment and Plan of Treatment: Please continue Norvasc as prescribed. Please make an appointment and follow up with your primary care provider in 1-2 weeks after discharge from rehab.    Diagnosis: Paroxysmal atrial fibrillation  Assessment and Plan of Treatment: Please continue Metoprolol as prescribed. Please make an appointment and follow up with your primary care provider in 1-2 weeks after discharge from rehab.    Diagnosis: Hyperlipidemia  Assessment and Plan of Treatment: Please continue Simvastatin as prescribed. Please make an appointment and follow up with your primary care provider in 1-2 weeks after discharge from rehab.    Diagnosis: Type 2 diabetes mellitus  Assessment and Plan of Treatment: HgbA1c 6.3, continue insulin sliding scale while in rehab facility and please make an appointment and follow up with your primary care provider in 1-2 weeks after discharge from rehab.    Diagnosis: COPD without exacerbation  Assessment and Plan of Treatment: 8/16 CTA Chest: emphysema, new mucoid impaction likely COPD exacerbations - please have follow up CT Chest in 3 months. Please continue your Spiriva, Symbicort and Duoneb and make an appointment and follow up with your primary care provider in 1-2 weeks after discharge from rehab.     PRINCIPAL DISCHARGE DIAGNOSIS  Diagnosis: DVT (deep venous thrombosis)  Assessment and Plan of Treatment: You were found to have a right subclavian / axillary and a left subclavian and basilic DVT last ultrasound 9/4, along with a right small segmental PE on CTA Chest on 8/16. Please continue Eliquis 5mg twice a day. Please make an appointment and follow up with Dr. Gagnon in 1-2 weeks after discharge from rehab.      SECONDARY DISCHARGE DIAGNOSES  Diagnosis: HTN (hypertension)  Assessment and Plan of Treatment: Please continue Norvasc as prescribed. Please make an appointment and follow up with your primary care provider in 1-2 weeks after discharge from rehab.    Diagnosis: Type 2 diabetes mellitus  Assessment and Plan of Treatment: HgbA1c 6.3, continue insulin sliding scale while in rehab facility and please make an appointment and follow up with your primary care provider in 1-2 weeks after discharge from rehab.    Diagnosis: COPD without exacerbation  Assessment and Plan of Treatment: 8/16 CTA Chest: emphysema, new mucoid impaction likely COPD exacerbations - please have follow up CT Chest in 3 months. Please continue your Spiriva, Symbicort and Duoneb and make an appointment and follow up with your primary care provider in 1-2 weeks after discharge from rehab.    Diagnosis: Paroxysmal atrial fibrillation  Assessment and Plan of Treatment: Please continue Metoprolol as prescribed. Please make an appointment and follow up with your primary care provider in 1-2 weeks after discharge from rehab.    Diagnosis: Dysphagia  Assessment and Plan of Treatment: You had a PEG placed by Dr. Savage on 8/27/24, please make an appointment and follow up with him in 1-2 weeks after discharge from rehab. Your course was complicated by bleeding around the PEG site, and anemia that required transfusion. Your CBC is stable now and you had an EGD on 9/3/24 that was stable. Please continue Protonix 40mg twice a day.    Diagnosis: Hyperlipidemia  Assessment and Plan of Treatment: Please continue Simvastatin as prescribed. Please make an appointment and follow up with your primary care provider in 1-2 weeks after discharge from rehab.    Diagnosis: S/P craniotomy  Assessment and Plan of Treatment: 8/6/24 s/p right anterolateral temporal lobectomy for resection of mass with Dr. Branham.   Please make an appointment and follow up with Dr. Branham in 1-2 weeks after discharge from rehab facility. You have an absorbable sutures on your incision. You may shower however please DO NOT scrub at incision site. Keep incision clean and dry. Please continue your Keppra for seizure prophylaxis. Keppra helps control and prevent seizures.  The most common side effects include diarrhea or constipation, excessive sleepiness, irritability and mood changes.  Rare and sometimes serious side effects include rash. You may take Tylenol (Acetaminophen) as needed for pain control. Please do not exceed 4g/per day of Tylenol. Please do NOT take any NSAIDs (Advil, Aleve, Motrin, Ibuprofen) as these medications can increase your risk of bleeding.    Diagnosis: Bacteremia  Assessment and Plan of Treatment: You were found to have Klebsiella bacteremia on 9/6, and was treated with ceftriaxone and then it was switched to po cipro.  Please make an appointment and follow up with Dr. Song in 1-2 weeks after discharge from rehab.    Diagnosis: Acute UTI  Assessment and Plan of Treatment: You were found to have Klebsiella UTI on 9/6, and was treated with cipro. Please make an appointment and follow up with Dr. Song in 1-2 weeks after dischaarge from rehab.

## 2024-08-30 NOTE — DISCHARGE NOTE PROVIDER - HOSPITAL COURSE
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 Today presents to ED for RUE doppler w/ DVT involving the subclavian, axillary and brachial veins and Superficial thrombus in the right basilic vein.    Patient was started on prophylactic Lovenox for DVT. He was seen by speech and failed MBS , he was micro aspirating. Peg was advised   Procedure: S/p Peg 8/27  Post Peg pt has had bleeding from the peg site which resolved . He was restarted on SQ Lovenox and in the morning on 8/29 there was some bloody oozing noted around peg site yesterday, surgicel was applied earlier  however the bleeding was worse and dressing was changed twice. no issue overnight and the dressing has been dry since. Patient is currently on therapeutic Lovenox for h/o DVT. Please transition to Eliquis 5mg BID while in rehab  He was seen by PT/OT and out patient therapy recommended. , However he was evaluated by PMR and [facility] recommended 79M hx recent admission for and R crani for lesion w/ Dr. 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 Today presents to ED for RUE doppler w/ DVT involving the subclavian, axillary and brachial veins and Superficial thrombus in the right basilic vein.      Patient admitted under Neurosurgery service on 8/15 and was found to not only have RUE subclavian / axillary DVT but also a small segmental PE on 8/16 CTA Chest. Vascular cardiology was consulted and patient was placed on Heparin drip, he was eventually transitioned to therapeutic SQ Lovenox. Patient was also found to be aspirating and had failed S&S, patient received PEG by GI on 8/27, post-operatively patient was found to have bleeding around the PEG site and anemia on CBC that necessitated PRBC transfusion. SQ Lovenox was held for several days and on 9/3 patient had EGD that was negative. Bleeding resolved, H/H stable. Eliquis was started on 9/6. Patient had subsequent stable H/H. On 9/6 patient was found to have elevated WBC 24, afebrile, blood cultures were sent that was +Klebsiella and urine culture was done that was +Klebsiella UTI. Infectious disease consulted and patient was placed on IV abx.     Patient was seen by PT/OT and originally deemed for home PT/OT however seen by PMR and deemed a candidate for [facility]. 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 Today presents to ED for RUE doppler w/ DVT involving the subclavian, axillary and brachial veins and Superficial thrombus in the right basilic vein.      Patient admitted under Neurosurgery service on 8/15 and was found to not only have RUE subclavian / axillary DVT but also a small segmental PE on 8/16 CTA Chest. Vascular cardiology was consulted and patient was placed on Heparin drip, he was eventually transitioned to therapeutic SQ Lovenox. Patient was also found to be aspirating and had failed S&S, patient received PEG by GI on 8/27, post-operatively patient was found to have bleeding around the PEG site and anemia on CBC that necessitated PRBC transfusion. SQ Lovenox was held for several days and on 9/3 patient had EGD that was negative. Bleeding resolved, H/H stable. Eliquis was started on 9/6. Patient had subsequent stable H/H. On 9/6 patient was found to have elevated WBC 24, afebrile, blood cultures were sent that was +Klebsiella and urine culture was done that was +Klebsiella UTI. Infectious disease consulted and patient was placed on IV abx.  Patient was switched to po antibiotics. Patient was medically and neurologically stable for discharge. Patient was discharged to rehab with follow up instructions.     Patient was seen by PT/OT and originally deemed for home PT/OT however seen by PMR and deemed a candidate for [facility].

## 2024-08-30 NOTE — PROGRESS NOTE ADULT - PROBLEM SELECTOR PLAN 1
and RUE DVT  - duplex of RUE: Right sided DVT involving the subclavian, axillary and brachial veins. Superficial thrombus in the right basilic vein  - 8/28 repeat UE duplex with persistent DVT in the right subclavian vein and right axillary vein, right basilic vein SVT.  - CT Angio chest: segmental emboli within RUL pulmonary artery with no evidence of RV strain  - TTE unremarkable  - Vascular Cardiology consulted, and started on hep gtt on 8/16, transitioned to therapeutic lovenox on 8/18 but will hold in setting of bleeding around PEG

## 2024-08-30 NOTE — DISCHARGE NOTE NURSING/CASE MANAGEMENT/SOCIAL WORK - NSDCPEFALRISK_GEN_ALL_CORE
For information on Fall & Injury Prevention, visit: [email or web address] OR  [email or web address] OR  https://www.cdc.gov/steadi/patient.html

## 2024-08-30 NOTE — PROGRESS NOTE ADULT - SUBJECTIVE AND OBJECTIVE BOX
INTERVAL HPI/OVERNIGHT EVENTS:  yesterday evening pt had some local PEG site bleeding  - stopped without any intervention  no further/recurrent bleeding overnight  tolerating PEG feeds  no nausea or vomiting  Hgb stable yesterday evening - AM labs pending      MEDICATIONS  (STANDING):  albuterol    0.083% 2.5 milliGRAM(s) Nebulizer every 6 hours  albuterol    90 MICROgram(s) HFA Inhaler 1 Puff(s) Inhalation every 4 hours  amLODIPine   Tablet 5 milliGRAM(s) Oral daily  budesonide 160 MICROgram(s)/formoterol 4.5 MICROgram(s) Inhaler 2 Puff(s) Inhalation two times a day  chlorhexidine 2% Cloths 1 Application(s) Topical <User Schedule>  enoxaparin Injectable 60 milliGRAM(s) SubCutaneous <User Schedule>  glucagon  Injectable 1 milliGRAM(s) IntraMuscular once  levETIRAcetam 250 milliGRAM(s) Oral two times a day  metoprolol tartrate 12.5 milliGRAM(s) Oral every 12 hours  Nephro-corine 1 Tablet(s) Oral daily  pantoprazole   Suspension 40 milliGRAM(s) Oral before breakfast  polyethylene glycol 3350 17 Gram(s) Oral daily  senna 2 Tablet(s) Oral at bedtime  simvastatin 20 milliGRAM(s) Oral at bedtime  tiotropium 2.5 MICROgram(s) Inhaler 2 Puff(s) Inhalation daily    MEDICATIONS  (PRN):  acetaminophen     Tablet .. 650 milliGRAM(s) Oral every 6 hours PRN Mild Pain (1 - 3)  bisacodyl 5 milliGRAM(s) Oral daily PRN Constipation      Allergies  penicillin (Other)  ACE inhibitors (Other)  Bee Stings- anaphylaxis (Other)      Review of Systems:  see HPI- remainder 10 point ROS negative    Vital Signs Last 24 Hrs  T(C): 36.8 (30 Aug 2024 09:16), Max: 37.3 (29 Aug 2024 20:17)  T(F): 98.3 (30 Aug 2024 09:16), Max: 99.2 (29 Aug 2024 20:17)  HR: 70 (30 Aug 2024 09:16) (68 - 80)  BP: 144/72 (30 Aug 2024 09:16) (102/67 - 157/81)  BP(mean): --  RR: 18 (30 Aug 2024 09:16) (17 - 18)  SpO2: 99% (30 Aug 2024 09:16) (95% - 100%)    Parameters below as of 30 Aug 2024 09:16  Patient On (Oxygen Delivery Method): room air    PHYSICAL EXAM:  GENERAL:: thin AA male awake, alert appears comfortable.  NECK: No JVD  RESP:: grossly clear  CV:: S1 and S2, RRR  GI:: BS+, soft, thin ND NT Abdominal binder opened - Surgicell w/ dried blood, intact at PEG incision. scant/minimal oozing w/ cleaning;  no further oozing noted; PEG bumper between 3.5 and 4 cm at skin level lightly touching skin and spins easily 360 degrees. +midline abdominal scar (from mid epigastric region to pubis) and small scar at lateral LUQ, WH LLQ scar c/w appy.  Abdominal binder replaced.  MUSCULOSKELETAL: No clubbing or cyanosis of digits; no joint swelling or tenderness to palpation  PSYCH: A+O to person, place, and time; affect appropriate  NEUROLOGY: CN 2-12 are intact and symmetric; no gross sensory deficits, grossly 5/5 strength throughout  SKIN: No rashes; no palpable lesions, +crani site c/d/i      LABS:                        9.8    5.32  )-----------( 181      ( 29 Aug 2024 18:01 )             30.9   Hemoglobin: 9.4 g/dL (08.29.24 @ 06:40)     08-29    134<L>  |  104  |  20  ----------------------------<  118<H>  4.3   |  21<L>  |  0.85    Ca    8.9      29 Aug 2024 06:35  Phos  2.8     08-29  Mg     2.3     08-29    TPro  6.2  /  Alb  2.8<L>  /  TBili  0.3  /  DBili  <0.1  /  AST  42<H>  /  ALT  70<H>  /  AlkPhos  82  08-29            RADIOLOGY & ADDITIONAL TESTS:     INTERVAL HPI/OVERNIGHT EVENTS:  yesterday evening pt had some local PEG site bleeding  - stopped without any intervention  no further/recurrent bleeding overnight  tolerating PEG feeds  no nausea or vomiting  Hgb stable yesterday evening - AM labs pending      MEDICATIONS  (STANDING):  albuterol    0.083% 2.5 milliGRAM(s) Nebulizer every 6 hours  albuterol    90 MICROgram(s) HFA Inhaler 1 Puff(s) Inhalation every 4 hours  amLODIPine   Tablet 5 milliGRAM(s) Oral daily  budesonide 160 MICROgram(s)/formoterol 4.5 MICROgram(s) Inhaler 2 Puff(s) Inhalation two times a day  chlorhexidine 2% Cloths 1 Application(s) Topical <User Schedule>  enoxaparin Injectable 60 milliGRAM(s) SubCutaneous <User Schedule>  glucagon  Injectable 1 milliGRAM(s) IntraMuscular once  levETIRAcetam 250 milliGRAM(s) Oral two times a day  metoprolol tartrate 12.5 milliGRAM(s) Oral every 12 hours  Nephro-corine 1 Tablet(s) Oral daily  pantoprazole   Suspension 40 milliGRAM(s) Oral before breakfast  polyethylene glycol 3350 17 Gram(s) Oral daily  senna 2 Tablet(s) Oral at bedtime  simvastatin 20 milliGRAM(s) Oral at bedtime  tiotropium 2.5 MICROgram(s) Inhaler 2 Puff(s) Inhalation daily    MEDICATIONS  (PRN):  acetaminophen     Tablet .. 650 milliGRAM(s) Oral every 6 hours PRN Mild Pain (1 - 3)  bisacodyl 5 milliGRAM(s) Oral daily PRN Constipation      Allergies  penicillin (Other)  ACE inhibitors (Other)  Bee Stings- anaphylaxis (Other)      Review of Systems:  see HPI- remainder 10 point ROS negative    Vital Signs Last 24 Hrs  T(C): 36.8 (30 Aug 2024 09:16), Max: 37.3 (29 Aug 2024 20:17)  T(F): 98.3 (30 Aug 2024 09:16), Max: 99.2 (29 Aug 2024 20:17)  HR: 70 (30 Aug 2024 09:16) (68 - 80)  BP: 144/72 (30 Aug 2024 09:16) (102/67 - 157/81)  BP(mean): --  RR: 18 (30 Aug 2024 09:16) (17 - 18)  SpO2: 99% (30 Aug 2024 09:16) (95% - 100%)    Parameters below as of 30 Aug 2024 09:16  Patient On (Oxygen Delivery Method): room air    PHYSICAL EXAM:  GENERAL:: thin AA male awake, alert appears comfortable.  NECK: No JVD  RESP:: grossly clear  CV:: S1 and S2, RRR  GI:: BS+, soft, thin ND NT Abdominal binder opened - Surgicell w/ dried blood, intact at PEG incision. scant/minimal oozing w/ cleaning;  no further oozing noted; PEG bumper between 3.5 and 4 cm at skin level lightly touching skin and spins easily 360 degrees. +midline abdominal scar (from mid epigastric region to pubis) and small scar at lateral LUQ, WH LLQ scar c/w appy.  Abdominal binder replaced.  MUSCULOSKELETAL: No clubbing or cyanosis of digits; no joint swelling or tenderness to palpation  PSYCH: A+O to person, place, and time; affect appropriate  NEUROLOGY: CN 2-12 are intact and symmetric; no gross sensory deficits, grossly 5/5 strength throughout  SKIN: No rashes; no palpable lesions, +crani site c/d/i    Re-assessed at 12:40pm- Notified by Medicine attending of AM Hgb 9.8 ->8.5  no melena or rectal bleeding  PEG feeds held at 12:45pm  Abdomen/GI:: abdominal binder opened +WH midline surgical scar, soft ND NT  PEG site with scant oozing  PEG lavaged with 55 cc free water +return of pink tinged water and feed  previously placed surgicell removed, minimal oozing noted at ends of PEG site incision - new Surgicell strip placed at PEG incision under PEG bumper, lightly tucked into ends (medical and lateral aspects of PEG site incision)   No visible oozing, sugricell noted to be clean.  Abdominal binder resecured . PEG feeds kept off    LABS:                        9.8    5.32  )-----------( 181      ( 29 Aug 2024 18:01 )             30.9   Hemoglobin: 9.4 g/dL (08.29.24 @ 06:40)     08-29    134<L>  |  104  |  20  ----------------------------<  118<H>  4.3   |  21<L>  |  0.85    Ca    8.9      29 Aug 2024 06:35  Phos  2.8     08-29  Mg     2.3     08-29    TPro  6.2  /  Alb  2.8<L>  /  TBili  0.3  /  DBili  <0.1  /  AST  42<H>  /  ALT  70<H>  /  AlkPhos  82  08-29            RADIOLOGY & ADDITIONAL TESTS:

## 2024-08-30 NOTE — DISCHARGE NOTE PROVIDER - CARE PROVIDERS DIRECT ADDRESSES
,[email or web address]

## 2024-08-30 NOTE — PROGRESS NOTE ADULT - TIME BILLING
reviewing medical record, evaluating the patient, discussing plan of care with patient, GI, and neurosurgery, documenting in EMR.

## 2024-08-30 NOTE — DISCHARGE NOTE PROVIDER - NSDCFUSCHEDAPPT_GEN_ALL_CORE_FT
Jose Alberto Branham  [facility]  NEUROSURG [address]  Scheduled Appointment: 09/27/2024    Ez Hidalgo  [facility]  OTOLARYNG [address] [facility]  Scheduled Appointment: 10/01/2024    Zack De La Rosa  [facility]  OTOLARYNG [address] [facility]  Scheduled Appointment: 11/21/2024

## 2024-08-30 NOTE — DISCHARGE NOTE PROVIDER - CARE PROVIDER_API CALL
Jose Alberto Branham  Neurosurgery  [address], [address]  [place] [address]  Phone: [phone]  Fax: [phone]  Follow Up Time:    Jose Alberto Branham  Neurosurgery  [address], [address]  [place] [address]  Phone: [phone]  Fax: [phone]  Follow Up Time:     Brijesh Gagnon  Interventional Cardiology  [address] [facility], [address] [place] [address]  Phone: [phone]  Fax: [phone]  Follow Up Time:     Yung Savage  Gastroenterology  [address] [place] [address]  Phone: [phone]  Fax: [phone]  Follow Up Time:     Caridad Song  Infectious Disease  [address] [facility], [address]  [place] [address]  Phone: [phone]  Fax: [phone]  Follow Up Time:

## 2024-08-30 NOTE — DISCHARGE NOTE PROVIDER - NSDCFUADDINST_GEN_ALL_CORE_FT
Please keep Peg site clean and dry. Please do not engage in strenuous activity, heavy lifting, drive, or return to work or school until cleared by surgeon.  Please notify physician if fevers, bleeding, swelling, pain not relieved by medication, increased sluggishness or irritability, increased nausea or vomiting, inability to tolerate foods or liquids.  No heavy lifting/straining, Showering allowed, Stairs allowed, Walking - Indoors allowed, Walking - Outdoors allowed, Follow Instructions Provided by your Surgical Team  Do not start any Motrin /Aspirin NSAIDS( Motrin, Advil.... until cleared by your neurosurgeon          Please make all necessary appointments and follow up. Please DO NOT take any NSAIDs (Advil, Aleve, Motrin, Ibuprofen) until cleared by your Neurosurgeon. Please DO NOT do any heavy lifting, bending, twisting and straining. Please come to the emergency room for any of the following: altered mental status, seizures, pain uncontrolled by pain medications, fevers, leaking / bleeding from surgical site, chest pain and shortness of breath.

## 2024-08-30 NOTE — PROGRESS NOTE ADULT - ASSESSMENT
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 Today presents to ED for RUE doppler w/ DVT involving the subclavian, axillary and brachial veins and Superficial thrombus in the right basilic vein.      Procedure: S/p Peg 8/27    PLAN    NEURO:  q4 neurochecks/q4 vitals   8/20 CTH: stable   On Keppra for seizure prophylaxis   Pain control prn w/ Tylenol   Activity: OOB as tolerated     PULM:  Incentive spirometry, mobilize as tolerated  8/16 CTA Chest:  Segmental emboli within right upper lobe pulmonary artery. No significant CT evidence of right heart strain.  -Vascular cardiology following   Also found to have Emphysema. New mucoid impactions within bilateral lower lungs and tree-in-bud opacities likely representing COPD exacerbation.   -Satting between % on RA   -Continue Albuterol inhalation as well as inhaler, Symbicort and Spiriva   -Recommend follow-up chest CT in 3 months to determine resolution    CV:  Keep SBP   Continue Amlodipine, Metoprolol for HTN   Continue Simvastatin for Hyperlipidemia   On Eliquis at home for afib - will resume upon d/c to rehab per neurosurgeon     RENAL:  IVL   Last Na 134  Voiding     GI:  Diet: On Glucerna via Peg  GI prophylaxis: Protonix   Bowel regimen: senna, miralax, dulocolax PRN   Last BM 8/28      ENDO:   Goal euglycemia (-180)  ISS, monitor FS     HEME/ONC:  VTE prophylaxis: SCDs  8/15 UED: Right sided DVT involving the subclavian, axillary and brachial veins  -Repeat UED 8/28 no propagation   8/16 LED: negative for DVT   Vascular Surgery following   - on therapeutic Lovenox held for PEG, will resume Lovenox 60mg BID and then transition to pt's home med Eliquis while in rehab 2/2 bleeding around the PEG    ID:  Afebrile    MISC:  Hospitalist following for co-management     DISPOSITION:   PMR saw PEGGY recommended, await rehab placement pending authorization    Plan to be d/w DR. Branham  #[id number]

## 2024-08-30 NOTE — PROGRESS NOTE ADULT - SUBJECTIVE AND OBJECTIVE BOX
[facility] Division of Hospital Medicine  Clau Kamara MD  Available via MS Teams  Spectra [phone]    SUBJECTIVE / OVERNIGHT EVENTS: patient seen and examined this morning. per neurosurgery, episodes of bleeding around PEG site yesterday. patient endorsed some soreness around PEG. no SOB.       MEDICATIONS  (STANDING):  albuterol    0.083% 2.5 milliGRAM(s) Nebulizer every 6 hours  albuterol    90 MICROgram(s) HFA Inhaler 1 Puff(s) Inhalation every 4 hours  amLODIPine   Tablet 5 milliGRAM(s) Oral daily  budesonide 160 MICROgram(s)/formoterol 4.5 MICROgram(s) Inhaler 2 Puff(s) Inhalation two times a day  chlorhexidine 2% Cloths 1 Application(s) Topical <User Schedule>  glucagon  Injectable 1 milliGRAM(s) IntraMuscular once  levETIRAcetam 250 milliGRAM(s) Oral two times a day  metoprolol tartrate 12.5 milliGRAM(s) Oral every 12 hours  Nephro-corine 1 Tablet(s) Oral daily  pantoprazole   Suspension 40 milliGRAM(s) Oral before breakfast  polyethylene glycol 3350 17 Gram(s) Oral daily  senna 2 Tablet(s) Oral at bedtime  simvastatin 20 milliGRAM(s) Oral at bedtime  tiotropium 2.5 MICROgram(s) Inhaler 2 Puff(s) Inhalation daily    MEDICATIONS  (PRN):  acetaminophen     Tablet .. 650 milliGRAM(s) Oral every 6 hours PRN Mild Pain (1 - 3)  bisacodyl 5 milliGRAM(s) Oral daily PRN Constipation      I&O's Summary    29 Aug 2024 07:01  -  30 Aug 2024 07:00  --------------------------------------------------------  IN: 785 mL / OUT: 925 mL / NET: -140 mL    30 Aug 2024 07:01  -  30 Aug 2024 14:54  --------------------------------------------------------  IN: 0 mL / OUT: 200 mL / NET: -200 mL        PHYSICAL EXAM:  Vital Signs Last 24 Hrs  T(C): 37.1 (30 Aug 2024 12:48), Max: 37.3 (29 Aug 2024 20:17)  T(F): 98.7 (30 Aug 2024 12:48), Max: 99.2 (29 Aug 2024 20:17)  HR: 71 (30 Aug 2024 12:48) (68 - 80)  BP: 138/68 (30 Aug 2024 12:48) (102/67 - 157/81)  BP(mean): --  RR: 18 (30 Aug 2024 12:48) (18 - 18)  SpO2: 98% (30 Aug 2024 12:48) (95% - 100%)    Parameters below as of 30 Aug 2024 12:48  Patient On (Oxygen Delivery Method): room air      CONSTITUTIONAL: NAD, frail appearing  RESPIRATORY: Normal respiratory effort; lungs are clear to auscultation bilaterally  CARDIOVASCULAR: normal S1 and S2; No lower extremity edema  ABDOMEN: Nontender to palpation, normoactive bowel sounds, + PEG with minimal bleed around it  MUSCULOSKELETAL: no clubbing or cyanosis of digits  PSYCH: A+O to person, place; affect appropriate  NEUROLOGY: moves all extremities, follows commands      LABS:                        8.3    5.98  )-----------( 216      ( 30 Aug 2024 13:23 )             25.9     08-29    134<L>  |  104  |  20  ----------------------------<  118<H>  4.3   |  21<L>  |  0.85    Ca    8.9      29 Aug 2024 06:35  Phos  2.8     08-29  Mg     2.3     08-29    TPro  6.2  /  Alb  2.8<L>  /  TBili  0.3  /  DBili  <0.1  /  AST  42<H>  /  ALT  70<H>  /  AlkPhos  82  08-29          Urinalysis Basic - ( 29 Aug 2024 06:35 )    Color: x / Appearance: x / SG: x / pH: x  Gluc: 118 mg/dL / Ketone: x  / Bili: x / Urobili: x   Blood: x / Protein: x / Nitrite: x   Leuk Esterase: x / RBC: x / WBC x   Sq Epi: x / Non Sq Epi: x / Bacteria: x              COMMUNICATION:  Care Discussed with Consultants/Other Providers and Details of Discussion: neurosurgery PA([place])

## 2024-08-30 NOTE — DISCHARGE NOTE PROVIDER - PROVIDER TOKENS
PROVIDER:[TOKEN:[[id number]]] PROVIDER:[TOKEN:[[id number]]],PROVIDER:[TOKEN:[[id number]]],PROVIDER:[TOKEN:[[id number]]],PROVIDER:[TOKEN:[[id number]]]

## 2024-08-30 NOTE — PROGRESS NOTE ADULT - ASSESSMENT
80 yo male with PMH of HTN, T2DM (diet controlled), HLD, COPD, CKD 3, carcinoid tumor s/p hemicolectomy, nasopharyngeal carcinoma s/p chemo/RT (c/b dysphagia ), IPMN s/p distal pancreatectomy (2018), and paroxysmal Afib on Eliquis s/p craniotomy 8/6 for resection of hemorrhagic lesion. Now presented to the ED for RUE swelling, found to have RUE DVT involving subclavian, axillary, and brachial veins and PE.    Pt was known to have cough w/ PO previously and was evaluated w/ OP MBS earlier this year - using thickened fluids at home   Post op course further complicated by Dysphagia - failed multiple SLP evaluations with recs: NPO   Pt and family are now agreeable to PEG placement for which GI is consulted    #Dysphagia  sp EGD + PEG placement 8/27/24 8/28 scant dried blood under PEG bumper; no active bleeding, induration, swelling or edema  PEG bumper between 3.5 and 4 cm at skin level  8/29 scant oozing, slight downtrend in Hgb. Surgicell placed at PEG tract  #remote history of head/Neck CA sp Chemo + XRT  #sp hemicolectomy  #sp partial pancreatectomy  #DVT+PE   AC held x 24 hours for PEG  AC (Lovenox) restarted 8/28 pm.   8/29 AM scant PEG site oozing, sl Hgb downtrend. Topical surgicell applied at PEG site  8/29 PM - recurrent PEG site bleeding; Surgicell intact. Resolved without further intervention. Hgb stable 6pm (9.4 ->9.8)    RECS:  -Keep surgicell in place; do not remove  -ok to continue PEG feeds and titrate to goal  -check CBC please   -ok to continue AC   -monitor CBC and PEG site  -local PEG care (cleanse daily and pat dry), abdominal binder on at all times please to prevent accidental PEG trauma/tugging/dislodgement  -PPI for GI ppx  -oral care and suction PRN      Discussed with ptat bedside; all questions answered  Discussed with Neurosurgery team    Please call covering GI service over holiday weekend prn with acute GI concerns   GI service : [phone]    Avtar Wolff [facility] Teaching GI Service  Available on TEAMS Mon-Fri 8a-4p  After hours and weekend coverage [phone]     78 yo male with PMH of HTN, T2DM (diet controlled), HLD, COPD, CKD 3, carcinoid tumor s/p hemicolectomy, nasopharyngeal carcinoma s/p chemo/RT (c/b dysphagia ), IPMN s/p distal pancreatectomy (2018), and paroxysmal Afib on Eliquis s/p craniotomy 8/6 for resection of hemorrhagic lesion. Now presented to the ED for RUE swelling, found to have RUE DVT involving subclavian, axillary, and brachial veins and PE.    Pt was known to have cough w/ PO previously and was evaluated w/ OP MBS earlier this year - using thickened fluids at home   Post op course further complicated by Dysphagia - failed multiple SLP evaluations with recs: NPO   Pt and family are now agreeable to PEG placement for which GI is consulted    #Dysphagia -sp EGD + PEG placement 8/27/24 c/b PEG site bleeding  8/28 scant dried blood under PEG bumper; no active bleeding, induration, swelling or edema  PEG bumper between 3.5 and 4 cm at skin level  8/29 scant oozing, slight downtrend in Hgb. Negative PEG lavage. Surgicell placed at PEG tract  8/29 PM - recurrent PEG site bleeding; Surgicell intact. Resolved without further intervention. Hgb stable 6pm (9.4 ->9.8)  8/30 AM minimal ooze at PEG site, no active bleeding. Re-assessed at 12:40pm- Notified by Medicine attending of AM Hgb 9.8 ->8.5. No melena +PEG lavage: pink tinged return with water/feeds. Previously placed surgicell removed, minimal oozing noted at ends of PEG site incision - new Surgicell strip placed at PEG incision under PEG bumper, lightly tucked into ends (medical and lateral aspects of PEG site incision)   No visible oozing, surgicell noted to be clean  #remote history of head/Neck CA sp Chemo + XRT  #sp hemicolectomy  #sp partial pancreatectomy  #DVT+PE   AC held x 24 hours for PEG  AC (Lovenox) restarted 8/28 pm.   8/29 AM scant PEG site oozing, sl Hgb downtrend. Topical surgicell applied at PEG site. 8/29 PM CBC stable  8/30 PM AC held 2/2 PEG site bleeding (see above)    RECS:  -Keep surgicell in place; do not remove  -Hold PEG feeds for now  -check repeat CBC and type and cross   -Hold AC per d/w Medicine Hospitalist  -DC to rehab cancelled   -Abdominal binder on at all times please to prevent accidental PEG trauma/tugging/dislodgement  -PPI for GI ppx    Discussed with pt and spouse at bedside; all questions answered  Discussed with Neurosurgery team And Medicine    Covering GI service over Holiday weekend prn  GI service : [phone]    Avtar Mixon PA-C  [facility] Teaching GI Service  Available on TEAMS Mon-Fri 8a-4p  After hours and weekend coverage [phone]

## 2024-08-30 NOTE — DISCHARGE NOTE PROVIDER - DETAILS OF MALNUTRITION DIAGNOSIS/DIAGNOSES
This patient has been assessed with a concern for Malnutrition and was treated during this hospitalization for the following Nutrition diagnosis/diagnoses:     -  08/19/2024: Moderate protein-calorie malnutrition   -  08/19/2024: Underweight (BMI < 19)   This patient has been assessed with a concern for Malnutrition and was treated during this hospitalization for the following Nutrition diagnosis/diagnoses:     -  09/06/2024: Severe protein-calorie malnutrition   -  09/06/2024: Underweight (BMI < 19)   -  08/19/2024: Moderate protein-calorie malnutrition   -  08/19/2024: Underweight (BMI < 19)

## 2024-08-31 LAB
ANION GAP SERPL CALC-SCNC: 9 MMOL/L — SIGNIFICANT CHANGE UP (ref 5–17)
BUN SERPL-MCNC: 36 MG/DL — HIGH (ref 7–23)
CALCIUM SERPL-MCNC: 8.8 MG/DL — SIGNIFICANT CHANGE UP (ref 8.4–10.5)
CHLORIDE SERPL-SCNC: 104 MMOL/L — SIGNIFICANT CHANGE UP (ref 96–108)
CO2 SERPL-SCNC: 24 MMOL/L — SIGNIFICANT CHANGE UP (ref 22–31)
CREAT SERPL-MCNC: 0.77 MG/DL — SIGNIFICANT CHANGE UP (ref 0.5–1.3)
EGFR: 90 ML/MIN/1.73M2 — SIGNIFICANT CHANGE UP
GLUCOSE BLDC GLUCOMTR-MCNC: 110 MG/DL — HIGH (ref 70–99)
GLUCOSE BLDC GLUCOMTR-MCNC: 113 MG/DL — HIGH (ref 70–99)
GLUCOSE BLDC GLUCOMTR-MCNC: 120 MG/DL — HIGH (ref 70–99)
GLUCOSE SERPL-MCNC: 110 MG/DL — HIGH (ref 70–99)
HCT VFR BLD CALC: 24.7 % — LOW (ref 39–50)
HCT VFR BLD CALC: 25.1 % — LOW (ref 39–50)
HCT VFR BLD CALC: 26.8 % — LOW (ref 39–50)
HGB BLD-MCNC: 8.1 G/DL — LOW (ref 13–17)
HGB BLD-MCNC: 8.2 G/DL — LOW (ref 13–17)
HGB BLD-MCNC: 8.7 G/DL — LOW (ref 13–17)
MCHC RBC-ENTMCNC: 30.2 PG — SIGNIFICANT CHANGE UP (ref 27–34)
MCHC RBC-ENTMCNC: 30.6 PG — SIGNIFICANT CHANGE UP (ref 27–34)
MCHC RBC-ENTMCNC: 30.8 PG — SIGNIFICANT CHANGE UP (ref 27–34)
MCHC RBC-ENTMCNC: 32.5 GM/DL — SIGNIFICANT CHANGE UP (ref 32–36)
MCHC RBC-ENTMCNC: 32.7 GM/DL — SIGNIFICANT CHANGE UP (ref 32–36)
MCHC RBC-ENTMCNC: 32.8 GM/DL — SIGNIFICANT CHANGE UP (ref 32–36)
MCV RBC AUTO: 92.2 FL — SIGNIFICANT CHANGE UP (ref 80–100)
MCV RBC AUTO: 94.4 FL — SIGNIFICANT CHANGE UP (ref 80–100)
MCV RBC AUTO: 94.4 FL — SIGNIFICANT CHANGE UP (ref 80–100)
NRBC # BLD: 0 /100 WBCS — SIGNIFICANT CHANGE UP (ref 0–0)
PLATELET # BLD AUTO: 181 K/UL — SIGNIFICANT CHANGE UP (ref 150–400)
PLATELET # BLD AUTO: 183 K/UL — SIGNIFICANT CHANGE UP (ref 150–400)
PLATELET # BLD AUTO: 197 K/UL — SIGNIFICANT CHANGE UP (ref 150–400)
POTASSIUM SERPL-MCNC: 4.6 MMOL/L — SIGNIFICANT CHANGE UP (ref 3.5–5.3)
POTASSIUM SERPL-SCNC: 4.6 MMOL/L — SIGNIFICANT CHANGE UP (ref 3.5–5.3)
RBC # BLD: 2.66 M/UL — LOW (ref 4.2–5.8)
RBC # BLD: 2.68 M/UL — LOW (ref 4.2–5.8)
RBC # BLD: 2.84 M/UL — LOW (ref 4.2–5.8)
RBC # FLD: 15.7 % — HIGH (ref 10.3–14.5)
RBC # FLD: 15.8 % — HIGH (ref 10.3–14.5)
RBC # FLD: 15.9 % — HIGH (ref 10.3–14.5)
SODIUM SERPL-SCNC: 137 MMOL/L — SIGNIFICANT CHANGE UP (ref 135–145)
WBC # BLD: 4.57 K/UL — SIGNIFICANT CHANGE UP (ref 3.8–10.5)
WBC # BLD: 5.51 K/UL — SIGNIFICANT CHANGE UP (ref 3.8–10.5)
WBC # BLD: 5.71 K/UL — SIGNIFICANT CHANGE UP (ref 3.8–10.5)
WBC # FLD AUTO: 4.57 K/UL — SIGNIFICANT CHANGE UP (ref 3.8–10.5)
WBC # FLD AUTO: 5.51 K/UL — SIGNIFICANT CHANGE UP (ref 3.8–10.5)
WBC # FLD AUTO: 5.71 K/UL — SIGNIFICANT CHANGE UP (ref 3.8–10.5)

## 2024-08-31 PROCEDURE — 99232 SBSQ HOSP IP/OBS MODERATE 35: CPT

## 2024-08-31 PROCEDURE — 99232 SBSQ HOSP IP/OBS MODERATE 35: CPT | Mod: GC

## 2024-08-31 PROCEDURE — 99024 POSTOP FOLLOW-UP VISIT: CPT

## 2024-08-31 PROCEDURE — 74177 CT ABD & PELVIS W/CONTRAST: CPT | Mod: 26

## 2024-08-31 RX ORDER — SODIUM CHLORIDE 9 MG/ML
1000 INJECTION INTRAMUSCULAR; INTRAVENOUS; SUBCUTANEOUS
Refills: 0 | Status: DISCONTINUED | OUTPATIENT
Start: 2024-08-31 | End: 2024-09-01

## 2024-08-31 RX ADMIN — AMLODIPINE BESYLATE 5 MILLIGRAM(S): 10 TABLET ORAL at 05:17

## 2024-08-31 RX ADMIN — TIOTROPIUM BROMIDE INHALATION SPRAY 2 PUFF(S): 3.12 SPRAY, METERED RESPIRATORY (INHALATION) at 11:52

## 2024-08-31 RX ADMIN — BUDESONIDE AND FORMOTEROL FUMARATE 2 PUFF(S): 80; 4.5 AEROSOL, METERED RESPIRATORY (INHALATION) at 17:13

## 2024-08-31 RX ADMIN — Medication 1 TABLET(S): at 12:07

## 2024-08-31 RX ADMIN — LEVETIRACETAM 400 MILLIGRAM(S): 1000 TABLET ORAL at 05:17

## 2024-08-31 RX ADMIN — Medication 2.5 MILLIGRAM(S): at 05:18

## 2024-08-31 RX ADMIN — Medication 2.5 MILLIGRAM(S): at 11:51

## 2024-08-31 RX ADMIN — Medication 40 MILLIGRAM(S): at 11:50

## 2024-08-31 RX ADMIN — METOPROLOL TARTRATE 12.5 MILLIGRAM(S): 100 TABLET ORAL at 17:00

## 2024-08-31 RX ADMIN — METOPROLOL TARTRATE 12.5 MILLIGRAM(S): 100 TABLET ORAL at 05:18

## 2024-08-31 RX ADMIN — CHLORHEXIDINE GLUCONATE 1 APPLICATION(S): 40 SOLUTION TOPICAL at 05:19

## 2024-08-31 RX ADMIN — POLYETHYLENE GLYCOL 3350 17 GRAM(S): 17 POWDER, FOR SOLUTION ORAL at 11:50

## 2024-08-31 RX ADMIN — BUDESONIDE AND FORMOTEROL FUMARATE 2 PUFF(S): 80; 4.5 AEROSOL, METERED RESPIRATORY (INHALATION) at 05:18

## 2024-08-31 RX ADMIN — Medication 2.5 MILLIGRAM(S): at 17:00

## 2024-08-31 RX ADMIN — SODIUM CHLORIDE 50 MILLILITER(S): 9 INJECTION INTRAMUSCULAR; INTRAVENOUS; SUBCUTANEOUS at 19:31

## 2024-08-31 RX ADMIN — LEVETIRACETAM 400 MILLIGRAM(S): 1000 TABLET ORAL at 17:07

## 2024-08-31 NOTE — PROGRESS NOTE ADULT - PROBLEM SELECTOR PLAN 1
and RUE DVT  - duplex of RUE: Right sided DVT involving the subclavian, axillary and brachial veins. Superficial thrombus in the right basilic vein  - 8/28 repeat UE duplex with persistent DVT in the right subclavian vein and right axillary vein, right basilic vein SVT.  - CT Angio chest: segmental emboli within RUL pulmonary artery with no evidence of RV strain  - TTE unremarkable  - Vascular Cardiology consulted, and started on hep gtt on 8/16, transitioned to therapeutic lovenox on 8/18 but on hold in setting of bleeding at PEG site, last dose was 8/30 AM  - if no further episodes of bleed and H/H stable, can re-trial on lovenox in next 24-48 hours

## 2024-08-31 NOTE — PROGRESS NOTE ADULT - SUBJECTIVE AND OBJECTIVE BOX
SUBJECTIVE: HPI: 79M hx recent admission for and R crani for lesion w/ Dr. 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 Today presents to ED for RUE doppler w/ DVT involving the subclavian, axillary and brachial veins and Superficial thrombus in the right basilic vein. Plts/coags wnl.       OVERNIGHT EVENTS: No acute events overnight, patient seen and evaluated at bedside this morning with his wife in the room, no complaints of abdominal pain around the PEG site, nor any bleeding in the site overnight per RN. This morning on my evaluation - PEG site is clean, dry, no bleeding and abdominal binder is C/D/I.     Vital Signs Last 24 Hrs  T(C): 36.9 (31 Aug 2024 09:15), Max: 37.1 (30 Aug 2024 12:48)  T(F): 98.4 (31 Aug 2024 09:15), Max: 98.8 (30 Aug 2024 23:49)  HR: 78 (31 Aug 2024 09:15) (62 - 78)  BP: 118/64 (31 Aug 2024 09:15) (100/66 - 138/68)  BP(mean): --  RR: 18 (31 Aug 2024 09:15) (18 - 18)  SpO2: 98% (31 Aug 2024 09:15) (96% - 99%)    Parameters below as of 31 Aug 2024 09:15  Patient On (Oxygen Delivery Method): room air        PHYSICAL EXAM:    General: No Acute Distress     Neurological: Awake, Ox2-3 (name, place, year but not month), PERRL, EOMI, following commands, no drift, uppers 5/5, lowers 5/5, SILT    Pulmonary: Clear to Auscultation, No Rales, No Rhonchi, No Wheezes     Cardiovascular: S1, S2, Regular Rate and Rhythm     Gastrointestinal: Soft, Nontender, Nondistended, PEG in place with abdominal binder -> C/D/I    Incision: right craniotomy incision has absorbable sutures in place C/D/I    LABS:                        8.7    5.71  )-----------( 197      ( 31 Aug 2024 08:28 )             26.8    08-31    137  |  104  |  36<H>  ----------------------------<  110<H>  4.6   |  24  |  0.77    Ca    8.8      31 Aug 2024 08:28          08-30 @ 07:01  -  08-31 @ 07:00  --------------------------------------------------------  IN: 1110 mL / OUT: 1000 mL / NET: 110 mL      DRAINS: None    MEDICATIONS:  Antibiotics:    Neuro:  acetaminophen     Tablet .. 650 milliGRAM(s) Oral every 6 hours PRN Mild Pain (1 - 3)  levETIRAcetam  IVPB 250 milliGRAM(s) IV Intermittent every 12 hours    Cardiac:  amLODIPine   Tablet 5 milliGRAM(s) Oral daily  metoprolol tartrate 12.5 milliGRAM(s) Oral every 12 hours    Pulm:  albuterol    0.083% 2.5 milliGRAM(s) Nebulizer every 6 hours  albuterol    90 MICROgram(s) HFA Inhaler 1 Puff(s) Inhalation every 4 hours  budesonide 160 MICROgram(s)/formoterol 4.5 MICROgram(s) Inhaler 2 Puff(s) Inhalation two times a day  tiotropium 2.5 MICROgram(s) Inhaler 2 Puff(s) Inhalation daily    GI/:  bisacodyl 5 milliGRAM(s) Oral daily PRN Constipation  pantoprazole  Injectable 40 milliGRAM(s) IV Push daily  polyethylene glycol 3350 17 Gram(s) Oral daily  senna 2 Tablet(s) Oral at bedtime    Other:   chlorhexidine 2% Cloths 1 Application(s) Topical <User Schedule>  glucagon  Injectable 1 milliGRAM(s) IntraMuscular once  Nephro-corine 1 Tablet(s) Oral daily  simvastatin 20 milliGRAM(s) Oral at bedtime  sodium chloride 0.9% with potassium chloride 20 mEq/L 1000 milliLiter(s) IV Continuous <Continuous>    DIET: [] Regular [] CCD [] Renal [] Puree [] Dysphagia [x] Tube Feeds: via PEG Glucerna@55    IMAGING:   < from: CT Head No Cont (08.20.24 @ 11:46) >  IMPRESSION:  No significant interval change 8/18/2024 in postop changes at the level   of the right temporal lobe. Evidence of prior infarcts as described.        --- End of Report ---      RAMESH SIMENTAL MD; Attending Radiologist  This document has been electronically signed. Aug 20 2024  1:23PM    < end of copied text >    < from: [facility] Duplex Lower Ext Vein Scan, Bilat (08.30.24 @ 20:09) >  IMPRESSION:  No evidence of deep venous thrombosis in either lower extremity.    --- End of Report ---    MYKE RIVERA MD; Attending Radiologist  This document has been electronically signed. Aug 30 2024  8:36PM    < end of copied text >    < from: [facility] Duplex Upper Ext Vein Scan, Bilat (08.28.24 @ 21:19) >  IMPRESSION:    Persistent DVT in the right subclavian vein and right axillary vein,   nonocclusive. Right basilic vein SVT.  Previously seen right heel vein DVT is not visualized.    No left upper extremity DVT.    --- End of Report ---      LAURI MC MD;Attending Radiologist  This document has been electronically signed. Aug 29 2024  3:24AM    < end of copied text >    < from: CT Angio Chest PE Protocol w/ IV Cont (08.16.24 @ 09:32) >  IMPRESSION:    1.  Segmental emboli within right upper lobe pulmonary artery. No   significant CT evidence of right heart strain.    2.  Emphysema. New mucoid impactions within bilateral lower lungs and   tree-in-bud opacities likely representing COPD exacerbation. Recommend   follow-up chest CT in 3 months to determine resolution.    Findings were discussed with primary team provider Trudy Medina PA-C.    --- End of Report ---      SHAYE AGUIRRE MD; Resident Radiologist  This document has been electronically signed.  REZA DEMARCO MD; Attending Radiologist  This document has been electronically signed. Aug 16 2024 12:48PM    < end of copied text >

## 2024-08-31 NOTE — PROGRESS NOTE ADULT - SUBJECTIVE AND OBJECTIVE BOX
Progress Note   Tiffani Rodríguez PGY4- GI/Hep    SUBJECTIVE: Patient seen and examined at bedside.   - hbg stable overnight     OBJECTIVE:    VITAL SIGNS:  ICU Vital Signs Last 24 Hrs  T(C): 36.7 (31 Aug 2024 05:59), Max: 37.1 (30 Aug 2024 12:48)  T(F): 98.1 (31 Aug 2024 05:59), Max: 98.8 (30 Aug 2024 23:49)  HR: 72 (31 Aug 2024 05:59) (62 - 72)  BP: 136/73 (31 Aug 2024 05:59) (100/66 - 144/72)  BP(mean): --  ABP: --  ABP(mean): --  RR: 18 (31 Aug 2024 05:59) (18 - 18)  SpO2: 99% (31 Aug 2024 05:59) (96% - 99%)    O2 Parameters below as of 31 Aug 2024 05:59  Patient On (Oxygen Delivery Method): room air              08-30 @ 07:01  -  08-31 @ 07:00  --------------------------------------------------------  IN: 1110 mL / OUT: 1000 mL / NET: 110 mL        PHYSICAL EXAM:    General: NAD  HEENT: NC/AT  Neck: supple  Respiratory: Regular RR, no increase in WOB  Cardiovascular: RRR  Abdomen: soft, NT/ND; +BS x4  Extremities: WWP, 2+ peripheral pulses b/l  Skin: normal color and turgor; no rash  Neurological: A&OX    MEDICATIONS:  MEDICATIONS  (STANDING):  albuterol    0.083% 2.5 milliGRAM(s) Nebulizer every 6 hours  albuterol    90 MICROgram(s) HFA Inhaler 1 Puff(s) Inhalation every 4 hours  amLODIPine   Tablet 5 milliGRAM(s) Oral daily  budesonide 160 MICROgram(s)/formoterol 4.5 MICROgram(s) Inhaler 2 Puff(s) Inhalation two times a day  chlorhexidine 2% Cloths 1 Application(s) Topical <User Schedule>  glucagon  Injectable 1 milliGRAM(s) IntraMuscular once  levETIRAcetam  IVPB 250 milliGRAM(s) IV Intermittent every 12 hours  metoprolol tartrate 12.5 milliGRAM(s) Oral every 12 hours  Nephro-corine 1 Tablet(s) Oral daily  pantoprazole  Injectable 40 milliGRAM(s) IV Push daily  polyethylene glycol 3350 17 Gram(s) Oral daily  senna 2 Tablet(s) Oral at bedtime  simvastatin 20 milliGRAM(s) Oral at bedtime  sodium chloride 0.9% with potassium chloride 20 mEq/L 1000 milliLiter(s) (50 mL/Hr) IV Continuous <Continuous>  tiotropium 2.5 MICROgram(s) Inhaler 2 Puff(s) Inhalation daily    MEDICATIONS  (PRN):  acetaminophen     Tablet .. 650 milliGRAM(s) Oral every 6 hours PRN Mild Pain (1 - 3)  bisacodyl 5 milliGRAM(s) Oral daily PRN Constipation      ALLERGIES:  Allergies    penicillin (Other)  ACE inhibitors (Other)  Bee Stings- anaphylaxis (Other)    Intolerances        LABS:                        8.7    5.71  )-----------( 197      ( 31 Aug 2024 08:28 )             26.8                  Progress Note   Tiffani Rodríguez PGY4- GI/Hep    SUBJECTIVE: Patient seen and examined at bedside.   - hbg stable overnight     OBJECTIVE:    VITAL SIGNS:  ICU Vital Signs Last 24 Hrs  T(C): 36.7 (31 Aug 2024 05:59), Max: 37.1 (30 Aug 2024 12:48)  T(F): 98.1 (31 Aug 2024 05:59), Max: 98.8 (30 Aug 2024 23:49)  HR: 72 (31 Aug 2024 05:59) (62 - 72)  BP: 136/73 (31 Aug 2024 05:59) (100/66 - 144/72)  BP(mean): --  ABP: --  ABP(mean): --  RR: 18 (31 Aug 2024 05:59) (18 - 18)  SpO2: 99% (31 Aug 2024 05:59) (96% - 99%)    O2 Parameters below as of 31 Aug 2024 05:59  Patient On (Oxygen Delivery Method): room air      08-30 @ 07:01  -  08-31 @ 07:00  --------------------------------------------------------  IN: 1110 mL / OUT: 1000 mL / NET: 110 mL        PHYSICAL EXAM:    General: NAD  HEENT: NC/AT  Neck: supple  Respiratory: Regular RR, no increase in WOB  Cardiovascular: RRR  Abdomen: soft, NT/ND; PEG clean without blood.   Extremities: WWP, 2+ peripheral pulses b/l  Skin: normal color and turgor; no rash  Neurological: Alert, follows commands     MEDICATIONS:  MEDICATIONS  (STANDING):  albuterol    0.083% 2.5 milliGRAM(s) Nebulizer every 6 hours  albuterol    90 MICROgram(s) HFA Inhaler 1 Puff(s) Inhalation every 4 hours  amLODIPine   Tablet 5 milliGRAM(s) Oral daily  budesonide 160 MICROgram(s)/formoterol 4.5 MICROgram(s) Inhaler 2 Puff(s) Inhalation two times a day  chlorhexidine 2% Cloths 1 Application(s) Topical <User Schedule>  glucagon  Injectable 1 milliGRAM(s) IntraMuscular once  levETIRAcetam  IVPB 250 milliGRAM(s) IV Intermittent every 12 hours  metoprolol tartrate 12.5 milliGRAM(s) Oral every 12 hours  Nephro-corine 1 Tablet(s) Oral daily  pantoprazole  Injectable 40 milliGRAM(s) IV Push daily  polyethylene glycol 3350 17 Gram(s) Oral daily  senna 2 Tablet(s) Oral at bedtime  simvastatin 20 milliGRAM(s) Oral at bedtime  sodium chloride 0.9% with potassium chloride 20 mEq/L 1000 milliLiter(s) (50 mL/Hr) IV Continuous <Continuous>  tiotropium 2.5 MICROgram(s) Inhaler 2 Puff(s) Inhalation daily    MEDICATIONS  (PRN):  acetaminophen     Tablet .. 650 milliGRAM(s) Oral every 6 hours PRN Mild Pain (1 - 3)  bisacodyl 5 milliGRAM(s) Oral daily PRN Constipation      ALLERGIES:  Allergies    penicillin (Other)  ACE inhibitors (Other)  Bee Stings- anaphylaxis (Other)    Intolerances        LABS:                        8.7    5.71  )-----------( 197      ( 31 Aug 2024 08:28 )             26.8

## 2024-08-31 NOTE — PROGRESS NOTE ADULT - ASSESSMENT
80 yo male with PMH of HTN, T2DM (diet controlled), HLD, COPD, CKD 3, carcinoid tumor s/p hemicolectomy, nasopharyngeal carcinoma s/p chemo/RT (c/b dysphagia ), IPMN s/p distal pancreatectomy (2018), and paroxysmal Afib on Eliquis s/p craniotomy 8/6 for resection of hemorrhagic lesion presented to the ED for RUE swelling, found to have RUE DVT involving subclavian, axillary, and brachial veins and PE. Course also c/b dysphagia s/p PEG 8/27, c/b bleeding around PEG site with acute blood loss anemia now stable.

## 2024-08-31 NOTE — PROGRESS NOTE ADULT - ASSESSMENT
78 yo male with PMH of HTN, T2DM (diet controlled), HLD, COPD, CKD 3, carcinoid tumor s/p hemicolectomy, nasopharyngeal carcinoma s/p chemo/RT (c/b dysphagia ), IPMN s/p distal pancreatectomy (2018), and paroxysmal Afib on Eliquis s/p craniotomy 8/6 for resection of hemorrhagic lesion. Now presented to the ED for RUE swelling, found to have RUE DVT involving subclavian, axillary, and brachial veins and PE.    Pt was known to have cough w/ PO previously and was evaluated w/ OP MBS earlier this year - using thickened fluids at home   Post op course further complicated by Dysphagia - failed multiple SLP evaluations with recs: NPO   Pt and family are now agreeable to PEG placement for which GI is consulted    #Dysphagia -sp EGD + PEG placement 8/27/24 c/b PEG site bleeding  8/28 scant dried blood under PEG bumper; no active bleeding, induration, swelling or edema  PEG bumper between 3.5 and 4 cm at skin level  8/29 scant oozing, slight downtrend in Hgb. Negative PEG lavage. Surgicell placed at PEG tract  8/29 PM - recurrent PEG site bleeding; Surgicell intact. Resolved without further intervention. Hgb stable 6pm (9.4 ->9.8)  8/30 AM minimal ooze at PEG site, no active bleeding. Re-assessed at 12:40pm- Notified by Medicine attending of AM Hgb 9.8 ->8.5. No melena +PEG lavage: pink tinged return with water/feeds. Previously placed surgicell removed, minimal oozing noted at ends of PEG site incision - new Surgicell strip placed at PEG incision under PEG bumper, lightly tucked into ends (medical and lateral aspects of PEG site incision)   No visible oozing, surgicell noted to be clean  #remote history of head/Neck CA sp Chemo + XRT  #sp hemicolectomy  #sp partial pancreatectomy  #DVT+PE   AC held x 24 hours for PEG  AC (Lovenox) restarted 8/28 pm.   8/29 AM scant PEG site oozing, sl Hgb downtrend. Topical surgicell applied at PEG site. 8/29 PM CBC stable  8/30 PM AC held 2/2 PEG site bleeding (see above)    RECS:  -Keep surgicell in place; do not remove  -Hold PEG feeds for now  -check repeat CBC and type and cross   -Hold AC per d/w Medicine Hospitalist  -DC to rehab cancelled   -Abdominal binder on at all times please to prevent accidental PEG trauma/tugging/dislodgement  -PPI for GI ppx    Tiffani Wells MD  Gastroenterology/Hepatology Fellow, PGY-5  Please contact via TEAMS    NON-URGENT CONSULTS:  Please email [email or web address]

## 2024-08-31 NOTE — PROGRESS NOTE ADULT - SUBJECTIVE AND OBJECTIVE BOX
Lizet Page MD  Division of Hospital Medicine  [facility]  Spectra: [phone]      Patient is a 79y old  Male who presents with a chief complaint of post-operative DVT (31 Aug 2024 10:36)      SUBJECTIVE / OVERNIGHT EVENTS:  no acute events overnight. no further episodes of bleed around PEG site with stable H/H. tolerating feeds   ADDITIONAL REVIEW OF SYSTEMS:    MEDICATIONS  (STANDING):  albuterol    0.083% 2.5 milliGRAM(s) Nebulizer every 6 hours  albuterol    90 MICROgram(s) HFA Inhaler 1 Puff(s) Inhalation every 4 hours  amLODIPine   Tablet 5 milliGRAM(s) Oral daily  budesonide 160 MICROgram(s)/formoterol 4.5 MICROgram(s) Inhaler 2 Puff(s) Inhalation two times a day  chlorhexidine 2% Cloths 1 Application(s) Topical <User Schedule>  glucagon  Injectable 1 milliGRAM(s) IntraMuscular once  levETIRAcetam  IVPB 250 milliGRAM(s) IV Intermittent every 12 hours  metoprolol tartrate 12.5 milliGRAM(s) Oral every 12 hours  Nephro-corine 1 Tablet(s) Oral daily  pantoprazole  Injectable 40 milliGRAM(s) IV Push daily  polyethylene glycol 3350 17 Gram(s) Oral daily  senna 2 Tablet(s) Oral at bedtime  simvastatin 20 milliGRAM(s) Oral at bedtime  tiotropium 2.5 MICROgram(s) Inhaler 2 Puff(s) Inhalation daily    MEDICATIONS  (PRN):  acetaminophen     Tablet .. 650 milliGRAM(s) Oral every 6 hours PRN Mild Pain (1 - 3)  bisacodyl 5 milliGRAM(s) Oral daily PRN Constipation      CAPILLARY BLOOD GLUCOSE      POCT Blood Glucose.: 113 mg/dL (31 Aug 2024 11:48)  POCT Blood Glucose.: 120 mg/dL (31 Aug 2024 06:05)  POCT Blood Glucose.: 128 mg/dL (30 Aug 2024 23:29)    I&O's Summary    30 Aug 2024 07:01  -  31 Aug 2024 07:00  --------------------------------------------------------  IN: 1110 mL / OUT: 1000 mL / NET: 110 mL        PHYSICAL EXAM:  Vital Signs Last 24 Hrs  T(C): 36.7 (31 Aug 2024 13:50), Max: 37.1 (30 Aug 2024 23:49)  T(F): 98 (31 Aug 2024 13:50), Max: 98.8 (30 Aug 2024 23:49)  HR: 64 (31 Aug 2024 13:50) (62 - 78)  BP: 115/64 (31 Aug 2024 13:50) (100/66 - 136/76)  BP(mean): --  RR: 18 (31 Aug 2024 13:50) (18 - 18)  SpO2: 100% (31 Aug 2024 13:50) (96% - 100%)    Parameters below as of 31 Aug 2024 13:50  Patient On (Oxygen Delivery Method): room air      CONSTITUTIONAL: NAD, well-developed, well-groomed  EYES: PERRLA; conjunctiva and sclera clear  ENMT: Moist oral mucosa, no pharyngeal injection or exudates; normal dentition  NECK: Supple, no palpable masses; no thyromegaly  RESPIRATORY: Normal respiratory effort; lungs are clear to auscultation bilaterally  CARDIOVASCULAR: Regular rate and rhythm, normal S1 and S2, no murmur/rub/gallop; No lower extremity edema  ABDOMEN: Soft, Nondistended, Nontender to palpation, normoactive bowel sounds, +PEG site with overlying abd binder with no bleed  MUSCULOSKELETAL: No clubbing or cyanosis of digits; no joint swelling or tenderness to palpation  PSYCH: A+O to person, place, and time; affect appropriate  NEUROLOGY: CN 2-12 are intact and symmetric; no gross sensory deficits   SKIN: No rashes; no palpable lesions, +prior R crani incision site c/d/i    LABS:                        8.7    5.71  )-----------( 197      ( 31 Aug 2024 08:28 )             26.8     08-31    137  |  104  |  36<H>  ----------------------------<  110<H>  4.6   |  24  |  0.77    Ca    8.8      31 Aug 2024 08:28      Urinalysis Basic - ( 31 Aug 2024 08:28 )    Color: x / Appearance: x / SG: x / pH: x  Gluc: 110 mg/dL / Ketone: x  / Bili: x / Urobili: x   Blood: x / Protein: x / Nitrite: x   Leuk Esterase: x / RBC: x / WBC x   Sq Epi: x / Non Sq Epi: x / Bacteria: x      RADIOLOGY & ADDITIONAL TESTS:  Results Reviewed: H/H stable though slightly downtrended, Cr stable  Imaging Personally Reviewed:  Electrocardiogram Personally Reviewed:    COORDINATION OF CARE:  Care Discussed with Consultants/Other Providers [Y]: Neurosurgery ALYSON James  Prior or Outpatient Records Reviewed [Y/N]:

## 2024-08-31 NOTE — PROGRESS NOTE ADULT - ATTENDING COMMENTS
Patient seen/examined.  Wife at bedside.  Assessment/recommendations as noted.  Very frail and debilitated but NAD/nontoxic appearing.  Abdomen–GT site dry and intact at current time.  No active bleeding observed.  Surgicel at site.  Monitor for any recurrent bleeding at G-tube incision site.  Monitor serial hemoglobin/hematocrit.  Continue current regimen of PPI antisecretory therapy.

## 2024-08-31 NOTE — PROGRESS NOTE ADULT - ASSESSMENT
ASSESSMENT AND PLAN: 79M hx recent admission for and R crani for lesion w/ Dr. 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 Today presents to ED for RUE doppler w/ DVT involving the subclavian, axillary and brachial veins and Superficial thrombus in the right basilic vein. Plts/coags wnl.     8/27 s/p PEG placement with GI (Dr. Savage)    NEURO:   - Continue neuro checks q 4  - Keppra for seizure prophylaxis  - Tylenol prn for pain control  - PT: Home PT w/ RW, PMR saw and recommended PEGGY    PULM:   - On room air, O2Sat>96%  - Incentive spirometry  - 8/16 CTA Chest: segmental emboli w/in right upper lobe pulm artery, no significant evidence of right heart strain; emphysema, new mucoid impaction likely COPD exacerbations - f/u CT Chest in 3 months  - Continue Proventil, Symbicort, Spiriva     CV:  - -160, currently within goals  - Continue Norvasc for HTN  - Continue Metoprolol for Afib  - Continue Simvastatin for HLD  - 8/16 TTE: LV systolic fxn normal w/ EF 71%, normal LV diastolic fxn, normal RV systolic fxn normal, LV systolic fxn is normal    ENDO:   - A1c 6.3, continue Glucerna TF, fingersticks stable    HEME/ONC:             8/31 CBC H/H this morning 8.7/26.8 (9.2/27.8) - currently being monitored for anemia as patient had bleeding around his PEG site a few days ago. Will do another f/u CBC at 3pm today.          DVT ppx: Patient previously on therapeutic SQ Lovenox 60mg BID but was discontinued due to H/H drop and bleeding around PEG site, last dose given on 8/30 at 5AM. Vascular Cardiology following for PE on CTA Chest on 8/16, and persistent DVT in right subclavian vein and non-occlusive right axillary vein, right basilic vein SVT, was being treated w/ SQ Lovenox, will reinstate once H/H stable and if PEG site continues to have no bleeding.    RENAL:   - NS+K@50  - 8/31 BMP stable  - Voiding    ID:   - Afebrile    GI:    - Currently has PEG placed by GI on 8/27, had bleeding around the PEG site a few days however today no bleeding, H/H being trended, so far stable. On tube feeds at goal  - Continue Protonix for GI ppx  - Senna and Miralax for bowel regimen, last BM 8/28    Spoke and updated wife at bedside.     Appreciate Hospitalist following for co-medical management.     DISCHARGE PLANNING:   PEGGY once patient is back on AC and no bleeding around PEG site    Plan to be discussed w/ Dr. Branham  [id number]    ASSESSMENT AND PLAN: 79M hx recent admission for and R crani for lesion w/ Dr. 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 Today presents to ED for RUE doppler w/ DVT involving the subclavian, axillary and brachial veins and Superficial thrombus in the right basilic vein. Plts/coags wnl.     8/27 s/p PEG placement with GI (Dr. Savage)    NEURO:   - Continue neuro checks q 4  - Keppra for seizure prophylaxis  - Tylenol prn for pain control  - PT: Home PT w/ RW, PMR saw and recommended PEGGY    PULM:   - On room air, O2Sat>96%  - Incentive spirometry  - 8/16 CTA Chest: segmental emboli w/in right upper lobe pulm artery, no significant evidence of right heart strain; emphysema, new mucoid impaction likely COPD exacerbations - f/u CT Chest in 3 months  - Continue Proventil, Symbicort, Spiriva     CV:  - -160, currently within goals  - Continue Norvasc for HTN  - Continue Metoprolol for Afib  - Continue Simvastatin for HLD  - 8/16 TTE: LV systolic fxn normal w/ EF 71%, normal LV diastolic fxn, normal RV systolic fxn normal, LV systolic fxn is normal    ENDO:   - A1c 6.3, continue Glucerna TF, fingersticks stable    HEME/ONC:             8/31 CBC H/H this morning 8.7/26.8 (9.2/27.8) - currently being monitored for anemia as patient had bleeding around his PEG site a few days ago. Will do another f/u CBC at 3pm today.          DVT ppx: Patient previously on therapeutic SQ Lovenox 60mg BID but was discontinued due to H/H drop and bleeding around PEG site, last dose given on 8/30 at 5AM. Vascular Cardiology following for PE on CTA Chest on 8/16, and persistent DVT in right subclavian vein and non-occlusive right axillary vein, right basilic vein SVT, was being treated w/ SQ Lovenox, will reinstate once H/H stable and if PEG site continues to have no bleeding.    RENAL:   - NS+K@50  - 8/31 BMP stable  - Voiding    ID:   - Afebrile    GI:    - Currently has PEG placed by GI on 8/27, had bleeding around the PEG site a few days however today no bleeding, H/H being trended, so far stable. On tube feeds at goal  - Continue Protonix for GI ppx  - Senna and Miralax for bowel regimen, last BM 8/28    Spoke and updated wife at bedside.     Appreciate Hospitalist following for co-medical management.     More than 30 minutes were spent educating the patient and family regarding condition, medications, follow up plans, signs and symptoms to be concerned with, preparing paperwork, and questions answered regarding discharge.     DISCHARGE PLANNING:   PEGGY once patient is back on AC and no bleeding around PEG site    Plan to be discussed w/ Dr. Branham  [id number]    ASSESSMENT AND PLAN: 79M hx recent admission for and R crani for lesion w/ Dr. 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 Today presents to ED for RUE doppler w/ DVT involving the subclavian, axillary and brachial veins and Superficial thrombus in the right basilic vein. Plts/coags wnl.     8/27 s/p PEG placement with GI (Dr. Savage)    NEURO:   - Continue neuro checks q 4  - Keppra for seizure prophylaxis  - Tylenol prn for pain control  - PT: Home PT w/ RW, PMR saw and recommended PEGGY    PULM:   - On room air, O2Sat>96%  - Incentive spirometry  - 8/16 CTA Chest: segmental emboli w/in right upper lobe pulm artery, no significant evidence of right heart strain; emphysema, new mucoid impaction likely COPD exacerbations - f/u CT Chest in 3 months  - Continue Proventil, Symbicort, Spiriva     CV:  - -160, currently within goals  - Continue Norvasc for HTN  - Continue Metoprolol for Afib  - Continue Simvastatin for HLD  - 8/16 TTE: LV systolic fxn normal w/ EF 71%, normal LV diastolic fxn, normal RV systolic fxn normal, LV systolic fxn is normal    ENDO:   - A1c 6.3, continue Glucerna TF, fingersticks stable    HEME/ONC:             8/31 CBC H/H this morning 8.7/26.8 (9.2/27.8) - currently being monitored for anemia as patient had bleeding around his PEG site a few days ago and was transfused yesterday 8/30. 3PM CBC further drop 8.1/24.7, remains hemodynamically stable. Discussed w/ Hospitalist and GI - NPO for now except meds, and getting CT A/P, will trend CBC this evening and if Hgb <8 and/or active signs of bleeding to be transfused. Has active T&S from 8/30.         DVT ppx: Patient previously on therapeutic SQ Lovenox 60mg BID but was discontinued due to H/H drop and bleeding around PEG site, last dose given on 8/30 at 5AM. Vascular Cardiology following for PE on CTA Chest on 8/16, and persistent DVT in right subclavian vein and non-occlusive right axillary vein, right basilic vein SVT, was being treated w/ SQ Lovenox, will reinstate once H/H stable and if PEG site continues to have no bleeding.    RENAL:   - NS+K@50  - 8/31 BMP stable  - Voiding    ID:   - Afebrile    GI:    - Currently has PEG placed by GI on 8/27, had bleeding around the PEG site a few days however today no bleeding seen around PEG site, but H/H continues to drop, discussed w/ GI -> getting CT A/P, and CBC to be trended / transfuse if <8 and/or active heme. Tube feeds held currently.   - Continue Protonix for GI ppx  - Senna and Miralax for bowel regimen, last BM 8/28    Spoke and updated wife at bedside.     Appreciate Hospitalist following for co-medical management.     More than 30 minutes were spent educating the patient and family regarding condition, medications, follow up plans, signs and symptoms to be concerned with, preparing paperwork, and questions answered regarding discharge.     DISCHARGE PLANNING:   PEGGY once patient is back on AC and no bleeding around PEG site    Plan to be discussed w/ Dr. Branham  [id number]    ASSESSMENT AND PLAN: 79M hx recent admission for and R crani for lesion w/ Dr. 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 Today presents to ED for RUE doppler w/ DVT involving the subclavian, axillary and brachial veins and Superficial thrombus in the right basilic vein. Plts/coags wnl.     8/27 s/p PEG placement with GI (Dr. Savage)    NEURO:   - Continue neuro checks q 4  - Keppra for seizure prophylaxis  - Tylenol prn for pain control  - PT: Home PT w/ RW, PMR saw and recommended PEGGY    PULM:   - On room air, O2Sat>96%  - Incentive spirometry  - 8/16 CTA Chest: segmental emboli w/in right upper lobe pulm artery, no significant evidence of right heart strain; emphysema, new mucoid impaction likely COPD exacerbations - f/u CT Chest in 3 months  - Continue Proventil, Symbicort, Spiriva     CV:  - -160, currently within goals  - Continue Norvasc for HTN  - Continue Metoprolol for Afib  - Continue Simvastatin for HLD  - 8/16 TTE: LV systolic fxn normal w/ EF 71%, normal LV diastolic fxn, normal RV systolic fxn normal, LV systolic fxn is normal    ENDO:   - A1c 6.3, continue Glucerna TF, fingersticks stable    HEME/ONC:             8/31 CBC H/H this morning 8.7/26.8 (9.2/27.8) - currently being monitored for anemia as patient had bleeding around his PEG site a few days ago and was transfused yesterday 8/30. 3PM CBC further drop 8.1/24.7, remains hemodynamically stable. Discussed w/ Hospitalist and GI - NPO for now except meds, and getting CT A/P, will trend CBC this evening and if Hgb <8 and/or active signs of bleeding to be transfused. Has active T&S from 8/30.         DVT ppx: Patient previously on therapeutic SQ Lovenox 60mg BID but was discontinued due to H/H drop and bleeding around PEG site, last dose given on 8/30 at 5AM. Vascular Cardiology following for PE on CTA Chest on 8/16, and persistent DVT in right subclavian vein and non-occlusive right axillary vein, right basilic vein SVT, was being treated w/ SQ Lovenox, will reinstate once H/H stable and if PEG site continues to have no bleeding.    RENAL:   - NS@50 as patient is NPO and will getting contrast via CT  - 8/31 BMP stable  - Voiding    ID:   - Afebrile    GI:    - Currently has PEG placed by GI on 8/27, had bleeding around the PEG site a few days however today no bleeding seen around PEG site, but H/H continues to drop, discussed w/ GI -> getting CT A/P, and CBC to be trended / transfuse if <8 and/or active heme. Tube feeds held currently.   - Continue Protonix for GI ppx  - Senna and Miralax for bowel regimen, last BM 8/28    Spoke and updated wife at bedside.     Appreciate Hospitalist following for co-medical management.     More than 30 minutes were spent educating the patient and family regarding condition, medications, follow up plans, signs and symptoms to be concerned with, preparing paperwork, and questions answered regarding discharge.     DISCHARGE PLANNING:   PEGGY once patient is back on AC and no bleeding around PEG site    Plan to be discussed w/ Dr. Branham  [id number]

## 2024-09-01 DIAGNOSIS — D64.9 ANEMIA, UNSPECIFIED: ICD-10-CM

## 2024-09-01 LAB
ANION GAP SERPL CALC-SCNC: 18 MMOL/L — HIGH (ref 5–17)
APTT BLD: 35.8 SEC — HIGH (ref 24.5–35.6)
BLD GP AB SCN SERPL QL: NEGATIVE — SIGNIFICANT CHANGE UP
BUN SERPL-MCNC: 24 MG/DL — HIGH (ref 7–23)
CALCIUM SERPL-MCNC: 9.5 MG/DL — SIGNIFICANT CHANGE UP (ref 8.4–10.5)
CHLORIDE SERPL-SCNC: 104 MMOL/L — SIGNIFICANT CHANGE UP (ref 96–108)
CO2 SERPL-SCNC: 19 MMOL/L — LOW (ref 22–31)
CREAT SERPL-MCNC: 0.75 MG/DL — SIGNIFICANT CHANGE UP (ref 0.5–1.3)
EGFR: 91 ML/MIN/1.73M2 — SIGNIFICANT CHANGE UP
GLUCOSE BLDC GLUCOMTR-MCNC: 84 MG/DL — SIGNIFICANT CHANGE UP (ref 70–99)
GLUCOSE BLDC GLUCOMTR-MCNC: 90 MG/DL — SIGNIFICANT CHANGE UP (ref 70–99)
GLUCOSE BLDC GLUCOMTR-MCNC: 92 MG/DL — SIGNIFICANT CHANGE UP (ref 70–99)
GLUCOSE BLDC GLUCOMTR-MCNC: 93 MG/DL — SIGNIFICANT CHANGE UP (ref 70–99)
GLUCOSE BLDC GLUCOMTR-MCNC: 98 MG/DL — SIGNIFICANT CHANGE UP (ref 70–99)
GLUCOSE SERPL-MCNC: 78 MG/DL — SIGNIFICANT CHANGE UP (ref 70–99)
HCT VFR BLD CALC: 21 % — CRITICAL LOW (ref 39–50)
HCT VFR BLD CALC: 29.2 % — LOW (ref 39–50)
HGB BLD-MCNC: 6.7 G/DL — CRITICAL LOW (ref 13–17)
HGB BLD-MCNC: 9.7 G/DL — LOW (ref 13–17)
INR BLD: 1.16 RATIO — SIGNIFICANT CHANGE UP (ref 0.85–1.18)
MCHC RBC-ENTMCNC: 30.9 PG — SIGNIFICANT CHANGE UP (ref 27–34)
MCHC RBC-ENTMCNC: 31.2 PG — SIGNIFICANT CHANGE UP (ref 27–34)
MCHC RBC-ENTMCNC: 31.9 GM/DL — LOW (ref 32–36)
MCHC RBC-ENTMCNC: 33.2 GM/DL — SIGNIFICANT CHANGE UP (ref 32–36)
MCV RBC AUTO: 93 FL — SIGNIFICANT CHANGE UP (ref 80–100)
MCV RBC AUTO: 97.7 FL — SIGNIFICANT CHANGE UP (ref 80–100)
NRBC # BLD: 0 /100 WBCS — SIGNIFICANT CHANGE UP (ref 0–0)
NRBC # BLD: 0 /100 WBCS — SIGNIFICANT CHANGE UP (ref 0–0)
PLATELET # BLD AUTO: 191 K/UL — SIGNIFICANT CHANGE UP (ref 150–400)
PLATELET # BLD AUTO: 211 K/UL — SIGNIFICANT CHANGE UP (ref 150–400)
POTASSIUM SERPL-MCNC: 5.1 MMOL/L — SIGNIFICANT CHANGE UP (ref 3.5–5.3)
POTASSIUM SERPL-SCNC: 5.1 MMOL/L — SIGNIFICANT CHANGE UP (ref 3.5–5.3)
PROTHROM AB SERPL-ACNC: 12.7 SEC — SIGNIFICANT CHANGE UP (ref 9.5–13)
RBC # BLD: 2.15 M/UL — LOW (ref 4.2–5.8)
RBC # BLD: 3.14 M/UL — LOW (ref 4.2–5.8)
RBC # FLD: 15.4 % — HIGH (ref 10.3–14.5)
RBC # FLD: 15.9 % — HIGH (ref 10.3–14.5)
RH IG SCN BLD-IMP: NEGATIVE — SIGNIFICANT CHANGE UP
SODIUM SERPL-SCNC: 141 MMOL/L — SIGNIFICANT CHANGE UP (ref 135–145)
WBC # BLD: 5.13 K/UL — SIGNIFICANT CHANGE UP (ref 3.8–10.5)
WBC # BLD: 5.9 K/UL — SIGNIFICANT CHANGE UP (ref 3.8–10.5)
WBC # FLD AUTO: 5.13 K/UL — SIGNIFICANT CHANGE UP (ref 3.8–10.5)
WBC # FLD AUTO: 5.9 K/UL — SIGNIFICANT CHANGE UP (ref 3.8–10.5)

## 2024-09-01 PROCEDURE — 99232 SBSQ HOSP IP/OBS MODERATE 35: CPT | Mod: GC

## 2024-09-01 PROCEDURE — 99233 SBSQ HOSP IP/OBS HIGH 50: CPT

## 2024-09-01 RX ORDER — FERROUS SULFATE 325(65) MG
325 TABLET ORAL
Refills: 0 | Status: DISCONTINUED | OUTPATIENT
Start: 2024-09-01 | End: 2024-09-01

## 2024-09-01 RX ORDER — SUCRALFATE 1 G/10ML
1 SUSPENSION ORAL ONCE
Refills: 0 | Status: COMPLETED | OUTPATIENT
Start: 2024-09-01 | End: 2024-09-01

## 2024-09-01 RX ADMIN — TIOTROPIUM BROMIDE INHALATION SPRAY 2 PUFF(S): 3.12 SPRAY, METERED RESPIRATORY (INHALATION) at 12:01

## 2024-09-01 RX ADMIN — CHLORHEXIDINE GLUCONATE 1 APPLICATION(S): 40 SOLUTION TOPICAL at 06:21

## 2024-09-01 RX ADMIN — POLYETHYLENE GLYCOL 3350 17 GRAM(S): 17 POWDER, FOR SOLUTION ORAL at 12:00

## 2024-09-01 RX ADMIN — Medication 2.5 MILLIGRAM(S): at 12:01

## 2024-09-01 RX ADMIN — Medication 1 TABLET(S): at 12:01

## 2024-09-01 RX ADMIN — ACETAMINOPHEN 650 MILLIGRAM(S): 325 TABLET ORAL at 20:42

## 2024-09-01 RX ADMIN — SUCRALFATE 1 GRAM(S): 1 SUSPENSION ORAL at 20:12

## 2024-09-01 RX ADMIN — LEVETIRACETAM 400 MILLIGRAM(S): 1000 TABLET ORAL at 06:20

## 2024-09-01 RX ADMIN — Medication 2.5 MILLIGRAM(S): at 23:57

## 2024-09-01 RX ADMIN — Medication 40 MILLIGRAM(S): at 12:00

## 2024-09-01 RX ADMIN — Medication 10 MILLIGRAM(S): at 12:52

## 2024-09-01 RX ADMIN — ACETAMINOPHEN 650 MILLIGRAM(S): 325 TABLET ORAL at 20:12

## 2024-09-01 RX ADMIN — METOPROLOL TARTRATE 12.5 MILLIGRAM(S): 100 TABLET ORAL at 17:15

## 2024-09-01 RX ADMIN — LEVETIRACETAM 400 MILLIGRAM(S): 1000 TABLET ORAL at 17:16

## 2024-09-01 RX ADMIN — Medication 60 MILLILITER(S): at 16:53

## 2024-09-01 RX ADMIN — BUDESONIDE AND FORMOTEROL FUMARATE 2 PUFF(S): 80; 4.5 AEROSOL, METERED RESPIRATORY (INHALATION) at 17:16

## 2024-09-01 RX ADMIN — BUDESONIDE AND FORMOTEROL FUMARATE 2 PUFF(S): 80; 4.5 AEROSOL, METERED RESPIRATORY (INHALATION) at 06:21

## 2024-09-01 RX ADMIN — Medication 20 MILLIGRAM(S): at 22:02

## 2024-09-01 RX ADMIN — Medication 2.5 MILLIGRAM(S): at 06:20

## 2024-09-01 RX ADMIN — Medication 2.5 MILLIGRAM(S): at 01:20

## 2024-09-01 RX ADMIN — Medication 2.5 MILLIGRAM(S): at 17:16

## 2024-09-01 NOTE — PROGRESS NOTE ADULT - PROBLEM SELECTOR PLAN 2
no further episodes of bleeding from PEG site since 8/30  - however, with ongoing worsening anemia  - CT abd with IV contrast performed on 8/31 with prelim report: unable to eval for GI bleed within large bowel due to residual oral contrast from prior study. follow-up official report  - transfuse 1u PRBC today and check post-transfusion CBC  - unclear utility of FOBT to r/o GI bleed as had prior bleeding at PEG site, thus may have +FOBT regardless  - no BM in last 5 days, so ordered for dulcolax supp x1 after transfusion complete  - agree with GI follow-up   - transfuse for Hb<7

## 2024-09-01 NOTE — PROGRESS NOTE ADULT - SUBJECTIVE AND OBJECTIVE BOX
Patient seen and examined at bedside. Hemodynamically stable.    --Anticoagulation--    T(C): 36.8 (09-01-24 @ 12:52), Max: 37 (08-31-24 @ 20:32)  HR: 74 (09-01-24 @ 12:52) (64 - 75)  BP: 119/77 (09-01-24 @ 12:52) (109/67 - 146/74)  RR: 18 (09-01-24 @ 12:52) (16 - 18)  SpO2: 97% (09-01-24 @ 12:52) (95% - 100%)  Wt(kg): --    Exam: AO3, PERRL, EOMI, GARNER 5/5, PEG site dry- no bleeding

## 2024-09-01 NOTE — PROGRESS NOTE ADULT - PROBLEM SELECTOR PLAN 1
and RUE DVT  - duplex of RUE: Right sided DVT involving the subclavian, axillary and brachial veins. Superficial thrombus in the right basilic vein  - 8/28 repeat UE duplex with persistent DVT in the right subclavian vein and right axillary vein, right basilic vein SVT.  - CT Angio chest: segmental emboli within RUL pulmonary artery with no evidence of RV strain  - TTE unremarkable  - Vascular Cardiology consulted, and started on hep gtt on 8/16, transitioned to therapeutic lovenox on 8/18 but on hold in setting of bleeding at PEG site, last dose was 8/30 AM  - continue to hold a/c given ongoing anemia

## 2024-09-01 NOTE — PROGRESS NOTE ADULT - ASSESSMENT
78 yo male with PMH of HTN, T2DM (diet controlled), HLD, COPD, CKD 3, carcinoid tumor s/p hemicolectomy, nasopharyngeal carcinoma s/p chemo/RT (c/b dysphagia ), IPMN s/p distal pancreatectomy (2018), and paroxysmal Afib on Eliquis s/p craniotomy 8/6 for resection of hemorrhagic lesion. Now presented to the ED for RUE swelling, found to have RUE DVT involving subclavian, axillary, and brachial veins and PE.    Pt was known to have cough w/ PO previously and was evaluated w/ OP MBS earlier this year - using thickened fluids at home   Post op course further complicated by Dysphagia - failed multiple SLP evaluations with recs: NPO   Pt and family are now agreeable to PEG placement for which GI is consulted    #Dysphagia -sp EGD + PEG placement 8/27/24 c/b PEG site bleeding  8/28 scant dried blood under PEG bumper; no active bleeding, induration, swelling or edema  PEG bumper between 3.5 and 4 cm at skin level  8/29 scant oozing, slight downtrend in Hgb. Negative PEG lavage. Surgicell placed at PEG tract  8/29 PM - recurrent PEG site bleeding; Surgicell intact. Resolved without further intervention. Hgb stable 6pm (9.4 ->9.8)  8/30 AM minimal ooze at PEG site, no active bleeding. Re-assessed at 12:40pm- Notified by Medicine attending of AM Hgb 9.8 ->8.5. No melena +PEG lavage: pink tinged return with water/feeds. Previously placed surgicell removed, minimal oozing noted at ends of PEG site incision - new Surgicell strip placed at PEG incision under PEG bumper, lightly tucked into ends (medical and lateral aspects of PEG site incision)   No visible oozing, surgicell noted to be clean  #remote history of head/Neck CA sp Chemo + XRT  #sp hemicolectomy  #sp partial pancreatectomy  #DVT+PE   AC held x 24 hours for PEG  AC (Lovenox) restarted 8/28 pm.   8/29 AM scant PEG site oozing, sl Hgb downtrend. Topical surgicell applied at PEG site. 8/29 PM CBC stable  8/30 PM AC held 2/2 PEG site bleeding (see above)    RECS:  -PEG lavaged without any blood; only gastric content; no hematoma or outward bleeding   -Keep surgicell in place; do not remove  -OK to start PEG feeds   -Hold AC per d/w Medicine Hospitalist  -Abdominal binder on at all times please to prevent accidental PEG trauma/tugging/dislodgement  -PPI for GI ppx    Tiffani Wells MD  Gastroenterology/Hepatology Fellow, PGY-5  Please contact via TEAMS    NON-URGENT CONSULTS:  Please email [email or web address]  [email or web address]

## 2024-09-01 NOTE — PROGRESS NOTE ADULT - ATTENDING COMMENTS
Assessment/recommendations as noted per GI fellow.  No indication of active bleeding at GT sit at time of assessment.  RECS:  -PEG lavaged without any blood; only gastric content; no hematoma or outward bleeding   -Keep surgicell in place; do not remove  -OK to start PEG feeds   -Hold AC per d/w Medicine Hospitalist  -Abdominal binder on at all times please to prevent accidental PEG trauma/tugging/dislodgement  -PPI for GI ppx

## 2024-09-01 NOTE — PROGRESS NOTE ADULT - SUBJECTIVE AND OBJECTIVE BOX
Progress Note   Tiffani Giammajasono- PGY4- GI/Hep    SUBJECTIVE: Patient seen and examined at bedside.   - hbg drop to 6.7 but then increased to 9.7 after 1 unit so likely inaccurate blood draw initially.   - No blood out of PEG  - No BMs yet    OBJECTIVE:    VITAL SIGNS:  ICU Vital Signs Last 24 Hrs  T(C): 36.2 (01 Sep 2024 16:54), Max: 37 (31 Aug 2024 20:32)  T(F): 97.1 (01 Sep 2024 16:54), Max: 98.6 (31 Aug 2024 20:32)  HR: 73 (01 Sep 2024 16:54) (65 - 75)  BP: 146/83 (01 Sep 2024 16:54) (109/67 - 146/83)  BP(mean): --  ABP: --  ABP(mean): --  RR: 18 (01 Sep 2024 16:54) (16 - 18)  SpO2: 98% (01 Sep 2024 16:54) (95% - 99%)    O2 Parameters below as of 01 Sep 2024 16:54  Patient On (Oxygen Delivery Method): room air        PHYSICAL EXAM:    General: NAD  HEENT: NC/AT  Neck: supple  Respiratory: Regular RR, no increase in WOB  Cardiovascular: RRR  Abdomen: soft, NT/ND; +BS x4, PEG lavaged without any note of blood, no hematoma felt  Extremities: WWP, 2+ peripheral pulses b/l  Skin: normal color and turgor; no rash  Neurological: Alert     MEDICATIONS:  MEDICATIONS  (STANDING):  albuterol    0.083% 2.5 milliGRAM(s) Nebulizer every 6 hours  albuterol    90 MICROgram(s) HFA Inhaler 1 Puff(s) Inhalation every 4 hours  amLODIPine   Tablet 5 milliGRAM(s) Oral daily  budesonide 160 MICROgram(s)/formoterol 4.5 MICROgram(s) Inhaler 2 Puff(s) Inhalation two times a day  chlorhexidine 2% Cloths 1 Application(s) Topical <User Schedule>  glucagon  Injectable 1 milliGRAM(s) IntraMuscular once  levETIRAcetam  IVPB 250 milliGRAM(s) IV Intermittent every 12 hours  metoprolol tartrate 12.5 milliGRAM(s) Oral every 12 hours  Nephro-corine 1 Tablet(s) Oral daily  pantoprazole  Injectable 40 milliGRAM(s) IV Push daily  polyethylene glycol 3350 17 Gram(s) Oral daily  senna 2 Tablet(s) Oral at bedtime  simvastatin 20 milliGRAM(s) Oral at bedtime  tiotropium 2.5 MICROgram(s) Inhaler 2 Puff(s) Inhalation daily    MEDICATIONS  (PRN):  acetaminophen     Tablet .. 650 milliGRAM(s) Oral every 6 hours PRN Mild Pain (1 - 3)  bisacodyl 5 milliGRAM(s) Oral daily PRN Constipation      ALLERGIES:  Allergies    penicillin (Other)  ACE inhibitors (Other)  Bee Stings- anaphylaxis (Other)    Intolerances        LABS:                        9.7    5.13  )-----------( 191      ( 01 Sep 2024 13:02 )             29.2     09-01    141  |  104  |  24<H>  ----------------------------<  78  5.1   |  19<L>  |  0.75    Ca    9.5      01 Sep 2024 07:24      PT/INR - ( 01 Sep 2024 13:02 )   PT: 12.7 sec;   INR: 1.16 ratio         PTT - ( 01 Sep 2024 13:02 )  PTT:35.8 sec  Urinalysis Basic - ( 01 Sep 2024 07:24 )    Color: x / Appearance: x / SG: x / pH: x  Gluc: 78 mg/dL / Ketone: x  / Bili: x / Urobili: x   Blood: x / Protein: x / Nitrite: x   Leuk Esterase: x / RBC: x / WBC x   Sq Epi: x / Non Sq Epi: x / Bacteria: x

## 2024-09-01 NOTE — PROGRESS NOTE ADULT - ASSESSMENT
ASSESSMENT AND PLAN: 79M hx recent admission for and R crani for lesion w/ Dr. 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 Today presents to ED for RUE doppler w/ DVT involving the subclavian, axillary and brachial veins and Superficial thrombus in the right basilic vein. Plts/coags wnl.     8/27 s/p PEG placement with GI (Dr. Savage)    -Patient continues to have drop in Hb; today 6.7 from 8.2. Patient is hemodynamically stable without any complaints. PEG site dry  -Transfusing 1 unit PRBC; will check post transfusion CBC  -Has not had a BM in several days; will get suppository today  -GI will see him today for repeat drop in Hb  -will continue to hold lovenox at this time

## 2024-09-01 NOTE — PROGRESS NOTE ADULT - SUBJECTIVE AND OBJECTIVE BOX
Lizet Page MD  Division of Hospital Medicine  [facility]  Spectra: [phone]      Patient is a 79y old  Male who presents with a chief complaint of post-operative DVT (31 Aug 2024 14:25)      SUBJECTIVE / OVERNIGHT EVENTS: no acute events overnight but drop in Hb being transfused 1u PRBC. denies any lightheadedness, chest pain, dyspnea, abd pain, n/v. no BM in 5 days but passing flatus he reports. PEG site without bleed.  ADDITIONAL REVIEW OF SYSTEMS:    MEDICATIONS  (STANDING):  albuterol    0.083% 2.5 milliGRAM(s) Nebulizer every 6 hours  albuterol    90 MICROgram(s) HFA Inhaler 1 Puff(s) Inhalation every 4 hours  amLODIPine   Tablet 5 milliGRAM(s) Oral daily  bisacodyl Suppository 10 milliGRAM(s) Rectal once  budesonide 160 MICROgram(s)/formoterol 4.5 MICROgram(s) Inhaler 2 Puff(s) Inhalation two times a day  chlorhexidine 2% Cloths 1 Application(s) Topical <User Schedule>  glucagon  Injectable 1 milliGRAM(s) IntraMuscular once  levETIRAcetam  IVPB 250 milliGRAM(s) IV Intermittent every 12 hours  metoprolol tartrate 12.5 milliGRAM(s) Oral every 12 hours  Nephro-corine 1 Tablet(s) Oral daily  pantoprazole  Injectable 40 milliGRAM(s) IV Push daily  polyethylene glycol 3350 17 Gram(s) Oral daily  senna 2 Tablet(s) Oral at bedtime  simvastatin 20 milliGRAM(s) Oral at bedtime  sodium chloride 0.9%. 1000 milliLiter(s) (50 mL/Hr) IV Continuous <Continuous>  tiotropium 2.5 MICROgram(s) Inhaler 2 Puff(s) Inhalation daily    MEDICATIONS  (PRN):  acetaminophen     Tablet .. 650 milliGRAM(s) Oral every 6 hours PRN Mild Pain (1 - 3)  bisacodyl 5 milliGRAM(s) Oral daily PRN Constipation      CAPILLARY BLOOD GLUCOSE      POCT Blood Glucose.: 93 mg/dL (01 Sep 2024 12:23)  POCT Blood Glucose.: 90 mg/dL (01 Sep 2024 06:10)  POCT Blood Glucose.: 92 mg/dL (01 Sep 2024 00:20)  POCT Blood Glucose.: 110 mg/dL (31 Aug 2024 20:00)    I&O's Summary      PHYSICAL EXAM:  Vital Signs Last 24 Hrs  T(C): 36.7 (01 Sep 2024 09:40), Max: 37 (31 Aug 2024 20:32)  T(F): 98.1 (01 Sep 2024 09:40), Max: 98.6 (31 Aug 2024 20:32)  HR: 75 (01 Sep 2024 09:40) (64 - 75)  BP: 109/67 (01 Sep 2024 09:40) (109/67 - 146/74)  BP(mean): --  RR: 16 (01 Sep 2024 09:40) (16 - 18)  SpO2: 95% (01 Sep 2024 09:40) (95% - 100%)    Parameters below as of 01 Sep 2024 09:40  Patient On (Oxygen Delivery Method): room air    CONSTITUTIONAL: NAD, well-developed, well-groomed  EYES: PERRLA; conjunctiva and sclera clear  ENMT: Moist oral mucosa, no pharyngeal injection or exudates; normal dentition  NECK: Supple, no palpable masses; no thyromegaly  RESPIRATORY: Normal respiratory effort; lungs are clear to auscultation bilaterally  CARDIOVASCULAR: Regular rate and rhythm, normal S1 and S2, no murmur/rub/gallop; No lower extremity edema  ABDOMEN: Soft, Nondistended, Nontender to palpation, normoactive bowel sounds, +PEG site with overlying abd binder with no bleed  MUSCULOSKELETAL: No clubbing or cyanosis of digits; no joint swelling or tenderness to palpation  PSYCH: A+O to person, place, and time; affect appropriate  NEUROLOGY: CN 2-12 are intact and symmetric; no gross sensory deficits   SKIN: No rashes; no palpable lesions, +prior R crani incision site c/d/i    LABS:                        6.7    5.90  )-----------( 211      ( 01 Sep 2024 07:22 )             21.0     09-01    141  |  104  |  24<H>  ----------------------------<  78  5.1   |  19<L>  |  0.75    Ca    9.5      01 Sep 2024 07:24      Urinalysis Basic - ( 01 Sep 2024 07:24 )    Color: x / Appearance: x / SG: x / pH: x  Gluc: 78 mg/dL / Ketone: x  / Bili: x / Urobili: x   Blood: x / Protein: x / Nitrite: x   Leuk Esterase: x / RBC: x / WBC x   Sq Epi: x / Non Sq Epi: x / Bacteria: x        RADIOLOGY & ADDITIONAL TESTS:  Results Reviewed: worsening anemia being transfused 1u PRBC  Imaging Personally Reviewed:  8/31/24 CT Abd with  IV contrast:  PRELIMINARY  IMPRESSION:  Unable to evaluate for GI bleed within the large bowel due to residual   oral contrast from prior study.  Mesenteric fat infiltration measuring up to 4.2 cm in the region of peg,   likely postsurgical change,  Small pericardial effusion.  Electrocardiogram Personally Reviewed:    COORDINATION OF CARE:  Care Discussed with Consultants/Other Providers [Y]: Neurosurgery resident Nagi  Prior or Outpatient Records Reviewed [Y/N]:

## 2024-09-02 LAB
ANION GAP SERPL CALC-SCNC: 12 MMOL/L — SIGNIFICANT CHANGE UP (ref 5–17)
BUN SERPL-MCNC: 18 MG/DL — SIGNIFICANT CHANGE UP (ref 7–23)
CALCIUM SERPL-MCNC: 9.4 MG/DL — SIGNIFICANT CHANGE UP (ref 8.4–10.5)
CHLORIDE SERPL-SCNC: 104 MMOL/L — SIGNIFICANT CHANGE UP (ref 96–108)
CO2 SERPL-SCNC: 23 MMOL/L — SIGNIFICANT CHANGE UP (ref 22–31)
CREAT SERPL-MCNC: 0.77 MG/DL — SIGNIFICANT CHANGE UP (ref 0.5–1.3)
EGFR: 90 ML/MIN/1.73M2 — SIGNIFICANT CHANGE UP
GLUCOSE BLDC GLUCOMTR-MCNC: 110 MG/DL — HIGH (ref 70–99)
GLUCOSE BLDC GLUCOMTR-MCNC: 124 MG/DL — HIGH (ref 70–99)
GLUCOSE BLDC GLUCOMTR-MCNC: 149 MG/DL — HIGH (ref 70–99)
GLUCOSE SERPL-MCNC: 101 MG/DL — HIGH (ref 70–99)
HCT VFR BLD CALC: 28.7 % — LOW (ref 39–50)
HCT VFR BLD CALC: 34.7 % — LOW (ref 39–50)
HGB BLD-MCNC: 11.2 G/DL — LOW (ref 13–17)
HGB BLD-MCNC: 9.4 G/DL — LOW (ref 13–17)
MAGNESIUM SERPL-MCNC: 2 MG/DL — SIGNIFICANT CHANGE UP (ref 1.6–2.6)
MCHC RBC-ENTMCNC: 30.1 PG — SIGNIFICANT CHANGE UP (ref 27–34)
MCHC RBC-ENTMCNC: 30.7 PG — SIGNIFICANT CHANGE UP (ref 27–34)
MCHC RBC-ENTMCNC: 32.3 GM/DL — SIGNIFICANT CHANGE UP (ref 32–36)
MCHC RBC-ENTMCNC: 32.8 GM/DL — SIGNIFICANT CHANGE UP (ref 32–36)
MCV RBC AUTO: 93.3 FL — SIGNIFICANT CHANGE UP (ref 80–100)
MCV RBC AUTO: 93.8 FL — SIGNIFICANT CHANGE UP (ref 80–100)
NRBC # BLD: 0 /100 WBCS — SIGNIFICANT CHANGE UP (ref 0–0)
NRBC # BLD: 0 /100 WBCS — SIGNIFICANT CHANGE UP (ref 0–0)
PHOSPHATE SERPL-MCNC: 3.7 MG/DL — SIGNIFICANT CHANGE UP (ref 2.5–4.5)
PLATELET # BLD AUTO: 213 K/UL — SIGNIFICANT CHANGE UP (ref 150–400)
PLATELET # BLD AUTO: 242 K/UL — SIGNIFICANT CHANGE UP (ref 150–400)
POTASSIUM SERPL-MCNC: 4.3 MMOL/L — SIGNIFICANT CHANGE UP (ref 3.5–5.3)
POTASSIUM SERPL-SCNC: 4.3 MMOL/L — SIGNIFICANT CHANGE UP (ref 3.5–5.3)
RBC # BLD: 3.06 M/UL — LOW (ref 4.2–5.8)
RBC # BLD: 3.72 M/UL — LOW (ref 4.2–5.8)
RBC # FLD: 14.8 % — HIGH (ref 10.3–14.5)
RBC # FLD: 15.1 % — HIGH (ref 10.3–14.5)
SODIUM SERPL-SCNC: 139 MMOL/L — SIGNIFICANT CHANGE UP (ref 135–145)
WBC # BLD: 4.12 K/UL — SIGNIFICANT CHANGE UP (ref 3.8–10.5)
WBC # BLD: 4.99 K/UL — SIGNIFICANT CHANGE UP (ref 3.8–10.5)
WBC # FLD AUTO: 4.12 K/UL — SIGNIFICANT CHANGE UP (ref 3.8–10.5)
WBC # FLD AUTO: 4.99 K/UL — SIGNIFICANT CHANGE UP (ref 3.8–10.5)

## 2024-09-02 PROCEDURE — 99233 SBSQ HOSP IP/OBS HIGH 50: CPT

## 2024-09-02 PROCEDURE — 99232 SBSQ HOSP IP/OBS MODERATE 35: CPT

## 2024-09-02 RX ORDER — SODIUM CHLORIDE 9 MG/ML
1000 INJECTION INTRAMUSCULAR; INTRAVENOUS; SUBCUTANEOUS
Refills: 0 | Status: DISCONTINUED | OUTPATIENT
Start: 2024-09-02 | End: 2024-09-06

## 2024-09-02 RX ORDER — POLYETHYLENE GLYCOL 3350 17 G/17G
17 POWDER, FOR SOLUTION ORAL
Refills: 0 | Status: DISCONTINUED | OUTPATIENT
Start: 2024-09-02 | End: 2024-09-07

## 2024-09-02 RX ORDER — SENNA 187 MG
10 TABLET ORAL AT BEDTIME
Refills: 0 | Status: DISCONTINUED | OUTPATIENT
Start: 2024-09-02 | End: 2024-09-08

## 2024-09-02 RX ORDER — PANTOPRAZOLE SODIUM 40 MG
40 TABLET, DELAYED RELEASE (ENTERIC COATED) ORAL EVERY 12 HOURS
Refills: 0 | Status: DISCONTINUED | OUTPATIENT
Start: 2024-09-02 | End: 2024-09-03

## 2024-09-02 RX ADMIN — Medication 40 MILLIGRAM(S): at 11:02

## 2024-09-02 RX ADMIN — AMLODIPINE BESYLATE 5 MILLIGRAM(S): 10 TABLET ORAL at 05:35

## 2024-09-02 RX ADMIN — LEVETIRACETAM 400 MILLIGRAM(S): 1000 TABLET ORAL at 17:58

## 2024-09-02 RX ADMIN — Medication 20 MILLIGRAM(S): at 21:06

## 2024-09-02 RX ADMIN — SODIUM CHLORIDE 70 MILLILITER(S): 9 INJECTION INTRAMUSCULAR; INTRAVENOUS; SUBCUTANEOUS at 23:53

## 2024-09-02 RX ADMIN — BUDESONIDE AND FORMOTEROL FUMARATE 2 PUFF(S): 80; 4.5 AEROSOL, METERED RESPIRATORY (INHALATION) at 17:55

## 2024-09-02 RX ADMIN — Medication 2.5 MILLIGRAM(S): at 05:34

## 2024-09-02 RX ADMIN — Medication 1 TABLET(S): at 11:01

## 2024-09-02 RX ADMIN — Medication 2.5 MILLIGRAM(S): at 11:01

## 2024-09-02 RX ADMIN — POLYETHYLENE GLYCOL 3350 17 GRAM(S): 17 POWDER, FOR SOLUTION ORAL at 11:01

## 2024-09-02 RX ADMIN — CHLORHEXIDINE GLUCONATE 1 APPLICATION(S): 40 SOLUTION TOPICAL at 06:23

## 2024-09-02 RX ADMIN — LEVETIRACETAM 400 MILLIGRAM(S): 1000 TABLET ORAL at 05:34

## 2024-09-02 RX ADMIN — BUDESONIDE AND FORMOTEROL FUMARATE 2 PUFF(S): 80; 4.5 AEROSOL, METERED RESPIRATORY (INHALATION) at 05:33

## 2024-09-02 RX ADMIN — Medication 2.5 MILLIGRAM(S): at 23:53

## 2024-09-02 RX ADMIN — TIOTROPIUM BROMIDE INHALATION SPRAY 2 PUFF(S): 3.12 SPRAY, METERED RESPIRATORY (INHALATION) at 11:02

## 2024-09-02 RX ADMIN — METOPROLOL TARTRATE 12.5 MILLIGRAM(S): 100 TABLET ORAL at 05:35

## 2024-09-02 RX ADMIN — Medication 2.5 MILLIGRAM(S): at 17:49

## 2024-09-02 RX ADMIN — METOPROLOL TARTRATE 12.5 MILLIGRAM(S): 100 TABLET ORAL at 17:49

## 2024-09-02 RX ADMIN — Medication 40 MILLIGRAM(S): at 17:49

## 2024-09-02 NOTE — PROGRESS NOTE ADULT - SUBJECTIVE AND OBJECTIVE BOX
SUBJECTIVE: Patient evaluated this AM, wife at bedside. well appearing in no acute distress, Denies abdominal pain. labs reviewed this AM, h/h inc to 9.7 from 6.7 s/p 1 Uprbc. Patient had small non bloody BM   - 24H events: downtrend in H/H, received 1 U PRBC. GI lavaged PEG without any blood; only gastric content; no hematoma or outward bleeding     Vital Signs Last 24 Hrs  T(C): 36.6 (09-02-24 @ 08:40), Max: 36.8 (09-01-24 @ 12:52)  T(F): 97.9 (09-02-24 @ 08:40), Max: 98.3 (09-01-24 @ 12:52)  HR: 65 (09-02-24 @ 08:40) (64 - 74)  BP: 125/77 (09-02-24 @ 08:40) (119/77 - 151/82)  BP(mean): --  RR: 18 (09-02-24 @ 08:40) (16 - 18)  SpO2: 95% (09-02-24 @ 08:40) (95% - 98%)    PHYSICAL EXAM:  Constitutional: No Acute Distress   Neurological: Awake/Alert, Oriented x2-3 (person, place, month correct, not oriented to year), EOMI, PERRL, no facial asymmetry, speech fluent, moving all extremities with symmetric 5/5 strength   Incision: R crani incision closed w/ monocryl. Clean dry intact   Pulmonary: Clear lungs   Cardiovascular: s1s2   Gastrointestinal: soft, Non-tender abdomen, Non-distended, +bowel sounds x 4. + PEG site w/ bumper present no oozing no active bleeding.   Extremities: No calf tenderness bilaterally, B/L Upper extremities no edema or redness.     LABS:                      9.4    4.12  )-----------( 213      ( 02 Sep 2024 06:40 )             28.7    09-02    139  |  104  |  18  ----------------------------<  101<H>  4.3   |  23  |  0.77    Ca    9.4      02 Sep 2024 06:42  Phos  3.7     09-02  Mg     2.0     09-02    PT/INR - ( 01 Sep 2024 13:02 )   PT: 12.7 sec;   INR: 1.16 ratio         PTT - ( 01 Sep 2024 13:02 )  PTT:35.8 sec    09-01 @ 07:01  -  09-02 @ 07:00  --------------------------------------------------------  IN: 360 mL / OUT: 800 mL / NET: -440 mL    IMAGING: reviewed     MEDICATIONS  (STANDING):  albuterol    0.083% 2.5 milliGRAM(s) Nebulizer every 6 hours  albuterol    90 MICROgram(s) HFA Inhaler 1 Puff(s) Inhalation every 4 hours  amLODIPine   Tablet 5 milliGRAM(s) Oral daily  budesonide 160 MICROgram(s)/formoterol 4.5 MICROgram(s) Inhaler 2 Puff(s) Inhalation two times a day  chlorhexidine 2% Cloths 1 Application(s) Topical <User Schedule>  glucagon  Injectable 1 milliGRAM(s) IntraMuscular once  levETIRAcetam  IVPB 250 milliGRAM(s) IV Intermittent every 12 hours  metoprolol tartrate 12.5 milliGRAM(s) Oral every 12 hours  Nephro-corine 1 Tablet(s) Oral daily  pantoprazole  Injectable 40 milliGRAM(s) IV Push daily  polyethylene glycol 3350 17 Gram(s) Oral daily  senna 2 Tablet(s) Oral at bedtime  simvastatin 20 milliGRAM(s) Oral at bedtime  tiotropium 2.5 MICROgram(s) Inhaler 2 Puff(s) Inhalation daily    MEDICATIONS  (PRN):  acetaminophen     Tablet .. 650 milliGRAM(s) Oral every 6 hours PRN Mild Pain (1 - 3)  bisacodyl 5 milliGRAM(s) Oral daily PRN Constipation    DIET: PEG FEEDS

## 2024-09-02 NOTE — PROGRESS NOTE ADULT - PROBLEM SELECTOR PLAN 1
and RUE DVT  - duplex of RUE: Right sided DVT involving the subclavian, axillary and brachial veins. Superficial thrombus in the right basilic vein  - 8/28 repeat UE duplex with persistent DVT in the right subclavian vein and right axillary vein, right basilic vein SVT.  - CT Angio chest: segmental emboli within RUL pulmonary artery with no evidence of RV strain  - TTE unremarkable  - Vascular Cardiology consulted, and started on hep gtt on 8/16, transitioned to therapeutic lovenox on 8/18 but on hold in setting of bleeding at PEG site, last dose was 8/30 AM  - given stable Hb, can consider resuming full a/c in next 24 hours

## 2024-09-02 NOTE — PROGRESS NOTE ADULT - ASSESSMENT
ASSESSMENT 79M hx recent admission for and R crani for lesion w/ Dr. 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 Admitted to [facility], after RUE doppler w/ DVT involving the subclavian, axillary and brachial veins and Superficial thrombus in the right basilic vein on 8/15. Vasc cards consulted- rec CT Angio chest showedsegmental emboli within RUL pulmonary artery with no evidence of RV strain. Hospital course significant for failed S/S assessment, now s/p PEG w/ GI on 8/27.    NEURO: Neurochecks q4h   - continue keppra 250 mg every 12 h for sz ppx   - Pain control prn tylenol   - PT/OT/PMR rec PEGGY     PULM: On room air, O2Sat>96%  - Incentive spirometer as tolerated   - 8/16 CTA Chest: segmental emboli w/in right upper lobe pulm artery, no significant evidence of right heart strain; emphysema, new mucoid impaction likely COPD exacerbations - f/u CT Chest in 3 months  - h/o COPD, Continue Proventil, Symbicort, Spiriva (no exacerbations)     CV: -160 mmhg, currently within goals  - Continue Norvasc for HTN  - Continue Metoprolol for Afib  - Continue Simvastatin for HLD  - 8/16 TTE: LV systolic fxn normal w/ EF 71%, normal LV diastolic fxn, normal RV systolic fxn normal, LV systolic fxn is normal    RENAL: IVL, voiding,   - BMP 9/2 within normal limits  - replete electrolytes prn     GI- s/p PEG on 8/27   - GI following - drop in h/h yesterday, came bedside to lavage PEG no makenna bleeding. PEG site is clean/dry no bleeding.     ENDO:  a1c 5.3    HEME/ONC: anemia on 9/1 CBC. hb 6.7, received 1 U PRBC, no hemodynamic instability.   - SQL 60 bid for DVT/PE. has been on hold for anemia.     ID: afebrile, no leukocytosis    Will discuss with Dr. Branham   spectra [phone]  ASSESSMENT 79M hx recent admission for and R crani for lesion w/ Dr. 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 Admitted to [facility], after RUE doppler w/ DVT involving the subclavian, axillary and brachial veins and Superficial thrombus in the right basilic vein on 8/15. Vasc cards consulted- rec CT Angio chest showedsegmental emboli within RUL pulmonary artery with no evidence of RV strain. Hospital course significant for failed S/S assessment, now s/p PEG w/ GI on 8/27.    NEURO: Neurochecks q4h   - continue keppra 250 mg every 12 h for sz ppx   - Pain control prn tylenol   - PT/OT/PMR rec PEGGY     PULM: On room air, O2Sat>96%  - Incentive spirometer as tolerated   - 8/16 CTA Chest: segmental emboli w/in right upper lobe pulm artery, no significant evidence of right heart strain; emphysema, new mucoid impaction likely COPD exacerbations - f/u CT Chest in 3 months  - h/o COPD, Continue Proventil, Symbicort, Spiriva (no exacerbations)     CV: -160 mmhg, currently within goals  - Continue Norvasc for HTN  - Continue Metoprolol for Afib  - Continue Simvastatin for HLD  - 8/16 TTE: LV systolic fxn normal w/ EF 71%, normal LV diastolic fxn, normal RV systolic fxn normal, LV systolic fxn is normal    RENAL: IVL, voiding,   - BMP 9/2 within normal limits  - replete electrolytes prn     GI- s/p PEG on 8/27   - GI following - drop in h/h yesterday, given 1 U PRBC- fellow came bedside to lavage PEG no makenna bleeding. PEG site is clean/dry no oozing. .     ENDO:  a1c 5.3    HEME/ONC: anemia on 9/1 CBC. hb 6.7, received 1 U PRBC, no hemodynamic instability.   -therapeutic SQL 60 bid for UE DVT (Subclavian, axillary and brachial veins and Superficial thrombus in the right basilic vein ) and PE. has been on hold for anemia.     ID: afebrile, no leukocytosis    Will discuss with Dr. Yonas harmon [phone]

## 2024-09-02 NOTE — PROGRESS NOTE ADULT - ASSESSMENT
80 yo male with PMH of HTN, T2DM (diet controlled), HLD, COPD, CKD 3, carcinoid tumor s/p hemicolectomy, nasopharyngeal carcinoma s/p chemo/RT (c/b dysphagia ), IPMN s/p distal pancreatectomy (2018), and paroxysmal Afib on Eliquis s/p craniotomy 8/6 for resection of hemorrhagic lesion presented to the ED for RUE swelling, found to have RUE DVT involving subclavian, axillary, and brachial veins and PE. Course also c/b dysphagia s/p PEG 8/27, c/b bleeding around PEG site with acute blood loss anemia now resolved.

## 2024-09-02 NOTE — PROGRESS NOTE ADULT - SUBJECTIVE AND OBJECTIVE BOX
Lizet Page MD  Division of Hospital Medicine  [facility]  Spectra: [phone]      Patient is a 79y old  Male who presents with a chief complaint of post-operative DVT (02 Sep 2024 11:43)      SUBJECTIVE / OVERNIGHT EVENTS: abd pain overnight improved s/p small BM. no dizziness, lightheadedness. overall feels well without complaints.  ADDITIONAL REVIEW OF SYSTEMS:    MEDICATIONS  (STANDING):  albuterol    0.083% 2.5 milliGRAM(s) Nebulizer every 6 hours  albuterol    90 MICROgram(s) HFA Inhaler 1 Puff(s) Inhalation every 4 hours  amLODIPine   Tablet 5 milliGRAM(s) Oral daily  budesonide 160 MICROgram(s)/formoterol 4.5 MICROgram(s) Inhaler 2 Puff(s) Inhalation two times a day  chlorhexidine 2% Cloths 1 Application(s) Topical <User Schedule>  glucagon  Injectable 1 milliGRAM(s) IntraMuscular once  levETIRAcetam  IVPB 250 milliGRAM(s) IV Intermittent every 12 hours  metoprolol tartrate 12.5 milliGRAM(s) Oral every 12 hours  Nephro-corine 1 Tablet(s) Oral daily  pantoprazole  Injectable 40 milliGRAM(s) IV Push daily  polyethylene glycol 3350 17 Gram(s) Oral two times a day  senna 2 Tablet(s) Oral at bedtime  simvastatin 20 milliGRAM(s) Oral at bedtime  tiotropium 2.5 MICROgram(s) Inhaler 2 Puff(s) Inhalation daily    MEDICATIONS  (PRN):  acetaminophen     Tablet .. 650 milliGRAM(s) Oral every 6 hours PRN Mild Pain (1 - 3)  bisacodyl 5 milliGRAM(s) Oral daily PRN Constipation      CAPILLARY BLOOD GLUCOSE      POCT Blood Glucose.: 149 mg/dL (02 Sep 2024 11:16)  POCT Blood Glucose.: 124 mg/dL (02 Sep 2024 04:35)  POCT Blood Glucose.: 98 mg/dL (01 Sep 2024 23:57)  POCT Blood Glucose.: 84 mg/dL (01 Sep 2024 16:21)    I&O's Summary    01 Sep 2024 07:01  -  02 Sep 2024 07:00  --------------------------------------------------------  IN: 360 mL / OUT: 800 mL / NET: -440 mL        PHYSICAL EXAM:  Vital Signs Last 24 Hrs  T(C): 36.6 (02 Sep 2024 08:40), Max: 36.8 (01 Sep 2024 12:52)  T(F): 97.9 (02 Sep 2024 08:40), Max: 98.3 (01 Sep 2024 12:52)  HR: 65 (02 Sep 2024 08:40) (64 - 74)  BP: 125/77 (02 Sep 2024 08:40) (119/77 - 151/82)  BP(mean): --  RR: 18 (02 Sep 2024 08:40) (18 - 18)  SpO2: 95% (02 Sep 2024 08:40) (95% - 98%)    Parameters below as of 02 Sep 2024 08:40  Patient On (Oxygen Delivery Method): room air    CONSTITUTIONAL: NAD, well-developed, well-groomed  EYES: PERRLA; conjunctiva and sclera clear  ENMT: Moist oral mucosa, no pharyngeal injection or exudates; normal dentition  NECK: Supple, no palpable masses; no thyromegaly  RESPIRATORY: Normal respiratory effort; lungs are clear to auscultation bilaterally  CARDIOVASCULAR: Regular rate and rhythm, normal S1 and S2, no murmur/rub/gallop; No lower extremity edema  ABDOMEN: Soft, Nondistended, Nontender to palpation, normoactive bowel sounds, +PEG site with overlying abd binder with no bleed  MUSCULOSKELETAL: No clubbing or cyanosis of digits; no joint swelling or tenderness to palpation  PSYCH: A+O to person, place, and time; affect appropriate  NEUROLOGY: CN 2-12 are intact and symmetric; no gross sensory deficits   SKIN: No rashes; no palpable lesions, +prior R crani incision site c/d/i      LABS:                        9.4    4.12  )-----------( 213      ( 02 Sep 2024 06:40 )             28.7     09-02    139  |  104  |  18  ----------------------------<  101<H>  4.3   |  23  |  0.77    Ca    9.4      02 Sep 2024 06:42  Phos  3.7     09-02  Mg     2.0     09-02      PT/INR - ( 01 Sep 2024 13:02 )   PT: 12.7 sec;   INR: 1.16 ratio         PTT - ( 01 Sep 2024 13:02 )  PTT:35.8 sec      Urinalysis Basic - ( 02 Sep 2024 06:42 )    Color: x / Appearance: x / SG: x / pH: x  Gluc: 101 mg/dL / Ketone: x  / Bili: x / Urobili: x   Blood: x / Protein: x / Nitrite: x   Leuk Esterase: x / RBC: x / WBC x   Sq Epi: x / Non Sq Epi: x / Bacteria: x      RADIOLOGY & ADDITIONAL TESTS:  Results Reviewed: Hb with robust response to transfusion and remains stable  Imaging Personally Reviewed:  8/31/24 CT abd/pelv:  IMPRESSION:    *  Nonspecific amorphous hyperdense material within the gastric fundus,   potentially something ingested. Gastric varices along the fundus and   body. No definite evidence of active gastric hemorrhage at this time.  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 These findings are similar   to 7/2/2023.  *  High density material within the colon, likely residual oral contrast,   which limits evaluation for active colonic hemorrhage. Otherwise, no   active intestinal hemorrhage is identified.  *  Percutaneous gastrostomy tube in place. Fat infiltration and small   amount of gas surrounding the gastrostomy catheter along its course   within the peritoneum and ventral abdominal wall, possibly postsurgical.   No associated collection.  *  Left lower lobe pulmonary nodule measuring 10 mm, possibly new.   Continued follow-up is advised.  *  High density material in the left lower lobe, new since 8/16/2024, and   suspected to represent aspirated contrast material from the recent   fluoroscopic barium swallow study.  *  Tree-in-bud opacities in the right middle lobe and left lower lobe,   which could reflect the endobronchial spread of infectious/process or   aspiration.    Electrocardiogram Personally Reviewed:    COORDINATION OF CARE:  Care Discussed with Consultants/Other Providers [Y]: Neurosurgery ALYSON Yates  Prior or Outpatient Records Reviewed [Y/N]:

## 2024-09-02 NOTE — PROGRESS NOTE ADULT - PROBLEM SELECTOR PLAN 2
no further episodes of bleeding from PEG site since 8/30  - however, with ongoing worsening anemia  - CT abd with IV contrast performed on 8/31 - limited due to residual contrast from prior study, but no active bleed identified  - s/p additional 1u PRBC on 9/1 with robust response and stable on repeat  - GI lavage of PEG appreciated - evidence of bleed, just gastric contents  - monitor Hb on CBC daily  - transfuse for Hb<7 no further episodes of bleeding from PEG site since 8/30  - however, with ongoing worsening anemia  - CT abd with IV contrast performed on 8/31 - limited due to residual contrast from prior study, but no active bleed identified  - s/p additional 1u PRBC on 9/1 with robust response and stable on repeat  - GI lavage of PEG appreciated - no evidence of bleed, just gastric contents  - monitor Hb on CBC daily  - transfuse for Hb<7

## 2024-09-03 ENCOUNTER — APPOINTMENT (OUTPATIENT)
Dept: PULMONOLOGY | Facility: CLINIC | Age: 79
End: 2024-09-03

## 2024-09-03 LAB
GLUCOSE BLDC GLUCOMTR-MCNC: 106 MG/DL — HIGH (ref 70–99)
GLUCOSE BLDC GLUCOMTR-MCNC: 88 MG/DL — SIGNIFICANT CHANGE UP (ref 70–99)
GLUCOSE BLDC GLUCOMTR-MCNC: 90 MG/DL — SIGNIFICANT CHANGE UP (ref 70–99)
HCT VFR BLD CALC: 33.1 % — LOW (ref 39–50)
HGB BLD-MCNC: 10.9 G/DL — LOW (ref 13–17)
MCHC RBC-ENTMCNC: 31 PG — SIGNIFICANT CHANGE UP (ref 27–34)
MCHC RBC-ENTMCNC: 32.9 GM/DL — SIGNIFICANT CHANGE UP (ref 32–36)
MCV RBC AUTO: 94 FL — SIGNIFICANT CHANGE UP (ref 80–100)
NRBC # BLD: 0 /100 WBCS — SIGNIFICANT CHANGE UP (ref 0–0)
PLATELET # BLD AUTO: 223 K/UL — SIGNIFICANT CHANGE UP (ref 150–400)
RBC # BLD: 3.52 M/UL — LOW (ref 4.2–5.8)
RBC # FLD: 14.7 % — HIGH (ref 10.3–14.5)
WBC # BLD: 4.88 K/UL — SIGNIFICANT CHANGE UP (ref 3.8–10.5)
WBC # FLD AUTO: 4.88 K/UL — SIGNIFICANT CHANGE UP (ref 3.8–10.5)

## 2024-09-03 PROCEDURE — 99232 SBSQ HOSP IP/OBS MODERATE 35: CPT

## 2024-09-03 PROCEDURE — 99233 SBSQ HOSP IP/OBS HIGH 50: CPT

## 2024-09-03 RX ORDER — LIDOCAINE HCL 20 MG/ML
4 VIAL (ML) INJECTION ONCE
Refills: 0 | Status: COMPLETED | OUTPATIENT
Start: 2024-09-03 | End: 2024-09-03

## 2024-09-03 RX ORDER — PANTOPRAZOLE SODIUM 40 MG
40 TABLET, DELAYED RELEASE (ENTERIC COATED) ORAL
Refills: 0 | Status: DISCONTINUED | OUTPATIENT
Start: 2024-09-03 | End: 2024-09-04

## 2024-09-03 RX ORDER — PANTOPRAZOLE SODIUM 40 MG
40 TABLET, DELAYED RELEASE (ENTERIC COATED) ORAL EVERY 12 HOURS
Refills: 0 | Status: DISCONTINUED | OUTPATIENT
Start: 2024-09-03 | End: 2024-09-03

## 2024-09-03 RX ORDER — HYDRALAZINE HCL 50 MG
5 TABLET ORAL ONCE
Refills: 0 | Status: COMPLETED | OUTPATIENT
Start: 2024-09-03 | End: 2024-09-03

## 2024-09-03 RX ADMIN — LEVETIRACETAM 400 MILLIGRAM(S): 1000 TABLET ORAL at 05:37

## 2024-09-03 RX ADMIN — Medication 2.5 MILLIGRAM(S): at 17:18

## 2024-09-03 RX ADMIN — METOPROLOL TARTRATE 12.5 MILLIGRAM(S): 100 TABLET ORAL at 05:37

## 2024-09-03 RX ADMIN — Medication 10 MILLILITER(S): at 22:23

## 2024-09-03 RX ADMIN — BUDESONIDE AND FORMOTEROL FUMARATE 2 PUFF(S): 80; 4.5 AEROSOL, METERED RESPIRATORY (INHALATION) at 05:37

## 2024-09-03 RX ADMIN — AMLODIPINE BESYLATE 5 MILLIGRAM(S): 10 TABLET ORAL at 05:36

## 2024-09-03 RX ADMIN — BUDESONIDE AND FORMOTEROL FUMARATE 2 PUFF(S): 80; 4.5 AEROSOL, METERED RESPIRATORY (INHALATION) at 17:19

## 2024-09-03 RX ADMIN — Medication 1 TABLET(S): at 17:18

## 2024-09-03 RX ADMIN — Medication 40 MILLIGRAM(S): at 05:36

## 2024-09-03 RX ADMIN — CHLORHEXIDINE GLUCONATE 1 APPLICATION(S): 40 SOLUTION TOPICAL at 05:55

## 2024-09-03 RX ADMIN — LEVETIRACETAM 400 MILLIGRAM(S): 1000 TABLET ORAL at 17:18

## 2024-09-03 RX ADMIN — Medication 2.5 MILLIGRAM(S): at 05:36

## 2024-09-03 RX ADMIN — Medication 2.5 MILLIGRAM(S): at 11:19

## 2024-09-03 RX ADMIN — Medication 40 MILLIGRAM(S): at 17:19

## 2024-09-03 RX ADMIN — POLYETHYLENE GLYCOL 3350 17 GRAM(S): 17 POWDER, FOR SOLUTION ORAL at 17:18

## 2024-09-03 RX ADMIN — Medication 5 MILLIGRAM(S): at 18:36

## 2024-09-03 RX ADMIN — METOPROLOL TARTRATE 12.5 MILLIGRAM(S): 100 TABLET ORAL at 17:18

## 2024-09-03 RX ADMIN — TIOTROPIUM BROMIDE INHALATION SPRAY 2 PUFF(S): 3.12 SPRAY, METERED RESPIRATORY (INHALATION) at 11:19

## 2024-09-03 RX ADMIN — Medication 20 MILLIGRAM(S): at 22:23

## 2024-09-03 NOTE — PRE-OP CHECKLIST - ASSESSMENT, HISTORY & PHYSICAL COMPLETED AND ON MEDICAL RECORD
Skin Care Plan:  1-Cleanse sacro-buttocks with soap and water. Apply Triad Paste to B/L Sacro-Buttocks Wounds TID and PRN episodes of incontinence  2-Turn/reposition q2h or when medically stable for pressure re-distribution on skin . 3-Elevate heels to offload pressure. 4-Moisturize skin daily with skin nourishing cream  5-P-500 [employer]   6-B/L Heels: cleanse area and pat dry. Apply allevyn foam dressings to area. Deo with P for Prevention and change every other day or PRN. Peel back and inspect skin Q-shift.   7-Right Hip: per Orthopedic Surgery Team
done
done

## 2024-09-03 NOTE — PROGRESS NOTE ADULT - PROBLEM SELECTOR PLAN 1
and RUE DVT  - duplex of RUE: Right sided DVT involving the subclavian, axillary and brachial veins. Superficial thrombus in the right basilic vein  - 8/28 repeat UE duplex with persistent DVT in the right subclavian vein and right axillary vein, right basilic vein SVT.  - CT Angio chest: segmental emboli within RUL pulmonary artery with no evidence of RV strain  - TTE unremarkable  - Vascular Cardiology consulted, and started on hep gtt on 8/16, transitioned to therapeutic lovenox on 8/18 but on hold in setting of bleeding at PEG site, last dose was 8/30 AM  - surveillance duplex 9/4. therapeutic lovenox on hold pending endoscopic eval with GI today

## 2024-09-03 NOTE — PROGRESS NOTE ADULT - SUBJECTIVE AND OBJECTIVE BOX
SUBJECTIVE: Patient seen and examined this AM, frustrated about being in hospital still, no further dark stools or bleeding from PEG site.   s/p 1U PRBC over the weekend. Stable CBC this am. Awaiting GI scope today.     Vital Signs Last 24 Hrs  T(C): 36 (03 Sep 2024 13:53), Max: 36.7 (02 Sep 2024 23:48)  T(F): 96.8 (03 Sep 2024 13:53), Max: 98.1 (03 Sep 2024 04:52)  HR: 67 (03 Sep 2024 13:53) (64 - 92)  BP: 183/95 (03 Sep 2024 13:53) (129/78 - 183/95)  BP(mean): --  RR: 20 (03 Sep 2024 13:53) (18 - 20)  SpO2: 97% (03 Sep 2024 13:53) (95% - 98%)    Parameters below as of 03 Sep 2024 13:53  Patient On (Oxygen Delivery Method): room air    PHYSICAL EXAM:  Constitutional: No Acute Distress   Neurological: Awake/Alert, Oriented x2-3 (person, place, month correct, not oriented to year), EOMI, PERRL, no facial asymmetry, speech fluent, moving all extremities with symmetric 5/5 strength   Incision: R crani incision closed w/ monocryl. Clean dry intact   Pulmonary: Clear lungs   Cardiovascular: s1s2   Gastrointestinal: soft, Non-tender abdomen, Non-distended, +bowel sounds x 4. + PEG site w/ bumper present no oozing no active bleeding.   Extremities: No calf tenderness bilaterally, B/L Upper extremities no edema or redness.     LABS:                                   10.9   4.88  )-----------( 223      ( 03 Sep 2024 07:16 )             33.1   09-02    139  |  104  |  18  ----------------------------<  101<H>  4.3   |  23  |  0.77    Ca    9.4      02 Sep 2024 06:42  Phos  3.7     09-02  Mg     2.0     09-02    IMAGING:   < from: CT Abdomen and Pelvis w/ IV Cont (08.31.24 @ 17:38) >  IMPRESSION:    *  Nonspecific amorphous hyperdense material within the gastric fundus,   potentially something ingested. Gastric varices along the fundus and  body. No definite evidence of active gastric hemorrhage at this time.  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 These findings are similar   to 7/2/2023.  *  High density material within the colon, likely residual oral contrast,   which limits evaluation for active colonic hemorrhage. Otherwise, no   active intestinal hemorrhage is identified.  *  Percutaneous gastrostomy tube in place. Fat infiltration and small   amount of gas surrounding the gastrostomy catheter along its course   within the peritoneum and ventral abdominal wall, possibly postsurgical.   No associated collection.  *  Left lower lobe pulmonary nodule measuring 10 mm, possibly new.   Continuedfollow-up is advised.  *  High density material in the left lower lobe, new since 8/16/2024, and   suspected to represent aspirated contrast material from the recent   fluoroscopic barium swallow study.  *  Tree-in-bud opacities in the right middle lobe and left lower lobe,   which could reflect the endobronchial spread of infectious/process or   aspiration. < end of copied text >      MEDICATIONS  (STANDING):  albuterol    0.083% 2.5 milliGRAM(s) Nebulizer every 6 hours  albuterol    90 MICROgram(s) HFA Inhaler 1 Puff(s) Inhalation every 4 hours  amLODIPine   Tablet 5 milliGRAM(s) Oral daily  budesonide 160 MICROgram(s)/formoterol 4.5 MICROgram(s) Inhaler 2 Puff(s) Inhalation two times a day  chlorhexidine 2% Cloths 1 Application(s) Topical <User Schedule>  glucagon  Injectable 1 milliGRAM(s) IntraMuscular once  levETIRAcetam  IVPB 250 milliGRAM(s) IV Intermittent every 12 hours  lidocaine 4% Injection for Nebulization 4 milliLiter(s) Nebulizer once  metoprolol tartrate 12.5 milliGRAM(s) Oral every 12 hours  Nephro-corine 1 Tablet(s) Oral daily  pantoprazole  Injectable 40 milliGRAM(s) IV Push two times a day  polyethylene glycol 3350 17 Gram(s) Oral two times a day  senna Syrup 10 milliLiter(s) Oral at bedtime  simvastatin 20 milliGRAM(s) Oral at bedtime  sodium chloride 0.9%. 1000 milliLiter(s) (70 mL/Hr) IV Continuous <Continuous>  tiotropium 2.5 MICROgram(s) Inhaler 2 Puff(s) Inhalation daily    MEDICATIONS  (PRN):  acetaminophen     Tablet .. 650 milliGRAM(s) Oral every 6 hours PRN Mild Pain (1 - 3)  bisacodyl 5 milliGRAM(s) Oral daily PRN Constipation    DIET: PEG FEEDS

## 2024-09-03 NOTE — PROGRESS NOTE ADULT - PROBLEM SELECTOR PLAN 2
no further episodes of bleeding from PEG site since 8/30  - however, with ongoing worsening anemia  - CT abd with IV contrast performed on 8/31 - limited due to residual contrast from prior study, but no active bleed identified  - s/p additional 1u PRBC on 9/1 with robust response and stable on repeat  - GI lavage of PEG appreciated - evidence of bleed, just gastric contents  - however, new melena on 9/2. PPI increased to IV BID  - plan for endoscopic eval with GI today  - monitor Hb on CBC daily  - transfuse for Hb<7 no further episodes of bleeding from PEG site since 8/30  - however, with ongoing worsening anemia  - CT abd with IV contrast performed on 8/31 - limited due to residual contrast from prior study, but no active bleed identified  - s/p additional 1u PRBC on 9/1 with robust response and stable on repeat  - GI lavage of PEG appreciated - no evidence of bleed, just gastric contents  - however, new melena on 9/2. PPI increased to IV BID  - plan for endoscopic eval with GI today  - monitor Hb on CBC daily  - transfuse for Hb<7

## 2024-09-03 NOTE — PROGRESS NOTE ADULT - SUBJECTIVE AND OBJECTIVE BOX
Lizet Page MD  Division of Hospital Medicine  [facility]  Spectra: [phone]      Patient is a 79y old  Male who presents with a chief complaint of post-operative DVT (03 Sep 2024 11:21)      SUBJECTIVE / OVERNIGHT EVENTS: had episode of melena yesterday evening but repeat Hb remain stable. denies any dizziness, abd pain, n/v.  ADDITIONAL REVIEW OF SYSTEMS:    MEDICATIONS  (STANDING):  albuterol    0.083% 2.5 milliGRAM(s) Nebulizer every 6 hours  albuterol    90 MICROgram(s) HFA Inhaler 1 Puff(s) Inhalation every 4 hours  amLODIPine   Tablet 5 milliGRAM(s) Oral daily  budesonide 160 MICROgram(s)/formoterol 4.5 MICROgram(s) Inhaler 2 Puff(s) Inhalation two times a day  chlorhexidine 2% Cloths 1 Application(s) Topical <User Schedule>  glucagon  Injectable 1 milliGRAM(s) IntraMuscular once  levETIRAcetam  IVPB 250 milliGRAM(s) IV Intermittent every 12 hours  metoprolol tartrate 12.5 milliGRAM(s) Oral every 12 hours  Nephro-corine 1 Tablet(s) Oral daily  pantoprazole  Injectable 40 milliGRAM(s) IV Push two times a day  polyethylene glycol 3350 17 Gram(s) Oral two times a day  senna Syrup 10 milliLiter(s) Oral at bedtime  simvastatin 20 milliGRAM(s) Oral at bedtime  sodium chloride 0.9%. 1000 milliLiter(s) (70 mL/Hr) IV Continuous <Continuous>  tiotropium 2.5 MICROgram(s) Inhaler 2 Puff(s) Inhalation daily    MEDICATIONS  (PRN):  acetaminophen     Tablet .. 650 milliGRAM(s) Oral every 6 hours PRN Mild Pain (1 - 3)  bisacodyl 5 milliGRAM(s) Oral daily PRN Constipation      CAPILLARY BLOOD GLUCOSE      POCT Blood Glucose.: 88 mg/dL (03 Sep 2024 11:17)  POCT Blood Glucose.: 90 mg/dL (03 Sep 2024 05:38)  POCT Blood Glucose.: 106 mg/dL (03 Sep 2024 00:15)  POCT Blood Glucose.: 110 mg/dL (02 Sep 2024 17:05)    I&O's Summary    02 Sep 2024 07:01  -  03 Sep 2024 07:00  --------------------------------------------------------  IN: 0 mL / OUT: 700 mL / NET: -700 mL        PHYSICAL EXAM:  Vital Signs Last 24 Hrs  T(C): 36.6 (03 Sep 2024 08:09), Max: 36.7 (02 Sep 2024 23:48)  T(F): 97.9 (03 Sep 2024 08:09), Max: 98.1 (03 Sep 2024 04:52)  HR: 64 (03 Sep 2024 08:09) (64 - 92)  BP: 152/83 (03 Sep 2024 08:09) (132/71 - 153/95)  BP(mean): --  RR: 18 (03 Sep 2024 08:09) (18 - 18)  SpO2: 95% (03 Sep 2024 08:09) (95% - 98%)    Parameters below as of 03 Sep 2024 08:09  Patient On (Oxygen Delivery Method): room air        CONSTITUTIONAL: NAD, well-developed, well-groomed  EYES: PERRLA; conjunctiva and sclera clear  ENMT: Moist oral mucosa, no pharyngeal injection or exudates; normal dentition  NECK: Supple, no palpable masses; no thyromegaly  RESPIRATORY: Normal respiratory effort; lungs are clear to auscultation bilaterally  CARDIOVASCULAR: Regular rate and rhythm, normal S1 and S2, no murmur/rub/gallop; No lower extremity edema; Peripheral pulses are 2+ bilaterally  ABDOMEN: Soft, Nondistended, Nontender to palpation, normoactive bowel sounds  MUSCULOSKELETAL: No clubbing or cyanosis of digits; no joint swelling or tenderness to palpation  PSYCH: A+O to person, place, and time; affect appropriate  NEUROLOGY: CN 2-12 are intact and symmetric; no gross sensory deficits   SKIN: No rashes; no palpable lesions    LABS:                        10.9   4.88  )-----------( 223      ( 03 Sep 2024 07:16 )             33.1     09-02    139  |  104  |  18  ----------------------------<  101<H>  4.3   |  23  |  0.77    Ca    9.4      02 Sep 2024 06:42  Phos  3.7     09-02  Mg     2.0     09-02      PT/INR - ( 01 Sep 2024 13:02 )   PT: 12.7 sec;   INR: 1.16 ratio         PTT - ( 01 Sep 2024 13:02 )  PTT:35.8 sec      Urinalysis Basic - ( 02 Sep 2024 06:42 )    Color: x / Appearance: x / SG: x / pH: x  Gluc: 101 mg/dL / Ketone: x  / Bili: x / Urobili: x   Blood: x / Protein: x / Nitrite: x   Leuk Esterase: x / RBC: x / WBC x   Sq Epi: x / Non Sq Epi: x / Bacteria: x          RADIOLOGY & ADDITIONAL TESTS:  Results Reviewed:   Imaging Personally Reviewed:  Electrocardiogram Personally Reviewed:    COORDINATION OF CARE:  Care Discussed with Consultants/Other Providers [Y/N]:  Prior or Outpatient Records Reviewed [Y/N]:   Lizet Page MD  Division of Hospital Medicine  [facility]  Spectra: [phone]      Patient is a 79y old  Male who presents with a chief complaint of post-operative DVT (03 Sep 2024 11:21)      SUBJECTIVE / OVERNIGHT EVENTS: had episode of melena yesterday evening but repeat Hb remain stable. denies any dizziness, abd pain, n/v. no further episodes of melena since yesterday  ADDITIONAL REVIEW OF SYSTEMS:    MEDICATIONS  (STANDING):  albuterol    0.083% 2.5 milliGRAM(s) Nebulizer every 6 hours  albuterol    90 MICROgram(s) HFA Inhaler 1 Puff(s) Inhalation every 4 hours  amLODIPine   Tablet 5 milliGRAM(s) Oral daily  budesonide 160 MICROgram(s)/formoterol 4.5 MICROgram(s) Inhaler 2 Puff(s) Inhalation two times a day  chlorhexidine 2% Cloths 1 Application(s) Topical <User Schedule>  glucagon  Injectable 1 milliGRAM(s) IntraMuscular once  levETIRAcetam  IVPB 250 milliGRAM(s) IV Intermittent every 12 hours  metoprolol tartrate 12.5 milliGRAM(s) Oral every 12 hours  Nephro-corine 1 Tablet(s) Oral daily  pantoprazole  Injectable 40 milliGRAM(s) IV Push two times a day  polyethylene glycol 3350 17 Gram(s) Oral two times a day  senna Syrup 10 milliLiter(s) Oral at bedtime  simvastatin 20 milliGRAM(s) Oral at bedtime  sodium chloride 0.9%. 1000 milliLiter(s) (70 mL/Hr) IV Continuous <Continuous>  tiotropium 2.5 MICROgram(s) Inhaler 2 Puff(s) Inhalation daily    MEDICATIONS  (PRN):  acetaminophen     Tablet .. 650 milliGRAM(s) Oral every 6 hours PRN Mild Pain (1 - 3)  bisacodyl 5 milliGRAM(s) Oral daily PRN Constipation      CAPILLARY BLOOD GLUCOSE      POCT Blood Glucose.: 88 mg/dL (03 Sep 2024 11:17)  POCT Blood Glucose.: 90 mg/dL (03 Sep 2024 05:38)  POCT Blood Glucose.: 106 mg/dL (03 Sep 2024 00:15)  POCT Blood Glucose.: 110 mg/dL (02 Sep 2024 17:05)    I&O's Summary    02 Sep 2024 07:01  -  03 Sep 2024 07:00  --------------------------------------------------------  IN: 0 mL / OUT: 700 mL / NET: -700 mL        PHYSICAL EXAM:  Vital Signs Last 24 Hrs  T(C): 36.6 (03 Sep 2024 08:09), Max: 36.7 (02 Sep 2024 23:48)  T(F): 97.9 (03 Sep 2024 08:09), Max: 98.1 (03 Sep 2024 04:52)  HR: 64 (03 Sep 2024 08:09) (64 - 92)  BP: 152/83 (03 Sep 2024 08:09) (132/71 - 153/95)  BP(mean): --  RR: 18 (03 Sep 2024 08:09) (18 - 18)  SpO2: 95% (03 Sep 2024 08:09) (95% - 98%)    Parameters below as of 03 Sep 2024 08:09  Patient On (Oxygen Delivery Method): room air    CONSTITUTIONAL: NAD, well-developed, well-groomed  EYES: PERRLA; conjunctiva and sclera clear  ENMT: Moist oral mucosa, no pharyngeal injection or exudates; normal dentition  NECK: Supple, no palpable masses; no thyromegaly  RESPIRATORY: Normal respiratory effort; lungs are clear to auscultation bilaterally  CARDIOVASCULAR: Regular rate and rhythm, normal S1 and S2, no murmur/rub/gallop; No lower extremity edema  ABDOMEN: Soft, Nondistended, Nontender to palpation, normoactive bowel sounds, +PEG site c/d/i with no bleed  MUSCULOSKELETAL: No clubbing or cyanosis of digits; no joint swelling or tenderness to palpation  PSYCH: A+O to person, place, and time; affect appropriate  NEUROLOGY: CN 2-12 are intact and symmetric; no gross sensory deficits   SKIN: No rashes; no palpable lesions, +prior R crani incision site c/d/i    LABS:                        10.9   4.88  )-----------( 223      ( 03 Sep 2024 07:16 )             33.1     09-02    139  |  104  |  18  ----------------------------<  101<H>  4.3   |  23  |  0.77    Ca    9.4      02 Sep 2024 06:42  Phos  3.7     09-02  Mg     2.0     09-02      PT/INR - ( 01 Sep 2024 13:02 )   PT: 12.7 sec;   INR: 1.16 ratio         PTT - ( 01 Sep 2024 13:02 )  PTT:35.8 sec      Urinalysis Basic - ( 02 Sep 2024 06:42 )    Color: x / Appearance: x / SG: x / pH: x  Gluc: 101 mg/dL / Ketone: x  / Bili: x / Urobili: x   Blood: x / Protein: x / Nitrite: x   Leuk Esterase: x / RBC: x / WBC x   Sq Epi: x / Non Sq Epi: x / Bacteria: x      RADIOLOGY & ADDITIONAL TESTS:  Results Reviewed: no leukocytosis, H/H stable, Cr stable  Imaging Personally Reviewed:  Electrocardiogram Personally Reviewed:    COORDINATION OF CARE:  Care Discussed with Consultants/Other Providers [Y]: Neurosurgery ALYSON Lewis, GI ALYSON Nova, Vas Cards ALYSON Ferrera  Prior or Outpatient Records Reviewed [Y/N]:

## 2024-09-03 NOTE — PRE PROCEDURE NOTE - PRE PROCEDURE EVALUATION
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      Disease of pancreas, unspecified      Hearing loss      Pancreatic mass      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  ROM limited Mouth opening & neck      ETOH abuse  quit in 2016      Other nonspecific abnormal finding of lung field      Abnormal chest CT      Type 2 diabetes mellitus      Dysphagia      History of cancer chemotherapy      S/P radiation therapy      Pneumonia      Syncope      Pneumonia due to COVID-19 virus      History of appendectomy      H/O hemicolectomy      S/P tonsillectomy  1959      H/O endoscopy  last one 06/2018      History of partial pancreatectomy      History of loop recorder        ALLERGIES:  penicillin (Other)  ACE inhibitors (Other)  Bee Stings- anaphylaxis (Other)    HOME MEDICATIONS:  Anoro Ellipta 62.5 mcg-25 mcg/inh inhalation powder: 1 puff(s) inhaled once a day  enoxaparin: 60 milligram(s) subcutaneous 2 times a day For DVT  levETIRAcetam 250 mg oral tablet: 1 tab(s) orally 2 times a day  metoprolol: 12.5 milligram(s) orally 2 times a day  polyethylene glycol 3350 oral powder for reconstitution: 17 gram(s) orally once a day  Proventil HFA 90 mcg/inh inhalation aerosol: 2 puff(s) inhaled prn  senna leaf extract oral tablet: 2 tab(s) orally once a day (at bedtime)  simvastatin 20 mg oral tablet: 1 tab(s) orally once a day (at bedtime)    AICD/PPM: [ ] yes   [x] no    PERTINENT LAB DATA:                        10.9   4.88  )-----------( 223      ( 03 Sep 2024 07:16 )             33.1     09-02    139  |  104  |  18  ----------------------------<  101<H>  4.3   |  23  |  0.77    Ca    9.4      02 Sep 2024 06:42  Phos  3.7     09-02  Mg     2.0     09-02                  PHYSICAL EXAMINATION:    T(C): 36.6  HR: 71  BP: 129/78  RR: 18  SpO2: 97%    Constitutional: NAD    Neck:  No JVD  Respiratory: no respiratory distress, normal effort   Cardiovascular:  RRR  Extremities: No peripheral edema  Neurological: A/O x 3-4         COMMENTS:    The patient is a suitable candidate for the planned procedure unless box checked [ ]  No, explain:    
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      Disease of pancreas, unspecified      Hearing loss      Pancreatic mass      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  ROM limited Mouth opening & neck      ETOH abuse  quit in 2016      Other nonspecific abnormal finding of lung field      Abnormal chest CT      Type 2 diabetes mellitus      Dysphagia      History of cancer chemotherapy      S/P radiation therapy      Pneumonia      Syncope      Pneumonia due to COVID-19 virus      History of appendectomy      H/O hemicolectomy      S/P tonsillectomy  1959      H/O endoscopy  last one 06/2018      History of partial pancreatectomy      History of loop recorder        ALLERGIES:  penicillin (Other)  ACE inhibitors (Other)  Bee Stings- anaphylaxis (Other)    HOME MEDICATIONS:  acetaminophen 325 mg oral tablet: 2 tab(s) orally every 6 hours As needed Temp greater or equal to 38C (100.4F), Mild Pain (1 - 3)  Anoro Ellipta 62.5 mcg-25 mcg/inh inhalation powder: 1 puff(s) inhaled once a day  Keppra 250 mg oral tablet: 1 tab(s) orally 2 times a day starting 8/14 AM  levETIRAcetam 500 mg oral tablet: 1 tab(s) orally 2 times a day thru 8/13  metoprolol succinate 25 mg oral tablet, extended release: 1 tab(s) orally once a day daily at 6PM  polyethylene glycol 3350 oral powder for reconstitution: 17 gram(s) orally once a day  Proventil HFA 90 mcg/inh inhalation aerosol: 2 puff(s) inhaled prn  senna leaf extract oral tablet: 2 tab(s) orally once a day (at bedtime)  simvastatin 20 mg oral tablet: 1 tab(s) orally once a day (at bedtime)    AICD/PPM: [ ] yes   [x ] no    PERTINENT LAB DATA:                        11.3   4.11  )-----------( 224      ( 26 Aug 2024 05:45 )             35.4     08-26    134<L>  |  100  |  28<H>  ----------------------------<  115<H>  4.9   |  24  |  0.91    Ca    9.6      26 Aug 2024 05:45    TPro  6.6  /  Alb  3.1<L>  /  TBili  0.3  /  DBili  x   /  AST  61<H>  /  ALT  99<H>  /  AlkPhos  83  08-26    PT/INR - ( 26 Aug 2024 05:45 )   PT: 12.5 sec;   INR: 1.14 ratio         PTT - ( 26 Aug 2024 05:45 )  PTT:46.7 sec            PHYSICAL EXAMINATION:    T(C): 36.1  HR: 63  BP: 110/65  RR: 18  SpO2: 100%    Constitutional: NAD    Neck:  No JVD  Respiratory:  no respiratory distress   Cardiovascular: RRR            COMMENTS:    The patient is a suitable candidate for the planned procedure unless box checked [ ]  No, explain:

## 2024-09-03 NOTE — PROGRESS NOTE ADULT - ASSESSMENT
78 yo male with PMH of HTN, T2DM (diet controlled), HLD, COPD, CKD 3, carcinoid tumor s/p hemicolectomy, nasopharyngeal carcinoma s/p chemo/RT (c/b dysphagia ), IPMN s/p distal pancreatectomy (2018), and paroxysmal Afib on Eliquis s/p craniotomy 8/6 for resection of hemorrhagic lesion presented to the ED for RUE swelling, found to have RUE DVT involving subclavian, axillary, and brachial veins and PE. Course also c/b dysphagia s/p PEG 8/27, c/b bleeding around PEG site, acute blood loss anemia, and now melena.

## 2024-09-03 NOTE — PROGRESS NOTE ADULT - SUBJECTIVE AND OBJECTIVE BOX
Pt seen today, in bed, alert, awake, fully conversant, wants to go home.  Pt was slated for acute rehab, but he has had dropping Hct after PEG placement and an episode of melena yesterday.  Pt Hct 31 today, was as low as 21.  Pt is hemodynamically stable.  For GI scoping today to determine for bleeding source.  Continue monitoring.

## 2024-09-03 NOTE — PRE-ANESTHESIA EVALUATION ADULT - NSANTHOSAYNRD_GEN_A_CORE
No. VANDA screening performed.  STOP BANG Legend: 0-2 = LOW Risk; 3-4 = INTERMEDIATE Risk; 5-8 = HIGH Risk
No. VANDA screening performed.  STOP BANG Legend: 0-2 = LOW Risk; 3-4 = INTERMEDIATE Risk; 5-8 = HIGH Risk

## 2024-09-03 NOTE — PROGRESS NOTE ADULT - ASSESSMENT
ASSESSMENT 79M hx recent admission for and R crani for lesion w/ Dr. 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 Admitted to [facility], after RUE doppler w/ DVT involving the subclavian, axillary and brachial veins and Superficial thrombus in the right basilic vein on 8/15. Vasc cards consulted- rec CT Angio chest showedsegmental emboli within RUL pulmonary artery with no evidence of RV strain. Hospital course significant for failed S/S assessment, now s/p PEG w/ GI on 8/27.    NEURO: Neurochecks q4h   - continue keppra 250 mg every 12 h for sz ppx   - Pain control prn tylenol   - PT/OT/PMR rec PEGGY upon d/c    PULM: On room air, O2Sat>96%  - Incentive spirometer as tolerated   - 8/16 CTA Chest: segmental emboli w/in right upper lobe pulm artery, no significant evidence of right heart strain; emphysema, new mucoid impaction likely COPD exacerbations - f/u CT Chest in 3 months  - h/o COPD, Continue Proventil, Symbicort, Spiriva (no exacerbations)     CV: -160 mmhg, currently within goals  - Continue Norvasc for HTN  - Continue Metoprolol for Afib  - Continue Simvastatin for HLD  - 8/16 TTE: LV systolic fxn normal w/ EF 71%, normal LV diastolic fxn, normal RV systolic fxn normal, LV systolic fxn is normal    RENAL: IVL, voiding,   - BMP 9/2 within normal limits  - replete electrolytes prn     GI- s/p PEG on 8/27   - GI following - 9/1 given 1 U PRBC- 9/2 fellow did bedside to lavage PEG no makenna bleeding. PEG site is clean/dry no oozing. .     ENDO:  A1C 5.3    HEME/ONC: anemia on 9/1 CBC. hb 6.7, received 1 U PRBC, no hemodynamic instability.   -therapeutic SQL 60 bid for UE DVT (Subclavian, axillary and brachial veins and Superficial thrombus in the right basilic vein ) and PE has been on hold for anemia. Awaiting GI scope results to restart.     ID: afebrile, no leukocytosis    as per vascular cardiology will repeat UE/LE duplex tomorrow to r/o propagation and consider to restart SQL vs Eliquis based on GI scope results, f.u am labs.  appreciate medicine co-management.     discussed with Dr. Branham   spectra [phone]

## 2024-09-03 NOTE — PROGRESS NOTE ADULT - SUBJECTIVE AND OBJECTIVE BOX
Vascular Cardiology Progress Note    DIRECT PROVIDER NUMBER: [phone] / Available on TEAMS    CC:  post-operative DVT    Interval Events:    No complaints noted.  No upper or lower extremity edema.   Currently off anticoagulation, plan for scope with GI today.     Allergies  penicillin (Other)  ACE inhibitors (Other)  Bee Stings- anaphylaxis (Other)    MEDICATIONS  (STANDING):  albuterol    0.083% 2.5 milliGRAM(s) Nebulizer every 6 hours  albuterol    90 MICROgram(s) HFA Inhaler 1 Puff(s) Inhalation every 4 hours  amLODIPine   Tablet 5 milliGRAM(s) Oral daily  budesonide 160 MICROgram(s)/formoterol 4.5 MICROgram(s) Inhaler 2 Puff(s) Inhalation two times a day  chlorhexidine 2% Cloths 1 Application(s) Topical <User Schedule>  glucagon  Injectable 1 milliGRAM(s) IntraMuscular once  levETIRAcetam  IVPB 250 milliGRAM(s) IV Intermittent every 12 hours  metoprolol tartrate 12.5 milliGRAM(s) Oral every 12 hours  Nephro-corine 1 Tablet(s) Oral daily  pantoprazole  Injectable 40 milliGRAM(s) IV Push two times a day  polyethylene glycol 3350 17 Gram(s) Oral two times a day  senna Syrup 10 milliLiter(s) Oral at bedtime  simvastatin 20 milliGRAM(s) Oral at bedtime  sodium chloride 0.9%. 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  ROM limited Mouth opening & neck  ETOH abuse  quit in 2016  Other nonspecific abnormal finding of lung field  Abnormal chest CT  Type 2 diabetes mellitus  Dysphagia  History of cancer chemotherapy  S/P radiation therapy  Pneumonia  Syncope  Pneumonia due to COVID-19 virus  History of appendectomy  H/O hemicolectomy  S/P tonsillectomy  1959  H/O endoscopy  last one 06/2018  History of partial pancreatectomy  History of loop recorder    FAMILY HISTORY:  Family history of breast cancer    SOCIAL HISTORY:  unchanged    REVIEW OF SYSTEMS:  14 Point ROS negative except per subjective and HPI    PHYSICAL EXAM:  Vital Signs Last 24 Hrs  T(C): 36.6 (03 Sep 2024 08:09), Max: 36.7 (02 Sep 2024 12:31)  T(F): 97.9 (03 Sep 2024 08:09), Max: 98.1 (02 Sep 2024 12:31)  HR: 64 (03 Sep 2024 08:09) (64 - 92)  BP: 152/83 (03 Sep 2024 08:09) (132/71 - 156/83)  BP(mean): --  RR: 18 (03 Sep 2024 08:09) (18 - 18)  SpO2: 95% (03 Sep 2024 08:09) (95% - 98%)    Parameters below as of 03 Sep 2024 08:09  Patient On (Oxygen Delivery Method): room air      Appearance: NAD  Cardiovascular:  RRR, S1 and S2  Respiratory: CTA B/L  Gastrointestinal:  Soft, Non-tender, + BS	  Skin: No cyanosis	  Extremities: No LE edema, bilateral calves soft  No UE edema, palpable radial pulse bilaterally         LABS:	 	                        10.9   4.88  )-----------( 223      ( 03 Sep 2024 07:16 )             33.1     09-02    139  |  104  |  18  ----------------------------<  101<H>  4.3   |  23  |  0.77    Ca    9.4      02 Sep 2024 06:42  Phos  3.7     09-02  Mg     2.0     09-02      PT/INR - ( 01 Sep 2024 13:02 )   PT: 12.7 sec;   INR: 1.16 ratio    PTT - ( 01 Sep 2024 13:02 )  PTT:35.8 sec  Urinalysis Basic - ( 02 Sep 2024 06:42 )    Color: x / Appearance: x / SG: x / pH: x  Gluc: 101 mg/dL / Ketone: x  / Bili: x / Urobili: x   Blood: x / Protein: x / Nitrite: x   Leuk Esterase: x / RBC: x / WBC x   Sq Epi: x / Non Sq Epi: x / Bacteria: x      Assessment:  1. R UE DVT involving subclavian and axillary vein  2. Segmental PE  3. Paroxysmal Afib  4. S/p craniotomy 8/6 for resection hemorrhagic lesion  5. HTN    Plan:  1. No evidence of phlegmasia. No upper extremity edema. Distal pulses intact.  2. Currently off anticoagulation, for GI scope today.  3. Post procedure, tomorrow consider resumption of anticoagulation if no further bleeding noted.  4. Pneumatic compression to bilateral lower extremities.  5. Last upper extremity duplex without propagation on 8/28. Last LE venous duplex without DVT on 8/30.  6. Recommend repeat surveillance upper and lower extremity venous duplex on 9/4.  7. Appreciate excellent Neurosurgical care.        Thank you  ALYSON Rico, MS, [facility]  Vascular Cardiology Service    Please call with any questions:   DIRECT SERVICE NUMBER: [phone] / Available on TEAMS

## 2024-09-04 LAB
ADD ON TEST-SPECIMEN IN LAB: SIGNIFICANT CHANGE UP
ALBUMIN SERPL ELPH-MCNC: 3.2 G/DL — LOW (ref 3.3–5)
ALBUMIN SERPL ELPH-MCNC: 3.3 G/DL — SIGNIFICANT CHANGE UP (ref 3.3–5)
ALP SERPL-CCNC: 87 U/L — SIGNIFICANT CHANGE UP (ref 40–120)
ALP SERPL-CCNC: 88 U/L — SIGNIFICANT CHANGE UP (ref 40–120)
ALT FLD-CCNC: 32 U/L — SIGNIFICANT CHANGE UP (ref 10–45)
ALT FLD-CCNC: 33 U/L — SIGNIFICANT CHANGE UP (ref 10–45)
ANION GAP SERPL CALC-SCNC: 14 MMOL/L — SIGNIFICANT CHANGE UP (ref 5–17)
ANION GAP SERPL CALC-SCNC: 15 MMOL/L — SIGNIFICANT CHANGE UP (ref 5–17)
ANION GAP SERPL CALC-SCNC: 23 MMOL/L — HIGH (ref 5–17)
ANISOCYTOSIS BLD QL: SLIGHT — SIGNIFICANT CHANGE UP
APTT BLD: 28.8 SEC — SIGNIFICANT CHANGE UP (ref 24.5–35.6)
AST SERPL-CCNC: 21 U/L — SIGNIFICANT CHANGE UP (ref 10–40)
AST SERPL-CCNC: 24 U/L — SIGNIFICANT CHANGE UP (ref 10–40)
BASOPHILS # BLD AUTO: 0 K/UL — SIGNIFICANT CHANGE UP (ref 0–0.2)
BASOPHILS # BLD AUTO: 0.06 K/UL — SIGNIFICANT CHANGE UP (ref 0–0.2)
BASOPHILS NFR BLD AUTO: 0 % — SIGNIFICANT CHANGE UP (ref 0–2)
BASOPHILS NFR BLD AUTO: 1.1 % — SIGNIFICANT CHANGE UP (ref 0–2)
BILIRUB SERPL-MCNC: 0.4 MG/DL — SIGNIFICANT CHANGE UP (ref 0.2–1.2)
BILIRUB SERPL-MCNC: 0.4 MG/DL — SIGNIFICANT CHANGE UP (ref 0.2–1.2)
BLD GP AB SCN SERPL QL: NEGATIVE — SIGNIFICANT CHANGE UP
BUN SERPL-MCNC: 16 MG/DL — SIGNIFICANT CHANGE UP (ref 7–23)
BUN SERPL-MCNC: 18 MG/DL — SIGNIFICANT CHANGE UP (ref 7–23)
BUN SERPL-MCNC: 18 MG/DL — SIGNIFICANT CHANGE UP (ref 7–23)
CALCIUM SERPL-MCNC: 9.4 MG/DL — SIGNIFICANT CHANGE UP (ref 8.4–10.5)
CALCIUM SERPL-MCNC: 9.5 MG/DL — SIGNIFICANT CHANGE UP (ref 8.4–10.5)
CALCIUM SERPL-MCNC: 9.7 MG/DL — SIGNIFICANT CHANGE UP (ref 8.4–10.5)
CHLORIDE SERPL-SCNC: 103 MMOL/L — SIGNIFICANT CHANGE UP (ref 96–108)
CHLORIDE SERPL-SCNC: 104 MMOL/L — SIGNIFICANT CHANGE UP (ref 96–108)
CHLORIDE SERPL-SCNC: 99 MMOL/L — SIGNIFICANT CHANGE UP (ref 96–108)
CO2 SERPL-SCNC: 14 MMOL/L — LOW (ref 22–31)
CO2 SERPL-SCNC: 19 MMOL/L — LOW (ref 22–31)
CO2 SERPL-SCNC: 22 MMOL/L — SIGNIFICANT CHANGE UP (ref 22–31)
CREAT SERPL-MCNC: 0.81 MG/DL — SIGNIFICANT CHANGE UP (ref 0.5–1.3)
CREAT SERPL-MCNC: 0.81 MG/DL — SIGNIFICANT CHANGE UP (ref 0.5–1.3)
CREAT SERPL-MCNC: 0.88 MG/DL — SIGNIFICANT CHANGE UP (ref 0.5–1.3)
EGFR: 87 ML/MIN/1.73M2 — SIGNIFICANT CHANGE UP
EGFR: 89 ML/MIN/1.73M2 — SIGNIFICANT CHANGE UP
EGFR: 89 ML/MIN/1.73M2 — SIGNIFICANT CHANGE UP
ELLIPTOCYTES BLD QL SMEAR: SLIGHT — SIGNIFICANT CHANGE UP
EOSINOPHIL # BLD AUTO: 0.08 K/UL — SIGNIFICANT CHANGE UP (ref 0–0.5)
EOSINOPHIL # BLD AUTO: 0.29 K/UL — SIGNIFICANT CHANGE UP (ref 0–0.5)
EOSINOPHIL NFR BLD AUTO: 1.5 % — SIGNIFICANT CHANGE UP (ref 0–6)
EOSINOPHIL NFR BLD AUTO: 5.3 % — SIGNIFICANT CHANGE UP (ref 0–6)
GAS PNL BLDA: SIGNIFICANT CHANGE UP
GAS PNL BLDV: SIGNIFICANT CHANGE UP
GIANT PLATELETS BLD QL SMEAR: PRESENT — SIGNIFICANT CHANGE UP
GLUCOSE BLDC GLUCOMTR-MCNC: 124 MG/DL — HIGH (ref 70–99)
GLUCOSE BLDC GLUCOMTR-MCNC: 129 MG/DL — HIGH (ref 70–99)
GLUCOSE BLDC GLUCOMTR-MCNC: 131 MG/DL — HIGH (ref 70–99)
GLUCOSE BLDC GLUCOMTR-MCNC: 88 MG/DL — SIGNIFICANT CHANGE UP (ref 70–99)
GLUCOSE SERPL-MCNC: 125 MG/DL — HIGH (ref 70–99)
GLUCOSE SERPL-MCNC: 127 MG/DL — HIGH (ref 70–99)
GLUCOSE SERPL-MCNC: 256 MG/DL — HIGH (ref 70–99)
HCT VFR BLD CALC: 34.7 % — LOW (ref 39–50)
HCT VFR BLD CALC: 36.9 % — LOW (ref 39–50)
HCT VFR BLD CALC: 41.3 % — SIGNIFICANT CHANGE UP (ref 39–50)
HGB BLD-MCNC: 11.5 G/DL — LOW (ref 13–17)
HGB BLD-MCNC: 11.8 G/DL — LOW (ref 13–17)
HGB BLD-MCNC: 13.1 G/DL — SIGNIFICANT CHANGE UP (ref 13–17)
IMM GRANULOCYTES NFR BLD AUTO: 4.9 % — HIGH (ref 0–0.9)
INR BLD: 1.26 RATIO — HIGH (ref 0.85–1.18)
LACTATE SERPL-SCNC: 2.8 MMOL/L — HIGH (ref 0.5–2)
LYMPHOCYTES # BLD AUTO: 0.48 K/UL — LOW (ref 1–3.3)
LYMPHOCYTES # BLD AUTO: 1.6 K/UL — SIGNIFICANT CHANGE UP (ref 1–3.3)
LYMPHOCYTES # BLD AUTO: 30.2 % — SIGNIFICANT CHANGE UP (ref 13–44)
LYMPHOCYTES # BLD AUTO: 8.9 % — LOW (ref 13–44)
MAGNESIUM SERPL-MCNC: 2.1 MG/DL — SIGNIFICANT CHANGE UP (ref 1.6–2.6)
MAGNESIUM SERPL-MCNC: 2.1 MG/DL — SIGNIFICANT CHANGE UP (ref 1.6–2.6)
MANUAL SMEAR VERIFICATION: SIGNIFICANT CHANGE UP
MCHC RBC-ENTMCNC: 30.5 PG — SIGNIFICANT CHANGE UP (ref 27–34)
MCHC RBC-ENTMCNC: 30.5 PG — SIGNIFICANT CHANGE UP (ref 27–34)
MCHC RBC-ENTMCNC: 31.2 GM/DL — LOW (ref 32–36)
MCHC RBC-ENTMCNC: 31.5 PG — SIGNIFICANT CHANGE UP (ref 27–34)
MCHC RBC-ENTMCNC: 31.7 GM/DL — LOW (ref 32–36)
MCHC RBC-ENTMCNC: 34 GM/DL — SIGNIFICANT CHANGE UP (ref 32–36)
MCV RBC AUTO: 92.5 FL — SIGNIFICANT CHANGE UP (ref 80–100)
MCV RBC AUTO: 96.3 FL — SIGNIFICANT CHANGE UP (ref 80–100)
MCV RBC AUTO: 97.9 FL — SIGNIFICANT CHANGE UP (ref 80–100)
MONOCYTES # BLD AUTO: 0.1 K/UL — SIGNIFICANT CHANGE UP (ref 0–0.9)
MONOCYTES # BLD AUTO: 0.24 K/UL — SIGNIFICANT CHANGE UP (ref 0–0.9)
MONOCYTES NFR BLD AUTO: 1.9 % — LOW (ref 2–14)
MONOCYTES NFR BLD AUTO: 4.4 % — SIGNIFICANT CHANGE UP (ref 2–14)
NEUTROPHILS # BLD AUTO: 3.19 K/UL — SIGNIFICANT CHANGE UP (ref 1.8–7.4)
NEUTROPHILS # BLD AUTO: 4.28 K/UL — SIGNIFICANT CHANGE UP (ref 1.8–7.4)
NEUTROPHILS NFR BLD AUTO: 60.4 % — SIGNIFICANT CHANGE UP (ref 43–77)
NEUTROPHILS NFR BLD AUTO: 79.6 % — HIGH (ref 43–77)
NRBC # BLD: 0 /100 WBCS — SIGNIFICANT CHANGE UP (ref 0–0)
NRBC # BLD: 0 /100 WBCS — SIGNIFICANT CHANGE UP (ref 0–0)
OVALOCYTES BLD QL SMEAR: SLIGHT — SIGNIFICANT CHANGE UP
PHOSPHATE SERPL-MCNC: 3.3 MG/DL — SIGNIFICANT CHANGE UP (ref 2.5–4.5)
PHOSPHATE SERPL-MCNC: 3.9 MG/DL — SIGNIFICANT CHANGE UP (ref 2.5–4.5)
PLAT MORPH BLD: NORMAL — SIGNIFICANT CHANGE UP
PLATELET # BLD AUTO: 285 K/UL — SIGNIFICANT CHANGE UP (ref 150–400)
PLATELET # BLD AUTO: 298 K/UL — SIGNIFICANT CHANGE UP (ref 150–400)
PLATELET # BLD AUTO: 319 K/UL — SIGNIFICANT CHANGE UP (ref 150–400)
POIKILOCYTOSIS BLD QL AUTO: SLIGHT — SIGNIFICANT CHANGE UP
POLYCHROMASIA BLD QL SMEAR: SLIGHT — SIGNIFICANT CHANGE UP
POTASSIUM SERPL-MCNC: 3.8 MMOL/L — SIGNIFICANT CHANGE UP (ref 3.5–5.3)
POTASSIUM SERPL-MCNC: 4.3 MMOL/L — SIGNIFICANT CHANGE UP (ref 3.5–5.3)
POTASSIUM SERPL-MCNC: 4.7 MMOL/L — SIGNIFICANT CHANGE UP (ref 3.5–5.3)
POTASSIUM SERPL-SCNC: 3.8 MMOL/L — SIGNIFICANT CHANGE UP (ref 3.5–5.3)
POTASSIUM SERPL-SCNC: 4.3 MMOL/L — SIGNIFICANT CHANGE UP (ref 3.5–5.3)
POTASSIUM SERPL-SCNC: 4.7 MMOL/L — SIGNIFICANT CHANGE UP (ref 3.5–5.3)
PROT SERPL-MCNC: 7.3 G/DL — SIGNIFICANT CHANGE UP (ref 6–8.3)
PROT SERPL-MCNC: 7.4 G/DL — SIGNIFICANT CHANGE UP (ref 6–8.3)
PROTHROM AB SERPL-ACNC: 13.1 SEC — HIGH (ref 9.5–13)
RBC # BLD: 3.75 M/UL — LOW (ref 4.2–5.8)
RBC # BLD: 3.77 M/UL — LOW (ref 4.2–5.8)
RBC # BLD: 4.29 M/UL — SIGNIFICANT CHANGE UP (ref 4.2–5.8)
RBC # FLD: 14.9 % — HIGH (ref 10.3–14.5)
RBC BLD AUTO: ABNORMAL
RH IG SCN BLD-IMP: NEGATIVE — SIGNIFICANT CHANGE UP
SODIUM SERPL-SCNC: 136 MMOL/L — SIGNIFICANT CHANGE UP (ref 135–145)
SODIUM SERPL-SCNC: 137 MMOL/L — SIGNIFICANT CHANGE UP (ref 135–145)
SODIUM SERPL-SCNC: 140 MMOL/L — SIGNIFICANT CHANGE UP (ref 135–145)
TROPONIN T, HIGH SENSITIVITY RESULT: <6 NG/L — SIGNIFICANT CHANGE UP (ref 0–51)
TROPONIN T, HIGH SENSITIVITY RESULT: <6 NG/L — SIGNIFICANT CHANGE UP (ref 0–51)
VARIANT LYMPHS # BLD: 1.8 % — SIGNIFICANT CHANGE UP (ref 0–6)
WBC # BLD: 5.29 K/UL — SIGNIFICANT CHANGE UP (ref 3.8–10.5)
WBC # BLD: 5.38 K/UL — SIGNIFICANT CHANGE UP (ref 3.8–10.5)
WBC # BLD: 6.09 K/UL — SIGNIFICANT CHANGE UP (ref 3.8–10.5)
WBC # FLD AUTO: 5.29 K/UL — SIGNIFICANT CHANGE UP (ref 3.8–10.5)
WBC # FLD AUTO: 5.38 K/UL — SIGNIFICANT CHANGE UP (ref 3.8–10.5)
WBC # FLD AUTO: 6.09 K/UL — SIGNIFICANT CHANGE UP (ref 3.8–10.5)

## 2024-09-04 PROCEDURE — 93970 EXTREMITY STUDY: CPT | Mod: 26

## 2024-09-04 PROCEDURE — 71045 X-RAY EXAM CHEST 1 VIEW: CPT | Mod: 26

## 2024-09-04 PROCEDURE — 99232 SBSQ HOSP IP/OBS MODERATE 35: CPT

## 2024-09-04 PROCEDURE — 93970 EXTREMITY STUDY: CPT | Mod: 26,59

## 2024-09-04 PROCEDURE — 93010 ELECTROCARDIOGRAM REPORT: CPT

## 2024-09-04 PROCEDURE — 99233 SBSQ HOSP IP/OBS HIGH 50: CPT

## 2024-09-04 PROCEDURE — 99024 POSTOP FOLLOW-UP VISIT: CPT

## 2024-09-04 RX ORDER — POTASSIUM CHLORIDE 10 MEQ
20 TABLET, EXT RELEASE, PARTICLES/CRYSTALS ORAL ONCE
Refills: 0 | Status: COMPLETED | OUTPATIENT
Start: 2024-09-04 | End: 2024-09-05

## 2024-09-04 RX ORDER — CHLORHEXIDINE GLUCONATE 40 MG/ML
1 SOLUTION TOPICAL DAILY
Refills: 0 | Status: DISCONTINUED | OUTPATIENT
Start: 2024-09-04 | End: 2024-09-06

## 2024-09-04 RX ORDER — ENOXAPARIN SODIUM 100 MG/ML
60 INJECTION SUBCUTANEOUS EVERY 12 HOURS
Refills: 0 | Status: DISCONTINUED | OUTPATIENT
Start: 2024-09-04 | End: 2024-09-04

## 2024-09-04 RX ORDER — ACETAMINOPHEN 325 MG/1
1000 TABLET ORAL ONCE
Refills: 0 | Status: COMPLETED | OUTPATIENT
Start: 2024-09-04 | End: 2024-09-06

## 2024-09-04 RX ORDER — PANTOPRAZOLE SODIUM 40 MG
8 TABLET, DELAYED RELEASE (ENTERIC COATED) ORAL
Qty: 80 | Refills: 0 | Status: DISCONTINUED | OUTPATIENT
Start: 2024-09-04 | End: 2024-09-05

## 2024-09-04 RX ORDER — PANTOPRAZOLE SODIUM 40 MG
80 TABLET, DELAYED RELEASE (ENTERIC COATED) ORAL ONCE
Refills: 0 | Status: COMPLETED | OUTPATIENT
Start: 2024-09-04 | End: 2024-09-04

## 2024-09-04 RX ORDER — IPRATROPIUM BROMIDE AND ALBUTEROL SULFATE .5; 3 MG/3ML; MG/3ML
3 SOLUTION RESPIRATORY (INHALATION) EVERY 6 HOURS
Refills: 0 | Status: DISCONTINUED | OUTPATIENT
Start: 2024-09-04 | End: 2024-09-11

## 2024-09-04 RX ADMIN — METOPROLOL TARTRATE 12.5 MILLIGRAM(S): 100 TABLET ORAL at 05:00

## 2024-09-04 RX ADMIN — LEVETIRACETAM 400 MILLIGRAM(S): 1000 TABLET ORAL at 18:25

## 2024-09-04 RX ADMIN — LEVETIRACETAM 400 MILLIGRAM(S): 1000 TABLET ORAL at 04:58

## 2024-09-04 RX ADMIN — BUDESONIDE AND FORMOTEROL FUMARATE 2 PUFF(S): 80; 4.5 AEROSOL, METERED RESPIRATORY (INHALATION) at 18:23

## 2024-09-04 RX ADMIN — CHLORHEXIDINE GLUCONATE 1 APPLICATION(S): 40 SOLUTION TOPICAL at 05:01

## 2024-09-04 RX ADMIN — Medication 80 MILLIGRAM(S): at 22:27

## 2024-09-04 RX ADMIN — Medication 2.5 MILLIGRAM(S): at 05:33

## 2024-09-04 RX ADMIN — TIOTROPIUM BROMIDE INHALATION SPRAY 2 PUFF(S): 3.12 SPRAY, METERED RESPIRATORY (INHALATION) at 11:17

## 2024-09-04 RX ADMIN — IPRATROPIUM BROMIDE AND ALBUTEROL SULFATE 3 MILLILITER(S): .5; 3 SOLUTION RESPIRATORY (INHALATION) at 23:02

## 2024-09-04 RX ADMIN — Medication 10 MG/HR: at 22:27

## 2024-09-04 RX ADMIN — Medication 40 MILLIGRAM(S): at 05:00

## 2024-09-04 RX ADMIN — AMLODIPINE BESYLATE 5 MILLIGRAM(S): 10 TABLET ORAL at 11:18

## 2024-09-04 RX ADMIN — Medication 40 MILLIGRAM(S): at 18:24

## 2024-09-04 RX ADMIN — Medication 1 TABLET(S): at 11:18

## 2024-09-04 RX ADMIN — POLYETHYLENE GLYCOL 3350 17 GRAM(S): 17 POWDER, FOR SOLUTION ORAL at 05:00

## 2024-09-04 RX ADMIN — Medication 2.5 MILLIGRAM(S): at 11:17

## 2024-09-04 RX ADMIN — BUDESONIDE AND FORMOTEROL FUMARATE 2 PUFF(S): 80; 4.5 AEROSOL, METERED RESPIRATORY (INHALATION) at 05:01

## 2024-09-04 RX ADMIN — Medication 2.5 MILLIGRAM(S): at 18:24

## 2024-09-04 RX ADMIN — POLYETHYLENE GLYCOL 3350 17 GRAM(S): 17 POWDER, FOR SOLUTION ORAL at 18:23

## 2024-09-04 RX ADMIN — METOPROLOL TARTRATE 12.5 MILLIGRAM(S): 100 TABLET ORAL at 18:24

## 2024-09-04 NOTE — PROGRESS NOTE ADULT - SUBJECTIVE AND OBJECTIVE BOX
INTERVAL HPI/OVERNIGHT EVENTS:  sp EGD 9/3/24  Findings:       The examined esophagus was normal.       The entire examined stomach was normal, except for a small patch of erythema on the incisura.        the Gastrostomy tract appeared intact. No ulcer or stigmata of bleeding       The examined duodenum was normal.    Tolerating PEG feeds  no transfusion requirement  no PEG site bleeding - surgicell in place externally    AC remains on hold (held since 8/30/24)    MEDICATIONS  (STANDING):  albuterol    0.083% 2.5 milliGRAM(s) Nebulizer every 6 hours  albuterol    90 MICROgram(s) HFA Inhaler 1 Puff(s) Inhalation every 4 hours  amLODIPine   Tablet 5 milliGRAM(s) Oral daily  budesonide 160 MICROgram(s)/formoterol 4.5 MICROgram(s) Inhaler 2 Puff(s) Inhalation two times a day  chlorhexidine 2% Cloths 1 Application(s) Topical <User Schedule>  glucagon  Injectable 1 milliGRAM(s) IntraMuscular once  levETIRAcetam  IVPB 250 milliGRAM(s) IV Intermittent every 12 hours  lidocaine 4% Injection for Nebulization 4 milliLiter(s) Nebulizer once  metoprolol tartrate 12.5 milliGRAM(s) Oral every 12 hours  Nephro-corine 1 Tablet(s) Oral daily  pantoprazole  Injectable 40 milliGRAM(s) IV Push two times a day  polyethylene glycol 3350 17 Gram(s) Oral two times a day  senna Syrup 10 milliLiter(s) Oral at bedtime  simvastatin 20 milliGRAM(s) Oral at bedtime  sodium chloride 0.9%. 1000 milliLiter(s) (70 mL/Hr) IV Continuous <Continuous>  tiotropium 2.5 MICROgram(s) Inhaler 2 Puff(s) Inhalation daily    MEDICATIONS  (PRN):  acetaminophen     Tablet .. 650 milliGRAM(s) Oral every 6 hours PRN Mild Pain (1 - 3)  bisacodyl 5 milliGRAM(s) Oral daily PRN Constipation      Allergies  penicillin (Other)  ACE inhibitors (Other)  Bee Stings- anaphylaxis (Other)      Review of Systems:  see HPI- remainder 10 point ROS negative      Vital Signs Last 24 Hrs  T(C): 36.3 (04 Sep 2024 12:32), Max: 36.9 (04 Sep 2024 05:08)  T(F): 97.3 (04 Sep 2024 12:32), Max: 98.4 (04 Sep 2024 05:08)  HR: 73 (04 Sep 2024 12:32) (61 - 84)  BP: 118/73 (04 Sep 2024 12:32) (111/63 - 208/99)  BP(mean): 120 (03 Sep 2024 14:25) (120 - 120)  RR: 18 (04 Sep 2024 12:32) (15 - 20)  SpO2: 99% (04 Sep 2024 12:32) (96% - 100%)    Parameters below as of 04 Sep 2024 12:32  Patient On (Oxygen Delivery Method): room air    PHYSICAL EXAM:    GENERAL:: thin AA male awake, alert appears comfortable.  NECK: No JVD  RESP:: grossly clear  CV:: S1 and S2, RRR  GI:: BS+, soft, thin ND NT Abdominal binder opened - Surgicell w/ dried blood, intact at PEG incision. no bleeding/oozing at PEG incision; PEG bumper at 4.5 cm bhargavi- lightly touching skin and spins easily 360 degrees. +midline abdominal scar (from mid epigastric region to pubis) and small scar at lateral LUQ, WH LLQ scar c/w appy.  Abdominal binder replaced.  MUSCULOSKELETAL: No clubbing or cyanosis of digits; no joint swelling or tenderness to palpation  PSYCH: A+O to person, place, and time; affect appropriate  NEUROLOGY: CN 2-12 are intact and symmetric; no gross sensory deficits, grossly 5/5 strength throughout  SKIN: No rashes; no palpable lesions, +crani site c/d/i    LABS:                        11.8   5.38  )-----------( 298      ( 04 Sep 2024 09:02 )             34.7   Hemoglobin: 10.9 g/dL (09.03.24 @ 07:16)   Hemoglobin: 11.2 g/dL (09.02.24 @ 16:45)   Hemoglobin: 9.4 g/dL (09.02.24 @ 06:40)     09-04    140  |  104  |  16  ----------------------------<  125<H>  4.3   |  22  |  0.81    Ca    9.4      04 Sep 2024 09:02  Phos  3.3     09-04  Mg     2.1     09-0        RADIOLOGY & ADDITIONAL TESTS:  ACC: [id number] EXAM:  CT ABDOMEN AND PELVIS IC   ORDERED BY: CONSTANCE HONEYCUTT     PROCEDURE DATE:  08/31/2024          INTERPRETATION:  CLINICAL INFORMATION: Downtrending hemoglobin, concern   for bleed. Dysphagia status post PEG placement on 8/27/2024. Had bleeding   around the PEG.    COMPARISON: CT chest 8/16/2024 and 5/7/2024. CT chest, abdomen, and   pelvis 7/2/2023.    CONTRAST/COMPLICATIONS:  IV Contrast: Omnipaque 350  90 cc administered   10 cc discarded  Oral Contrast: NONE  Complications: None reported at time of study completion    PROCEDURE:  CT of the Abdomen and Pelvis was performed.  Precontrast, Arterial and Delayed phases were performed.  Sagittal and coronal reformats were performed.    FINDINGS:  LOWER CHEST: High density in the left lower lobe, new since 8/16/2024.   Bibasilar subsegmental atelectasis. Tree-in-bud opacities in the right   middle lobe and left lower lobe. Left lower lobe nodule measuring 10 mm   (series 6 image 17), not clearly seen on 8/16/2024 (possibly obscured by   atelectasis at that time), and also not seen on 5/7/2024. Trace   pericardial fluid. Aortic root calcification.      LIVER: A few subcentimeter hypodense foci that are too small to   characterize, but not significantly changed since 7/2/2023.  BILE DUCTS: Normal caliber.  GALLBLADDER: Contracted.  SPLEEN: Within normal limits.  PANCREAS: Distal pancreatectomy. Hypodense cystic appearing lesion in the   pancreatic head measuring 1.2 x 0.9 cm (series 6 image 46), not   significantly changed since 7/2/2023.  ADRENALS: Within normal limits.  KIDNEYS/URETERS: No hydronephrosis. Right renal cyst and subcentimeter   hypodense focus in the left kidney that is too small to characterize.   Redemonstrated left upper pole renal cortical scarring and volume loss.    BLADDER: Minimally distended.  REPRODUCTIVE ORGANS: Prostate is enlarged.    BOWEL: Enterocolic anastomosis in the right hemiabdomen. Percutaneous   gastrostomy tube in place, terminating in the stomach. Fat infiltration   and small amount of gas surrounding the gastrostomy catheter along its   course within the peritoneum and ventral abdominal wall, without   associated collection. Nonspecific amorphous hyperdense material in the   lumen of the gastric fundus. Prominent enhancing vessels along the wall   of the gastric fundus and proximal body, suggestive of varices. No   definite evidence of active gastric hemorrhage at this time. There is   high density contrast throughout the colon on precontrast imaging, likely   from the previous modified barium swallow study, which limits evaluation   for active hemorrhage within the colon. Otherwise, no active intestinal   hemorrhage is identified. A few colonic diverticula without evidence for   acute diverticulitis. No evidence for bowel obstruction.  PERITONEUM/RETROPERITONEUM: Fat stranding and small amount of gas along   the percutaneous gastrostomy tube. No collection. No retroperitoneal   hematoma.  VESSELS: Atherosclerotic changes. Retroaortic left renal vein. The   majority of the splenic vein is not definitively identified, with the   exception of the vein in the region of the splenic hilum. There is a   prominent left gastric vein with gastric varices and mildly prominent   gastroepiploic collateral vessels. These findings are similar to the   previous exam of 7/2/2023, which may reflect a chronically thrombosed   splenic vein with collateralization.  LYMPH NODES: No lymphadenopathy.  ABDOMINAL WALL: Postsurgical changes. Percutaneous gastrostomy tube, as   described above. No abdominal wall collection.  BONES: Degenerative changes.    IMPRESSION:    *  Nonspecific amorphous hyperdense material within the gastric fundus,   potentially something ingested. Gastric varices along the fundus and  body. No definite evidence of active gastric hemorrhage at this time.  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 These findings are similar   to 7/2/2023.  *  High density material within the colon, likely residual oral contrast,   which limits evaluation for active colonic hemorrhage. Otherwise, no   active intestinal hemorrhage is identified.  *  Percutaneous gastrostomy tube in place. Fat infiltration and small   amount of gas surrounding the gastrostomy catheter along its course   within the peritoneum and ventral abdominal wall, possibly postsurgical.   No associated collection.  *  Left lower lobe pulmonary nodule measuring 10 mm, possibly new.   Continuedfollow-up is advised.  *  High density material in the left lower lobe, new since 8/16/2024, and   suspected to represent aspirated contrast material from the recent   fluoroscopic barium swallow study.  *  Tree-in-bud opacities in the right middle lobe and left lower lobe,   which could reflect the endobronchial spread of infectious/process or   aspiration.

## 2024-09-04 NOTE — PROGRESS NOTE ADULT - ASSESSMENT
78 yo male with PMH of HTN, T2DM (diet controlled), HLD, COPD, CKD 3, carcinoid tumor s/p hemicolectomy, nasopharyngeal carcinoma s/p chemo/RT (c/b dysphagia ), IPMN s/p distal pancreatectomy (2018), and paroxysmal Afib on Eliquis s/p craniotomy 8/6 for resection of hemorrhagic lesion. Now presented to the ED for RUE swelling, found to have RUE DVT involving subclavian, axillary, and brachial veins and PE.    Pt was known to have cough w/ PO previously and was evaluated w/ OP MBS earlier this year - using thickened fluids at home   Post op course further complicated by Dysphagia - failed multiple SLP evaluations with recs: NPO   Pt and family are now agreeable to PEG placement for which GI is consulted    #Dysphagia -sp EGD + PEG placement 8/27/24 c/b PEG site bleeding  8/28 scant dried blood under PEG bumper; no active bleeding, induration, swelling or edema  PEG bumper between 3.5 and 4 cm at skin level  8/29 scant oozing, slight downtrend in Hgb. Negative PEG lavage. Surgicell placed at PEG tract  8/29 PM - recurrent PEG site bleeding; Surgicell intact. Resolved without further intervention. Hgb stable 6pm (9.4 ->9.8)  8/30 AM minimal ooze at PEG site, no active bleeding. Re-assessed at 12:40pm- Notified by Medicine attending of AM Hgb 9.8 ->8.5. No melena +PEG lavage: pink tinged return with water/feeds. Previously placed surgicell removed, minimal oozing noted at ends of PEG site incision - new Surgicell strip placed at PEG incision under PEG bumper, lightly tucked into ends (medical and lateral aspects of PEG site incision)   No visible oozing, surgicell noted to be clean.  Hgb 8.3 1 unit PRBCs transfused  8/31 CT: no e/o GI bleed, gastric varices along fundus and body and gastroepiploic collateral vessels, chronic thrombosis of splenic vein, gastrostomy tube in place, LLL pulm nodule, tree in bud opacities RML and LLL  9/1/24 transfuse additional 1 unit PRBCs  9/2/24 negative PEG lavage  9/3/24 EGD: normal esophagus, normal stomach. small patch of gastritis at incisura (non bleeding) PEG tract without bleeding, no ulcer or stigmata of bleeding. Duodenum WNL  PEG bumper placed at 4.5 cm. Surgicell still in place  #remote history of head/Neck CA sp Chemo + XRT  #sp hemicolectomy  #sp partial pancreatectomy  #DVT+PE   AC held x 24 hours for PEG  AC (Lovenox) restarted 8/28 pm.   8/29 AM scant PEG site oozing, sl Hgb downtrend. Topical surgicell applied at PEG site. 8/29 PM CBC stable  8/30 PM AC held 2/2 PEG site bleeding (see above)     RECS:  -Keep surgicell in place; do not remove  -continue PEG feeds; monitor tolerance  -monitor CBC and BMs   -continue to hold AC today; plan to restart AC 9/5 (SQ Lovenox)   -Abdominal binder on at all times please to prevent accidental PEG trauma/tugging/dislodgement  -PPI for GI ppx    Discussed with pt at bedside; all questions answered  Discussed with Neurosurgery team And Medicine    Avtar Mixon PA-C  [facility] Teaching GI Service  Available on TEAMS Mon-Fri 8a-4p  After hours and weekend coverage [phone]   80 yo male with PMH of HTN, T2DM (diet controlled), HLD, COPD, CKD 3, carcinoid tumor s/p hemicolectomy, nasopharyngeal carcinoma s/p chemo/RT (c/b dysphagia ), IPMN s/p distal pancreatectomy (2018), and paroxysmal Afib on Eliquis s/p craniotomy 8/6 for resection of hemorrhagic lesion. Now presented to the ED for RUE swelling, found to have RUE DVT involving subclavian, axillary, and brachial veins and PE.    Pt was known to have cough w/ PO previously and was evaluated w/ OP MBS earlier this year - using thickened fluids at home   Post op course further complicated by Dysphagia - failed multiple SLP evaluations with recs: NPO   Pt and family are now agreeable to PEG placement for which GI is consulted    #Dysphagia -sp EGD + PEG placement 8/27/24 c/b PEG site bleeding  8/28 scant dried blood under PEG bumper; no active bleeding, induration, swelling or edema  PEG bumper between 3.5 and 4 cm at skin level  8/29 scant oozing, slight downtrend in Hgb. Negative PEG lavage. Surgicell placed at PEG tract  8/29 PM - recurrent PEG site bleeding; Surgicell intact. Resolved without further intervention. Hgb stable 6pm (9.4 ->9.8)  8/30 AM minimal ooze at PEG site, no active bleeding. Re-assessed at 12:40pm- Notified by Medicine attending of AM Hgb 9.8 ->8.5. No melena +PEG lavage: pink tinged return with water/feeds. Previously placed surgicell removed, minimal oozing noted at ends of PEG site incision - new Surgicell strip placed at PEG incision under PEG bumper, lightly tucked into ends (medical and lateral aspects of PEG site incision)   No visible oozing, surgicell noted to be clean.  Hgb 8.3 1 unit PRBCs transfused  8/31 CT: no e/o GI bleed, gastric varices along fundus and body and gastroepiploic collateral vessels, chronic thrombosis of splenic vein, gastrostomy tube in place, LLL pulm nodule, tree in bud opacities RML and LLL  9/1/24 transfuse additional 1 unit PRBCs  9/2/24 negative PEG lavage  9/3/24 EGD: normal esophagus, normal stomach. small patch of gastritis at incisura (non bleeding) PEG tract without bleeding, no ulcer or stigmata of bleeding. Duodenum WNL  PEG bumper placed at 4.5 cm. Surgicell still in place  Impression:  Unremarkable EGD, no signs of portal hypertension. SUspect resolved bleed from PEG tract. Per CT patient may have some intraabdominal collaterals that were within the PEG tract.  #remote history of head/Neck CA sp Chemo + XRT  #sp hemicolectomy  #sp partial pancreatectomy  #DVT+PE   AC held x 24 hours for PEG  AC (Lovenox) restarted 8/28 pm.   8/29 AM scant PEG site oozing, sl Hgb downtrend. Topical surgicell applied at PEG site. 8/29 PM CBC stable  8/30 PM AC held 2/2 PEG site bleeding (see above)     RECS:  -Keep surgicell in place; do not remove  -continue PEG feeds; monitor tolerance  -monitor CBC and BMs   -continue to hold AC today; plan to restart AC 9/5 (SQ Lovenox)   -Abdominal binder on at all times please to prevent accidental PEG trauma/tugging/dislodgement  -PPI for GI ppx    Discussed with pt at bedside; all questions answered  Discussed with Neurosurgery team And Medicine    Avtar Mixon PA-C  [facility] Non Teaching GI Service  Available on TEAMS Mon-Fri 8a-4p  After hours and weekend coverage [phone]

## 2024-09-04 NOTE — PROGRESS NOTE ADULT - ASSESSMENT
ASSESSMENT AND PLAN: 79M hx recent admission for and R crani for lesion w/ Dr. 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 Today presents to ED for RUE doppler w/ DVT involving the subclavian, axillary and brachial veins and Superficial thrombus in the right basilic vein. Plts/coags wnl.     8/27 s/p PEG placement with GI (Dr. Savage)    NEURO:   - Continue neuro checks q 4  - Keppra for seizure prophylaxis  - Tylenol prn for pain control  - PT: Home PT w/ RW, PMR saw and recommended PEGGY    PULM:   - On room air, O2Sat>96%  - Incentive spirometry  - 8/16 CTA Chest: segmental emboli w/in right upper lobe pulm artery, no significant evidence of right heart strain; emphysema, new mucoid impaction likely COPD exacerbations - f/u CT Chest in 3 months  - Continue Proventil, Symbicort, Spiriva     CV:  - -160, hypertensive to 200's yesterday afternoon but much better and within goals this morning  - Continue Norvasc for HTN  - Continue Metoprolol for Afib  - Continue Simvastatin for HLD  - 8/16 TTE: LV systolic fxn normal w/ EF 71%, normal LV diastolic fxn, normal RV systolic fxn normal, LV systolic fxn is normal    ENDO:   - A1c 6.3, continue Glucerna TF, fingersticks stable    HEME/ONC & DVT ppx:             9/4 CBC H/H 11.8/34.7 (10.9/33.1), last transfused with PRBC x 1 on 9/1. Currently stable, will trend in am and if remains stable will restart AC (SQL) tomorrow and monitor CBC after as discussed w/ GI / Hospitalist and Dr. Branham. Vascular Cardiology following for PE on CTA Chest on 8/16, and persistent DVT in right subclavian vein and non-occlusive right axillary vein, right basilic vein SVT, was being treated w/ SQ Lovenox, will reinstate once H/H stable and if PEG site continues to have no bleeding. Pending repeat UE/LE Dopplers today    RENAL:   - IVL  - 9/4 BMP stable  - Voiding    ID:   - Afebrile    GI:    - Currently has PEG placed by GI on 8/27, complicated by bleeding around the site that has since resolved, did require transfusions x 2 previously and AC currently on hold. GI did EGD yesterday which shows unremarkable EGD, no signs of portal hypertension, suspect resolved bleed from PEG tract. Per CT patient may have some intraabdominal collaterals that were within the PEG tract.  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  - Discussed today with GI - rec restart AC tomorrow and monitor / trend CBC.   - Continue Protonix for GI ppx  - Senna and Miralax for bowel regimen, last BM 9/1    Appreciate Hospitalist following for co-medical management.     More than 30 minutes were spent educating the patient regarding condition, medications, follow up plans, signs and symptoms to be concerned with, preparing paperwork, and questions answered regarding discharge.     DISCHARGE PLANNING:   PEGGY once patient is back on AC and no bleeding around PEG site    Plan to be discussed w/ Dr. Branham  [id number]

## 2024-09-04 NOTE — PROGRESS NOTE ADULT - ASSESSMENT
ASSESSMENT/PLAN: POD 28 R. 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 bleed  F/U CMP mg and Phos    GI:  Suspected G.I. Bleed  F/U FOBT  F/U CT abd and P with IV contrast to rule out active G.I. bleed. Patient has a history of a hemicolectomy 2/2 carcinomatosis   G.I. on board, recommends PPI ggt if Hg drops. Hg dropped from 13.1 --> 11.5. recent EGD 09/03 negative   CBC Q12 to monitor Hg  Diet: NPO, feeds on hold   GI prophylaxis [] not indicated [] PPI [] other:  Bowel regimen [] colace [] senna [] other:  09/04: last BM    ENDO:   Goal euglycemia (-180)    HEME/ONC:  VTE prophylaxis: [x] SCDs [] chemoprophylaxis [] high risk of DVT/PE on admission due to:  DVT ppx and or Theraprutic lovenox on hold   09/04: Stable persistent clot in the right subclavian and axillary veins.  Thrombus around left subclavian vein line , new.  Left basilic vein thrombosis.    ID: Afebrile   monitor for fevers     MISC:    SOCIAL/FAMILY:  [] updated at bedside [] family meeting    CODE STATUS:  [] Full Code [] DNR [] DNI [] Palliative/Comfort Care    DISPOSITION:  [] ICU [] Stroke Unit [] Floor [] EMU [] RCU [] PCU    [] Patient is at high risk of neurologic deterioration/death due to:     Time seen:  Time spent: ___ [] critical care minutes

## 2024-09-04 NOTE — PROGRESS NOTE ADULT - PROBLEM SELECTOR PLAN 2
no further episodes of bleeding from PEG site since 8/30  - however, with ongoing worsening anemia  - CT abd with IV contrast performed on 8/31 - limited due to residual contrast from prior study, but no active bleed identified  - s/p additional 1u PRBC on 9/1 with robust response and stable on repeat  - GI lavage of PEG appreciated - evidence of bleed, just gastric contents  - however, new melena on 9/2. PPI increased to IV BID - continue fo rnow  - s/p EGD on 9/3 with no signs of active bleed  - monitor Hb on CBC daily  - transfuse for Hb<7 no further episodes of bleeding from PEG site since 8/30  - however, with ongoing worsening anemia  - CT abd with IV contrast performed on 8/31 - limited due to residual contrast from prior study, but no active bleed identified  - s/p additional 1u PRBC on 9/1 with robust response and stable on repeat  - GI lavage of PEG appreciated - no evidence of bleed, just gastric contents  - however, new melena on 9/2. PPI increased to IV BID - continue fo rnow  - s/p EGD on 9/3 with no signs of active bleed  - monitor Hb on CBC daily  - transfuse for Hb<7

## 2024-09-04 NOTE — PROGRESS NOTE ADULT - PROBLEM SELECTOR PLAN 1
and RUE DVT  - duplex of RUE: Right sided DVT involving the subclavian, axillary and brachial veins. Superficial thrombus in the right basilic vein  - 8/28 repeat UE duplex with persistent DVT in the right subclavian vein and right axillary vein, right basilic vein SVT.  - CT Angio chest: segmental emboli within RUL pulmonary artery with no evidence of RV strain  - TTE unremarkable  - Vascular Cardiology consulted, and started on hep gtt on 8/16, transitioned to therapeutic lovenox on 8/18 but on hold in setting of bleeding at PEG site, last dose was 8/30 AM  - surveillance duplex 9/4  - therapeutic lovenox on hold - d/w NSGY and GI, plan to resume on 9/5.

## 2024-09-04 NOTE — RAPID RESPONSE TEAM SUMMARY - NSSITUATIONBACKGROUNDRRT_GEN_ALL_CORE
1M hx recent admission for and R crani for lesion w/ Dr. 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 presents initially to ED for RUE doppler w/ DVT involving the subclavian, axillary and brachial veins and Superficial thrombus in the right basilic vein.Plts/coags wnl.

## 2024-09-04 NOTE — RAPID RESPONSE TEAM SUMMARY - NSADDTLFINDINGSRRT_GEN_ALL_CORE
upon entering the room , patient soiled himself, ao*2, pupil equal reactive, no focal neuro deficits. Fs low, BP high.

## 2024-09-04 NOTE — PROGRESS NOTE ADULT - SUBJECTIVE AND OBJECTIVE BOX
SUBJECTIVE: HPI: 79M hx recent admission for and R crani for lesion w/ Dr. 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 Today presents to ED for RUE doppler w/ DVT involving the subclavian, axillary and brachial veins and Superficial thrombus in the right basilic vein. Plts/coags wnl.       OVERNIGHT EVENTS: No acute events overnight, patient seen and evaluated at bedside this morning remains off AC, PEG site C/D/I. Tolerating tube feeds, reports feeling unhappy as he would like to go to rehab soon.     Vital Signs Last 24 Hrs  T(C): 36.9 (04 Sep 2024 09:15), Max: 36.9 (04 Sep 2024 05:08)  T(F): 98.4 (04 Sep 2024 09:15), Max: 98.4 (04 Sep 2024 05:08)  HR: 74 (04 Sep 2024 09:15) (61 - 84)  BP: 139/80 (04 Sep 2024 09:15) (111/63 - 208/99)  BP(mean): 120 (03 Sep 2024 14:25) (120 - 120)  RR: 18 (04 Sep 2024 09:15) (15 - 20)  SpO2: 98% (04 Sep 2024 09:15) (96% - 100%)    Parameters below as of 04 Sep 2024 09:15  Patient On (Oxygen Delivery Method): room air        PHYSICAL EXAM:    General: No Acute Distress     Neurological: Awake, Ox3 (name, place, date), PERRL, EOMI, following commands, no drift, uppers 5/5, lowers 5/5, SILT    Pulmonary: Clear to Auscultation, No Rales, No Rhonchi, No Wheezes     Cardiovascular: S1, S2, Regular Rate and Rhythm     Gastrointestinal: Soft, Nontender, Nondistended, PEG in place w/ abdominal binder C/D/I    Incision: right craniotomy incision has absorbable sutures in place C/D/I    LABS:                        11.8   5.38  )-----------( 298      ( 04 Sep 2024 09:02 )             34.7    09-04    140  |  104  |  16  ----------------------------<  125<H>  4.3   |  22  |  0.81    Ca    9.4      04 Sep 2024 09:02  Phos  3.3     09-04  Mg     2.1     09-04 09-03 @ 07:01  -  09-04 @ 07:00  --------------------------------------------------------  IN: 0 mL / OUT: 1350 mL / NET: -1350 mL      DRAINS: None    MEDICATIONS:  Antibiotics:    Neuro:  acetaminophen     Tablet .. 650 milliGRAM(s) Oral every 6 hours PRN Mild Pain (1 - 3)  levETIRAcetam  IVPB 250 milliGRAM(s) IV Intermittent every 12 hours    Cardiac:  amLODIPine   Tablet 5 milliGRAM(s) Oral daily  metoprolol tartrate 12.5 milliGRAM(s) Oral every 12 hours    Pulm:  albuterol    0.083% 2.5 milliGRAM(s) Nebulizer every 6 hours  albuterol    90 MICROgram(s) HFA Inhaler 1 Puff(s) Inhalation every 4 hours  budesonide 160 MICROgram(s)/formoterol 4.5 MICROgram(s) Inhaler 2 Puff(s) Inhalation two times a day  tiotropium 2.5 MICROgram(s) Inhaler 2 Puff(s) Inhalation daily    GI/:  bisacodyl 5 milliGRAM(s) Oral daily PRN Constipation  pantoprazole  Injectable 40 milliGRAM(s) IV Push two times a day  polyethylene glycol 3350 17 Gram(s) Oral two times a day  senna Syrup 10 milliLiter(s) Oral at bedtime    Other:   chlorhexidine 2% Cloths 1 Application(s) Topical <User Schedule>  glucagon  Injectable 1 milliGRAM(s) IntraMuscular once  lidocaine 4% Injection for Nebulization 4 milliLiter(s) Nebulizer once  Nephro-corine 1 Tablet(s) Oral daily  simvastatin 20 milliGRAM(s) Oral at bedtime  sodium chloride 0.9%. 1000 milliLiter(s) IV Continuous <Continuous>    DIET: [] Regular [] CCD [] Renal [] Puree [] Dysphagia [x] Tube Feeds: Glucerna@60 via PEG    IMAGING:   < from: CT Abdomen and Pelvis w/ IV Cont (08.31.24 @ 17:38) >  IMPRESSION:    *  Nonspecific amorphous hyperdense material within the gastric fundus,   potentially something ingested. Gastric varices along the fundus and  body. No definite evidence of active gastric hemorrhage at this time.  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 These findings are similar   to 7/2/2023.  *  High density material within the colon, likely residual oral contrast,   which limits evaluation for active colonic hemorrhage. Otherwise, no   active intestinal hemorrhage is identified.  *  Percutaneous gastrostomy tube in place. Fat infiltration and small   amount of gas surrounding the gastrostomy catheter along its course   within the peritoneum and ventral abdominal wall, possibly postsurgical.   No associated collection.  *  Left lower lobe pulmonary nodule measuring 10 mm, possibly new.   Continuedfollow-up is advised.  *  High density material in the left lower lobe, new since 8/16/2024, and   suspected to represent aspirated contrast material from the recent   fluoroscopic barium swallow study.  *  Tree-in-bud opacities in the right middle lobe and left lower lobe,   which could reflect the endobronchial spread of infectious/process or   aspiration.    --- End of Report ---    JAMAL LUBIN MD; Resident Radiologist  This document has been electronically signed.  HARI BEAL MD; Attending Radiologist  This document has been electronically signed. Sep  1 2024  4:39PM    < end of copied text >

## 2024-09-04 NOTE — PROGRESS NOTE ADULT - SUBJECTIVE AND OBJECTIVE BOX
HOSPITAL COURSE: 78 YO M with a PMhx pf R crani for lesion w/ Dr. 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 team negative for any acute pathology.     09/04: Near syncope like episode while having a BM. Reported to be dark tarry stools. Patient upgraded to the NSICU for frequent vitals.   Patient HD stable.       Allergies    penicillin (Other)  ACE inhibitors (Other)  Bee Stings- anaphylaxis (Other)    Intolerances        REVIEW OF SYSTEMS: [ ] Unable to Assess due to neurologic exam   [ ] All ROS addressed below are non-contributory, except:  Neuro: [ ] Headache [ ] Back pain [ ] Numbness [ ] Weakness [ ] Ataxia [ ] Dizziness [ ] Aphasia [ ] Dysarthria [ ] Visual disturbance  Resp: [ ] Shortness of breath/dyspnea, [ ] Orthopnea [ ] Cough  CV: [ ] Chest pain [ ] Palpitation [ ] Lightheadedness [ ] Syncope  Renal: [ ] Thirst [ ] Edema  GI: [ ] Nausea [ ] Emesis [ ] Abdominal pain [ ] Constipation [ ] Diarrhea  Hem: [ ] Hematemesis [ ] bright red blood per rectum  ID: [ ] Fever [ ] Chills [ ] Dysuria  ENT: [ ] Rhinorrhea      DEVICES:   [ ] Restraints [ ] ET tube [ ] central line [ ] arterial line [ ] castillo [ ] NGT/OGT [ ] EVD [ ] LD [ ] FALLON/HMV [ ] Trach [ ] PEG [ ] Chest Tube     VITALS:   Vital Signs Last 24 Hrs  T(C): 36.9 (04 Sep 2024 20:30), Max: 36.9 (04 Sep 2024 05:08)  T(F): 98.5 (04 Sep 2024 20:30), Max: 98.5 (04 Sep 2024 20:30)  HR: 100 (04 Sep 2024 20:30) (68 - 100)  BP: 176/92 (04 Sep 2024 20:30) (116/76 - 176/92)  BP(mean): --  RR: 18 (04 Sep 2024 20:30) (18 - 19)  SpO2: 94% (04 Sep 2024 20:30) (94% - 99%)    Parameters below as of 04 Sep 2024 20:30  Patient On (Oxygen Delivery Method): room air      CAPILLARY BLOOD GLUCOSE      POCT Blood Glucose.: 131 mg/dL (04 Sep 2024 20:20)  POCT Blood Glucose.: 88 mg/dL (04 Sep 2024 19:59)  POCT Blood Glucose.: 129 mg/dL (04 Sep 2024 11:54)  POCT Blood Glucose.: 124 mg/dL (04 Sep 2024 06:57)    I&O's Summary    03 Sep 2024 07:01  -  04 Sep 2024 07:00  --------------------------------------------------------  IN: 0 mL / OUT: 1350 mL / NET: -1350 mL    04 Sep 2024 07:01  -  04 Sep 2024 21:05  --------------------------------------------------------  IN: 0 mL / OUT: 0 mL / NET: 0 mL        Respiratory:        LABS:                        11.5   5.29  )-----------( 285      ( 04 Sep 2024 20:43 )             36.9     09-04    140  |  104  |  16  ----------------------------<  125<H>  4.3   |  22  |  0.81             MEDICATION LEVELS:     IVF FLUIDS/MEDICATIONS:   MEDICATIONS  (STANDING):  albuterol    0.083% 2.5 milliGRAM(s) Nebulizer every 6 hours  albuterol    90 MICROgram(s) HFA Inhaler 1 Puff(s) Inhalation every 4 hours  amLODIPine   Tablet 5 milliGRAM(s) Oral daily  budesonide 160 MICROgram(s)/formoterol 4.5 MICROgram(s) Inhaler 2 Puff(s) Inhalation two times a day  chlorhexidine 2% Cloths 1 Application(s) Topical <User Schedule>  glucagon  Injectable 1 milliGRAM(s) IntraMuscular once  labetalol Injectable 5 milliGRAM(s) IV Push once  levETIRAcetam  IVPB 250 milliGRAM(s) IV Intermittent every 12 hours  lidocaine 4% Injection for Nebulization 4 milliLiter(s) Nebulizer once  metoprolol tartrate 12.5 milliGRAM(s) Oral every 12 hours  Nephro-corine 1 Tablet(s) Oral daily  pantoprazole  Injectable 40 milliGRAM(s) IV Push two times a day  polyethylene glycol 3350 17 Gram(s) Oral two times a day  senna Syrup 10 milliLiter(s) Oral at bedtime  simvastatin 20 milliGRAM(s) Oral at bedtime  sodium chloride 0.9%. 1000 milliLiter(s) (70 mL/Hr) IV Continuous <Continuous>  tiotropium 2.5 MICROgram(s) Inhaler 2 Puff(s) Inhalation daily    MEDICATIONS  (PRN):  acetaminophen     Tablet .. 650 milliGRAM(s) Oral every 6 hours PRN Mild Pain (1 - 3)  bisacodyl 5 milliGRAM(s) Oral daily PRN Constipation        IMAGING:      EXAMINATION:  PHYSICAL EXAM:    Constitutional: No Acute Distress     Neurological: Awake, alert oriented to person, place and time ( with choices ), Following Commands, PERRL, EOMI, No Gaze Preference, left mild facial, Speech Fluent, No dysmetria, No ataxia, No nystagmus     Motor exam:          Upper extremity                         Delt     Bicep     Tricep    HG                                                 R         5/5        5/5        5/5       5/5                                               L          5/5        5/5        5/5       5/5          Lower extremity                        HF         KF        KE       DF         PF                                                  R        5/5        5/5        5/5       5/5         5/5                                               L         5/5        5/5       5/5       5/5          5/5      Pulmonary: Clear to Auscultation, No rales, No rhonchi, No wheezes     Cardiovascular: S1, S2, Regular rate and rhythm     Gastrointestinal: Soft, Non-tender, Non-distended. PEG tube in place c.d.i.    Extremities: No calf tenderness     Incision:

## 2024-09-04 NOTE — PROGRESS NOTE ADULT - SUBJECTIVE AND OBJECTIVE BOX
Lizet Page MD  Division of Hospital Medicine  [facility]  Spectra: [phone]      Patient is a 79y old  Male who presents with a chief complaint of post-operative DVT (03 Sep 2024 14:15)      SUBJECTIVE / OVERNIGHT EVENTS: transient hypertension requiring IVP hydralazine x1 but resolved. cantankerous as he wants to go home. explained will need to re-trial full a/c and ensure no more episodes of bleed before he can go home.  ADDITIONAL REVIEW OF SYSTEMS:    MEDICATIONS  (STANDING):  albuterol    0.083% 2.5 milliGRAM(s) Nebulizer every 6 hours  albuterol    90 MICROgram(s) HFA Inhaler 1 Puff(s) Inhalation every 4 hours  amLODIPine   Tablet 5 milliGRAM(s) Oral daily  budesonide 160 MICROgram(s)/formoterol 4.5 MICROgram(s) Inhaler 2 Puff(s) Inhalation two times a day  chlorhexidine 2% Cloths 1 Application(s) Topical <User Schedule>  glucagon  Injectable 1 milliGRAM(s) IntraMuscular once  levETIRAcetam  IVPB 250 milliGRAM(s) IV Intermittent every 12 hours  lidocaine 4% Injection for Nebulization 4 milliLiter(s) Nebulizer once  metoprolol tartrate 12.5 milliGRAM(s) Oral every 12 hours  Nephro-corine 1 Tablet(s) Oral daily  pantoprazole  Injectable 40 milliGRAM(s) IV Push two times a day  polyethylene glycol 3350 17 Gram(s) Oral two times a day  senna Syrup 10 milliLiter(s) Oral at bedtime  simvastatin 20 milliGRAM(s) Oral at bedtime  sodium chloride 0.9%. 1000 milliLiter(s) (70 mL/Hr) IV Continuous <Continuous>  tiotropium 2.5 MICROgram(s) Inhaler 2 Puff(s) Inhalation daily    MEDICATIONS  (PRN):  acetaminophen     Tablet .. 650 milliGRAM(s) Oral every 6 hours PRN Mild Pain (1 - 3)  bisacodyl 5 milliGRAM(s) Oral daily PRN Constipation      CAPILLARY BLOOD GLUCOSE      POCT Blood Glucose.: 124 mg/dL (04 Sep 2024 06:57)    I&O's Summary    03 Sep 2024 07:01  -  04 Sep 2024 07:00  --------------------------------------------------------  IN: 0 mL / OUT: 1350 mL / NET: -1350 mL        PHYSICAL EXAM:  Vital Signs Last 24 Hrs  T(C): 36.9 (04 Sep 2024 09:15), Max: 36.9 (04 Sep 2024 05:08)  T(F): 98.4 (04 Sep 2024 09:15), Max: 98.4 (04 Sep 2024 05:08)  HR: 74 (04 Sep 2024 09:15) (61 - 84)  BP: 139/80 (04 Sep 2024 09:15) (111/63 - 208/99)  BP(mean): 120 (03 Sep 2024 14:25) (120 - 120)  RR: 18 (04 Sep 2024 09:15) (15 - 20)  SpO2: 98% (04 Sep 2024 09:15) (96% - 100%)    Parameters below as of 04 Sep 2024 09:15  Patient On (Oxygen Delivery Method): room air    CONSTITUTIONAL: NAD, well-developed, well-groomed  EYES: PERRLA; conjunctiva and sclera clear  ENMT: Moist oral mucosa, no pharyngeal injection or exudates; normal dentition  NECK: Supple, no palpable masses; no thyromegaly  RESPIRATORY: Normal respiratory effort; lungs are clear to auscultation bilaterally  CARDIOVASCULAR: Regular rate and rhythm, normal S1 and S2, no murmur/rub/gallop; No lower extremity edema  ABDOMEN: Soft, Nondistended, Nontender to palpation, normoactive bowel sounds, +PEG site c/d/i with no bleed  MUSCULOSKELETAL: No clubbing or cyanosis of digits; no joint swelling or tenderness to palpation  PSYCH: A+O to person, place, and time; affect appropriate though cantankerous  NEUROLOGY: CN 2-12 are intact and symmetric; no gross sensory deficits   SKIN: No rashes; no palpable lesions, +prior R crani incision site c/d/i    LABS:                        11.8   5.38  )-----------( 298      ( 04 Sep 2024 09:02 )             34.7     09-04    140  |  104  |  16  ----------------------------<  125<H>  4.3   |  22  |  0.81    Ca    9.4      04 Sep 2024 09:02  Phos  3.3     09-04  Mg     2.1     09-04        Urinalysis Basic - ( 04 Sep 2024 09:02 )    Color: x / Appearance: x / SG: x / pH: x  Gluc: 125 mg/dL / Ketone: x  / Bili: x / Urobili: x   Blood: x / Protein: x / Nitrite: x   Leuk Esterase: x / RBC: x / WBC x   Sq Epi: x / Non Sq Epi: x / Bacteria: x      RADIOLOGY & ADDITIONAL TESTS:  Results Reviewed: H/H stable  Imaging Personally Reviewed:  9/3/24 Upper Endoscopy:  Findings:       The examined esophagus was normal.       The entire examined stomach was normal, except for a small patch of erythema on the incisura.        the Gastrostomy tract appeared intact. No ulcer or stigmata of bleeding       The examined duodenum was normal.                                                                                                        Impression:          - Unremarkable EGD, no signs of portal hypertension. SUspect resolved bleed                        from PEG tract. Per CT patient may have some intraabdominal collaterals that                        were within the PEG tract.  Recommendation:      - [facility].                       - Agree with plan to hold a/c and rechallenge in a few days.  Electrocardiogram Personally Reviewed:    COORDINATION OF CARE:  Care Discussed with Consultants/Other Providers [Y]: GI Michael Nova, Neurosurgery MICHAEL James  Prior or Outpatient Records Reviewed [Y/N]:

## 2024-09-04 NOTE — PROGRESS NOTE ADULT - ASSESSMENT
80 yo male with PMH of HTN, T2DM (diet controlled), HLD, COPD, CKD 3, carcinoid tumor s/p hemicolectomy, nasopharyngeal carcinoma s/p chemo/RT (c/b dysphagia ), IPMN s/p distal pancreatectomy (2018), and paroxysmal Afib on Eliquis s/p craniotomy 8/6 for resection of hemorrhagic lesion presented to the ED for RUE swelling, found to have RUE DVT involving subclavian, axillary, and brachial veins and PE. Course also c/b dysphagia s/p PEG 8/27, c/b bleeding around PEG site and melena now s/p EGD with no evidence of active bleed.

## 2024-09-04 NOTE — CHART NOTE - NSCHARTNOTEFT_GEN_A_CORE
Brief GI Note:     Called by team about patient. Patient in the bathroom with vasovagal episode while having stool movements. Stool reportedly not black or tarry, greenish in color.   Would recommend checking vitals, resuscitation with fluid or blood if needed (i.e. if repeat Hgb with blood loss). Unlikely to be syncope from GI bleed given that endoscopy on Sept 3 (one day prior) with no signs of bleeding, no ulcers. Also patient with green stool movement.   If patient with black, tarry stool or Hgb drop, would recommend NPO, resuscitation for Hgb >7, IV PPI 80mg x1 followed by gtt.   GI team to follow in AM with formal note.     Monie Mckeon MD, PGY6  GI Fellow Brief GI Note:     Called by team about patient. Patient in the bathroom with vasovagal episode while having stool movements. Stool reportedly not black or tarry, greenish in color.   Would recommend checking vitals, resuscitation with fluid or blood if needed (i.e. if repeat Hgb with blood loss). Unlikely to be syncope from GI bleed given that endoscopy on Sept 3 (one day prior) with no signs of bleeding, no ulcers. Also patient with green stool movement.     If patient with black, tarry stool or Hgb drop, would recommend NPO, resuscitation for Hgb >7, IV PPI 80mg x1 followed by gtt, hold AC.   GI team to follow in AM with formal note.     Monie Mckeon MD, PGY6  GI Fellow

## 2024-09-04 NOTE — RAPID RESPONSE TEAM SUMMARY - NSOTHERINTERVENTIONSRRT_GEN_ALL_CORE
syncope work up labs, half amp of d50, and 5mg IV labetolol given. likely vasal vagal syncope.  NSICU evaluated patient and will be transferred to NSICU.

## 2024-09-05 LAB
ANION GAP SERPL CALC-SCNC: 15 MMOL/L — SIGNIFICANT CHANGE UP (ref 5–17)
ANION GAP SERPL CALC-SCNC: 15 MMOL/L — SIGNIFICANT CHANGE UP (ref 5–17)
BUN SERPL-MCNC: 21 MG/DL — SIGNIFICANT CHANGE UP (ref 7–23)
BUN SERPL-MCNC: 21 MG/DL — SIGNIFICANT CHANGE UP (ref 7–23)
CALCIUM SERPL-MCNC: 8.7 MG/DL — SIGNIFICANT CHANGE UP (ref 8.4–10.5)
CALCIUM SERPL-MCNC: 9.1 MG/DL — SIGNIFICANT CHANGE UP (ref 8.4–10.5)
CHLORIDE SERPL-SCNC: 104 MMOL/L — SIGNIFICANT CHANGE UP (ref 96–108)
CHLORIDE SERPL-SCNC: 105 MMOL/L — SIGNIFICANT CHANGE UP (ref 96–108)
CO2 SERPL-SCNC: 18 MMOL/L — LOW (ref 22–31)
CO2 SERPL-SCNC: 19 MMOL/L — LOW (ref 22–31)
CREAT SERPL-MCNC: 0.95 MG/DL — SIGNIFICANT CHANGE UP (ref 0.5–1.3)
CREAT SERPL-MCNC: 1.18 MG/DL — SIGNIFICANT CHANGE UP (ref 0.5–1.3)
EGFR: 62 ML/MIN/1.73M2 — SIGNIFICANT CHANGE UP
EGFR: 81 ML/MIN/1.73M2 — SIGNIFICANT CHANGE UP
GLUCOSE SERPL-MCNC: 106 MG/DL — HIGH (ref 70–99)
GLUCOSE SERPL-MCNC: 85 MG/DL — SIGNIFICANT CHANGE UP (ref 70–99)
HCT VFR BLD CALC: 34.3 % — LOW (ref 39–50)
HCT VFR BLD CALC: 37.6 % — LOW (ref 39–50)
HCT VFR BLD CALC: 39.1 % — SIGNIFICANT CHANGE UP (ref 39–50)
HGB BLD-MCNC: 11.1 G/DL — LOW (ref 13–17)
HGB BLD-MCNC: 12.5 G/DL — LOW (ref 13–17)
HGB BLD-MCNC: 13 G/DL — SIGNIFICANT CHANGE UP (ref 13–17)
MAGNESIUM SERPL-MCNC: 1.9 MG/DL — SIGNIFICANT CHANGE UP (ref 1.6–2.6)
MCHC RBC-ENTMCNC: 30.4 PG — SIGNIFICANT CHANGE UP (ref 27–34)
MCHC RBC-ENTMCNC: 30.9 PG — SIGNIFICANT CHANGE UP (ref 27–34)
MCHC RBC-ENTMCNC: 31.3 PG — SIGNIFICANT CHANGE UP (ref 27–34)
MCHC RBC-ENTMCNC: 32.4 GM/DL — SIGNIFICANT CHANGE UP (ref 32–36)
MCHC RBC-ENTMCNC: 33.2 GM/DL — SIGNIFICANT CHANGE UP (ref 32–36)
MCHC RBC-ENTMCNC: 33.2 GM/DL — SIGNIFICANT CHANGE UP (ref 32–36)
MCV RBC AUTO: 92.9 FL — SIGNIFICANT CHANGE UP (ref 80–100)
MCV RBC AUTO: 94 FL — SIGNIFICANT CHANGE UP (ref 80–100)
MCV RBC AUTO: 94 FL — SIGNIFICANT CHANGE UP (ref 80–100)
NRBC # BLD: 0 /100 WBCS — SIGNIFICANT CHANGE UP (ref 0–0)
PHOSPHATE SERPL-MCNC: 3.4 MG/DL — SIGNIFICANT CHANGE UP (ref 2.5–4.5)
PLATELET # BLD AUTO: 210 K/UL — SIGNIFICANT CHANGE UP (ref 150–400)
PLATELET # BLD AUTO: 238 K/UL — SIGNIFICANT CHANGE UP (ref 150–400)
PLATELET # BLD AUTO: 254 K/UL — SIGNIFICANT CHANGE UP (ref 150–400)
POTASSIUM SERPL-MCNC: 3.9 MMOL/L — SIGNIFICANT CHANGE UP (ref 3.5–5.3)
POTASSIUM SERPL-MCNC: 4.2 MMOL/L — SIGNIFICANT CHANGE UP (ref 3.5–5.3)
POTASSIUM SERPL-SCNC: 3.9 MMOL/L — SIGNIFICANT CHANGE UP (ref 3.5–5.3)
POTASSIUM SERPL-SCNC: 4.2 MMOL/L — SIGNIFICANT CHANGE UP (ref 3.5–5.3)
RBC # BLD: 3.65 M/UL — LOW (ref 4.2–5.8)
RBC # BLD: 4 M/UL — LOW (ref 4.2–5.8)
RBC # BLD: 4.21 M/UL — SIGNIFICANT CHANGE UP (ref 4.2–5.8)
RBC # FLD: 14.7 % — HIGH (ref 10.3–14.5)
RBC # FLD: 14.9 % — HIGH (ref 10.3–14.5)
RBC # FLD: 15.3 % — HIGH (ref 10.3–14.5)
SODIUM SERPL-SCNC: 138 MMOL/L — SIGNIFICANT CHANGE UP (ref 135–145)
SODIUM SERPL-SCNC: 138 MMOL/L — SIGNIFICANT CHANGE UP (ref 135–145)
WBC # BLD: 8.24 K/UL — SIGNIFICANT CHANGE UP (ref 3.8–10.5)
WBC # BLD: 8.68 K/UL — SIGNIFICANT CHANGE UP (ref 3.8–10.5)
WBC # BLD: 8.74 K/UL — SIGNIFICANT CHANGE UP (ref 3.8–10.5)
WBC # FLD AUTO: 8.24 K/UL — SIGNIFICANT CHANGE UP (ref 3.8–10.5)
WBC # FLD AUTO: 8.68 K/UL — SIGNIFICANT CHANGE UP (ref 3.8–10.5)
WBC # FLD AUTO: 8.74 K/UL — SIGNIFICANT CHANGE UP (ref 3.8–10.5)

## 2024-09-05 PROCEDURE — 99233 SBSQ HOSP IP/OBS HIGH 50: CPT

## 2024-09-05 PROCEDURE — 74176 CT ABD & PELVIS W/O CONTRAST: CPT | Mod: 26

## 2024-09-05 PROCEDURE — 70450 CT HEAD/BRAIN W/O DYE: CPT | Mod: 26

## 2024-09-05 RX ORDER — PANTOPRAZOLE SODIUM 40 MG
40 TABLET, DELAYED RELEASE (ENTERIC COATED) ORAL
Refills: 0 | Status: DISCONTINUED | OUTPATIENT
Start: 2024-09-05 | End: 2024-09-06

## 2024-09-05 RX ORDER — SODIUM CHLORIDE 9 MG/ML
500 INJECTION INTRAMUSCULAR; INTRAVENOUS; SUBCUTANEOUS ONCE
Refills: 0 | Status: COMPLETED | OUTPATIENT
Start: 2024-09-05 | End: 2024-09-05

## 2024-09-05 RX ORDER — POTASSIUM CHLORIDE 10 MEQ
40 TABLET, EXT RELEASE, PARTICLES/CRYSTALS ORAL ONCE
Refills: 0 | Status: COMPLETED | OUTPATIENT
Start: 2024-09-05 | End: 2024-09-05

## 2024-09-05 RX ORDER — METOPROLOL TARTRATE 100 MG/1
5 TABLET ORAL ONCE
Refills: 0 | Status: DISCONTINUED | OUTPATIENT
Start: 2024-09-05 | End: 2024-09-05

## 2024-09-05 RX ADMIN — SODIUM CHLORIDE 70 MILLILITER(S): 9 INJECTION INTRAMUSCULAR; INTRAVENOUS; SUBCUTANEOUS at 20:03

## 2024-09-05 RX ADMIN — Medication 40 MILLIEQUIVALENT(S): at 15:40

## 2024-09-05 RX ADMIN — CHLORHEXIDINE GLUCONATE 1 APPLICATION(S): 40 SOLUTION TOPICAL at 12:01

## 2024-09-05 RX ADMIN — IPRATROPIUM BROMIDE AND ALBUTEROL SULFATE 3 MILLILITER(S): .5; 3 SOLUTION RESPIRATORY (INHALATION) at 11:10

## 2024-09-05 RX ADMIN — Medication 40 MILLIGRAM(S): at 17:26

## 2024-09-05 RX ADMIN — TIOTROPIUM BROMIDE INHALATION SPRAY 2 PUFF(S): 3.12 SPRAY, METERED RESPIRATORY (INHALATION) at 11:10

## 2024-09-05 RX ADMIN — BUDESONIDE AND FORMOTEROL FUMARATE 2 PUFF(S): 80; 4.5 AEROSOL, METERED RESPIRATORY (INHALATION) at 05:43

## 2024-09-05 RX ADMIN — IPRATROPIUM BROMIDE AND ALBUTEROL SULFATE 3 MILLILITER(S): .5; 3 SOLUTION RESPIRATORY (INHALATION) at 05:43

## 2024-09-05 RX ADMIN — Medication 50 MILLIEQUIVALENT(S): at 05:42

## 2024-09-05 RX ADMIN — LEVETIRACETAM 400 MILLIGRAM(S): 1000 TABLET ORAL at 06:24

## 2024-09-05 RX ADMIN — LEVETIRACETAM 400 MILLIGRAM(S): 1000 TABLET ORAL at 17:26

## 2024-09-05 RX ADMIN — BUDESONIDE AND FORMOTEROL FUMARATE 2 PUFF(S): 80; 4.5 AEROSOL, METERED RESPIRATORY (INHALATION) at 17:33

## 2024-09-05 RX ADMIN — Medication 10 MG/HR: at 07:41

## 2024-09-05 RX ADMIN — IPRATROPIUM BROMIDE AND ALBUTEROL SULFATE 3 MILLILITER(S): .5; 3 SOLUTION RESPIRATORY (INHALATION) at 17:33

## 2024-09-05 RX ADMIN — METOPROLOL TARTRATE 12.5 MILLIGRAM(S): 100 TABLET ORAL at 06:25

## 2024-09-05 RX ADMIN — IPRATROPIUM BROMIDE AND ALBUTEROL SULFATE 3 MILLILITER(S): .5; 3 SOLUTION RESPIRATORY (INHALATION) at 23:20

## 2024-09-05 RX ADMIN — Medication 20 MILLIGRAM(S): at 22:13

## 2024-09-05 RX ADMIN — METOPROLOL TARTRATE 12.5 MILLIGRAM(S): 100 TABLET ORAL at 17:26

## 2024-09-05 RX ADMIN — SODIUM CHLORIDE 70 MILLILITER(S): 9 INJECTION INTRAMUSCULAR; INTRAVENOUS; SUBCUTANEOUS at 07:41

## 2024-09-05 RX ADMIN — Medication 25 GRAM(S): at 14:38

## 2024-09-05 RX ADMIN — SODIUM CHLORIDE 500 MILLILITER(S): 9 INJECTION INTRAMUSCULAR; INTRAVENOUS; SUBCUTANEOUS at 03:14

## 2024-09-05 RX ADMIN — Medication 10 MILLILITER(S): at 22:13

## 2024-09-05 NOTE — PROGRESS NOTE ADULT - ASSESSMENT
9/4 RRT for syncopal episode after BM likely 2/2 vasovagal, Hgb drop s/p protonix drip and CTH/CT CAP    BB to floor    FOB- p    CBC q12

## 2024-09-05 NOTE — PATIENT PROFILE ADULT - FALL HARM RISK - HARM RISK INTERVENTIONS
Assistance with ambulation/Assistance OOB with selected safe patient handling equipment/Communicate Risk of Fall with Harm to all staff/Discuss with provider need for PT consult/Monitor gait and stability/Provide patient with walking aids - walker, cane, crutches/Reinforce activity limits and safety measures with patient and family/Review medications for side effects contributing to fall risk/Sit up slowly, dangle for a short time, stand at bedside before walking/Tailored Fall Risk Interventions/Toileting schedule using arm’s reach rule for commode and bathroom/Visual Cue: Yellow wristband and red socks/Bed in lowest position, wheels locked, appropriate side rails in place/Call bell, personal items and telephone in reach/Instruct patient to call for assistance before getting out of bed or chair/Non-slip footwear when patient is out of bed/[place] to call system/Physically safe environment - no spills, clutter or unnecessary equipment/Purposeful Proactive Rounding/Room/bathroom lighting operational, light cord in reach

## 2024-09-05 NOTE — PATIENT PROFILE ADULT - FUNCTIONAL ASSESSMENT - BASIC MOBILITY 4.
----- Message from Karen Ramos 4 = No assist / stand by assistance

## 2024-09-05 NOTE — PROGRESS NOTE ADULT - SUBJECTIVE AND OBJECTIVE BOX
INTERVAL HPI/OVERNIGHT EVENTS:  prior events noted  sp dark, melenic BM yesterday evening - repeat CBC checked HGb 11.9 ->13.1 (no transfusion ?lab error ?hemoconcentrated)  First BM since 9/2 PM     subsequent RRT  w/ near syncope while having BM - dark/tarry   transferred to NSCU for monitoring   Hgb  11.9 ->13.1 (?lab error ?hemoconcentrated) ->11.5    started on PPI gtts  transfused 1 unit Hgb 11.1 -> 13  PEG feeds held overnight    no nausea, vomiting, hemetemesis, PEG site bleeding, rectal bleeding or abdominal pain  Stool checked overnight by GI coverage team - dark green     AC remains on hold: UE Duplex 9/4/24: persistent RUE DVT, new LUE DVT in subclavian      MEDICATIONS  (STANDING):  acetaminophen   IVPB .. 1000 milliGRAM(s) IV Intermittent once  albuterol/ipratropium for Nebulization 3 milliLiter(s) Nebulizer every 6 hours  amLODIPine   Tablet 5 milliGRAM(s) Oral daily  budesonide 160 MICROgram(s)/formoterol 4.5 MICROgram(s) Inhaler 2 Puff(s) Inhalation two times a day  chlorhexidine 4% Liquid 1 Application(s) Topical daily  labetalol Injectable 5 milliGRAM(s) IV Push once  levETIRAcetam  IVPB 250 milliGRAM(s) IV Intermittent every 12 hours  lidocaine 4% Injection for Nebulization 4 milliLiter(s) Nebulizer once  metoprolol tartrate 12.5 milliGRAM(s) Oral every 12 hours  Nephro-corine 1 Tablet(s) Oral daily  pantoprazole    Tablet 40 milliGRAM(s) Oral two times a day  polyethylene glycol 3350 17 Gram(s) Oral two times a day  senna Syrup 10 milliLiter(s) Oral at bedtime  simvastatin 20 milliGRAM(s) Oral at bedtime  sodium chloride 0.9%. 1000 milliLiter(s) (70 mL/Hr) IV Continuous <Continuous>  tiotropium 2.5 MICROgram(s) Inhaler 2 Puff(s) Inhalation daily    MEDICATIONS  (PRN):  acetaminophen     Tablet .. 650 milliGRAM(s) Oral every 6 hours PRN Mild Pain (1 - 3)  bisacodyl 5 milliGRAM(s) Oral daily PRN Constipation      Allergies  penicillin (Other)  ACE inhibitors (Other)  Bee Stings- anaphylaxis (Other)      Review of Systems:  see HPI- remainder 10 point ROS negative    Vital Signs Last 24 Hrs  T(C): 36.4 (05 Sep 2024 07:00), Max: 36.9 (04 Sep 2024 20:30)  T(F): 97.5 (05 Sep 2024 07:00), Max: 98.5 (04 Sep 2024 20:30)  HR: 78 (05 Sep 2024 11:18) (68 - 101)  BP: 135/66 (05 Sep 2024 09:00) (90/54 - 176/92)  BP(mean): 93 (05 Sep 2024 09:00) (67 - 125)  RR: 16 (05 Sep 2024 09:00) (16 - 20)  SpO2: 97% (05 Sep 2024 11:18) (93% - 100%)    Parameters below as of 05 Sep 2024 11:18  Patient On (Oxygen Delivery Method): nasal cannula    PHYSICAL EXAM:  GENERAL:: thin AA male awake, alert appears comfortable.  NECK: No JVD  RESP:: grossly clear  CV:: S1 and S2, RRR  GI:: BS+, soft, thin ND NT Abdominal binder opened - Surgicell is off PEG site (wiped/cleaned by clinician) no bleeding/oozing at PEG incision; PEG bumper at 4.5 cm bhargavi- lightly touching skin and spins easily 360 degrees. +midline abdominal scar (from mid epigastric region to pubis) and small scar at lateral LUQ, WH LLQ scar c/w appy.    PEG tubing with some residue feeds in tube. PEG lavage with 60 cc free water - clear return with scant tube feed residue. NO blood or coffee grounds. PEG tube capped  Abdominal binder replaced  MUSCULOSKELETAL: No clubbing or cyanosis of digits; no joint swelling or tenderness to palpation  PSYCH: A+O to person, place, and time; affect appropriate  NEUROLOGY: CN 2-12 are intact and symmetric; no gross sensory deficits, grossly 5/5 strength throughout  SKIN: No rashes; no palpable lesions, +crani site c/d/i    LABS:                        13.0   8.74  )-----------( 238      ( 05 Sep 2024 05:53 )             39.1   Hemoglobin: 11.1 g/dL (09.05.24 @ 02:41)   Hemoglobin: 11.5 g/dL (09.04.24 @ 20:43)   Hemoglobin: 13.1 g/dL (09.04.24 @ 17:40)   Hemoglobin: 11.8 g/dL (09.04.24 @ 09:02)     09-05    138  |  104  |  21  ----------------------------<  85  4.2   |  19<L>  |  1.18    Ca    9.1      05 Sep 2024 02:41  Phos  3.9     09-04  Mg     2.1     09-04    TPro  7.3  /  Alb  3.3  /  TBili  0.4  /  DBili  x   /  AST  21  /  ALT  33  /  AlkPhos  88  09-04    PT/INR - ( 04 Sep 2024 20:45 )   PT: 13.1 sec;   INR: 1.26 ratio         PTT - ( 04 Sep 2024 20:45 )  PTT:28.8 sec           RADIOLOGY & ADDITIONAL TESTS:    ACC: [id number] EXAM:  CT ABDOMEN AND PELVIS   ORDERED BY:  ISAIAH JOLLY     PROCEDURE DATE:  09/05/2024          INTERPRETATION:  CLINICAL INFORMATION: rule out GI bleed, tarry stools   Admitting Dxs: I82.409 [facility] EVAL MCT    COMPARISON: 8.31.24.    CONTRAST/COMPLICATIONS:  IV Contrast: NONE  Oral Contrast: NONE  Complications: None reported at time of study completion    PROCEDURE:  CT of the Abdomen and Pelvis was performed.  Sagittal and coronal reformats were performed.    FINDINGS:    LOWER CHEST: Right greater than left lower lobe airspace opacities. Left   lower lobe nodule no longer visualized.    LIVER: Within normal limits.  BILE DUCTS: Normal caliber.  GALLBLADDER: Within normal limits.  SPLEEN: Within normal limits.  PANCREAS: Distal pancreatectomy.  ADRENALS: Within normal limits.  KIDNEYS/URETERS: A right renal cyst.    BLADDER: Within normal limits.  REPRODUCTIVE ORGANS: Within normal limits.    BOWEL: No bowel obstruction. Right hemicolectomy. Gastrostomy tube is in   place. Small focus of extraluminal air adjacent to the gastrostomy tube.  PERITONEUM/RETROPERITONEUM: Within normal limits.  VESSELS:  Within normal limits.  ABDOMINAL WALL: Within normal limits.  BONES: Within normal limits.    IMPRESSION: Lower lobe airspace opacities of uncertain etiology.

## 2024-09-05 NOTE — PATIENT PROFILE ADULT - FUNCTIONAL ASSESSMENT - DAILY ACTIVITY 6.
4 = No assist / stand by assistance Melolabial Interpolation Flap Text: A decision was made to reconstruct the defect utilizing an interpolation axial flap and a staged reconstruction.  A telfa template was made of the defect.  This telfa template was then used to outline the melolabial interpolation flap.  The donor area for the pedicle flap was then injected with anesthesia.  The flap was excised through the skin and subcutaneous tissue down to the layer of the underlying musculature.  The pedicle flap was carefully excised within this deep plane to maintain its blood supply.  The edges of the donor site were undermined.   The donor site was closed in a primary fashion.  The pedicle was then rotated into position and sutured.  Once the tube was sutured into place, adequate blood supply was confirmed with blanching and refill.  The pedicle was then wrapped with xeroform gauze and dressed appropriately with a telfa and gauze bandage to ensure continued blood supply and protect the attached pedicle.

## 2024-09-05 NOTE — PROGRESS NOTE ADULT - ASSESSMENT
ASSESSMENT/PLAN: POD 29 R. 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   -Pending NSGY clearance for restarting AC for BUE DVTs.  Pain: Tylenol PRN  Vasovagal syncope in the setting of a BM vs GI bleed  Activity: [] OOB as tolerated [] Bedrest [] PT [] OT [] PMNR    PULM:  -Hx COPD  -2L NC saturating > 95%  -Duonebs Q6  -IS   ABG: WNL    CV:  SBP goal: PAfib  AC on hold   Metoprolol tar 12.5 BID, rate controlled   TTE: EF: 71%  F/u EKG    RENAL:  Monitor I&Os.    GI:  -S/p EGD on 9/3 w/o significant ulcers or active GI tract bleeding. PEG tract unremarkable.   -Protonix 40BID.  -CT CAP shows no concern for active bleeding.  -GI cleared restarting AC.  Diet: NPO on Glucerna tube feeds   Bowel regimen: Dulcolax, senna, Miralax  09/04: last BM    ENDO:   Goal euglycemia (-180)    HEME/ONC:  VTE prophylaxis: SCDs  DVT ppx and or Theraprutic lovenox on hold   -09/04: Stable persistent clot in the right subclavian and axillary veins.  Thrombus around left subclavian vein line , new. Left basilic vein thrombosis.  -Vasc Cards rec'd restarting therapeutic AC.    ID: Afebrile   monitor for fevers     MISC:    SOCIAL/FAMILY:  [] updated at bedside [] family meeting    CODE STATUS:  [X] Full Code [] DNR [] DNI [] Palliative/Comfort Care    DISPOSITION:  [X] ICU [] Stroke Unit [] Floor [] EMU [] RCU [] PCU

## 2024-09-05 NOTE — PROGRESS NOTE ADULT - SUBJECTIVE AND OBJECTIVE BOX
Patient seen and examined at bedside.    --Anticoagulation--    T(C): 36.9 (09-04-24 @ 23:00), Max: 36.9 (09-04-24 @ 05:08)  HR: 101 (09-04-24 @ 23:33) (68 - 101)  BP: 173/90 (09-04-24 @ 23:00) (118/73 - 176/92)  RR: 20 (09-04-24 @ 23:00) (18 - 20)  SpO2: 93% (09-04-24 @ 23:33) (93% - 99%)  Wt(kg): --    Exam: Awake, Ox2-3 (self , place time w/ chocies), mild L facial, FC, BUE 5/5, BLE 5/5

## 2024-09-05 NOTE — PATIENT PROFILE ADULT - PATIENT REPRESENTATIVE: ( YOU CAN CHOOSE ANY PERSON THAT CAN ASSIST YOU WITH YOUR HEALTH CARE PREFERENCES, DOES NOT HAVE TO BE A SPOUSE, IMMEDIATE FAMILY OR SIGNIFICANT OTHER/PARTNER)
Diabetes Education Program - [address] [address] [facility], [address] [facility], [address] [place] [address],B-1 declines

## 2024-09-05 NOTE — PROGRESS NOTE ADULT - ASSESSMENT
78 yo male with PMH of HTN, T2DM (diet controlled), HLD, COPD, CKD 3, carcinoid tumor s/p hemicolectomy, nasopharyngeal carcinoma s/p chemo/RT (c/b dysphagia ), IPMN s/p distal pancreatectomy (2018), and paroxysmal Afib on Eliquis s/p craniotomy 8/6 for resection of hemorrhagic lesion. Now presented to the ED for RUE swelling, found to have RUE DVT involving subclavian, axillary, and brachial veins and PE.    Pt was known to have cough w/ PO previously and was evaluated w/ OP MBS earlier this year - using thickened fluids at home   Post op course further complicated by Dysphagia - failed multiple SLP evaluations with recs: NPO   Pt and family are now agreeable to PEG placement for which GI is consulted    #Dysphagia -sp EGD + PEG placement 8/27/24 c/b PEG site bleeding  suspect PEG tract bleeding. Per CT patient may have some intraabdominal collaterals that were within the PEG tract.    8/28 scant dried blood under PEG bumper; no active bleeding, induration, swelling or edema  PEG bumper between 3.5 and 4 cm at skin level  8/29 scant oozing, slight downtrend in Hgb. Negative PEG lavage. Surgicell placed at PEG tract  8/29 PM - recurrent PEG site bleeding; Surgicell intact. Resolved without further intervention. Hgb stable 6pm (9.4 ->9.8)  8/30 AM minimal ooze at PEG site, no active bleeding. Re-assessed at 12:40pm- Notified by Medicine attending of AM Hgb 9.8 ->8.5. No melena +PEG lavage: pink tinged return with water/feeds. Previously placed surgicell removed, minimal oozing noted at ends of PEG site incision - new Surgicell strip placed at PEG incision under PEG bumper, lightly tucked into ends (medical and lateral aspects of PEG site incision)   No visible oozing, surgicell noted to be clean.  Hgb 8.3 1 unit PRBCs transfused  8/31 CT: no e/o GI bleed, gastric varices along fundus and body and gastroepiploic collateral vessels, chronic thrombosis of splenic vein, gastrostomy tube in place, LLL pulm nodule, tree in bud opacities RML and LLL  9/1/24 transfuse additional 1 unit PRBCs  9/2/24 negative PEG lavage  9/3/24 EGD: normal esophagus, normal stomach. small patch of gastritis at incisura (non bleeding) PEG tract without bleeding, no ulcer or stigmata of bleeding. Duodenum WNL  PEG bumper placed at 4.5 cm. Surgicell still in place  Impression:  Unremarkable EGD, no signs of portal hypertension. SUspect resolved bleed from PEG tract. Per CT patient may have some intraabdominal collaterals that were within the PEG tract.  9/4/24 dark BM (suspect passage of "old"/retained blood from GI tract). s/p RRT ?vasovagal - transfer to NSCU for monitoring 1 unit PRBCs transfused Hgb 11 ->13. dark green stool on exam  9/5/24 negative PEG lavage  #remote history of head/Neck CA sp Chemo + XRT  #sp hemicolectomy  #sp partial pancreatectomy  #DVT+PE   AC held x 24 hours for PEG  AC (Lovenox) restarted 8/28 pm.   8/29 AM scant PEG site oozing, sl Hgb downtrend. Topical surgicell applied at PEG site. 8/29 PM CBC stable  8/30 PM AC held 2/2 PEG site bleeding (see above)   9/4/24 Duplex: Stable persistent clot in the right subclavian and axillary veins. Thrombus around left subclavian vein line , new. Left basilic vein thrombosis.  9/5 no recurrent PEG site bleeding. Surgicell noted to be off PEG incision site and wiped away by clinician. Negative PEG lavage    RECS:    -can resume PEG feeds (goal rate); monitor tolerance  -monitor CBC and BMs   -NO GI objection to restart AC (Heparin or Lovenox; defer to Neurosurgery/NSCU) and monitor clinically  -Abdominal binder on at all times please to prevent accidental PEG trauma/tugging/dislodgement  -can DC PPI gtts and restart PPI daily for GI ppx    Discussed with pt at bedside; all questions answered  Discussed with NCSU team    Avtar Mixon PA-C  [facility] Teaching GI Service  Available on TEAMS Mon-Fri 8a-4p  After hours and weekend coverage [phone]

## 2024-09-05 NOTE — PROGRESS NOTE ADULT - SUBJECTIVE AND OBJECTIVE BOX
SUMMARY:  79M s/p Right crani on 8/6/2024 for enhancing mass. PMH poorly differentiated right neck nasopharyngeal carcinoma, who underwent 70 Gy of radiation treatment in the past with estimated of 40 Gy in the field that included the right anterior temporal lobe and inferior temporal lobe. The intracranial mass had been discovered after the patient presented with seizures.     ICU COURSE:  9/4: txferred from [facility] after syncopal episode in the setting of Hgb drop to 11 from 13 and concern for melena.    REVIEW OF SYSTEMS: None other than stated in HPI    ALLERGIES: Allergies    penicillin (Other)  ACE inhibitors (Other)  Bee Stings- anaphylaxis (Other)    Intolerances        VITALS/DATA/ORDERS:    T(C): 36.3 (09-05-24 @ 11:00), Max: 36.9 (09-04-24 @ 20:30)  HR: 100 (09-05-24 @ 12:00) (68 - 101)  BP: 163/84 (09-05-24 @ 12:00) (90/54 - 176/92)  RR: 20 (09-05-24 @ 12:00) (16 - 20)  SpO2: 95% (09-05-24 @ 12:00) (93% - 100%)                          13.0   8.74  )-----------( 238      ( 05 Sep 2024 05:53 )             39.1       09-05    138  |  104  |  21  ----------------------------<  85  4.2   |  19<L>  |  1.18    Ca    9.1      05 Sep 2024 02:41  Phos  3.9     09-04  Mg     2.1     09-04    TPro  7.3  /  Alb  3.3  /  TBili  0.4  /  DBili  x   /  AST  21  /  ALT  33  /  AlkPhos  88  09-04          ABG - ( 04 Sep 2024 21:11 )  pH, Arterial: 7.44  pH, Blood: x     /  pCO2: 33    /  pO2: 99    / HCO3: 22    / Base Excess: -1.2  /  SaO2: 98.9                PT/INR - ( 04 Sep 2024 20:45 )   PT: 13.1 sec;   INR: 1.26 ratio         PTT - ( 04 Sep 2024 20:45 )  PTT:28.8 sec            09-04-24 @ 07:01  -  09-05-24 @ 07:00  --------------------------------------------------------  IN: 1720 mL / OUT: 500 mL / NET: 1220 mL    09-05-24 @ 07:01  -  09-05-24 @ 12:43  --------------------------------------------------------  IN: 470 mL / OUT: 0 mL / NET: 470 mL        acetaminophen     Tablet .. 650 milliGRAM(s) Oral every 6 hours PRN  acetaminophen   IVPB .. 1000 milliGRAM(s) IV Intermittent once  albuterol/ipratropium for Nebulization 3 milliLiter(s) Nebulizer every 6 hours  amLODIPine   Tablet 5 milliGRAM(s) Oral daily  bisacodyl 5 milliGRAM(s) Oral daily PRN  budesonide 160 MICROgram(s)/formoterol 4.5 MICROgram(s) Inhaler 2 Puff(s) Inhalation two times a day  chlorhexidine 4% Liquid 1 Application(s) Topical daily  labetalol Injectable 5 milliGRAM(s) IV Push once  levETIRAcetam  IVPB 250 milliGRAM(s) IV Intermittent every 12 hours  metoprolol tartrate 12.5 milliGRAM(s) Oral every 12 hours  Nephro-corine 1 Tablet(s) Oral daily  pantoprazole    Tablet 40 milliGRAM(s) Oral two times a day  polyethylene glycol 3350 17 Gram(s) Oral two times a day  senna Syrup 10 milliLiter(s) Oral at bedtime  simvastatin 20 milliGRAM(s) Oral at bedtime  sodium chloride 0.9%. 1000 milliLiter(s) IV Continuous <Continuous>  tiotropium 2.5 MICROgram(s) Inhaler 2 Puff(s) Inhalation daily      EXAMINATION:  General: No acute distress  HEENT: Anicteric sclerae  Cardiac: [id number]  Lungs: Clear  Abdomen: Soft, non-tender, +BS  Extremities: No c/c/e  Skin/Incision Site: Clean, dry and intact  Neurologic: Awake, alert, fully oriented, follows commands, PERRL, VFFtc, EOMI, face symmetric, tongue midline, no drift, full strength

## 2024-09-05 NOTE — PROGRESS NOTE ADULT - SUBJECTIVE AND OBJECTIVE BOX
SUMMARY:  79M s/p Right crani on 8/6/2024 for enhancing mass. PMH poorly differentiated right neck nasopharyngeal carcinoma, who underwent 70 Gy of radiation treatment in the past with estimated of 40 Gy in the field that included the right anterior temporal lobe and inferior temporal lobe. The intracranial mass had been discovered after the patient presented with seizures.     ICU COURSE:  9/4: txferred from [facility] after syncopal episode in the setting of Hgb drop to 11 from 13 and concern for melena.    REVIEW OF SYSTEMS: None other than stated in HPI    ALLERGIES: Allergies    penicillin (Other)  ACE inhibitors (Other)  Bee Stings- anaphylaxis (Other)    Intolerances        VITALS/DATA/ORDERS:    T(C): 36.3 (09-05-24 @ 11:00), Max: 36.9 (09-04-24 @ 20:30)  HR: 100 (09-05-24 @ 12:00) (68 - 101)  BP: 163/84 (09-05-24 @ 12:00) (90/54 - 176/92)  RR: 20 (09-05-24 @ 12:00) (16 - 20)  SpO2: 95% (09-05-24 @ 12:00) (93% - 100%)                          13.0   8.74  )-----------( 238      ( 05 Sep 2024 05:53 )             39.1       09-05    138  |  104  |  21  ----------------------------<  85  4.2   |  19<L>  |  1.18    Ca    9.1      05 Sep 2024 02:41  Phos  3.9     09-04  Mg     2.1     09-04    TPro  7.3  /  Alb  3.3  /  TBili  0.4  /  DBili  x   /  AST  21  /  ALT  33  /  AlkPhos  88  09-04          ABG - ( 04 Sep 2024 21:11 )  pH, Arterial: 7.44  pH, Blood: x     /  pCO2: 33    /  pO2: 99    / HCO3: 22    / Base Excess: -1.2  /  SaO2: 98.9                PT/INR - ( 04 Sep 2024 20:45 )   PT: 13.1 sec;   INR: 1.26 ratio         PTT - ( 04 Sep 2024 20:45 )  PTT:28.8 sec            09-04-24 @ 07:01  -  09-05-24 @ 07:00  --------------------------------------------------------  IN: 1720 mL / OUT: 500 mL / NET: 1220 mL    09-05-24 @ 07:01  -  09-05-24 @ 12:43  --------------------------------------------------------  IN: 470 mL / OUT: 0 mL / NET: 470 mL        acetaminophen     Tablet .. 650 milliGRAM(s) Oral every 6 hours PRN  acetaminophen   IVPB .. 1000 milliGRAM(s) IV Intermittent once  albuterol/ipratropium for Nebulization 3 milliLiter(s) Nebulizer every 6 hours  amLODIPine   Tablet 5 milliGRAM(s) Oral daily  bisacodyl 5 milliGRAM(s) Oral daily PRN  budesonide 160 MICROgram(s)/formoterol 4.5 MICROgram(s) Inhaler 2 Puff(s) Inhalation two times a day  chlorhexidine 4% Liquid 1 Application(s) Topical daily  labetalol Injectable 5 milliGRAM(s) IV Push once  levETIRAcetam  IVPB 250 milliGRAM(s) IV Intermittent every 12 hours  metoprolol tartrate 12.5 milliGRAM(s) Oral every 12 hours  Nephro-corine 1 Tablet(s) Oral daily  pantoprazole    Tablet 40 milliGRAM(s) Oral two times a day  polyethylene glycol 3350 17 Gram(s) Oral two times a day  senna Syrup 10 milliLiter(s) Oral at bedtime  simvastatin 20 milliGRAM(s) Oral at bedtime  sodium chloride 0.9%. 1000 milliLiter(s) IV Continuous <Continuous>  tiotropium 2.5 MICROgram(s) Inhaler 2 Puff(s) Inhalation daily      EXAMINATION:  General: No acute distress  HEENT: Anicteric sclerae  Cardiac: [id number]  Lungs: Clear  Abdomen: Soft, non-tender, +BS  Extremities: No c/c/e  Skin/Incision Site: Clean, dry and intact  Neurologic: Awake, alert, fully oriented, follows commands, PERRL, VFFtc, EOMI, face symmetric, tongue midline, no drift, full strength

## 2024-09-06 DIAGNOSIS — R55 SYNCOPE AND COLLAPSE: ICD-10-CM

## 2024-09-06 LAB
ANION GAP SERPL CALC-SCNC: 15 MMOL/L — SIGNIFICANT CHANGE UP (ref 5–17)
APPEARANCE UR: ABNORMAL
BACTERIA # UR AUTO: ABNORMAL /HPF
BILIRUB UR-MCNC: ABNORMAL
BUN SERPL-MCNC: 22 MG/DL — SIGNIFICANT CHANGE UP (ref 7–23)
CALCIUM SERPL-MCNC: 8.7 MG/DL — SIGNIFICANT CHANGE UP (ref 8.4–10.5)
CAST: 11 /LPF — HIGH (ref 0–4)
CHLORIDE SERPL-SCNC: 107 MMOL/L — SIGNIFICANT CHANGE UP (ref 96–108)
CO2 SERPL-SCNC: 16 MMOL/L — LOW (ref 22–31)
COLOR SPEC: SIGNIFICANT CHANGE UP
CREAT SERPL-MCNC: 1.11 MG/DL — SIGNIFICANT CHANGE UP (ref 0.5–1.3)
DIFF PNL FLD: ABNORMAL
EGFR: 67 ML/MIN/1.73M2 — SIGNIFICANT CHANGE UP
GLUCOSE BLDC GLUCOMTR-MCNC: 136 MG/DL — HIGH (ref 70–99)
GLUCOSE SERPL-MCNC: 123 MG/DL — HIGH (ref 70–99)
GLUCOSE UR QL: NEGATIVE MG/DL — SIGNIFICANT CHANGE UP
HCT VFR BLD CALC: 31.5 % — LOW (ref 39–50)
HCT VFR BLD CALC: 36 % — LOW (ref 39–50)
HGB BLD-MCNC: 10.5 G/DL — LOW (ref 13–17)
HGB BLD-MCNC: 11.3 G/DL — LOW (ref 13–17)
KETONES UR-MCNC: ABNORMAL MG/DL
LEUKOCYTE ESTERASE UR-ACNC: ABNORMAL
MAGNESIUM SERPL-MCNC: 2.6 MG/DL — SIGNIFICANT CHANGE UP (ref 1.6–2.6)
MCHC RBC-ENTMCNC: 30.3 PG — SIGNIFICANT CHANGE UP (ref 27–34)
MCHC RBC-ENTMCNC: 30.6 PG — SIGNIFICANT CHANGE UP (ref 27–34)
MCHC RBC-ENTMCNC: 31.4 GM/DL — LOW (ref 32–36)
MCHC RBC-ENTMCNC: 33.3 GM/DL — SIGNIFICANT CHANGE UP (ref 32–36)
MCV RBC AUTO: 91 FL — SIGNIFICANT CHANGE UP (ref 80–100)
MCV RBC AUTO: 97.6 FL — SIGNIFICANT CHANGE UP (ref 80–100)
NITRITE UR-MCNC: NEGATIVE — SIGNIFICANT CHANGE UP
NRBC # BLD: 0 /100 WBCS — SIGNIFICANT CHANGE UP (ref 0–0)
NRBC # BLD: 0 /100 WBCS — SIGNIFICANT CHANGE UP (ref 0–0)
PH UR: 5 — SIGNIFICANT CHANGE UP (ref 5–8)
PHOSPHATE SERPL-MCNC: 2.7 MG/DL — SIGNIFICANT CHANGE UP (ref 2.5–4.5)
PLATELET # BLD AUTO: 164 K/UL — SIGNIFICANT CHANGE UP (ref 150–400)
PLATELET # BLD AUTO: 181 K/UL — SIGNIFICANT CHANGE UP (ref 150–400)
POTASSIUM SERPL-MCNC: 4.6 MMOL/L — SIGNIFICANT CHANGE UP (ref 3.5–5.3)
POTASSIUM SERPL-SCNC: 4.6 MMOL/L — SIGNIFICANT CHANGE UP (ref 3.5–5.3)
PROT UR-MCNC: 30 MG/DL
RBC # BLD: 3.46 M/UL — LOW (ref 4.2–5.8)
RBC # BLD: 3.69 M/UL — LOW (ref 4.2–5.8)
RBC # FLD: 15.5 % — HIGH (ref 10.3–14.5)
RBC # FLD: 15.6 % — HIGH (ref 10.3–14.5)
RBC CASTS # UR COMP ASSIST: 10 /HPF — HIGH (ref 0–4)
REVIEW: SIGNIFICANT CHANGE UP
SODIUM SERPL-SCNC: 138 MMOL/L — SIGNIFICANT CHANGE UP (ref 135–145)
SP GR SPEC: 1.02 — SIGNIFICANT CHANGE UP (ref 1–1.03)
SQUAMOUS # UR AUTO: 5 /HPF — SIGNIFICANT CHANGE UP (ref 0–5)
UROBILINOGEN FLD QL: 1 MG/DL — SIGNIFICANT CHANGE UP (ref 0.2–1)
WBC # BLD: 24.53 K/UL — HIGH (ref 3.8–10.5)
WBC # BLD: 9.63 K/UL — SIGNIFICANT CHANGE UP (ref 3.8–10.5)
WBC # FLD AUTO: 24.53 K/UL — HIGH (ref 3.8–10.5)
WBC # FLD AUTO: 9.63 K/UL — SIGNIFICANT CHANGE UP (ref 3.8–10.5)
WBC UR QL: 13 /HPF — HIGH (ref 0–5)

## 2024-09-06 PROCEDURE — 99232 SBSQ HOSP IP/OBS MODERATE 35: CPT

## 2024-09-06 PROCEDURE — 99233 SBSQ HOSP IP/OBS HIGH 50: CPT

## 2024-09-06 PROCEDURE — 99232 SBSQ HOSP IP/OBS MODERATE 35: CPT | Mod: 24,GC

## 2024-09-06 PROCEDURE — ZZZZZ: CPT

## 2024-09-06 PROCEDURE — 71045 X-RAY EXAM CHEST 1 VIEW: CPT | Mod: 26

## 2024-09-06 RX ORDER — SODIUM PHOSPHATE, MONOBASIC, MONOHYDRATE AND SODIUM PHOSPHATE, DIBASIC ANHYDROUS 276; 142 MG/ML; MG/ML
15 INJECTION, SOLUTION INTRAVENOUS ONCE
Refills: 0 | Status: COMPLETED | OUTPATIENT
Start: 2024-09-06 | End: 2024-09-06

## 2024-09-06 RX ORDER — DEXTROSE 15 G/33 G
15 GEL IN PACKET (GRAM) ORAL ONCE
Refills: 0 | Status: DISCONTINUED | OUTPATIENT
Start: 2024-09-06 | End: 2024-09-11

## 2024-09-06 RX ORDER — DEXTROSE 15 G/33 G
25 GEL IN PACKET (GRAM) ORAL ONCE
Refills: 0 | Status: DISCONTINUED | OUTPATIENT
Start: 2024-09-06 | End: 2024-09-11

## 2024-09-06 RX ORDER — PANTOPRAZOLE SODIUM 40 MG
40 TABLET, DELAYED RELEASE (ENTERIC COATED) ORAL
Refills: 0 | Status: DISCONTINUED | OUTPATIENT
Start: 2024-09-06 | End: 2024-09-09

## 2024-09-06 RX ORDER — METOPROLOL TARTRATE 100 MG/1
12.5 TABLET ORAL EVERY 12 HOURS
Refills: 0 | Status: DISCONTINUED | OUTPATIENT
Start: 2024-09-06 | End: 2024-09-11

## 2024-09-06 RX ORDER — APIXABAN 5 MG/1
5 TABLET, FILM COATED ORAL EVERY 12 HOURS
Refills: 0 | Status: DISCONTINUED | OUTPATIENT
Start: 2024-09-06 | End: 2024-09-11

## 2024-09-06 RX ORDER — GLUCAGON INJECTION, SOLUTION 1 MG/.2ML
1 INJECTION, SOLUTION SUBCUTANEOUS ONCE
Refills: 0 | Status: DISCONTINUED | OUTPATIENT
Start: 2024-09-06 | End: 2024-09-11

## 2024-09-06 RX ORDER — PANTOPRAZOLE SODIUM 40 MG
40 TABLET, DELAYED RELEASE (ENTERIC COATED) ORAL
Refills: 0 | Status: DISCONTINUED | OUTPATIENT
Start: 2024-09-06 | End: 2024-09-06

## 2024-09-06 RX ORDER — DEXTROSE 15 G/33 G
12.5 GEL IN PACKET (GRAM) ORAL ONCE
Refills: 0 | Status: DISCONTINUED | OUTPATIENT
Start: 2024-09-06 | End: 2024-09-11

## 2024-09-06 RX ADMIN — TIOTROPIUM BROMIDE INHALATION SPRAY 2 PUFF(S): 3.12 SPRAY, METERED RESPIRATORY (INHALATION) at 11:48

## 2024-09-06 RX ADMIN — Medication 40 MILLIGRAM(S): at 17:43

## 2024-09-06 RX ADMIN — Medication 40 MILLIGRAM(S): at 06:36

## 2024-09-06 RX ADMIN — ACETAMINOPHEN 400 MILLIGRAM(S): 325 TABLET ORAL at 11:14

## 2024-09-06 RX ADMIN — APIXABAN 5 MILLIGRAM(S): 5 TABLET, FILM COATED ORAL at 17:43

## 2024-09-06 RX ADMIN — POLYETHYLENE GLYCOL 3350 17 GRAM(S): 17 POWDER, FOR SOLUTION ORAL at 06:11

## 2024-09-06 RX ADMIN — IPRATROPIUM BROMIDE AND ALBUTEROL SULFATE 3 MILLILITER(S): .5; 3 SOLUTION RESPIRATORY (INHALATION) at 17:43

## 2024-09-06 RX ADMIN — BUDESONIDE AND FORMOTEROL FUMARATE 2 PUFF(S): 80; 4.5 AEROSOL, METERED RESPIRATORY (INHALATION) at 05:52

## 2024-09-06 RX ADMIN — IPRATROPIUM BROMIDE AND ALBUTEROL SULFATE 3 MILLILITER(S): .5; 3 SOLUTION RESPIRATORY (INHALATION) at 05:53

## 2024-09-06 RX ADMIN — POLYETHYLENE GLYCOL 3350 17 GRAM(S): 17 POWDER, FOR SOLUTION ORAL at 17:43

## 2024-09-06 RX ADMIN — APIXABAN 5 MILLIGRAM(S): 5 TABLET, FILM COATED ORAL at 09:23

## 2024-09-06 RX ADMIN — IPRATROPIUM BROMIDE AND ALBUTEROL SULFATE 3 MILLILITER(S): .5; 3 SOLUTION RESPIRATORY (INHALATION) at 11:50

## 2024-09-06 RX ADMIN — LEVETIRACETAM 400 MILLIGRAM(S): 1000 TABLET ORAL at 06:11

## 2024-09-06 RX ADMIN — ACETAMINOPHEN 1000 MILLIGRAM(S): 325 TABLET ORAL at 11:45

## 2024-09-06 RX ADMIN — LEVETIRACETAM 400 MILLIGRAM(S): 1000 TABLET ORAL at 17:43

## 2024-09-06 RX ADMIN — METOPROLOL TARTRATE 12.5 MILLIGRAM(S): 100 TABLET ORAL at 17:43

## 2024-09-06 RX ADMIN — Medication 1 TABLET(S): at 11:52

## 2024-09-06 RX ADMIN — METOPROLOL TARTRATE 12.5 MILLIGRAM(S): 100 TABLET ORAL at 06:14

## 2024-09-06 RX ADMIN — BUDESONIDE AND FORMOTEROL FUMARATE 2 PUFF(S): 80; 4.5 AEROSOL, METERED RESPIRATORY (INHALATION) at 17:44

## 2024-09-06 RX ADMIN — SODIUM PHOSPHATE, MONOBASIC, MONOHYDRATE AND SODIUM PHOSPHATE, DIBASIC ANHYDROUS 63.75 MILLIMOLE(S): 276; 142 INJECTION, SOLUTION INTRAVENOUS at 06:10

## 2024-09-06 NOTE — PROGRESS NOTE ADULT - ASSESSMENT
78 yo male with PMH of HTN, T2DM (diet controlled), HLD, COPD, CKD 3, carcinoid tumor s/p hemicolectomy, nasopharyngeal carcinoma s/p chemo/RT (c/b dysphagia ), IPMN s/p distal pancreatectomy (2018), and paroxysmal Afib on Eliquis s/p craniotomy 8/6 for resection of hemorrhagic lesion presented to the ED for RUE swelling, found to have RUE DVT involving subclavian, axillary, and brachial veins and PE. Course also c/b dysphagia s/p PEG 8/27, c/b bleeding around PEG site and melena now s/p EGD with no evidence of active bleed. Course c/b likely vasovagal syncope while on toilet and transferred to NSCU for closer monitoring. No evidence of active bleed. Transferred back to floors on 9/6.

## 2024-09-06 NOTE — PROGRESS NOTE ADULT - ASSESSMENT
78 yo male with PMH of HTN, T2DM (diet controlled), HLD, COPD, CKD 3, carcinoid tumor s/p hemicolectomy, nasopharyngeal carcinoma s/p chemo/RT (c/b dysphagia ), IPMN s/p distal pancreatectomy (2018), and paroxysmal Afib on Eliquis s/p craniotomy 8/6 for resection of hemorrhagic lesion. Now presented to the ED for RUE swelling, found to have RUE DVT involving subclavian, axillary, and brachial veins and PE.    Pt was known to have cough w/ PO previously and was evaluated w/ OP MBS earlier this year - using thickened fluids at home   Post op course further complicated by Dysphagia - failed multiple SLP evaluations with recs: NPO   Pt and family are now agreeable to PEG placement for which GI is consulted    #Dysphagia -sp EGD + PEG placement 8/27/24 c/b PEG site bleeding  suspect PEG tract bleeding. Per CT patient may have some intraabdominal collaterals that were within the PEG tract.    8/28 scant dried blood under PEG bumper; no active bleeding, induration, swelling or edema  PEG bumper between 3.5 and 4 cm at skin level  8/29 scant oozing, slight downtrend in Hgb. Negative PEG lavage. Surgicell placed at PEG tract  8/29 PM - recurrent PEG site bleeding; Surgicell intact. Resolved without further intervention. Hgb stable 6pm (9.4 ->9.8)  8/30 AM minimal ooze at PEG site, no active bleeding. Re-assessed at 12:40pm- Notified by Medicine attending of AM Hgb 9.8 ->8.5. No melena +PEG lavage: pink tinged return with water/feeds. Previously placed surgicell removed, minimal oozing noted at ends of PEG site incision - new Surgicell strip placed at PEG incision under PEG bumper, lightly tucked into ends (medical and lateral aspects of PEG site incision)   No visible oozing, surgicell noted to be clean.  Hgb 8.3 1 unit PRBCs transfused  8/31 CT: no e/o GI bleed, gastric varices along fundus and body and gastroepiploic collateral vessels, chronic thrombosis of splenic vein, gastrostomy tube in place, LLL pulm nodule, tree in bud opacities RML and LLL  9/1/24 transfuse additional 1 unit PRBCs  9/2/24 negative PEG lavage  9/3/24 EGD: normal esophagus, normal stomach. small patch of gastritis at incisura (non bleeding) PEG tract without bleeding, no ulcer or stigmata of bleeding. Duodenum WNL  PEG bumper placed at 4.5 cm. Surgicell still in place  Impression:  Unremarkable EGD, no signs of portal hypertension. SUspect resolved bleed from PEG tract. Per CT patient may have some intraabdominal collaterals that were within the PEG tract.  9/4/24 dark BM (suspect passage of "old"/retained blood from GI tract). s/p RRT ?vasovagal - transfer to NSCU for monitoring 1 unit PRBCs transfused Hgb 11 ->13. dark green stool on exam  9/5/24 negative PEG lavage  #remote history of head/Neck CA sp Chemo + XRT  #sp hemicolectomy  #sp partial pancreatectomy  #DVT+PE   AC held x 24 hours for PEG  AC (Lovenox) restarted 8/28 pm.   8/29 AM scant PEG site oozing, sl Hgb downtrend. Topical surgicell applied at PEG site. 8/29 PM CBC stable  8/30 PM AC held 2/2 PEG site bleeding (see above)   9/4/24 Duplex: Stable persistent clot in the right subclavian and axillary veins. Thrombus around left subclavian vein line , new. Left basilic vein thrombosis.  9/5 no recurrent PEG site bleeding. Surgicell noted to be off PEG incision site and wiped away by clinician. Negative PEG lavage  9/6 Eliquis restarted    RECS:    -PEG feeds; monitor tolerance  -monitor CBC and BMs   -NO GI objection to AC and monitor clinically; plans noted for repeat CBC this afternoon  -Abdominal binder on at all times please to prevent accidental PEG trauma/tugging/dislodgement  -continue PPI; can change to via PEG dosing    Discussed with pt at bedside; all questions answered  Discussed with NCSU and neurosurgery teams, d/w Medicine attending    Please call coverage GI service over weekend prn with acute GI concerns   GI service : [phone]      [facility]  Available on TEAMS Mon-Fri 8a-4p  After hours and weekend coverage [phone]

## 2024-09-06 NOTE — PROGRESS NOTE ADULT - SUBJECTIVE AND OBJECTIVE BOX
SUMMARY:  79M s/p Right crani on 8/6/2024 for enhancing mass. PMH poorly differentiated right neck nasopharyngeal carcinoma, who underwent 70 Gy of radiation treatment in the past with estimated of 40 Gy in the field that included the right anterior temporal lobe and inferior temporal lobe. The intracranial mass had been discovered after the patient presented with seizures.     ICU COURSE:  9/4: txferred from [facility] after syncopal episode in the setting of Hgb drop to 11 from 13 and concern for melena.    REVIEW OF SYSTEMS: None other than stated in HPI    ALLERGIES: Allergies    penicillin (Other)  ACE inhibitors (Other)  Bee Stings- anaphylaxis (Other)    Intolerances        VITALS/DATA/ORDERS:    T(C): 36.3 (09-05-24 @ 11:00), Max: 36.9 (09-04-24 @ 20:30)  HR: 100 (09-05-24 @ 12:00) (68 - 101)  BP: 163/84 (09-05-24 @ 12:00) (90/54 - 176/92)  RR: 20 (09-05-24 @ 12:00) (16 - 20)  SpO2: 95% (09-05-24 @ 12:00) (93% - 100%)                          13.0   8.74  )-----------( 238      ( 05 Sep 2024 05:53 )             39.1       09-05    138  |  104  |  21  ----------------------------<  85  4.2   |  19<L>  |  1.18    Ca    9.1      05 Sep 2024 02:41  Phos  3.9     09-04  Mg     2.1     09-04    TPro  7.3  /  Alb  3.3  /  TBili  0.4  /  DBili  x   /  AST  21  /  ALT  33  /  AlkPhos  88  09-04          ABG - ( 04 Sep 2024 21:11 )  pH, Arterial: 7.44  pH, Blood: x     /  pCO2: 33    /  pO2: 99    / HCO3: 22    / Base Excess: -1.2  /  SaO2: 98.9                PT/INR - ( 04 Sep 2024 20:45 )   PT: 13.1 sec;   INR: 1.26 ratio         PTT - ( 04 Sep 2024 20:45 )  PTT:28.8 sec            09-04-24 @ 07:01  -  09-05-24 @ 07:00  --------------------------------------------------------  IN: 1720 mL / OUT: 500 mL / NET: 1220 mL    09-05-24 @ 07:01  -  09-05-24 @ 12:43  --------------------------------------------------------  IN: 470 mL / OUT: 0 mL / NET: 470 mL        acetaminophen     Tablet .. 650 milliGRAM(s) Oral every 6 hours PRN  acetaminophen   IVPB .. 1000 milliGRAM(s) IV Intermittent once  albuterol/ipratropium for Nebulization 3 milliLiter(s) Nebulizer every 6 hours  amLODIPine   Tablet 5 milliGRAM(s) Oral daily  bisacodyl 5 milliGRAM(s) Oral daily PRN  budesonide 160 MICROgram(s)/formoterol 4.5 MICROgram(s) Inhaler 2 Puff(s) Inhalation two times a day  chlorhexidine 4% Liquid 1 Application(s) Topical daily  labetalol Injectable 5 milliGRAM(s) IV Push once  levETIRAcetam  IVPB 250 milliGRAM(s) IV Intermittent every 12 hours  metoprolol tartrate 12.5 milliGRAM(s) Oral every 12 hours  Nephro-corine 1 Tablet(s) Oral daily  pantoprazole    Tablet 40 milliGRAM(s) Oral two times a day  polyethylene glycol 3350 17 Gram(s) Oral two times a day  senna Syrup 10 milliLiter(s) Oral at bedtime  simvastatin 20 milliGRAM(s) Oral at bedtime  sodium chloride 0.9%. 1000 milliLiter(s) IV Continuous <Continuous>  tiotropium 2.5 MICROgram(s) Inhaler 2 Puff(s) Inhalation daily      EXAMINATION:  General: No acute distress  HEENT: Anicteric sclerae  Cardiac: [id number]  Lungs: Clear  Abdomen: Soft, non-tender, +BS  Extremities: No c/c/e  Skin/Incision Site: Clean, dry and intact  Neurologic: Awake, alert, fully oriented, follows commands, PERRL, VFFtc, EOMI, face symmetric, tongue midline, no drift, full strength SUMMARY:  79M s/p Right crani on 8/6/2024 for enhancing mass. PMH poorly differentiated right neck nasopharyngeal carcinoma, who underwent 70 Gy of radiation treatment in the past with estimated of 40 Gy in the field that included the right anterior temporal lobe and inferior temporal lobe. The intracranial mass had been discovered after the patient presented with seizures.     ICU COURSE:  9/4: txferred from [facility] after syncopal episode in the setting of Hgb drop to 11 from 13 and concern for melena.  9/5: Hgb and clinical exam stable.    REVIEW OF SYSTEMS: None other than stated in HPI    ALLERGIES: Allergies    penicillin (Other)  ACE inhibitors (Other)  Bee Stings- anaphylaxis (Other)    Intolerances        VITALS/DATA/ORDERS:    T(C): 36.3 (09-05-24 @ 11:00), Max: 36.9 (09-04-24 @ 20:30)  HR: 100 (09-05-24 @ 12:00) (68 - 101)  BP: 163/84 (09-05-24 @ 12:00) (90/54 - 176/92)  RR: 20 (09-05-24 @ 12:00) (16 - 20)  SpO2: 95% (09-05-24 @ 12:00) (93% - 100%)                          13.0   8.74  )-----------( 238      ( 05 Sep 2024 05:53 )             39.1       09-05    138  |  104  |  21  ----------------------------<  85  4.2   |  19<L>  |  1.18    Ca    9.1      05 Sep 2024 02:41  Phos  3.9     09-04  Mg     2.1     09-04    TPro  7.3  /  Alb  3.3  /  TBili  0.4  /  DBili  x   /  AST  21  /  ALT  33  /  AlkPhos  88  09-04          ABG - ( 04 Sep 2024 21:11 )  pH, Arterial: 7.44  pH, Blood: x     /  pCO2: 33    /  pO2: 99    / HCO3: 22    / Base Excess: -1.2  /  SaO2: 98.9                PT/INR - ( 04 Sep 2024 20:45 )   PT: 13.1 sec;   INR: 1.26 ratio         PTT - ( 04 Sep 2024 20:45 )  PTT:28.8 sec            09-04-24 @ 07:01  -  09-05-24 @ 07:00  --------------------------------------------------------  IN: 1720 mL / OUT: 500 mL / NET: 1220 mL    09-05-24 @ 07:01  - 09-05-24 @ 12:43  --------------------------------------------------------  IN: 470 mL / OUT: 0 mL / NET: 470 mL        acetaminophen     Tablet .. 650 milliGRAM(s) Oral every 6 hours PRN  acetaminophen   IVPB .. 1000 milliGRAM(s) IV Intermittent once  albuterol/ipratropium for Nebulization 3 milliLiter(s) Nebulizer every 6 hours  amLODIPine   Tablet 5 milliGRAM(s) Oral daily  bisacodyl 5 milliGRAM(s) Oral daily PRN  budesonide 160 MICROgram(s)/formoterol 4.5 MICROgram(s) Inhaler 2 Puff(s) Inhalation two times a day  chlorhexidine 4% Liquid 1 Application(s) Topical daily  labetalol Injectable 5 milliGRAM(s) IV Push once  levETIRAcetam  IVPB 250 milliGRAM(s) IV Intermittent every 12 hours  metoprolol tartrate 12.5 milliGRAM(s) Oral every 12 hours  Nephro-corine 1 Tablet(s) Oral daily  pantoprazole    Tablet 40 milliGRAM(s) Oral two times a day  polyethylene glycol 3350 17 Gram(s) Oral two times a day  senna Syrup 10 milliLiter(s) Oral at bedtime  simvastatin 20 milliGRAM(s) Oral at bedtime  sodium chloride 0.9%. 1000 milliLiter(s) IV Continuous <Continuous>  tiotropium 2.5 MICROgram(s) Inhaler 2 Puff(s) Inhalation daily      EXAMINATION:  General: No acute distress  HEENT: Anicteric sclerae  Cardiac: [id number]  Lungs: Clear  Abdomen: Soft, non-tender, +BS  Extremities: No c/c/e  Skin/Incision Site: Clean, dry and intact  Neurologic: Awake, alert, fully oriented, follows commands, PERRL, VFFtc, EOMI, face symmetric, tongue midline, no drift, full strength

## 2024-09-06 NOTE — PROGRESS NOTE ADULT - SUBJECTIVE AND OBJECTIVE BOX
seen/examined earlier this AM in NSCU    INTERVAL HPI/OVERNIGHT EVENTS:  okanned transfer back to floor today  no melena or PEG site bleeding  no abdominal pain, nausea or vomiting  started Eliquis this AM    MEDICATIONS  (STANDING):  acetaminophen   IVPB .. 1000 milliGRAM(s) IV Intermittent once  albuterol/ipratropium for Nebulization 3 milliLiter(s) Nebulizer every 6 hours  amLODIPine   Tablet 5 milliGRAM(s) Oral daily  apixaban 5 milliGRAM(s) Oral every 12 hours  budesonide 160 MICROgram(s)/formoterol 4.5 MICROgram(s) Inhaler 2 Puff(s) Inhalation two times a day  levETIRAcetam  IVPB 250 milliGRAM(s) IV Intermittent every 12 hours  metoprolol tartrate 12.5 milliGRAM(s) Oral every 12 hours  Nephro-corine 1 Tablet(s) Oral daily  pantoprazole  Injectable 40 milliGRAM(s) IV Push two times a day  polyethylene glycol 3350 17 Gram(s) Oral two times a day  senna Syrup 10 milliLiter(s) Oral at bedtime  tiotropium 2.5 MICROgram(s) Inhaler 2 Puff(s) Inhalation daily    MEDICATIONS  (PRN):  acetaminophen     Tablet .. 650 milliGRAM(s) Oral every 6 hours PRN Mild Pain (1 - 3)  bisacodyl 5 milliGRAM(s) Oral daily PRN Constipation      Allergies  penicillin (Other)  ACE inhibitors (Other)  Bee Stings- anaphylaxis (Other)    Review of Systems:  see HPI- remainder 10 point ROS negative      Vital Signs Last 24 Hrs  T(C): 37 (06 Sep 2024 07:00), Max: 37 (06 Sep 2024 07:00)  T(F): 98.6 (06 Sep 2024 07:00), Max: 98.6 (06 Sep 2024 07:00)  HR: 109 (06 Sep 2024 09:25) (78 - 116)  BP: 112/55 (06 Sep 2024 09:25) (101/59 - 163/84)  BP(mean): 76 (06 Sep 2024 09:25) (74 - 116)  RR: 34 (06 Sep 2024 09:25) (18 - 34)  SpO2: 93% (06 Sep 2024 09:25) (92% - 98%)    Parameters below as of 06 Sep 2024 09:25  Patient On (Oxygen Delivery Method): nasal cannula  O2 Flow (L/min): 3    PHYSICAL EXAM:  GENERAL:: thin AA male awake, alert appears comfortable. +occ cough w/ thick oral secretions  NECK: No JVD  RESP:: occ rhonchi, no inc WOB  CV:: S1 and S2, RRR  GI:: BS+, soft, thin ND NT Abdominal binder opened - PEG bumper at 4.5 cm bhargavi at top of bumper, no bleeding or drainage- lightly touching skin and spins easily 360 degrees. +midline abdominal scar (from mid epigastric region to pubis) and small scar at lateral LUQ, WH LLQ scar c/w appy.    Abdominal binder replaced  MUSCULOSKELETAL: No clubbing or cyanosis of digits; no joint swelling or tenderness to palpation  PSYCH: A+O to person, place, and time; affect appropriate  NEUROLOGY: CN 2-12 are intact and symmetric; no gross sensory deficits, grossly 5/5 strength throughout  SKIN: No rashes; no palpable lesions, +crani site c/d/i      LABS:                        11.3   9.63  )-----------( 181      ( 06 Sep 2024 03:53 )             36.0     09-06    138  |  107  |  22  ----------------------------<  123<H>  4.6   |  16<L>  |  1.11    Ca    8.7      06 Sep 2024 03:53  Phos  2.7     09-06  Mg     2.6     09-06    TPro  7.3  /  Alb  3.3  /  TBili  0.4  /  DBili  x   /  AST  21  /  ALT  33  /  AlkPhos  88  09-04    PT/INR - ( 04 Sep 2024 20:45 )   PT: 13.1 sec;   INR: 1.26 ratio    PTT - ( 04 Sep 2024 20:45 )  PTT:28.8 sec      RADIOLOGY & ADDITIONAL TESTS:

## 2024-09-06 NOTE — PROGRESS NOTE ADULT - ASSESSMENT
ASSESSMENT/PLAN: POD 29 R. 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   -Pending NSGY clearance for restarting AC for BUE DVTs.  Pain: Tylenol PRN  Vasovagal syncope in the setting of a BM vs GI bleed  Activity: [] OOB as tolerated [] Bedrest [] PT [] OT [] PMNR    PULM:  -Hx COPD  -2L NC saturating > 95%  -Duonebs Q6  -IS   ABG: WNL    CV:  SBP goal: PAfib  AC on hold   Metoprolol tar 12.5 BID, rate controlled   TTE: EF: 71%  F/u EKG    RENAL:  Monitor I&Os.    GI:  -S/p EGD on 9/3 w/o significant ulcers or active GI tract bleeding. PEG tract unremarkable.   -Protonix 40BID.  -CT CAP shows no concern for active bleeding.  -GI cleared restarting AC.  Diet: NPO on Glucerna tube feeds   Bowel regimen: Dulcolax, senna, Miralax  09/04: last BM    ENDO:   Goal euglycemia (-180)    HEME/ONC:  VTE prophylaxis: SCDs  DVT ppx and or Theraprutic lovenox on hold   -09/04: Stable persistent clot in the right subclavian and axillary veins.  Thrombus around left subclavian vein line , new. Left basilic vein thrombosis.  -Vasc Cards rec'd restarting therapeutic AC.    ID: Afebrile   monitor for fevers     MISC:    SOCIAL/FAMILY:  [] updated at bedside [] family meeting    CODE STATUS:  [X] Full Code [] DNR [] DNI [] Palliative/Comfort Care    DISPOSITION:  [X] ICU [] Stroke Unit [] Floor [] EMU [] RCU [] PCU ASSESSMENT/PLAN: POD 29 R. 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   Pain: Tylenol PRN  Vasovagal syncope in the setting of a BM vs GI bleed  Activity: [X] OOB as tolerated [] Bedrest [X] PT [X] OT [] PMNR    PULM:  -Hx COPD  -2L NC saturating > 95%  -Duonebs Q6  -IS   ABG: WNL    CV:  SBP goal: PAfib  AC on hold   Metoprolol tar 12.5 BID, rate controlled   TTE: EF: 71%  F/u EKG    RENAL:  Monitor I&Os.    GI:  -S/p EGD on 9/3 w/o significant ulcers or active GI tract bleeding. PEG tract unremarkable.   -Protonix 40BID.  -CT CAP shows no concern for active bleeding.  -GI cleared restarting AC.  Diet: NPO on Glucerna tube feeds   Bowel regimen: Dulcolax, senna, Miralax  09/04: last BM    ENDO:   Goal euglycemia (-180)    HEME/ONC:  VTE prophylaxis: SCDs  DVT ppx and or Theraprutic lovenox on hold   -09/04: Stable persistent clot in the right subclavian and axillary veins.  Thrombus around left subclavian vein line , new. Left basilic vein thrombosis.  -Vasc Cards rec'd restarting therapeutic AC.    ID: Afebrile   monitor for fevers     MISC:    SOCIAL/FAMILY:  [] updated at bedside [] family meeting    CODE STATUS:  [X] Full Code [] DNR [] DNI [] Palliative/Comfort Care    DISPOSITION:  [X] ICU [] Stroke Unit [] Floor [] EMU [] RCU [] PCU ASSESSMENT/PLAN: POD 29 R. 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   Pain: Tylenol PRN  Vasovagal syncope in the setting of a BM vs GI bleed  Activity: [X] OOB as tolerated [] Bedrest [X] PT [X] OT [] PMNR    PULM:  -Hx COPD  -2L NC saturating > 90%  -Duonebs Q6H  -IS   ABG: WNL    CV:  SBP goal: PAfib  Metoprolol 12.5 BID, rate controlled   TTE: EF: 71%  F/u EKG    RENAL:  Monitor I&Os.    GI:  -S/p EGD on 9/3 w/o significant ulcers or active GI tract bleeding. PEG tract unremarkable.   -Protonix 40BID.  -CT CAP shows no concern for active bleeding.  -GI cleared restarting AC.  Diet: NPO on Glucerna tube feeds   Bowel regimen: Dulcolax, senna, Miralax  09/04: last BM    ENDO:   Goal euglycemia (-180)    HEME/ONC:  VTE prophylaxis: SCDs  -09/04: Stable persistent clot in the right subclavian and axillary veins.  Thrombus around left subclavian vein line , new. Left basilic vein thrombosis.  -Restarting Eliquis 5BID.  -Vasc Cards rec'd restarting therapeutic AC.    ID: Afebrile   monitor for fevers     MISC:    SOCIAL/FAMILY:  [] Awaiting [] updated at bedside [] family meeting    CODE STATUS:  [X] Full Code [] DNR [] DNI [] Palliative/Comfort Care    DISPOSITION:  [X] ICU [] Stroke Unit [] Floor [] EMU [] RCU [] PCU

## 2024-09-06 NOTE — PROGRESS NOTE ADULT - ATTENDING COMMENTS
Pt seen in evening of 9/6/24.  Pt alert and conversant, PATSY.  He was transferred to floor after 2 days observation in ICU.  He had remained hemodynamically stable with stable H/H.  New DVT was found in left subclavian.  It was agreed to start pt back on DOAC as they are associated with fewer bleeding complications than LMWH.  Continue monitoring on floor. Pending rehab placement.  Continue PEG feeds, but pt will need speech therapy to bolster swallowing. Pt seen in evening of 9/6/24.  Pt alert and conversant, PATSY.  He was transferred to floor after 2 days observation in ICU.  He had remained hemodynamically stable with stable H/H.  New DVT was found in left subclavian. Pt also with elevated WBC undergoing work up.  It was agreed to start pt back on DOAC as they are associated with fewer bleeding complications than LMWH.  Continue monitoring on floor. Pending rehab placement.  Continue PEG feeds, but pt will need speech therapy to bolster swallowing.

## 2024-09-06 NOTE — PROGRESS NOTE ADULT - SUBJECTIVE AND OBJECTIVE BOX
Patient seen and examined at bedside.    --Anticoagulation--    T(C): 36.8 (09-05-24 @ 23:00), Max: 36.9 (09-05-24 @ 15:00)  HR: 97 (09-05-24 @ 23:27) (78 - 105)  BP: 137/75 (09-05-24 @ 23:00) (101/59 - 163/84)  RR: 18 (09-05-24 @ 23:00) (16 - 20)  SpO2: 95% (09-05-24 @ 23:27) (93% - 100%)  Wt(kg): --    Exam: Awake, Ox2-3 (self , place time w/ choices ), mild L facial, FC, GARNER 5/5

## 2024-09-06 NOTE — PROGRESS NOTE ADULT - ASSESSMENT
Will d/w Dr. Branham starting Eliquis 5BID in AM    CBC daily    GI following     Per Vasc cards, okay to keep midline

## 2024-09-06 NOTE — PROGRESS NOTE ADULT - PROBLEM SELECTOR PLAN 1
and RUE DVT  - duplex of RUE: Right sided DVT involving the subclavian, axillary and brachial veins. Superficial thrombus in the right basilic vein  - 8/28 repeat UE duplex with persistent DVT in the right subclavian vein and right axillary vein, right basilic vein SVT.  - CT Angio chest: segmental emboli within RUL pulmonary artery with no evidence of RV strain  - TTE unremarkable  - Vascular Cardiology consulted, and started on hep gtt on 8/16, transitioned to therapeutic lovenox on 8/18 but on hold in setting of bleeding at PEG site, last dose was 8/30 AM  - surveillance duplex 9/4 demonstrating NEW LEFT subclavian DVT  - cleared by Neurosurgery to resume eliquis 5mg BID, first dose 9/6 AM

## 2024-09-06 NOTE — PROGRESS NOTE ADULT - PROBLEM SELECTOR PLAN 2
no further episodes of bleeding from PEG site since 8/30  - however, had transient worsening anemia and melena - now resolved  - CT abd with IV contrast performed on 8/31 - limited due to residual contrast from prior study, but no active bleed identified  - s/p additional 1u PRBC on 9/1 with robust response and stable on repeat  - GI lavage of PEG appreciated - no evidence of bleed, just gastric contents  - new melena noted on 9/2 - likely from old residual blood from PEG site bleed into tract  - s/p EGD on 9/3 with no signs of active bleed  - was transfused in NSCU additional 1u PRBC on 9/5 but no active bleed. GI repeat lavage of PEG appreciated  - his current Hb range is in 11-12  - eliquis resumed on 9/6 - monitor for signs of bleed on full a/c  - transfuse for Hb<7 no further episodes of bleeding from PEG site since 8/30  - however, had transient worsening anemia and melena - now resolved  - CT abd with IV contrast performed on 8/31 - limited due to residual contrast from prior study, but no active bleed identified  - s/p additional 1u PRBC on 9/1 with robust response and stable on repeat  - GI lavage of PEG appreciated - no evidence of bleed, just gastric contents  - new melena noted on 9/2 - likely from old residual blood from PEG site bleed into tract  - s/p EGD on 9/3 with no signs of active bleed  - was transfused in NSCU additional 1u PRBC on 9/5 but no active bleed. GI repeat lavage of PEG appreciated  - his current Hb range is in 11-12. anything above this range likely falsely elevated unless patient receives another transfusion  - eliquis resumed on 9/6 - monitor for signs of bleed on full a/c  - transfuse for Hb<7 no further episodes of bleeding from PEG site since 8/30  - however, had transient worsening anemia and melena - now resolved  - CT abd with IV contrast performed on 8/31 - limited due to residual contrast from prior study, but no active bleed identified  - s/p additional 1u PRBC on 9/1 with robust response and stable on repeat  - GI lavage of PEG appreciated - no evidence of bleed, just gastric contents  - new melena noted on 9/2 - likely from old residual blood from PEG site bleed into tract  - s/p EGD on 9/3 with no signs of active bleed  - was transfused in NSCU additional 1u PRBC on 9/5 but no active bleed. GI repeat lavage of PEG appreciated  - his current Hb range is in 11-12. anything above this range likely falsely elevated unless patient receives another transfusion  - eliquis resumed on 9/6 - monitor for signs of bleed on full a/c  - c/w PPI for GI ppx  - transfuse for Hb<7 no further episodes of bleeding from PEG site since 8/30  - however, had transient worsening anemia and melena - now resolved  - CT abd with IV contrast performed on 8/31 - limited due to residual contrast from prior study, but no active bleed identified  - s/p additional 1u PRBC on 9/1 with robust response and stable on repeat  - GI lavage of PEG appreciated - no evidence of bleed, just gastric contents  - new melena noted on 9/2 - likely from old residual blood from PEG site bleed into tract  - s/p EGD on 9/3 with no signs of active bleed  - was transfused in NSCU additional 1u PRBC on 9/5 but no active bleed. GI repeat lavage of PEG in NSCU appreciated - again, no signs of active bleed.  - his current Hb range is in 11-12. anything above this range likely falsely elevated unless patient receives another transfusion  - eliquis resumed on 9/6 - monitor for signs of bleed on full a/c  - c/w PPI for GI ppx  - transfuse for Hb<7

## 2024-09-06 NOTE — PROGRESS NOTE ADULT - SUPERVISING ATTENDING
Agree with above. Currently no signs of bleeding from PEG - continue A/C and monitor for further bleeding.

## 2024-09-06 NOTE — PROGRESS NOTE ADULT - SUBJECTIVE AND OBJECTIVE BOX
Lizet Page MD  Division of Hospital Medicine  [facility]  Spectra: [phone]      Patient is a 79y old  Male who presents with a chief complaint of post-operative DVT (06 Sep 2024 10:29)      SUBJECTIVE / OVERNIGHT EVENTS: patient seen and examined bedside upon arrival to [facility]. no acute events overnight. vaguely recalls syncopal event while being on the toilet few nights ago. feels weak and thirsty but otherwise feeling okay. no dizziness, headache, chest pain, dyspnea, nor abd pain.   ADDITIONAL REVIEW OF SYSTEMS:    MEDICATIONS  (STANDING):  albuterol/ipratropium for Nebulization 3 milliLiter(s) Nebulizer every 6 hours  amLODIPine   Tablet 5 milliGRAM(s) Oral daily  apixaban 5 milliGRAM(s) Oral every 12 hours  budesonide 160 MICROgram(s)/formoterol 4.5 MICROgram(s) Inhaler 2 Puff(s) Inhalation two times a day  levETIRAcetam  IVPB 250 milliGRAM(s) IV Intermittent every 12 hours  metoprolol tartrate 12.5 milliGRAM(s) Oral every 12 hours  Nephro-coirne 1 Tablet(s) Oral daily  pantoprazole  Injectable 40 milliGRAM(s) IV Push two times a day  polyethylene glycol 3350 17 Gram(s) Oral two times a day  senna Syrup 10 milliLiter(s) Oral at bedtime  tiotropium 2.5 MICROgram(s) Inhaler 2 Puff(s) Inhalation daily    MEDICATIONS  (PRN):  acetaminophen     Tablet .. 650 milliGRAM(s) Oral every 6 hours PRN Mild Pain (1 - 3)  bisacodyl 5 milliGRAM(s) Oral daily PRN Constipation      CAPILLARY BLOOD GLUCOSE        I&O's Summary    05 Sep 2024 07:01  -  06 Sep 2024 07:00  --------------------------------------------------------  IN: 2700 mL / OUT: 1125 mL / NET: 1575 mL    06 Sep 2024 07:01  -  06 Sep 2024 15:01  --------------------------------------------------------  IN: 280 mL / OUT: 0 mL / NET: 280 mL        PHYSICAL EXAM:  Vital Signs Last 24 Hrs  T(C): 36.7 (06 Sep 2024 11:28), Max: 37 (06 Sep 2024 07:00)  T(F): 98.1 (06 Sep 2024 11:28), Max: 98.6 (06 Sep 2024 07:00)  HR: 102 (06 Sep 2024 11:28) (91 - 116)  BP: 99/66 (06 Sep 2024 11:28) (99/66 - 156/55)  BP(mean): 76 (06 Sep 2024 09:25) (74 - 98)  RR: 20 (06 Sep 2024 11:28) (18 - 34)  SpO2: 92% (06 Sep 2024 11:28) (92% - 98%)    Parameters below as of 06 Sep 2024 11:28  Patient On (Oxygen Delivery Method): room air    CONSTITUTIONAL: NAD, well-developed, well-groomed  EYES: PERRLA; conjunctiva and sclera clear  ENMT: Moist oral mucosa, no pharyngeal injection or exudates; normal dentition  NECK: Supple, no palpable masses; no thyromegaly  RESPIRATORY: Normal respiratory effort; lungs are clear to auscultation bilaterally  CARDIOVASCULAR: Regular rate and rhythm, normal S1 and S2, no murmur/rub/gallop; No lower extremity edema  ABDOMEN: Soft, Nondistended, Nontender to palpation, normoactive bowel sounds, +PEG site c/d/i with no bleed  MUSCULOSKELETAL: No clubbing or cyanosis of digits; no joint swelling or tenderness to palpation  PSYCH: A+O to person, place, and time; affect appropriate though cantankerous  NEUROLOGY: CN 2-12 are intact and symmetric; no gross sensory deficits   SKIN: No rashes; no palpable lesions, +prior R crani incision site c/d/i    LABS:                        11.3   9.63  )-----------( 181      ( 06 Sep 2024 03:53 )             36.0     09-06    138  |  107  |  22  ----------------------------<  123<H>  4.6   |  16<L>  |  1.11    Ca    8.7      06 Sep 2024 03:53  Phos  2.7     09-06  Mg     2.6     09-06    TPro  7.3  /  Alb  3.3  /  TBili  0.4  /  DBili  x   /  AST  21  /  ALT  33  /  AlkPhos  88  09-04    PT/INR - ( 04 Sep 2024 20:45 )   PT: 13.1 sec;   INR: 1.26 ratio         PTT - ( 04 Sep 2024 20:45 )  PTT:28.8 sec      Urinalysis Basic - ( 06 Sep 2024 03:53 )    Color: x / Appearance: x / SG: x / pH: x  Gluc: 123 mg/dL / Ketone: x  / Bili: x / Urobili: x   Blood: x / Protein: x / Nitrite: x   Leuk Esterase: x / RBC: x / WBC x   Sq Epi: x / Non Sq Epi: x / Bacteria: x      RADIOLOGY & ADDITIONAL TESTS:  Results Reviewed: no leukocytosis, H/H stable  Imaging Personally Reviewed:  Electrocardiogram Personally Reviewed:    COORDINATION OF CARE:  Care Discussed with Consultants/Other Providers [Y]: GI ALYSON Nova, Neurosurgery ALYSON Jmaes  Prior or Outpatient Records Reviewed [Y/N]:

## 2024-09-06 NOTE — PROGRESS NOTE ADULT - NSPROGADDITIONALINFOA_GEN_ALL_CORE
.  Lizet Page MD  Division of Hospital Medicine  [facility]   Spectra: [phone]    Plan discussed with patient, wife and daughter bedside, and Neurosurgery ALYSON Ryan.
.  Lizet Page MD  Division of Hospital Medicine  [facility]   Spectra: [phone]    Plan discussed with patient, wife bedside, and Neurosurgery ALYSON Lewis.
.  Lizet Page MD  Division of Hospital Medicine  [facility]   Spectra: [phone]    Plan discussed with patient, wife bedside, and Neurosurgery ALYSON Yates.
.  Lizet Page MD  Division of Hospital Medicine  [facility]   Spectra: [phone]    Plan discussed with patient, GI ALYSON Nova, Vasc Cards ALYSON Ferrera, and Neurosurgery ALYSON Lewis.
.  Lizet Page MD  Division of Hospital Medicine  [facility]   Spectra: [phone]    Plan discussed with patient, GI ALYSON Nova, and Neurosurgery ALYSON James.
36 minutes spent.
.  Lizet Page MD  Division of Hospital Medicine  [facility]   Spectra: [phone]    Plan discussed with patient, wife bedside, and Neurosurgery ALYSON Yates.
.  Lizet Page MD  Division of Hospital Medicine  [facility]   Spectra: [phone]    Plan discussed with patient, GI ALYSON Nova, and Neurosurgery ALYSON James.
.  Lizet Page MD  Division of Hospital Medicine  [facility]   Spectra: [phone]    Plan discussed with patient, wife bedside, and Neurosurgery resident Nagi.
.  Lizet Page MD  Division of Hospital Medicine  [facility]   Spectra: [phone]    Plan discussed with patient, wife bedside, and Neurosurgery ALYSON James.
.  Lizet Page MD  Division of Hospital Medicine  [facility]   Spectra: [phone]    Plan discussed with patient and Neurosurgery ALYSON Barry.

## 2024-09-06 NOTE — CHART NOTE - NSCHARTNOTEFT_GEN_A_CORE
NUTRITION FOLLOW UP NOTE    PATIENT SEEN FOR: consult - MST >/2; EN/PN PTA     SOURCE: [] Patient  [x] Current Medical Record  [x] RN  [] Family/support person at bedside  [] Patient unavailable/inappropriate  [] Other:    CHART REVIEWED/EVENTS NOTED.  [x] Nutrition Status:  -Failed speech and swallow  -PEG placed 8/27   -S/p EGD 9/03 --> no signs of ulcers or active bleeding   -Rapid response 9/04 --> syncopal episode, transferred to NSCU     DIET ORDER:   Diet, NPO with Tube Feed:   Tube Feeding Modality: Nasogastric  Glucerna 1.2 Gabriele (GLUCERNARTH)  Total Volume for 24 Hours (mL): 1440  Continuous  Starting Tube Feed Rate {mL per Hour}: 20  Increase Tube Feed Rate by (mL): 10     Every 6 hours  Until Goal Tube Feed Rate (mL per Hour): 60  Tube Feed Duration (in Hours): 24  Tube Feed Start Time: 13:00 (09-05-24)    ENTERAL NUTRITION  EN Order Provides:  1440ml formula, 1728kcal, 86g protein, 1159ml free water; meets 29 kcal/kg (based on 58.8kg - dosing weight) and 1.14 g protein/kg (based on 75.3kg - IBW)    Current Pump Rate:  EN provision: titrating towards goal; currently at 50mL/hr   -Received <50% of estimated needs x >/5 days     CURRENT DIET ORDER IS:  [x] Inadequate: does not meet current estimated protein-energy needs     ANTHROPOMETRICS:  Drug Dosing Weight  Height (cm): 177.8 (03 Sep 2024 14:25)  Weight (kg): 58.8 (03 Sep 2024 14:25)  BMI (kg/m2): 18.6 (03 Sep 2024 14:25)    NUTRITIONALLY PERTINENT MEDICATIONS:  MEDICATIONS  (STANDING):  amLODIPine   Tablet  metoprolol tartrate  Nephro-corine  pantoprazole  Injectable  polyethylene glycol 3350  senna Syrup       NUTRITIONALLY PERTINENT LABS:  09-06 Na138 mmol/L Glu 123 mg/dL<H> K+ 4.6 mmol/L Cr  1.11 mg/dL BUN 22 mg/dL 09-06 Phos 2.7 mg/dL 09-04 Alb 3.3 g/dL09-04 ALT 33 U/L AST 21 U/L Alkaline Phosphatase 88 U/L    A1C with Estimated Average Glucose Result: 6.3 % (07-23-24 @ 10:46)  A1C with Estimated Average Glucose Result: 6.1 % (06-18-24 @ 13:03)    NUTRITIONALLY PERTINENT MEDICATIONS/LABS:  [x] Reviewed  [] Relevant notes on medications/labs:    EDEMA:  [x] Reviewed  [] Relevant notes:    GI/ I&O:  [x] Reviewed  [] Relevant notes:  [] Other:    SKIN:   [x] No pressure injuries documented, per nursing flowsheet    ESTIMATED NEEDS:  [x] Updated:  Energy:   1764-2058kcal/day (30-35 kcal/kg)  Protein:   76-100g/day (1.3-1.7 g/kg)  Fluid:   ml/day or [x] defer to team  Based on: 58.8kg - dosing weight     NUTRITION DIAGNOSIS:  [x] Prior Dx:  1) Underweight/Moderate malnutrition --> upgraded  2) Increased protein-energy needs   [x] New Dx:  P: Severe malnutrition in the context of acute on chronic illness  E: related to inadequate protein-energy intake in the setting of prolonged hospitalization, swallowing difficulty  S: as evidenced by <50% intake x >/5 days; moderate-severe signs of muscle/fat loss (previously noted in initial)   Goal: pt to receive >80% of estimated needs     EDUCATION:  [] Yes:  [x] Not appropriate/warranted    NUTRITION CARE PLAN:  1. Diet: Increase EN regimen to Glucerna 1.2 @ 65mL x 24hrs providing 1560mL of formula, 1872kcal/day (32kcal/kg), 94g protein/day (1.6g/kg), 1256mL free water - based on 58.8kg   -Defer free water to Team   -Monitor BG levels closely, if <120mg/dL recommend switching to non-carb steady formula  2. Supplements: N/A  3. Multivitamin/mineral supplementation: multivitamin   4. Malnutrition sticker placed      [] Achieved - Continue current nutrition intervention(s)  [] Current medical condition precludes nutrition intervention at this time.    MONITORING AND EVALUATION:   RD remains available upon request and will follow up per protocol.    Colette Page, MS, RDN, CDN (Teams)   Available on MS TEAMS

## 2024-09-07 DIAGNOSIS — R78.81 BACTEREMIA: ICD-10-CM

## 2024-09-07 LAB
-  K. PNEUMONIAE GROUP: SIGNIFICANT CHANGE UP
ANION GAP SERPL CALC-SCNC: 14 MMOL/L — SIGNIFICANT CHANGE UP (ref 5–17)
BUN SERPL-MCNC: 32 MG/DL — HIGH (ref 7–23)
CALCIUM SERPL-MCNC: 9.3 MG/DL — SIGNIFICANT CHANGE UP (ref 8.4–10.5)
CHLORIDE SERPL-SCNC: 105 MMOL/L — SIGNIFICANT CHANGE UP (ref 96–108)
CO2 SERPL-SCNC: 19 MMOL/L — LOW (ref 22–31)
CREAT SERPL-MCNC: 1.12 MG/DL — SIGNIFICANT CHANGE UP (ref 0.5–1.3)
EGFR: 66 ML/MIN/1.73M2 — SIGNIFICANT CHANGE UP
GLUCOSE BLDC GLUCOMTR-MCNC: 131 MG/DL — HIGH (ref 70–99)
GLUCOSE BLDC GLUCOMTR-MCNC: 148 MG/DL — HIGH (ref 70–99)
GLUCOSE BLDC GLUCOMTR-MCNC: 149 MG/DL — HIGH (ref 70–99)
GLUCOSE BLDC GLUCOMTR-MCNC: 167 MG/DL — HIGH (ref 70–99)
GLUCOSE SERPL-MCNC: 136 MG/DL — HIGH (ref 70–99)
GRAM STN FLD: ABNORMAL
HCT VFR BLD CALC: 35.4 % — LOW (ref 39–50)
HGB BLD-MCNC: 11.5 G/DL — LOW (ref 13–17)
MAGNESIUM SERPL-MCNC: 2.7 MG/DL — HIGH (ref 1.6–2.6)
MCHC RBC-ENTMCNC: 30.7 PG — SIGNIFICANT CHANGE UP (ref 27–34)
MCHC RBC-ENTMCNC: 32.5 GM/DL — SIGNIFICANT CHANGE UP (ref 32–36)
MCV RBC AUTO: 94.7 FL — SIGNIFICANT CHANGE UP (ref 80–100)
METHOD TYPE: SIGNIFICANT CHANGE UP
NRBC # BLD: 0 /100 WBCS — SIGNIFICANT CHANGE UP (ref 0–0)
PHOSPHATE SERPL-MCNC: 1.8 MG/DL — LOW (ref 2.5–4.5)
PLATELET # BLD AUTO: 155 K/UL — SIGNIFICANT CHANGE UP (ref 150–400)
POTASSIUM SERPL-MCNC: 4.5 MMOL/L — SIGNIFICANT CHANGE UP (ref 3.5–5.3)
POTASSIUM SERPL-SCNC: 4.5 MMOL/L — SIGNIFICANT CHANGE UP (ref 3.5–5.3)
RBC # BLD: 3.74 M/UL — LOW (ref 4.2–5.8)
RBC # FLD: 15.7 % — HIGH (ref 10.3–14.5)
SODIUM SERPL-SCNC: 138 MMOL/L — SIGNIFICANT CHANGE UP (ref 135–145)
SPECIMEN SOURCE: SIGNIFICANT CHANGE UP
SPECIMEN SOURCE: SIGNIFICANT CHANGE UP
WBC # BLD: 16.29 K/UL — HIGH (ref 3.8–10.5)
WBC # FLD AUTO: 16.29 K/UL — HIGH (ref 3.8–10.5)

## 2024-09-07 PROCEDURE — 99233 SBSQ HOSP IP/OBS HIGH 50: CPT

## 2024-09-07 RX ORDER — SODIUM PHOSPHATE, DIBASIC, ANHYDROUS, POTASSIUM PHOSPHATE, MONOBASIC, AND SODIUM PHOSPHATE, MONOBASIC, MONOHYDRATE 852; 155; 130 MG/1; MG/1; MG/1
1 TABLET, COATED ORAL
Refills: 0 | Status: COMPLETED | OUTPATIENT
Start: 2024-09-07 | End: 2024-09-09

## 2024-09-07 RX ORDER — CEFEPIME 2 G/1
2000 INJECTION, POWDER, FOR SOLUTION INTRAVENOUS EVERY 12 HOURS
Refills: 0 | Status: DISCONTINUED | OUTPATIENT
Start: 2024-09-07 | End: 2024-09-09

## 2024-09-07 RX ORDER — SODIUM CHLORIDE 9 MG/ML
500 INJECTION INTRAMUSCULAR; INTRAVENOUS; SUBCUTANEOUS ONCE
Refills: 0 | Status: COMPLETED | OUTPATIENT
Start: 2024-09-07 | End: 2024-09-07

## 2024-09-07 RX ORDER — CHLORHEXIDINE GLUCONATE 40 MG/ML
1 SOLUTION TOPICAL DAILY
Refills: 0 | Status: DISCONTINUED | OUTPATIENT
Start: 2024-09-07 | End: 2024-09-11

## 2024-09-07 RX ADMIN — LEVETIRACETAM 400 MILLIGRAM(S): 1000 TABLET ORAL at 17:11

## 2024-09-07 RX ADMIN — SODIUM CHLORIDE 500 MILLILITER(S): 9 INJECTION INTRAMUSCULAR; INTRAVENOUS; SUBCUTANEOUS at 16:00

## 2024-09-07 RX ADMIN — CEFEPIME 100 MILLIGRAM(S): 2 INJECTION, POWDER, FOR SOLUTION INTRAVENOUS at 18:04

## 2024-09-07 RX ADMIN — Medication 1: at 23:56

## 2024-09-07 RX ADMIN — METOPROLOL TARTRATE 12.5 MILLIGRAM(S): 100 TABLET ORAL at 05:14

## 2024-09-07 RX ADMIN — LEVETIRACETAM 400 MILLIGRAM(S): 1000 TABLET ORAL at 05:14

## 2024-09-07 RX ADMIN — Medication 40 MILLIGRAM(S): at 05:14

## 2024-09-07 RX ADMIN — IPRATROPIUM BROMIDE AND ALBUTEROL SULFATE 3 MILLILITER(S): .5; 3 SOLUTION RESPIRATORY (INHALATION) at 17:09

## 2024-09-07 RX ADMIN — Medication 100 MILLIGRAM(S): at 00:13

## 2024-09-07 RX ADMIN — IPRATROPIUM BROMIDE AND ALBUTEROL SULFATE 3 MILLILITER(S): .5; 3 SOLUTION RESPIRATORY (INHALATION) at 23:03

## 2024-09-07 RX ADMIN — SODIUM PHOSPHATE, DIBASIC, ANHYDROUS, POTASSIUM PHOSPHATE, MONOBASIC, AND SODIUM PHOSPHATE, MONOBASIC, MONOHYDRATE 1 TABLET(S): 852; 155; 130 TABLET, COATED ORAL at 17:17

## 2024-09-07 RX ADMIN — IPRATROPIUM BROMIDE AND ALBUTEROL SULFATE 3 MILLILITER(S): .5; 3 SOLUTION RESPIRATORY (INHALATION) at 00:14

## 2024-09-07 RX ADMIN — Medication 40 MILLIGRAM(S): at 17:10

## 2024-09-07 RX ADMIN — IPRATROPIUM BROMIDE AND ALBUTEROL SULFATE 3 MILLILITER(S): .5; 3 SOLUTION RESPIRATORY (INHALATION) at 12:26

## 2024-09-07 RX ADMIN — CEFEPIME 100 MILLIGRAM(S): 2 INJECTION, POWDER, FOR SOLUTION INTRAVENOUS at 14:46

## 2024-09-07 RX ADMIN — CHLORHEXIDINE GLUCONATE 1 APPLICATION(S): 40 SOLUTION TOPICAL at 12:28

## 2024-09-07 RX ADMIN — METOPROLOL TARTRATE 12.5 MILLIGRAM(S): 100 TABLET ORAL at 17:09

## 2024-09-07 RX ADMIN — TIOTROPIUM BROMIDE INHALATION SPRAY 2 PUFF(S): 3.12 SPRAY, METERED RESPIRATORY (INHALATION) at 12:28

## 2024-09-07 RX ADMIN — Medication 1 TABLET(S): at 12:26

## 2024-09-07 RX ADMIN — BUDESONIDE AND FORMOTEROL FUMARATE 2 PUFF(S): 80; 4.5 AEROSOL, METERED RESPIRATORY (INHALATION) at 05:15

## 2024-09-07 RX ADMIN — BUDESONIDE AND FORMOTEROL FUMARATE 2 PUFF(S): 80; 4.5 AEROSOL, METERED RESPIRATORY (INHALATION) at 17:12

## 2024-09-07 RX ADMIN — APIXABAN 5 MILLIGRAM(S): 5 TABLET, FILM COATED ORAL at 05:15

## 2024-09-07 RX ADMIN — SODIUM PHOSPHATE, DIBASIC, ANHYDROUS, POTASSIUM PHOSPHATE, MONOBASIC, AND SODIUM PHOSPHATE, MONOBASIC, MONOHYDRATE 1 TABLET(S): 852; 155; 130 TABLET, COATED ORAL at 23:13

## 2024-09-07 RX ADMIN — APIXABAN 5 MILLIGRAM(S): 5 TABLET, FILM COATED ORAL at 17:10

## 2024-09-07 RX ADMIN — IPRATROPIUM BROMIDE AND ALBUTEROL SULFATE 3 MILLILITER(S): .5; 3 SOLUTION RESPIRATORY (INHALATION) at 05:14

## 2024-09-07 NOTE — PROVIDER CONTACT NOTE (OTHER) - ASSESSMENT
Pt being cleaning and blood noted under condom catheter. Condom catheter gently removed and skin assessed. Skin tear noted and +1 edema on head of penis. Pt foreskin unable to cover head of penis.
Pt alert, oriented, endorses he feels "okay"
VSS   Pt. remains neurologically intact
A&Ox3, neuro status unchanged. PEG site is clean and dry, no bleeding.
Patient A&Ox3-4, no c/o of pain, no signs of bleeding.

## 2024-09-07 NOTE — PROCEDURE NOTE - ADDITIONAL PROCEDURE DETAILS
manual compression applied to the glans to reduce edema  +capillary refill  skin tear noted on ventral aspect of penis below corona  foreskin gently pulled forward over glans penis using both thumbs to push glans in while simultaneously pulling foreskin forward with index fingers  paraphimosis successfully reduced.   foreskin repositioned appropriately over the glans  avoid further retraction of foreskin in near future

## 2024-09-07 NOTE — PROGRESS NOTE ADULT - ASSESSMENT
ASSESSMENT AND PLAN: 79 year old male with PMHx of recent admission for and R crani for lesion w/ Dr. 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 Today presents to ED for RUE doppler w/ DVT involving the subclavian, axillary and brachial veins and Superficial thrombus in the right basilic vein. Plts/coags wnl. Exam: unchanged from discharge,  awake, Ox2 (self and place), EOS, mild L facial, FC, BUE 5/5, BLE 5/5. Pt started on eliquis. On 9/6 patient was found to have elevated WBC on CBC and pancultured. Found to have gram negative rods in blood. Given one dose of ceftriaxone overnight.     NEURO: Seizure ppx: continue keppra   Continue tylenol for pain control    PULM: Found with small subsegmental PE. On eliquis for AC. RA and saturating well. Continue continuous pulse ox   Encouraged incentive spirometer     CV: Continue metoprolol,     ENDO: FS monitor. HISS     HEME/ONC:                      DVT ppx: On eliquis for PE/DVT     RENAL: Continue Free Water 200Q8. BUN slightly elevated. Will give 500cc bolus.     ID: ID consulted. Ceftriaxone switched to cepeime for broader coverage after speaking with IM and ID. Per ID will repeat blood cultures in Am and CBC in Am. Follow up urine culture. CXR WNL. Monitor WBC and fevers. Follow up sensitivities. Pt is allergic to PCN and has been tolerating ceftriaxone.     GI: Protonix for GI ppx. Senna and dulcolax for stool softner.    Glucerna at 60 via PEG     DISCHARGE PLANNING: [facility] vs Home PT   Will D/w Neurosurgeon

## 2024-09-07 NOTE — PROVIDER CONTACT NOTE (CRITICAL VALUE NOTIFICATION) - ACTION/TREATMENT ORDERED:
Brigette Pizarro hold Heparin until further notice
Will provide instructions
APTT was drawn as ordered.  Left arm was used secondary to pt having a DVT in right arm.  Heparin was held for half  hr prior to lab begin  drawn.

## 2024-09-07 NOTE — PROVIDER CONTACT NOTE (CRITICAL VALUE NOTIFICATION) - NAME OF MD/NP/PA/DO NOTIFIED:
11/16:  Update CM Note:  Cm sent message to attending to request scripts for glucometer, strips & Lancets for the patient when dc. Dr Leora Price would like Endo to address these needs. Dr. Efrem Pfeiffer contacted & advise that this needs addressed.   Electronically signed by Maria Garcia RN on 11/16/2022 at 11:12 AM
Brigette Pizarro
Charlene Yanez
Brigette Pizarro

## 2024-09-07 NOTE — PROVIDER CONTACT NOTE (OTHER) - ACTION/TREATMENT ORDERED:
MD Carrero notified, 1 gram Carafate and 650mg tylenol ordered and given as per MD Carrero
Historically patient has not responded well to changes medication, and became hypotensive. evaluate blood pressure again in 40 minutes to ensure it is decreasing with current regimen.
MD notified aware, new heparin rate pending
Team made aware & came to bedside to re-enforce and re-dress PEG dressing. Will wait 1hr to use for meds and water as per neurosurgery team. Pt. remains stable.
MD Aaron Carrero aware and at bedside to assess. Urology ALYSON High at bedside, see ALYSON High procedure note for details. Wound consult ordered by RN for new skin tear.

## 2024-09-07 NOTE — PROGRESS NOTE ADULT - SUBJECTIVE AND OBJECTIVE BOX
SUBJECTIVE: Pt seen and examined. Pt complains of having a dry mouth. Pt denies any shortness of breath or calf pain.     OVERNIGHT EVENTS: Pt had elevated white count with suspicious UA. PanCx before antibiotics and blood cultures showed gram negative rods. Pt received one dose of Ceftriaxone last night for suspected UTI.      Vital Signs Last 24 Hrs  T(C): 36.7 (07 Sep 2024 12:48), Max: 37 (06 Sep 2024 20:53)  T(F): 98 (07 Sep 2024 12:48), Max: 98.6 (06 Sep 2024 20:53)  HR: 91 (07 Sep 2024 12:48) (60 - 94)  BP: 110/61 (07 Sep 2024 12:48) (101/56 - 117/81)  RR: 18 (07 Sep 2024 12:48) (18 - 18)  SpO2: 98% (07 Sep 2024 12:48) (97% - 98%)    Parameters below as of 07 Sep 2024 12:48  Patient On (Oxygen Delivery Method): room air                       11.5   16.29 )-----------( 155      ( 07 Sep 2024 04:50 )             35.4    09-07    138  |  105  |  32<H>  ----------------------------<  136<H>  4.5   |  19<L>  |  1.12    Ca    9.3      07 Sep 2024 04:50  Phos  1.8     09-07  Mg     2.7     09-07    PHYSICAL EXAM:    General: No Acute Distress     Neurological: Awake, alert oriented to person, place and says "1224" for the year, patient corrected with choices, says month correctly, Following Commands, EOMI, Face Symmetrical, Speech Fluent, Moving all extremities, Muscle Strength normal in all four extremities, No Drift, Sensation to Light Touch Intact    Pulmonary: Clear to Auscultation, No Rales, No Rhonchi, No Wheezes     Cardiovascular: S1, S2, Regular Rate and Rhythm     Gastrointestinal: Soft, Nontender, Nondistended     Incision: Sutures in place.     MEDICATIONS:   Antibiotics:  cefepime   IVPB 2000 milliGRAM(s) IV Intermittent every 12 hours    Neuro:  acetaminophen     Tablet .. 650 milliGRAM(s) Oral every 6 hours PRN Mild Pain (1 - 3)  levETIRAcetam  IVPB 250 milliGRAM(s) IV Intermittent every 12 hours    Anticoagulation:  apixaban 5 milliGRAM(s) Oral every 12 hours    Cardiology:  metoprolol tartrate 12.5 milliGRAM(s) Oral every 12 hours    Endo:   insulin lispro (ADMELOG) corrective regimen sliding scale   SubCutaneous every 6 hours    Pulm:  albuterol/ipratropium for Nebulization 3 milliLiter(s) Nebulizer every 6 hours  budesonide 160 MICROgram(s)/formoterol 4.5 MICROgram(s) Inhaler 2 Puff(s) Inhalation two times a day  tiotropium 2.5 MICROgram(s) Inhaler 2 Puff(s) Inhalation daily    GI/:  bisacodyl 5 milliGRAM(s) Oral daily PRN  pantoprazole  Injectable 40 milliGRAM(s) IV Push two times a day  senna Syrup 10 milliLiter(s) Oral at bedtime    Other:  Nephro-corine 1 Tablet(s) Oral daily  potassium phosphate / sodium phosphate Tablet (K-PHOS No. 2) 1 Tablet(s) Oral four times a day   SUBJECTIVE: Pt seen and examined. Pt complains of having a dry mouth. Pt denies any shortness of breath or calf pain.     OVERNIGHT EVENTS: Pt had elevated white count with suspicious UA. PanCx before antibiotics and blood cultures showed gram negative rods. Pt received one dose of Ceftriaxone last night for suspected UTI. Urology called to reduce paraphimosis.       Vital Signs Last 24 Hrs  T(C): 36.7 (07 Sep 2024 12:48), Max: 37 (06 Sep 2024 20:53)  T(F): 98 (07 Sep 2024 12:48), Max: 98.6 (06 Sep 2024 20:53)  HR: 91 (07 Sep 2024 12:48) (60 - 94)  BP: 110/61 (07 Sep 2024 12:48) (101/56 - 117/81)  RR: 18 (07 Sep 2024 12:48) (18 - 18)  SpO2: 98% (07 Sep 2024 12:48) (97% - 98%)    Parameters below as of 07 Sep 2024 12:48  Patient On (Oxygen Delivery Method): room air                       11.5   16.29 )-----------( 155      ( 07 Sep 2024 04:50 )             35.4    09-07    138  |  105  |  32<H>  ----------------------------<  136<H>  4.5   |  19<L>  |  1.12    Ca    9.3      07 Sep 2024 04:50  Phos  1.8     09-07  Mg     2.7     09-07    PHYSICAL EXAM:    General: No Acute Distress     Neurological: Awake, alert oriented to person, place and says "1224" for the year, patient corrected with choices, says month correctly, Following Commands, EOMI, Face Symmetrical, Speech Fluent, Moving all extremities, Muscle Strength normal in all four extremities, No Drift, Sensation to Light Touch Intact    Pulmonary: Clear to Auscultation, No Rales, No Rhonchi, No Wheezes     Cardiovascular: S1, S2, Regular Rate and Rhythm     Gastrointestinal: Soft, Nontender, Nondistended     Incision: Sutures in place. Clean and dry     MEDICATIONS:   Antibiotics:  cefepime   IVPB 2000 milliGRAM(s) IV Intermittent every 12 hours    Neuro:  acetaminophen     Tablet .. 650 milliGRAM(s) Oral every 6 hours PRN Mild Pain (1 - 3)  levETIRAcetam  IVPB 250 milliGRAM(s) IV Intermittent every 12 hours    Anticoagulation:  apixaban 5 milliGRAM(s) Oral every 12 hours    Cardiology:  metoprolol tartrate 12.5 milliGRAM(s) Oral every 12 hours    Endo:   insulin lispro (ADMELOG) corrective regimen sliding scale   SubCutaneous every 6 hours    Pulm:  albuterol/ipratropium for Nebulization 3 milliLiter(s) Nebulizer every 6 hours  budesonide 160 MICROgram(s)/formoterol 4.5 MICROgram(s) Inhaler 2 Puff(s) Inhalation two times a day  tiotropium 2.5 MICROgram(s) Inhaler 2 Puff(s) Inhalation daily    GI/:  bisacodyl 5 milliGRAM(s) Oral daily PRN  pantoprazole  Injectable 40 milliGRAM(s) IV Push two times a day  senna Syrup 10 milliLiter(s) Oral at bedtime    Other:  Nephro-corine 1 Tablet(s) Oral daily  potassium phosphate / sodium phosphate Tablet (K-PHOS No. 2) 1 Tablet(s) Oral four times a day

## 2024-09-07 NOTE — PROGRESS NOTE ADULT - PROBLEM SELECTOR PLAN 1
Klebsiella growing from blood cx sent 9/6. F/u sensitivities, broaden abx to Zosyn.     Possible source urine, f/u urine cx.     Recommend ID input.

## 2024-09-07 NOTE — PROVIDER CONTACT NOTE (OTHER) - BACKGROUND
Acute Embolism & thrombosis of deep vein of LE
R crani for lesion, hemoglobin has been trending downward, previous bleeding at PEG site, received 1 unit PRBCs earlier today
Acute embolism and thrombosis of deep vein of lower extremity
dx RUE DVT, PE, hx crani Rx,pulmonary emphysema, COPD, HTN, HLD, cancer
s/p R crani, + PE, GI bleed with resolution per recent endoscopy,

## 2024-09-07 NOTE — PROGRESS NOTE ADULT - SUBJECTIVE AND OBJECTIVE BOX
Patient is a 79y old  Male who presents with a chief complaint of post-operative DVT (06 Sep 2024 15:01)      SUBJECTIVE / OVERNIGHT EVENTS: Pt in bed, c/o generalized weakness, denies pain.     MEDICATIONS  (STANDING):  albuterol/ipratropium for Nebulization 3 milliLiter(s) Nebulizer every 6 hours  apixaban 5 milliGRAM(s) Oral every 12 hours  budesonide 160 MICROgram(s)/formoterol 4.5 MICROgram(s) Inhaler 2 Puff(s) Inhalation two times a day  cefTRIAXone   IVPB 1000 milliGRAM(s) IV Intermittent every 24 hours  chlorhexidine 2% Cloths 1 Application(s) Topical daily  dextrose 5%. 1000 milliLiter(s) (50 mL/Hr) IV Continuous <Continuous>  dextrose 5%. 1000 milliLiter(s) (100 mL/Hr) IV Continuous <Continuous>  dextrose 50% Injectable 25 Gram(s) IV Push once  dextrose 50% Injectable 25 Gram(s) IV Push once  dextrose 50% Injectable 12.5 Gram(s) IV Push once  glucagon  Injectable 1 milliGRAM(s) IntraMuscular once  insulin lispro (ADMELOG) corrective regimen sliding scale   SubCutaneous every 6 hours  levETIRAcetam  IVPB 250 milliGRAM(s) IV Intermittent every 12 hours  metoprolol tartrate 12.5 milliGRAM(s) Oral every 12 hours  Nephro-corine 1 Tablet(s) Oral daily  pantoprazole  Injectable 40 milliGRAM(s) IV Push two times a day  polyethylene glycol 3350 17 Gram(s) Oral two times a day  senna Syrup 10 milliLiter(s) Oral at bedtime  tiotropium 2.5 MICROgram(s) Inhaler 2 Puff(s) Inhalation daily    MEDICATIONS  (PRN):  acetaminophen     Tablet .. 650 milliGRAM(s) Oral every 6 hours PRN Mild Pain (1 - 3)  bisacodyl 5 milliGRAM(s) Oral daily PRN Constipation  dextrose Oral Gel 15 Gram(s) Oral once PRN Blood Glucose LESS THAN 70 milliGRAM(s)/deciliter      CAPILLARY BLOOD GLUCOSE      POCT Blood Glucose.: 131 mg/dL (07 Sep 2024 05:54)  POCT Blood Glucose.: 136 mg/dL (06 Sep 2024 23:51)    I&O's Summary    06 Sep 2024 07:01  -  07 Sep 2024 07:00  --------------------------------------------------------  IN: 280 mL / OUT: 100 mL / NET: 180 mL    07 Sep 2024 07:01  -  07 Sep 2024 11:48  --------------------------------------------------------  IN: 0 mL / OUT: 100 mL / NET: -100 mL        PHYSICAL EXAM:  T(C): 36.7 (09-07-24 @ 09:34), Max: 37 (09-06-24 @ 20:53)  HR: 60 (09-07-24 @ 09:34) (60 - 94)  BP: 116/77 (09-07-24 @ 09:34) (101/56 - 117/81)  RR: 18 (09-07-24 @ 09:34) (18 - 18)  SpO2: 98% (09-07-24 @ 09:34) (97% - 98%)    In bed, awake, alert, RRR, abdomen soft, PEG in place, good air entry anteriorly, moving all extremities                        11.5   16.29 )-----------( 155      ( 07 Sep 2024 04:50 )             35.4     09-07    138  |  105  |  32<H>  ----------------------------<  136<H>  4.5   |  19<L>  |  1.12    Ca    9.3      07 Sep 2024 04:50  Phos  1.8     09-07  Mg     2.7     09-07            Urinalysis Basic - ( 07 Sep 2024 04:50 )    Color: x / Appearance: x / SG: x / pH: x  Gluc: 136 mg/dL / Ketone: x  / Bili: x / Urobili: x   Blood: x / Protein: x / Nitrite: x   Leuk Esterase: x / RBC: x / WBC x   Sq Epi: x / Non Sq Epi: x / Bacteria: x        RADIOLOGY & ADDITIONAL TESTS:    Imaging Personally Reviewed:    Consultant(s) Notes Reviewed:      Care Discussed with Consultants/Other Providers: Nsx

## 2024-09-07 NOTE — PROGRESS NOTE ADULT - ASSESSMENT
78 yo male with PMH of HTN, T2DM (diet controlled), HLD, COPD, CKD 3, carcinoid tumor s/p hemicolectomy, nasopharyngeal carcinoma s/p chemo/RT (c/b dysphagia ), IPMN s/p distal pancreatectomy (2018), and paroxysmal Afib on Eliquis, s/p craniotomy 8/6 for resection of hemorrhagic lesion presented to the ED for RUE swelling, found to have RUE DVT involving subclavian, axillary, and brachial veins and PE.     Course also c/b dysphagia- s/p PEG 8/27, c/b bleeding around PEG site and melena- now s/p EGD with no evidence of active bleed.     Course c/b likely vasovagal syncope while on toilet and transferred to NSCU for closer monitoring. No evidence of active bleed. Transferred back to floors on 9/6.    Now w Klebsiella bacteremia. Currently in bed, awake, c/o weakness, denies pain.

## 2024-09-07 NOTE — PROVIDER CONTACT NOTE (OTHER) - SITUATION
PEG site bleeding, gauze saturated.
Patient has stomach pain, states it feels like he's been punched in the stomach
Pt being cleaning and blood noted under condom catheter. Condom catheter gently removed and skin assessed.
Patient with heparin infusing at 9 ml hr, PTT results from am blood draw 82.5 goal 55-65
given report from endoscopy that patients blood pressure was within parameter. upon checking vitals in the computer found to be out of parameter. reassessed and found to be more increased at 208/99

## 2024-09-07 NOTE — PROVIDER CONTACT NOTE (OTHER) - REASON
Patient with heparin infusing at 9 ml hr, PTT results from am blood draw 82.5 goal 55-65
PEG site bleeding, gauze saturated.
Paraphimosis and skin tear on penis
Patient blood pressure out of parameter
Patient has stomach pain, states it feels like he's been punched in the stomach

## 2024-09-07 NOTE — PROVIDER CONTACT NOTE (OTHER) - RECOMMENDATIONS
?
MD to assess results and adjust heparin rate accordingly
Notify MD Aaron Carrero and consult urology. Wound consult for new skin tear
Notify MD Carrero, pain meds?
evaluate need for change in blood pressure medications

## 2024-09-08 LAB
-  AMPICILLIN/SULBACTAM: SIGNIFICANT CHANGE UP
-  AMPICILLIN: SIGNIFICANT CHANGE UP
-  AZTREONAM: SIGNIFICANT CHANGE UP
-  CEFAZOLIN: SIGNIFICANT CHANGE UP
-  CEFEPIME: SIGNIFICANT CHANGE UP
-  CEFOXITIN: SIGNIFICANT CHANGE UP
-  CEFTRIAXONE: SIGNIFICANT CHANGE UP
-  CIPROFLOXACIN: SIGNIFICANT CHANGE UP
-  ERTAPENEM: SIGNIFICANT CHANGE UP
-  GENTAMICIN: SIGNIFICANT CHANGE UP
-  IMIPENEM: SIGNIFICANT CHANGE UP
-  LEVOFLOXACIN: SIGNIFICANT CHANGE UP
-  MEROPENEM: SIGNIFICANT CHANGE UP
-  PIPERACILLIN/TAZOBACTAM: SIGNIFICANT CHANGE UP
-  TOBRAMYCIN: SIGNIFICANT CHANGE UP
-  TRIMETHOPRIM/SULFAMETHOXAZOLE: SIGNIFICANT CHANGE UP
CULTURE RESULTS: ABNORMAL
CULTURE RESULTS: ABNORMAL
GLUCOSE BLDC GLUCOMTR-MCNC: 115 MG/DL — HIGH (ref 70–99)
GLUCOSE BLDC GLUCOMTR-MCNC: 119 MG/DL — HIGH (ref 70–99)
GLUCOSE BLDC GLUCOMTR-MCNC: 130 MG/DL — HIGH (ref 70–99)
GLUCOSE BLDC GLUCOMTR-MCNC: 137 MG/DL — HIGH (ref 70–99)
GLUCOSE BLDC GLUCOMTR-MCNC: 139 MG/DL — HIGH (ref 70–99)
HCT VFR BLD CALC: 31.5 % — LOW (ref 39–50)
HGB BLD-MCNC: 10.3 G/DL — LOW (ref 13–17)
MCHC RBC-ENTMCNC: 30.5 PG — SIGNIFICANT CHANGE UP (ref 27–34)
MCHC RBC-ENTMCNC: 32.7 GM/DL — SIGNIFICANT CHANGE UP (ref 32–36)
MCV RBC AUTO: 93.2 FL — SIGNIFICANT CHANGE UP (ref 80–100)
METHOD TYPE: SIGNIFICANT CHANGE UP
MRSA PCR RESULT.: SIGNIFICANT CHANGE UP
NRBC # BLD: 0 /100 WBCS — SIGNIFICANT CHANGE UP (ref 0–0)
ORGANISM # SPEC MICROSCOPIC CNT: ABNORMAL
PLATELET # BLD AUTO: 146 K/UL — LOW (ref 150–400)
RBC # BLD: 3.38 M/UL — LOW (ref 4.2–5.8)
RBC # FLD: 15.9 % — HIGH (ref 10.3–14.5)
S AUREUS DNA NOSE QL NAA+PROBE: SIGNIFICANT CHANGE UP
SPECIMEN SOURCE: SIGNIFICANT CHANGE UP
SPECIMEN SOURCE: SIGNIFICANT CHANGE UP
WBC # BLD: 12.86 K/UL — HIGH (ref 3.8–10.5)
WBC # FLD AUTO: 12.86 K/UL — HIGH (ref 3.8–10.5)

## 2024-09-08 PROCEDURE — 99233 SBSQ HOSP IP/OBS HIGH 50: CPT

## 2024-09-08 RX ORDER — AMLODIPINE BESYLATE 10 MG/1
2.5 TABLET ORAL DAILY
Refills: 0 | Status: DISCONTINUED | OUTPATIENT
Start: 2024-09-08 | End: 2024-09-09

## 2024-09-08 RX ADMIN — IPRATROPIUM BROMIDE AND ALBUTEROL SULFATE 3 MILLILITER(S): .5; 3 SOLUTION RESPIRATORY (INHALATION) at 11:25

## 2024-09-08 RX ADMIN — TIOTROPIUM BROMIDE INHALATION SPRAY 2 PUFF(S): 3.12 SPRAY, METERED RESPIRATORY (INHALATION) at 11:26

## 2024-09-08 RX ADMIN — Medication 1 TABLET(S): at 11:25

## 2024-09-08 RX ADMIN — APIXABAN 5 MILLIGRAM(S): 5 TABLET, FILM COATED ORAL at 05:05

## 2024-09-08 RX ADMIN — LEVETIRACETAM 400 MILLIGRAM(S): 1000 TABLET ORAL at 18:20

## 2024-09-08 RX ADMIN — IPRATROPIUM BROMIDE AND ALBUTEROL SULFATE 3 MILLILITER(S): .5; 3 SOLUTION RESPIRATORY (INHALATION) at 05:05

## 2024-09-08 RX ADMIN — AMLODIPINE BESYLATE 2.5 MILLIGRAM(S): 10 TABLET ORAL at 14:01

## 2024-09-08 RX ADMIN — ACETAMINOPHEN 650 MILLIGRAM(S): 325 TABLET ORAL at 11:25

## 2024-09-08 RX ADMIN — Medication 40 MILLIGRAM(S): at 05:05

## 2024-09-08 RX ADMIN — CEFEPIME 100 MILLIGRAM(S): 2 INJECTION, POWDER, FOR SOLUTION INTRAVENOUS at 18:25

## 2024-09-08 RX ADMIN — METOPROLOL TARTRATE 12.5 MILLIGRAM(S): 100 TABLET ORAL at 18:24

## 2024-09-08 RX ADMIN — SODIUM PHOSPHATE, DIBASIC, ANHYDROUS, POTASSIUM PHOSPHATE, MONOBASIC, AND SODIUM PHOSPHATE, MONOBASIC, MONOHYDRATE 1 TABLET(S): 852; 155; 130 TABLET, COATED ORAL at 11:25

## 2024-09-08 RX ADMIN — ACETAMINOPHEN 650 MILLIGRAM(S): 325 TABLET ORAL at 12:00

## 2024-09-08 RX ADMIN — LEVETIRACETAM 400 MILLIGRAM(S): 1000 TABLET ORAL at 05:04

## 2024-09-08 RX ADMIN — SODIUM PHOSPHATE, DIBASIC, ANHYDROUS, POTASSIUM PHOSPHATE, MONOBASIC, AND SODIUM PHOSPHATE, MONOBASIC, MONOHYDRATE 1 TABLET(S): 852; 155; 130 TABLET, COATED ORAL at 23:29

## 2024-09-08 RX ADMIN — SODIUM PHOSPHATE, DIBASIC, ANHYDROUS, POTASSIUM PHOSPHATE, MONOBASIC, AND SODIUM PHOSPHATE, MONOBASIC, MONOHYDRATE 1 TABLET(S): 852; 155; 130 TABLET, COATED ORAL at 05:05

## 2024-09-08 RX ADMIN — BUDESONIDE AND FORMOTEROL FUMARATE 2 PUFF(S): 80; 4.5 AEROSOL, METERED RESPIRATORY (INHALATION) at 18:26

## 2024-09-08 RX ADMIN — IPRATROPIUM BROMIDE AND ALBUTEROL SULFATE 3 MILLILITER(S): .5; 3 SOLUTION RESPIRATORY (INHALATION) at 23:29

## 2024-09-08 RX ADMIN — IPRATROPIUM BROMIDE AND ALBUTEROL SULFATE 3 MILLILITER(S): .5; 3 SOLUTION RESPIRATORY (INHALATION) at 18:26

## 2024-09-08 RX ADMIN — CEFEPIME 100 MILLIGRAM(S): 2 INJECTION, POWDER, FOR SOLUTION INTRAVENOUS at 05:04

## 2024-09-08 RX ADMIN — SODIUM PHOSPHATE, DIBASIC, ANHYDROUS, POTASSIUM PHOSPHATE, MONOBASIC, AND SODIUM PHOSPHATE, MONOBASIC, MONOHYDRATE 1 TABLET(S): 852; 155; 130 TABLET, COATED ORAL at 18:25

## 2024-09-08 RX ADMIN — METOPROLOL TARTRATE 12.5 MILLIGRAM(S): 100 TABLET ORAL at 05:05

## 2024-09-08 RX ADMIN — Medication 40 MILLIGRAM(S): at 18:25

## 2024-09-08 RX ADMIN — APIXABAN 5 MILLIGRAM(S): 5 TABLET, FILM COATED ORAL at 18:25

## 2024-09-08 RX ADMIN — BUDESONIDE AND FORMOTEROL FUMARATE 2 PUFF(S): 80; 4.5 AEROSOL, METERED RESPIRATORY (INHALATION) at 05:06

## 2024-09-08 RX ADMIN — CHLORHEXIDINE GLUCONATE 1 APPLICATION(S): 40 SOLUTION TOPICAL at 11:13

## 2024-09-08 NOTE — PROGRESS NOTE ADULT - SUBJECTIVE AND OBJECTIVE BOX
Patient is a 79y old  Male who presents with a chief complaint of post-operative DVT (08 Sep 2024 06:59)      SUBJECTIVE / OVERNIGHT EVENTS: Pt in bed, c/o fatigue.     MEDICATIONS  (STANDING):  albuterol/ipratropium for Nebulization 3 milliLiter(s) Nebulizer every 6 hours  apixaban 5 milliGRAM(s) Oral every 12 hours  budesonide 160 MICROgram(s)/formoterol 4.5 MICROgram(s) Inhaler 2 Puff(s) Inhalation two times a day  cefepime   IVPB 2000 milliGRAM(s) IV Intermittent every 12 hours  chlorhexidine 2% Cloths 1 Application(s) Topical daily  dextrose 5%. 1000 milliLiter(s) (50 mL/Hr) IV Continuous <Continuous>  dextrose 5%. 1000 milliLiter(s) (100 mL/Hr) IV Continuous <Continuous>  dextrose 50% Injectable 25 Gram(s) IV Push once  dextrose 50% Injectable 12.5 Gram(s) IV Push once  dextrose 50% Injectable 25 Gram(s) IV Push once  glucagon  Injectable 1 milliGRAM(s) IntraMuscular once  insulin lispro (ADMELOG) corrective regimen sliding scale   SubCutaneous every 6 hours  levETIRAcetam  IVPB 250 milliGRAM(s) IV Intermittent every 12 hours  metoprolol tartrate 12.5 milliGRAM(s) Oral every 12 hours  Nephro-corine 1 Tablet(s) Oral daily  pantoprazole  Injectable 40 milliGRAM(s) IV Push two times a day  potassium phosphate / sodium phosphate Tablet (K-PHOS No. 2) 1 Tablet(s) Oral four times a day  senna Syrup 10 milliLiter(s) Oral at bedtime  tiotropium 2.5 MICROgram(s) Inhaler 2 Puff(s) Inhalation daily    MEDICATIONS  (PRN):  acetaminophen     Tablet .. 650 milliGRAM(s) Oral every 6 hours PRN Mild Pain (1 - 3)  bisacodyl 5 milliGRAM(s) Oral daily PRN Constipation  dextrose Oral Gel 15 Gram(s) Oral once PRN Blood Glucose LESS THAN 70 milliGRAM(s)/deciliter      CAPILLARY BLOOD GLUCOSE      POCT Blood Glucose.: 137 mg/dL (08 Sep 2024 11:33)  POCT Blood Glucose.: 139 mg/dL (08 Sep 2024 05:04)  POCT Blood Glucose.: 115 mg/dL (08 Sep 2024 03:28)  POCT Blood Glucose.: 167 mg/dL (07 Sep 2024 23:51)  POCT Blood Glucose.: 149 mg/dL (07 Sep 2024 16:23)    I&O's Summary    07 Sep 2024 07:01  -  08 Sep 2024 07:00  --------------------------------------------------------  IN: 1120 mL / OUT: 300 mL / NET: 820 mL    08 Sep 2024 07:01  -  08 Sep 2024 12:43  --------------------------------------------------------  IN: 300 mL / OUT: 290 mL / NET: 10 mL        PHYSICAL EXAM:  T(C): 36.9 (09-08-24 @ 09:00), Max: 37.1 (09-08-24 @ 05:33)  HR: 84 (09-08-24 @ 10:30) (78 - 105)  BP: 152/78 (09-08-24 @ 10:30) (110/61 - 169/79)  RR: 18 (09-08-24 @ 09:00) (18 - 18)  SpO2: 94% (09-08-24 @ 09:00) (94% - 98%)    In bed, awake, RRR, abdomen soft, good air entry anteriorly, moving all extremities    LABS:                        10.3   12.86 )-----------( 146      ( 08 Sep 2024 06:22 )             31.5     09-07    138  |  105  |  32<H>  ----------------------------<  136<H>  4.5   |  19<L>  |  1.12    Ca    9.3      07 Sep 2024 04:50  Phos  1.8     09-07  Mg     2.7     09-07            Urinalysis Basic - ( 07 Sep 2024 04:50 )    Color: x / Appearance: x / SG: x / pH: x  Gluc: 136 mg/dL / Ketone: x  / Bili: x / Urobili: x   Blood: x / Protein: x / Nitrite: x   Leuk Esterase: x / RBC: x / WBC x   Sq Epi: x / Non Sq Epi: x / Bacteria: x        RADIOLOGY & ADDITIONAL TESTS:    Imaging Personally Reviewed:    Consultant(s) Notes Reviewed:      Care Discussed with Consultants/Other Providers: Nsx

## 2024-09-08 NOTE — PROGRESS NOTE ADULT - ASSESSMENT
ASSESSMENT AND PLAN: 79 year old male with PMHx of recent admission for and R crani for lesion w/ Dr. 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 Today presents to ED for RUE doppler w/ DVT involving the subclavian, axillary and brachial veins and Superficial thrombus in the right basilic vein. Plts/coags wnl. Exam: unchanged from discharge,  awake, Ox2 (self and place), EOS, mild L facial, FC, BUE 5/5, BLE 5/5. Pt started on eliquis. On 9/6 patient was found to have elevated WBC on CBC and pancultured. Found to have gram negative rods in blood. Given one dose of ceftriaxone on 9/6.     NEURO: Seizure ppx: continue keppra   Continue tylenol for pain control    PULM: Found with small subsegmental PE. On eliquis for AC. RA and saturating well. Continue continuous pulse ox   Encouraged incentive spirometer     CV: Continue metoprolol    ENDO: FS monitor. HISS     HEME/ONC:                      DVT ppx: On eliquis for PE/DVT     RENAL: Continue Free Water 200Q8. BUN slightly elevated. Will give 500cc bolus.     ID: ID consulted. Ceftriaxone switched to cepeime for broader coverage after speaking with IM and ID. Will follow repeat blood cultures from 9/8. WBC from CBC improving even more today. Urine culture also shows GNR. CXR WNL. Monitor fevers. Follow up sensitivities. Pt is allergic to PCN and has been tolerating ceftriaxone.     GI: Protonix for GI ppx. Senna and dulcolax for stool softner.    Glucerna at 60 via PEG. Pt having frequent BM will add banitrol     DISCHARGE PLANNING: [facility] vs Home PT   Will D/w Neurosurgeon

## 2024-09-08 NOTE — CONSULT NOTE ADULT - SUBJECTIVE AND OBJECTIVE BOX
Consult received. Full note to follow.    Yoan Vale M.D.  [facility], Division of Infectious Diseases  [phone]  After 5pm on weekdays and all day on weekends - please call [phone]  [facility], Division of Infectious Diseases  LIANA Solis S. Shah, Y. Patel, G. Kavin  [phone]  ([phone] - weekdays after 5pm and weekends)    RADHA HOPE  79y, Male  [id number]    HPI--  HPI:  80 y/o M PMhx recent admission for and R crani for lesion w/ Dr. 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 CTA demonstrated segmental emboli within right upper lobe pulmonary artery. Due to dysphagia and failed MBS, PEG tube was placed 8/27.  Hospital course c/b leukocytosis on 9/6 found to have klebsiella pneumoniae bacteremia.  Patient unable to provide history therefore history obtained from chart review.     ROS: limited 2/2 patient's mentation    Allergies: penicillin (Other)  ACE inhibitors (Other)  Bee Stings- anaphylaxis (Other)    PMH -- HTN (hypertension)    High cholesterol    Low back pain    Benign carcinoid tumor    COPD (chronic obstructive pulmonary disease)    Atrial fibrillation, unspecified type    Acute kidney injury    Stage 3 chronic kidney disease    Pulmonary emphysema, unspecified emphysema type    Nasopharynx cancer    Disease of pancreas, unspecified    Hearing loss    Pancreatic mass    Radiation fibrosis    ETOH abuse    Other nonspecific abnormal finding of lung field    Abnormal chest CT    Type 2 diabetes mellitus    Dysphagia    History of cancer chemotherapy    S/P radiation therapy    Pneumonia    Syncope    Pneumonia due to COVID-19 virus      PSH -- History of appendectomy    H/O hemicolectomy    S/P tonsillectomy    H/O endoscopy    History of partial pancreatectomy    History of loop recorder      FH -- Family history of breast cancer      Social History -- denies tobacco, alcohol or illicit drug use    Physical Exam--  Vital Signs Last 24 Hrs  T(F): 98.4 (08 Sep 2024 09:00), Max: 98.7 (08 Sep 2024 05:33)  HR: 84 (08 Sep 2024 10:30) (78 - 105)  BP: 152/78 (08 Sep 2024 10:30) (110/61 - 169/79)  RR: 18 (08 Sep 2024 09:00) (18 - 18)  SpO2: 94% (08 Sep 2024 09:00) (94% - 98%)  General: nontoxic-appearing, no acute distress  HEENT: anicteric, surgical face/ scalp incision well healed  Lungs: Clear bilaterally without rales  Heart: S1, S2, normal rate.  Abdomen: Soft. Nondistended. Nontender. PEG tube, abdominal binder  Neuro: AAOx1-2  Back: No costovertebral angle tenderness.  Extremities: No LE edema. LUE midline  Skin: Warm. Dry. No rash.  Psychiatric: flat affect    Laboratory & Imaging Data--  CBC:                       10.3   12.86 )-----------( 146      ( 08 Sep 2024 06:22 )             31.5     WBC Count: 12.86 K/uL (09-08-24 @ 06:22)  WBC Count: 16.29 K/uL (09-07-24 @ 04:50)  WBC Count: 24.53 K/uL (09-06-24 @ 16:18)  WBC Count: 9.63 K/uL (09-06-24 @ 03:53)  WBC Count: 8.68 K/uL (09-05-24 @ 15:25)  WBC Count: 8.74 K/uL (09-05-24 @ 05:53)  WBC Count: 8.24 K/uL (09-05-24 @ 02:41)  WBC Count: 5.29 K/uL (09-04-24 @ 20:43)  WBC Count: 6.09 K/uL (09-04-24 @ 17:40)  WBC Count: 5.38 K/uL (09-04-24 @ 09:02)  WBC Count: 4.88 K/uL (09-03-24 @ 07:16)  WBC Count: 4.99 K/uL (09-02-24 @ 16:45)  WBC Count: 4.12 K/uL (09-02-24 @ 06:40)  WBC Count: 5.13 K/uL (09-01-24 @ 13:02)    CMP: 09-07    138  |  105  |  32<H>  ----------------------------<  136<H>  4.5   |  19<L>  |  1.12    Ca    9.3      07 Sep 2024 04:50  Phos  1.8     09-07  Mg     2.7     09-07        Urinalysis Basic - ( 07 Sep 2024 04:50 )    Color: x / Appearance: x / SG: x / pH: x  Gluc: 136 mg/dL / Ketone: x  / Bili: x / Urobili: x   Blood: x / Protein: x / Nitrite: x   Leuk Esterase: x / RBC: x / WBC x   Sq Epi: x / Non Sq Epi: x / Bacteria: x      Microbiology:     Culture - Urine (collected 09-07-24 @ 01:05)  Source: Clean Catch Clean Catch (Midstream)  Preliminary Report (09-08-24 @ 10:22):    >100,000 CFU/ml Gram Negative Rods    Culture - Blood (collected 09-06-24 @ 18:50)  Source: .Blood Blood  Gram Stain (09-07-24 @ 08:04):    Growth in aerobic bottle: Gram Negative Rods    Growth in anaerobic bottle: Gram Negative Rods  Preliminary Report (09-07-24 @ 19:56):    Growth in aerobic and anaerobic bottles: Klebsiella pneumoniae    Direct identification is available within approximately 3-5    hours either by Blood Panel Multiplexed PCR or Direct    MALDI-TOF. Details: [email or web address]  Organism: Blood Culture PCR (09-07-24 @ 09:16)  Organism: Blood Culture PCR (09-07-24 @ 09:16)      Method Type: PCR      -  K. pneumoniae group: Detec (K. pneumoniae, K. quasipneumoniae, K. variicola)    Culture - Blood (collected 09-06-24 @ 18:00)  Source: .Blood Blood  Gram Stain (09-07-24 @ 08:15):    Growth in aerobic bottle: Gram Negative Rods    Growth in anaerobic bottle: Gram Negative Rods  Preliminary Report (09-07-24 @ 21:53):    Growth in aerobic and anaerobic bottles: Klebsiella pneumoniae    See previous culture [id number]        Radiology--  ***  Active Medications--  acetaminophen     Tablet .. 650 milliGRAM(s) Oral every 6 hours PRN  albuterol/ipratropium for Nebulization 3 milliLiter(s) Nebulizer every 6 hours  apixaban 5 milliGRAM(s) Oral every 12 hours  bisacodyl 5 milliGRAM(s) Oral daily PRN  budesonide 160 MICROgram(s)/formoterol 4.5 MICROgram(s) Inhaler 2 Puff(s) Inhalation two times a day  cefepime   IVPB 2000 milliGRAM(s) IV Intermittent every 12 hours  chlorhexidine 2% Cloths 1 Application(s) Topical daily  dextrose 5%. 1000 milliLiter(s) IV Continuous <Continuous>  dextrose 5%. 1000 milliLiter(s) IV Continuous <Continuous>  dextrose 50% Injectable 25 Gram(s) IV Push once  dextrose 50% Injectable 12.5 Gram(s) IV Push once  dextrose 50% Injectable 25 Gram(s) IV Push once  dextrose Oral Gel 15 Gram(s) Oral once PRN  glucagon  Injectable 1 milliGRAM(s) IntraMuscular once  insulin lispro (ADMELOG) corrective regimen sliding scale   SubCutaneous every 6 hours  levETIRAcetam  IVPB 250 milliGRAM(s) IV Intermittent every 12 hours  metoprolol tartrate 12.5 milliGRAM(s) Oral every 12 hours  Nephro-corine 1 Tablet(s) Oral daily  pantoprazole  Injectable 40 milliGRAM(s) IV Push two times a day  potassium phosphate / sodium phosphate Tablet (K-PHOS No. 2) 1 Tablet(s) Oral four times a day  senna Syrup 10 milliLiter(s) Oral at bedtime  tiotropium 2.5 MICROgram(s) Inhaler 2 Puff(s) Inhalation daily    Antimicrobials:   cefepime   IVPB 2000 milliGRAM(s) IV Intermittent every 12 hours    Immunologic:

## 2024-09-08 NOTE — CONSULT NOTE ADULT - ASSESSMENT
80 y/o M PMhx recent admission for and R crani for lesion w/ Dr. 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 CTA demonstrated segmental emboli within right upper lobe pulmonary artery. On eliquis. Due to dysphagia and failed MBS, PEG tube was placed 8/27.  Hospital course c/b leukocytosis on 9/6 found to have klebsiella pneumoniae bacteremia.    klebsiella pneumoniae bacteremia  suspect urinary source  UA positive  patient denies  symptoms but is a poor historian    Recommendations  can continue w/ cefepime for now   f/u klebsiella pneumoniae sensitivities  - will adjust antibiotics based on above  f/u repeat blood cultures from today  f/u urine cx GNR ID and sensitivities to determine if etiology of bacteremia    Yoan Vale M.D.  [facility], Division of Infectious Diseases  [phone]  After 5pm on weekdays and all day on weekends - please call [phone]

## 2024-09-08 NOTE — PROGRESS NOTE ADULT - PROBLEM SELECTOR PLAN 2
and RUE DVT- duplex of RUE: Right sided DVT involving the subclavian, axillary and brachial veins. Superficial thrombus in the right basilic vein  - 8/28 repeat UE duplex with persistent DVT in the right subclavian vein and right axillary vein, right basilic vein SVT.  - CT Angio chest: segmental emboli within RUL pulmonary artery with no evidence of RV strain  - TTE unremarkable  - Vascular Cardiology consulted, and started on hep gtt on 8/16, transitioned to therapeutic lovenox on 8/18 but on hold in setting of bleeding at PEG site, last dose was 8/30 AM  - surveillance duplex 9/4 demonstrating NEW LEFT subclavian DVT  - cleared by Neurosurgery to resume eliquis 5mg BID, first dose 9/6 AM- tolerating

## 2024-09-08 NOTE — PROGRESS NOTE ADULT - SUBJECTIVE AND OBJECTIVE BOX
SUBJECTIVE: Pt seen and examined. Pt denies any complaints. Sitting comfortably in bed     Vital Signs Last 24 Hrs  T(C): 36.3 (08 Sep 2024 13:00), Max: 37.1 (08 Sep 2024 05:33)  T(F): 97.3 (08 Sep 2024 13:00), Max: 98.7 (08 Sep 2024 05:33)  HR: 86 (08 Sep 2024 13:00) (78 - 105)  BP: 121/76 (08 Sep 2024 13:00) (119/79 - 169/79)  RR: 18 (08 Sep 2024 13:00) (18 - 18)  SpO2: 94% (08 Sep 2024 13:00) (94% - 98%)    Parameters below as of 08 Sep 2024 13:00  Patient On (Oxygen Delivery Method): room air                       10.3   12.86 )-----------( 146      ( 08 Sep 2024 06:22 )             31.5    09-07    138  |  105  |  32<H>  ----------------------------<  136<H>  4.5   |  19<L>  |  1.12    Ca    9.3      07 Sep 2024 04:50  Phos  1.8     09-07  Mg     2.7     09-07    PHYSICAL EXAM:    General: No Acute Distress     Neurological: Awake, alert oriented to person, place and says september and year says two thousand and four, Following Commands, EOMI, Face Symmetrical, Speech Fluent, Moving all extremities, Muscle Strength normal in all four extremities, No Drift, Sensation to Light Touch Intact    Pulmonary: Clear to Auscultation, No Rales, No Rhonchi, No Wheezes     Cardiovascular: S1, S2, Regular Rate and Rhythm     Gastrointestinal: Soft, Nontender, Nondistended     Incision: Clean and dry     MEDICATIONS:   Antibiotics:  cefepime   IVPB 2000 milliGRAM(s) IV Intermittent every 12 hours    Neuro:  acetaminophen     Tablet .. 650 milliGRAM(s) Oral every 6 hours PRN Mild Pain (1 - 3)  levETIRAcetam  IVPB 250 milliGRAM(s) IV Intermittent every 12 hours    Anticoagulation:  apixaban 5 milliGRAM(s) Oral every 12 hours    Cardiology:  amLODIPine   Tablet 2.5 milliGRAM(s) Oral daily  metoprolol tartrate 12.5 milliGRAM(s) Oral every 12 hours    Endo:   insulin lispro (ADMELOG) corrective regimen sliding scale   SubCutaneous every 6 hours    Pulm:  albuterol/ipratropium for Nebulization 3 milliLiter(s) Nebulizer every 6 hours  budesonide 160 MICROgram(s)/formoterol 4.5 MICROgram(s) Inhaler 2 Puff(s) Inhalation two times a day  tiotropium 2.5 MICROgram(s) Inhaler 2 Puff(s) Inhalation daily    GI/:  bisacodyl 5 milliGRAM(s) Oral daily PRN  pantoprazole  Injectable 40 milliGRAM(s) IV Push two times a day    Other:  potassium phosphate / sodium phosphate Tablet (K-PHOS No. 2) 1 Tablet(s) Oral four times a day

## 2024-09-08 NOTE — PROGRESS NOTE ADULT - PROBLEM SELECTOR PLAN 1
Klebsiella growing from blood cx sent 9/6. F/u sensitivities, abx broadened to Cefepime.     Possible source urine, f/u urine cx. ID following.

## 2024-09-09 LAB
ANION GAP SERPL CALC-SCNC: 12 MMOL/L — SIGNIFICANT CHANGE UP (ref 5–17)
BUN SERPL-MCNC: 22 MG/DL — SIGNIFICANT CHANGE UP (ref 7–23)
CALCIUM SERPL-MCNC: 8.7 MG/DL — SIGNIFICANT CHANGE UP (ref 8.4–10.5)
CHLORIDE SERPL-SCNC: 105 MMOL/L — SIGNIFICANT CHANGE UP (ref 96–108)
CO2 SERPL-SCNC: 22 MMOL/L — SIGNIFICANT CHANGE UP (ref 22–31)
CREAT SERPL-MCNC: 0.68 MG/DL — SIGNIFICANT CHANGE UP (ref 0.5–1.3)
EGFR: 94 ML/MIN/1.73M2 — SIGNIFICANT CHANGE UP
GLUCOSE BLDC GLUCOMTR-MCNC: 127 MG/DL — HIGH (ref 70–99)
GLUCOSE BLDC GLUCOMTR-MCNC: 139 MG/DL — HIGH (ref 70–99)
GLUCOSE BLDC GLUCOMTR-MCNC: 155 MG/DL — HIGH (ref 70–99)
GLUCOSE SERPL-MCNC: 145 MG/DL — HIGH (ref 70–99)
HCT VFR BLD CALC: 29.9 % — LOW (ref 39–50)
HGB BLD-MCNC: 9.9 G/DL — LOW (ref 13–17)
MAGNESIUM SERPL-MCNC: 2 MG/DL — SIGNIFICANT CHANGE UP (ref 1.6–2.6)
MCHC RBC-ENTMCNC: 30.2 PG — SIGNIFICANT CHANGE UP (ref 27–34)
MCHC RBC-ENTMCNC: 33.1 GM/DL — SIGNIFICANT CHANGE UP (ref 32–36)
MCV RBC AUTO: 91.2 FL — SIGNIFICANT CHANGE UP (ref 80–100)
NRBC # BLD: 0 /100 WBCS — SIGNIFICANT CHANGE UP (ref 0–0)
PHOSPHATE SERPL-MCNC: 3.4 MG/DL — SIGNIFICANT CHANGE UP (ref 2.5–4.5)
PLATELET # BLD AUTO: 143 K/UL — LOW (ref 150–400)
POTASSIUM SERPL-MCNC: 4.1 MMOL/L — SIGNIFICANT CHANGE UP (ref 3.5–5.3)
POTASSIUM SERPL-SCNC: 4.1 MMOL/L — SIGNIFICANT CHANGE UP (ref 3.5–5.3)
RBC # BLD: 3.28 M/UL — LOW (ref 4.2–5.8)
RBC # FLD: 16.1 % — HIGH (ref 10.3–14.5)
SODIUM SERPL-SCNC: 139 MMOL/L — SIGNIFICANT CHANGE UP (ref 135–145)
WBC # BLD: 9.48 K/UL — SIGNIFICANT CHANGE UP (ref 3.8–10.5)
WBC # FLD AUTO: 9.48 K/UL — SIGNIFICANT CHANGE UP (ref 3.8–10.5)

## 2024-09-09 PROCEDURE — 99024 POSTOP FOLLOW-UP VISIT: CPT

## 2024-09-09 PROCEDURE — 99232 SBSQ HOSP IP/OBS MODERATE 35: CPT

## 2024-09-09 PROCEDURE — 99221 1ST HOSP IP/OBS SF/LOW 40: CPT

## 2024-09-09 PROCEDURE — 99233 SBSQ HOSP IP/OBS HIGH 50: CPT

## 2024-09-09 RX ORDER — HYDRALAZINE HCL 50 MG
5 TABLET ORAL ONCE
Refills: 0 | Status: COMPLETED | OUTPATIENT
Start: 2024-09-09 | End: 2024-09-09

## 2024-09-09 RX ORDER — AMLODIPINE BESYLATE 10 MG/1
5 TABLET ORAL DAILY
Refills: 0 | Status: DISCONTINUED | OUTPATIENT
Start: 2024-09-10 | End: 2024-09-11

## 2024-09-09 RX ORDER — PANTOPRAZOLE SODIUM 40 MG
40 TABLET, DELAYED RELEASE (ENTERIC COATED) ORAL DAILY
Refills: 0 | Status: DISCONTINUED | OUTPATIENT
Start: 2024-09-09 | End: 2024-09-11

## 2024-09-09 RX ORDER — AMLODIPINE BESYLATE 10 MG/1
2.5 TABLET ORAL ONCE
Refills: 0 | Status: COMPLETED | OUTPATIENT
Start: 2024-09-09 | End: 2024-09-09

## 2024-09-09 RX ADMIN — ACETAMINOPHEN 650 MILLIGRAM(S): 325 TABLET ORAL at 12:02

## 2024-09-09 RX ADMIN — Medication 1 TABLET(S): at 11:31

## 2024-09-09 RX ADMIN — CHLORHEXIDINE GLUCONATE 1 APPLICATION(S): 40 SOLUTION TOPICAL at 11:28

## 2024-09-09 RX ADMIN — IPRATROPIUM BROMIDE AND ALBUTEROL SULFATE 3 MILLILITER(S): .5; 3 SOLUTION RESPIRATORY (INHALATION) at 05:26

## 2024-09-09 RX ADMIN — APIXABAN 5 MILLIGRAM(S): 5 TABLET, FILM COATED ORAL at 17:27

## 2024-09-09 RX ADMIN — IPRATROPIUM BROMIDE AND ALBUTEROL SULFATE 3 MILLILITER(S): .5; 3 SOLUTION RESPIRATORY (INHALATION) at 11:31

## 2024-09-09 RX ADMIN — Medication 10 MILLIGRAM(S): at 21:22

## 2024-09-09 RX ADMIN — AMLODIPINE BESYLATE 2.5 MILLIGRAM(S): 10 TABLET ORAL at 05:26

## 2024-09-09 RX ADMIN — Medication 5 MILLIGRAM(S): at 00:20

## 2024-09-09 RX ADMIN — CEFEPIME 100 MILLIGRAM(S): 2 INJECTION, POWDER, FOR SOLUTION INTRAVENOUS at 06:06

## 2024-09-09 RX ADMIN — Medication 40 MILLIGRAM(S): at 05:27

## 2024-09-09 RX ADMIN — METOPROLOL TARTRATE 12.5 MILLIGRAM(S): 100 TABLET ORAL at 05:27

## 2024-09-09 RX ADMIN — METOPROLOL TARTRATE 12.5 MILLIGRAM(S): 100 TABLET ORAL at 17:27

## 2024-09-09 RX ADMIN — Medication 1: at 12:05

## 2024-09-09 RX ADMIN — TIOTROPIUM BROMIDE INHALATION SPRAY 2 PUFF(S): 3.12 SPRAY, METERED RESPIRATORY (INHALATION) at 11:32

## 2024-09-09 RX ADMIN — SODIUM PHOSPHATE, DIBASIC, ANHYDROUS, POTASSIUM PHOSPHATE, MONOBASIC, AND SODIUM PHOSPHATE, MONOBASIC, MONOHYDRATE 1 TABLET(S): 852; 155; 130 TABLET, COATED ORAL at 05:27

## 2024-09-09 RX ADMIN — AMLODIPINE BESYLATE 2.5 MILLIGRAM(S): 10 TABLET ORAL at 09:06

## 2024-09-09 RX ADMIN — IPRATROPIUM BROMIDE AND ALBUTEROL SULFATE 3 MILLILITER(S): .5; 3 SOLUTION RESPIRATORY (INHALATION) at 17:26

## 2024-09-09 RX ADMIN — APIXABAN 5 MILLIGRAM(S): 5 TABLET, FILM COATED ORAL at 05:27

## 2024-09-09 RX ADMIN — LEVETIRACETAM 400 MILLIGRAM(S): 1000 TABLET ORAL at 17:26

## 2024-09-09 RX ADMIN — ACETAMINOPHEN 650 MILLIGRAM(S): 325 TABLET ORAL at 11:32

## 2024-09-09 RX ADMIN — LEVETIRACETAM 400 MILLIGRAM(S): 1000 TABLET ORAL at 05:27

## 2024-09-09 RX ADMIN — BUDESONIDE AND FORMOTEROL FUMARATE 2 PUFF(S): 80; 4.5 AEROSOL, METERED RESPIRATORY (INHALATION) at 17:26

## 2024-09-09 RX ADMIN — BUDESONIDE AND FORMOTEROL FUMARATE 2 PUFF(S): 80; 4.5 AEROSOL, METERED RESPIRATORY (INHALATION) at 05:28

## 2024-09-09 RX ADMIN — SODIUM PHOSPHATE, DIBASIC, ANHYDROUS, POTASSIUM PHOSPHATE, MONOBASIC, AND SODIUM PHOSPHATE, MONOBASIC, MONOHYDRATE 1 TABLET(S): 852; 155; 130 TABLET, COATED ORAL at 11:35

## 2024-09-09 RX ADMIN — Medication 100 MILLIGRAM(S): at 14:00

## 2024-09-09 NOTE — PROGRESS NOTE ADULT - ASSESSMENT
78 yo male with PMH of HTN, T2DM (diet controlled), HLD, COPD, CKD 3, carcinoid tumor s/p hemicolectomy, nasopharyngeal carcinoma s/p chemo/RT (c/b dysphagia ), IPMN s/p distal pancreatectomy (2018), and paroxysmal Afib on Eliquis, s/p craniotomy 8/6 for resection of hemorrhagic lesion presented to the ED for RUE swelling, found to have RUE DVT involving subclavian, axillary, and brachial veins and PE.     Course also c/b dysphagia- s/p PEG 8/27, c/b bleeding around PEG site and melena- now s/p EGD with no evidence of active bleed.     Course c/b likely vasovagal syncope while on toilet and transferred to NSCU for closer monitoring. No evidence of active bleed. Transferred back to floors on 9/6.    Now w Klebsiella bacteremia. Currently in bed, awake, c/o headache.

## 2024-09-09 NOTE — CONSULT NOTE ADULT - CONSULT REQUESTED DATE/TIME
09-Sep-2024 15:47
19-Aug-2024 12:20
16-Aug-2024 14:19
08-Sep-2024 06:59
28-Aug-2024 15:07
15-Aug-2024 22:33
16-Aug-2024 09:54
23-Aug-2024 16:18

## 2024-09-09 NOTE — PROGRESS NOTE ADULT - SUBJECTIVE AND OBJECTIVE BOX
SUBJECTIVE: HPI: 79M hx recent admission for and R crani for lesion w/ Dr. 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 Today presents to ED for RUE doppler w/ DVT involving the subclavian, axillary and brachial veins and Superficial thrombus in the right basilic vein. Plts/coags wnl.       OVERNIGHT EVENTS: No acute events overnight, remains on antibiotics for UTI and Klebsiella bacteremia. Patient seen and evaluated at bedside, no complaints.     Vital Signs Last 24 Hrs  T(C): 36.9 (09 Sep 2024 04:57), Max: 36.9 (08 Sep 2024 09:00)  T(F): 98.4 (09 Sep 2024 04:57), Max: 98.4 (08 Sep 2024 09:00)  HR: 92 (09 Sep 2024 04:57) (81 - 92)  BP: 144/80 (09 Sep 2024 04:57) (121/76 - 178/96)  BP(mean): --  RR: 18 (09 Sep 2024 04:57) (18 - 18)  SpO2: 98% (09 Sep 2024 04:57) (93% - 98%)    Parameters below as of 09 Sep 2024 04:57  Patient On (Oxygen Delivery Method): room air        PHYSICAL EXAM:    General: No Acute Distress     Neurological: Awake, Ox2-3 (name, place, month / initially got year wrong but was able to self correct), PERRL, EOMI, following commands, uppers 5/5, no drift, lowers 5/5    Pulmonary: Clear to Auscultation, No Rales, No Rhonchi, No Wheezes     Cardiovascular: S1, S2, Regular Rate and Rhythm     Gastrointestinal: Soft, Nontender, Nondistended, has PEG in place with abdominal binder    Incision: right craniotomy incision has absorbable sutures in place C/D/I    LABS:                        9.9    9.48  )-----------( 143      ( 09 Sep 2024 06:42 )             29.9    09-09    139  |  105  |  22  ----------------------------<  145<H>  4.1   |  22  |  0.68    Ca    8.7      09 Sep 2024 06:46  Phos  3.4     09-09  Mg     2.0     09-09 09-08 @ 07:01  -  09-09 @ 07:00  --------------------------------------------------------  IN: 920 mL / OUT: 390 mL / NET: 530 mL      DRAINS: None    MEDICATIONS:  Antibiotics:  cefepime   IVPB 2000 milliGRAM(s) IV Intermittent every 12 hours    Neuro:  acetaminophen     Tablet .. 650 milliGRAM(s) Oral every 6 hours PRN Mild Pain (1 - 3)  levETIRAcetam  IVPB 250 milliGRAM(s) IV Intermittent every 12 hours    Cardiac:  amLODIPine   Tablet 2.5 milliGRAM(s) Oral once  metoprolol tartrate 12.5 milliGRAM(s) Oral every 12 hours    Pulm:  albuterol/ipratropium for Nebulization 3 milliLiter(s) Nebulizer every 6 hours  budesonide 160 MICROgram(s)/formoterol 4.5 MICROgram(s) Inhaler 2 Puff(s) Inhalation two times a day  tiotropium 2.5 MICROgram(s) Inhaler 2 Puff(s) Inhalation daily    GI/:  bisacodyl 5 milliGRAM(s) Oral daily PRN Constipation  pantoprazole  Injectable 40 milliGRAM(s) IV Push two times a day    Other:   apixaban 5 milliGRAM(s) Oral every 12 hours  chlorhexidine 2% Cloths 1 Application(s) Topical daily  dextrose 5%. 1000 milliLiter(s) IV Continuous <Continuous>  dextrose 5%. 1000 milliLiter(s) IV Continuous <Continuous>  dextrose 50% Injectable 25 Gram(s) IV Push once  dextrose 50% Injectable 12.5 Gram(s) IV Push once  dextrose 50% Injectable 25 Gram(s) IV Push once  dextrose Oral Gel 15 Gram(s) Oral once PRN Blood Glucose LESS THAN 70 milliGRAM(s)/deciliter  glucagon  Injectable 1 milliGRAM(s) IntraMuscular once  insulin lispro (ADMELOG) corrective regimen sliding scale   SubCutaneous every 6 hours  Nephro-corine 1 Tablet(s) Oral daily  potassium phosphate / sodium phosphate Tablet (K-PHOS No. 2) 1 Tablet(s) Oral four times a day    DIET: [] Regular [] CCD [] Renal [] Puree [] Dysphagia [x] Tube Feeds: via PEG - Glucerna 1.2    IMAGING:   < from: CT Head No Cont (09.05.24 @ 04:52) >  IMPRESSION:  No acute intracranial hemorrhage or mass effect. Grossly stable   postsurgical change. If clinical symptoms persist or worsen, more   sensitive evaluation with brain MRI may be obtained, if no   contraindications exist.        --- End of Report ---    MARELY FORBES MD; Attending Radiologist  This document has been electronically signed. Sep  5 2024  7:12AM    < end of copied text >    < from: CT Abdomen and Pelvis No Cont (09.05.24 @ 04:52) >  FINDINGS:    LOWER CHEST: Right greater than left lower lobe airspace opacities. Left   lower lobe nodule no longer visualized.    LIVER: Within normal limits.  BILE DUCTS: Normal caliber.  GALLBLADDER: Within normal limits.  SPLEEN: Within normal limits.  PANCREAS: Distal pancreatectomy.  ADRENALS: Within normal limits.  KIDNEYS/URETERS: A right renal cyst.    BLADDER: Within normal limits.  REPRODUCTIVE ORGANS: Within normal limits.    BOWEL: No bowel obstruction. Right hemicolectomy. Gastrostomy tube is in   place. Small focus of extraluminal air adjacent to the gastrostomy tube.  PERITONEUM/RETROPERITONEUM: Within normal limits.  VESSELS:  Within normal limits.  ABDOMINAL WALL: Within normal limits.  BONES: Within normal limits.    IMPRESSION: Lower lobe airspace opacities of uncertain etiology.    --- End of Report ---  NIHARIKA MANCILLA MD; Attending Radiologist  This document has been electronically signed. Sep  5 2024  8:45AM    < end of copied text >

## 2024-09-09 NOTE — CONSULT NOTE ADULT - ASSESSMENT
Impression:    Urethral meatus mucosa irritation  Pressure Injury Prophylaxis  Incontinence of bowel and bladder  Incontinence Dermatitis    Recommend:  1.) topical therapy: sacral/buttock, tip of penis skin – cleanse with incontinence cleanser, pat dry, apply Aguila ointment BID and PRN for incontinent episodes  2.) Incontinence Management - incontinence cleanser, pads, pericare BID  3.) Maintain on an alternating air with low air loss surface  4.) Turn and reposition Q 2 hours  5.) Nutrition optimization  6.) Offload heels/feet with complete cair air fluidized boots/pillows; ensure that the soles of the feet are not resting on the foot board of the bed.  7.) chair cushion for chair sitting    Care as per medicine. Will not actively follow but will remain available. Please recall for new issues or deterioration.  Thank you for this consult  Lesli Anna, NP-C, CWOCN via TEAMS

## 2024-09-09 NOTE — PROGRESS NOTE ADULT - ASSESSMENT
ASSESSMENT AND PLAN: 79M hx recent admission for and R crani for lesion w/ Dr. 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 Today presents to ED for RUE doppler w/ DVT involving the subclavian, axillary and brachial veins and Superficial thrombus in the right basilic vein. Plts/coags wnl.     8/27 s/p PEG placement with GI (Dr. Savage)    NEURO:   - Continue neuro checks q 4  - Keppra for seizure prophylaxis  - Tylenol prn for pain control  - PT: Home PT w/ RW, PMR saw and recommended PEGGY    PULM:   - On room air, O2Sat>93%  - Incentive spirometry  - 8/16 CTA Chest: segmental emboli w/in right upper lobe pulm artery, no significant evidence of right heart strain; emphysema, new mucoid impaction likely COPD exacerbations - f/u CT Chest in 3 months  - Continue Duoneb, Symbicort, Spiriva     CV:  - -160, hypertensive to 170s overnight  - Continue Norvasc for HTN, increased from 2.5mg daily to 5mg daily. Gave extra Norvasc 2.5mg today  - Continue Metoprolol for Afib  - 8/16 TTE: LV systolic fxn normal w/ EF 71%, normal LV diastolic fxn, normal RV systolic fxn normal, LV systolic fxn is normal    ENDO:   - A1c 6.3, continue Glucerna TF, fingersticks stable    HEME/ONC & DVT ppx:               - 9/9 CBC: H/H 9.9/29.9 (10.3/31.5), last transfused with PRBC 9/4, currently stable. Currently on Eliquis 5mg BID for PE on CTA Chest 8/16 and persistent DVT in right subclavian vein and non-occlusive right axillary vein, right basilic vein DVT and left subclavian and basilic DVT, UE Dopp 9/4. Vascular Cardiology following.     RENAL:   - IVL  - 9/9 BMP stable  - Voiding    ID:   - Afebrile currently, will monitor for fevers  - WBC 9.48 (from 12.86), leukocytosis improved  - ID following: patient found to have UTI (9/7 UCX >100K GNR) and bacteremia (9/7 4/4 GNR / Klebsiella PNA), continue Cefepime 2000mg IVPB every 12 hours.   - F/u repeat BCX sent on 9/8    GI:    - Currently has PEG placed by GI on 8/27, complicated by bleeding around the site that has since resolved, did require transfusions (last done 9/4) however H/H currently stable. GI did EGD 9/3 which shows unremarkable EGD, no signs of portal hypertension, suspect resolved bleed from PEG tract. Per CT patient may have some intraabdominal collaterals that were within the PEG tract.  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  - Continue Abdomina binder around PEG site  - Continue Protonix 40mg BID for GI ppx  - Senna and Miralax for bowel regimen, last BM 9/8    Appreciate Hospitalist following for co-medical management.     More than 30 minutes were spent educating the patient and wife regarding condition, medications, follow up plans, signs and symptoms to be concerned with, preparing paperwork, and questions answered regarding discharge.     DISCHARGE PLANNING:   PEGGY once patient is medically stable    Plan to be discussed w/ Dr. Branham  [id number] ASSESSMENT AND PLAN: 79M hx recent admission for and R crani for lesion w/ Dr. 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 Today presents to ED for RUE doppler w/ DVT involving the subclavian, axillary and brachial veins and Superficial thrombus in the right basilic vein. Plts/coags wnl.     8/27 s/p PEG placement with GI (Dr. Savage)    NEURO:   - Continue neuro checks q 4  - Keppra for seizure prophylaxis  - Tylenol prn for pain control  - PT: Home PT w/ RW, PMR saw and recommended PEGGY    PULM:   - On room air, O2Sat>93%  - Incentive spirometry  - 8/16 CTA Chest: segmental emboli w/in right upper lobe pulm artery, no significant evidence of right heart strain; emphysema, new mucoid impaction likely COPD exacerbations - f/u CT Chest in 3 months  - Continue Duoneb, Symbicort, Spiriva     CV:  - -160, hypertensive to 170s overnight  - Continue Norvasc for HTN, increased from 2.5mg daily to 5mg daily. Gave extra Norvasc 2.5mg today  - Continue Metoprolol for Afib  - Continue Lipitor for HLD  - 8/16 TTE: LV systolic fxn normal w/ EF 71%, normal LV diastolic fxn, normal RV systolic fxn normal, LV systolic fxn is normal    ENDO:   - A1c 6.3, continue Glucerna TF, fingersticks stable    HEME/ONC & DVT ppx:               - 9/9 CBC: H/H 9.9/29.9 (10.3/31.5), last transfused with PRBC 9/4, currently stable. Currently on Eliquis 5mg BID for PE on CTA Chest 8/16 and persistent DVT in right subclavian vein and non-occlusive right axillary vein, right basilic vein DVT and left subclavian and basilic DVT, UE Dopp 9/4. Vascular Cardiology following.     RENAL:   - IVL  - 9/9 BMP stable  - Voiding    ID:   - Afebrile currently, will monitor for fevers  - WBC 9.48 (from 12.86), leukocytosis improved  - ID following: patient found to have UTI (9/7 UCX >100K GNR) and bacteremia (9/7 4/4 GNR / Klebsiella PNA), continue Cefepime 2000mg IVPB every 12 hours.   - F/u repeat BCX sent on 9/8    GI:    - Currently has PEG placed by GI on 8/27, complicated by bleeding around the site that has since resolved, did require transfusions (last done 9/4) however H/H currently stable. GI did EGD 9/3 which shows unremarkable EGD, no signs of portal hypertension, suspect resolved bleed from PEG tract. Per CT patient may have some intraabdominal collaterals that were within the PEG tract.  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  - Continue Abdomina binder around PEG site  - Continue Protonix 40mg BID for GI ppx  - Dulcolax prn for bowel movements, standing BM meds discontinued for loose BM's, last was 9/8, currently has Cynthia Champagne Hospitalist following for co-medical management.     More than 30 minutes were spent educating the patient and wife regarding condition, medications, follow up plans, signs and symptoms to be concerned with, preparing paperwork, and questions answered regarding discharge.     DISCHARGE PLANNING:   PEGGY once patient is medically stable    Plan to be discussed w/ Dr. Branham  [id number] ASSESSMENT AND PLAN: 79M hx recent admission for and R crani for lesion w/ Dr. 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 Today presents to ED for RUE doppler w/ DVT involving the subclavian, axillary and brachial veins and Superficial thrombus in the right basilic vein. Plts/coags wnl.     8/27 s/p PEG placement with GI (Dr. Savage)    NEURO:   - Continue neuro checks q 4  - Keppra for seizure prophylaxis  - Tylenol prn for pain control  - PT: Home PT w/ RW, PMR saw and recommended PEGGY    PULM:   - On room air, O2Sat>93%  - Incentive spirometry  - 8/16 CTA Chest: segmental emboli w/in right upper lobe pulm artery, no significant evidence of right heart strain; emphysema, new mucoid impaction likely COPD exacerbations - f/u CT Chest in 3 months  - Continue Duoneb, Symbicort, Spiriva     CV:  - -160, hypertensive to 170s overnight  - Continue Norvasc for HTN, increased from 2.5mg daily to 5mg daily. Gave extra Norvasc 2.5mg today  - Continue Metoprolol for Afib  - Continue Lipitor for HLD  - 8/16 TTE: LV systolic fxn normal w/ EF 71%, normal LV diastolic fxn, normal RV systolic fxn normal, LV systolic fxn is normal    ENDO:   - A1c 6.3, continue Glucerna TF, fingersticks stable    HEME/ONC & DVT ppx:               - 9/9 CBC: H/H 9.9/29.9 (10.3/31.5), last transfused with PRBC 9/4, currently stable. Currently on Eliquis 5mg BID for PE on CTA Chest 8/16 and persistent DVT in right subclavian vein and non-occlusive right axillary vein, right basilic vein DVT and left subclavian and basilic DVT, UE Dopp 9/4. Vascular Cardiology following.     RENAL:   - IVL  - 9/9 BMP stable  - Voiding    ID:   - Afebrile currently, will monitor for fevers  - WBC 9.48 (from 12.86), leukocytosis improved  - ID following: patient found to have UTI (9/7 UCX >100K GNR) and bacteremia (9/7 4/4 GNR / Klebsiella PNA), continue Cefepime 2000mg IVPB every 12 hours.   - F/u repeat BCX sent on 9/8    GI:    - Currently has PEG placed by GI on 8/27, complicated by bleeding around the site that has since resolved, did require transfusions (last done 9/4) however H/H currently stable. GI did EGD 9/3 which shows unremarkable EGD, no signs of portal hypertension, suspect resolved bleed from PEG tract. Per CT patient may have some intraabdominal collaterals that were within the PEG tract.  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  - Continue Abdomina binder around PEG site  - Continue Protonix 40mg BID for GI ppx  - On Free Water 200q8 for hydration  - Dulcolax prn for bowel movements, standing BM meds discontinued for loose BM's, last was 9/8, currently has Cynthia Champagne Hospitalist following for co-medical management.     More than 30 minutes were spent educating the patient and wife regarding condition, medications, follow up plans, signs and symptoms to be concerned with, preparing paperwork, and questions answered regarding discharge.     DISCHARGE PLANNING:   PEGGY once patient is medically stable    Plan to be discussed w/ Dr. Branham  [id number]

## 2024-09-09 NOTE — CONSULT NOTE ADULT - CONSULT REASON
dysphagia
dvt
GNR bacteremia
post-operative DVT
PE
penile skin tear
Rehabilitation consult
Laryngoscopic guided NGT

## 2024-09-09 NOTE — CONSULT NOTE ADULT - REASON FOR ADMISSION
post-operative DVT

## 2024-09-09 NOTE — PROGRESS NOTE ADULT - ASSESSMENT
78 y/o M PMhx recent admission for and R crani for lesion w/ Dr. 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 CTA demonstrated segmental emboli within right upper lobe pulmonary artery. On eliquis. Due to dysphagia and failed MBS, PEG tube was placed 8/27.  Hospital course c/b leukocytosis on 9/6 found to have klebsiella pneumoniae bacteremia.    Klebsiella pneumoniae bacteremia suspect urinary source  Bcx with Kleb pneumoniae--sensitivities reviewed   UA positive, patient denies  symptoms but is a poor historian  leukocytosis d/t above --resolved     Recommendations:  Follow repeat Bcx - NGTD x2 (24h)  Follow Ucx for GNR ID/sensitivities to determine if etiology of bacteremia  Discontinue cefepime  Start on ceftriaxone 1g IV Q24h  Continue rest of care per primary team     D/w DESTIN Song M.D.  [facility] Division of Infectious Diseases  [phone]  After 5pm on weekdays and all day on weekends - please call [phone]  Available on [employer] TEAMS

## 2024-09-09 NOTE — PROGRESS NOTE ADULT - SUBJECTIVE AND OBJECTIVE BOX
[facility] DIVISION OF INFECTIOUS DISEASES  LIANA Solis Y. Patel, S. Shah, G. Kavin  [phone]  ([phone] - weekdays after 5pm and weekends)    Name: RADHA HOPE  Age/Gender: 79y Male  MRN: [id number]    Interval History:  Patient seen and examined this morning.   No new complaints noted.  Notes reviewed.     Allergies: penicillin (Other)  ACE inhibitors (Other)  Bee Stings- anaphylaxis (Other)      Objective:  Vitals:   T(F): 100 (09-09-24 @ 09:14), Max: 100 (09-09-24 @ 09:14)  HR: 89 (09-09-24 @ 09:14) (82 - 92)  BP: 138/72 (09-09-24 @ 09:14) (121/76 - 178/96)  RR: 18 (09-09-24 @ 09:14) (18 - 18)  SpO2: 98% (09-09-24 @ 09:14) (93% - 98%)  Physical Examination:  General: no acute distress, nontoxic appearing   HEENT: NC/AT, anicteric, EOMI  Respiratory: no acc muscle use, breathing comfortably  Cardiovascular: S1 and S2 present  Gastrointestinal: normal appearing, nondistended  Extremities: no edema, no cyanosis  Skin: no visible rash    Laboratory Studies:  CBC:                       9.9    9.48  )-----------( 143      ( 09 Sep 2024 06:42 )             29.9     WBC Trend:  9.48 09-09-24 @ 06:42  12.86 09-08-24 @ 06:22  16.29 09-07-24 @ 04:50  24.53 09-06-24 @ 16:18  9.63 09-06-24 @ 03:53  8.68 09-05-24 @ 15:25  8.74 09-05-24 @ 05:53  8.24 09-05-24 @ 02:41  5.29 09-04-24 @ 20:43  6.09 09-04-24 @ 17:40  5.38 09-04-24 @ 09:02  4.88 09-03-24 @ 07:16  4.99 09-02-24 @ 16:45    CMP: 09-09    139  |  105  |  22  ----------------------------<  145<H>  4.1   |  22  |  0.68    Ca    8.7      09 Sep 2024 06:46  Phos  3.4     09-09  Mg     2.0     09-09      Creatinine: 0.68 mg/dL (09-09-24 @ 06:46)  Creatinine: 1.12 mg/dL (09-07-24 @ 04:50)  Creatinine: 1.11 mg/dL (09-06-24 @ 03:53)  Creatinine: 0.95 mg/dL (09-05-24 @ 12:47)  Creatinine: 1.18 mg/dL (09-05-24 @ 02:41)  Creatinine: 0.81 mg/dL (09-04-24 @ 21:15)  Creatinine: 0.88 mg/dL (09-04-24 @ 20:43)  Creatinine: 0.81 mg/dL (09-04-24 @ 09:02)    Microbiology: reviewed   Culture - Blood (collected 09-08-24 @ 04:40)  Source: .Blood Blood  Preliminary Report (09-09-24 @ 12:10):    No growth at 24 hours    Culture - Blood (collected 09-08-24 @ 04:32)  Source: .Blood Blood  Preliminary Report (09-09-24 @ 12:10):    No growth at 24 hours    Culture - Urine (collected 09-07-24 @ 01:05)  Source: Clean Catch Clean Catch (Midstream)  Preliminary Report (09-08-24 @ 10:22):    >100,000 CFU/ml Gram Negative Rods    Culture - Blood (collected 09-06-24 @ 18:50)  Source: .Blood Blood  Gram Stain (09-07-24 @ 08:04):    Growth in aerobic bottle: Gram Negative Rods    Growth in anaerobic bottle: Gram Negative Rods  Final Report (09-08-24 @ 14:43):    Growth in aerobic and anaerobic bottles: Klebsiella pneumoniae    Direct identification is available within approximately 3-5    hours either by Blood Panel Multiplexed PCR or Direct    MALDI-TOF. Details: [email or web address]  Organism: Blood Culture PCR  Klebsiella pneumoniae (09-08-24 @ 14:43)  Organism: Klebsiella pneumoniae (09-08-24 @ 14:43)      Method Type: MITCHELL      -  Ampicillin: R >16 These ampicillin results predict results for amoxicillin      -  Ampicillin/Sulbactam: S <=4/2      -  Aztreonam: S <=4      -  Cefazolin: S <=2      -  Cefepime: S <=2      -  Cefoxitin: S <=8      -  Ceftriaxone: S <=1      -  Ciprofloxacin: S <=0.25      -  Ertapenem: S <=0.5      -  Gentamicin: S <=2      -  Imipenem: S <=1      -  Levofloxacin: S <=0.5      -  Meropenem: S <=1      -  Piperacillin/Tazobactam: S <=8      -  Tobramycin: S <=2      -  Trimethoprim/Sulfamethoxazole: S <=0.5/9.5  Organism: Blood Culture PCR (09-08-24 @ 14:43)      Method Type: PCR      -  K. pneumoniae group: Detec (K. pneumoniae, K. quasipneumoniae, K. variicola)    Culture - Blood (collected 09-06-24 @ 18:00)  Source: .Blood Blood  Gram Stain (09-07-24 @ 08:15):    Growth in aerobic bottle: Gram Negative Rods    Growth in anaerobic bottle: Gram Negative Rods  Final Report (09-08-24 @ 14:44):    Growth in aerobic and anaerobic bottles: Klebsiella pneumoniae    See previous culture [id number]          Radiology: reviewed     Medications:  acetaminophen     Tablet .. 650 milliGRAM(s) Oral every 6 hours PRN  albuterol/ipratropium for Nebulization 3 milliLiter(s) Nebulizer every 6 hours  apixaban 5 milliGRAM(s) Oral every 12 hours  atorvastatin 10 milliGRAM(s) Oral at bedtime  bisacodyl 5 milliGRAM(s) Oral daily PRN  budesonide 160 MICROgram(s)/formoterol 4.5 MICROgram(s) Inhaler 2 Puff(s) Inhalation two times a day  cefepime   IVPB 2000 milliGRAM(s) IV Intermittent every 12 hours  chlorhexidine 2% Cloths 1 Application(s) Topical daily  dextrose 5%. 1000 milliLiter(s) IV Continuous <Continuous>  dextrose 5%. 1000 milliLiter(s) IV Continuous <Continuous>  dextrose 50% Injectable 12.5 Gram(s) IV Push once  dextrose 50% Injectable 25 Gram(s) IV Push once  dextrose 50% Injectable 25 Gram(s) IV Push once  dextrose Oral Gel 15 Gram(s) Oral once PRN  glucagon  Injectable 1 milliGRAM(s) IntraMuscular once  insulin lispro (ADMELOG) corrective regimen sliding scale   SubCutaneous every 6 hours  levETIRAcetam  IVPB 250 milliGRAM(s) IV Intermittent every 12 hours  metoprolol tartrate 12.5 milliGRAM(s) Oral every 12 hours  Nephro-corine 1 Tablet(s) Oral daily  pantoprazole  Injectable 40 milliGRAM(s) IV Push two times a day  tiotropium 2.5 MICROgram(s) Inhaler 2 Puff(s) Inhalation daily    Current Antimicrobials:  cefepime   IVPB 2000 milliGRAM(s) IV Intermittent every 12 hours    Prior/Completed Antimicrobials:  vancomycin  IVPB

## 2024-09-09 NOTE — CHART NOTE - NSCHARTNOTEFT_GEN_A_CORE
NUTRITION FOLLOW UP NOTE    PATIENT SEEN FOR: Malnutrition/Enteral Nutrition Follow Up    SOURCE: [X] Patient  [x] Current Medical Record  [X] RN  [] Family/support person at bedside  [] Patient unavailable/inappropriate  [] Other:    CHART REVIEWED/EVENTS NOTED.  [] No changes to nutrition care plan to note  [X] Nutrition Status:  - Per GI, maintain abdominal binder with PEG at all times to prevent dislodgement.     DIET ORDER:   Diet, NPO with Tube Feed:   Tube Feeding Modality: Nasogastric  Glucerna 1.2 Gabriele (GLUCERNARTH)  Total Volume for 24 Hours (mL): 1440  Continuous  Starting Tube Feed Rate {mL per Hour}: 20  Increase Tube Feed Rate by (mL): 10     Every 6 hours  Until Goal Tube Feed Rate (mL per Hour): 60  Tube Feed Duration (in Hours): 24  Tube Feed Start Time: 13:00  Banatrol TF     Qty per Day:  1 (24)      CURRENT DIET ORDER IS:  [] Appropriate:  [X] Inadequate: See recommendations below  [] Other:    NUTRITION INTAKE/PROVISION:  [] PO:  [X] Enteral Nutrition:  - Per team, pt tolerating EN regimen at goal rate of 60 mL/hr x 24 hrs; however, noted episodes of loose stools x >2 days. Consider increasing banatrol to 2x/day to aid in bulking stool.   - EN Order Provides:  1440ml formula, 1728kcal, 86g protein, 1159ml free water; meets 29 kcal/kg (based on 58.8kg - dosing weight) and 1.14 g protein/kg (based on 75.3kg - IBW)    Protein Modular(s) Provides: N/A    Current Pump Rate: 60 mL/hr  EN provision: 50 % EN volume goal provided in past 2 days - ? complete accuracy r/t lack of documentation. RD will continue to reassess as able.   [] Parenteral Nutrition:    ANTHROPOMETRICS:  Drug Dosing Weight  Height (cm): 177.8 (03 Sep 2024 14:25)  Weight (kg): 58.8 (03 Sep 2024 14:25)  BMI (kg/m2): 18.6 (03 Sep 2024 14:25)    Weights:   Previous dosing wt of 58.8 kg (8/15).   Daily wts in k.3 kg (, RD obtained bedscale wt). ? upward wt trend of 11% x > 2 weeks (based on wts from 8/15, ) vs bedscale inaccuracies. RD will continue to monitor wt trends as able/available.     NUTRITIONALLY PERTINENT MEDICATIONS:  MEDICATIONS  (STANDING):  atorvastatin  cefTRIAXone   IVPB  dextrose 5%.  dextrose 5%.  dextrose 50% Injectable  dextrose 50% Injectable  dextrose 50% Injectable  glucagon  Injectable  insulin lispro (ADMELOG) corrective regimen sliding scale  metoprolol tartrate  Nephro-corine  pantoprazole   Suspension       NUTRITIONALLY PERTINENT LABS:   Na139 mmol/L Glu 145 mg/dL<H> K+ 4.1 mmol/L Cr  0.68 mg/dL BUN 22 mg/dL  Phos 3.4 mg/dL  Alb 3.3 g/dL ALT 33 U/L AST 21 U/L Alkaline Phosphatase 88 U/L    A1C with Estimated Average Glucose Result: 6.3 % (24 @ 10:46)  A1C with Estimated Average Glucose Result: 6.1 % (24 @ 13:03)      Finger Sticks:  POCT Blood Glucose.: 155 mg/dL ( @ 11:42)  POCT Blood Glucose.: 139 mg/dL ( @ 06:13)  POCT Blood Glucose.: 130 mg/dL ( @ 23:21)      NUTRITIONALLY PERTINENT MEDICATIONS/LABS:  [x] Reviewed  [X] Relevant notes on medications/labs:  - POCTs x 24 hrs WNL; ordered for SSI.   - Ordered for Kphos supplementation 4x/day.   - Ordered for nephrovite supplementation.   - Ordered for Abx.     EDEMA:  [x] Reviewed  [] Relevant notes:    GI/ I&O:  [x] Reviewed  [X] Relevant notes: Per chart, pt with multiple BMs over weekend (green, semi-formed). Per RN, pt with liquid stools yesterday, however, today soft, more formed. Last BM . Ordered for dulcolax; previously ordered for Miralax, senna (now d/c).   [x] Other: Ordered for PPI. Consider holding bowel regimen in setting of loose stools. Provide banatrol up to 2x/day to aid in bulking stools.     SKIN:   [X] No pressure injuries documented, per nursing flowsheet  [] Pressure injury previously noted  [] Change in pressure injury documentation:  [] Other:    ESTIMATED NEEDS:  [] No change:  [x] Updated:  Energy:   1764-2058kcal/day (30-35 kcal/kg)  Protein:   76-100g/day (1.3-1.7 g/kg)  Fluid:   ml/day or [x] defer to team  Based on: 58.8kg - dosing weight     NUTRITION DIAGNOSIS:  [X] Prior Dx: (1) Underweight (2) Severe Malnutrition in context of acute on chronic illness  [] New Dx:    EDUCATION:  [] Yes:  [X] Not appropriate/warranted    NUTRITION CARE PLAN:  [X] Consider advancing EN regimen via PEG to more adequately meet EER as follows: Glucerna 1.2 @ 65 mL/hr x 24 hrs; EN regimen to provide:  1560 mL total volume, 1872 kcal/day, 94 g protein/day, 1256 mL free water; meets 32 kcal/kg,  1.6 g protein/kg; based on  dosing wt of 58.8 kg. Defer free water flushes to team.              [X] As loose stools persist, provide banatrol up to 2x/day via PEG to aid in bulking stools.              [X] Monitor BG levels closely, if <120mg/dL recommend switching to non-carb steady formula             [X] RD remains available upon request for TF formulary/rate adjustments prn.   [X] Continue with NephroVite supplementation for micronutrient coverage, pending no medical contraindications    [] Achieved - Continue current nutrition intervention(s)  [] Current medical condition precludes nutrition intervention at this time.    MONITORING AND EVALUATION:   RD remains available upon request and will follow up per protocol.    Jazmine Mullen RD  Available on [facility] TEAMS

## 2024-09-09 NOTE — PROGRESS NOTE ADULT - PROBLEM SELECTOR PLAN 1
Klebsiella growing from blood cx sent 9/6. Pansensitive- can transition to po abx if cleared by ID.     Possible source urine, f/u urine cx- g neg rods.

## 2024-09-09 NOTE — CONSULT NOTE ADULT - SUBJECTIVE AND OBJECTIVE BOX
Wound Surgery Consult Note:    HPI:  80 yo male with PMH of HTN, T2DM (diet controlled), HLD, COPD, CKD 3, carcinoid tumor s/p hemicolectomy, nasopharyngeal carcinoma s/p chemo/RT (c/b dysphagia ), IPMN s/p distal pancreatectomy (2018), and paroxysmal Afib on Eliquis, s/p craniotomy 8/6 for resection of hemorrhagic lesion presented to the ED for RUE swelling, found to have RUE DVT involving subclavian, axillary, and brachial veins and PE. Course also c/b dysphagia- s/p PEG 8/27, c/b bleeding around PEG site and melena- now s/p EGD with no evidence of active bleed. Course c/b likely vasovagal syncope while on toilet and transferred to NSCU for closer monitoring. No evidence of active bleed. Transferred back to floors on 9/6. Now w Klebsiella bacteremia.     Request for wound care evaluation of the penile injury received from nursing. Mr. Hernandez was encountered on an alternating air with low air loss surface.  He is incontinent of stool and urine. He denies pain related to his penile/urethral meatus skin injury.  He recently had an external male urinary catheter that has since been discontinued.  His immobility, inactivity, incontinence of bowel and bladder in addition to poor nutritional status all contribute to his risk of pressure injury development and hinder healing. Principles of pressure injury prevention including but not limited to turning and positioning reviewed with patient.     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  Disease of pancreas, unspecified  Hearing loss  Pancreatic mass  Radiation fibrosis, from RT for nasopharyngeal ca, ROM limited Mouth opening & neck  ETOH abuse, quit in 2016  Other nonspecific abnormal finding of lung field  Abnormal chest CT  Type 2 diabetes mellitus  Dysphagia  History of cancer chemotherapy  S/P radiation therapy  Pneumonia  Syncope  Pneumonia due to COVID-19 virus  History of appendectomy  H/O hemicolectomy  S/P tonsillectomy, 1959  H/O endoscopy, last one 06/2018  History of partial pancreatectomy  History of loop recorder    REVIEW OF SYSTEMS  CONSTITUTIONAL: no weakness, no fevers or chills  EYES/ENT: No visual changes;  No vertigo or throat pain   MOUTH: No oral lesion, moist  NECK: No pain or stiffness  RESPIRATORY: No cough, wheezing, hemoptysis; No shortness of breath  CARDIOVASCULAR: No chest pain or palpitations  GASTROINTESTINAL: No abdominal or epigastric pain. No nausea, vomiting, or hematemesis; No diarrhea or constipation. No melena or hematochezia.  GENITOURINARY: + dysuria, no frequency or hematuria  NEUROLOGICAL: no numbness  SKIN: No itching, rashes  PSYCH: no confusion or altered mental status    MEDICATIONS  (STANDING):  albuterol/ipratropium for Nebulization 3 milliLiter(s) Nebulizer every 6 hours  apixaban 5 milliGRAM(s) Oral every 12 hours  atorvastatin 10 milliGRAM(s) Oral at bedtime  budesonide 160 MICROgram(s)/formoterol 4.5 MICROgram(s) Inhaler 2 Puff(s) Inhalation two times a day  cefTRIAXone   IVPB 1000 milliGRAM(s) IV Intermittent every 24 hours  chlorhexidine 2% Cloths 1 Application(s) Topical daily  dextrose 5%. 1000 milliLiter(s) (50 mL/Hr) IV Continuous <Continuous>  dextrose 5%. 1000 milliLiter(s) (100 mL/Hr) IV Continuous <Continuous>  dextrose 50% Injectable 12.5 Gram(s) IV Push once  dextrose 50% Injectable 25 Gram(s) IV Push once  dextrose 50% Injectable 25 Gram(s) IV Push once  glucagon  Injectable 1 milliGRAM(s) IntraMuscular once  insulin lispro (ADMELOG) corrective regimen sliding scale   SubCutaneous every 6 hours  levETIRAcetam  IVPB 250 milliGRAM(s) IV Intermittent every 12 hours  metoprolol tartrate 12.5 milliGRAM(s) Oral every 12 hours  Nephro-corine 1 Tablet(s) Oral daily  pantoprazole   Suspension 40 milliGRAM(s) Oral daily  tiotropium 2.5 MICROgram(s) Inhaler 2 Puff(s) Inhalation daily    MEDICATIONS  (PRN):  acetaminophen     Tablet .. 650 milliGRAM(s) Oral every 6 hours PRN Mild Pain (1 - 3)  bisacodyl 5 milliGRAM(s) Oral daily PRN Constipation  dextrose Oral Gel 15 Gram(s) Oral once PRN Blood Glucose LESS THAN 70 milliGRAM(s)/deciliter    Allergies    penicillin (Other)  ACE inhibitors (Other)  Bee Stings- anaphylaxis (Other)    Intolerances    SOCIAL HISTORY:  ; Former smoker, Denies smoking, ETOH, drugs    FAMILY HISTORY:  Family history of breast cancer    Vital Signs Last 24 Hrs  T(C): 37.1 (09 Sep 2024 13:30), Max: 37.8 (09 Sep 2024 09:14)  T(F): 98.8 (09 Sep 2024 13:30), Max: 100 (09 Sep 2024 09:14)  HR: 93 (09 Sep 2024 13:30) (82 - 93)  BP: 132/76 (09 Sep 2024 13:30) (129/80 - 178/96)  BP(mean): --  RR: 18 (09 Sep 2024 13:30) (18 - 18)  SpO2: 99% (09 Sep 2024 13:30) (93% - 99%)    Parameters below as of 09 Sep 2024 13:30  Patient On (Oxygen Delivery Method): room air    Physical Exam:  General: alert, WN  Ophthamology: sclera clear  ENMT: moist mucous membranes, trachea midline  Respiratory: equal chest rise with respirations  Gastrointestinal: soft NT/ND  Neurology: verbal,  following commands  Psych: calm, cooperative  Musculoskeletal: no contractures  Vascular: BLE edema equal  Skin:  Tip of penis with irritated, moist, superficially eroded urinary meatus mucosa, scant serosanguinous drainage  No odor, erythema, increased warmth, tenderness, induration, fluctuance    LABS:  09-09    139  |  105  |  22  ----------------------------<  145<H>  4.1   |  22  |  0.68    Ca    8.7      09 Sep 2024 06:46  Phos  3.4     09-09  Mg     2.0     09-09                            9.9    9.48  )-----------( 143      ( 09 Sep 2024 06:42 )             29.9       Urinalysis Basic - ( 09 Sep 2024 06:46 )    Color: x / Appearance: x / SG: x / pH: x  Gluc: 145 mg/dL / Ketone: x  / Bili: x / Urobili: x   Blood: x / Protein: x / Nitrite: x   Leuk Esterase: x / RBC: x / WBC x   Sq Epi: x / Non Sq Epi: x / Bacteria: x       Wound Surgery Consult Note:    HPI:  78 yo male with PMH of HTN, T2DM (diet controlled), HLD, COPD, CKD 3, carcinoid tumor s/p hemicolectomy, nasopharyngeal carcinoma s/p chemo/RT (c/b dysphagia ), IPMN s/p distal pancreatectomy (2018), and paroxysmal Afib on Eliquis, s/p craniotomy 8/6 for resection of hemorrhagic lesion presented to the ED for RUE swelling, found to have RUE DVT involving subclavian, axillary, and brachial veins and PE. Course also c/b dysphagia- s/p PEG 8/27, c/b bleeding around PEG site and melena- now s/p EGD with no evidence of active bleed. Course c/b likely vasovagal syncope while on toilet and transferred to NSCU for closer monitoring. No evidence of active bleed. Transferred back to floors on 9/6. Now w Klebsiella bacteremia.     Request for wound care evaluation of the penile injury received from nursing. Mr. Hernandez was encountered on an alternating air with low air loss surface.  He is incontinent of stool and urine. He denies pain related to his penile/urethral meatus skin injury.  No paraphimosis identified. He recently had an external male urinary catheter that has since been discontinued.  His immobility, inactivity, incontinence of bowel and bladder in addition to poor nutritional status all contribute to his risk of pressure injury development and hinder healing. Principles of pressure injury prevention including but not limited to turning and positioning reviewed with patient.     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  Disease of pancreas, unspecified  Hearing loss  Pancreatic mass  Radiation fibrosis, from RT for nasopharyngeal ca, ROM limited Mouth opening & neck  ETOH abuse, quit in 2016  Other nonspecific abnormal finding of lung field  Abnormal chest CT  Type 2 diabetes mellitus  Dysphagia  History of cancer chemotherapy  S/P radiation therapy  Pneumonia  Syncope  Pneumonia due to COVID-19 virus  History of appendectomy  H/O hemicolectomy  S/P tonsillectomy, 1959  H/O endoscopy, last one 06/2018  History of partial pancreatectomy  History of loop recorder    REVIEW OF SYSTEMS  CONSTITUTIONAL: no weakness, no fevers or chills  EYES/ENT: No visual changes;  No vertigo or throat pain   MOUTH: No oral lesion, moist  NECK: No pain or stiffness  RESPIRATORY: No cough, wheezing, hemoptysis; No shortness of breath  CARDIOVASCULAR: No chest pain or palpitations  GASTROINTESTINAL: No abdominal or epigastric pain. No nausea, vomiting, or hematemesis; No diarrhea or constipation. No melena or hematochezia.  GENITOURINARY: + dysuria, no frequency or hematuria  NEUROLOGICAL: no numbness  SKIN: No itching, rashes  PSYCH: no confusion or altered mental status    MEDICATIONS  (STANDING):  albuterol/ipratropium for Nebulization 3 milliLiter(s) Nebulizer every 6 hours  apixaban 5 milliGRAM(s) Oral every 12 hours  atorvastatin 10 milliGRAM(s) Oral at bedtime  budesonide 160 MICROgram(s)/formoterol 4.5 MICROgram(s) Inhaler 2 Puff(s) Inhalation two times a day  cefTRIAXone   IVPB 1000 milliGRAM(s) IV Intermittent every 24 hours  chlorhexidine 2% Cloths 1 Application(s) Topical daily  dextrose 5%. 1000 milliLiter(s) (50 mL/Hr) IV Continuous <Continuous>  dextrose 5%. 1000 milliLiter(s) (100 mL/Hr) IV Continuous <Continuous>  dextrose 50% Injectable 12.5 Gram(s) IV Push once  dextrose 50% Injectable 25 Gram(s) IV Push once  dextrose 50% Injectable 25 Gram(s) IV Push once  glucagon  Injectable 1 milliGRAM(s) IntraMuscular once  insulin lispro (ADMELOG) corrective regimen sliding scale   SubCutaneous every 6 hours  levETIRAcetam  IVPB 250 milliGRAM(s) IV Intermittent every 12 hours  metoprolol tartrate 12.5 milliGRAM(s) Oral every 12 hours  Nephro-corine 1 Tablet(s) Oral daily  pantoprazole   Suspension 40 milliGRAM(s) Oral daily  tiotropium 2.5 MICROgram(s) Inhaler 2 Puff(s) Inhalation daily    MEDICATIONS  (PRN):  acetaminophen     Tablet .. 650 milliGRAM(s) Oral every 6 hours PRN Mild Pain (1 - 3)  bisacodyl 5 milliGRAM(s) Oral daily PRN Constipation  dextrose Oral Gel 15 Gram(s) Oral once PRN Blood Glucose LESS THAN 70 milliGRAM(s)/deciliter    Allergies    penicillin (Other)  ACE inhibitors (Other)  Bee Stings- anaphylaxis (Other)    Intolerances    SOCIAL HISTORY:  ; Former smoker, Denies smoking, ETOH, drugs    FAMILY HISTORY:  Family history of breast cancer    Vital Signs Last 24 Hrs  T(C): 37.1 (09 Sep 2024 13:30), Max: 37.8 (09 Sep 2024 09:14)  T(F): 98.8 (09 Sep 2024 13:30), Max: 100 (09 Sep 2024 09:14)  HR: 93 (09 Sep 2024 13:30) (82 - 93)  BP: 132/76 (09 Sep 2024 13:30) (129/80 - 178/96)  BP(mean): --  RR: 18 (09 Sep 2024 13:30) (18 - 18)  SpO2: 99% (09 Sep 2024 13:30) (93% - 99%)    Parameters below as of 09 Sep 2024 13:30  Patient On (Oxygen Delivery Method): room air    Physical Exam:  General: alert, WN  Ophthamology: sclera clear  ENMT: moist mucous membranes, trachea midline  Respiratory: equal chest rise with respirations  Gastrointestinal: soft NT/ND  Neurology: verbal,  following commands  Psych: calm, cooperative  Musculoskeletal: no contractures  Vascular: BLE edema equal  Skin:  Tip of penis with irritated, moist, superficially eroded urinary meatus mucosa, scant serosanguinous drainage  No odor, erythema, increased warmth, tenderness, induration, fluctuance    LABS:  09-09    139  |  105  |  22  ----------------------------<  145<H>  4.1   |  22  |  0.68    Ca    8.7      09 Sep 2024 06:46  Phos  3.4     09-09  Mg     2.0     09-09                            9.9    9.48  )-----------( 143      ( 09 Sep 2024 06:42 )             29.9       Urinalysis Basic - ( 09 Sep 2024 06:46 )    Color: x / Appearance: x / SG: x / pH: x  Gluc: 145 mg/dL / Ketone: x  / Bili: x / Urobili: x   Blood: x / Protein: x / Nitrite: x   Leuk Esterase: x / RBC: x / WBC x   Sq Epi: x / Non Sq Epi: x / Bacteria: x

## 2024-09-09 NOTE — PROGRESS NOTE ADULT - SUBJECTIVE AND OBJECTIVE BOX
INTERVAL HPI/OVERNIGHT EVENTS:  no GI events per report  tolerating PEG feeds  no PEG site bleeding  no melena or rectal bleeding  no abdominal pain  no transfusion requirement  AC restarted 9/6    on Abx for UTI w/ bacteremia    MEDICATIONS  (STANDING):  albuterol/ipratropium for Nebulization 3 milliLiter(s) Nebulizer every 6 hours  apixaban 5 milliGRAM(s) Oral every 12 hours  atorvastatin 10 milliGRAM(s) Oral at bedtime  budesonide 160 MICROgram(s)/formoterol 4.5 MICROgram(s) Inhaler 2 Puff(s) Inhalation two times a day  cefTRIAXone   IVPB 1000 milliGRAM(s) IV Intermittent every 24 hours  chlorhexidine 2% Cloths 1 Application(s) Topical daily  dextrose 5%. 1000 milliLiter(s) (50 mL/Hr) IV Continuous <Continuous>  dextrose 5%. 1000 milliLiter(s) (100 mL/Hr) IV Continuous <Continuous>  dextrose 50% Injectable 25 Gram(s) IV Push once  dextrose 50% Injectable 12.5 Gram(s) IV Push once  dextrose 50% Injectable 25 Gram(s) IV Push once  glucagon  Injectable 1 milliGRAM(s) IntraMuscular once  insulin lispro (ADMELOG) corrective regimen sliding scale   SubCutaneous every 6 hours  levETIRAcetam  IVPB 250 milliGRAM(s) IV Intermittent every 12 hours  metoprolol tartrate 12.5 milliGRAM(s) Oral every 12 hours  Nephro-corine 1 Tablet(s) Oral daily  pantoprazole  Injectable 40 milliGRAM(s) IV Push two times a day  tiotropium 2.5 MICROgram(s) Inhaler 2 Puff(s) Inhalation daily    MEDICATIONS  (PRN):  acetaminophen     Tablet .. 650 milliGRAM(s) Oral every 6 hours PRN Mild Pain (1 - 3)  bisacodyl 5 milliGRAM(s) Oral daily PRN Constipation  dextrose Oral Gel 15 Gram(s) Oral once PRN Blood Glucose LESS THAN 70 milliGRAM(s)/deciliter      Allergies  penicillin (Other)  ACE inhibitors (Other)  Bee Stings- anaphylaxis (Other)      Review of Systems:  see HPI- remainder 10 point ROS negative    Vital Signs Last 24 Hrs  T(C): 37.8 (09 Sep 2024 09:14), Max: 37.8 (09 Sep 2024 09:14)  T(F): 100 (09 Sep 2024 09:14), Max: 100 (09 Sep 2024 09:14)  HR: 89 (09 Sep 2024 09:14) (82 - 92)  BP: 138/72 (09 Sep 2024 09:14) (129/80 - 178/96)  BP(mean): --  RR: 18 (09 Sep 2024 09:14) (18 - 18)  SpO2: 98% (09 Sep 2024 09:14) (93% - 98%)    Parameters below as of 09 Sep 2024 09:14  Patient On (Oxygen Delivery Method): room air    PHYSICAL EXAM:  GENERAL:: thin AA male awake, alert smiling and  appears comfortable. +occ cough w/ oral secretions (suctioned). Spouse at bedside  NECK: No JVD  RESP:: occ rhonchi, no inc WOB  CV:: S1 and S2, RRR  GI:: BS+, soft, thin ND NT Abdominal binder opened - PEG bumper at 4.5 cm bhargavi at top of bumper, no bleeding. gauze under bumper (removed by clinician)- lightly touching skin and spins easily 360 degrees. +midline abdominal scar (from mid epigastric region to pubis) and small scar at lateral LUQ, WH LLQ scar c/w appy.  ABD pad placed OVER PEG and abdominal binder resecured  MUSCULOSKELETAL: No clubbing or cyanosis of digits; no joint swelling or tenderness to palpation  PSYCH: A+O to person, place, and time; affect appropriate  NEUROLOGY: CN 2-12 are intact and symmetric; no gross sensory deficits, grossly 5/5 strength throughout  SKIN: No rashes; no palpable lesions, +crani site c/d/i          LABS:                        9.9    9.48  )-----------( 143      ( 09 Sep 2024 06:42 )             29.9     09-09    139  |  105  |  22  ----------------------------<  145<H>  4.1   |  22  |  0.68    Ca    8.7      09 Sep 2024 06:46  Phos  3.4     09-09  Mg     2.0     09-09        Culture - Urine (09.07.24 @ 01:05)   Specimen Source: Clean Catch Clean Catch (Midstream)  Culture Results:   >100,000 CFU/ml Gram Negative Rods    Culture - Blood (09.06.24 @ 18:50)   - K. pneumoniae group: Detec (K. pneumoniae, K. quasipneumoniae, K. variicola)  Gram Stain:   Growth in aerobic bottle: Gram Negative Rods   Growth in anaerobic bottle: Gram Negative Rods    Culture - Blood in AM (09.08.24 @ 04:40)   Specimen Source: .Blood Blood  Culture Results:   No growth at 24 hours        RADIOLOGY & ADDITIONAL TESTS:

## 2024-09-09 NOTE — PROGRESS NOTE ADULT - SUBJECTIVE AND OBJECTIVE BOX
Patient is a 79y old  Male who presents with a chief complaint of post-operative DVT (09 Sep 2024 08:55)      SUBJECTIVE / OVERNIGHT EVENTS: Pt in bed, c/o headache.     MEDICATIONS  (STANDING):  albuterol/ipratropium for Nebulization 3 milliLiter(s) Nebulizer every 6 hours  apixaban 5 milliGRAM(s) Oral every 12 hours  atorvastatin 10 milliGRAM(s) Oral at bedtime  budesonide 160 MICROgram(s)/formoterol 4.5 MICROgram(s) Inhaler 2 Puff(s) Inhalation two times a day  cefepime   IVPB 2000 milliGRAM(s) IV Intermittent every 12 hours  chlorhexidine 2% Cloths 1 Application(s) Topical daily  dextrose 5%. 1000 milliLiter(s) (50 mL/Hr) IV Continuous <Continuous>  dextrose 5%. 1000 milliLiter(s) (100 mL/Hr) IV Continuous <Continuous>  dextrose 50% Injectable 25 Gram(s) IV Push once  dextrose 50% Injectable 25 Gram(s) IV Push once  dextrose 50% Injectable 12.5 Gram(s) IV Push once  glucagon  Injectable 1 milliGRAM(s) IntraMuscular once  insulin lispro (ADMELOG) corrective regimen sliding scale   SubCutaneous every 6 hours  levETIRAcetam  IVPB 250 milliGRAM(s) IV Intermittent every 12 hours  metoprolol tartrate 12.5 milliGRAM(s) Oral every 12 hours  Nephro-corine 1 Tablet(s) Oral daily  pantoprazole  Injectable 40 milliGRAM(s) IV Push two times a day  tiotropium 2.5 MICROgram(s) Inhaler 2 Puff(s) Inhalation daily    MEDICATIONS  (PRN):  acetaminophen     Tablet .. 650 milliGRAM(s) Oral every 6 hours PRN Mild Pain (1 - 3)  bisacodyl 5 milliGRAM(s) Oral daily PRN Constipation  dextrose Oral Gel 15 Gram(s) Oral once PRN Blood Glucose LESS THAN 70 milliGRAM(s)/deciliter      CAPILLARY BLOOD GLUCOSE      POCT Blood Glucose.: 139 mg/dL (09 Sep 2024 06:13)  POCT Blood Glucose.: 130 mg/dL (08 Sep 2024 23:21)  POCT Blood Glucose.: 119 mg/dL (08 Sep 2024 17:02)    I&O's Summary    08 Sep 2024 07:01  -  09 Sep 2024 07:00  --------------------------------------------------------  IN: 920 mL / OUT: 390 mL / NET: 530 mL    09 Sep 2024 07:01  -  09 Sep 2024 12:03  --------------------------------------------------------  IN: 240 mL / OUT: 200 mL / NET: 40 mL        PHYSICAL EXAM:  T(C): 37.8 (09-09-24 @ 09:14), Max: 37.8 (09-09-24 @ 09:14)  HR: 89 (09-09-24 @ 09:14) (82 - 92)  BP: 138/72 (09-09-24 @ 09:14) (121/76 - 178/96)  RR: 18 (09-09-24 @ 09:14) (18 - 18)  SpO2: 98% (09-09-24 @ 09:14) (93% - 98%)    Awake, alert, RRR, abdomen soft, good air entry anteriorly, moving all extremities    LABS:                        9.9    9.48  )-----------( 143      ( 09 Sep 2024 06:42 )             29.9     09-09    139  |  105  |  22  ----------------------------<  145<H>  4.1   |  22  |  0.68    Ca    8.7      09 Sep 2024 06:46  Phos  3.4     09-09  Mg     2.0     09-09            Urinalysis Basic - ( 09 Sep 2024 06:46 )    Color: x / Appearance: x / SG: x / pH: x  Gluc: 145 mg/dL / Ketone: x  / Bili: x / Urobili: x   Blood: x / Protein: x / Nitrite: x   Leuk Esterase: x / RBC: x / WBC x   Sq Epi: x / Non Sq Epi: x / Bacteria: x        RADIOLOGY & ADDITIONAL TESTS:    Imaging Personally Reviewed:    Consultant(s) Notes Reviewed:      Care Discussed with Consultants/Other Providers: Nsx

## 2024-09-09 NOTE — CONSULT NOTE ADULT - PROVIDER SPECIALTY LIST ADULT
Hospitalist
Infectious Disease
Rehab Medicine
Wound Care
ENT
Neurosurgery
Gastroenterology
Vascular Cardiology

## 2024-09-09 NOTE — PROGRESS NOTE ADULT - ASSESSMENT
80 yo male with PMH of HTN, T2DM (diet controlled), HLD, COPD, CKD 3, carcinoid tumor s/p hemicolectomy, nasopharyngeal carcinoma s/p chemo/RT (c/b dysphagia ), IPMN s/p distal pancreatectomy (2018), and paroxysmal Afib on Eliquis s/p craniotomy 8/6 for resection of hemorrhagic lesion. Now presented to the ED for RUE swelling, found to have RUE DVT involving subclavian, axillary, and brachial veins and PE.    Pt was known to have cough w/ PO previously and was evaluated w/ OP MBS earlier this year - using thickened fluids at home   Post op course further complicated by Dysphagia - failed multiple SLP evaluations with recs: NPO   Pt and family are now agreeable to PEG placement for which GI is consulted    #Dysphagia -sp EGD + PEG placement 8/27/24 c/b PEG site bleeding  suspect PEG tract bleeding. Per CT patient may have some intraabdominal collaterals that were within the PEG tract.    8/28 scant dried blood under PEG bumper; no active bleeding, induration, swelling or edema  PEG bumper between 3.5 and 4 cm at skin level  8/29 scant oozing, slight downtrend in Hgb. Negative PEG lavage. Surgicell placed at PEG tract  8/29 PM - recurrent PEG site bleeding; Surgicell intact. Resolved without further intervention. Hgb stable 6pm (9.4 ->9.8)  8/30 AM minimal ooze at PEG site, no active bleeding. Re-assessed at 12:40pm- Notified by Medicine attending of AM Hgb 9.8 ->8.5. No melena +PEG lavage: pink tinged return with water/feeds. Previously placed surgicell removed, minimal oozing noted at ends of PEG site incision - new Surgicell strip placed at PEG incision under PEG bumper, lightly tucked into ends (medical and lateral aspects of PEG site incision)   No visible oozing, surgicell noted to be clean.  Hgb 8.3 1 unit PRBCs transfused  8/31 CT: no e/o GI bleed, gastric varices along fundus and body and gastroepiploic collateral vessels, chronic thrombosis of splenic vein, gastrostomy tube in place, LLL pulm nodule, tree in bud opacities RML and LLL  9/1/24 transfuse additional 1 unit PRBCs  9/2/24 negative PEG lavage  9/3/24 EGD: normal esophagus, normal stomach. small patch of gastritis at incisura (non bleeding) PEG tract without bleeding, no ulcer or stigmata of bleeding. Duodenum WNL  PEG bumper placed at 4.5 cm. Surgicell still in place  Impression:  Unremarkable EGD, no signs of portal hypertension. SUspect resolved bleed from PEG tract. Per CT patient may have some intraabdominal collaterals that were within the PEG tract.  9/4/24 dark BM (suspect passage of "old"/retained blood from GI tract). s/p RRT ?vasovagal - transfer to NSCU for monitoring 1 unit PRBCs transfused Hgb 11 ->13. dark green stool on exam  9/5/24 negative PEG lavage  #remote history of head/Neck CA sp Chemo + XRT  #sp hemicolectomy  #sp partial pancreatectomy  #DVT+PE   AC held x 24 hours for PEG  AC (Lovenox) restarted 8/28 pm.   8/29 AM scant PEG site oozing, sl Hgb downtrend. Topical surgicell applied at PEG site. 8/29 PM CBC stable  8/30 PM AC held 2/2 PEG site bleeding (see above)   9/4/24 Duplex: Stable persistent clot in the right subclavian and axillary veins. Thrombus around left subclavian vein line , new. Left basilic vein thrombosis.  9/5 no recurrent PEG site bleeding. Surgicell noted to be off PEG incision site and wiped away by clinician. Negative PEG lavage  9/6 Eliquis restarted  #UTI w/ bacteremia - on IV Abx    RECS:    -PEG feeds; monitor tolerance  -monitor CBC and BMs   -NO GI objection to AC and monitor clinically  -nothing under PEG bumper please and Abdominal binder on at all times please to prevent accidental PEG trauma/tugging/dislodgement  -continue PPI; can change to via PEG dosing    Discussed with pt and spouse at bedside; all questions answered  Discussed with neurosurgery team  Further care per primary team    Avtar Mixon PA-C  [facility] Non Teaching GI Service  Available on TEAMS Mon-Fri 8a-4p  After hours and weekend coverage [phone]

## 2024-09-10 LAB
ANION GAP SERPL CALC-SCNC: 11 MMOL/L — SIGNIFICANT CHANGE UP (ref 5–17)
BUN SERPL-MCNC: 20 MG/DL — SIGNIFICANT CHANGE UP (ref 7–23)
CALCIUM SERPL-MCNC: 8.7 MG/DL — SIGNIFICANT CHANGE UP (ref 8.4–10.5)
CHLORIDE SERPL-SCNC: 103 MMOL/L — SIGNIFICANT CHANGE UP (ref 96–108)
CO2 SERPL-SCNC: 24 MMOL/L — SIGNIFICANT CHANGE UP (ref 22–31)
CREAT SERPL-MCNC: 0.72 MG/DL — SIGNIFICANT CHANGE UP (ref 0.5–1.3)
EGFR: 92 ML/MIN/1.73M2 — SIGNIFICANT CHANGE UP
GLUCOSE BLDC GLUCOMTR-MCNC: 127 MG/DL — HIGH (ref 70–99)
GLUCOSE BLDC GLUCOMTR-MCNC: 133 MG/DL — HIGH (ref 70–99)
GLUCOSE BLDC GLUCOMTR-MCNC: 136 MG/DL — HIGH (ref 70–99)
GLUCOSE BLDC GLUCOMTR-MCNC: 143 MG/DL — HIGH (ref 70–99)
GLUCOSE BLDC GLUCOMTR-MCNC: 151 MG/DL — HIGH (ref 70–99)
GLUCOSE SERPL-MCNC: 144 MG/DL — HIGH (ref 70–99)
HCT VFR BLD CALC: 29.3 % — LOW (ref 39–50)
HGB BLD-MCNC: 9.5 G/DL — LOW (ref 13–17)
MCHC RBC-ENTMCNC: 29.8 PG — SIGNIFICANT CHANGE UP (ref 27–34)
MCHC RBC-ENTMCNC: 32.4 GM/DL — SIGNIFICANT CHANGE UP (ref 32–36)
MCV RBC AUTO: 91.8 FL — SIGNIFICANT CHANGE UP (ref 80–100)
NRBC # BLD: 0 /100 WBCS — SIGNIFICANT CHANGE UP (ref 0–0)
PLATELET # BLD AUTO: 164 K/UL — SIGNIFICANT CHANGE UP (ref 150–400)
POTASSIUM SERPL-MCNC: 4.4 MMOL/L — SIGNIFICANT CHANGE UP (ref 3.5–5.3)
POTASSIUM SERPL-SCNC: 4.4 MMOL/L — SIGNIFICANT CHANGE UP (ref 3.5–5.3)
RBC # BLD: 3.19 M/UL — LOW (ref 4.2–5.8)
RBC # FLD: 16.1 % — HIGH (ref 10.3–14.5)
SODIUM SERPL-SCNC: 138 MMOL/L — SIGNIFICANT CHANGE UP (ref 135–145)
WBC # BLD: 10.32 K/UL — SIGNIFICANT CHANGE UP (ref 3.8–10.5)
WBC # FLD AUTO: 10.32 K/UL — SIGNIFICANT CHANGE UP (ref 3.8–10.5)

## 2024-09-10 PROCEDURE — 99024 POSTOP FOLLOW-UP VISIT: CPT

## 2024-09-10 PROCEDURE — 99231 SBSQ HOSP IP/OBS SF/LOW 25: CPT | Mod: 24

## 2024-09-10 PROCEDURE — 99233 SBSQ HOSP IP/OBS HIGH 50: CPT

## 2024-09-10 PROCEDURE — 99232 SBSQ HOSP IP/OBS MODERATE 35: CPT

## 2024-09-10 RX ADMIN — Medication 10 MILLIGRAM(S): at 22:19

## 2024-09-10 RX ADMIN — BUDESONIDE AND FORMOTEROL FUMARATE 2 PUFF(S): 80; 4.5 AEROSOL, METERED RESPIRATORY (INHALATION) at 05:29

## 2024-09-10 RX ADMIN — METOPROLOL TARTRATE 12.5 MILLIGRAM(S): 100 TABLET ORAL at 17:24

## 2024-09-10 RX ADMIN — IPRATROPIUM BROMIDE AND ALBUTEROL SULFATE 3 MILLILITER(S): .5; 3 SOLUTION RESPIRATORY (INHALATION) at 01:16

## 2024-09-10 RX ADMIN — IPRATROPIUM BROMIDE AND ALBUTEROL SULFATE 3 MILLILITER(S): .5; 3 SOLUTION RESPIRATORY (INHALATION) at 12:25

## 2024-09-10 RX ADMIN — Medication 100 MILLIGRAM(S): at 12:25

## 2024-09-10 RX ADMIN — BUDESONIDE AND FORMOTEROL FUMARATE 2 PUFF(S): 80; 4.5 AEROSOL, METERED RESPIRATORY (INHALATION) at 17:25

## 2024-09-10 RX ADMIN — METOPROLOL TARTRATE 12.5 MILLIGRAM(S): 100 TABLET ORAL at 05:29

## 2024-09-10 RX ADMIN — AMLODIPINE BESYLATE 5 MILLIGRAM(S): 10 TABLET ORAL at 05:29

## 2024-09-10 RX ADMIN — APIXABAN 5 MILLIGRAM(S): 5 TABLET, FILM COATED ORAL at 05:29

## 2024-09-10 RX ADMIN — IPRATROPIUM BROMIDE AND ALBUTEROL SULFATE 3 MILLILITER(S): .5; 3 SOLUTION RESPIRATORY (INHALATION) at 05:28

## 2024-09-10 RX ADMIN — Medication 1 TABLET(S): at 12:25

## 2024-09-10 RX ADMIN — LEVETIRACETAM 400 MILLIGRAM(S): 1000 TABLET ORAL at 05:29

## 2024-09-10 RX ADMIN — LEVETIRACETAM 400 MILLIGRAM(S): 1000 TABLET ORAL at 17:24

## 2024-09-10 RX ADMIN — IPRATROPIUM BROMIDE AND ALBUTEROL SULFATE 3 MILLILITER(S): .5; 3 SOLUTION RESPIRATORY (INHALATION) at 17:24

## 2024-09-10 RX ADMIN — Medication 40 MILLIGRAM(S): at 12:25

## 2024-09-10 RX ADMIN — CHLORHEXIDINE GLUCONATE 1 APPLICATION(S): 40 SOLUTION TOPICAL at 22:22

## 2024-09-10 RX ADMIN — TIOTROPIUM BROMIDE INHALATION SPRAY 2 PUFF(S): 3.12 SPRAY, METERED RESPIRATORY (INHALATION) at 12:26

## 2024-09-10 RX ADMIN — IPRATROPIUM BROMIDE AND ALBUTEROL SULFATE 3 MILLILITER(S): .5; 3 SOLUTION RESPIRATORY (INHALATION) at 23:41

## 2024-09-10 RX ADMIN — APIXABAN 5 MILLIGRAM(S): 5 TABLET, FILM COATED ORAL at 17:25

## 2024-09-10 RX ADMIN — Medication 1: at 06:23

## 2024-09-10 NOTE — PROGRESS NOTE ADULT - ASSESSMENT
ASSESSMENT AND PLAN: 79M hx recent admission for and R crani for lesion w/ Dr. 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 Today presents to ED for RUE doppler w/ DVT involving the subclavian, axillary and brachial veins and Superficial thrombus in the right basilic vein. Plts/coags wnl.     8/27 s/p PEG placement with GI (Dr. Savage)    NEURO:   - Continue neuro checks q 4  - Keppra for seizure prophylaxis  - Tylenol prn for pain control  - PT: Home PT w/ RW, PMR saw and recommended PEGGY    PULM:   - On room air, O2Sat>94%  - Incentive spirometry  - 8/16 CTA Chest: segmental emboli w/in right upper lobe pulm artery, no significant evidence of right heart strain; emphysema, new mucoid impaction likely COPD exacerbations - f/u CT Chest in 3 months  - Continue Duoneb, Symbicort, Spiriva     CV:  - -160, blood pressure better last night  - Continue Norvasc 5mg daily for HTN  - Continue Metoprolol 12.5mg BID for Afib  - Continue Lipitor for HLD  - 8/16 TTE: LV systolic fxn normal w/ EF 71%, normal LV diastolic fxn, normal RV systolic fxn normal, LV systolic fxn is normal    ENDO:   - A1c 6.3, continue Glucerna TF, fingersticks stable    HEME/ONC & DVT ppx:               - 9/10 CBC: H/H stable, last transfused with PRBC 9/4. Currently on Eliquis 5mg BID for PE on CTA Chest 8/16 and persistent DVT in right subclavian vein and non-occlusive right axillary vein, right basilic vein DVT and left subclavian and basilic DVT, UE Dopp 9/4. Vascular Cardiology following.     RENAL:   - IVL  - 9/10 BMP stable  - Voiding    ID:   - Afebrile currently, will monitor for fevers  - 9/10 Leukocytosis resolved  - ID following: patient found to have UTI (9/7 UCX >100K GNR / Klebsiella) and bacteremia (9/7 4/4 GNR / Klebsiella PNA), changed to Ceftriaxone 1G x 24hrs yesterday 9/9. Will f/u sensitivities to Ucx and f/u w/ ID regarding transitioning to oral abx,   - F/u repeat BCX sent on 9/8    GI:    - Currently has PEG placed by GI on 8/27, complicated by bleeding around the site that has since resolved, did require transfusions (last done 9/4) however H/H currently stable. GI did EGD 9/3 which shows unremarkable EGD, no signs of portal hypertension, suspect resolved bleed from PEG tract. Per CT patient may have some intraabdominal collaterals that were within the PEG tract.  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  - Continue Abdomina binder around PEG site  - Continue Protonix 40mg BID for GI ppx  - On Free Water 200q8 for hydration  - Dulcolax prn for bowel movements, standing BM meds discontinued for loose BM's, currently has Banatrol TF, last BM 9/9 and per RN has had no diarrhea overnight.     Appreciate Hospitalist following for co-medical management.     More than 30 minutes were spent educating the patient and wife regarding condition, medications, follow up plans, signs and symptoms to be concerned with, preparing paperwork, and questions answered regarding discharge.     DISCHARGE PLANNING:   PEGGY once patient is medically stable    Plan to be discussed w/ Dr. Branham  [id number]

## 2024-09-10 NOTE — PROGRESS NOTE ADULT - ASSESSMENT
80 y/o M PMhx recent admission for and R crani for lesion w/ Dr. 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 CTA demonstrated segmental emboli within right upper lobe pulmonary artery. On eliquis. Due to dysphagia and failed MBS, PEG tube was placed 8/27.  Hospital course c/b leukocytosis on 9/6 found to have klebsiella pneumoniae bacteremia.    Klebsiella pneumoniae bacteremia due to urinary source  Bcx with Kleb pneumoniae--sensitivities reviewed   UA positive, patient denies  symptoms but is a poor historian  Ucx also with Klebsiella pneumoniae   s/p cefepime 9/7-9/9    Recommendations:  Follow repeat Bcx - NGTD x2 (24h)  Continue on ceftriaxone 1g IV Q24h to complete total 10d course on 9/16  Pending Ucx sensitivities -- can likely change to ciprofloxacin 500mg PO BID to complete course   Continue rest of care per primary team       Caridad Song M.D.  \[facility] [place] [facility]\"", Division of Infectious Diseases  [phone]  After 5pm on weekdays and all day on weekends - please call [phone]  Available on [employer] TEAMS

## 2024-09-10 NOTE — PROGRESS NOTE ADULT - PROBLEM SELECTOR PLAN 1
Klebsiella growing from blood cx sent 9/6. Pansensitive- can transition to po abx if cleared by ID.     Possible source urine. Cx growing Klebsiella.

## 2024-09-10 NOTE — PROGRESS NOTE ADULT - TIME BILLING
Alert and oriented to person, place, time/situation. normal mood and affect. no apparent risk to self or others. Chart review, exam, documentation, d/w pt, wife, Nsx

## 2024-09-10 NOTE — PROGRESS NOTE ADULT - SUBJECTIVE AND OBJECTIVE BOX
SUBJECTIVE: HPI: 79M hx recent admission for and R crani for lesion w/ Dr. 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 Today presents to ED for RUE doppler w/ DVT involving the subclavian, axillary and brachial veins and Superficial thrombus in the right basilic vein. Plts/coags wnl.       OVERNIGHT EVENTS: No acute events overnight, patient seen and evaluated at bedside, doing well - remains on antibiotics for bacteremia and UTI, afebrile.     Vital Signs Last 24 Hrs  T(C): 37.7 (10 Sep 2024 08:31), Max: 37.7 (10 Sep 2024 08:31)  T(F): 99.8 (10 Sep 2024 08:31), Max: 99.8 (10 Sep 2024 08:31)  HR: 86 (10 Sep 2024 08:31) (85 - 93)  BP: 141/74 (10 Sep 2024 08:31) (132/76 - 166/79)  BP(mean): --  RR: 18 (10 Sep 2024 08:31) (18 - 18)  SpO2: 97% (10 Sep 2024 08:31) (94% - 99%)    Parameters below as of 10 Sep 2024 08:31  Patient On (Oxygen Delivery Method): room air        PHYSICAL EXAM:    General: No Acute Distress     Neurological: Awake, Ox2-3 (name, place, month / initially got year wrong but was able to self correct), PERRL, EOMI, following commands, uppers 5/5, no drift, lowers 5/5    Pulmonary: Clear to Auscultation, No Rales, No Rhonchi, No Wheezes     Cardiovascular: S1, S2, Regular Rate and Rhythm     Gastrointestinal: Soft, Nontender, Nondistended, PEG in place w/ abdominal binder C/D/I    Incision: right craniotomy incision has absorbable sutures in place, healing well    LABS:                        9.5    10.32 )-----------( 164      ( 10 Sep 2024 06:20 )             29.3    09-10    138  |  103  |  20  ----------------------------<  144<H>  4.4   |  24  |  0.72    Ca    8.7      10 Sep 2024 06:19  Phos  3.4     09-09  Mg     2.0     09-09 09-09 @ 07:01  -  09-10 @ 07:00  --------------------------------------------------------  IN: 2120 mL / OUT: 750 mL / NET: 1370 mL      DRAINS: None    MEDICATIONS:  Antibiotics:  cefTRIAXone   IVPB 1000 milliGRAM(s) IV Intermittent every 24 hours    Neuro:  acetaminophen     Tablet .. 650 milliGRAM(s) Oral every 6 hours PRN Mild Pain (1 - 3)  levETIRAcetam  IVPB 250 milliGRAM(s) IV Intermittent every 12 hours    Cardiac:  amLODIPine   Tablet 5 milliGRAM(s) Oral daily  metoprolol tartrate 12.5 milliGRAM(s) Oral every 12 hours    Pulm:  albuterol/ipratropium for Nebulization 3 milliLiter(s) Nebulizer every 6 hours  budesonide 160 MICROgram(s)/formoterol 4.5 MICROgram(s) Inhaler 2 Puff(s) Inhalation two times a day  tiotropium 2.5 MICROgram(s) Inhaler 2 Puff(s) Inhalation daily    GI/:  bisacodyl 5 milliGRAM(s) Oral daily PRN Constipation  pantoprazole   Suspension 40 milliGRAM(s) Oral daily    Other:   apixaban 5 milliGRAM(s) Oral every 12 hours  atorvastatin 10 milliGRAM(s) Oral at bedtime  chlorhexidine 2% Cloths 1 Application(s) Topical daily  dextrose 5%. 1000 milliLiter(s) IV Continuous <Continuous>  dextrose 5%. 1000 milliLiter(s) IV Continuous <Continuous>  dextrose 50% Injectable 25 Gram(s) IV Push once  dextrose 50% Injectable 12.5 Gram(s) IV Push once  dextrose 50% Injectable 25 Gram(s) IV Push once  dextrose Oral Gel 15 Gram(s) Oral once PRN Blood Glucose LESS THAN 70 milliGRAM(s)/deciliter  glucagon  Injectable 1 milliGRAM(s) IntraMuscular once  insulin lispro (ADMELOG) corrective regimen sliding scale   SubCutaneous every 6 hours  Nephro-corine 1 Tablet(s) Oral daily    DIET: [] Regular [] CCD [] Renal [] Puree [] Dysphagia [x] Tube Feeds: via PEG Glucerna    IMAGING:   < from: CT Head No Cont (09.05.24 @ 04:52) >  IMPRESSION:  No acute intracranial hemorrhage or mass effect. Grossly stable   postsurgical change. If clinical symptoms persist or worsen, more   sensitive evaluation with brain MRI may be obtained, if no   contraindications exist.    --- End of Report ---      MARELY FORBES MD; Attending Radiologist  This document has been electronically signed. Sep  5 2024  7:12AM    < end of copied text >    < from: [facility] Duplex Upper Ext Vein Scan, Bilat (09.04.24 @ 14:46) >  IMPRESSION:  Stable persistent clot in the right subclavian and axillary veins.  Thrombus around left subclavian vein line , new.  Left basilic vein thrombosis.      --- End of Report ---      BALJINDER URBINA MD; Attending Radiologist  This document has been electronically signed. Sep  4 2024  5:34PM    < end of copied text >

## 2024-09-10 NOTE — PROGRESS NOTE ADULT - ASSESSMENT
78 yo male with PMH of HTN, T2DM (diet controlled), HLD, COPD, CKD 3, carcinoid tumor s/p hemicolectomy, nasopharyngeal carcinoma s/p chemo/RT (c/b dysphagia ), IPMN s/p distal pancreatectomy (2018), and paroxysmal Afib on Eliquis s/p craniotomy 8/6 for resection of hemorrhagic lesion. Now presented to the ED for RUE swelling, found to have RUE DVT involving subclavian, axillary, and brachial veins and PE.    Pt was known to have cough w/ PO previously and was evaluated w/ OP MBS earlier this year - using thickened fluids at home   Post op course further complicated by Dysphagia - failed multiple SLP evaluations with recs: NPO   Pt and family are now agreeable to PEG placement for which GI is consulted    #Dysphagia -sp EGD + PEG placement 8/27/24 c/b PEG site bleeding  suspect PEG tract bleeding. Per CT patient may have some intraabdominal collaterals that were within the PEG tract.    8/28 scant dried blood under PEG bumper; no active bleeding, induration, swelling or edema  PEG bumper between 3.5 and 4 cm at skin level  8/29 scant oozing, slight downtrend in Hgb. Negative PEG lavage. Surgicell placed at PEG tract  8/29 PM - recurrent PEG site bleeding; Surgicell intact. Resolved without further intervention. Hgb stable 6pm (9.4 ->9.8)  8/30 AM minimal ooze at PEG site, no active bleeding. Re-assessed at 12:40pm- Notified by Medicine attending of AM Hgb 9.8 ->8.5. No melena +PEG lavage: pink tinged return with water/feeds. Previously placed surgicell removed, minimal oozing noted at ends of PEG site incision - new Surgicell strip placed at PEG incision under PEG bumper, lightly tucked into ends (medical and lateral aspects of PEG site incision)   No visible oozing, surgicell noted to be clean.  Hgb 8.3 1 unit PRBCs transfused  8/31 CT: no e/o GI bleed, gastric varices along fundus and body and gastroepiploic collateral vessels, chronic thrombosis of splenic vein, gastrostomy tube in place, LLL pulm nodule, tree in bud opacities RML and LLL  9/1/24 transfuse additional 1 unit PRBCs  9/2/24 negative PEG lavage  9/3/24 EGD: normal esophagus, normal stomach. small patch of gastritis at incisura (non bleeding) PEG tract without bleeding, no ulcer or stigmata of bleeding. Duodenum WNL  PEG bumper placed at 4.5 cm. Surgicell still in place  Impression:  Unremarkable EGD, no signs of portal hypertension. SUspect resolved bleed from PEG tract. Per CT patient may have some intraabdominal collaterals that were within the PEG tract.  9/4/24 dark BM (suspect passage of "old"/retained blood from GI tract). s/p RRT ?vasovagal - transfer to NSCU for monitoring 1 unit PRBCs transfused Hgb 11 ->13. dark green stool on exam  9/5/24 negative PEG lavage  #remote history of head/Neck CA sp Chemo + XRT  #sp hemicolectomy  #sp partial pancreatectomy  #DVT+PE   AC held x 24 hours for PEG  AC (Lovenox) restarted 8/28 pm.   8/29 AM scant PEG site oozing, sl Hgb downtrend. Topical surgicell applied at PEG site. 8/29 PM CBC stable  8/30 PM AC held 2/2 PEG site bleeding (see above)   9/4/24 Duplex: Stable persistent clot in the right subclavian and axillary veins. Thrombus around left subclavian vein line , new. Left basilic vein thrombosis.  9/5 no recurrent PEG site bleeding. Surgicell noted to be off PEG incision site and wiped away by clinician. Negative PEG lavage  9/6 Eliquis restarted  #UTI w/ bacteremia - on IV Abx    RECS:    -PEG feeds; monitor tolerance  -monitor CBC and BMs   -NO GI objection to AC and monitor clinically  -nothing under PEG bumper please and Abdominal binder on at all times please to prevent accidental PEG trauma/tugging/dislodgement  -continue PPI daily (via PEG)    Discussed with pt at bedside; all questions answered  Discussed with neurosurgery team  Further care per primary team    Avtar Mixon PA-C  [facility] Non Teaching GI Service  Available on TEAMS Mon-Fri 8a-4p  After hours and weekend coverage [phone]

## 2024-09-10 NOTE — PROGRESS NOTE ADULT - SUBJECTIVE AND OBJECTIVE BOX
Patient is a 79y old  Male who presents with a chief complaint of post-operative DVT (10 Sep 2024 12:08)      SUBJECTIVE / OVERNIGHT EVENTS: Multiple BMs    MEDICATIONS  (STANDING):  albuterol/ipratropium for Nebulization 3 milliLiter(s) Nebulizer every 6 hours  amLODIPine   Tablet 5 milliGRAM(s) Oral daily  apixaban 5 milliGRAM(s) Oral every 12 hours  atorvastatin 10 milliGRAM(s) Oral at bedtime  budesonide 160 MICROgram(s)/formoterol 4.5 MICROgram(s) Inhaler 2 Puff(s) Inhalation two times a day  cefTRIAXone   IVPB 1000 milliGRAM(s) IV Intermittent every 24 hours  chlorhexidine 2% Cloths 1 Application(s) Topical daily  dextrose 5%. 1000 milliLiter(s) (50 mL/Hr) IV Continuous <Continuous>  dextrose 5%. 1000 milliLiter(s) (100 mL/Hr) IV Continuous <Continuous>  dextrose 50% Injectable 25 Gram(s) IV Push once  dextrose 50% Injectable 12.5 Gram(s) IV Push once  dextrose 50% Injectable 25 Gram(s) IV Push once  glucagon  Injectable 1 milliGRAM(s) IntraMuscular once  insulin lispro (ADMELOG) corrective regimen sliding scale   SubCutaneous every 6 hours  levETIRAcetam  IVPB 250 milliGRAM(s) IV Intermittent every 12 hours  metoprolol tartrate 12.5 milliGRAM(s) Oral every 12 hours  Nephro-corine 1 Tablet(s) Oral daily  pantoprazole   Suspension 40 milliGRAM(s) Oral daily  tiotropium 2.5 MICROgram(s) Inhaler 2 Puff(s) Inhalation daily    MEDICATIONS  (PRN):  acetaminophen     Tablet .. 650 milliGRAM(s) Oral every 6 hours PRN Mild Pain (1 - 3)  bisacodyl 5 milliGRAM(s) Oral daily PRN Constipation  dextrose Oral Gel 15 Gram(s) Oral once PRN Blood Glucose LESS THAN 70 milliGRAM(s)/deciliter      CAPILLARY BLOOD GLUCOSE      POCT Blood Glucose.: 127 mg/dL (10 Sep 2024 11:57)  POCT Blood Glucose.: 151 mg/dL (10 Sep 2024 05:54)  POCT Blood Glucose.: 136 mg/dL (10 Sep 2024 00:44)  POCT Blood Glucose.: 127 mg/dL (09 Sep 2024 17:21)    I&O's Summary    09 Sep 2024 07:01  -  10 Sep 2024 07:00  --------------------------------------------------------  IN: 2120 mL / OUT: 750 mL / NET: 1370 mL        PHYSICAL EXAM:  T(C): 36.6 (09-10-24 @ 13:09), Max: 37.7 (09-10-24 @ 08:31)  HR: 92 (09-10-24 @ 13:09) (85 - 92)  BP: 147/76 (09-10-24 @ 13:09) (132/90 - 166/79)  RR: 18 (09-10-24 @ 13:09) (18 - 18)  SpO2: 95% (09-10-24 @ 13:09) (94% - 99%)    In bed, awake, RRR, abdomen soft, PEG, good air entry anteriorly, moving all extremities    LABS:                        9.5    10.32 )-----------( 164      ( 10 Sep 2024 06:20 )             29.3     09-10    138  |  103  |  20  ----------------------------<  144<H>  4.4   |  24  |  0.72    Ca    8.7      10 Sep 2024 06:19  Phos  3.4     09-09  Mg     2.0     09-09            Urinalysis Basic - ( 10 Sep 2024 06:19 )    Color: x / Appearance: x / SG: x / pH: x  Gluc: 144 mg/dL / Ketone: x  / Bili: x / Urobili: x   Blood: x / Protein: x / Nitrite: x   Leuk Esterase: x / RBC: x / WBC x   Sq Epi: x / Non Sq Epi: x / Bacteria: x        RADIOLOGY & ADDITIONAL TESTS:    Imaging Personally Reviewed:    Consultant(s) Notes Reviewed:      Care Discussed with Consultants/Other Providers: Nsx

## 2024-09-10 NOTE — PROGRESS NOTE ADULT - SUBJECTIVE AND OBJECTIVE BOX
INTERVAL HPI/OVERNIGHT EVENTS:  feeling well  no issues with PEG, no PEG site bleeding  tolerating feeds  no melena or rectal bleeding +soft BM 9/9   no transfusion requirement  no fever or chills    MEDICATIONS  (STANDING):  albuterol/ipratropium for Nebulization 3 milliLiter(s) Nebulizer every 6 hours  amLODIPine   Tablet 5 milliGRAM(s) Oral daily  apixaban 5 milliGRAM(s) Oral every 12 hours  atorvastatin 10 milliGRAM(s) Oral at bedtime  budesonide 160 MICROgram(s)/formoterol 4.5 MICROgram(s) Inhaler 2 Puff(s) Inhalation two times a day  cefTRIAXone   IVPB 1000 milliGRAM(s) IV Intermittent every 24 hours  chlorhexidine 2% Cloths 1 Application(s) Topical daily  dextrose 5%. 1000 milliLiter(s) (50 mL/Hr) IV Continuous <Continuous>  dextrose 5%. 1000 milliLiter(s) (100 mL/Hr) IV Continuous <Continuous>  dextrose 50% Injectable 25 Gram(s) IV Push once  dextrose 50% Injectable 12.5 Gram(s) IV Push once  dextrose 50% Injectable 25 Gram(s) IV Push once  glucagon  Injectable 1 milliGRAM(s) IntraMuscular once  insulin lispro (ADMELOG) corrective regimen sliding scale   SubCutaneous every 6 hours  levETIRAcetam  IVPB 250 milliGRAM(s) IV Intermittent every 12 hours  metoprolol tartrate 12.5 milliGRAM(s) Oral every 12 hours  Nephro-corine 1 Tablet(s) Oral daily  pantoprazole   Suspension 40 milliGRAM(s) Oral daily  tiotropium 2.5 MICROgram(s) Inhaler 2 Puff(s) Inhalation daily    MEDICATIONS  (PRN):  acetaminophen     Tablet .. 650 milliGRAM(s) Oral every 6 hours PRN Mild Pain (1 - 3)  bisacodyl 5 milliGRAM(s) Oral daily PRN Constipation  dextrose Oral Gel 15 Gram(s) Oral once PRN Blood Glucose LESS THAN 70 milliGRAM(s)/deciliter      Allergies  penicillin (Other)  ACE inhibitors (Other)  Bee Stings- anaphylaxis (Other)      Review of Systems:  see HPI- remainder 10 point ROS negative    Vital Signs Last 24 Hrs  T(C): 37.7 (10 Sep 2024 08:31), Max: 37.7 (10 Sep 2024 08:31)  T(F): 99.8 (10 Sep 2024 08:31), Max: 99.8 (10 Sep 2024 08:31)  HR: 86 (10 Sep 2024 08:31) (85 - 93)  BP: 141/74 (10 Sep 2024 08:31) (132/76 - 166/79)  BP(mean): --  RR: 18 (10 Sep 2024 08:31) (18 - 18)  SpO2: 97% (10 Sep 2024 08:31) (94% - 99%)    Parameters below as of 10 Sep 2024 08:31  Patient On (Oxygen Delivery Method): room air    PHYSICAL EXAM:    GENERAL:: thin AA male awake, alert smiling and  appears comfortable.   NECK: No JVD  RESP:: occ rhonchi, no inc WOB  CV:: S1 and S2, RRR  GI:: BS+, soft, thin ND NT Abdominal binder opened - PEG bumper at 4.5 cm bhargavi at top of bumper, no bleeding. gauze under bumper (removed by clinician)- lightly touching skin and spins easily 360 degrees. +midline abdominal scar (from mid epigastric region to pubis) and small scar at lateral LUQ, WH LLQ scar c/w appy.  ABD pad placed OVER PEG and abdominal binder resecured  MUSCULOSKELETAL: No clubbing or cyanosis of digits; no joint swelling or tenderness to palpation  PSYCH: A+O to person, place, and time; affect appropriate  NEUROLOGY: CN 2-12 are intact and symmetric; no gross sensory deficits, grossly 5/5 strength throughout  SKIN: No rashes; no palpable lesions, +crani site c/d/i    LABS:                        9.5    10.32 )-----------( 164      ( 10 Sep 2024 06:20 )             29.3   Hemoglobin: 9.9 g/dL (09.09.24 @ 06:42)     09-10    138  |  103  |  20  ----------------------------<  144<H>  4.4   |  24  |  0.72    Ca    8.7      10 Sep 2024 06:19  Phos  3.4     09-09  Mg     2.0     09-09        Urinalysis Basic - ( 10 Sep 2024 06:19 )    Color: x / Appearance: x / SG: x / pH: x  Gluc: 144 mg/dL / Ketone: x  / Bili: x / Urobili: x   Blood: x / Protein: x / Nitrite: x   Leuk Esterase: x / RBC: x / WBC x   Sq Epi: x / Non Sq Epi: x / Bacteria: x          RADIOLOGY & ADDITIONAL TESTS:

## 2024-09-10 NOTE — PROGRESS NOTE ADULT - SUBJECTIVE AND OBJECTIVE BOX
Pt today alert and awake, afebrile, conversant, GARNER.  Wound c/d/i.  PT with WBC 10 after treatment for Klebsiella bacteremia and UTI.  Continue monitoring.  Pt pending rehab placement.  On Eliquis for DVT and intermittent afib.

## 2024-09-10 NOTE — PROGRESS NOTE ADULT - SUBJECTIVE AND OBJECTIVE BOX
[facility]  LIANA Solis Y. Patel, S. Shah, G. Kavin  [phone]  ([phone] - weekdays after 5pm and weekends)    Name: RADHA HOPE  Age/Gender: 79y Male  MRN: [id number]    Interval History:  Patient seen and examined this morning.   No new complaints noted.  Notes reviewed  No concerning overnight events  Afebrile     Allergies: penicillin (Other)  ACE inhibitors (Other)  Bee Stings- anaphylaxis (Other)      Objective:  Vitals:   T(F): 99.8 (09-10-24 @ 08:31), Max: 99.8 (09-10-24 @ 08:31)  HR: 86 (09-10-24 @ 08:31) (85 - 93)  BP: 141/74 (09-10-24 @ 08:31) (132/76 - 166/79)  RR: 18 (09-10-24 @ 08:31) (18 - 18)  SpO2: 97% (09-10-24 @ 08:31) (94% - 99%)  Physical Examination:  General: no acute distress  HEENT: NC/AT, anicteric, EOMI  Respiratory: no acc muscle use, breathing comfortably  Cardiovascular: S1 and S2 present  Gastrointestinal: normal appearing, nondistended  Extremities: no edema, no cyanosis  Skin: no visible rash    Laboratory Studies:  CBC:                       9.5    10.32 )-----------( 164      ( 10 Sep 2024 06:20 )             29.3     WBC Trend:  10.32 09-10-24 @ 06:20  9.48 09-09-24 @ 06:42  12.86 09-08-24 @ 06:22  16.29 09-07-24 @ 04:50  24.53 09-06-24 @ 16:18  9.63 09-06-24 @ 03:53  8.68 09-05-24 @ 15:25  8.74 09-05-24 @ 05:53  8.24 09-05-24 @ 02:41  5.29 09-04-24 @ 20:43  6.09 09-04-24 @ 17:40  5.38 09-04-24 @ 09:02    CMP: 09-10    138  |  103  |  20  ----------------------------<  144<H>  4.4   |  24  |  0.72    Ca    8.7      10 Sep 2024 06:19  Phos  3.4     09-09  Mg     2.0     09-09      Creatinine: 0.72 mg/dL (09-10-24 @ 06:19)  Creatinine: 0.68 mg/dL (09-09-24 @ 06:46)  Creatinine: 1.12 mg/dL (09-07-24 @ 04:50)  Creatinine: 1.11 mg/dL (09-06-24 @ 03:53)  Creatinine: 0.95 mg/dL (09-05-24 @ 12:47)  Creatinine: 1.18 mg/dL (09-05-24 @ 02:41)  Creatinine: 0.81 mg/dL (09-04-24 @ 21:15)  Creatinine: 0.88 mg/dL (09-04-24 @ 20:43)  Creatinine: 0.81 mg/dL (09-04-24 @ 09:02)    Microbiology: reviewed   Culture - Blood (collected 09-08-24 @ 04:40)  Source: .Blood Blood  Preliminary Report (09-09-24 @ 12:10):    No growth at 24 hours    Culture - Blood (collected 09-08-24 @ 04:32)  Source: .Blood Blood  Preliminary Report (09-09-24 @ 12:10):    No growth at 24 hours    Culture - Urine (collected 09-07-24 @ 01:05)  Source: Clean Catch Clean Catch (Midstream)  Preliminary Report (09-09-24 @ 15:32):    >100,000 CFU/ml Klebsiella pneumoniae    Culture - Blood (collected 09-06-24 @ 18:50)  Source: .Blood Blood  Gram Stain (09-07-24 @ 08:04):    Growth in aerobic bottle: Gram Negative Rods    Growth in anaerobic bottle: Gram Negative Rods  Final Report (09-08-24 @ 14:43):    Growth in aerobic and anaerobic bottles: Klebsiella pneumoniae    Direct identification is available within approximately 3-5    hours either by Blood Panel Multiplexed PCR or Direct    MALDI-TOF. Details: [email or web address]  Organism: Blood Culture PCR  Klebsiella pneumoniae (09-08-24 @ 14:43)  Organism: Klebsiella pneumoniae (09-08-24 @ 14:43)      Method Type: MITCHELL      -  Ampicillin: R >16 These ampicillin results predict results for amoxicillin      -  Ampicillin/Sulbactam: S <=4/2      -  Aztreonam: S <=4      -  Cefazolin: S <=2      -  Cefepime: S <=2      -  Cefoxitin: S <=8      -  Ceftriaxone: S <=1      -  Ciprofloxacin: S <=0.25      -  Ertapenem: S <=0.5      -  Gentamicin: S <=2      -  Imipenem: S <=1      -  Levofloxacin: S <=0.5      -  Meropenem: S <=1      -  Piperacillin/Tazobactam: S <=8      -  Tobramycin: S <=2      -  Trimethoprim/Sulfamethoxazole: S <=0.5/9.5  Organism: Blood Culture PCR (09-08-24 @ 14:43)      Method Type: PCR      -  K. pneumoniae group: Detec (K. pneumoniae, K. quasipneumoniae, K. variicola)    Culture - Blood (collected 09-06-24 @ 18:00)  Source: .Blood Blood  Gram Stain (09-07-24 @ 08:15):    Growth in aerobic bottle: Gram Negative Rods    Growth in anaerobic bottle: Gram Negative Rods  Final Report (09-08-24 @ 14:44):    Growth in aerobic and anaerobic bottles: Klebsiella pneumoniae    See previous culture [id number]    Radiology: reviewed     Medications:  acetaminophen     Tablet .. 650 milliGRAM(s) Oral every 6 hours PRN  albuterol/ipratropium for Nebulization 3 milliLiter(s) Nebulizer every 6 hours  amLODIPine   Tablet 5 milliGRAM(s) Oral daily  apixaban 5 milliGRAM(s) Oral every 12 hours  atorvastatin 10 milliGRAM(s) Oral at bedtime  bisacodyl 5 milliGRAM(s) Oral daily PRN  budesonide 160 MICROgram(s)/formoterol 4.5 MICROgram(s) Inhaler 2 Puff(s) Inhalation two times a day  cefTRIAXone   IVPB 1000 milliGRAM(s) IV Intermittent every 24 hours  chlorhexidine 2% Cloths 1 Application(s) Topical daily  dextrose 5%. 1000 milliLiter(s) IV Continuous <Continuous>  dextrose 5%. 1000 milliLiter(s) IV Continuous <Continuous>  dextrose 50% Injectable 12.5 Gram(s) IV Push once  dextrose 50% Injectable 25 Gram(s) IV Push once  dextrose 50% Injectable 25 Gram(s) IV Push once  dextrose Oral Gel 15 Gram(s) Oral once PRN  glucagon  Injectable 1 milliGRAM(s) IntraMuscular once  insulin lispro (ADMELOG) corrective regimen sliding scale   SubCutaneous every 6 hours  levETIRAcetam  IVPB 250 milliGRAM(s) IV Intermittent every 12 hours  metoprolol tartrate 12.5 milliGRAM(s) Oral every 12 hours  Nephro-corine 1 Tablet(s) Oral daily  pantoprazole   Suspension 40 milliGRAM(s) Oral daily  tiotropium 2.5 MICROgram(s) Inhaler 2 Puff(s) Inhalation daily    Current Antimicrobials:  cefTRIAXone   IVPB 1000 milliGRAM(s) IV Intermittent every 24 hours    Prior/Completed Antimicrobials:  vancomycin  IVPB

## 2024-09-10 NOTE — PROGRESS NOTE ADULT - ASSESSMENT
80 yo male with PMH of HTN, T2DM (diet controlled), HLD, COPD, CKD 3, carcinoid tumor s/p hemicolectomy, nasopharyngeal carcinoma s/p chemo/RT (c/b dysphagia ), IPMN s/p distal pancreatectomy (2018), and paroxysmal Afib on Eliquis, s/p craniotomy 8/6 for resection of hemorrhagic lesion presented to the ED for RUE swelling, found to have RUE DVT involving subclavian, axillary, and brachial veins and PE.     Course also c/b dysphagia- s/p PEG 8/27, c/b bleeding around PEG site and melena- now s/p EGD with no evidence of active bleed.     Course c/b likely vasovagal syncope while on toilet and transferred to NSCU for closer monitoring. No evidence of active bleed. Transferred back to floors on 9/6.    Now w Klebsiella bacteremia. Currently in bed, awake, having multiple BMs.

## 2024-09-11 ENCOUNTER — NON-APPOINTMENT (OUTPATIENT)
Age: 79
End: 2024-09-11

## 2024-09-11 VITALS
OXYGEN SATURATION: 98 % | SYSTOLIC BLOOD PRESSURE: 143 MMHG | DIASTOLIC BLOOD PRESSURE: 83 MMHG | TEMPERATURE: 98 F | HEART RATE: 86 BPM | RESPIRATION RATE: 18 BRPM

## 2024-09-11 LAB
-  AMOXICILLIN/CLAVULANIC ACID: SIGNIFICANT CHANGE UP
-  AMPICILLIN/SULBACTAM: SIGNIFICANT CHANGE UP
-  AMPICILLIN: SIGNIFICANT CHANGE UP
-  AZTREONAM: SIGNIFICANT CHANGE UP
-  CEFAZOLIN: SIGNIFICANT CHANGE UP
-  CEFEPIME: SIGNIFICANT CHANGE UP
-  CEFOXITIN: SIGNIFICANT CHANGE UP
-  CEFTRIAXONE: SIGNIFICANT CHANGE UP
-  CEFUROXIME: SIGNIFICANT CHANGE UP
-  CIPROFLOXACIN: SIGNIFICANT CHANGE UP
-  ERTAPENEM: SIGNIFICANT CHANGE UP
-  GENTAMICIN: SIGNIFICANT CHANGE UP
-  IMIPENEM: SIGNIFICANT CHANGE UP
-  LEVOFLOXACIN: SIGNIFICANT CHANGE UP
-  MEROPENEM: SIGNIFICANT CHANGE UP
-  NITROFURANTOIN: SIGNIFICANT CHANGE UP
-  PIPERACILLIN/TAZOBACTAM: SIGNIFICANT CHANGE UP
-  TOBRAMYCIN: SIGNIFICANT CHANGE UP
-  TRIMETHOPRIM/SULFAMETHOXAZOLE: SIGNIFICANT CHANGE UP
CULTURE RESULTS: ABNORMAL
GLUCOSE BLDC GLUCOMTR-MCNC: 123 MG/DL — HIGH (ref 70–99)
GLUCOSE BLDC GLUCOMTR-MCNC: 150 MG/DL — HIGH (ref 70–99)
METHOD TYPE: SIGNIFICANT CHANGE UP
ORGANISM # SPEC MICROSCOPIC CNT: ABNORMAL
ORGANISM # SPEC MICROSCOPIC CNT: ABNORMAL
SPECIMEN SOURCE: SIGNIFICANT CHANGE UP

## 2024-09-11 PROCEDURE — 85610 PROTHROMBIN TIME: CPT

## 2024-09-11 PROCEDURE — 82435 ASSAY OF BLOOD CHLORIDE: CPT

## 2024-09-11 PROCEDURE — 83605 ASSAY OF LACTIC ACID: CPT

## 2024-09-11 PROCEDURE — 82947 ASSAY GLUCOSE BLOOD QUANT: CPT

## 2024-09-11 PROCEDURE — C1769: CPT

## 2024-09-11 PROCEDURE — 99232 SBSQ HOSP IP/OBS MODERATE 35: CPT

## 2024-09-11 PROCEDURE — 82330 ASSAY OF CALCIUM: CPT

## 2024-09-11 PROCEDURE — 97116 GAIT TRAINING THERAPY: CPT

## 2024-09-11 PROCEDURE — 84132 ASSAY OF SERUM POTASSIUM: CPT

## 2024-09-11 PROCEDURE — C1713: CPT

## 2024-09-11 PROCEDURE — 85014 HEMATOCRIT: CPT

## 2024-09-11 PROCEDURE — 70553 MRI BRAIN STEM W/O & W/DYE: CPT | Mod: MC

## 2024-09-11 PROCEDURE — C1889: CPT

## 2024-09-11 PROCEDURE — 88307 TISSUE EXAM BY PATHOLOGIST: CPT

## 2024-09-11 PROCEDURE — 85018 HEMOGLOBIN: CPT

## 2024-09-11 PROCEDURE — A9585: CPT

## 2024-09-11 PROCEDURE — 71045 X-RAY EXAM CHEST 1 VIEW: CPT

## 2024-09-11 PROCEDURE — 94640 AIRWAY INHALATION TREATMENT: CPT

## 2024-09-11 PROCEDURE — 85027 COMPLETE CBC AUTOMATED: CPT

## 2024-09-11 PROCEDURE — 97161 PT EVAL LOW COMPLEX 20 MIN: CPT

## 2024-09-11 PROCEDURE — 93970 EXTREMITY STUDY: CPT

## 2024-09-11 PROCEDURE — 85730 THROMBOPLASTIN TIME PARTIAL: CPT

## 2024-09-11 PROCEDURE — 88333 PATH CONSLTJ SURG CYTO XM 1: CPT

## 2024-09-11 PROCEDURE — 80048 BASIC METABOLIC PNL TOTAL CA: CPT

## 2024-09-11 PROCEDURE — 88331 PATH CONSLTJ SURG 1 BLK 1SPC: CPT

## 2024-09-11 PROCEDURE — 70450 CT HEAD/BRAIN W/O DYE: CPT | Mod: MC

## 2024-09-11 PROCEDURE — 97535 SELF CARE MNGMENT TRAINING: CPT

## 2024-09-11 PROCEDURE — 84295 ASSAY OF SERUM SODIUM: CPT

## 2024-09-11 PROCEDURE — 83735 ASSAY OF MAGNESIUM: CPT

## 2024-09-11 PROCEDURE — 97166 OT EVAL MOD COMPLEX 45 MIN: CPT

## 2024-09-11 PROCEDURE — C9399: CPT

## 2024-09-11 PROCEDURE — 97530 THERAPEUTIC ACTIVITIES: CPT

## 2024-09-11 PROCEDURE — 82803 BLOOD GASES ANY COMBINATION: CPT

## 2024-09-11 PROCEDURE — 82962 GLUCOSE BLOOD TEST: CPT

## 2024-09-11 PROCEDURE — 97110 THERAPEUTIC EXERCISES: CPT

## 2024-09-11 PROCEDURE — 84100 ASSAY OF PHOSPHORUS: CPT

## 2024-09-11 RX ORDER — LEVETIRACETAM 1000 MG/1
25 TABLET ORAL
Qty: 0 | Refills: 0 | DISCHARGE
Start: 2024-09-11

## 2024-09-11 RX ORDER — AMLODIPINE BESYLATE 10 MG/1
1 TABLET ORAL
Qty: 0 | Refills: 0 | DISCHARGE
Start: 2024-09-11

## 2024-09-11 RX ORDER — ACETAMINOPHEN 325 MG/1
2 TABLET ORAL
Qty: 0 | Refills: 0 | DISCHARGE
Start: 2024-09-11

## 2024-09-11 RX ORDER — BUDESONIDE AND FORMOTEROL FUMARATE 80; 4.5 UG/1; UG/1
2 AEROSOL, METERED RESPIRATORY (INHALATION)
Qty: 0 | Refills: 0 | DISCHARGE

## 2024-09-11 RX ORDER — APIXABAN 5 MG/1
1 TABLET, FILM COATED ORAL
Qty: 0 | Refills: 0 | DISCHARGE
Start: 2024-09-11

## 2024-09-11 RX ORDER — PANTOPRAZOLE SODIUM 40 MG
1 TABLET, DELAYED RELEASE (ENTERIC COATED) ORAL
Qty: 14 | Refills: 0
Start: 2024-09-11

## 2024-09-11 RX ORDER — PSYLLIUM HUSK 3.4 G/6.5G
3.4 GRANULE ORAL
Qty: 0 | Refills: 0 | DISCHARGE

## 2024-09-11 RX ORDER — TIOTROPIUM BROMIDE INHALATION SPRAY 3.12 UG/1
2 SPRAY, METERED RESPIRATORY (INHALATION)
Qty: 0 | Refills: 0 | DISCHARGE

## 2024-09-11 RX ADMIN — METOPROLOL TARTRATE 12.5 MILLIGRAM(S): 100 TABLET ORAL at 05:23

## 2024-09-11 RX ADMIN — IPRATROPIUM BROMIDE AND ALBUTEROL SULFATE 3 MILLILITER(S): .5; 3 SOLUTION RESPIRATORY (INHALATION) at 05:22

## 2024-09-11 RX ADMIN — IPRATROPIUM BROMIDE AND ALBUTEROL SULFATE 3 MILLILITER(S): .5; 3 SOLUTION RESPIRATORY (INHALATION) at 12:48

## 2024-09-11 RX ADMIN — Medication 1 TABLET(S): at 12:49

## 2024-09-11 RX ADMIN — APIXABAN 5 MILLIGRAM(S): 5 TABLET, FILM COATED ORAL at 05:23

## 2024-09-11 RX ADMIN — AMLODIPINE BESYLATE 5 MILLIGRAM(S): 10 TABLET ORAL at 05:23

## 2024-09-11 RX ADMIN — Medication 40 MILLIGRAM(S): at 12:48

## 2024-09-11 RX ADMIN — BUDESONIDE AND FORMOTEROL FUMARATE 2 PUFF(S): 80; 4.5 AEROSOL, METERED RESPIRATORY (INHALATION) at 05:23

## 2024-09-11 RX ADMIN — LEVETIRACETAM 400 MILLIGRAM(S): 1000 TABLET ORAL at 05:23

## 2024-09-11 RX ADMIN — TIOTROPIUM BROMIDE INHALATION SPRAY 2 PUFF(S): 3.12 SPRAY, METERED RESPIRATORY (INHALATION) at 12:48

## 2024-09-11 NOTE — PROGRESS NOTE ADULT - SUBJECTIVE AND OBJECTIVE BOX
INTERVAL HPI/OVERNIGHT EVENTS:  no GI events   tolerating PEG feeds  +soft brown stools  no PEG site bleeding  no fever or chills    anticipated DC today    MEDICATIONS  (STANDING):  albuterol/ipratropium for Nebulization 3 milliLiter(s) Nebulizer every 6 hours  amLODIPine   Tablet 5 milliGRAM(s) Oral daily  apixaban 5 milliGRAM(s) Oral every 12 hours  atorvastatin 10 milliGRAM(s) Oral at bedtime  budesonide 160 MICROgram(s)/formoterol 4.5 MICROgram(s) Inhaler 2 Puff(s) Inhalation two times a day  cefTRIAXone   IVPB 1000 milliGRAM(s) IV Intermittent every 24 hours  chlorhexidine 2% Cloths 1 Application(s) Topical daily  dextrose 5%. 1000 milliLiter(s) (50 mL/Hr) IV Continuous <Continuous>  dextrose 5%. 1000 milliLiter(s) (100 mL/Hr) IV Continuous <Continuous>  dextrose 50% Injectable 25 Gram(s) IV Push once  dextrose 50% Injectable 25 Gram(s) IV Push once  dextrose 50% Injectable 12.5 Gram(s) IV Push once  glucagon  Injectable 1 milliGRAM(s) IntraMuscular once  insulin lispro (ADMELOG) corrective regimen sliding scale   SubCutaneous every 6 hours  levETIRAcetam  IVPB 250 milliGRAM(s) IV Intermittent every 12 hours  metoprolol tartrate 12.5 milliGRAM(s) Oral every 12 hours  Nephro-corine 1 Tablet(s) Oral daily  pantoprazole   Suspension 40 milliGRAM(s) Oral daily  tiotropium 2.5 MICROgram(s) Inhaler 2 Puff(s) Inhalation daily    MEDICATIONS  (PRN):  acetaminophen     Tablet .. 650 milliGRAM(s) Oral every 6 hours PRN Mild Pain (1 - 3)  bisacodyl 5 milliGRAM(s) Oral daily PRN Constipation  dextrose Oral Gel 15 Gram(s) Oral once PRN Blood Glucose LESS THAN 70 milliGRAM(s)/deciliter      Allergies    penicillin (Other)  ACE inhibitors (Other)  Bee Stings- anaphylaxis (Other)      Review of Systems:  see HPI- remainder 10 point ROS negative      Vital Signs Last 24 Hrs  T(C): 36.4 (11 Sep 2024 10:00), Max: 37.3 (10 Sep 2024 15:53)  T(F): 97.6 (11 Sep 2024 10:00), Max: 99.2 (10 Sep 2024 20:35)  HR: 86 (11 Sep 2024 10:00) (86 - 108)  BP: 168/86 (11 Sep 2024 10:00) (109/66 - 168/86)  BP(mean): --  RR: 18 (11 Sep 2024 10:00) (18 - 18)  SpO2: 94% (11 Sep 2024 10:00) (91% - 98%)    Parameters below as of 11 Sep 2024 10:00  Patient On (Oxygen Delivery Method): room air    PHYSICAL EXAM:  GENERAL:: thin AA male awake, alert smiling and  appears comfortable.   NECK: No JVD  RESP:: occ rhonchi, no inc WOB  CV:: S1 and S2, RRR  GI:: BS+, soft, thin ND NT Abdominal binder opened - PEG bumper at 4.5 cm bhargavi at top of bumper, no bleeding. gauze under bumper (removed by clinician)- lightly touching skin and spins easily 360 degrees. +midline abdominal scar (from mid epigastric region to pubis) and small scar at lateral LUQ, WH LLQ scar c/w appy.  ABD pad placed OVER PEG and abdominal binder resecured  MUSCULOSKELETAL: No clubbing or cyanosis of digits; no joint swelling or tenderness to palpation  PSYCH: A+O to person, place, and time; affect appropriate  NEUROLOGY: CN 2-12 are intact and symmetric; no gross sensory deficits, grossly 5/5 strength throughout  SKIN: No rashes; no palpable lesions, +crani site c/d/i    LABS:                        9.5    10.32 )-----------( 164      ( 10 Sep 2024 06:20 )             29.3     09-10    138  |  103  |  20  ----------------------------<  144<H>  4.4   |  24  |  0.72    Ca    8.7      10 Sep 2024 06:19        Urinalysis Basic - ( 10 Sep 2024 06:19 )    Color: x / Appearance: x / SG: x / pH: x  Gluc: 144 mg/dL / Ketone: x  / Bili: x / Urobili: x   Blood: x / Protein: x / Nitrite: x   Leuk Esterase: x / RBC: x / WBC x   Sq Epi: x / Non Sq Epi: x / Bacteria: x          RADIOLOGY & ADDITIONAL TESTS:

## 2024-09-11 NOTE — PROGRESS NOTE ADULT - PROVIDER SPECIALTY LIST ADULT
Gastroenterology
Gastroenterology
Infectious Disease
Neurosurgery
Vascular Cardiology
Gastroenterology
Gastroenterology
Hospitalist
Internal Medicine
Neurosurgery
Vascular Cardiology
Gastroenterology
Infectious Disease
Neurosurgery
Vascular Cardiology
Gastroenterology
Infectious Disease
NSICU
Neurosurgery
Hospitalist
NSICU
Neurosurgery
Hospitalist
Neurosurgery
Hospitalist
Internal Medicine
Internal Medicine
Hospitalist
Internal Medicine
Hospitalist
Internal Medicine
Hospitalist

## 2024-09-11 NOTE — PROGRESS NOTE ADULT - PROBLEM SELECTOR PLAN 1
Klebsiella growing from blood cx sent 9/6. Pansensitive- transitioned to po abx.    Possible source urine. Cx growing Klebsiella.

## 2024-09-11 NOTE — PROGRESS NOTE ADULT - NUTRITIONAL ASSESSMENT
This patient has been assessed with a concern for Malnutrition and has been determined to have a diagnosis/diagnoses of Moderate protein-calorie malnutrition and Underweight (BMI < 19).    This patient is being managed with:   Diet NPO after Midnight-     NPO Start Date: 02-Sep-2024   NPO Start Time: 23:59  Except Medications  Entered: Sep  2 2024  6:15PM    Diet NPO with Tube Feed-  Tube Feeding Modality: Nasogastric  Glucerna 1.2 Gabriele (GLUCERNARTH)  Total Volume for 24 Hours (mL): 1440  Continuous  Starting Tube Feed Rate {mL per Hour}: 20  Increase Tube Feed Rate by (mL): 10     Every 6 hours  Until Goal Tube Feed Rate (mL per Hour): 60  Tube Feed Duration (in Hours): 24  Tube Feed Start Time: 15:30  Entered: Sep  1 2024  4:38PM  
This patient has been assessed with a concern for Malnutrition and has been determined to have a diagnosis/diagnoses of Moderate protein-calorie malnutrition and Underweight (BMI < 19).    This patient is being managed with:   Diet NPO after Midnight-     NPO Start Date: 26-Aug-2024   NPO Start Time: 23:59  Entered: Aug 26 2024 12:08PM    Diet NPO with Tube Feed-  Tube Feeding Modality: Nasogastric  Glucerna 1.2 Gabriele (GLUCERNARTH)  Total Volume for 24 Hours (mL): 1440  Continuous  Starting Tube Feed Rate {mL per Hour}: 20  Increase Tube Feed Rate by (mL): 10     Every 6 hours  Until Goal Tube Feed Rate (mL per Hour): 60  Tube Feed Duration (in Hours): 24  Tube Feed Start Time: 15:30  Entered: Aug 19 2024  3:03PM  
This patient has been assessed with a concern for Malnutrition and has been determined to have a diagnosis/diagnoses of Moderate protein-calorie malnutrition and Underweight (BMI < 19).    This patient is being managed with:   Diet NPO with Tube Feed-  Tube Feeding Modality: Nasogastric  Glucerna 1.2 Gabriele (GLUCERNARTH)  Total Volume for 24 Hours (mL): 1440  Continuous  Starting Tube Feed Rate {mL per Hour}: 20  Increase Tube Feed Rate by (mL): 10     Every 6 hours  Until Goal Tube Feed Rate (mL per Hour): 60  Tube Feed Duration (in Hours): 24  Tube Feed Start Time: 15:30  Entered: Aug 19 2024  3:03PM  
This patient has been assessed with a concern for Malnutrition and has been determined to have a diagnosis/diagnoses of Moderate protein-calorie malnutrition and Underweight (BMI < 19).    This patient is being managed with:   Diet NPO with Tube Feed-  Tube Feeding Modality: Nasogastric  Glucerna 1.2 Gabriele (GLUCERNARTH)  Total Volume for 24 Hours (mL): 1440  Continuous  Starting Tube Feed Rate {mL per Hour}: 20  Increase Tube Feed Rate by (mL): 10     Every 6 hours  Until Goal Tube Feed Rate (mL per Hour): 60  Tube Feed Duration (in Hours): 24  Tube Feed Start Time: 15:30  Entered: Sep  1 2024  4:38PM  
This patient has been assessed with a concern for Malnutrition and has been determined to have a diagnosis/diagnoses of Moderate protein-calorie malnutrition and Underweight (BMI < 19).    This patient is being managed with:   Diet NPO with Tube Feed-  Tube Feeding Modality: Nasogastric  Glucerna 1.2 Gabriele (GLUCERNARTH)  Total Volume for 24 Hours (mL): 1440  Continuous  Starting Tube Feed Rate {mL per Hour}: 20  Increase Tube Feed Rate by (mL): 10     Every 6 hours  Until Goal Tube Feed Rate (mL per Hour): 60  Tube Feed Duration (in Hours): 24  Tube Feed Start Time: 15:30  Entered: Sep  1 2024  4:38PM  
This patient has been assessed with a concern for Malnutrition and has been determined to have a diagnosis/diagnoses of Severe protein-calorie malnutrition and Underweight (BMI < 19).    This patient is being managed with:   Diet NPO with Tube Feed-  Tube Feeding Modality: Nasogastric  Glucerna 1.2 Gabriele (GLUCERNARTH)  Total Volume for 24 Hours (mL): 1440  Continuous  Starting Tube Feed Rate {mL per Hour}: 20  Increase Tube Feed Rate by (mL): 10     Every 6 hours  Until Goal Tube Feed Rate (mL per Hour): 60  Tube Feed Duration (in Hours): 24  Tube Feed Start Time: 13:00  Cynthia TF     Qty per Day:  1  Entered: Sep  8 2024  3:23PM  
This patient has been assessed with a concern for Malnutrition and has been determined to have a diagnosis/diagnoses of Severe protein-calorie malnutrition and Underweight (BMI < 19).    This patient is being managed with:   Diet NPO with Tube Feed-  Tube Feeding Modality: Nasogastric  Glucerna 1.2 Gabriele (GLUCERNARTH)  Total Volume for 24 Hours (mL): 1440  Continuous  Starting Tube Feed Rate {mL per Hour}: 20  Increase Tube Feed Rate by (mL): 10     Every 6 hours  Until Goal Tube Feed Rate (mL per Hour): 60  Tube Feed Duration (in Hours): 24  Tube Feed Start Time: 13:00  Entered: Sep  5 2024 12:08PM  
This patient has been assessed with a concern for Malnutrition and has been determined to have a diagnosis/diagnoses of Severe protein-calorie malnutrition and Underweight (BMI < 19).    This patient is being managed with:   Diet NPO with Tube Feed-  Tube Feeding Modality: Nasogastric  Glucerna 1.2 Gabriele (GLUCERNARTH)  Total Volume for 24 Hours (mL): 1560  Continuous  Starting Tube Feed Rate {mL per Hour}: 20  Increase Tube Feed Rate by (mL): 10     Every 6 hours  Until Goal Tube Feed Rate (mL per Hour): 65  Tube Feed Duration (in Hours): 24  Tube Feed Start Time: 13:00  Cynthia TF     Qty per Day:  2  Entered: Sep  9 2024  5:44PM  
This patient has been assessed with a concern for Malnutrition and has been determined to have a diagnosis/diagnoses of Underweight (BMI < 19) and Moderate protein-calorie malnutrition.    This patient is being managed with:   Diet NPO with Tube Feed-  Tube Feeding Modality: Nasogastric  Glucerna 1.2 Gabriele (GLUCERNARTH)  Total Volume for 24 Hours (mL): 1440  Continuous  Starting Tube Feed Rate {mL per Hour}: 20  Increase Tube Feed Rate by (mL): 10     Every 6 hours  Until Goal Tube Feed Rate (mL per Hour): 60  Tube Feed Duration (in Hours): 24  Tube Feed Start Time: 15:30  Entered: Aug 19 2024  3:03PM  
This patient has been assessed with a concern for Malnutrition and has been determined to have a diagnosis/diagnoses of Moderate protein-calorie malnutrition and Underweight (BMI < 19).    This patient is being managed with:   Diet NPO with Tube Feed-  Tube Feeding Modality: Nasogastric  Glucerna 1.2 Gabriele (GLUCERNARTH)  Total Volume for 24 Hours (mL): 1440  Continuous  Starting Tube Feed Rate {mL per Hour}: 20  Increase Tube Feed Rate by (mL): 10     Every 6 hours  Until Goal Tube Feed Rate (mL per Hour): 60  Tube Feed Duration (in Hours): 24  Tube Feed Start Time: 13:00  Entered: Sep  5 2024 12:08PM  
This patient has been assessed with a concern for Malnutrition and has been determined to have a diagnosis/diagnoses of Moderate protein-calorie malnutrition and Underweight (BMI < 19).    This patient is being managed with:   Diet NPO with Tube Feed-  Tube Feeding Modality: Nasogastric  Glucerna 1.2 Gabriele (GLUCERNARTH)  Total Volume for 24 Hours (mL): 1440  Continuous  Starting Tube Feed Rate {mL per Hour}: 20  Increase Tube Feed Rate by (mL): 10     Every 6 hours  Until Goal Tube Feed Rate (mL per Hour): 60  Tube Feed Duration (in Hours): 24  Tube Feed Start Time: 15:30  Entered: Aug 19 2024  3:03PM  
This patient has been assessed with a concern for Malnutrition and has been determined to have a diagnosis/diagnoses of Moderate protein-calorie malnutrition and Underweight (BMI < 19).    This patient is being managed with:   Diet NPO with Tube Feed-  Tube Feeding Modality: Nasogastric  Glucerna 1.2 Gabriele (GLUCERNARTH)  Total Volume for 24 Hours (mL): 1440  Continuous  Starting Tube Feed Rate {mL per Hour}: 20  Increase Tube Feed Rate by (mL): 10     Every 6 hours  Until Goal Tube Feed Rate (mL per Hour): 60  Tube Feed Duration (in Hours): 24  Tube Feed Start Time: 15:30  Entered: Aug 19 2024  3:03PM  
This patient has been assessed with a concern for Malnutrition and has been determined to have a diagnosis/diagnoses of Moderate protein-calorie malnutrition and Underweight (BMI < 19).    This patient is being managed with:   Diet NPO with Tube Feed-  Tube Feeding Modality: Gastrostomy  Glucerna 1.2 Gabriele (GLUCERNARTH)  Total Volume for 24 Hours (mL): 1320  Continuous  Starting Tube Feed Rate {mL per Hour}: 30  Increase Tube Feed Rate by (mL): 10     Every 4 hours  Until Goal Tube Feed Rate (mL per Hour): 55  Tube Feed Duration (in Hours): 24  Tube Feed Start Time: 12:00  Entered: Aug 28 2024 10:53AM  
This patient has been assessed with a concern for Malnutrition and has been determined to have a diagnosis/diagnoses of Moderate protein-calorie malnutrition and Underweight (BMI < 19).    This patient is being managed with:   Diet NPO with Tube Feed-  Tube Feeding Modality: Nasogastric  Glucerna 1.2 Gabriele (GLUCERNARTH)  Total Volume for 24 Hours (mL): 1440  Continuous  Starting Tube Feed Rate {mL per Hour}: 20  Increase Tube Feed Rate by (mL): 10     Every 6 hours  Until Goal Tube Feed Rate (mL per Hour): 60  Tube Feed Duration (in Hours): 24  Tube Feed Start Time: 15:30  Entered: Aug 19 2024  3:03PM  
This patient has been assessed with a concern for Malnutrition and has been determined to have a diagnosis/diagnoses of Severe protein-calorie malnutrition and Underweight (BMI < 19).    This patient is being managed with:   Diet NPO with Tube Feed-  Tube Feeding Modality: Nasogastric  Glucerna 1.2 Gabriele (GLUCERNARTH)  Total Volume for 24 Hours (mL): 1560  Continuous  Starting Tube Feed Rate {mL per Hour}: 20  Increase Tube Feed Rate by (mL): 10     Every 6 hours  Until Goal Tube Feed Rate (mL per Hour): 65  Tube Feed Duration (in Hours): 24  Tube Feed Start Time: 13:00  Cynthia TF     Qty per Day:  2  Entered: Sep  9 2024  5:44PM  
This patient has been assessed with a concern for Malnutrition and has been determined to have a diagnosis/diagnoses of Severe protein-calorie malnutrition and Underweight (BMI < 19).    This patient is being managed with:   Diet NPO with Tube Feed-  Tube Feeding Modality: Nasogastric  Glucerna 1.2 Gabriele (GLUCERNARTH)  Total Volume for 24 Hours (mL): 1560  Continuous  Starting Tube Feed Rate {mL per Hour}: 20  Increase Tube Feed Rate by (mL): 10     Every 6 hours  Until Goal Tube Feed Rate (mL per Hour): 65  Tube Feed Duration (in Hours): 24  Tube Feed Start Time: 13:00  Cynthia TF     Qty per Day:  2  Entered: Sep  9 2024  5:44PM  
This patient has been assessed with a concern for Malnutrition and has been determined to have a diagnosis/diagnoses of Moderate protein-calorie malnutrition and Underweight (BMI < 19).    This patient is being managed with:   Diet NPO after Midnight-     NPO Start Date: 26-Aug-2024   NPO Start Time: 23:59  Entered: Aug 26 2024 12:08PM    Diet NPO with Tube Feed-  Tube Feeding Modality: Nasogastric  Glucerna 1.2 Gabriele (GLUCERNARTH)  Total Volume for 24 Hours (mL): 1440  Continuous  Starting Tube Feed Rate {mL per Hour}: 20  Increase Tube Feed Rate by (mL): 10     Every 6 hours  Until Goal Tube Feed Rate (mL per Hour): 60  Tube Feed Duration (in Hours): 24  Tube Feed Start Time: 15:30  Entered: Aug 19 2024  3:03PM  
This patient has been assessed with a concern for Malnutrition and has been determined to have a diagnosis/diagnoses of Moderate protein-calorie malnutrition and Underweight (BMI < 19).    This patient is being managed with:   Diet NPO with Tube Feed-  Tube Feeding Modality: Nasogastric  Glucerna 1.2 Gabriele (GLUCERNARTH)  Total Volume for 24 Hours (mL): 1440  Continuous  Starting Tube Feed Rate {mL per Hour}: 20  Increase Tube Feed Rate by (mL): 10     Every 6 hours  Until Goal Tube Feed Rate (mL per Hour): 60  Tube Feed Duration (in Hours): 24  Tube Feed Start Time: 15:30  Entered: Aug 19 2024  3:03PM  
This patient has been assessed with a concern for Malnutrition and has been determined to have a diagnosis/diagnoses of Moderate protein-calorie malnutrition and Underweight (BMI < 19).    This patient is being managed with:   Diet NPO with Tube Feed-  Tube Feeding Modality: Nasogastric  Glucerna 1.2 Gabriele (GLUCERNARTH)  Total Volume for 24 Hours (mL): 1440  Continuous  Starting Tube Feed Rate {mL per Hour}: 20  Increase Tube Feed Rate by (mL): 10     Every 6 hours  Until Goal Tube Feed Rate (mL per Hour): 60  Tube Feed Duration (in Hours): 24  Tube Feed Start Time: 15:30  Entered: Aug 19 2024  3:03PM  
This patient has been assessed with a concern for Malnutrition and has been determined to have a diagnosis/diagnoses of Moderate protein-calorie malnutrition and Underweight (BMI < 19).    This patient is being managed with:   Diet NPO-  Except Medications  Entered: Sep  4 2024  9:15PM  
This patient has been assessed with a concern for Malnutrition and has been determined to have a diagnosis/diagnoses of Moderate protein-calorie malnutrition and Underweight (BMI < 19).    This patient is being managed with:   Diet NPO after Midnight-     NPO Start Date: 02-Sep-2024   NPO Start Time: 23:59  Except Medications  Entered: Sep  2 2024  6:15PM    Diet NPO with Tube Feed-  Tube Feeding Modality: Nasogastric  Glucerna 1.2 Gabriele (GLUCERNARTH)  Total Volume for 24 Hours (mL): 1440  Continuous  Starting Tube Feed Rate {mL per Hour}: 20  Increase Tube Feed Rate by (mL): 10     Every 6 hours  Until Goal Tube Feed Rate (mL per Hour): 60  Tube Feed Duration (in Hours): 24  Tube Feed Start Time: 15:30  Entered: Sep  1 2024  4:38PM  
This patient has been assessed with a concern for Malnutrition and has been determined to have a diagnosis/diagnoses of Moderate protein-calorie malnutrition and Underweight (BMI < 19).    This patient is being managed with:   Diet NPO with Tube Feed-  Tube Feeding Modality: Gastrostomy  Glucerna 1.2 Gabriele (GLUCERNARTH)  Total Volume for 24 Hours (mL): 1320  Continuous  Starting Tube Feed Rate {mL per Hour}: 30  Increase Tube Feed Rate by (mL): 10     Every 4 hours  Until Goal Tube Feed Rate (mL per Hour): 55  Tube Feed Duration (in Hours): 24  Tube Feed Start Time: 12:00  Entered: Aug 28 2024 10:53AM  
This patient has been assessed with a concern for Malnutrition and has been determined to have a diagnosis/diagnoses of Moderate protein-calorie malnutrition and Underweight (BMI < 19).    This patient is being managed with:   Diet NPO with Tube Feed-  Tube Feeding Modality: Nasogastric  Glucerna 1.2 Gabriele (GLUCERNARTH)  Total Volume for 24 Hours (mL): 1440  Continuous  Starting Tube Feed Rate {mL per Hour}: 20  Increase Tube Feed Rate by (mL): 10     Every 6 hours  Until Goal Tube Feed Rate (mL per Hour): 60  Tube Feed Duration (in Hours): 24  Tube Feed Start Time: 15:30  Entered: Aug 19 2024  3:03PM  
This patient has been assessed with a concern for Malnutrition and has been determined to have a diagnosis/diagnoses of Moderate protein-calorie malnutrition and Underweight (BMI < 19).    This patient is being managed with:   Diet NPO-  Except Medications  Entered: Aug 31 2024  4:03PM  
This patient has been assessed with a concern for Malnutrition and has been determined to have a diagnosis/diagnoses of Moderate protein-calorie malnutrition and Underweight (BMI < 19).    This patient is being managed with:   Diet NPO with Tube Feed-  Tube Feeding Modality: Gastrostomy  Glucerna 1.2 Gabriele (GLUCERNARTH)  Total Volume for 24 Hours (mL): 1320  Continuous  Starting Tube Feed Rate {mL per Hour}: 30  Increase Tube Feed Rate by (mL): 10     Every 4 hours  Until Goal Tube Feed Rate (mL per Hour): 55  Tube Feed Duration (in Hours): 24  Tube Feed Start Time: 12:00  Entered: Aug 28 2024 10:53AM  
This patient has been assessed with a concern for Malnutrition and has been determined to have a diagnosis/diagnoses of Severe protein-calorie malnutrition and Underweight (BMI < 19).    This patient is being managed with:   Diet NPO with Tube Feed-  Tube Feeding Modality: Nasogastric  Glucerna 1.2 Gabriele (GLUCERNARTH)  Total Volume for 24 Hours (mL): 1440  Continuous  Starting Tube Feed Rate {mL per Hour}: 20  Increase Tube Feed Rate by (mL): 10     Every 6 hours  Until Goal Tube Feed Rate (mL per Hour): 60  Tube Feed Duration (in Hours): 24  Tube Feed Start Time: 13:00  Entered: Sep  5 2024 12:08PM

## 2024-09-11 NOTE — PROGRESS NOTE ADULT - PROBLEM SELECTOR PLAN 3
- failed MBS on 8/19  - s/p NGT placement with ENT on 8/19  - failed repeat MBS on 8/22  - after lengthy discussion with patient, wife, daughter on 8/23, patient decided to proceed with PEG placement  - s/p PEG placement 8/27 but now with episodes of bleeding around PEG with acute blood loss anemia on 8/30 - now resolved  - c/w tube feeds  - GI follow-up appreciated
s/p craniotomy 8/6 for resection hemorrhagic lesion  Completed steroid taper per neurosurgery  - CT Head on 8/18 stable with no bleed  - on keppra 250mg BID (was 500mg BID x 7 days post op but 250mg was home dose prior)
- failed MBS on 8/19  - s/p NGT placement with ENT on 8/19  - failed repeat MBS on 8/22  - after lengthy discussion with patient, wife, daughter on 8/23, patient decided to proceed with PEG placement  - s/p PEG placement 8/27 but now with episodes of bleeding around PEG with acute blood loss anemia on 8/30 - now resolved  - tube feeds resumed but on hold for scope today  - GI follow-up appreciated
s/p craniotomy 8/6 for resection hemorrhagic lesion  Complete steroid taper per neurosurgery  Repeat CTH while on hep gtt per neurosurgery  on keppra 250mg BID (was 500mg BID x 7 days post op but 250mg was home dose prior).
- failed MBS on 8/19  - s/p NGT placement with ENT on 8/19  - failed repeat MBS on 8/22  - after lengthy discussion with patient, wife, daughter on 8/23, patient decided to proceed with PEG placement  - s/p PEG placement 8/27 but now with episodes of bleeding around PEG with acute blood loss anemia on 8/30 - now resolved but with ongoing anemia  - continue to hold tube feeds  - GI follow-up
s/p craniotomy 8/6 for resection hemorrhagic lesion  Complete steroid taper per neurosurgery  - CT Head on 8/18 stable with no bleed  - on keppra 250mg BID (was 500mg BID x 7 days post op but 250mg was home dose prior)
no further episodes of bleeding from PEG site since 8/30  - however, had transient worsening anemia and melena - now resolved  - CT abd with IV contrast performed on 8/31 - limited due to residual contrast from prior study, but no active bleed identified  - s/p additional 1u PRBC on 9/1 with robust response and stable on repeat. GI lavage of PEG appreciated - no evidence of bleed, just gastric contents  - new melena noted on 9/2 - likely from old residual blood from PEG site bleed into tract. S/p EGD on 9/3 with no signs of active bleed  - was transfused in NSCU additional 1u PRBC on 9/5 but no active bleed. GI repeat lavage of PEG in NSCU appreciated - again, no signs of active bleed.  - his current Hb range is in 11-12. anything above this range likely falsely elevated unless patient receives another transfusion. Eliquis resumed on 9/6 - monitor for signs of bleed on full a/c. c/w PPI for GI ppx
no further episodes of bleeding from PEG site since 8/30  - however, had transient worsening anemia and melena - now resolved  - CT abd with IV contrast performed on 8/31 - limited due to residual contrast from prior study, but no active bleed identified  - s/p additional 1u PRBC on 9/1 with robust response and stable on repeat. GI lavage of PEG appreciated - no evidence of bleed, just gastric contents  - new melena noted on 9/2 - likely from old residual blood from PEG site bleed into tract. S/p EGD on 9/3 with no signs of active bleed  - was transfused in NSCU additional 1u PRBC on 9/5 but no active bleed. GI repeat lavage of PEG in NSCU appreciated - again, no signs of active bleed.  - his current Hb range is in 11-12. anything above this range likely falsely elevated unless patient receives another transfusion. Eliquis resumed on 9/6 - monitor for signs of bleed on full a/c. c/w PPI for GI ppx
s/p craniotomy 8/6 for resection hemorrhagic lesion  Complete steroid taper per neurosurgery  - CT Head on 8/18 stable with no bleed  - on keppra 250mg BID (was 500mg BID x 7 days post op but 250mg was home dose prior)
no further episodes of bleeding from PEG site since 8/30  - however, had transient worsening anemia and melena - now resolved  - CT abd with IV contrast performed on 8/31 - limited due to residual contrast from prior study, but no active bleed identified  - s/p additional 1u PRBC on 9/1 with robust response and stable on repeat. GI lavage of PEG appreciated - no evidence of bleed, just gastric contents  - new melena noted on 9/2 - likely from old residual blood from PEG site bleed into tract. S/p EGD on 9/3 with no signs of active bleed  - was transfused in NSCU additional 1u PRBC on 9/5 but no active bleed. GI repeat lavage of PEG in NSCU appreciated - again, no signs of active bleed.  - his current Hb range is in 11-12. anything above this range likely falsely elevated unless patient receives another transfusion. Eliquis resumed on 9/6 - monitor for signs of bleed on full a/c. c/w PPI for GI ppx
s/p craniotomy 8/6 for resection hemorrhagic lesion  Completed steroid taper per neurosurgery  - CT Head on 8/18 stable with no bleed  - on keppra 250mg BID (was 500mg BID x 7 days post op but 250mg was home dose prior)
s/p craniotomy 8/6 for resection hemorrhagic lesion  Completed steroid taper per neurosurgery  - CT Head on 8/18 stable with no bleed  - on keppra 250mg BID (was 500mg BID x 7 days post op but 250mg was home dose prior)
s/p craniotomy 8/6 for resection hemorrhagic lesion  Complete steroid taper per neurosurgery  Repeat CTH while on hep gtt per neurosurgery  on keppra 250mg BID (was 500mg BID x 7 days post op but 250mg was home dose prior).
s/p craniotomy 8/6 for resection hemorrhagic lesion  Complete steroid taper per neurosurgery  - CT Head on 8/18 stable with no bleed  - on keppra 250mg BID (was 500mg BID x 7 days post op but 250mg was home dose prior)
s/p craniotomy 8/6 for resection hemorrhagic lesion  Complete steroid taper per neurosurgery  - CT Head on 8/18 stable with no bleed  - on keppra 250mg BID (was 500mg BID x 7 days post op but 250mg was home dose prior)
s/p craniotomy 8/6 for resection hemorrhagic lesion  Completed steroid taper per neurosurgery  - CT Head on 8/18 stable with no bleed  - on keppra 250mg BID (was 500mg BID x 7 days post op but 250mg was home dose prior)
- failed MBS on 8/19  - s/p NGT placement with ENT on 8/19  - failed repeat MBS on 8/22  - after lengthy discussion with patient, wife, daughter on 8/23, patient decided to proceed with PEG placement  - s/p PEG placement 8/27 but now with episodes of bleeding around PEG with acute blood loss anemia on 8/30 - now resolved  - tube feeds resumed  - GI follow-up appreciated
no further episodes of bleeding from PEG site since 8/30  - however, had transient worsening anemia and melena - now resolved  - CT abd with IV contrast performed on 8/31 - limited due to residual contrast from prior study, but no active bleed identified  - s/p additional 1u PRBC on 9/1 with robust response and stable on repeat  - GI lavage of PEG appreciated - no evidence of bleed, just gastric contents  - new melena noted on 9/2 - likely from old residual blood from PEG site bleed into tract  - s/p EGD on 9/3 with no signs of active bleed  - was transfused in NSCU additional 1u PRBC on 9/5 but no active bleed. GI repeat lavage of PEG in NSCU appreciated - again, no signs of active bleed.  - his current Hb range is in 11-12. anything above this range likely falsely elevated unless patient receives another transfusion  - eliquis resumed on 9/6 - monitor for signs of bleed on full a/c  - c/w PPI for GI ppx  - transfuse for Hb<7
no further episodes of bleeding from PEG site since 8/30  - however, had transient worsening anemia and melena - now resolved  - CT abd with IV contrast performed on 8/31 - limited due to residual contrast from prior study, but no active bleed identified  - s/p additional 1u PRBC on 9/1 with robust response and stable on repeat. GI lavage of PEG appreciated - no evidence of bleed, just gastric contents  - new melena noted on 9/2 - likely from old residual blood from PEG site bleed into tract. S/p EGD on 9/3 with no signs of active bleed  - was transfused in NSCU additional 1u PRBC on 9/5 but no active bleed. GI repeat lavage of PEG in NSCU appreciated - again, no signs of active bleed.  - his current Hb range is in 11-12. anything above this range likely falsely elevated unless patient receives another transfusion. Eliquis resumed on 9/6 - monitor for signs of bleed on full a/c. c/w PPI for GI ppx
syncopal episode while straining to move his bowels on toilet 9/4 - likely vasovagal exacerbated by on/off NPO status due to recurrent concerns for bleed and/or need for procedures  - normally would recommend orthostatics but unclear utility given BP checks only in LE  - add free water 200cc q8h given mild hypovolemia on exam  - maintain euvolemia

## 2024-09-11 NOTE — PROGRESS NOTE ADULT - PROBLEM SELECTOR PROBLEM 9
Prophylactic measure
Paroxysmal atrial fibrillation
Prophylactic measure
Paroxysmal atrial fibrillation
Prophylactic measure
Paroxysmal atrial fibrillation
Prophylactic measure
Type 2 diabetes mellitus

## 2024-09-11 NOTE — PROGRESS NOTE ADULT - ASSESSMENT
78 y/o M PMhx recent admission for and R crani for lesion w/ Dr. 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 CTA demonstrated segmental emboli within right upper lobe pulmonary artery. On eliquis. Due to dysphagia and failed MBS, PEG tube was placed 8/27.  Hospital course c/b leukocytosis on 9/6 found to have klebsiella pneumoniae bacteremia.    Klebsiella pneumoniae bacteremia due to urinary source  9/6 Bcx with Kleb pneumoniae  9/8 repeat Bcx NGTD x2   UA positive, patient denies  symptoms but is a poor historian  Ucx also with Klebsiella pneumoniae   EKG reviewed, qtc ok  s/p cefepime 9/7-9/9    Recommendations:  Currently on ceftriaxone  Can transition to ciprofloxacin 500mg PO BID to complete total 10d course on 9/16  Continue rest of care per primary team     Stable from ID standpoint at this time.   Discharge planning per primary team.     D/w DESTIN Song M.D.  [facility], Division of Infectious Diseases  [phone]  After 5pm on weekdays and all day on weekends - please call [phone]  Available on [employer] TEAMS

## 2024-09-11 NOTE — PROGRESS NOTE ADULT - SUBJECTIVE AND OBJECTIVE BOX
[facility] DIVISION OF INFECTIOUS DISEASES  LIANA Solis Y. Patel, S. Shah, G. [facility]  [phone]  ([phone] - weekdays after 5pm and weekends)    Name: RADHA HOPE  Age/Gender: 79y Male  MRN: [id number]    Interval History:  Patient seen and examined this morning.   No new complaints noted.  Notes reviewed  No concerning overnight events  Afebrile   Allergies: penicillin (Other)  ACE inhibitors (Other)  Bee Stings- anaphylaxis (Other)      Objective:  Vitals:   T(F): 98.9 (09-11-24 @ 04:43), Max: 99.2 (09-10-24 @ 20:35)  HR: 90 (09-11-24 @ 04:43) (89 - 108)  BP: 127/74 (09-11-24 @ 04:43) (109/66 - 155/74)  RR: 18 (09-11-24 @ 04:43) (18 - 18)  SpO2: 97% (09-11-24 @ 04:43) (91% - 98%)  Physical Examination:  General: no acute distress, nontoxic appearing   HEENT: NC/AT, anicteric, EOMI  Respiratory: no acc muscle use, breathing comfortably  Cardiovascular: S1 and S2 present  Gastrointestinal: normal appearing, nondistended  Extremities: no edema, no cyanosis  Skin: no visible rash    Laboratory Studies:  CBC:                       9.5    10.32 )-----------( 164      ( 10 Sep 2024 06:20 )             29.3     WBC Trend:  10.32 09-10-24 @ 06:20  9.48 09-09-24 @ 06:42  12.86 09-08-24 @ 06:22  16.29 09-07-24 @ 04:50  24.53 09-06-24 @ 16:18  9.63 09-06-24 @ 03:53  8.68 09-05-24 @ 15:25  8.74 09-05-24 @ 05:53  8.24 09-05-24 @ 02:41  5.29 09-04-24 @ 20:43  6.09 09-04-24 @ 17:40    CMP: 09-10    138  |  103  |  20  ----------------------------<  144<H>  4.4   |  24  |  0.72    Ca    8.7      10 Sep 2024 06:19      Creatinine: 0.72 mg/dL (09-10-24 @ 06:19)  Creatinine: 0.68 mg/dL (09-09-24 @ 06:46)  Creatinine: 1.12 mg/dL (09-07-24 @ 04:50)  Creatinine: 1.11 mg/dL (09-06-24 @ 03:53)  Creatinine: 0.95 mg/dL (09-05-24 @ 12:47)  Creatinine: 1.18 mg/dL (09-05-24 @ 02:41)  Creatinine: 0.81 mg/dL (09-04-24 @ 21:15)  Creatinine: 0.88 mg/dL (09-04-24 @ 20:43)    Microbiology: reviewed   Culture - Blood (collected 09-08-24 @ 04:40)  Source: .Blood Blood  Preliminary Report (09-10-24 @ 12:01):    No growth at 48 Hours    Culture - Blood (collected 09-08-24 @ 04:32)  Source: .Blood Blood  Preliminary Report (09-10-24 @ 12:01):    No growth at 48 Hours    Culture - Urine (collected 09-07-24 @ 01:05)  Source: Clean Catch Clean Catch (Midstream)  Preliminary Report (09-09-24 @ 15:32):    >100,000 CFU/ml Klebsiella pneumoniae    Culture - Blood (collected 09-06-24 @ 18:50)  Source: .Blood Blood  Gram Stain (09-07-24 @ 08:04):    Growth in aerobic bottle: Gram Negative Rods    Growth in anaerobic bottle: Gram Negative Rods  Final Report (09-08-24 @ 14:43):    Growth in aerobic and anaerobic bottles: Klebsiella pneumoniae    Direct identification is available within approximately 3-5    hours either by Blood Panel Multiplexed PCR or Direct    MALDI-TOF. Details: [email or web address]  Organism: Blood Culture PCR  Klebsiella pneumoniae (09-08-24 @ 14:43)  Organism: Klebsiella pneumoniae (09-08-24 @ 14:43)      -  Levofloxacin: S <=0.5      -  Tobramycin: S <=2      -  Aztreonam: S <=4      -  Gentamicin: S <=2      -  Cefazolin: S <=2      -  Cefepime: S <=2      -  Piperacillin/Tazobactam: S <=8      -  Ciprofloxacin: S <=0.25      -  Imipenem: S <=1      -  Ceftriaxone: S <=1      -  Ampicillin: R >16 These ampicillin results predict results for amoxicillin      Method Type: MITCHELL      -  Meropenem: S <=1      -  Ampicillin/Sulbactam: S <=4/2      -  Cefoxitin: S <=8      -  Trimethoprim/Sulfamethoxazole: S <=0.5/9.5      -  Ertapenem: S <=0.5  Organism: Blood Culture PCR (09-08-24 @ 14:43)      Method Type: PCR      -  K. pneumoniae group: Detec (K. pneumoniae, K. quasipneumoniae, K. variicola)    Culture - Blood (collected 09-06-24 @ 18:00)  Source: .Blood Blood  Gram Stain (09-07-24 @ 08:15):    Growth in aerobic bottle: Gram Negative Rods    Growth in anaerobic bottle: Gram Negative Rods  Final Report (09-08-24 @ 14:44):    Growth in aerobic and anaerobic bottles: Klebsiella pneumoniae    See previous culture [id number]    Radiology: reviewed     Medications:  acetaminophen     Tablet .. 650 milliGRAM(s) Oral every 6 hours PRN  albuterol/ipratropium for Nebulization 3 milliLiter(s) Nebulizer every 6 hours  amLODIPine   Tablet 5 milliGRAM(s) Oral daily  apixaban 5 milliGRAM(s) Oral every 12 hours  atorvastatin 10 milliGRAM(s) Oral at bedtime  bisacodyl 5 milliGRAM(s) Oral daily PRN  budesonide 160 MICROgram(s)/formoterol 4.5 MICROgram(s) Inhaler 2 Puff(s) Inhalation two times a day  cefTRIAXone   IVPB 1000 milliGRAM(s) IV Intermittent every 24 hours  chlorhexidine 2% Cloths 1 Application(s) Topical daily  dextrose 5%. 1000 milliLiter(s) IV Continuous <Continuous>  dextrose 5%. 1000 milliLiter(s) IV Continuous <Continuous>  dextrose 50% Injectable 25 Gram(s) IV Push once  dextrose 50% Injectable 12.5 Gram(s) IV Push once  dextrose 50% Injectable 25 Gram(s) IV Push once  dextrose Oral Gel 15 Gram(s) Oral once PRN  glucagon  Injectable 1 milliGRAM(s) IntraMuscular once  insulin lispro (ADMELOG) corrective regimen sliding scale   SubCutaneous every 6 hours  levETIRAcetam  IVPB 250 milliGRAM(s) IV Intermittent every 12 hours  metoprolol tartrate 12.5 milliGRAM(s) Oral every 12 hours  Nephro-corine 1 Tablet(s) Oral daily  pantoprazole   Suspension 40 milliGRAM(s) Oral daily  tiotropium 2.5 MICROgram(s) Inhaler 2 Puff(s) Inhalation daily    Current Antimicrobials:  cefTRIAXone   IVPB 1000 milliGRAM(s) IV Intermittent every 24 hours    Prior/Completed Antimicrobials:  vancomycin  IVPB

## 2024-09-11 NOTE — PROGRESS NOTE ADULT - NS ATTEND [facility] GEN_ALL_CORE
----- Message from Gloria Parks MA sent at 7/10/2023  6:59 PM CDT -----  Regarding: FW: Appointment  Contact: Jong Roque/ partners [phone]    ----- Message -----  From: Jim Fernandez  Sent: 7/10/2023   8:17 AM CDT  To: [facility]  Subject: Appointment                                      Calling to get follow up appt scheduled. Please call to schedule      
I independently performed the documented:
I attest my time as attending is greater than 50% of the total combined time spent on qualifying patient care activities by the PA/NP and attending.
I independently performed the documented:
I attest my time as attending is greater than 50% of the total combined time spent on qualifying patient care activities by the PA/NP and attending.
I attest my time as attending is greater than 50% of the total combined time spent on qualifying patient care activities by the PA/NP and attending.
I independently performed the documented:

## 2024-09-11 NOTE — PROGRESS NOTE ADULT - PROBLEM SELECTOR PLAN 9
- A1c 6.3, diet controlled  - FS on BMP controlled off of ISS  - c/w home simvastatin 20mg - mild transaminitis noted but improving on repeat
- c/w metoprolol as above  - Home eliquis held post op last admission  - was on therapeutic lovenox but on hold given bleeding around PEG and ongoing anemia  - eliquis resumed on 9/6
DVT ppx: therapeutic lovenox on hold. SCDs in interim  last BM 9/1    Dispo: eventual [facility]
- c/w metoprolol as above  - Home eliquis held post op last admission  - was on therapeutic lovenox but on hold given bleeding around PEG and ongoing anemia  - eliquis resumed on 9/6
DVT ppx: therapeutic lovenox on hold. SCDs in interim  last BM 9/1    Dispo: eventual [facility]
- c/w metoprolol as above  - Home eliquis held post op last admission  - was on therapeutic lovenox but on hold given bleeding around PEG and ongoing anemia  - eliquis resumed on 9/6
DVT ppx: therapeutic lovenox on hold. SCDs in interim  last BM 8/28    Dispo: eventual [facility]
DVT ppx: therapeutic lovenox on hold. SCDs in interim  last BM 9/1    Dispo: eventual [facility]
- c/w metoprolol as above  - Home eliquis held post op last admission  - was on therapeutic lovenox but on hold given bleeding around PEG and ongoing anemia  - eliquis resumed on 9/6
- c/w metoprolol as above  - Home eliquis held post op last admission  - was on therapeutic lovenox but on hold given bleeding around PEG and ongoing anemia  - eliquis resumed on 9/6

## 2024-09-11 NOTE — PROGRESS NOTE ADULT - ASSESSMENT
80 yo male with PMH of HTN, T2DM (diet controlled), HLD, COPD, CKD 3, carcinoid tumor s/p hemicolectomy, nasopharyngeal carcinoma s/p chemo/RT (c/b dysphagia ), IPMN s/p distal pancreatectomy (2018), and paroxysmal Afib on Eliquis s/p craniotomy 8/6 for resection of hemorrhagic lesion. Now presented to the ED for RUE swelling, found to have RUE DVT involving subclavian, axillary, and brachial veins and PE.    Pt was known to have cough w/ PO previously and was evaluated w/ OP MBS earlier this year - using thickened fluids at home   Post op course further complicated by Dysphagia - failed multiple SLP evaluations with recs: NPO   Pt and family are now agreeable to PEG placement for which GI is consulted    #Dysphagia -sp EGD + PEG placement 8/27/24 c/b PEG site bleeding  suspect PEG tract bleeding. Per CT patient may have some intraabdominal collaterals that were within the PEG tract.    8/28 scant dried blood under PEG bumper; no active bleeding, induration, swelling or edema  PEG bumper between 3.5 and 4 cm at skin level  8/29 scant oozing, slight downtrend in Hgb. Negative PEG lavage. Surgicell placed at PEG tract  8/29 PM - recurrent PEG site bleeding; Surgicell intact. Resolved without further intervention. Hgb stable 6pm (9.4 ->9.8)  8/30 AM minimal ooze at PEG site, no active bleeding. Re-assessed at 12:40pm- Notified by Medicine attending of AM Hgb 9.8 ->8.5. No melena +PEG lavage: pink tinged return with water/feeds. Previously placed surgicell removed, minimal oozing noted at ends of PEG site incision - new Surgicell strip placed at PEG incision under PEG bumper, lightly tucked into ends (medical and lateral aspects of PEG site incision)   No visible oozing, surgicell noted to be clean.  Hgb 8.3 1 unit PRBCs transfused  8/31 CT: no e/o GI bleed, gastric varices along fundus and body and gastroepiploic collateral vessels, chronic thrombosis of splenic vein, gastrostomy tube in place, LLL pulm nodule, tree in bud opacities RML and LLL  9/1/24 transfuse additional 1 unit PRBCs  9/2/24 negative PEG lavage  9/3/24 EGD: normal esophagus, normal stomach. small patch of gastritis at incisura (non bleeding) PEG tract without bleeding, no ulcer or stigmata of bleeding. Duodenum WNL  PEG bumper placed at 4.5 cm. Surgicell still in place  Impression:  Unremarkable EGD, no signs of portal hypertension. SUspect resolved bleed from PEG tract. Per CT patient may have some intraabdominal collaterals that were within the PEG tract.  9/4/24 dark BM (suspect passage of "old"/retained blood from GI tract). s/p RRT ?vasovagal - transfer to NSCU for monitoring 1 unit PRBCs transfused Hgb 11 ->13. dark green stool on exam  9/5/24 negative PEG lavage  #remote history of head/Neck CA sp Chemo + XRT  #sp hemicolectomy  #sp partial pancreatectomy  #DVT+PE   AC held x 24 hours for PEG  AC (Lovenox) restarted 8/28 pm.   8/29 AM scant PEG site oozing, sl Hgb downtrend. Topical surgicell applied at PEG site. 8/29 PM CBC stable  8/30 PM AC held 2/2 PEG site bleeding (see above)   9/4/24 Duplex: Stable persistent clot in the right subclavian and axillary veins. Thrombus around left subclavian vein line , new. Left basilic vein thrombosis.  9/5 no recurrent PEG site bleeding. Surgicell noted to be off PEG incision site and wiped away by clinician. Negative PEG lavage  9/6 Eliquis restarted  #UTI w/ bacteremia - on IV Abx    RECS:    -PEG feeds; and local PEG site care  -monitor CBC and BMs   -NO GI objection to AC and monitor clinically  -nothing under PEG bumper please and Abdominal binder on at all times please to prevent accidental PEG trauma/tugging/dislodgement  -continue PPI daily (via PEG)    Discussed with pt at bedside; all questions answered  Discussed with neurosurgery team  further care/ DC planning per primary team. Stable GI issues at this time    Avtar Mixon PA-C  [facility] Teaching GI Service  Available on TEAMS Mon-Fri 8a-4p  After hours and weekend coverage [phone]

## 2024-09-11 NOTE — PROGRESS NOTE ADULT - ASSESSMENT
80 yo male with PMH of HTN, T2DM (diet controlled), HLD, COPD, CKD 3, carcinoid tumor s/p hemicolectomy, nasopharyngeal carcinoma s/p chemo/RT (c/b dysphagia ), IPMN s/p distal pancreatectomy (2018), and paroxysmal Afib on Eliquis, s/p craniotomy 8/6 for resection of hemorrhagic lesion presented to the ED for RUE swelling, found to have RUE DVT involving subclavian, axillary, and brachial veins and PE.     Course also c/b dysphagia- s/p PEG 8/27, c/b bleeding around PEG site and melena- now s/p EGD with no evidence of active bleed.     Course c/b likely vasovagal syncope while on toilet and transferred to NSCU for closer monitoring. No evidence of active bleed. Transferred back to floors on 9/6.    Klebsiella bacteremia. Currently in bed, awake, calm.

## 2024-09-11 NOTE — PROGRESS NOTE ADULT - PROBLEM SELECTOR PROBLEM 1
Pulmonary embolism
Bacteremia
Mass of temporal lobe
Pulmonary embolism
Bacteremia
Pulmonary embolism
Mass of temporal lobe
Pulmonary embolism
Bacteremia
Pulmonary embolism

## 2024-09-11 NOTE — PROGRESS NOTE ADULT - SUBJECTIVE AND OBJECTIVE BOX
SUBJECTIVE:   Patient was seen and evaluated at bedside. Patient is resting in bed and is in no new acute distress.   OVERNIGHT EVENTS:   none   Vital Signs Last 24 Hrs  T(C): 36.4 (11 Sep 2024 10:00), Max: 37.3 (10 Sep 2024 15:53)  T(F): 97.6 (11 Sep 2024 10:00), Max: 99.2 (10 Sep 2024 20:35)  HR: 86 (11 Sep 2024 10:00) (86 - 108)  BP: 168/86 (11 Sep 2024 10:00) (109/66 - 168/86)  BP(mean): --  RR: 18 (11 Sep 2024 10:00) (18 - 18)  SpO2: 94% (11 Sep 2024 10:00) (91% - 98%)    Parameters below as of 11 Sep 2024 10:00  Patient On (Oxygen Delivery Method): room air        PHYSICAL EXAM:    General: No Acute Distress     Neurological:   No Acute Distress     Neurological: Awake, Ox2-3 (name, place, month / initially got year wrong but was able to self correct), PERRL, EOMI, following commands, uppers 5/5, no drift, lowers 5/5    Pulmonary: Clear to Auscultation, No Rales, No Rhonchi, No Wheezes     Cardiovascular: S1, S2, Regular Rate and Rhythm     Gastrointestinal: Soft, Nontender, Nondistended     Incision:   clean and dry   LABS:                        9.5    10.32 )-----------( 164      ( 10 Sep 2024 06:20 )             29.3    09-10    138  |  103  |  20  ----------------------------<  144<H>  4.4   |  24  |  0.72    Ca    8.7      10 Sep 2024 06:19          09-10 @ 07:01 - 09-11 @ 07:00  --------------------------------------------------------  IN: 915 mL / OUT: 0 mL / NET: 915 mL      DRAINS:     MEDICATIONS:  Antibiotics:  cefTRIAXone   IVPB 1000 milliGRAM(s) IV Intermittent every 24 hours    Neuro:  acetaminophen     Tablet .. 650 milliGRAM(s) Oral every 6 hours PRN Mild Pain (1 - 3)  levETIRAcetam  IVPB 250 milliGRAM(s) IV Intermittent every 12 hours    Cardiac:  amLODIPine   Tablet 5 milliGRAM(s) Oral daily  metoprolol tartrate 12.5 milliGRAM(s) Oral every 12 hours    Pulm:  albuterol/ipratropium for Nebulization 3 milliLiter(s) Nebulizer every 6 hours  budesonide 160 MICROgram(s)/formoterol 4.5 MICROgram(s) Inhaler 2 Puff(s) Inhalation two times a day  tiotropium 2.5 MICROgram(s) Inhaler 2 Puff(s) Inhalation daily    GI/:  bisacodyl 5 milliGRAM(s) Oral daily PRN Constipation  pantoprazole   Suspension 40 milliGRAM(s) Oral daily    Other:   apixaban 5 milliGRAM(s) Oral every 12 hours  atorvastatin 10 milliGRAM(s) Oral at bedtime  chlorhexidine 2% Cloths 1 Application(s) Topical daily  dextrose 5%. 1000 milliLiter(s) IV Continuous <Continuous>  dextrose 5%. 1000 milliLiter(s) IV Continuous <Continuous>  dextrose 50% Injectable 25 Gram(s) IV Push once  dextrose 50% Injectable 12.5 Gram(s) IV Push once  dextrose 50% Injectable 25 Gram(s) IV Push once  dextrose Oral Gel 15 Gram(s) Oral once PRN Blood Glucose LESS THAN 70 milliGRAM(s)/deciliter  glucagon  Injectable 1 milliGRAM(s) IntraMuscular once  insulin lispro (ADMELOG) corrective regimen sliding scale   SubCutaneous every 6 hours  Nephro-corine 1 Tablet(s) Oral daily    DIET: [] Regular [] CCD [] Renal [] Puree [] Dysphagia [] Tube Feeds:   glucerna tube feeds   IMAGING:   ACC: [id number] EXAM:  CT ABDOMEN AND PELVIS   ORDERED BY:  ISAIAH JOLLY     PROCEDURE DATE:  09/05/2024          INTERPRETATION:  CLINICAL INFORMATION: rule out GI bleed, tarry stools   Admitting Dxs: I82.409 MED EVAL MCT    COMPARISON: 8.31.24.    CONTRAST/COMPLICATIONS:  IV Contrast: NONE  Oral Contrast: NONE  Complications: None reported at time of study completion    PROCEDURE:  CT of the Abdomen and Pelvis was performed.  Sagittal and coronal reformats were performed.    FINDINGS:    LOWER CHEST: Right greater than left lower lobe airspace opacities. Left   lower lobe nodule no longer visualized.    LIVER: Within normal limits.  BILE DUCTS: Normal caliber.  GALLBLADDER: Within normal limits.  SPLEEN: Within normal limits.  PANCREAS: Distal pancreatectomy.  ADRENALS: Within normal limits.  KIDNEYS/URETERS: A right renal cyst.    BLADDER: Within normal limits.  REPRODUCTIVE ORGANS: Within normal limits.    BOWEL: No bowel obstruction. Right hemicolectomy. Gastrostomy tube is in   place. Small focus of extraluminal air adjacent to the gastrostomy tube.  PERITONEUM/RETROPERITONEUM: Within normal limits.  VESSELS:  Within normal limits.  ABDOMINAL WALL: Within normal limits.  BONES: Within normal limits.    IMPRESSION: Lower lobe airspace opacities of uncertain etiology.            --- End of Report ---            NIHARIKA MANCILLA MD; Attending Radiologist  This document has been electronically signed. Sep  5 2024  8:45AM  ACC: [id number] EXAM:  CT BRAIN   ORDERED BY:  HUANG CHERRY     PROCEDURE DATE:  09/05/2024          INTERPRETATION:  CLINICAL INFORMATION: Vasovagal.    COMPARISON: CT head of 8/20/2024.    CONTRAST:  IV Contrast: NONE  Complications: None reported at time of study completion    TECHNIQUE:  Serial axial images were obtained from the skull base to the   vertex using multi-slice helical technique. Sagittal and coronal   reformats were obtained.    FINDINGS:    VENTRICLES AND SULCI: Age appropriate involutional changes.  INTRA-AXIAL: No mass effect, acute hemorrhage, or midline shift.   Encephalomalacia and gliosis within the right frontal, right parietal,   and left occipital lobes . Chronic appearing right thalamic lacunar   infarct. There are periventricular and subcortical white matter   hypodensities, consistent with microvascular type changes.  EXTRA-AXIAL: No mass or fluid collection. Basal cisterns are normal in   appearance. Stable right parietotemporal dural thickening/calcification   with overlying thin collection subjacent to the craniotomy.    VISUALIZED SINUSES:  Clear.  TYMPANOMASTOID CAVITIES:  Partial opacification of the right inferior   mastoid air cells  VISUALIZED ORBITS: Normal.  CALVARIUM: Right temporoparietal craniotomy.    MISCELLANEOUS: None.      IMPRESSION:  No acute intracranial hemorrhage or mass effect. Grossly stable   postsurgical change. If clinical symptoms persist or worsen, more   sensitive evaluation with brain MRI may be obtained, if no   contraindications exist.        --- End of Report ---            MARELY FORBES MD; Attending Radiologist  This document has been electronically signed. Sep  5 2024  7:12AM  ACC: [id number] EXAM:  DUPLEX SCAN EXT VEINS UPPER BI   ORDERED BY: ERINN MARKS     PROCEDURE DATE:  09/04/2024          INTERPRETATION:  CLINICAL INFORMATION: Follow right arm DVT    COMPARISON: Venous Doppler 8/28/2024    TECHNIQUE: Duplex sonography of the BILATERAL upper extremity veins with   color and spectral Doppler, with and without compression.    FINDINGS:    RIGHT:  Stable persistent clot in the right subclavian and axillary veins.  The right internal jugular, and brachial veins are patent and   compressible where applicable.  The basilic vein (superficial vein) shows   chronic wall thickening but no thrombus.  The cephalic vein (superficial   vein) is not seen.    LEFT:  Thrombus around left subclavian vein line.  The leftinternal jugular, axillary and brachial veins are patent and   compressible where applicable.  The basilic vein (superficial vein) is   thrombosed in the upper arm.  The cephalic vein (superficial vein) is not   seen.    Doppler examination shows normal spontaneous and phasic flow.    IMPRESSION:  Stable persistent clot in the right subclavian and axillary veins.  Thrombus around left subclavian vein line , new.  Left basilic vein thrombosis.      --- End of Report ---            BALJINDER URBINA MD; Attending Radiologist  This document has been electronically signed. Sep  4 2024  5:34PM  ACC: [id number] EXAM:  CT ANGIO CHEST [facility]   ORDERED BY: JACKELYN ARNOLD     PROCEDURE DATE:  08/16/2024          INTERPRETATION:  CLINICAL INFORMATION: Right subclavian DVT, rule out PE    COMPARISON: CT chest 5/7/2024    CONTRAST/COMPLICATIONS:  IV Contrast: Omnipaque 350  60 cc administered   40 cc discarded  Oral Contrast: NONE  Complications: None reported at time of study completion    PROCEDURE:  CT Angiogram of the chest was obtained with intravenous contrast. Three   dimensional maximum intensity projection (MIP) images were generated.    FINDINGS:    AIRWAYS/LUNGS/PLEURA: Emphysema. Bronchiectasis and mucoid impaction of   right middle lobe and bilateral lower lobe bronchi new from prior exam.   Tree-in-bud opacities within the right upper lobe and right middle lobe.   Mild bibasilar atelectasis. Previously noted bandlike atelectasis within   the anteromedial segment of left lower lobe is unchanged. No pleural   effusion.    MEDIASTINUM AND JAVIER: No lymphadenopathy.    PULMONARY ANGIOGRAM: Small emboli within the segmental branches of right   upper lobe pulmonary artery.    VESSELS: Thoracic and abdominal aortic calcifications. Small amount of   reflux of contrast into the IVC.    HEART: Heart size is normal. No pericardial effusion. No definite CT   evidence of right heart strain. Coronary calcifications.    VISUALIZED UPPER ABDOMEN: 3 x 2.2 cm right renal cyst. Partial   pancreatectomy.`    CHEST WALL AND BONES: Left chest loop recorder. Degenerative joint   disease with flowing syndesmophytes.    IMPRESSION:    1.  Segmental emboli within right upper lobe pulmonary artery. No   significant CT evidence of right heart strain.    2.  Emphysema. New mucoid impactions within bilateral lower lungs and   tree-in-bud opacities likely representing COPD exacerbation. Recommend   follow-up chest CT in 3 months to determine resolution.    Findings were discussed with primary team provider Jackelyn Arnold PA-C.    --- End of Report ---          SHAYE AGUIRRE MD; Resident Radiologist  This document has been electronically signed.  REZA DEMARCO MD; Attending Radiologist  This document has been electronically signed. Aug 16 2024 12:48PM

## 2024-09-11 NOTE — PROGRESS NOTE ADULT - THIS PATIENT HAS THE FOLLOWING CONDITION(S)/DIAGNOSES ON THIS ADMISSION:
Functional Quadriplegia/Cerebral Edema
None
Functional Quadriplegia/Cerebral Edema
None
post-op anemia/Acute Blood Loss Anemia
Cerebral Edema
None
post-op anemia, stable/Acute Blood Loss Anemia
Functional Quadriplegia/Cerebral Edema
None
anemia from PEG bleeding, bleeding resolved, H/H stable/Acute Blood Loss Anemia
None
anemia from PEG bleeding, bleeding resolved, H/H stable/Acute Blood Loss Anemia

## 2024-09-11 NOTE — PROGRESS NOTE ADULT - PROBLEM SELECTOR PLAN 4
- failed MBS on 8/19  - s/p NGT placement with ENT on 8/19  - failed repeat MBS on 8/22  - after lengthy discussion with patient, wife, daughter on 8/23, patient decided to proceed with PEG placement  - s/p PEG placement 8/27  - start tube feed via PEG
syncopal episode while straining to move his bowels on toilet 9/4 - likely vasovagal exacerbated by on/off NPO status due to recurrent concerns for bleed and/or need for procedures  - normally would recommend orthostatics but unclear utility given BP checks only in LE  - added free water 200cc q8h given mild hypovolemia on exam  - maintain euvolemia
- failed MBS on 8/19  - s/p NGT placement with ENT on 8/19  - failed repeat MBS on 8/22  - after lengthy discussion with patient, wife, daughter on 8/23, patient decided to proceed with PEG placement  - s/p PEG placement 8/27 but now with episodes of bleeding around PEG with acute blood loss anemia  - transfuse 1u PRBC and monitor CBC  - hold tube feed and lovenox for now  - d/w GI
c/w metoprolol 25mg ER - will need a new script on dc. only had 12.5mg dose at home from prior before dose increased last admission     Started on norvasc 5mg last admission - patient was taking this per wife. Hold for now- last . Resume if SBP > 140 persistently.
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
c/w metoprolol 25mg ER - will need a new script on dc. only had 12.5mg dose at home from prior before dose increased last admission     Started on norvasc 5mg last admission - patient was taking this per wife. Hold for now- last . Resume if SBP > 140 persistently.
- failed MBS on 8/19  - s/p NGT placement with ENT on 8/19  - failed repeat MBS on 8/22  - after lengthy discussion with patient, wife, daughter on 8/23, patient decided to proceed with PEG placement  - s/p PEG placement 8/27 but now with episodes of bleeding around PEG with acute blood loss anemia on 8/30  - s/p 1u PRBC on 8/30  - tube feeds resumed and tolerating. monitor PEG site for further bleed  - hold therapeutic lovenox for now  - GI - follow-up aprpeciated
syncopal episode while straining to move his bowels on toilet 9/4 - likely vasovagal exacerbated by on/off NPO status due to recurrent concerns for bleed and/or need for procedures  - normally would recommend orthostatics but unclear utility given BP checks only in LE  - added free water 200cc q8h given mild hypovolemia on exam  - maintain euvolemia
s/p craniotomy 8/6 for resection hemorrhagic lesion  Completed steroid taper per neurosurgery  - CT Head on 8/18 stable with no bleed  - on keppra 250mg BID (was 500mg BID x 7 days post op but 250mg was home dose prior)
s/p craniotomy 8/6 for resection hemorrhagic lesion  Completed steroid taper per neurosurgery  - CT Head on 8/18 stable with no bleed  - on keppra 250mg BID (was 500mg BID x 7 days post op but 250mg was home dose prior)
- failed MBS on 8/19  - s/p NGT placement with ENT on 8/19  - tube feeds as per nutrition recs  - failed repeat MBS on 8/22  - after lengthy discussion with patient, wife, daughter on 8/23, patient decided to proceed with PEG placement  - now pending PEG placement
failed MBS on 8/19  - s/p NGT placement with ENT on 8/19  -  tube feeds as per nutrition recs  - repeat SLP eval  - may need eventual GI eval for PEG placement if dysphagia persists
s/p craniotomy 8/6 for resection hemorrhagic lesion  Completed steroid taper per neurosurgery  - CT Head on 8/18 stable with no bleed  - on keppra 250mg BID (was 500mg BID x 7 days post op but 250mg was home dose prior)
failed MBS on 8/19  - s/p NGT placement with ENT on 8/19  - tube feeds as per nutrition recs  - repeat SLP eval on 8/22  - may need eventual GI eval for PEG placement if dysphagia persists
syncopal episode while straining to move his bowels on toilet 9/4 - likely vasovagal exacerbated by on/off NPO status due to recurrent concerns for bleed and/or need for procedures  - normally would recommend orthostatics but unclear utility given BP checks only in LE  - added free water 200cc q8h given mild hypovolemia on exam  - maintain euvolemia
- failed MBS on 8/19 and repeat on 8/22  - after lengthy discussion with patient, wife, daughter on 8/23, patient decided to proceed with PEG placement  - s/p PEG placement 8/27 but with episodes of bleeding around PEG with acute blood loss anemia on 8/30 - now resolved  - c/w tube feeds  - GI follow-up appreciated
syncopal episode while straining to move his bowels on toilet 9/4 - likely vasovagal exacerbated by on/off NPO status due to recurrent concerns for bleed and/or need for procedures  - normally would recommend orthostatics but unclear utility given BP checks only in LE  - added free water 200cc q8h given mild hypovolemia on exam  - maintain euvolemia
- failed MBS on 8/19  - s/p NGT placement with ENT on 8/19  - tube feeds as per nutrition recs  - failed repeat MBS on 8/22  - after lengthy discussion with patient, wife, daughter on 8/23, patient decided to proceed with PEG placement  - pending PEG placement tentatively scheduled for 8/27
s/p craniotomy 8/6 for resection hemorrhagic lesion  Completed steroid taper per neurosurgery  - CT Head on 8/18 stable with no bleed  - on keppra 250mg BID (was 500mg BID x 7 days post op but 250mg was home dose prior)
syncopal episode while straining to move his bowels on toilet 9/4 - likely vasovagal exacerbated by on/off NPO status due to recurrent concerns for bleed and/or need for procedures  - normally would recommend orthostatics but unclear utility given BP checks only in LE  - added free water 200cc q8h given mild hypovolemia on exam  - maintain euvolemia

## 2024-09-11 NOTE — PROGRESS NOTE ADULT - NS ATTEND AMEND GEN_ALL_CORE FT
79yM with PEG placed yesterday  TF ok  PEG site looks ok
Events that transpired over last 24 hours were reviewed.  The patient denies any cardiopulmonary complaints, fevers, chills or sweats.  Agree with 14 point review of systems and physical examination noted above.  No upper or lower extremity edema noted.  Plan for patient to get GI scope later today.  Not on anticoagulation therapy.  No evidence of phlegmasia.  Continue to closely monitor upper lower extremities for signs of swelling or changes in pulses.  Recommend reinitiation of anticoagulation therapy postsurgery as per GI/neurosurgery team.  Continue pneumatic compression to bilateral lower extremities.  Recommend surveillance upper and lower extremity duplex on 9/4.    I have spent 35 minutes of time on the encounter which excludes teaching and separately reported services.
79yM with PEG on 8/27  small amt of blood at the PEG site  tolearting TF
Events that transpired over last 24 hours were reviewed.  No upper or lower extremity edema.  Agree with 14 point review of systems and physical examination noted above.  Patient currently on Lovenox 1 mg/kilogram twice daily.  Closely monitor for signs of bleeding.  No evidence of swelling, phlegmasia or changes in pulses.  Post PEG once the patient is clinically doing well and safe from a bleeding perspective recommend transition to DOAC/Eliquis 5 mg twice a day.    I have spent 35 minutes of time on the encounter which excludes teaching and separately reported services.
Medical history as above  #Dysphagia -sp EGD + PEG placement 8/27/24 c/b PEG site bleeding  suspect PEG tract bleeding. Per CT patient may have some intraabdominal collaterals that were within the PEG tract. Ct scan no active bleed, gastric varices noted  no evidence of bleeding at this time, tolerating peg feeds.    plan ppi daily  management as above.
medical and surgical history as above now with dysphagia, pt probable candidate for peg    plan peg placement tomorrow  laxative as needed as above.
79yM with PEG site bleeding controlled, confirmed by EGD  cont PPi and GT feedings
79yM with recent craniotomy and PEG c/b bleeding  Now satble.Tolearting TF
s/p gi bleed, black stool s/p RRT, hgb increased post transfusion EGD results as above, no active bleeding source found    plan ppi bid  s/p peg and feeds as above
Pt seen in evening 9/4/24.  At time, pt was in bathroom with nurse and felt dizzy with syncope.  Pt was not diaphoretic, but was unconscious for a few minutes, then improved to speaking, GARNER, asking questions.  There was copious melanic diarrhea.  Pt had a EGD 2 days prior with no evidence of bleeding.  Hb 13 today earlier and was hypertensive after event.  RRT was called and NSCU consult was called.  Pt was transported to NSCU for closer hemodynamic monitoring and monitoring of H/H.  Pt has been taken off the therapeutic Lovenox until we can assure that there is no further drop in H/H.  Spoke to wife over the phone regarding the events of the evening and transfer to ICU as a precaution.
Agree with above note. Note edited appropriately as below. Patient seen 8/30/24 and note signed 8/31/24. Briefly, Pt. is a 78 yo male with PMH of HTN, T2DM (diet controlled), HLD, COPD, CKD 3, carcinoid tumor s/p hemicolectomy, nasopharyngeal carcinoma s/p chemo/RT (c/b dysphagia ), IPMN s/p distal pancreatectomy (2018), and paroxysmal Afib on Eliquis s/p craniotomy 8/6 for resection of hemorrhagic lesion. Now presented to the ED for RUE swelling, found to have RUE DVT involving subclavian, axillary, and brachial veins and PE.    Pt was known to have cough w/ PO previously and was evaluated w/ OP MBS earlier this year - using thickened fluids at home   Post op course further complicated by Dysphagia - failed multiple SLP evaluations with recs: NPO   Pt and family are now agreeable to PEG placement for which GI is consulted  GI Issues:  1. Dysphagia -sp EGD + PEG placement 8/27/24 c/b PEG site bleeding  8/28 scant dried blood under PEG bumper; no active bleeding, induration, swelling or edema  PEG bumper between 3.5 and 4 cm at skin level  8/29 scant oozing, slight downtrend in Hgb. Negative PEG lavage. Surgicell placed at PEG tract  8/29 PM - recurrent PEG site bleeding; Surgicell intact. Resolved without further intervention. Hgb stable 6pm (9.4 ->9.8)  8/30 AM minimal ooze at PEG site, no active bleeding. Re-assessed at 12:40pm- Notified by Medicine attending of AM Hgb 9.8 ->8.5. No melena +PEG lavage: pink tinged return with water/feeds. Previously placed surgicell removed, minimal oozing noted at ends of PEG site incision - new Surgicell strip placed at PEG incision under PEG bumper, lightly tucked into ends (medical and lateral aspects of PEG site incision)   No visible oozing, surgicell noted to be clean    #DVT+PE   AC held x 24 hours for PEG  AC (Lovenox) restarted 8/28 pm.   8/29 AM scant PEG site oozing, sl Hgb downtrend. Topical surgicell applied at PEG site. 8/29 PM CBC stable  8/30 PM AC held 2/2 PEG site bleeding (see above)    Plan:  -Keep surgicell in place; do not remove  -Hold PEG feeds for now  -check repeat CBC and type and cross   -Hold AC per d/w Medicine Hospitalist  -DC to rehab cancelled   -Abdominal binder on at all times please to prevent accidental PEG trauma/tugging/dislodgement  -PPI for GI ppx    Elpidio Rich MD  [facility]

## 2024-09-11 NOTE — PROGRESS NOTE ADULT - PROBLEM SELECTOR PLAN 11
DVT ppx: Eliquis    Having multiple BMs- green and semi-formed per nurse. Likely due to tube feeds. Banatrol added- monitor.    Dispo: eventual PEGGY    Hypophosphatemia- replaced
DVT ppx: Eliquis    Multiple BM over the weekend- holding Miralax and Senna. Green and semi-formed per nurse. Likely due to tube feeds. Banatrol added- monitor.    Dispo: eventual PEGGY    Hypophosphatemia- replaced
DVT ppx: Eliquis    Multiple BMs- holding Miralax and Senna. Green and semi-formed per nurse. Likely due to tube feeds. Add banana flakes.     Dispo: eventual PEGGY    Hypophosphatemia- replace and recheck.
DVT ppx: Eliquis    Multiple BMs reported overnight. Hold Miralax for now.     Dispo: eventual PEGGY
DVT ppx: Eliquis    Having multiple BMs- green and semi-formed per nurse. Likely due to tube feeds. Banatrol added but persistent. Add Metamucil bid for bulking.     Dispo: PEGGY

## 2024-09-11 NOTE — PROGRESS NOTE ADULT - PROBLEM SELECTOR PLAN 5
- CTA also w/ Emphysema. New mucoid impactions within bilateral lower lungs and tree-in-bud opacities likely representing COPD exacerbation. Recommend follow-up chest CT in 3 months to determine resolution.  - clinically patient without wheeze  - c/w chest PT, incentive spirometer, acapella  - home regimen: anoro + proventil  - c/w symbicort and spiriva while admitted. can resume home inhalers on discharge
s/p craniotomy 8/6 for resection hemorrhagic lesion  Completed steroid taper per neurosurgery  - CT Head on 8/18 stable with no bleed  - on keppra 250mg BID (was 500mg BID x 7 days post op but 250mg was home dose prior)
BP above goal.   - was on Toprol XL 25mg PO daily but unable to give via NGT  - c/w metoprolol tartrate 12.5mg BID via NGT  - c/w amlodipine 5mg daily via NGT
- was on Toprol XL 25mg PO daily but unable to give via PEG  - c/w metoprolol tartrate 12.5mg BID   - c/w amlodipine 5mg daily  - monitor BP
- failed MBS on 8/19 and repeat on 8/22  - after lengthy discussion with patient, wife, daughter on 8/23, patient decided to proceed with PEG placement  - s/p PEG placement 8/27 but with episodes of bleeding around PEG with acute blood loss anemia on 8/30 - now resolved  - c/w tube feeds  -Abdominal binder on at all times please to prevent accidental PEG trauma/tugging/dislodgement  - GI follow-up appreciated
BP above goal.   - was on Toprol XL 25mg PO daily but unable to give via NGT  - c/w metoprolol tartrate 12.5mg BID via NGT  - c/w amlodipine 5mg daily via NGT
- failed MBS on 8/19 and repeat on 8/22  - after lengthy discussion with patient, wife, daughter on 8/23, patient decided to proceed with PEG placement  - s/p PEG placement 8/27 but with episodes of bleeding around PEG with acute blood loss anemia on 8/30 - now resolved  - c/w tube feeds  -Abdominal binder on at all times please to prevent accidental PEG trauma/tugging/dislodgement  - GI follow-up appreciated
- was on Toprol XL 25mg PO daily but unable to give via NGT  - c/w metoprolol tartrate 12.5mg BID   - c/w amlodipine 5mg daily  - monitor BP
c/w metoprolol   Home eliquis held post op last admission.  FD Lovenox as above
c/w metoprolol   Home eliquis held post op last admission.  Heparin gtt as above
- CTA also w/ Emphysema. New mucoid impactions within bilateral lower lungs and tree-in-bud opacities likely representing COPD exacerbation. Recommend follow-up chest CT in 3 months to determine resolution.  - clinically patient without wheeze  - c/w chest PT, incentive spirometer, acapella  - home regimen: anoro + proventil  - c/w symbicort and spiriva while admitted. can resume home inhalers on discharge
- failed MBS on 8/19 and repeat on 8/22  - after lengthy discussion with patient, wife, daughter on 8/23, patient decided to proceed with PEG placement  - s/p PEG placement 8/27 but with episodes of bleeding around PEG with acute blood loss anemia on 8/30 - now resolved  - c/w tube feeds  -Abdominal binder on at all times please to prevent accidental PEG trauma/tugging/dislodgement  - GI follow-up appreciated
BP above goal.   - was on Toprol XL 25mg PO daily but unable to give via NGT  - c/w metoprolol tartrate 12.5mg BID   - c/w amlodipine 5mg daily
BP above goal.   - was on Toprol XL 25mg PO daily but unable to give via NGT  - c/w metoprolol tartrate 12.5mg BID via NGT  - c/w amlodipine 5mg daily via NGT
BP above goal.   - was on Toprol XL 25mg PO daily but unable to give via NGT  - c/w metoprolol tartrate 12.5mg BID via NGT  - c/w amlodipine 5mg daily via NGT
- CTA also w/ Emphysema. New mucoid impactions within bilateral lower lungs and tree-in-bud opacities likely representing COPD exacerbation. Recommend follow-up chest CT in 3 months to determine resolution.  - clinically patient without wheeze  - c/w chest PT, incentive spirometer, acapella  - home regimen: anoro + proventil  - c/w symbicort and spiriva while admitted. can resume home inhalers on discharge
BP above goal.   - was on Toprol XL 25mg PO daily but unable to give via NGT  - start metoprolol tartrate 12.5mg BID via NGT  - if still with suboptimal BP control, will start amlodipine 5mg PO via NGT as wel
- CTA also w/ Emphysema. New mucoid impactions within bilateral lower lungs and tree-in-bud opacities likely representing COPD exacerbation. Recommend follow-up chest CT in 3 months to determine resolution.  - clinically patient without wheeze  - c/w chest PT, incentive spirometer, acapella  - home regimen: anoro + proventil  - c/w symbicort and spiriva while admitted. can resume home inhalers on discharge
- failed MBS on 8/19 and repeat on 8/22  - after lengthy discussion with patient, wife, daughter on 8/23, patient decided to proceed with PEG placement  - s/p PEG placement 8/27 but with episodes of bleeding around PEG with acute blood loss anemia on 8/30 - now resolved  - c/w tube feeds  -Abdominal binder on at all times please to prevent accidental PEG trauma/tugging/dislodgement  - GI follow-up appreciated
- failed MBS on 8/19 and repeat on 8/22  - after lengthy discussion with patient, wife, daughter on 8/23, patient decided to proceed with PEG placement  - s/p PEG placement 8/27 but with episodes of bleeding around PEG with acute blood loss anemia on 8/30 - now resolved  - c/w tube feeds  -Abdominal binder on at all times please to prevent accidental PEG trauma/tugging/dislodgement  - GI follow-up appreciated

## 2024-09-11 NOTE — PROGRESS NOTE ADULT - PROBLEM SELECTOR PLAN 8
- A1c 6.3, diet controlled  - FS on BMP controlled off of ISS  - c/w home simvastatin 20mg - mild transaminitis noted but improving on repeat
DVT ppx: therapeutic lovenox on hold  last BM 8/28    Dispo: d/c planning to [facility] when medically stable
- A1c 6.3, diet controlled  - FS on BMP controlled off of ISS  - c/w home simvastatin 20mg - mild transaminitis noted but improving on repeat
- was on Toprol XL 25mg PO daily but unable to give via PEG  - c/w metoprolol tartrate 12.5mg BID   - Amlodipine was stopped as BP low normal- BP now high- low dose resumed on 9/8, increased to 5mg today, monitor
- was on Toprol XL 25mg PO daily but unable to give via PEG  - c/w metoprolol tartrate 12.5mg BID   - Amlodipine was stopped as BP low normal- BP now high- low dose resumed, monitor.
DVT ppx: therapeutic lovenox  last BM 8/21 but endorses incomplete emptying. miralax increased to BID.    Dispo: outpatient PT
- was on Toprol XL 25mg PO daily but unable to give via PEG  - c/w metoprolol tartrate 12.5mg BID   - Amlodipine stopped as BP low normal
- was on Toprol XL 25mg PO daily but unable to give via PEG  - c/w metoprolol tartrate 12.5mg BID   - Amlodipine was stopped as BP low normal- BP now high- low dose resumed on 9/8, increased to 5mg on 9/9
- A1c 6.3, diet controlled  - FS on BMP controlled off of ISS  - c/w home simvastatin 20mg - mild transaminitis noted but improving on repeat
- was on Toprol XL 25mg PO daily but unable to give via PEG  - c/w metoprolol tartrate 12.5mg BID   - Amlodipine was stopped as BP low normal- BP now high- low dose resumed on 9/8, increased to 5mg on 9/9, monitor
DVT ppx: therapeutic lovenox    Dispo: outpatient PT
DVT ppx: therapeutic lovenox on hold  last BM 8/28    Dispo: d/c planning to [facility] when medically stable
DVT ppx: therapeutic lovenox    Dispo: outpatient PT
- A1c 6.3, diet controlled  - FS on BMP controlled off of ISS  - c/w home simvastatin 20mg - mild transaminitis noted but improving on repeat
DVT ppx: therapeutic lovenox  last BM 8/28    Dispo: d/c planning to [facility]    will follow periodically. call with questions.
DVT ppx: therapeutic lovenox  last BM 8/21    Dispo: outpatient PT after PEG placement
DVT ppx: therapeutic lovenox  last BM 8/25    Dispo: d/c planning possibly to home with outpatient PT after PEG placement    will follow periodically. call with questions.
- c/w metoprolol as above  - Home eliquis held post op last admission  - was on therapeutic lovenox but on hold given bleeding around PEG and ongoing anemia  - eliquis resumed on 9/6

## 2024-09-11 NOTE — PROGRESS NOTE ADULT - PROBLEM SELECTOR PROBLEM 6
HTN (hypertension)
Paroxysmal atrial fibrillation
Mass of temporal lobe
Paroxysmal atrial fibrillation
Mass of temporal lobe
Paroxysmal atrial fibrillation
Mass of temporal lobe
Type 2 diabetes mellitus
Mass of temporal lobe
Mass of temporal lobe
Paroxysmal atrial fibrillation
Type 2 diabetes mellitus
Paroxysmal atrial fibrillation
Paroxysmal atrial fibrillation
HTN (hypertension)
COPD without exacerbation

## 2024-09-11 NOTE — PROGRESS NOTE ADULT - PROBLEM SELECTOR PROBLEM 4
Dysphagia
Vasovagal syncope
Dysphagia
Type 2 diabetes mellitus
HTN (hypertension)
Type 2 diabetes mellitus
Mass of temporal lobe
Mass of temporal lobe
Vasovagal syncope
Dysphagia
Dysphagia
HTN (hypertension)
Dysphagia
Mass of temporal lobe
Dysphagia
Vasovagal syncope
Dysphagia
Vasovagal syncope
Mass of temporal lobe
Vasovagal syncope

## 2024-09-11 NOTE — PROGRESS NOTE ADULT - PROBLEM SELECTOR PROBLEM 5
HTN (hypertension)
HTN (hypertension)
Paroxysmal atrial fibrillation
HTN (hypertension)
Dysphagia
COPD without exacerbation
Paroxysmal atrial fibrillation
COPD without exacerbation
HTN (hypertension)
HTN (hypertension)
COPD without exacerbation
HTN (hypertension)
COPD without exacerbation
Dysphagia
COPD without exacerbation
Dysphagia
COPD without exacerbation
Mass of temporal lobe

## 2024-09-11 NOTE — PROGRESS NOTE ADULT - PROBLEM SELECTOR PROBLEM 10
Type 2 diabetes mellitus
Prophylactic measure

## 2024-09-11 NOTE — PROGRESS NOTE ADULT - ASSESSMENT
HPI:  ASSESSMENT AND PLAN: 79M hx recent admission for and R crani for lesion w/ Dr. 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 Today presents to ED for RUE doppler w/ DVT involving the subclavian, axillary and brachial veins and Superficial thrombus in the right basilic vein. Plts/coags wnl.     8/27 s/p PEG placement with GI (Dr. Savage)    NEURO:   - Continue neuro checks q 4  - Keppra for seizure prophylaxis  - Tylenol prn for pain control  - PT: Home PT w/ RW, PMR saw and recommended [facility]    PULM:   - On room air, O2Sat>94%  - Incentive spirometry  - 8/16 CTA Chest: segmental emboli w/in right upper lobe pulm artery, no significant evidence of right heart strain; emphysema, new mucoid impaction likely COPD exacerbations - f/u CT Chest in 3 months  - Continue Duoneb, Symbicort, Spiriva     CV:  - -160, blood pressure better last night  - Continue Norvasc 5mg daily for HTN  - Continue Metoprolol 12.5mg BID for Afib  - Continue Lipitor for HLD  - 8/16 TTE: LV systolic fxn normal w/ EF 71%, normal LV diastolic fxn, normal RV systolic fxn normal, LV systolic fxn is normal    ENDO:   - A1c 6.3, continue Glucerna TF, fingersticks stable    HEME/ONC & DVT ppx:               - 9/10 CBC: H/H stable, last transfused with PRBC 9/4. Currently on Eliquis 5mg BID for PE on CTA Chest 8/16 and persistent DVT in right subclavian vein and non-occlusive right axillary vein, right basilic vein DVT and left subclavian and basilic DVT, UE Dopp 9/4. Vascular Cardiology following.     RENAL:   - IVL  - 9/10 BMP stable  - Voiding    ID:   - Afebrile currently, will monitor for fevers  - 9/10 Leukocytosis resolved  - ID following: patient found to have UTI (9/7 UCX >100K GNR / Klebsiella) and bacteremia (9/7 4/4 GNR / Klebsiella PNA), changed to Ceftriaxone 1G x 24hrs yesterday 9/9. Will f/u sensitivities to Ucx   -Id changed iv abx to po cipro today   -ID cleared patient for dc to rehab   - F/u repeat BCX sent on 9/8-negative to date     GI:    - Currently has PEG placed by GI on 8/27, complicated by bleeding around the site that has since resolved, did require transfusions (last done 9/4) however H/H currently stable. GI did EGD 9/3 which shows unremarkable EGD, no signs of portal hypertension, suspect resolved bleed from PEG tract. Per CT patient may have some intraabdominal collaterals that were within the PEG tract.  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  - Continue Abdomina binder around PEG site  - Continue Protonix 40mg BID for GI ppx  - On Free Water 200q8 for hydration- will dc upon dc to rehab   - Dulcolax prn for bowel movements, standing BM meds discontinued for loose BM's, currently has Banatrol TF, last BM 9/9 and per RN has had no diarrhea overnight.     Appreciate Hospitalist following for co-medical management.     More than 30 minutes were spent educating the patient and wife regarding condition, medications, follow up plans, signs and symptoms to be concerned with, preparing paperwork, and questions answered regarding discharge.     DISCHARGE PLANNING:   [facility] once patient is medically stable    Plan to be discussed w/ Dr. Branham  [id number]   HPI:  ASSESSMENT AND PLAN: 79M hx recent admission for and R crani for lesion w/ Dr. 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 Today presents to ED for RUE doppler w/ DVT involving the subclavian, axillary and brachial veins and Superficial thrombus in the right basilic vein. Plts/coags wnl.     8/27 s/p PEG placement with GI (Dr. Savage)    NEURO:   - Continue neuro checks q 4  - Keppra for seizure prophylaxis  - Tylenol prn for pain control  - PT: Home PT w/ RW, PMR saw and recommended [facility]    PULM:   - On room air, O2Sat>94%  - Incentive spirometry  - 8/16 CTA Chest: segmental emboli w/in right upper lobe pulm artery, no significant evidence of right heart strain; emphysema, new mucoid impaction likely COPD exacerbations - f/u CT Chest in 3 months  - Continue Duoneb, Symbicort, Spiriva     CV:  - -160, blood pressure better last night  - Continue Norvasc 5mg daily for HTN  - Continue Metoprolol 12.5mg BID for Afib  - Continue Lipitor for HLD  - 8/16 TTE: LV systolic fxn normal w/ EF 71%, normal LV diastolic fxn, normal RV systolic fxn normal, LV systolic fxn is normal    ENDO:   - A1c 6.3, continue Glucerna TF, fingersticks stable    HEME/ONC & DVT ppx:               - 9/10 CBC: H/H stable, last transfused with PRBC 9/4. Currently on Eliquis 5mg BID for PE on CTA Chest 8/16 and persistent DVT in right subclavian vein and non-occlusive right axillary vein, right basilic vein DVT and left subclavian and basilic DVT, UE Dopp 9/4. Vascular Cardiology following.     RENAL:   - IVL  - 9/10 BMP stable  - Voiding    ID:   - Afebrile currently, will monitor for fevers  - 9/10 Leukocytosis resolved  - ID following: patient found to have UTI (9/7 UCX >100K GNR / Klebsiella) and bacteremia (9/7 4/4 GNR / Klebsiella PNA), changed to Ceftriaxone 1G x 24hrs yesterday 9/9. Will f/u sensitivities to Ucx   -Id changed iv abx to po cipro today   -ID cleared patient for dc to rehab   - F/u repeat BCX sent on 9/8-negative to date     GI:    - Currently has PEG placed by GI on 8/27, complicated by bleeding around the site that has since resolved, did require transfusions (last done 9/4) however H/H currently stable. GI did EGD 9/3 which shows unremarkable EGD, no signs of portal hypertension, suspect resolved bleed from PEG tract. Per CT patient may have some intraabdominal collaterals that were within the PEG tract.  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  - Continue Abdomina binder around PEG site  - Continue Protonix 40mg BID for GI ppx  - On Free Water 200q8 for hydration- will dc upon dc to rehab   - Dulcolax prn for bowel movements, standing BM meds discontinued for loose BM's, currently has Banatrol TF, last BM 9/9 and per RN has had no diarrhea overnight.     Appreciate Hospitalist following for co-medical management.     More than 30 minutes were spent educating the patient and wife regarding condition, medications, follow up plans, signs and symptoms to be concerned with, preparing paperwork, and questions answered regarding discharge.     DISCHARGE PLANNING:   [facility] once patient is medically stable    Plan to be discussed w/ Dr. Branham  [id number]   36 minutes spent for patient care and discussed care with hospitalist and infectious disease.

## 2024-09-11 NOTE — PROGRESS NOTE ADULT - PROBLEM SELECTOR PLAN 10
DVT ppx: therapeutic lovenox on hold. SCDs in interim  last BM 9/1    Dispo: eventual [facility]
- A1c 6.3, diet controlled    - c/w home simvastatin 20mg - mild transaminitis noted but improving on repeat

## 2024-09-11 NOTE — PROGRESS NOTE ADULT - PROBLEM SELECTOR PROBLEM 7
Type 2 diabetes mellitus
Paroxysmal atrial fibrillation
HTN (hypertension)
Type 2 diabetes mellitus
Type 2 diabetes mellitus
Paroxysmal atrial fibrillation
COPD without exacerbation
Type 2 diabetes mellitus
Type 2 diabetes mellitus
Paroxysmal atrial fibrillation
Prophylactic measure
Prophylactic measure
COPD without exacerbation
COPD without exacerbation
Type 2 diabetes mellitus
Paroxysmal atrial fibrillation
COPD without exacerbation
COPD without exacerbation

## 2024-09-11 NOTE — PROGRESS NOTE ADULT - SUBJECTIVE AND OBJECTIVE BOX
Patient is a 79y old  Male who presents with a chief complaint of post-operative DVT (11 Sep 2024 10:15)      SUBJECTIVE / OVERNIGHT EVENTS: Frequent soft BMs    MEDICATIONS  (STANDING):  albuterol/ipratropium for Nebulization 3 milliLiter(s) Nebulizer every 6 hours  amLODIPine   Tablet 5 milliGRAM(s) Oral daily  apixaban 5 milliGRAM(s) Oral every 12 hours  atorvastatin 10 milliGRAM(s) Oral at bedtime  budesonide 160 MICROgram(s)/formoterol 4.5 MICROgram(s) Inhaler 2 Puff(s) Inhalation two times a day  cefTRIAXone   IVPB 1000 milliGRAM(s) IV Intermittent every 24 hours  chlorhexidine 2% Cloths 1 Application(s) Topical daily  dextrose 5%. 1000 milliLiter(s) (50 mL/Hr) IV Continuous <Continuous>  dextrose 5%. 1000 milliLiter(s) (100 mL/Hr) IV Continuous <Continuous>  dextrose 50% Injectable 25 Gram(s) IV Push once  dextrose 50% Injectable 25 Gram(s) IV Push once  dextrose 50% Injectable 12.5 Gram(s) IV Push once  glucagon  Injectable 1 milliGRAM(s) IntraMuscular once  insulin lispro (ADMELOG) corrective regimen sliding scale   SubCutaneous every 6 hours  levETIRAcetam  IVPB 250 milliGRAM(s) IV Intermittent every 12 hours  metoprolol tartrate 12.5 milliGRAM(s) Oral every 12 hours  Nephro-corine 1 Tablet(s) Oral daily  pantoprazole   Suspension 40 milliGRAM(s) Oral daily  tiotropium 2.5 MICROgram(s) Inhaler 2 Puff(s) Inhalation daily    MEDICATIONS  (PRN):  acetaminophen     Tablet .. 650 milliGRAM(s) Oral every 6 hours PRN Mild Pain (1 - 3)  bisacodyl 5 milliGRAM(s) Oral daily PRN Constipation  dextrose Oral Gel 15 Gram(s) Oral once PRN Blood Glucose LESS THAN 70 milliGRAM(s)/deciliter      CAPILLARY BLOOD GLUCOSE      POCT Blood Glucose.: 150 mg/dL (11 Sep 2024 05:22)  POCT Blood Glucose.: 143 mg/dL (10 Sep 2024 23:49)  POCT Blood Glucose.: 133 mg/dL (10 Sep 2024 17:08)  POCT Blood Glucose.: 127 mg/dL (10 Sep 2024 11:57)    I&O's Summary    10 Sep 2024 07:01  -  11 Sep 2024 07:00  --------------------------------------------------------  IN: 915 mL / OUT: 0 mL / NET: 915 mL        PHYSICAL EXAM:  T(C): 37.2 (09-11-24 @ 04:43), Max: 37.3 (09-10-24 @ 15:53)  HR: 90 (09-11-24 @ 04:43) (89 - 108)  BP: 127/74 (09-11-24 @ 04:43) (109/66 - 155/74)  RR: 18 (09-11-24 @ 04:43) (18 - 18)  SpO2: 97% (09-11-24 @ 04:43) (91% - 98%)  CONSTITUTIONAL: NAD, well-developed, well-groomed    In bed, awake, RRR, abdomen soft, PEG in place, good air entry anteriorly, moving all extremities    LABS:                        9.5    10.32 )-----------( 164      ( 10 Sep 2024 06:20 )             29.3     09-10    138  |  103  |  20  ----------------------------<  144<H>  4.4   |  24  |  0.72    Ca    8.7      10 Sep 2024 06:19            Urinalysis Basic - ( 10 Sep 2024 06:19 )    Color: x / Appearance: x / SG: x / pH: x  Gluc: 144 mg/dL / Ketone: x  / Bili: x / Urobili: x   Blood: x / Protein: x / Nitrite: x   Leuk Esterase: x / RBC: x / WBC x   Sq Epi: x / Non Sq Epi: x / Bacteria: x        RADIOLOGY & ADDITIONAL TESTS:    Imaging Personally Reviewed:    Consultant(s) Notes Reviewed:      Care Discussed with Consultants/Other Providers: Nsx

## 2024-09-11 NOTE — PROGRESS NOTE ADULT - PROBLEM SELECTOR PROBLEM 3
Mass of temporal lobe
HTN (hypertension)
Vasovagal syncope
Mass of temporal lobe
Dysphagia
Dysphagia
HTN (hypertension)
Anemia
Dysphagia
Mass of temporal lobe
Mass of temporal lobe
Anemia
Mass of temporal lobe
Mass of temporal lobe
Dysphagia
Mass of temporal lobe
Anemia
Mass of temporal lobe
Anemia
Anemia

## 2024-09-11 NOTE — PROGRESS NOTE ADULT - PROBLEM SELECTOR PLAN 6
- was on Toprol XL 25mg PO daily but unable to give via PEG  - c/w metoprolol tartrate 12.5mg BID   - c/w amlodipine 5mg daily  - monitor BP
- c/w metoprolol as above  - Home eliquis held post op last admission  - on therapeutic lovenox
s/p craniotomy 8/6 for resection hemorrhagic lesion- path- Neural tissue with reactive gliosis, hemosiderin, macrophage infiltration, vascular hyalinization, and necrosis, consistent with treatment effect; negative for tumor    Completed steroid taper per neurosurgery    - on keppra 250mg BID (was 500mg BID x 7 days post op but 250mg was home dose prior)    - CTH done 9/5 at 5AM was stable, no significant acute heme, after syncopal event.
- CTA also w/ Emphysema. New mucoid impactions within bilateral lower lungs and tree-in-bud opacities likely representing COPD exacerbation. Recommend follow-up chest CT in 3 months to determine resolution.  - clinically patient without wheeze  - c/w chest PT, incentive spirometer, acapella  - home regimen: anoro + proventil  - c/w symbicort and spiriva while admitted. can resume home inhalers on discharge
- was on Toprol XL 25mg PO daily but unable to give via PEG  - c/w metoprolol tartrate 12.5mg BID   - c/w amlodipine 5mg daily  - monitor BP
- c/w metoprolol as above  - Home eliquis held post op last admission  - now on therapeutic lovenox. transition back to eliquis on d/c if clear from neurosurgery standpoint
s/p craniotomy 8/6 for resection hemorrhagic lesion- path- Neural tissue with reactive gliosis, hemosiderin, macrophage infiltration, vascular hyalinization, and necrosis, consistent with treatment effect; negative for tumor    Completed steroid taper per neurosurgery    - on keppra 250mg BID (was 500mg BID x 7 days post op but 250mg was home dose prior)    - CTH done 9/5 at 5AM was stable, no significant acute heme, after syncopal event.
- was on Toprol XL 25mg PO daily but unable to give via PEG  - c/w metoprolol tartrate 12.5mg BID   - c/w amlodipine 5mg daily  - monitor BP
A1c 6.3, diet controlled  FS on BMP controlled off of ISS  c/w home simvastatin 20mg.
s/p craniotomy 8/6 for resection hemorrhagic lesion- path- Neural tissue with reactive gliosis, hemosiderin, macrophage infiltration, vascular hyalinization, and necrosis, consistent with treatment effect; negative for tumor    Completed steroid taper per neurosurgery    - on keppra 250mg BID (was 500mg BID x 7 days post op but 250mg was home dose prior)    - CTH done 9/5 at 5AM was stable, no significant acute heme, after syncopal event.
A1c 6.3, diet controlled  FS on BMP controlled off of ISS  c/w home simvastatin 20mg.
- c/w metoprolol as above  - Home eliquis held post op last admission  - on therapeutic lovenox. transition back to eliquis on d/c if clear from neurosurgery standpoint
- c/w metoprolol as above  - Home eliquis held post op last admission  - on therapeutic lovenox. transition back to eliquis on d/c if clear from neurosurgery standpoint
- c/w metoprolol as above  - Home eliquis held post op last admission  - on therapeutic lovenox
- c/w metoprolol as above  - Home eliquis held post op last admission  - was on therapeutic lovenox but on hold given bleeding around PEG as above  - consider transition back to eliquis at some point in the future if remains stable and cleared from neurosurgery standpoint
- c/w metoprolol as above  - Home eliquis held post op last admission  - was on therapeutic lovenox but on hold given bleeding around PEG as above  - consider transition back to eliquis at some point in the future if remains stable and cleared from neurosurgery standpoint
- c/w metoprolol as above  - Home eliquis held post op last admission  - on therapeutic lovenox
s/p craniotomy 8/6 for resection hemorrhagic lesion- path- Neural tissue with reactive gliosis, hemosiderin, macrophage infiltration, vascular hyalinization, and necrosis, consistent with treatment effect; negative for tumor    Completed steroid taper per neurosurgery    - on keppra 250mg BID (was 500mg BID x 7 days post op but 250mg was home dose prior)    - CTH done 9/5 at 5AM was stable, no significant acute heme, after syncopal event.
- was on Toprol XL 25mg PO daily but unable to give via PEG  - c/w metoprolol tartrate 12.5mg BID   - c/w amlodipine 5mg daily  - monitor BP
s/p craniotomy 8/6 for resection hemorrhagic lesion- path- Neural tissue with reactive gliosis, hemosiderin, macrophage infiltration, vascular hyalinization, and necrosis, consistent with treatment effect; negative for tumor    Completed steroid taper per neurosurgery    - on keppra 250mg BID (was 500mg BID x 7 days post op but 250mg was home dose prior)    - CTH done 9/5 at 5AM was stable, no significant acute heme, after syncopal event.

## 2024-09-11 NOTE — PROGRESS NOTE ADULT - REASON FOR ADMISSION
post-operative DVT
Post-op DVT
post-operative DVT
Venous sinus thrombosis
post-operative DVT

## 2024-09-11 NOTE — PROGRESS NOTE ADULT - NS ATTEND BILL GEN_ALL_CORE
Attending to bill
PA/NP to bill
PA/NP to bill

## 2024-09-11 NOTE — PROGRESS NOTE ADULT - PROBLEM SELECTOR PROBLEM 2
COPD without exacerbation
Pulmonary embolism
Anemia
COPD without exacerbation
Anemia
Anemia
COPD without exacerbation
Pulmonary embolism
Anemia
COPD without exacerbation
Pulmonary embolism
COPD without exacerbation
Pulmonary embolism
Anemia

## 2024-09-11 NOTE — PROGRESS NOTE ADULT - PROBLEM SELECTOR PROBLEM 8
HTN (hypertension)
Prophylactic measure
HTN (hypertension)
Prophylactic measure
Prophylactic measure
HTN (hypertension)
Type 2 diabetes mellitus
Paroxysmal atrial fibrillation
Prophylactic measure
Type 2 diabetes mellitus
Prophylactic measure
HTN (hypertension)
Prophylactic measure
HTN (hypertension)

## 2024-09-11 NOTE — PROGRESS NOTE ADULT - PROBLEM SELECTOR PLAN 7
- A1c 6.3, diet controlled  - FS on BMP controlled off of ISS  - c/w home simvastatin 20mg - mild transaminitis noted but improving on repeat
- A1c 6.3, diet controlled  - FS on BMP controlled off of ISS  - c/w home simvastatin 20mg
- A1c 6.3, diet controlled  - FS on BMP controlled off of ISS  - c/w home simvastatin 20mg
- c/w metoprolol as above  - Home eliquis held post op last admission  - was on therapeutic lovenox but on hold given bleeding around PEG and ongoing anemia  - consider transition back to eliquis at some point in the future when anemia stabilizes and cleared from neurosurgery standpoint
- A1c 6.3, diet controlled  - FS on BMP controlled off of ISS  - c/w home simvastatin 20mg - mild transaminitis noted but improving on repeat
DVT ppx: heparin gtt
- CTA also w/ Emphysema. New mucoid impactions within bilateral lower lungs and tree-in-bud opacities likely representing COPD exacerbation. Recommend follow-up chest CT in 3 months to determine resolution.  - clinically patient without wheeze  - c/w chest PT, incentive spirometer, acapella  - home regimen: anoro + proventil  - c/w symbicort and spiriva while admitted. can resume home inhalers on discharge
- A1c 6.3, diet controlled  - FS on BMP controlled off of ISS  - c/w home simvastatin 20mg - mild transaminitis noted, continue to monitor and if worsening, consider holding simvastatin.
- CTA also w/ Emphysema. New mucoid impactions within bilateral lower lungs and tree-in-bud opacities likely representing COPD exacerbation. Recommend follow-up chest CT in 3 months to determine resolution.  - clinically patient without wheeze  - c/w chest PT, incentive spirometer, acapella  - home regimen: anoro + proventil  - c/w symbicort and spiriva while admitted. can resume home inhalers on discharge
- A1c 6.3, diet controlled  - FS on BMP controlled off of ISS  - c/w home simvastatin 20mg
- c/w metoprolol as above  - Home eliquis held post op last admission  - was on therapeutic lovenox but on hold given bleeding around PEG and ongoing anemia  - plan to retrial on therapeutic lovenox with eventual transition back to eliquis when clear from neurosurgery standpoint
- CTA also w/ Emphysema. New mucoid impactions within bilateral lower lungs and tree-in-bud opacities likely representing COPD exacerbation. Recommend follow-up chest CT in 3 months to determine resolution.  - clinically patient without wheeze  - c/w chest PT, incentive spirometer, acapella  - home regimen: anoro + proventil  - c/w symbicort and spiriva while admitted. can resume home inhalers on discharge
- c/w metoprolol as above  - Home eliquis held post op last admission  - was on therapeutic lovenox but on hold given bleeding around PEG and ongoing anemia  - consider transition back to eliquis at some point in the future when anemia stabilizes and cleared from neurosurgery standpoint
- CTA also w/ Emphysema. New mucoid impactions within bilateral lower lungs and tree-in-bud opacities likely representing COPD exacerbation. Recommend follow-up chest CT in 3 months to determine resolution.  - clinically patient without wheeze  - c/w chest PT, incentive spirometer, acapella  - home regimen: anoro + proventil  - c/w symbicort and spiriva while admitted. can resume home inhalers on discharge
- was on Toprol XL 25mg PO daily but unable to give via PEG  - c/w metoprolol tartrate 12.5mg BID   - c/w amlodipine 5mg daily but can decrease to 2.5mg if BP continues to be marginal  - monitor BP
DVT ppx: FD Lovenox as above
- A1c 6.3, diet controlled  - FS on BMP controlled off of ISS  - c/w home simvastatin 20mg
- A1c 6.3, diet controlled  - FS on BMP controlled off of ISS  - c/w home simvastatin 20mg - mild transaminitis noted, continue to monitor and if worsening, consider holding simvastatin.
- c/w metoprolol as above  - Home eliquis held post op last admission  - was on therapeutic lovenox but on hold given bleeding around PEG and ongoing anemia  - consider transition back to eliquis at some point in the future when anemia stabilizes and cleared from neurosurgery standpoint
- CTA also w/ Emphysema. New mucoid impactions within bilateral lower lungs and tree-in-bud opacities likely representing COPD exacerbation. Recommend follow-up chest CT in 3 months to determine resolution.  - clinically patient without wheeze  - c/w chest PT, incentive spirometer, acapella  - home regimen: anoro + proventil  - c/w symbicort and spiriva while admitted. can resume home inhalers on discharge

## 2024-09-13 LAB
CULTURE RESULTS: SIGNIFICANT CHANGE UP
CULTURE RESULTS: SIGNIFICANT CHANGE UP
SPECIMEN SOURCE: SIGNIFICANT CHANGE UP
SPECIMEN SOURCE: SIGNIFICANT CHANGE UP

## 2024-10-15 PROCEDURE — 97535 SELF CARE MNGMENT TRAINING: CPT

## 2024-10-15 PROCEDURE — 87150 DNA/RNA AMPLIFIED PROBE: CPT

## 2024-10-15 PROCEDURE — 87086 URINE CULTURE/COLONY COUNT: CPT

## 2024-10-15 PROCEDURE — 86850 RBC ANTIBODY SCREEN: CPT

## 2024-10-15 PROCEDURE — 71045 X-RAY EXAM CHEST 1 VIEW: CPT

## 2024-10-15 PROCEDURE — 84484 ASSAY OF TROPONIN QUANT: CPT

## 2024-10-15 PROCEDURE — 83735 ASSAY OF MAGNESIUM: CPT

## 2024-10-15 PROCEDURE — 85018 HEMOGLOBIN: CPT

## 2024-10-15 PROCEDURE — 70450 CT HEAD/BRAIN W/O DYE: CPT | Mod: MC

## 2024-10-15 PROCEDURE — 82962 GLUCOSE BLOOD TEST: CPT

## 2024-10-15 PROCEDURE — 74177 CT ABD & PELVIS W/CONTRAST: CPT | Mod: MC

## 2024-10-15 PROCEDURE — 74230 X-RAY XM SWLNG FUNCJ C+: CPT

## 2024-10-15 PROCEDURE — C8929: CPT

## 2024-10-15 PROCEDURE — 97161 PT EVAL LOW COMPLEX 20 MIN: CPT

## 2024-10-15 PROCEDURE — 74019 RADEX ABDOMEN 2 VIEWS: CPT

## 2024-10-15 PROCEDURE — 87040 BLOOD CULTURE FOR BACTERIA: CPT

## 2024-10-15 PROCEDURE — 82803 BLOOD GASES ANY COMBINATION: CPT

## 2024-10-15 PROCEDURE — 86923 COMPATIBILITY TEST ELECTRIC: CPT

## 2024-10-15 PROCEDURE — C1889: CPT

## 2024-10-15 PROCEDURE — 84295 ASSAY OF SERUM SODIUM: CPT

## 2024-10-15 PROCEDURE — 87640 STAPH A DNA AMP PROBE: CPT

## 2024-10-15 PROCEDURE — C1751: CPT

## 2024-10-15 PROCEDURE — 84100 ASSAY OF PHOSPHORUS: CPT

## 2024-10-15 PROCEDURE — L8699: CPT

## 2024-10-15 PROCEDURE — 82435 ASSAY OF BLOOD CHLORIDE: CPT

## 2024-10-15 PROCEDURE — 92611 MOTION FLUOROSCOPY/SWALLOW: CPT

## 2024-10-15 PROCEDURE — 86901 BLOOD TYPING SEROLOGIC RH(D): CPT

## 2024-10-15 PROCEDURE — 93005 ELECTROCARDIOGRAM TRACING: CPT

## 2024-10-15 PROCEDURE — 36415 COLL VENOUS BLD VENIPUNCTURE: CPT

## 2024-10-15 PROCEDURE — 85027 COMPLETE CBC AUTOMATED: CPT

## 2024-10-15 PROCEDURE — 36430 TRANSFUSION BLD/BLD COMPNT: CPT

## 2024-10-15 PROCEDURE — 97530 THERAPEUTIC ACTIVITIES: CPT

## 2024-10-15 PROCEDURE — 99285 EMERGENCY DEPT VISIT HI MDM: CPT

## 2024-10-15 PROCEDURE — 74176 CT ABD & PELVIS W/O CONTRAST: CPT | Mod: MC

## 2024-10-15 PROCEDURE — 85730 THROMBOPLASTIN TIME PARTIAL: CPT

## 2024-10-15 PROCEDURE — 97116 GAIT TRAINING THERAPY: CPT

## 2024-10-15 PROCEDURE — 97165 OT EVAL LOW COMPLEX 30 MIN: CPT

## 2024-10-15 PROCEDURE — 84132 ASSAY OF SERUM POTASSIUM: CPT

## 2024-10-15 PROCEDURE — 80076 HEPATIC FUNCTION PANEL: CPT

## 2024-10-15 PROCEDURE — P9016: CPT

## 2024-10-15 PROCEDURE — 80053 COMPREHEN METABOLIC PANEL: CPT

## 2024-10-15 PROCEDURE — 71275 CT ANGIOGRAPHY CHEST: CPT | Mod: MC

## 2024-10-15 PROCEDURE — 93970 EXTREMITY STUDY: CPT

## 2024-10-15 PROCEDURE — 87077 CULTURE AEROBIC IDENTIFY: CPT

## 2024-10-15 PROCEDURE — 86900 BLOOD TYPING SEROLOGIC ABO: CPT

## 2024-10-15 PROCEDURE — 82947 ASSAY GLUCOSE BLOOD QUANT: CPT

## 2024-10-15 PROCEDURE — 81001 URINALYSIS AUTO W/SCOPE: CPT

## 2024-10-15 PROCEDURE — 92610 EVALUATE SWALLOWING FUNCTION: CPT

## 2024-10-15 PROCEDURE — 87186 SC STD MICRODIL/AGAR DIL: CPT

## 2024-10-15 PROCEDURE — 85610 PROTHROMBIN TIME: CPT

## 2024-10-15 PROCEDURE — 85025 COMPLETE CBC W/AUTO DIFF WBC: CPT

## 2024-10-15 PROCEDURE — 94640 AIRWAY INHALATION TREATMENT: CPT

## 2024-10-15 PROCEDURE — 36569 INSJ PICC 5 YR+ W/O IMAGING: CPT

## 2024-10-15 PROCEDURE — 85014 HEMATOCRIT: CPT

## 2024-10-15 PROCEDURE — 82330 ASSAY OF CALCIUM: CPT

## 2024-10-15 PROCEDURE — 87641 MR-STAPH DNA AMP PROBE: CPT

## 2024-10-15 PROCEDURE — 80048 BASIC METABOLIC PNL TOTAL CA: CPT

## 2024-10-15 PROCEDURE — 83605 ASSAY OF LACTIC ACID: CPT

## 2024-11-04 DIAGNOSIS — I82.629 ACUTE EMBOLISM AND THROMBOSIS OF DEEP VEINS OF UNSPECIFIED UPPER EXTREMITY: ICD-10-CM

## 2024-11-07 ENCOUNTER — APPOINTMENT (OUTPATIENT)
Dept: ULTRASOUND IMAGING | Facility: IMAGING CENTER | Age: 79
End: 2024-11-07
Payer: MEDICARE

## 2024-11-07 ENCOUNTER — OUTPATIENT (OUTPATIENT)
Dept: OUTPATIENT SERVICES | Facility: HOSPITAL | Age: 79
LOS: 1 days | End: 2024-11-07
Payer: MEDICARE

## 2024-11-07 ENCOUNTER — APPOINTMENT (OUTPATIENT)
Dept: CT IMAGING | Facility: IMAGING CENTER | Age: 79
End: 2024-11-07

## 2024-11-07 DIAGNOSIS — R93.89 ABNORMAL FINDINGS ON DIAGNOSTIC IMAGING OF OTHER SPECIFIED BODY STRUCTURES: ICD-10-CM

## 2024-11-07 DIAGNOSIS — Z90.411 ACQUIRED PARTIAL ABSENCE OF PANCREAS: Chronic | ICD-10-CM

## 2024-11-07 DIAGNOSIS — Z98.89 OTHER SPECIFIED POSTPROCEDURAL STATES: Chronic | ICD-10-CM

## 2024-11-07 DIAGNOSIS — Z98.890 OTHER SPECIFIED POSTPROCEDURAL STATES: Chronic | ICD-10-CM

## 2024-11-07 DIAGNOSIS — Z90.49 ACQUIRED ABSENCE OF OTHER SPECIFIED PARTS OF DIGESTIVE TRACT: Chronic | ICD-10-CM

## 2024-11-07 DIAGNOSIS — Z90.89 ACQUIRED ABSENCE OF OTHER ORGANS: Chronic | ICD-10-CM

## 2024-11-07 PROCEDURE — 71250 CT THORAX DX C-: CPT

## 2024-11-07 PROCEDURE — 93970 EXTREMITY STUDY: CPT

## 2024-11-07 PROCEDURE — 71250 CT THORAX DX C-: CPT | Mod: 26

## 2024-11-07 PROCEDURE — 93970 EXTREMITY STUDY: CPT | Mod: 26

## 2024-11-12 ENCOUNTER — APPOINTMENT (OUTPATIENT)
Dept: MRI IMAGING | Facility: IMAGING CENTER | Age: 79
End: 2024-11-12

## 2024-11-12 ENCOUNTER — APPOINTMENT (OUTPATIENT)
Dept: NEUROLOGY | Facility: CLINIC | Age: 79
End: 2024-11-12
Payer: MEDICARE

## 2024-11-12 ENCOUNTER — OUTPATIENT (OUTPATIENT)
Dept: OUTPATIENT SERVICES | Facility: HOSPITAL | Age: 79
LOS: 1 days | End: 2024-11-12
Payer: MEDICARE

## 2024-11-12 VITALS
SYSTOLIC BLOOD PRESSURE: 122 MMHG | DIASTOLIC BLOOD PRESSURE: 78 MMHG | HEIGHT: 69 IN | WEIGHT: 122 LBS | OXYGEN SATURATION: 97 % | RESPIRATION RATE: 16 BRPM | HEART RATE: 60 BPM | BODY MASS INDEX: 18.07 KG/M2 | TEMPERATURE: 98.4 F

## 2024-11-12 DIAGNOSIS — Z90.49 ACQUIRED ABSENCE OF OTHER SPECIFIED PARTS OF DIGESTIVE TRACT: Chronic | ICD-10-CM

## 2024-11-12 DIAGNOSIS — Z98.890 OTHER SPECIFIED POSTPROCEDURAL STATES: Chronic | ICD-10-CM

## 2024-11-12 DIAGNOSIS — R55 SYNCOPE AND COLLAPSE: ICD-10-CM

## 2024-11-12 DIAGNOSIS — Z90.411 ACQUIRED PARTIAL ABSENCE OF PANCREAS: Chronic | ICD-10-CM

## 2024-11-12 DIAGNOSIS — I67.89 OTHER CEREBROVASCULAR DISEASE: ICD-10-CM

## 2024-11-12 DIAGNOSIS — Z98.89 OTHER SPECIFIED POSTPROCEDURAL STATES: Chronic | ICD-10-CM

## 2024-11-12 DIAGNOSIS — G93.89 OTHER SPECIFIED DISORDERS OF BRAIN: ICD-10-CM

## 2024-11-12 DIAGNOSIS — Z90.89 ACQUIRED ABSENCE OF OTHER ORGANS: Chronic | ICD-10-CM
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
## 2024-11-12 PROCEDURE — 99214 OFFICE O/P EST MOD 30 MIN: CPT

## 2024-11-12 PROCEDURE — A9585: CPT

## 2024-11-12 PROCEDURE — 70553 MRI BRAIN STEM W/O & W/DYE: CPT

## 2024-11-12 PROCEDURE — 70553 MRI BRAIN STEM W/O & W/DYE: CPT | Mod: 26

## 2024-11-18 ENCOUNTER — APPOINTMENT (OUTPATIENT)
Dept: NEUROSURGERY | Facility: CLINIC | Age: 79
End: 2024-11-18

## 2024-11-18 VITALS
SYSTOLIC BLOOD PRESSURE: 114 MMHG | HEIGHT: 69 IN | WEIGHT: 122 LBS | HEART RATE: 66 BPM | BODY MASS INDEX: 18.07 KG/M2 | DIASTOLIC BLOOD PRESSURE: 70 MMHG | RESPIRATION RATE: 16 BRPM | OXYGEN SATURATION: 99 %

## 2024-11-18 DIAGNOSIS — Z98.890 OTHER SPECIFIED POSTPROCEDURAL STATES: ICD-10-CM

## 2024-11-18 PROCEDURE — 99214 OFFICE O/P EST MOD 30 MIN: CPT

## 2024-11-21 ENCOUNTER — APPOINTMENT (OUTPATIENT)
Dept: OTOLARYNGOLOGY | Facility: CLINIC | Age: 79
End: 2024-11-21
Payer: MEDICARE

## 2024-11-21 VITALS — HEIGHT: 69 IN | WEIGHT: 122 LBS | BODY MASS INDEX: 18.07 KG/M2

## 2024-11-21 DIAGNOSIS — H60.42 CHOLESTEATOMA OF LEFT EXTERNAL EAR: ICD-10-CM

## 2024-11-21 DIAGNOSIS — H90.3 SENSORINEURAL HEARING LOSS, BILATERAL: ICD-10-CM

## 2024-11-21 PROCEDURE — 99213 OFFICE O/P EST LOW 20 MIN: CPT

## 2024-11-21 PROCEDURE — 92504 EAR MICROSCOPY EXAMINATION: CPT

## 2024-12-06 ENCOUNTER — APPOINTMENT (OUTPATIENT)
Dept: CARDIOLOGY | Facility: CLINIC | Age: 79
End: 2024-12-06
Payer: MEDICARE

## 2024-12-06 ENCOUNTER — NON-APPOINTMENT (OUTPATIENT)
Age: 79
End: 2024-12-06

## 2024-12-06 VITALS
WEIGHT: 122 LBS | OXYGEN SATURATION: 93 % | DIASTOLIC BLOOD PRESSURE: 74 MMHG | HEART RATE: 64 BPM | SYSTOLIC BLOOD PRESSURE: 121 MMHG | BODY MASS INDEX: 18.02 KG/M2

## 2024-12-06 DIAGNOSIS — I48.91 UNSPECIFIED ATRIAL FIBRILLATION: ICD-10-CM

## 2024-12-06 DIAGNOSIS — I82.629 ACUTE EMBOLISM AND THROMBOSIS OF DEEP VEINS OF UNSPECIFIED UPPER EXTREMITY: ICD-10-CM

## 2024-12-06 DIAGNOSIS — I73.9 PERIPHERAL VASCULAR DISEASE, UNSPECIFIED: ICD-10-CM

## 2024-12-06 PROCEDURE — 99204 OFFICE O/P NEW MOD 45 MIN: CPT

## 2024-12-06 PROCEDURE — G2211 COMPLEX E/M VISIT ADD ON: CPT
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
## 2024-12-11 NOTE — PACU DISCHARGE NOTE - NAUSEA/VOMITING:
None
Patient calls and states that [facility] told her that she no longer has refills of her Hcty and she needs to call the office to retill.    Per chart, hctz was refilled on 8/26/24 #[id number].    Writer called [facility] and they state that they never received that refill.  
None

## 2024-12-17 ENCOUNTER — APPOINTMENT (OUTPATIENT)
Dept: OTOLARYNGOLOGY | Facility: CLINIC | Age: 79
End: 2024-12-17
Payer: MEDICARE

## 2024-12-17 VITALS
BODY MASS INDEX: 17.48 KG/M2 | OXYGEN SATURATION: 99 % | HEART RATE: 62 BPM | WEIGHT: 118 LBS | SYSTOLIC BLOOD PRESSURE: 120 MMHG | DIASTOLIC BLOOD PRESSURE: 75 MMHG | HEIGHT: 69 IN

## 2024-12-17 DIAGNOSIS — H69.91 UNSPECIFIED EUSTACHIAN TUBE DISORDER, RIGHT EAR: ICD-10-CM

## 2024-12-17 DIAGNOSIS — R25.2 CRAMP AND SPASM: ICD-10-CM

## 2024-12-17 DIAGNOSIS — C11.9 MALIGNANT NEOPLASM OF NASOPHARYNX, UNSPECIFIED: ICD-10-CM

## 2024-12-17 DIAGNOSIS — R13.10 DYSPHAGIA, UNSPECIFIED: ICD-10-CM

## 2024-12-17 DIAGNOSIS — H90.3 SENSORINEURAL HEARING LOSS, BILATERAL: ICD-10-CM

## 2024-12-17 PROCEDURE — 99214 OFFICE O/P EST MOD 30 MIN: CPT | Mod: 25

## 2024-12-17 PROCEDURE — 31231 NASAL ENDOSCOPY DX: CPT

## 2025-01-07 ENCOUNTER — NON-APPOINTMENT (OUTPATIENT)
Age: 80
End: 2025-01-07

## 2025-01-07 ENCOUNTER — APPOINTMENT (OUTPATIENT)
Dept: CARDIOLOGY | Facility: CLINIC | Age: 80
End: 2025-01-07
Payer: MEDICARE

## 2025-01-07 VITALS
SYSTOLIC BLOOD PRESSURE: 130 MMHG | BODY MASS INDEX: 18.9 KG/M2 | OXYGEN SATURATION: 98 % | DIASTOLIC BLOOD PRESSURE: 80 MMHG | HEART RATE: 80 BPM | HEIGHT: 69 IN | WEIGHT: 127.6 LBS | RESPIRATION RATE: 16 BRPM

## 2025-01-07 DIAGNOSIS — I48.91 UNSPECIFIED ATRIAL FIBRILLATION: ICD-10-CM

## 2025-01-07 DIAGNOSIS — I27.82 CHRONIC PULMONARY EMBOLISM: ICD-10-CM

## 2025-01-07 DIAGNOSIS — I82.629 ACUTE EMBOLISM AND THROMBOSIS OF DEEP VEINS OF UNSPECIFIED UPPER EXTREMITY: ICD-10-CM

## 2025-01-07 DIAGNOSIS — R55 SYNCOPE AND COLLAPSE: ICD-10-CM

## 2025-01-07 PROCEDURE — 93000 ELECTROCARDIOGRAM COMPLETE: CPT

## 2025-01-07 PROCEDURE — 99215 OFFICE O/P EST HI 40 MIN: CPT | Mod: 25

## 2025-01-07 RX ORDER — METOPROLOL TARTRATE 25 MG/1
25 TABLET ORAL TWICE DAILY
Refills: 0 | Status: ACTIVE | COMMUNITY
Start: 2025-01-07

## 2025-01-07 RX ORDER — ATORVASTATIN CALCIUM 10 MG/1
10 TABLET, FILM COATED ORAL
Refills: 0 | Status: ACTIVE | COMMUNITY
Start: 2025-01-07

## 2025-01-08 NOTE — PATIENT PROFILE ADULT - PACKS YRS CALCULATION
20
You can access the FollowMyHealth Patient Portal offered by [facility] by registering at the following website: [email or web address]. By joining [facility]’s FollowMyHealth portal, you will also be able to view your health information using other applications (apps) compatible with our system.

## 2025-01-15 ENCOUNTER — APPOINTMENT (OUTPATIENT)
Dept: OTOLARYNGOLOGY | Facility: CLINIC | Age: 80
End: 2025-01-15
Payer: MEDICARE

## 2025-01-15 PROCEDURE — 92610 EVALUATE SWALLOWING FUNCTION: CPT | Mod: GN

## 2025-01-23 NOTE — ASU PATIENT PROFILE, ADULT - TOBACCO USE
Called the lab they stated pt left so then I called pt daughter to update her and had to leave a voicemail for her to call the office back. (Lab work orders are now placed)   Former smoker

## 2025-01-28 ENCOUNTER — APPOINTMENT (OUTPATIENT)
Dept: OTOLARYNGOLOGY | Facility: CLINIC | Age: 80
End: 2025-01-28

## 2025-01-28 PROCEDURE — 92612 ENDOSCOPY SWALLOW (FEES) VID: CPT | Mod: GN

## 2025-02-25 ENCOUNTER — APPOINTMENT (OUTPATIENT)
Dept: OTOLARYNGOLOGY | Facility: CLINIC | Age: 80
End: 2025-02-25
Payer: MEDICARE

## 2025-02-25 PROCEDURE — 92526 ORAL FUNCTION THERAPY: CPT | Mod: GN

## 2025-02-27 NOTE — DIETITIAN INITIAL EVALUATION ADULT - FLUID ACCUMULATION
ANTICOAGULATION MANAGEMENT     Clementina Cooper 84 year old female is on warfarin with therapeutic INR result. (Goal INR 2.0-3.0)    Recent labs: (last 7 days)     02/27/25  0913   INR 2.7*       ASSESSMENT     Source(s): Chart Review and Patient/Caregiver Call     Warfarin doses taken: Booster dose with 5mg on 2/13/25, recently taken which may be affecting INR  Diet: No new diet changes identified  Medication/supplement changes: None noted  New illness, injury, or hospitalization: No  Signs or symptoms of bleeding or clotting: No  Previous result: Subtherapeutic at 1.7 on 2/13/25 due to one missed dose.  Additional findings: None       PLAN     Recommended plan for no diet, medication or health factor changes affecting INR     Dosing Instructions: Continue your current warfarin dose with next INR in 2 weeks       Summary  As of 2/27/2025      Full warfarin instructions:  5 mg every Tue, Fri; 2.5 mg all other days   Next INR check:  3/13/2025               Telephone call with Lynda who verbalizes understanding and agrees to plan    Lab visit scheduled - INR on 3/20/25 @ [facility]    Education provided: Taking warfarin: Importance of taking warfarin as instructed  Goal range and lab monitoring: goal range and significance of current result    Plan made per [facility] anticoagulation protocol    Lanie Rocha, RN  2/27/2025  Anticoagulation Clinic  [employer] for routing messages: carolina CRUZ  [facility] patient phone line: [phone]        _______________________________________________________________________     Anticoagulation Episode Summary       Current INR goal:  2.0-3.0   TTR:  86.7% (1 y)   Target end date:  Indefinite   Send INR reminders to:  RON CRUZ    Indications    Chronic atrial fibrillation (H) [I48.20]  Status post catheter ablation of atrial fibrillation [Z98.890]  PVD (peripheral vascular disease) [I73.9]  Anticoagulation goal of INR 2 to 3 [Z51.81  Z79.01]  Long-term (current) use of  anticoagulants [Z79.01] [Z79.01]             Comments:  --             Anticoagulation Care Providers       Provider Role Specialty Phone number    Estela Davila MD Referring HOSPITALIST [phone]    Enmanuel Baez DO Referring Family Medicine [phone]             No edema noted per nursing documentation.

## 2025-03-05 ENCOUNTER — APPOINTMENT (OUTPATIENT)
Dept: OTOLARYNGOLOGY | Facility: CLINIC | Age: 80
End: 2025-03-05
Payer: MEDICARE

## 2025-03-05 PROCEDURE — 92526 ORAL FUNCTION THERAPY: CPT | Mod: GN

## 2025-03-11 ENCOUNTER — APPOINTMENT (OUTPATIENT)
Dept: OTOLARYNGOLOGY | Facility: CLINIC | Age: 80
End: 2025-03-11

## 2025-03-11 PROCEDURE — 92526 ORAL FUNCTION THERAPY: CPT | Mod: GN

## 2025-03-12 ENCOUNTER — OUTPATIENT (OUTPATIENT)
Dept: OUTPATIENT SERVICES | Facility: HOSPITAL | Age: 80
LOS: 1 days | End: 2025-03-12
Payer: MEDICARE

## 2025-03-12 ENCOUNTER — APPOINTMENT (OUTPATIENT)
Dept: MRI IMAGING | Facility: IMAGING CENTER | Age: 80
End: 2025-03-12
Payer: MEDICARE

## 2025-03-12 ENCOUNTER — APPOINTMENT (OUTPATIENT)
Dept: NEUROLOGY | Facility: CLINIC | Age: 80
End: 2025-03-12
Payer: MEDICARE

## 2025-03-12 VITALS
TEMPERATURE: 98.1 F | HEIGHT: 69 IN | OXYGEN SATURATION: 98 % | DIASTOLIC BLOOD PRESSURE: 81 MMHG | WEIGHT: 127 LBS | RESPIRATION RATE: 16 BRPM | HEART RATE: 70 BPM | BODY MASS INDEX: 18.81 KG/M2 | SYSTOLIC BLOOD PRESSURE: 129 MMHG
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
## 2025-03-12 DIAGNOSIS — G93.89 OTHER SPECIFIED DISORDERS OF BRAIN: ICD-10-CM

## 2025-03-12 DIAGNOSIS — Z90.411 ACQUIRED PARTIAL ABSENCE OF PANCREAS: Chronic | ICD-10-CM

## 2025-03-12 DIAGNOSIS — R55 SYNCOPE AND COLLAPSE: ICD-10-CM

## 2025-03-12 DIAGNOSIS — Z98.890 OTHER SPECIFIED POSTPROCEDURAL STATES: Chronic | ICD-10-CM

## 2025-03-12 DIAGNOSIS — I67.89 OTHER CEREBROVASCULAR DISEASE: ICD-10-CM

## 2025-03-12 DIAGNOSIS — Z90.49 ACQUIRED ABSENCE OF OTHER SPECIFIED PARTS OF DIGESTIVE TRACT: Chronic | ICD-10-CM

## 2025-03-12 DIAGNOSIS — Z90.89 ACQUIRED ABSENCE OF OTHER ORGANS: Chronic | ICD-10-CM

## 2025-03-12 PROCEDURE — 99214 OFFICE O/P EST MOD 30 MIN: CPT

## 2025-03-12 PROCEDURE — A9585: CPT

## 2025-03-12 PROCEDURE — 70553 MRI BRAIN STEM W/O & W/DYE: CPT | Mod: 26

## 2025-03-12 PROCEDURE — 70553 MRI BRAIN STEM W/O & W/DYE: CPT

## 2025-03-13 ENCOUNTER — OUTPATIENT (OUTPATIENT)
Dept: OUTPATIENT SERVICES | Facility: HOSPITAL | Age: 80
LOS: 1 days | End: 2025-03-13
Payer: MEDICARE

## 2025-03-13 ENCOUNTER — APPOINTMENT (OUTPATIENT)
Dept: ULTRASOUND IMAGING | Facility: CLINIC | Age: 80
End: 2025-03-13

## 2025-03-13 DIAGNOSIS — Z90.49 ACQUIRED ABSENCE OF OTHER SPECIFIED PARTS OF DIGESTIVE TRACT: Chronic | ICD-10-CM

## 2025-03-13 DIAGNOSIS — Z90.89 ACQUIRED ABSENCE OF OTHER ORGANS: Chronic | ICD-10-CM

## 2025-03-13 DIAGNOSIS — Z98.89 OTHER SPECIFIED POSTPROCEDURAL STATES: Chronic | ICD-10-CM

## 2025-03-13 DIAGNOSIS — Z98.890 OTHER SPECIFIED POSTPROCEDURAL STATES: Chronic | ICD-10-CM

## 2025-03-13 DIAGNOSIS — I73.9 PERIPHERAL VASCULAR DISEASE, UNSPECIFIED: ICD-10-CM

## 2025-03-13 DIAGNOSIS — Z90.411 ACQUIRED PARTIAL ABSENCE OF PANCREAS: Chronic | ICD-10-CM

## 2025-03-13 PROCEDURE — 93970 EXTREMITY STUDY: CPT | Mod: 26

## 2025-03-13 PROCEDURE — 93925 LOWER EXTREMITY STUDY: CPT | Mod: 26

## 2025-03-13 PROCEDURE — 93925 LOWER EXTREMITY STUDY: CPT

## 2025-03-13 PROCEDURE — 93970 EXTREMITY STUDY: CPT

## 2025-03-14 ENCOUNTER — NON-APPOINTMENT (OUTPATIENT)
Age: 80
End: 2025-03-14

## 2025-03-17 ENCOUNTER — APPOINTMENT (OUTPATIENT)
Dept: NEUROSURGERY | Facility: CLINIC | Age: 80
End: 2025-03-17

## 2025-03-18 ENCOUNTER — APPOINTMENT (OUTPATIENT)
Dept: OTOLARYNGOLOGY | Facility: CLINIC | Age: 80
End: 2025-03-18
Payer: MEDICARE

## 2025-03-18 PROCEDURE — 92526 ORAL FUNCTION THERAPY: CPT | Mod: GN

## 2025-03-25 ENCOUNTER — APPOINTMENT (OUTPATIENT)
Dept: OTOLARYNGOLOGY | Facility: CLINIC | Age: 80
End: 2025-03-25
Payer: MEDICARE

## 2025-03-25 PROCEDURE — 92526 ORAL FUNCTION THERAPY: CPT | Mod: GN

## 2025-04-07 ENCOUNTER — APPOINTMENT (OUTPATIENT)
Dept: NEUROSURGERY | Facility: CLINIC | Age: 80
End: 2025-04-07

## 2025-04-07 VITALS
OXYGEN SATURATION: 97 % | DIASTOLIC BLOOD PRESSURE: 74 MMHG | BODY MASS INDEX: 18.81 KG/M2 | HEIGHT: 69 IN | SYSTOLIC BLOOD PRESSURE: 129 MMHG | WEIGHT: 127 LBS | RESPIRATION RATE: 15 BRPM | HEART RATE: 60 BPM

## 2025-04-07 PROCEDURE — 99214 OFFICE O/P EST MOD 30 MIN: CPT

## 2025-04-10 ENCOUNTER — APPOINTMENT (OUTPATIENT)
Dept: CARDIOLOGY | Facility: CLINIC | Age: 80
End: 2025-04-10
Payer: MEDICARE

## 2025-04-10 VITALS
HEART RATE: 61 BPM | OXYGEN SATURATION: 97 % | BODY MASS INDEX: 18.75 KG/M2 | SYSTOLIC BLOOD PRESSURE: 114 MMHG | DIASTOLIC BLOOD PRESSURE: 71 MMHG | WEIGHT: 127 LBS

## 2025-04-10 DIAGNOSIS — I73.9 PERIPHERAL VASCULAR DISEASE, UNSPECIFIED: ICD-10-CM

## 2025-04-10 PROCEDURE — 99214 OFFICE O/P EST MOD 30 MIN: CPT

## 2025-04-10 PROCEDURE — G2211 COMPLEX E/M VISIT ADD ON: CPT

## 2025-04-29 ENCOUNTER — APPOINTMENT (OUTPATIENT)
Dept: OTOLARYNGOLOGY | Facility: CLINIC | Age: 80
End: 2025-04-29
Payer: MEDICARE

## 2025-04-29 PROCEDURE — 92526 ORAL FUNCTION THERAPY: CPT | Mod: GN

## 2025-05-01 ENCOUNTER — NON-APPOINTMENT (OUTPATIENT)
Age: 80
End: 2025-05-01

## 2025-05-01 ENCOUNTER — APPOINTMENT (OUTPATIENT)
Dept: CARDIOLOGY | Facility: CLINIC | Age: 80
End: 2025-05-01

## 2025-05-01 VITALS
BODY MASS INDEX: 19.26 KG/M2 | DIASTOLIC BLOOD PRESSURE: 60 MMHG | SYSTOLIC BLOOD PRESSURE: 98 MMHG | WEIGHT: 130 LBS | HEIGHT: 69 IN | OXYGEN SATURATION: 95 % | HEART RATE: 59 BPM | RESPIRATION RATE: 18 BRPM

## 2025-05-01 DIAGNOSIS — I82.629 ACUTE EMBOLISM AND THROMBOSIS OF DEEP VEINS OF UNSPECIFIED UPPER EXTREMITY: ICD-10-CM

## 2025-05-01 DIAGNOSIS — E78.5 HYPERLIPIDEMIA, UNSPECIFIED: ICD-10-CM

## 2025-05-01 DIAGNOSIS — I48.91 UNSPECIFIED ATRIAL FIBRILLATION: ICD-10-CM

## 2025-05-01 DIAGNOSIS — I10 ESSENTIAL (PRIMARY) HYPERTENSION: ICD-10-CM

## 2025-05-01 DIAGNOSIS — R55 SYNCOPE AND COLLAPSE: ICD-10-CM

## 2025-05-01 PROCEDURE — 99214 OFFICE O/P EST MOD 30 MIN: CPT

## 2025-05-01 PROCEDURE — G2211 COMPLEX E/M VISIT ADD ON: CPT

## 2025-05-01 PROCEDURE — 93000 ELECTROCARDIOGRAM COMPLETE: CPT

## 2025-05-02 ENCOUNTER — APPOINTMENT (OUTPATIENT)
Dept: VASCULAR SURGERY | Facility: CLINIC | Age: 80
End: 2025-05-02
Payer: MEDICARE

## 2025-05-02 ENCOUNTER — APPOINTMENT (OUTPATIENT)
Dept: VASCULAR SURGERY | Facility: CLINIC | Age: 80
End: 2025-05-02

## 2025-05-02 VITALS
HEIGHT: 69 IN | DIASTOLIC BLOOD PRESSURE: 70 MMHG | SYSTOLIC BLOOD PRESSURE: 115 MMHG | WEIGHT: 130 LBS | BODY MASS INDEX: 19.26 KG/M2 | TEMPERATURE: 98.2 F | HEART RATE: 60 BPM

## 2025-05-02 PROCEDURE — 99213 OFFICE O/P EST LOW 20 MIN: CPT

## 2025-05-02 PROCEDURE — 93923 UPR/LXTR ART STDY 3+ LVLS: CPT

## 2025-05-05 ENCOUNTER — APPOINTMENT (OUTPATIENT)
Dept: PULMONOLOGY | Facility: CLINIC | Age: 80
End: 2025-05-05
Payer: MEDICARE

## 2025-05-05 VITALS
HEART RATE: 54 BPM | DIASTOLIC BLOOD PRESSURE: 68 MMHG | BODY MASS INDEX: 19.26 KG/M2 | HEIGHT: 69 IN | WEIGHT: 130 LBS | SYSTOLIC BLOOD PRESSURE: 107 MMHG

## 2025-05-05 DIAGNOSIS — J43.9 EMPHYSEMA, UNSPECIFIED: ICD-10-CM

## 2025-05-05 DIAGNOSIS — R13.10 DYSPHAGIA, UNSPECIFIED: ICD-10-CM

## 2025-05-05 DIAGNOSIS — R93.89 ABNORMAL FINDINGS ON DIAGNOSTIC IMAGING OF OTHER SPECIFIED BODY STRUCTURES: ICD-10-CM

## 2025-05-05 PROCEDURE — 94726 PLETHYSMOGRAPHY LUNG VOLUMES: CPT

## 2025-05-05 PROCEDURE — 94060 EVALUATION OF WHEEZING: CPT

## 2025-05-05 PROCEDURE — 99214 OFFICE O/P EST MOD 30 MIN: CPT

## 2025-05-05 PROCEDURE — 94729 DIFFUSING CAPACITY: CPT

## 2025-05-05 PROCEDURE — G2211 COMPLEX E/M VISIT ADD ON: CPT

## 2025-05-06 ENCOUNTER — APPOINTMENT (OUTPATIENT)
Dept: OTOLARYNGOLOGY | Facility: CLINIC | Age: 80
End: 2025-05-06
Payer: MEDICARE

## 2025-05-06 PROCEDURE — 92526 ORAL FUNCTION THERAPY: CPT | Mod: GN

## 2025-05-13 NOTE — PROGRESS NOTE ADULT - ASSESSMENT
ASSESSMENT: 79M hx recent admission for and R crani for lesion w/ Dr. 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 Today presents to ED for RUE doppler w/ DVT involving the subclavian, axillary and brachial veins and Superficial thrombus in the right basilic vein. Plts/coags wnl. Exam: unchanged from discharge,  awake, Ox2 (self and place), EOS, mild L facial, FC, BUE 5/5, BLE 5/5    NEURO: Neurochecks q4h   CT Head this AM prior to starting AC for DVT/PE- Postsurgical changes and chronic infarcts, without acute intracranial hemorrhage.   Continue Keppra 250 mg BID for sz ppx  Pain control w/ Tylenol prn   PT/OT, out of bed     PULM: On room air, Sat>92%   CTA PE study 8/16- Segmental PE within R upper lobe pulmonary artery. No significant CT evidence of right heart strain  CT 8/16 w/ Emphysema. New mucoid impactions within bilateral lower lungs and tree-in-bud opacities, copd. Rec repeat ct in 3 months. Incentive spirometer and Chest PT for secretion facilitation.   continue home inhalers     CV: -160   h/o HTN continue home med metoprolol     RENAL: IVL, voiding     GI: Regular diet, with mildly thick liquids. S&S eval ordered   Continue bowel regimen with miralax and senna. LBM PTA     ENDO: No active issues     HEME/ONC: H/H stable, Platelets WNL   + R subclavian, axillary and brachial DVT- Vascular cardiology consulted- Rec CTA Chest PE study which showed new R segmental PE (No RV strain), [facility] and Dr. Page Start Heparin Gtt 900U/Hr, q6h PTT Goal 55-65. Will obtain CTH when therapeutic     ID: afebrile, no leukocytosis     Discussed with   [id number]  ASSESSMENT: 79M hx recent admission for and R crani for lesion w/ Dr. 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 Today presents to ED for RUE doppler w/ DVT involving the subclavian, axillary and brachial veins and Superficial thrombus in the right basilic vein. Plts/coags wnl. Exam: unchanged from discharge,  awake, Ox2 (self and place), EOS, mild L facial, FC, BUE 5/5, BLE 5/5    NEURO: Neurochecks q4h   CT Head this AM prior to starting AC for DVT/PE- Postsurgical changes and chronic infarcts, without acute intracranial hemorrhage.   continued on decadron taper ends 8/20   Continue Keppra 250 mg BID for sz ppx  Pain control w/ Tylenol prn   Hospitalist following for co-management   PT/OT, out of bed     PULM: On room air, Sat>92%   CTA PE study 8/16- Segmental PE within R upper lobe pulmonary artery. No significant CT evidence of right heart strain  CT 8/16 w/ Emphysema. New mucoid impactions within bilateral lower lungs and tree-in-bud opacities, copd. Rec repeat ct in 3 months. Incentive spirometer and Chest PT for secretion facilitation. Acapella ordered   continue home inhalers     CV: -160   TTE 8/16 EF 71%   h/o HTN continue home med metoprolol   statin     RENAL: IVL, voiding     GI: Regular diet, with mildly thick liquids. S&S eval ordered   Continue bowel regimen with miralax and senna. LBM PTA     ENDO: No active issues     HEME/ONC: H/H stable, Platelets WNL   + R subclavian, axillary and brachial DVT- Vascular cardiology consulted- Rec CTA Chest PE study which showed new R segmental PE (No RV strain), discussed with vasc cards, Dr. Branham and Dr. Page Start Heparin Gtt 900U/Hr, q6h PTT Goal 55-65. Will obtain CTH when therapeutic PTT x2 or sooner if neurologically changed on exam     ID: afebrile, no leukocytosis     Discussed with Dr. Branham and Dr. Page   [id number]  PACU to home

## 2025-05-14 ENCOUNTER — APPOINTMENT (OUTPATIENT)
Dept: OTOLARYNGOLOGY | Facility: CLINIC | Age: 80
End: 2025-05-14
Payer: MEDICARE

## 2025-05-14 PROCEDURE — 92526 ORAL FUNCTION THERAPY: CPT | Mod: GN

## 2025-05-17 NOTE — DISCHARGE NOTE ADULT - DISCHARGE DATE
You may take over the counter Tylenol (Acetaminophen) and/or Motrin (Ibuprofen) as needed, as directed on packaging.   Be sure to get plenty of rest, and drinking fluids to remain hydrated.     Please follow up with your primary provider in the next several days. Should you have any worsening of symptoms, or lack of improvement please be re-evaluated. If needed for significant concerns, consider 911 or ER evaluation.   Strep throat is contagious. It's spread through droplets such as when you sneeze crump cough. It is also shared through shared food and drinks. You should avoid sharing any cups, utensils, drinks/food etc. It can also be picked up from surfaces which have been touched if someone sneezes into their hand then touches the surface. Once starting antibiotics strep throat usually is no longer contagious after 24-48 hours.     You should replace your toothbrush after strep throat. I recommend you replace it after 2-3 days of antibiotic use. Be sure to treat your symptoms to help you feel better faster. Making sure you get plenty of fluids, and treating any fevers with normal over the counter medications. You may also try over the counter throat spray or throat lozenges if appropriate to help sooth the throat. (Throat lozenges are not recommended in young children as they can be a choking hazard).     If tests have been performed at [facility], our office will contact you ONLY with results if changes need to be made to the care plan discussed with you at the visit.  [facility]       31-Dec-2018 01-Jan-2019

## 2025-05-20 ENCOUNTER — APPOINTMENT (OUTPATIENT)
Dept: OTOLARYNGOLOGY | Facility: CLINIC | Age: 80
End: 2025-05-20
Payer: MEDICARE

## 2025-05-20 PROCEDURE — 92526 ORAL FUNCTION THERAPY: CPT | Mod: GN

## 2025-05-28 ENCOUNTER — APPOINTMENT (OUTPATIENT)
Dept: OTOLARYNGOLOGY | Facility: CLINIC | Age: 80
End: 2025-05-28

## 2025-05-28 PROCEDURE — 92526 ORAL FUNCTION THERAPY: CPT | Mod: GN

## 2025-06-02 NOTE — PROGRESS NOTE ADULT - PROBLEM SELECTOR PLAN 4
Him/He Pt w/ HTN. Amlodopine recently discontinued  - c/w metoprolol succinate 25mg qd Pt w/ HTN. Amolodipine recently discontinued  - c/w metoprolol succinate 25mg qd

## 2025-06-05 ENCOUNTER — APPOINTMENT (OUTPATIENT)
Dept: ELECTROPHYSIOLOGY | Facility: CLINIC | Age: 80
End: 2025-06-05
Payer: MEDICARE

## 2025-06-05 ENCOUNTER — NON-APPOINTMENT (OUTPATIENT)
Age: 80
End: 2025-06-05

## 2025-06-05 PROCEDURE — 93298 REM INTERROG DEV EVAL SCRMS: CPT

## 2025-06-30 ENCOUNTER — APPOINTMENT (OUTPATIENT)
Dept: NEUROLOGY | Facility: CLINIC | Age: 80
End: 2025-06-30

## 2025-07-07 ENCOUNTER — APPOINTMENT (OUTPATIENT)
Dept: NEUROLOGY | Facility: CLINIC | Age: 80
End: 2025-07-07
Payer: MEDICARE

## 2025-07-07 VITALS
WEIGHT: 130 LBS | SYSTOLIC BLOOD PRESSURE: 124 MMHG | OXYGEN SATURATION: 97 % | HEART RATE: 72 BPM | BODY MASS INDEX: 19.26 KG/M2 | RESPIRATION RATE: 16 BRPM | TEMPERATURE: 98.4 F | HEIGHT: 69 IN | DIASTOLIC BLOOD PRESSURE: 75 MMHG

## 2025-07-07 PROCEDURE — 99214 OFFICE O/P EST MOD 30 MIN: CPT

## 2025-07-10 ENCOUNTER — NON-APPOINTMENT (OUTPATIENT)
Age: 80
End: 2025-07-10

## 2025-07-10 ENCOUNTER — APPOINTMENT (OUTPATIENT)
Dept: ELECTROPHYSIOLOGY | Facility: CLINIC | Age: 80
End: 2025-07-10

## 2025-07-10 PROCEDURE — 93298 REM INTERROG DEV EVAL SCRMS: CPT

## 2025-07-15 ENCOUNTER — APPOINTMENT (OUTPATIENT)
Dept: OTOLARYNGOLOGY | Facility: CLINIC | Age: 80
End: 2025-07-15
Payer: MEDICARE

## 2025-07-15 VITALS
DIASTOLIC BLOOD PRESSURE: 64 MMHG | WEIGHT: 130 LBS | SYSTOLIC BLOOD PRESSURE: 99 MMHG | BODY MASS INDEX: 19.26 KG/M2 | HEART RATE: 62 BPM | HEIGHT: 69 IN

## 2025-07-15 PROCEDURE — 92504 EAR MICROSCOPY EXAMINATION: CPT

## 2025-07-15 PROCEDURE — 99213 OFFICE O/P EST LOW 20 MIN: CPT

## 2025-08-12 ENCOUNTER — APPOINTMENT (OUTPATIENT)
Dept: ELECTROPHYSIOLOGY | Facility: CLINIC | Age: 80
End: 2025-08-12
Payer: MEDICARE

## 2025-08-12 ENCOUNTER — NON-APPOINTMENT (OUTPATIENT)
Age: 80
End: 2025-08-12

## 2025-08-12 PROCEDURE — 93298 REM INTERROG DEV EVAL SCRMS: CPT

## 2025-09-12 ENCOUNTER — APPOINTMENT (OUTPATIENT)
Dept: ELECTROPHYSIOLOGY | Facility: CLINIC | Age: 80
End: 2025-09-12
Payer: MEDICARE

## 2025-09-12 ENCOUNTER — NON-APPOINTMENT (OUTPATIENT)
Age: 80
End: 2025-09-12

## 2025-09-12 PROCEDURE — 93298 REM INTERROG DEV EVAL SCRMS: CPT

## (undated) DEVICE — VALVE SUCTION EVIS 160/200/240

## (undated) DEVICE — DRAPE TOWEL BLUE 17" X 24"

## (undated) DEVICE — PACK BRONCHOSCOPY

## (undated) DEVICE — SOL IRR POUR NS 0.9% 500ML

## (undated) DEVICE — SOL INJ NS 0.9% 500ML 2 PORT

## (undated) DEVICE — SUCTION YANKAUER NO CONTROL VENT

## (undated) DEVICE — NDL ASPIRATION VIZISHOT2 22G

## (undated) DEVICE — TUBING SUCTION 20FT

## (undated) DEVICE — SYR LUER LOK 10CC

## (undated) DEVICE — WARMING BLANKET LOWER ADULT

## (undated) DEVICE — SENSOR O2 FINGER ADULT

## (undated) DEVICE — CATH IV SAFE BC 22G X 1" (BLUE)

## (undated) DEVICE — TUBING IV SET GRAVITY 3Y 100" MACRO

## (undated) DEVICE — SYR LUER LOK 20CC

## (undated) DEVICE — SUTURE REMOVAL KIT

## (undated) DEVICE — ADAPTER FIBEROPTIC BRONCHOSCOPE DUAL AXIS SWIVEL

## (undated) DEVICE — DRSG TAPE TRANSPORE 1"

## (undated) DEVICE — ABDOMINAL BINDER MED/LG 9" X 36"-64"

## (undated) DEVICE — SYR LUER SLIP TIP 30CC

## (undated) DEVICE — FORCEP BIOPSY BRONCHOSCOPE DISP

## (undated) DEVICE — SYR ALLIANCE II INFLATION 60ML

## (undated) DEVICE — CATH IV SAFE BC 20G X 1.16" (PINK)

## (undated) DEVICE — STOPCOCK 4-WAY (BLUE) DISCOFIX SPIN-LOCK CONNECTOR

## (undated) DEVICE — FOLEY HOLDER STATLOCK 2 WAY ADULT

## (undated) DEVICE — DRSG CURITY GAUZE SPONGE 4 X 4" 12-PLY

## (undated) DEVICE — BALLOON SINGLE FOR BF-UC160F

## (undated) DEVICE — SOL INJ NS 0.9% 100ML

## (undated) DEVICE — MASK SURGICAL WITH EYESHIELD ANTIFOG (ORANGE)

## (undated) DEVICE — BALLOON US ENDO

## (undated) DEVICE — TRAP SPECIMEN SPUTUM 40CC

## (undated) DEVICE — BITE BLOCK ADULT 20 X 27MM (GREEN)

## (undated) DEVICE — PACK IV START WITH CHG

## (undated) DEVICE — BRUSH CYTO DISP

## (undated) DEVICE — GLV 7 PROTEXIS (WHITE)

## (undated) DEVICE — TUBING SUCTION CONN 6FT STERILE

## (undated) DEVICE — VALVE BIOPSY BRONCHOVIDEOSCOPE